# Patient Record
Sex: MALE | Race: WHITE | NOT HISPANIC OR LATINO | Employment: OTHER | URBAN - METROPOLITAN AREA
[De-identification: names, ages, dates, MRNs, and addresses within clinical notes are randomized per-mention and may not be internally consistent; named-entity substitution may affect disease eponyms.]

---

## 2017-01-16 RX ORDER — NORTRIPTYLINE HYDROCHLORIDE 25 MG/1
CAPSULE ORAL
Qty: 90 CAPSULE | Refills: 1 | OUTPATIENT
Start: 2017-01-16

## 2017-06-08 ENCOUNTER — TRANSCRIBE ORDERS (OUTPATIENT)
Dept: ADMINISTRATIVE | Facility: HOSPITAL | Age: 72
End: 2017-06-08

## 2017-06-08 DIAGNOSIS — M79.605 LEG PAIN, BILATERAL: ICD-10-CM

## 2017-06-08 DIAGNOSIS — M79.604 LEG PAIN, BILATERAL: ICD-10-CM

## 2017-06-08 DIAGNOSIS — M54.16 LUMBAR RADICULOPATHY: Primary | ICD-10-CM

## 2017-06-08 DIAGNOSIS — R29.898 RIGHT LEG WEAKNESS: ICD-10-CM

## 2017-06-12 ENCOUNTER — HOSPITAL ENCOUNTER (OUTPATIENT)
Dept: MRI IMAGING | Facility: HOSPITAL | Age: 72
Discharge: HOME | End: 2017-06-12
Admitting: PHYSICIAN ASSISTANT

## 2017-06-12 DIAGNOSIS — M79.604 LEG PAIN, BILATERAL: ICD-10-CM

## 2017-06-12 DIAGNOSIS — R29.898 RIGHT LEG WEAKNESS: ICD-10-CM

## 2017-06-12 DIAGNOSIS — M54.16 LUMBAR RADICULOPATHY: ICD-10-CM

## 2017-06-12 DIAGNOSIS — M79.605 LEG PAIN, BILATERAL: ICD-10-CM

## 2017-06-12 PROCEDURE — 72148 MRI LUMBAR SPINE W/O DYE: CPT

## 2017-06-19 PROBLEM — E78.5 HYPERLIPIDEMIA: Status: ACTIVE | Noted: 2017-06-19

## 2017-06-19 PROBLEM — F41.9 ANXIETY: Status: ACTIVE | Noted: 2017-06-19

## 2017-06-19 PROBLEM — F32.A DEPRESSION: Status: ACTIVE | Noted: 2017-06-19

## 2017-06-19 PROBLEM — M19.90 ARTHRITIS: Status: ACTIVE | Noted: 2017-06-19

## 2017-06-27 RX ORDER — LEVOTHYROXINE SODIUM 0.05 MG/1
50 TABLET ORAL DAILY
COMMUNITY
End: 2018-08-14 | Stop reason: DRUGHIGH

## 2017-06-27 RX ORDER — SERTRALINE HYDROCHLORIDE 100 MG/1
150 TABLET, FILM COATED ORAL DAILY
COMMUNITY
End: 2019-08-14 | Stop reason: SDUPTHER

## 2017-06-27 RX ORDER — ATORVASTATIN CALCIUM 20 MG/1
20 TABLET, FILM COATED ORAL DAILY
COMMUNITY
End: 2018-08-14 | Stop reason: DRUGHIGH

## 2017-06-27 RX ORDER — FLUTICASONE PROPIONATE 50 MCG
2 SPRAY, SUSPENSION (ML) NASAL DAILY
COMMUNITY
End: 2020-03-23 | Stop reason: SDUPTHER

## 2017-06-27 RX ORDER — PRAMIPEXOLE DIHYDROCHLORIDE 0.75 MG/1
0.75 TABLET ORAL 3 TIMES DAILY
COMMUNITY
End: 2017-06-28 | Stop reason: DRUGHIGH

## 2017-06-28 ENCOUNTER — OFFICE VISIT (OUTPATIENT)
Dept: NEUROSURGERY | Facility: CLINIC | Age: 72
End: 2017-06-28

## 2017-06-28 ENCOUNTER — PREP FOR SURGERY (OUTPATIENT)
Dept: OTHER | Facility: HOSPITAL | Age: 72
End: 2017-06-28

## 2017-06-28 VITALS — BODY MASS INDEX: 33.18 KG/M2 | HEIGHT: 71 IN | TEMPERATURE: 97.4 F | WEIGHT: 237 LBS

## 2017-06-28 DIAGNOSIS — M48.062 SPINAL STENOSIS, LUMBAR REGION, WITH NEUROGENIC CLAUDICATION: Primary | ICD-10-CM

## 2017-06-28 DIAGNOSIS — M47.896 OTHER OSTEOARTHRITIS OF SPINE, LUMBAR REGION: ICD-10-CM

## 2017-06-28 DIAGNOSIS — M51.36 DDD (DEGENERATIVE DISC DISEASE), LUMBAR: ICD-10-CM

## 2017-06-28 PROCEDURE — 99204 OFFICE O/P NEW MOD 45 MIN: CPT | Performed by: NEUROLOGICAL SURGERY

## 2017-06-28 RX ORDER — HYDROCODONE BITARTRATE AND ACETAMINOPHEN 5; 325 MG/1; MG/1
1 TABLET ORAL EVERY 6 HOURS PRN
COMMUNITY
Start: 2017-06-07 | End: 2017-07-14 | Stop reason: HOSPADM

## 2017-06-28 RX ORDER — PRAMIPEXOLE DIHYDROCHLORIDE 1 MG/1
1 TABLET ORAL 3 TIMES DAILY
COMMUNITY
Start: 2017-06-15 | End: 2018-11-14 | Stop reason: SDUPTHER

## 2017-06-28 RX ORDER — ACETAMINOPHEN 325 MG/1
1000 TABLET ORAL ONCE
Status: CANCELLED | OUTPATIENT
Start: 2017-06-28 | End: 2017-06-28

## 2017-06-28 RX ORDER — OXYCODONE HCL 10 MG/1
10 TABLET, FILM COATED, EXTENDED RELEASE ORAL ONCE
Status: CANCELLED | OUTPATIENT
Start: 2017-06-28 | End: 2017-06-28

## 2017-06-28 RX ORDER — SODIUM CHLORIDE 0.9 % (FLUSH) 0.9 %
1-10 SYRINGE (ML) INJECTION AS NEEDED
Status: CANCELLED | OUTPATIENT
Start: 2017-06-28

## 2017-06-28 RX ORDER — IBUPROFEN 200 MG
600 TABLET ORAL ONCE
Status: CANCELLED | OUTPATIENT
Start: 2017-06-28 | End: 2017-06-28

## 2017-06-28 RX ORDER — CHLORHEXIDINE GLUCONATE 4 G/100ML
SOLUTION TOPICAL
Qty: 120 ML | Refills: 0 | Status: SHIPPED | OUTPATIENT
Start: 2017-06-28 | End: 2017-07-10

## 2017-06-28 RX ORDER — FAMOTIDINE 10 MG
20 TABLET ORAL
Status: CANCELLED | OUTPATIENT
Start: 2017-06-28

## 2017-06-28 NOTE — PROGRESS NOTES
Patient: Cordell Martin  : 1945    Primary Care Provider: Gregor Bear MD    Requesting Provider: As above        History    Chief Complaint: Back, left hip, and leg pain with walking and standing intolerance.    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  He describes a two-month history of pain in his back that extends in the left hip and then down the side of his left calf.  He also gets pain extending into both inguinal regions.  He gets some relief from medicines and when lying down.  He is much worse when standing or walking.  Physical therapy has been pursued and was not helpful.  He has no bowel difficulties but does have some urinary urgency.  He's got to the point where he cannot stand long enough to fix a sandwhich in his kitchen.  He does have Parkinson's disease.  He does live alone.    Review of Systems   Constitutional: Positive for activity change and fatigue. Negative for appetite change, chills, diaphoresis, fever and unexpected weight change.   HENT: Positive for hearing loss, postnasal drip, tinnitus and voice change. Negative for congestion, dental problem, drooling, ear discharge, ear pain, facial swelling, mouth sores, nosebleeds, rhinorrhea, sinus pressure, sneezing, sore throat and trouble swallowing.    Eyes: Negative for photophobia, pain, discharge, redness, itching and visual disturbance.   Respiratory: Negative for apnea, cough, choking, chest tightness, shortness of breath, wheezing and stridor.    Cardiovascular: Negative for chest pain, palpitations and leg swelling.   Gastrointestinal: Negative for abdominal distention, abdominal pain, anal bleeding, blood in stool, constipation, diarrhea, nausea, rectal pain and vomiting.   Genitourinary: Positive for frequency, penile pain and urgency.   Musculoskeletal: Positive for arthralgias, back pain, myalgias and neck stiffness. Negative for gait problem, joint  "swelling and neck pain.   Skin: Negative for color change, pallor, rash and wound.   Allergic/Immunologic: Negative for environmental allergies, food allergies and immunocompromised state.   Neurological: Positive for dizziness, speech difficulty, weakness, light-headedness and numbness. Negative for tremors, seizures, syncope, facial asymmetry and headaches.   Hematological: Negative for adenopathy. Does not bruise/bleed easily.   Psychiatric/Behavioral: Positive for dysphoric mood and sleep disturbance. Negative for agitation, behavioral problems, confusion, decreased concentration, self-injury and suicidal ideas. The patient is nervous/anxious. The patient is not hyperactive.        The patient's past medical history, past surgical history, family history, and social history have been reviewed at length in the electronic medical record.    Physical Exam:   Temp 97.4 °F (36.3 °C)  Ht 71\" (180.3 cm)  Wt 237 lb (108 kg)  BMI 33.05 kg/m2  CONSTITUTIONAL: Patient is well-nourished, pleasant and appears stated age.  CV: Heart regular rate and rhythm without murmur, rub, or gallop.  PULMONARY: Lungs are clear to ascultation.  MUSCULOSKELETAL:  Straight leg raising is negative.  He does have significant hamstring tightness bilaterally.  Lemuel's Sign is negative.  ROM in back and in all directions.  Tenderness in the back to palpation is not observed.  Bilateral pill-rolling tremors are noted in his hands, right greater than left.  NEUROLOGICAL:  Orientation, memory, attention span, language function, and cognition have been examined and are intact.  Strength is intact in the lower extremities to direct testing.  Muscle tone is normal throughout.  Station and gait are somewhat stooped.  Sensation is intact to light touch testing throughout.  Deep tendon reflexes are difficult to elicit in his lower extremities.  Coordination is intact.  CRANIAL NERVES:  Cranial Nerve II:  Visual fields are full to " confrontation.  Cranial Nerve III, IV, and VI: PERRLADC. Extraocular movements are intact.  Nystagmus is not present.  Cranial Nerve V: Facial sensation is intact to light touch.  Cranial Nerve VII: Muscles of facial expression demonstate no weakness or asymmetry.  Cranial Nerve VIII: Hearing is intact to finger rub bilaterally.  Cranial Nerve IX and X: Palate elevates symmetrically.  Cranial Nerve XI: Shoulder shrug is intact bilaterally.  Cranial Nerve XII: Tongue is midline without evidence of atrophy or fasciculation.    Medical Decision Making    Data Review:   MRI of the lumbar spine demonstrates multilevel degenerative disc disease and facet arthropathy.  There is high-grade stenosis at L4-5.    Diagnosis:   The patient harbors significant L4-5 stenosis with neurogenic claudication.    Treatment Options:   I've discussed the patient's condition with him at length.  I recommended decompressive laminectomy at L4-5.  This is likely to considerably improve his pain and his mobility.  It is not a panacea for all of his back issues.  The nature of the procedure as well as the potential risks, complications, limitations, and alternatives to the procedure were discussed at length with the patient and the patient has agreed to proceed with surgery.  Given his Parkinson's disease and that he lives alone we may need to consider a short rehabilitation stay after surgery.       Diagnosis Plan   1. Spinal stenosis, lumbar region, with neurogenic claudication     2. Other osteoarthritis of spine, lumbar region     3. DDD (degenerative disc disease), lumbar             I, Dr. Trent, personally performed the services described in the documentation, as scribed in my presence, and it is both accurate and complete.  Scribed for Antonio Trent MD by Velasquez Wong CMA on 06/28/2017 at 11:38 AM

## 2017-06-28 NOTE — H&P
Patient: Cordell Martin  : 1945     Primary Care Provider: Gregor Bear MD     Requesting Provider: As above           History     Chief Complaint: Back, left hip, and leg pain with walking and standing intolerance.     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 He describes a two-month history of pain in his back that extends in the left hip and then down the side of his left calf. He also gets pain extending into both inguinal regions. He gets some relief from medicines and when lying down. He is much worse when standing or walking. Physical therapy has been pursued and was not helpful. He has no bowel difficulties but does have some urinary urgency. He's got to the point where he cannot stand long enough to fix a sandwhich in his kitchen. He does have Parkinson's disease. He does live alone.     Review of Systems   Constitutional: Positive for activity change and fatigue. Negative for appetite change, chills, diaphoresis, fever and unexpected weight change.   HENT: Positive for hearing loss, postnasal drip, tinnitus and voice change. Negative for congestion, dental problem, drooling, ear discharge, ear pain, facial swelling, mouth sores, nosebleeds, rhinorrhea, sinus pressure, sneezing, sore throat and trouble swallowing.   Eyes: Negative for photophobia, pain, discharge, redness, itching and visual disturbance.   Respiratory: Negative for apnea, cough, choking, chest tightness, shortness of breath, wheezing and stridor.   Cardiovascular: Negative for chest pain, palpitations and leg swelling.   Gastrointestinal: Negative for abdominal distention, abdominal pain, anal bleeding, blood in stool, constipation, diarrhea, nausea, rectal pain and vomiting.   Genitourinary: Positive for frequency, penile pain and urgency.   Musculoskeletal: Positive for arthralgias, back pain, myalgias and neck stiffness. Negative for gait problem, joint  swelling and neck pain.   Skin: Negative for color change, pallor, rash and wound.   Allergic/Immunologic: Negative for environmental allergies, food allergies and immunocompromised state.   Neurological: Positive for dizziness, speech difficulty, weakness, light-headedness and numbness. Negative for tremors, seizures, syncope, facial asymmetry and headaches.   Hematological: Negative for adenopathy. Does not bruise/bleed easily.   Psychiatric/Behavioral: Positive for dysphoric mood and sleep disturbance. Negative for agitation, behavioral problems, confusion, decreased concentration, self-injury and suicidal ideas. The patient is nervous/anxious. The patient is not hyperactive.         The patient's past medical history, past surgical history, family history, and social history have been reviewed at length in the electronic medical record.     Past Medical History:   Diagnosis Date   • Anxiety    • Arthritis    • Bronchitis    • Chronic kidney disease (CKD), stage III (moderate)    • Cognitive impairment    • Depression    • Glucose intolerance (impaired glucose tolerance)    • Hyperlipidemia    • IBS (irritable bowel syndrome)    • Kidney stone    • Obesity    • Parkinson disease    • Pneumonia    • Prostate disorder      Past Surgical History:   Procedure Laterality Date   • SHOULDER ROTATOR CUFF REPAIR     • TONSILLECTOMY       Family History   Problem Relation Age of Onset   • Alzheimer's disease Other    • Cancer Other    • Diabetes Other    • Heart disease Other    • Stroke Other      Social History     Social History   • Marital status:      Spouse name: N/A   • Number of children: N/A   • Years of education: N/A     Occupational History   • Not on file.     Social History Main Topics   • Smoking status: Former Smoker   • Smokeless tobacco: Not on file   • Alcohol use Yes   • Drug use: No   • Sexual activity: Not on file     Other Topics Concern   • Not on file     Social History Narrative     No  "Known Allergies    Physical Exam:   Temp 97.4 °F (36.3 °C)  Ht 71\" (180.3 cm)  Wt 237 lb (108 kg)  BMI 33.05 kg/m2  CONSTITUTIONAL: Patient is well-nourished, pleasant and appears stated age.  CV: Heart regular rate and rhythm without murmur, rub, or gallop.  PULMONARY: Lungs are clear to ascultation.  MUSCULOSKELETAL:  Straight leg raising is negative. He does have significant hamstring tightness bilaterally.  Lemuel's Sign is negative.  ROM in back and in all directions.  Tenderness in the back to palpation is not observed.  Bilateral pill-rolling tremors are noted in his hands, right greater than left.  NEUROLOGICAL:  Orientation, memory, attention span, language function, and cognition have been examined and are intact.  Strength is intact in the lower extremities to direct testing.  Muscle tone is normal throughout.  Station and gait are somewhat stooped.  Sensation is intact to light touch testing throughout.  Deep tendon reflexes are difficult to elicit in his lower extremities.  Coordination is intact.  CRANIAL NERVES:  Cranial Nerve II: Visual fields are full to confrontation.  Cranial Nerve III, IV, and VI: PERRLADC. Extraocular movements are intact. Nystagmus is not present.  Cranial Nerve V: Facial sensation is intact to light touch.  Cranial Nerve VII: Muscles of facial expression demonstate no weakness or asymmetry.  Cranial Nerve VIII: Hearing is intact to finger rub bilaterally.  Cranial Nerve IX and X: Palate elevates symmetrically.  Cranial Nerve XI: Shoulder shrug is intact bilaterally.  Cranial Nerve XII: Tongue is midline without evidence of atrophy or fasciculation.     Medical Decision Making     Data Review:   MRI of the lumbar spine demonstrates multilevel degenerative disc disease and facet arthropathy. There is high-grade stenosis at L4-5.     Diagnosis:   The patient harbors significant L4-5 stenosis with neurogenic claudication.     Treatment Options:   I've discussed the patient's " condition with him at length. I recommended decompressive laminectomy at L4-5. This is likely to considerably improve his pain and his mobility. It is not a panacea for all of his back issues. The nature of the procedure as well as the potential risks, complications, limitations, and alternatives to the procedure were discussed at length with the patient and the patient has agreed to proceed with surgery. Given his Parkinson's disease and that he lives alone we may need to consider a short rehabilitation stay after surgery.          Diagnosis Plan   1. Spinal stenosis, lumbar region, with neurogenic claudication      2. Other osteoarthritis of spine, lumbar region      3. DDD (degenerative disc disease), lumbar

## 2017-07-10 ENCOUNTER — APPOINTMENT (OUTPATIENT)
Dept: PREADMISSION TESTING | Facility: HOSPITAL | Age: 72
End: 2017-07-10

## 2017-07-10 VITALS — WEIGHT: 233.03 LBS | BODY MASS INDEX: 31.56 KG/M2 | HEIGHT: 72 IN

## 2017-07-10 LAB
DEPRECATED RDW RBC AUTO: 48.9 FL (ref 37–54)
ERYTHROCYTE [DISTWIDTH] IN BLOOD BY AUTOMATED COUNT: 13.9 % (ref 11.3–14.5)
HBA1C MFR BLD: 6.3 % (ref 4.8–5.6)
HCT VFR BLD AUTO: 43.7 % (ref 38.9–50.9)
HGB BLD-MCNC: 14.1 G/DL (ref 13.1–17.5)
MCH RBC QN AUTO: 30.7 PG (ref 27–31)
MCHC RBC AUTO-ENTMCNC: 32.3 G/DL (ref 32–36)
MCV RBC AUTO: 95.2 FL (ref 80–99)
MRSA DNA SPEC QL NAA+PROBE: NEGATIVE
PLATELET # BLD AUTO: 220 10*3/MM3 (ref 150–450)
PMV BLD AUTO: 9.1 FL (ref 6–12)
RBC # BLD AUTO: 4.59 10*6/MM3 (ref 4.2–5.76)
WBC NRBC COR # BLD: 5.56 10*3/MM3 (ref 3.5–10.8)

## 2017-07-10 PROCEDURE — 83036 HEMOGLOBIN GLYCOSYLATED A1C: CPT | Performed by: ANESTHESIOLOGY

## 2017-07-10 PROCEDURE — 87641 MR-STAPH DNA AMP PROBE: CPT | Performed by: NEUROLOGICAL SURGERY

## 2017-07-10 PROCEDURE — 85027 COMPLETE CBC AUTOMATED: CPT | Performed by: ANESTHESIOLOGY

## 2017-07-10 PROCEDURE — 93005 ELECTROCARDIOGRAM TRACING: CPT

## 2017-07-10 PROCEDURE — 36415 COLL VENOUS BLD VENIPUNCTURE: CPT

## 2017-07-10 PROCEDURE — 93010 ELECTROCARDIOGRAM REPORT: CPT | Performed by: INTERNAL MEDICINE

## 2017-07-10 RX ORDER — MELATONIN
2000 DAILY
COMMUNITY
End: 2017-12-12

## 2017-07-10 NOTE — DISCHARGE INSTRUCTIONS
The following information and instructions were given:    NPO after MN except sips of water with routine prescribed medication (except blood thinner, diabetes, or weight reducing medication) unless otherwise instructed by your physician.  Do not eat, drink, smoke or chew gum after MN the night before surgery. This also includes no mints.    DO NOT shave, wear makeup or dark nail polish.    Remove all jewelry (advised to go to jeweler if unable to remove).    Leave anything you consider valuable at home.    Leave your suitcase in the car until after your surgery.    Bring the following with you (if applicable)   -picture ID and insurance cards   -Co-pay/deductible required by insurance   -Medications in the original bottles (not a list) including all over-the-counter  medications if not brought to PAT   -Copy of advance directive, living will or power of  documents if not  brought to PAT   -CPAP or BIPAP mask and tubing (do not bring machine)   -Skin prep instructions sheet   -PAT Pass   Education booklet, brochure, handout or binder given to patient.    Pain Control After Surgery handout given to patient.    Respirex use (handout given to patient) and pneumonia prevention.    Signs and Symptoms of infection.    DVT Prevention stressing the importance of ambulation.    Patient to apply Chlorhexadine wipes to surgical area (as instructed) the night before procedure and the AM of procedure.      Lumbar book given

## 2017-07-10 NOTE — PAT
Bactroban and Chlorhexidine Prescription given during PAT visit, as well as written and verbal instructions given to patient during PAT visit.

## 2017-07-13 ENCOUNTER — ANESTHESIA (OUTPATIENT)
Dept: PERIOP | Facility: HOSPITAL | Age: 72
End: 2017-07-13

## 2017-07-13 ENCOUNTER — ANESTHESIA EVENT (OUTPATIENT)
Dept: PERIOP | Facility: HOSPITAL | Age: 72
End: 2017-07-13

## 2017-07-13 ENCOUNTER — HOSPITAL ENCOUNTER (OUTPATIENT)
Facility: HOSPITAL | Age: 72
Discharge: HOME | End: 2017-07-14
Attending: NEUROLOGICAL SURGERY | Admitting: NEUROLOGICAL SURGERY

## 2017-07-13 ENCOUNTER — APPOINTMENT (OUTPATIENT)
Dept: GENERAL RADIOLOGY | Facility: HOSPITAL | Age: 72
End: 2017-07-13

## 2017-07-13 DIAGNOSIS — M48.062 SPINAL STENOSIS, LUMBAR REGION, WITH NEUROGENIC CLAUDICATION: ICD-10-CM

## 2017-07-13 LAB
ANION GAP SERPL CALCULATED.3IONS-SCNC: 4 MMOL/L (ref 3–11)
BUN BLD-MCNC: 20 MG/DL (ref 9–23)
BUN/CREAT SERPL: 15.4 (ref 7–25)
CALCIUM SPEC-SCNC: 9.7 MG/DL (ref 8.7–10.4)
CHLORIDE SERPL-SCNC: 105 MMOL/L (ref 99–109)
CO2 SERPL-SCNC: 29 MMOL/L (ref 20–31)
CREAT BLD-MCNC: 1.3 MG/DL (ref 0.6–1.3)
GFR SERPL CREATININE-BSD FRML MDRD: 55 ML/MIN/1.73
GLUCOSE BLD-MCNC: 96 MG/DL (ref 70–100)
POTASSIUM BLD-SCNC: 4.3 MMOL/L (ref 3.5–5.5)
SODIUM BLD-SCNC: 138 MMOL/L (ref 132–146)

## 2017-07-13 PROCEDURE — A9270 NON-COVERED ITEM OR SERVICE: HCPCS | Performed by: NEUROLOGICAL SURGERY

## 2017-07-13 PROCEDURE — 25010000002 FENTANYL CITRATE (PF) 100 MCG/2ML SOLUTION: Performed by: NURSE ANESTHETIST, CERTIFIED REGISTERED

## 2017-07-13 PROCEDURE — 63710000001 FLUTICASONE 50 MCG/ACT SUSPENSION 16 G BOTTLE: Performed by: NEUROLOGICAL SURGERY

## 2017-07-13 PROCEDURE — 25010000003 CEFAZOLIN IN DEXTROSE 2-4 GM/100ML-% SOLUTION: Performed by: NEUROLOGICAL SURGERY

## 2017-07-13 PROCEDURE — 63710000001 IBUPROFEN 600 MG TABLET: Performed by: NEUROLOGICAL SURGERY

## 2017-07-13 PROCEDURE — 25010000002 PHENYLEPHRINE PER 1 ML: Performed by: NURSE ANESTHETIST, CERTIFIED REGISTERED

## 2017-07-13 PROCEDURE — 76000 FLUOROSCOPY <1 HR PHYS/QHP: CPT

## 2017-07-13 PROCEDURE — 63710000001 PRAMIPEXOLE 0.25 MG TABLET: Performed by: NEUROLOGICAL SURGERY

## 2017-07-13 PROCEDURE — 25010000002 ONDANSETRON PER 1 MG: Performed by: NURSE ANESTHETIST, CERTIFIED REGISTERED

## 2017-07-13 PROCEDURE — 63047 LAM FACETEC & FORAMOT LUMBAR: CPT | Performed by: NEUROLOGICAL SURGERY

## 2017-07-13 PROCEDURE — 80048 BASIC METABOLIC PNL TOTAL CA: CPT | Performed by: NEUROLOGICAL SURGERY

## 2017-07-13 PROCEDURE — 63710000001 FAMOTIDINE 20 MG TABLET: Performed by: NEUROLOGICAL SURGERY

## 2017-07-13 PROCEDURE — 25010000002 PROPOFOL 10 MG/ML EMULSION: Performed by: NURSE ANESTHETIST, CERTIFIED REGISTERED

## 2017-07-13 PROCEDURE — 63710000001 OXYCODONE 10 MG TABLET EXTENDED-RELEASE 12 HOUR: Performed by: NEUROLOGICAL SURGERY

## 2017-07-13 PROCEDURE — 63710000001 HYDROCODONE-ACETAMINOPHEN 7.5-325 MG TABLET: Performed by: NEUROLOGICAL SURGERY

## 2017-07-13 PROCEDURE — 25010000002 NEOSTIGMINE 10 MG/10ML SOLUTION: Performed by: NURSE ANESTHETIST, CERTIFIED REGISTERED

## 2017-07-13 PROCEDURE — 25010000002 HYDROMORPHONE PER 4 MG: Performed by: NURSE ANESTHETIST, CERTIFIED REGISTERED

## 2017-07-13 RX ORDER — LABETALOL HYDROCHLORIDE 5 MG/ML
5 INJECTION, SOLUTION INTRAVENOUS
Status: DISCONTINUED | OUTPATIENT
Start: 2017-07-13 | End: 2017-07-13 | Stop reason: HOSPADM

## 2017-07-13 RX ORDER — SENNA AND DOCUSATE SODIUM 50; 8.6 MG/1; MG/1
1 TABLET, FILM COATED ORAL NIGHTLY PRN
Status: DISCONTINUED | OUTPATIENT
Start: 2017-07-13 | End: 2017-07-14 | Stop reason: HOSPADM

## 2017-07-13 RX ORDER — PROMETHAZINE HYDROCHLORIDE 25 MG/1
25 SUPPOSITORY RECTAL ONCE AS NEEDED
Status: DISCONTINUED | OUTPATIENT
Start: 2017-07-13 | End: 2017-07-13 | Stop reason: HOSPADM

## 2017-07-13 RX ORDER — FLUTICASONE PROPIONATE 50 MCG
2 SPRAY, SUSPENSION (ML) NASAL DAILY
Status: DISCONTINUED | OUTPATIENT
Start: 2017-07-13 | End: 2017-07-14 | Stop reason: HOSPADM

## 2017-07-13 RX ORDER — IBUPROFEN 600 MG/1
600 TABLET ORAL EVERY 8 HOURS SCHEDULED
Status: DISCONTINUED | OUTPATIENT
Start: 2017-07-13 | End: 2017-07-14 | Stop reason: HOSPADM

## 2017-07-13 RX ORDER — CEFAZOLIN SODIUM 2 G/100ML
2 INJECTION, SOLUTION INTRAVENOUS EVERY 8 HOURS
Status: COMPLETED | OUTPATIENT
Start: 2017-07-13 | End: 2017-07-14

## 2017-07-13 RX ORDER — SODIUM CHLORIDE 0.9 % (FLUSH) 0.9 %
1-10 SYRINGE (ML) INJECTION AS NEEDED
Status: DISCONTINUED | OUTPATIENT
Start: 2017-07-13 | End: 2017-07-13 | Stop reason: HOSPADM

## 2017-07-13 RX ORDER — BISACODYL 10 MG
10 SUPPOSITORY, RECTAL RECTAL DAILY PRN
Status: DISCONTINUED | OUTPATIENT
Start: 2017-07-13 | End: 2017-07-14 | Stop reason: HOSPADM

## 2017-07-13 RX ORDER — FENTANYL CITRATE 50 UG/ML
50 INJECTION, SOLUTION INTRAMUSCULAR; INTRAVENOUS
Status: DISCONTINUED | OUTPATIENT
Start: 2017-07-13 | End: 2017-07-13 | Stop reason: HOSPADM

## 2017-07-13 RX ORDER — HYDRALAZINE HYDROCHLORIDE 20 MG/ML
5 INJECTION INTRAMUSCULAR; INTRAVENOUS
Status: DISCONTINUED | OUTPATIENT
Start: 2017-07-13 | End: 2017-07-13 | Stop reason: HOSPADM

## 2017-07-13 RX ORDER — NEOSTIGMINE METHYLSULFATE 1 MG/ML
INJECTION, SOLUTION INTRAVENOUS AS NEEDED
Status: DISCONTINUED | OUTPATIENT
Start: 2017-07-13 | End: 2017-07-13 | Stop reason: SURG

## 2017-07-13 RX ORDER — SODIUM CHLORIDE 0.9 % (FLUSH) 0.9 %
1-10 SYRINGE (ML) INJECTION AS NEEDED
Status: DISCONTINUED | OUTPATIENT
Start: 2017-07-13 | End: 2017-07-14 | Stop reason: HOSPADM

## 2017-07-13 RX ORDER — MAGNESIUM HYDROXIDE 1200 MG/15ML
LIQUID ORAL AS NEEDED
Status: DISCONTINUED | OUTPATIENT
Start: 2017-07-13 | End: 2017-07-13 | Stop reason: HOSPADM

## 2017-07-13 RX ORDER — MORPHINE SULFATE 4 MG/ML
4 INJECTION, SOLUTION INTRAMUSCULAR; INTRAVENOUS EVERY 4 HOURS PRN
Status: DISCONTINUED | OUTPATIENT
Start: 2017-07-13 | End: 2017-07-14 | Stop reason: HOSPADM

## 2017-07-13 RX ORDER — DIPHENHYDRAMINE HCL 25 MG
25 CAPSULE ORAL NIGHTLY PRN
Status: DISCONTINUED | OUTPATIENT
Start: 2017-07-13 | End: 2017-07-14 | Stop reason: HOSPADM

## 2017-07-13 RX ORDER — FAMOTIDINE 20 MG/1
20 TABLET, FILM COATED ORAL ONCE
Status: DISCONTINUED | OUTPATIENT
Start: 2017-07-13 | End: 2017-07-13

## 2017-07-13 RX ORDER — NALOXONE HCL 0.4 MG/ML
0.4 VIAL (ML) INJECTION
Status: DISCONTINUED | OUTPATIENT
Start: 2017-07-13 | End: 2017-07-14 | Stop reason: HOSPADM

## 2017-07-13 RX ORDER — PROMETHAZINE HYDROCHLORIDE 25 MG/ML
6.25 INJECTION, SOLUTION INTRAMUSCULAR; INTRAVENOUS ONCE AS NEEDED
Status: DISCONTINUED | OUTPATIENT
Start: 2017-07-13 | End: 2017-07-13 | Stop reason: HOSPADM

## 2017-07-13 RX ORDER — PROPOFOL 10 MG/ML
VIAL (ML) INTRAVENOUS AS NEEDED
Status: DISCONTINUED | OUTPATIENT
Start: 2017-07-13 | End: 2017-07-13 | Stop reason: SURG

## 2017-07-13 RX ORDER — ONDANSETRON 2 MG/ML
4 INJECTION INTRAMUSCULAR; INTRAVENOUS EVERY 6 HOURS PRN
Status: DISCONTINUED | OUTPATIENT
Start: 2017-07-13 | End: 2017-07-14 | Stop reason: HOSPADM

## 2017-07-13 RX ORDER — MEPERIDINE HYDROCHLORIDE 25 MG/ML
12.5 INJECTION INTRAMUSCULAR; INTRAVENOUS; SUBCUTANEOUS
Status: DISCONTINUED | OUTPATIENT
Start: 2017-07-13 | End: 2017-07-13 | Stop reason: HOSPADM

## 2017-07-13 RX ORDER — GLYCOPYRROLATE 0.2 MG/ML
INJECTION INTRAMUSCULAR; INTRAVENOUS AS NEEDED
Status: DISCONTINUED | OUTPATIENT
Start: 2017-07-13 | End: 2017-07-13 | Stop reason: SURG

## 2017-07-13 RX ORDER — OXYCODONE HCL 10 MG/1
10 TABLET, FILM COATED, EXTENDED RELEASE ORAL ONCE
Status: COMPLETED | OUTPATIENT
Start: 2017-07-13 | End: 2017-07-13

## 2017-07-13 RX ORDER — OXYCODONE HYDROCHLORIDE AND ACETAMINOPHEN 5; 325 MG/1; MG/1
1 TABLET ORAL ONCE AS NEEDED
Status: DISCONTINUED | OUTPATIENT
Start: 2017-07-13 | End: 2017-07-13 | Stop reason: HOSPADM

## 2017-07-13 RX ORDER — IBUPROFEN 600 MG/1
600 TABLET ORAL ONCE
Status: COMPLETED | OUTPATIENT
Start: 2017-07-13 | End: 2017-07-13

## 2017-07-13 RX ORDER — ACETAMINOPHEN 325 MG/1
650 TABLET ORAL EVERY 4 HOURS PRN
Status: DISCONTINUED | OUTPATIENT
Start: 2017-07-13 | End: 2017-07-14 | Stop reason: HOSPADM

## 2017-07-13 RX ORDER — ONDANSETRON 2 MG/ML
4 INJECTION INTRAMUSCULAR; INTRAVENOUS ONCE AS NEEDED
Status: DISCONTINUED | OUTPATIENT
Start: 2017-07-13 | End: 2017-07-13 | Stop reason: HOSPADM

## 2017-07-13 RX ORDER — FAMOTIDINE 10 MG/ML
20 INJECTION, SOLUTION INTRAVENOUS ONCE
Status: DISCONTINUED | OUTPATIENT
Start: 2017-07-13 | End: 2017-07-13

## 2017-07-13 RX ORDER — ROCURONIUM BROMIDE 10 MG/ML
INJECTION, SOLUTION INTRAVENOUS AS NEEDED
Status: DISCONTINUED | OUTPATIENT
Start: 2017-07-13 | End: 2017-07-13 | Stop reason: SURG

## 2017-07-13 RX ORDER — SODIUM CHLORIDE, SODIUM LACTATE, POTASSIUM CHLORIDE, CALCIUM CHLORIDE 600; 310; 30; 20 MG/100ML; MG/100ML; MG/100ML; MG/100ML
9 INJECTION, SOLUTION INTRAVENOUS CONTINUOUS
Status: DISCONTINUED | OUTPATIENT
Start: 2017-07-13 | End: 2017-07-13 | Stop reason: SDUPTHER

## 2017-07-13 RX ORDER — HYDROCODONE BITARTRATE AND ACETAMINOPHEN 5; 325 MG/1; MG/1
1 TABLET ORAL EVERY 6 HOURS PRN
Status: DISCONTINUED | OUTPATIENT
Start: 2017-07-13 | End: 2017-07-14 | Stop reason: HOSPADM

## 2017-07-13 RX ORDER — LIDOCAINE HYDROCHLORIDE 10 MG/ML
0.5 INJECTION, SOLUTION EPIDURAL; INFILTRATION; INTRACAUDAL; PERINEURAL ONCE AS NEEDED
Status: COMPLETED | OUTPATIENT
Start: 2017-07-13 | End: 2017-07-13

## 2017-07-13 RX ORDER — PROMETHAZINE HYDROCHLORIDE 25 MG/1
25 TABLET ORAL ONCE AS NEEDED
Status: DISCONTINUED | OUTPATIENT
Start: 2017-07-13 | End: 2017-07-13 | Stop reason: HOSPADM

## 2017-07-13 RX ORDER — NALOXONE HCL 0.4 MG/ML
0.4 VIAL (ML) INJECTION AS NEEDED
Status: DISCONTINUED | OUTPATIENT
Start: 2017-07-13 | End: 2017-07-13 | Stop reason: HOSPADM

## 2017-07-13 RX ORDER — HYDROCODONE BITARTRATE AND ACETAMINOPHEN 7.5; 325 MG/1; MG/1
1 TABLET ORAL EVERY 4 HOURS PRN
Status: DISCONTINUED | OUTPATIENT
Start: 2017-07-13 | End: 2017-07-14 | Stop reason: HOSPADM

## 2017-07-13 RX ORDER — HYDROMORPHONE HYDROCHLORIDE 1 MG/ML
0.5 INJECTION, SOLUTION INTRAMUSCULAR; INTRAVENOUS; SUBCUTANEOUS
Status: DISCONTINUED | OUTPATIENT
Start: 2017-07-13 | End: 2017-07-13 | Stop reason: HOSPADM

## 2017-07-13 RX ORDER — FENTANYL CITRATE 50 UG/ML
INJECTION, SOLUTION INTRAMUSCULAR; INTRAVENOUS AS NEEDED
Status: DISCONTINUED | OUTPATIENT
Start: 2017-07-13 | End: 2017-07-13 | Stop reason: SURG

## 2017-07-13 RX ORDER — SODIUM CHLORIDE, SODIUM LACTATE, POTASSIUM CHLORIDE, CALCIUM CHLORIDE 600; 310; 30; 20 MG/100ML; MG/100ML; MG/100ML; MG/100ML
90 INJECTION, SOLUTION INTRAVENOUS CONTINUOUS
Status: DISCONTINUED | OUTPATIENT
Start: 2017-07-13 | End: 2017-07-14

## 2017-07-13 RX ORDER — MORPHINE SULFATE 2 MG/ML
2 INJECTION, SOLUTION INTRAMUSCULAR; INTRAVENOUS EVERY 4 HOURS PRN
Status: DISCONTINUED | OUTPATIENT
Start: 2017-07-13 | End: 2017-07-14 | Stop reason: HOSPADM

## 2017-07-13 RX ORDER — BISACODYL 5 MG/1
5 TABLET, DELAYED RELEASE ORAL DAILY PRN
Status: DISCONTINUED | OUTPATIENT
Start: 2017-07-13 | End: 2017-07-14 | Stop reason: HOSPADM

## 2017-07-13 RX ORDER — ACETAMINOPHEN 500 MG
1000 TABLET ORAL ONCE
Status: DISCONTINUED | OUTPATIENT
Start: 2017-07-13 | End: 2017-07-13 | Stop reason: HOSPADM

## 2017-07-13 RX ORDER — ATORVASTATIN CALCIUM 20 MG/1
20 TABLET, FILM COATED ORAL DAILY
Status: DISCONTINUED | OUTPATIENT
Start: 2017-07-14 | End: 2017-07-14 | Stop reason: HOSPADM

## 2017-07-13 RX ORDER — DOCUSATE SODIUM 100 MG/1
100 CAPSULE, LIQUID FILLED ORAL 2 TIMES DAILY PRN
Status: DISCONTINUED | OUTPATIENT
Start: 2017-07-13 | End: 2017-07-14 | Stop reason: HOSPADM

## 2017-07-13 RX ORDER — CEFAZOLIN SODIUM 2 G/100ML
2 INJECTION, SOLUTION INTRAVENOUS ONCE
Status: COMPLETED | OUTPATIENT
Start: 2017-07-13 | End: 2017-07-13

## 2017-07-13 RX ORDER — PRAMIPEXOLE DIHYDROCHLORIDE 0.25 MG/1
1 TABLET ORAL EVERY 8 HOURS SCHEDULED
Status: DISCONTINUED | OUTPATIENT
Start: 2017-07-13 | End: 2017-07-14 | Stop reason: HOSPADM

## 2017-07-13 RX ORDER — FAMOTIDINE 20 MG/1
20 TABLET, FILM COATED ORAL
Status: COMPLETED | OUTPATIENT
Start: 2017-07-13 | End: 2017-07-13

## 2017-07-13 RX ORDER — IPRATROPIUM BROMIDE AND ALBUTEROL SULFATE 2.5; .5 MG/3ML; MG/3ML
3 SOLUTION RESPIRATORY (INHALATION) ONCE AS NEEDED
Status: DISCONTINUED | OUTPATIENT
Start: 2017-07-13 | End: 2017-07-13 | Stop reason: HOSPADM

## 2017-07-13 RX ORDER — ONDANSETRON 2 MG/ML
INJECTION INTRAMUSCULAR; INTRAVENOUS AS NEEDED
Status: DISCONTINUED | OUTPATIENT
Start: 2017-07-13 | End: 2017-07-13 | Stop reason: SURG

## 2017-07-13 RX ORDER — OXYCODONE AND ACETAMINOPHEN 7.5; 325 MG/1; MG/1
1 TABLET ORAL ONCE AS NEEDED
Status: DISCONTINUED | OUTPATIENT
Start: 2017-07-13 | End: 2017-07-13 | Stop reason: HOSPADM

## 2017-07-13 RX ORDER — BUPIVACAINE HYDROCHLORIDE AND EPINEPHRINE 2.5; 5 MG/ML; UG/ML
INJECTION, SOLUTION EPIDURAL; INFILTRATION; INTRACAUDAL; PERINEURAL AS NEEDED
Status: DISCONTINUED | OUTPATIENT
Start: 2017-07-13 | End: 2017-07-13 | Stop reason: HOSPADM

## 2017-07-13 RX ORDER — LEVOTHYROXINE SODIUM 0.05 MG/1
50 TABLET ORAL
Status: DISCONTINUED | OUTPATIENT
Start: 2017-07-14 | End: 2017-07-14 | Stop reason: HOSPADM

## 2017-07-13 RX ORDER — LIDOCAINE HYDROCHLORIDE 10 MG/ML
INJECTION, SOLUTION EPIDURAL; INFILTRATION; INTRACAUDAL; PERINEURAL AS NEEDED
Status: DISCONTINUED | OUTPATIENT
Start: 2017-07-13 | End: 2017-07-13 | Stop reason: SURG

## 2017-07-13 RX ADMIN — EPHEDRINE SULFATE 10 MG: 50 INJECTION INTRAMUSCULAR; INTRAVENOUS; SUBCUTANEOUS at 13:11

## 2017-07-13 RX ADMIN — IBUPROFEN 600 MG: 600 TABLET ORAL at 21:19

## 2017-07-13 RX ADMIN — ONDANSETRON 4 MG: 2 INJECTION INTRAMUSCULAR; INTRAVENOUS at 13:35

## 2017-07-13 RX ADMIN — LIDOCAINE HYDROCHLORIDE 50 MG: 10 INJECTION, SOLUTION EPIDURAL; INFILTRATION; INTRACAUDAL; PERINEURAL at 12:19

## 2017-07-13 RX ADMIN — FAMOTIDINE 20 MG: 20 TABLET ORAL at 10:45

## 2017-07-13 RX ADMIN — HYDROMORPHONE HYDROCHLORIDE: 1 INJECTION, SOLUTION INTRAMUSCULAR; INTRAVENOUS; SUBCUTANEOUS at 15:25

## 2017-07-13 RX ADMIN — PHENYLEPHRINE HYDROCHLORIDE 100 MCG: 10 INJECTION INTRAVENOUS at 12:46

## 2017-07-13 RX ADMIN — FENTANYL CITRATE 100 MCG: 50 INJECTION, SOLUTION INTRAMUSCULAR; INTRAVENOUS at 12:19

## 2017-07-13 RX ADMIN — CEFAZOLIN SODIUM 2 G: 2 INJECTION, SOLUTION INTRAVENOUS at 12:22

## 2017-07-13 RX ADMIN — HYDROCODONE BITARTRATE AND ACETAMINOPHEN 1 TABLET: 7.5; 325 TABLET ORAL at 16:46

## 2017-07-13 RX ADMIN — SODIUM CHLORIDE, POTASSIUM CHLORIDE, SODIUM LACTATE AND CALCIUM CHLORIDE 9 ML/HR: 600; 310; 30; 20 INJECTION, SOLUTION INTRAVENOUS at 10:50

## 2017-07-13 RX ADMIN — GLYCOPYRROLATE 0.6 MG: 0.2 INJECTION, SOLUTION INTRAMUSCULAR; INTRAVENOUS at 13:48

## 2017-07-13 RX ADMIN — OXYCODONE HYDROCHLORIDE 10 MG: 10 TABLET, FILM COATED, EXTENDED RELEASE ORAL at 11:04

## 2017-07-13 RX ADMIN — LIDOCAINE HYDROCHLORIDE 0.5 ML: 10 INJECTION, SOLUTION EPIDURAL; INFILTRATION; INTRACAUDAL; PERINEURAL at 10:49

## 2017-07-13 RX ADMIN — PHENYLEPHRINE HYDROCHLORIDE 100 MCG: 10 INJECTION INTRAVENOUS at 13:01

## 2017-07-13 RX ADMIN — HYDROCODONE BITARTRATE AND ACETAMINOPHEN 1 TABLET: 7.5; 325 TABLET ORAL at 20:27

## 2017-07-13 RX ADMIN — NEOSTIGMINE METHYLSULFATE 4 MG: 1 INJECTION, SOLUTION INTRAVENOUS at 13:48

## 2017-07-13 RX ADMIN — FENTANYL CITRATE: 50 INJECTION INTRAMUSCULAR; INTRAVENOUS at 14:45

## 2017-07-13 RX ADMIN — PROPOFOL 160 MG: 10 INJECTION, EMULSION INTRAVENOUS at 12:19

## 2017-07-13 RX ADMIN — GLYCOPYRROLATE 0.2 MG: 0.2 INJECTION, SOLUTION INTRAMUSCULAR; INTRAVENOUS at 13:01

## 2017-07-13 RX ADMIN — SODIUM CHLORIDE, POTASSIUM CHLORIDE, SODIUM LACTATE AND CALCIUM CHLORIDE 90 ML/HR: 600; 310; 30; 20 INJECTION, SOLUTION INTRAVENOUS at 16:47

## 2017-07-13 RX ADMIN — ROCURONIUM BROMIDE 30 MG: 10 INJECTION INTRAVENOUS at 12:19

## 2017-07-13 RX ADMIN — IBUPROFEN 600 MG: 600 TABLET ORAL at 11:04

## 2017-07-13 RX ADMIN — FENTANYL CITRATE: 50 INJECTION INTRAMUSCULAR; INTRAVENOUS at 14:25

## 2017-07-13 RX ADMIN — FLUTICASONE PROPIONATE 2 SPRAY: 50 SPRAY, METERED NASAL at 16:46

## 2017-07-13 RX ADMIN — PRAMIPEXOLE DIHYDROCHLORIDE 1 MG: 0.25 TABLET ORAL at 16:46

## 2017-07-13 RX ADMIN — CEFAZOLIN SODIUM 2 G: 2 INJECTION, SOLUTION INTRAVENOUS at 21:18

## 2017-07-13 RX ADMIN — SODIUM CHLORIDE, POTASSIUM CHLORIDE, SODIUM LACTATE AND CALCIUM CHLORIDE: 600; 310; 30; 20 INJECTION, SOLUTION INTRAVENOUS at 13:02

## 2017-07-13 RX ADMIN — PHENYLEPHRINE HYDROCHLORIDE 100 MCG: 10 INJECTION INTRAVENOUS at 12:24

## 2017-07-13 NOTE — ANESTHESIA PREPROCEDURE EVALUATION
Anesthesia Evaluation     Patient summary reviewed and Nursing notes reviewed          Airway   Mallampati: II  TM distance: >3 FB  Neck ROM: full  no difficulty expected  Dental - normal exam     Pulmonary - negative pulmonary ROS and normal exam   Cardiovascular - normal exam    (+) hyperlipidemia      Neuro/Psych    ROS Comment: Parkinsons  GI/Hepatic/Renal/Endo    (+) morbid obesity, hypothyroidism,     Musculoskeletal (-) negative ROS    Abdominal  - normal exam    Bowel sounds: normal.   Substance History - negative use     OB/GYN negative ob/gyn ROS         Other                                        Anesthesia Plan    ASA 3     general     intravenous induction   Anesthetic plan and risks discussed with patient.    Plan discussed with CRNA.

## 2017-07-13 NOTE — H&P
"Pre-Op H&P (See Recent Office Note Attached)    Chief complaint: back and leg pain L>R    HPI:    Patient is a 72 y.o. male who presents with history of 1 year of back and leg pain, worse past 2 months.  No recent fever or chills. No changes in presenting symptoms since last seen by MD.    Review of Systems:  General ROS:  no fever, chills, rashes, No change since last office visit  Cardiovascular ROS: no chest pain or dyspnea on exertion  Respiratory ROS: no cough, shortness of breath, or wheezing    Immunization History:   Influenza:  yes  Pneumococcal:  yes  Tetanus:  yes    Vital Signs:  /79 (BP Location: Right arm, Patient Position: Lying)  Pulse 71  Temp 97.7 °F (36.5 °C) (Tympanic)   Resp 18  Ht 72\" (182.9 cm)  Wt 230 lb (104 kg)  SpO2 91%  BMI 31.19 kg/m2    Physical Exam:    CV:  S1S2 regular rate and rhythm, no murmur               Resp:  Clear to auscultation; respirations regular, even and unlabored    Results Review:    I reviewed the patient's new clinical results. CBC, EKG on chart    Cancer Staging (if applicable)  Cancer Patient: __ yes _x_no __unknown; If yes, clinical stage T:__ N:__M:__, stage group or __N/A    Assessment/Plan: Lumbar spinal stenosis/For lumbar laminectomy today      JASMEET Amaya  7/13/2017 12:05 PM      "

## 2017-07-13 NOTE — ANESTHESIA PROCEDURE NOTES
Airway  Urgency: elective    Date/Time: 7/13/2017 12:28 PM  End Time:7/13/2017 12:28 PM  Airway not difficult    General Information and Staff    Patient location during procedure: OR  CRNA: SAGRARIO PANCHAL    Indications and Patient Condition  Indications for airway management: airway protection    Preoxygenated: yes  MILS maintained throughout  Mask difficulty assessment: 1 - vent by mask    Final Airway Details  Final airway type: endotracheal airway      Successful airway: ETT    Successful intubation technique: direct laryngoscopy  Facilitating devices/methods: intubating stylet  Blade: Bouchra  Blade size: #4  ETT size: 8.0 mm  Cormack-Lehane Classification: grade I - full view of glottis  Placement verified by: chest auscultation and capnometry   Measured from: lips  ETT to lips (cm): 22  Number of attempts at approach: 1

## 2017-07-13 NOTE — OP NOTE
NEUROSURGICAL OPERATIVE NOTE        PREOPERATIVE DIAGNOSIS:    1.  Lumbar spinal stenosis, L4-5  2.  Neurogenic claudication      POSTOPERATIVE DIAGNOSIS:  Same      PROCEDURE:  L4-5 laminectomy with medial facetectomies and foraminotomies      SURGEON:  Antonio Trent M.D.      ASSISTANT:  Orly Bunch PA-C      ANESTHESIA:  General      ESTIMATED BLOOD LOSS:  Minimal      SPECIMEN:  None      DRAINS:  7 flat NELY      COMPLICATIONS:  None      CLINICAL NOTE:  The patient is a 72-year-old gentleman with a history of back and leg pain with walking and standing intolerance.  His left lower extremity afflicts him more than the right.  Studies revealed generous grade stenosis at L4-5.  This is felt to be the source for his symptoms and as such he presents at this time for lumbar decompression. The nature of the procedure as well as the potential risks, complications, limitations, and alternatives to the procedure were discussed at length with the patient and the patient has agreed to proceed with surgery.      TECHNICAL NOTE:  The patient was brought to the operating room and while on his cart general anesthesia was achieved.  He was then turned prone onto the [place] saddle frame.  Special care was ensured to protect pressure points.  His low back was prepared and draped in the usual fashion.  A localizing radiograph was obtained with a spinal needle in the lumbosacral midline utilizing the C-arm.  Based on this a several centimeter vertical incision was fashioned overlying the L4-5 interspace.  Soft tissues were divided with cautery to provide exposure to the posterior spinal elements.  Another radiograph confirmed the operative level.  A Leksell washer was utilized to remove the spinous processes at L4 and the upper aspect of L5.  Midas Terence drill utilized to fashion a central trough which was then widened using drill and Kerrison punches.  The facet complexes were undercut the neural foramina were decompressed.   Bleeding points were controlled with bone wax and FloSeal.  At completion of the procedure a ball probe could easily be passed along the nerve roots into the foramina and good decompression had been achieved.  Bleeding was controlled with bone wax and FloSeal.  After irrigating the wound out a 7 flat NELY drain was brought in through a separate stab incision and left in the epidural space.  The paraspinous muscle and fascia was reapproximated in interrupted fashion with 0 Vicryl suture.  Quarter percent Marcaine was instilled into the paraspinous muscles and subcutaneous tissues.  Cutaneous tissues were closed in layers with 2-0 followed by 3-0 Vicryl suture.  Skin was closed in a running subcuticular fashion with 3-0 Vicryl suture.  Steri-Strips and sterile dressing were applied.  He was subsequently rolled onto his cart, extubated, taken recovery room in satisfactory condition.  There were no overt intraoperative complications.    Antonio Trent M.D.

## 2017-07-13 NOTE — ANESTHESIA POSTPROCEDURE EVALUATION
Patient: Cordell Martin    Procedure Summary     Date Anesthesia Start Anesthesia Stop Room / Location    07/13/17 1213  [facility] OR [facility] / [facility]       Procedure Diagnosis Surgeon Provider    LUMBAR LAMINECTOMY L4-5 (N/A Spine Lumbar) Spinal stenosis, lumbar region, with neurogenic claudication  (Spinal stenosis, lumbar region, with neurogenic claudication [M48.06]) MD Antonio Higgins MD          Anesthesia Type: general  Last vitals 7/13/17 @ 1401  BP   137/75   Temp   97.4   Pulse  91   Resp   24   SpO2   98%     Post Anesthesia Care and Evaluation    Patient location during evaluation: PACU  Patient participation: complete - patient participated  Level of consciousness: responsive to verbal stimuli  Pain score: 0  Pain management: adequate  Airway patency: patent  Anesthetic complications: No anesthetic complications    Cardiovascular status: stable  Respiratory status: acceptable, nasal cannula and spontaneous ventilation  Hydration status: stable    Comments: Pt transferred to PACU with O2. Vital signs stable. Report to PACU RN and care accepted. Moves all extremities.

## 2017-07-14 VITALS
HEART RATE: 69 BPM | OXYGEN SATURATION: 93 % | BODY MASS INDEX: 31.15 KG/M2 | SYSTOLIC BLOOD PRESSURE: 125 MMHG | TEMPERATURE: 96.2 F | WEIGHT: 230 LBS | DIASTOLIC BLOOD PRESSURE: 70 MMHG | RESPIRATION RATE: 16 BRPM | HEIGHT: 72 IN

## 2017-07-14 PROCEDURE — A9270 NON-COVERED ITEM OR SERVICE: HCPCS | Performed by: NEUROLOGICAL SURGERY

## 2017-07-14 PROCEDURE — 63710000001 LEVOTHYROXINE 50 MCG TABLET: Performed by: NEUROLOGICAL SURGERY

## 2017-07-14 PROCEDURE — 63710000001 PRAMIPEXOLE 0.25 MG TABLET: Performed by: NEUROLOGICAL SURGERY

## 2017-07-14 PROCEDURE — 25010000003 CEFAZOLIN IN DEXTROSE 2-4 GM/100ML-% SOLUTION: Performed by: NEUROLOGICAL SURGERY

## 2017-07-14 PROCEDURE — 63710000001 HYDROCODONE-ACETAMINOPHEN 7.5-325 MG TABLET: Performed by: NEUROLOGICAL SURGERY

## 2017-07-14 PROCEDURE — 63710000001 ATORVASTATIN 20 MG TABLET: Performed by: NEUROLOGICAL SURGERY

## 2017-07-14 PROCEDURE — 63710000001 SERTRALINE 100 MG TABLET: Performed by: NEUROLOGICAL SURGERY

## 2017-07-14 PROCEDURE — 63710000001 IBUPROFEN 600 MG TABLET: Performed by: NEUROLOGICAL SURGERY

## 2017-07-14 PROCEDURE — 63710000001 SERTRALINE 50 MG TABLET: Performed by: NEUROLOGICAL SURGERY

## 2017-07-14 RX ORDER — HYDROCODONE BITARTRATE AND ACETAMINOPHEN 7.5; 325 MG/1; MG/1
1 TABLET ORAL 3 TIMES DAILY PRN
Qty: 50 TABLET | Refills: 0 | Status: SHIPPED | OUTPATIENT
Start: 2017-07-14 | End: 2017-12-12

## 2017-07-14 RX ADMIN — FLUTICASONE PROPIONATE 2 SPRAY: 50 SPRAY, METERED NASAL at 08:46

## 2017-07-14 RX ADMIN — PRAMIPEXOLE DIHYDROCHLORIDE 1 MG: 0.25 TABLET ORAL at 14:14

## 2017-07-14 RX ADMIN — SERTRALINE 150 MG: 100 TABLET, FILM COATED ORAL at 08:46

## 2017-07-14 RX ADMIN — PRAMIPEXOLE DIHYDROCHLORIDE 1 MG: 0.25 TABLET ORAL at 05:31

## 2017-07-14 RX ADMIN — HYDROCODONE BITARTRATE AND ACETAMINOPHEN 1 TABLET: 7.5; 325 TABLET ORAL at 08:46

## 2017-07-14 RX ADMIN — CEFAZOLIN SODIUM 2 G: 2 INJECTION, SOLUTION INTRAVENOUS at 05:31

## 2017-07-14 RX ADMIN — IBUPROFEN 600 MG: 600 TABLET ORAL at 05:31

## 2017-07-14 RX ADMIN — IBUPROFEN 600 MG: 600 TABLET ORAL at 14:14

## 2017-07-14 RX ADMIN — LEVOTHYROXINE SODIUM 50 MCG: 50 TABLET ORAL at 05:31

## 2017-07-14 RX ADMIN — ATORVASTATIN CALCIUM 20 MG: 20 TABLET, FILM COATED ORAL at 08:46

## 2017-07-14 NOTE — PLAN OF CARE
Problem: Patient Care Overview (Adult)  Goal: Plan of Care Review  Outcome: Ongoing (interventions implemented as appropriate)    07/14/17 0409   Coping/Psychosocial Response Interventions   Plan Of Care Reviewed With patient   Outcome Evaluation   Outcome Summary/Follow up Plan rested well this shift. up with assist. no falls or injuries. o2 sat on room air 79% o2 at 2liters per nc applied and up to 94%. pain controlled well with pain meds given       Goal: Adult Individualization and Mutuality  Outcome: Ongoing (interventions implemented as appropriate)  Goal: Discharge Needs Assessment  Outcome: Ongoing (interventions implemented as appropriate)    Problem: Perioperative Period (Adult)  Goal: Signs and Symptoms of Listed Potential Problems Will be Absent or Manageable (Perioperative Period)  Outcome: Ongoing (interventions implemented as appropriate)    Problem: Laminectomy/Foraminotomy/Discectomy (Adult)  Goal: Signs and Symptoms of Listed Potential Problems Will be Absent or Manageable (Laminectomy/Foraminotomy/Discectomy)  Outcome: Ongoing (interventions implemented as appropriate)

## 2017-07-14 NOTE — PROGRESS NOTES
Discharge Planning Assessment  [facility]     Patient Name: Cordell Martin  MRN: [id number]  Today's Date: 7/14/2017    Admit Date: 7/13/2017          Discharge Needs Assessment       07/14/17 1034    Living Environment    Lives With alone    Living Arrangements house    Home Accessibility bed and bath on same level;stairs to enter home    Number of Stairs to Enter Home 1    Transportation Available car;family or friend will provide    Living Environment    Quality Of Family Relationships supportive    Able to Return to Prior Living Arrangements yes    Living Arrangement Comments Mr. Martin lives alone in a one story home in [facility].  He has a sister that lives out of town that will be staying with him until this coming Sunday to assist him as needed.  He also has a neighbor that can assist him if he requests help.    Discharge Needs Assessment    Readmission Within The Last 30 Days no previous admission in last 30 days    Outpatient/Agency/Support Group Needs homecare agency (specify level of care)    Community Agency Name(S) --   [facility],     Equipment Currently Used at Home walker, rolling;rollator;wheelchair   Mr. Martin has access to this DME that was his Mother's.  He has not been using prior to admission.    Equipment Needed After Discharge none    Discharge Disposition home healthcare service    Discharge Contact Information if Applicable Sister, Lennie,  [phone]            Discharge Plan       07/14/17 1040    Case Management/Social Work Plan    Plan Home with [facility]    Patient/Family In Agreement With Plan yes    Additional Comments Met with Mr. Martin at the bedside to discuss discharge planning.  Mr. Martin lives alone and will have some assistance from a neighbor, but only if he requests it.  His sister, Lennie, who lives in [place] is visting and will be transporting him home and staying to assist until this Sunday.  Discussed home health and Mr. Martin requested  [facility], [place], operated by [facility].  Referred patient to Alfredo with [facility].  Mr. Martin's sister will be transporting him home today by car.  CM will continue to follow.        Discharge Placement     No information found        Expected Discharge Date and Time     Expected Discharge Date Expected Discharge Time    Jul 14, 2017               Demographic Summary       07/14/17 1030    Referral Information    Admission Type outpatient in a bed    Arrived From home or self-care    Reason For Consult discharge planning    Record Reviewed clinical discipline documentation;history and physical;medical record    Contact Information    Permission Granted to Share Information With     Primary Care Physician Information    Name Gregor Bear MD            Functional Status       07/14/17 1032    Functional Status Prior    Ambulation 0-->independent    Transferring 0-->independent    Toileting 0-->independent    Bathing 0-->independent    Dressing 0-->independent    Communication 0-->understands/communicates without difficulty    Swallowing 0-->swallows foods/liquids without difficulty    IADL    Medications independent    Meal Preparation independent    Housekeeping independent    Laundry independent    Shopping independent    Oral Care independent    Activity Tolerance    Current Activity Limitations --   See PT notes    Usual Activity Tolerance good    Cognitive/Perceptual/Developmental    Current Mental Status/Cognitive Functioning no deficits noted    Employment/Financial    Employment/Finance Comments Mr. Martin has prescription drug coverage with Medicare and [employer].  Verified insurance with patient.  He fills scripts at [facility].            Psychosocial     None            Abuse/Neglect     None            Legal       07/14/17 1034    Legal    Legal Comments No advance directives.            Substance Abuse     None            Patient Forms     None          Ute Diallo

## 2017-07-14 NOTE — PROGRESS NOTES
"NEUROSURGERY PROGRESS NOTE     LOS: 0 days   Patient Care Team:  Gregor Bear MD as PCP - General  Gregor Bear MD as Referring Physician (Internal Medicine)    Chief Complaint: Back and left leg pain with walking and standing intolerance.    POD#: 1 Day Post-Op  Procedures:  4-5 laminectomy.    Interval History:   The patient is voiding independently and has ambulated in the mata.  Patient Complaints: Incisional pain.  Patient Denies: Preoperative back and left hip pain is absent.    Vital Signs: Blood pressure 107/72, pulse 60, temperature 96.9 °F (36.1 °C), temperature source Tympanic, resp. rate 16, height 72\" (182.9 cm), weight 230 lb (104 kg), SpO2 96 %.  Intake/Output:   Intake/Output Summary (Last 24 hours) at 07/14/17 0736  Last data filed at 07/14/17 0430   Gross per 24 hour   Intake             1200 ml   Output              980 ml   Net              220 ml     Drain output: 80/100 mL.    Physical Exam:  Patient is awake, alert, and appropriate.  Motor and sensory function are intact.  Dry dressing is in place on his incision.     Assessment/Plan:  1.  Lumbar stenosis with neurogenic claudication status post L4-5 decompression.  2.  Disposition: Home later today with home health.      Antonio Trent MD  07/14/17  7:36 AM    "

## 2017-07-18 ENCOUNTER — TELEPHONE (OUTPATIENT)
Dept: NEUROSURGERY | Facility: CLINIC | Age: 72
End: 2017-07-18

## 2017-07-18 NOTE — TELEPHONE ENCOUNTER
Provider:  Twan  Caller: Erica   Time of call:   9:41am  Phone #:  [phone]  Surgery: LUMBAR LAMINECTOMY L4-5   Surgery Date:  7/13/17  Last visit:   6/28/17  Next visit: 8/4/17        Reason for call:         Erica from [facility] called to let us know that she visited the pt this morning and he has not been wearing his BAYRON hose and is not on any blood thinner.  Pt has no one living with him and is alone.  Erica wanted to know if she needed to make sure he is definitely wearing his BAYRON hose.

## 2017-07-28 ENCOUNTER — OFFICE VISIT (OUTPATIENT)
Dept: NEUROLOGY | Facility: CLINIC | Age: 72
End: 2017-07-28

## 2017-07-28 VITALS
SYSTOLIC BLOOD PRESSURE: 124 MMHG | DIASTOLIC BLOOD PRESSURE: 76 MMHG | HEIGHT: 72 IN | WEIGHT: 230 LBS | BODY MASS INDEX: 31.15 KG/M2

## 2017-07-28 DIAGNOSIS — G20 PARKINSON'S DISEASE (HCC): Primary | ICD-10-CM

## 2017-07-28 PROBLEM — G20.A1 PARKINSON'S DISEASE: Status: ACTIVE | Noted: 2017-07-28

## 2017-07-28 PROCEDURE — 99214 OFFICE O/P EST MOD 30 MIN: CPT | Performed by: PSYCHIATRY & NEUROLOGY

## 2017-07-28 RX ORDER — AMANTADINE HYDROCHLORIDE 100 MG/1
100 CAPSULE, GELATIN COATED ORAL 2 TIMES DAILY
Qty: 60 CAPSULE | Refills: 6 | Status: SHIPPED | OUTPATIENT
Start: 2017-07-28 | End: 2017-12-12

## 2017-07-28 NOTE — PROGRESS NOTES
"Cordell Martin    Subjective     CC: tremor    History of Present Illness   Cordell Martin returns to clinic today for evaluation of tremor. He has noted an worsening tremor of the R>L hand over the last couple years (since about 2015). He notes occasional associated imbalance, but no other associated symptoms. He is taking Mirapex at 1 mg tid without clear benefit.      The following portions of the patient's history were reviewed and updated as appropriate: allergies, current medications, past family history, past medical history, past social history, past surgical history and problem list.    Review of Systems   Constitutional: Negative.    Respiratory: Negative.    Cardiovascular: Negative.    Gastrointestinal: Negative.    Genitourinary: Positive for urgency.   Musculoskeletal: Negative.        Objective     /76  Ht 72\" (182.9 cm)  Wt 230 lb (104 kg)  BMI 31.19 kg/m2     Neurologic Exam     Mental Status   Oriented to person, place, and time.   Registration: recalls 3 of 3 objects. Recall at 5 minutes: recalls 3 of 3 objects. Follows 3 step commands.   Attention: normal.   Speech: speech is normal   Level of consciousness: alert  Knowledge: good.   Normal comprehension.     Cranial Nerves   Cranial nerves II through XII intact.        Very mild hypomimia     Motor Exam   Muscle bulk: normal  Overall muscle tone: normal    Strength   Strength 5/5 throughout.     Sensory Exam   Light touch normal.     Gait, Coordination, and Reflexes     Gait  Gait: normal    Coordination   Finger to nose coordination: normal    Tremor   Resting tremor: present (primarily on the right)        Assessment/Plan   Cordell was seen today for parkinson's disease.    Diagnoses and all orders for this visit:    Parkinson's disease  -     CT Head Without Contrast    Other orders  -     amantadine (SYMMETREL) 100 MG capsule; Take 1 capsule by mouth 2 (Two) Times a Day.          Cordell Martin returns to clinic today with a history " of tremor and examination suggestive of PD. I discussed this in some detail. I would like to obtain a CT scan to rule out hydrocephalus or other intracranial abnormality. For treatment, we will try adding amantadine. I discussed potential side effects. If he does well, then we may be able to taper his Mirapex. Sinemet is another consideration if necessary in the future.  He will then follow up in 3 month, or sooner if needed.     As part of this visit I reviewed records from prior hospitalizations which is incorporated in the HPI.    Chalo Cavanaugh MD

## 2017-08-02 ENCOUNTER — HOSPITAL ENCOUNTER (OUTPATIENT)
Dept: CT IMAGING | Facility: HOSPITAL | Age: 72
Discharge: HOME | End: 2017-08-02
Attending: PSYCHIATRY & NEUROLOGY | Admitting: PSYCHIATRY & NEUROLOGY

## 2017-08-02 PROCEDURE — 70450 CT HEAD/BRAIN W/O DYE: CPT

## 2017-08-04 ENCOUNTER — OFFICE VISIT (OUTPATIENT)
Dept: NEUROSURGERY | Facility: CLINIC | Age: 72
End: 2017-08-04

## 2017-08-04 VITALS
HEIGHT: 72 IN | WEIGHT: 229 LBS | BODY MASS INDEX: 31.02 KG/M2 | DIASTOLIC BLOOD PRESSURE: 70 MMHG | TEMPERATURE: 97.5 F | SYSTOLIC BLOOD PRESSURE: 116 MMHG

## 2017-08-04 DIAGNOSIS — Z98.890 STATUS POST LUMBAR LAMINECTOMY: ICD-10-CM

## 2017-08-04 DIAGNOSIS — M48.062 SPINAL STENOSIS, LUMBAR REGION, WITH NEUROGENIC CLAUDICATION: Primary | ICD-10-CM

## 2017-08-04 PROCEDURE — 99024 POSTOP FOLLOW-UP VISIT: CPT | Performed by: PHYSICIAN ASSISTANT

## 2017-08-04 NOTE — PATIENT INSTRUCTIONS
Continue to advance activity as tolerated.    Limit bending, lifting, and twisting of the lower back for 3-6 months.    Limit vigorous activites, such as running or other sports.

## 2017-08-04 NOTE — PROGRESS NOTES
[facility]    Patient: Cordell Martin      Date: 17  : 1945   MRN: [id number]    PCP: Gregor Bear MD  Patient Care Team:  Gregor Bear MD as PCP - General  Gregor Bear MD as Referring Physician (Internal Medicine)      SURGERY:  L4-5 laminectomy with medial facetectomies and foraminotomies     2017     CHIEF COMPLIANT:  PostOp wound check    HISTORY OF PRESENT ILLNESS  Mr Martin is a pleasant 72 y.o. gentleman with a hx a Parkinson's Dz who presents today for postop follow up.  Preoperatively, he had back, left hip, inguinal, and leg pain with walking and standing intolerance.  Today, he reports significant improvement of his preoperative symptoms and no pain in his knees.  He reports some incisional pain.  He walks his dog daily and does his exercises at home.  He lives at home alone with his dog. No fevers, chills, headaches, wound drainage, B/B changes, or new pain or weakness.    Review of Systems   Constitutional: Negative for activity change, appetite change, chills, diaphoresis, fatigue, fever and unexpected weight change.   HENT: Positive for trouble swallowing. Negative for congestion, dental problem, drooling, ear discharge, ear pain, facial swelling, hearing loss, mouth sores, nosebleeds, postnasal drip, rhinorrhea, sinus pressure, sneezing, sore throat, tinnitus and voice change.    Eyes: Negative for photophobia, pain, discharge, redness, itching and visual disturbance.   Respiratory: Negative for apnea, cough, choking, chest tightness, shortness of breath, wheezing and stridor.    Cardiovascular: Negative for chest pain, palpitations and leg swelling.   Gastrointestinal: Negative for abdominal distention, abdominal pain, anal bleeding, blood in stool, constipation, diarrhea, nausea, rectal pain and vomiting.   Endocrine: Negative for cold intolerance, heat intolerance, polydipsia, polyphagia and  polyuria.   Genitourinary: Negative for decreased urine volume, difficulty urinating, dysuria, enuresis, flank pain, frequency, genital sores, hematuria and urgency.   Musculoskeletal: Positive for back pain. Negative for arthralgias, gait problem, joint swelling, myalgias, neck pain and neck stiffness.   Skin: Negative for color change, pallor, rash and wound.   Allergic/Immunologic: Negative for environmental allergies, food allergies and immunocompromised state.   Neurological: Positive for tremors and light-headedness. Negative for dizziness, seizures, syncope, facial asymmetry, speech difficulty, weakness, numbness and headaches.   Hematological: Negative for adenopathy. Does not bruise/bleed easily.   Psychiatric/Behavioral: Positive for dysphoric mood. Negative for agitation, behavioral problems, confusion, decreased concentration, hallucinations, self-injury, sleep disturbance and suicidal ideas. The patient is not nervous/anxious and is not hyperactive.         The patient's past medical history, past surgical history, family history, and social history have been reviewed at length in the electronic medical record.      Past Medical History:   Diagnosis Date   • Anxiety    • Arthritis    • Back pain    • Bronchitis    • Cognitive impairment    • Depression    • Disease of thyroid gland    • Glucose intolerance (impaired glucose tolerance)    • Hyperlipidemia    • IBS (irritable bowel syndrome)    • Kidney stone    • Obesity    • Parkinson disease    • Pneumonia    • Prostate disorder    • Wears eyeglasses      Past Surgical History:   Procedure Laterality Date   • COLONOSCOPY      5 years ago   • LUMBAR LAMINECTOMY DISCECTOMY DECOMPRESSION N/A 7/13/2017    Procedure: LUMBAR LAMINECTOMY L4-5;  Surgeon: Antonio Trent MD;  Location: [facility];  Service:    • SHOULDER ROTATOR CUFF REPAIR Right     x2   • TONSILLECTOMY       Family History   Problem Relation Age of Onset   • Alzheimer's disease Other    •  Cancer Other    • Diabetes Other    • Heart disease Other    • Stroke Other      Social History     Social History   • Marital status:      Spouse name: N/A   • Number of children: N/A   • Years of education: N/A     Occupational History   • Not on file.     Social History Main Topics   • Smoking status: Former Smoker     Types: Cigarettes   • Smokeless tobacco: Never Used      Comment: quit 1980   • Alcohol use Yes      Comment: occasionally   • Drug use: No   • Sexual activity: Defer     Other Topics Concern   • Not on file     Social History Narrative     Outpatient Prescriptions Marked as Taking for the 8/4/17 encounter (Office Visit) with Orly Bunch PA-C   Medication Sig Dispense Refill   • amantadine (SYMMETREL) 100 MG capsule Take 1 capsule by mouth 2 (Two) Times a Day. 60 capsule 6   • atorvastatin (LIPITOR) 20 MG tablet Take 20 mg by mouth Daily.     • cholecalciferol (VITAMIN D3) 1000 UNITS tablet Take 2,000 Units by mouth Daily.     • fluticasone (FLONASE) 50 MCG/ACT nasal spray 2 sprays into each nostril Daily.     • HYDROcodone-acetaminophen (NORCO) 7.5-325 MG per tablet Take 1 tablet by mouth 3 (Three) Times a Day As Needed for Moderate Pain  (Pain). 50 tablet 0   • levothyroxine (SYNTHROID, LEVOTHROID) 50 MCG tablet Take 50 mcg by mouth Daily.     • pramipexole (MIRAPEX) 1 MG tablet Take 1 mg by mouth 3 (Three) Times a Day.     • sertraline (ZOLOFT) 100 MG tablet Take 150 mg by mouth Daily.         Current Outpatient Prescriptions:   •  amantadine (SYMMETREL) 100 MG capsule, Take 1 capsule by mouth 2 (Two) Times a Day., Disp: 60 capsule, Rfl: 6  •  atorvastatin (LIPITOR) 20 MG tablet, Take 20 mg by mouth Daily., Disp: , Rfl:   •  cholecalciferol (VITAMIN D3) 1000 UNITS tablet, Take 2,000 Units by mouth Daily., Disp: , Rfl:   •  fluticasone (FLONASE) 50 MCG/ACT nasal spray, 2 sprays into each nostril Daily., Disp: , Rfl:   •  HYDROcodone-acetaminophen (NORCO) 7.5-325 MG per tablet, Take 1  "tablet by mouth 3 (Three) Times a Day As Needed for Moderate Pain  (Pain)., Disp: 50 tablet, Rfl: 0  •  levothyroxine (SYNTHROID, LEVOTHROID) 50 MCG tablet, Take 50 mcg by mouth Daily., Disp: , Rfl:   •  pramipexole (MIRAPEX) 1 MG tablet, Take 1 mg by mouth 3 (Three) Times a Day., Disp: , Rfl:   •  sertraline (ZOLOFT) 100 MG tablet, Take 150 mg by mouth Daily., Disp: , Rfl:   Allergies as of 08/04/2017   • (No Known Allergies)       PHYSICAL EXAM      Vitals: /70  Temp 97.5 °F (36.4 °C)  Ht 72\" (182.9 cm)  Wt 229 lb (104 kg)  BMI 31.06 kg/m2   General Appearance:   WDWN, alert, and cooperative. NAD.   HEENT: NCAT   Chest Breathing unlabored.   Mood: Appropriate mood and affect.   Skin:     Midline lumbar incision is well approximated at appropriate stage of healing without warmth, drainage, or erythema.   Pill rolling tremor.  Bradykinesia c/w Parkinson's     Medical Decision Making  ASSESSMENT  1. Lumbar stenosis with neurogenic claudication status post L4-5 decompression    2. Status post lumbar laminectomy        PLAN  Cordell presents today for postop follow-up.  He is doing very well postoperatively.  His incision is healing well with no s/s of infection.    Patient Instructions   Continue to advance activity as tolerated.    Limit bending, lifting, and twisting of the lower back  Limit vigorous activities.   Can take ibuprofen 400mg BID PRN for incisional pain    Return for f/up with Dr. Trent, in 6 weeks. He should call or RTC sooner if symptoms worsen or new symptoms develop.     Time:   15 minutes with more than 50% of time spent counseling and coordination of care with patient.      MARION Brito MD  [facility]  08/04/17  10:00 AM    "

## 2017-09-13 ENCOUNTER — OFFICE VISIT (OUTPATIENT)
Dept: NEUROSURGERY | Facility: CLINIC | Age: 72
End: 2017-09-13

## 2017-09-13 VITALS — WEIGHT: 232 LBS | BODY MASS INDEX: 31.42 KG/M2 | HEIGHT: 72 IN | TEMPERATURE: 96.5 F

## 2017-09-13 DIAGNOSIS — M48.062 SPINAL STENOSIS OF LUMBAR REGION WITH NEUROGENIC CLAUDICATION: ICD-10-CM

## 2017-09-13 DIAGNOSIS — M47.896 OTHER OSTEOARTHRITIS OF SPINE, LUMBAR REGION: ICD-10-CM

## 2017-09-13 DIAGNOSIS — M51.36 DEGENERATIVE DISC DISEASE, LUMBAR: ICD-10-CM

## 2017-09-13 DIAGNOSIS — Z98.890 STATUS POST LUMBAR LAMINECTOMY: Primary | ICD-10-CM

## 2017-09-13 PROCEDURE — 99024 POSTOP FOLLOW-UP VISIT: CPT | Performed by: NEUROLOGICAL SURGERY

## 2017-09-13 NOTE — PROGRESS NOTES
Patient: Cordell Martin  : 1945    Primary Care Provider: Gregor Bear MD    Requesting Provider: As above        History    Chief Complaint: Back, left hip, and leg pain with walking and standing intolerance.    History of Present Illness: Mr. Martin is seen in follow-up.  He is a 72-year-old gentleman who presented with neurogenic claudication and on 17 underwent L4-5 decompression.  His left leg pain is absent.  Occasionally he gets a twinge of pain in his right thigh.  He's been walking about a mile a day.  He's been steadily increasing his activity.    Review of Systems   Constitutional: Negative for activity change, appetite change, chills, diaphoresis, fatigue, fever and unexpected weight change.   HENT: Negative for congestion, dental problem, drooling, ear discharge, ear pain, facial swelling, hearing loss, mouth sores, nosebleeds, postnasal drip, rhinorrhea, sinus pressure, sneezing, sore throat, tinnitus, trouble swallowing and voice change.    Eyes: Negative for photophobia, pain, discharge, redness, itching and visual disturbance.   Respiratory: Negative for apnea, cough, choking, chest tightness, shortness of breath, wheezing and stridor.    Cardiovascular: Negative for chest pain, palpitations and leg swelling.   Gastrointestinal: Negative for abdominal distention, abdominal pain, anal bleeding, blood in stool, constipation, diarrhea, nausea, rectal pain and vomiting.   Endocrine: Negative for cold intolerance, heat intolerance, polydipsia, polyphagia and polyuria.   Genitourinary: Negative for decreased urine volume, difficulty urinating, dysuria, enuresis, flank pain, frequency, genital sores, hematuria and urgency.   Musculoskeletal: Positive for back pain. Negative for arthralgias, gait problem, joint swelling, myalgias, neck pain and neck stiffness.   Skin: Negative for color change, pallor, rash and wound.   Allergic/Immunologic: Negative for environmental allergies, food  "allergies and immunocompromised state.   Neurological: Positive for tremors and headaches. Negative for dizziness, seizures, syncope, facial asymmetry, speech difficulty, weakness, light-headedness and numbness.   Hematological: Negative for adenopathy. Does not bruise/bleed easily.   Psychiatric/Behavioral: Negative for agitation, behavioral problems, confusion, decreased concentration, dysphoric mood, hallucinations, self-injury, sleep disturbance and suicidal ideas. The patient is not nervous/anxious and is not hyperactive.    All other systems reviewed and are negative.      The patient's past medical history, past surgical history, family history, and social history have been reviewed at length in the electronic medical record.    Physical Exam:   Temp 96.5 °F (35.8 °C) (Temporal Artery )   Ht 72\" (182.9 cm)  Wt 232 lb (105 kg)  BMI 31.46 kg/m2  He moves about in an upright in independent fashion.  His incision is well-healed.    Medical Decision Making    Diagnosis:   Lumbar stenosis with neurogenic claudication status post L4-5 decompression.    Treatment Options:   Mr. Martin is doing quite nicely.  I would like him to continue to increase his walking.  He will follow-up in our clinic in about 3 months for what will likely be a final visit.       Diagnosis Plan   1. Status post lumbar laminectomy     2. Spinal stenosis of lumbar region with neurogenic claudication     3. Other osteoarthritis of spine, lumbar region     4. Degenerative disc disease, lumbar       Scribed for Antonio Trent MD by Brenda Cook CMA on 09/13/2017 at 3:05 PM      I, Dr. Trent, personally performed the services described in the documentation, as scribed in my presence, and it is both accurate and complete.  "

## 2017-12-12 ENCOUNTER — OFFICE VISIT (OUTPATIENT)
Dept: NEUROLOGY | Facility: CLINIC | Age: 72
End: 2017-12-12

## 2017-12-12 ENCOUNTER — APPOINTMENT (OUTPATIENT)
Dept: LAB | Facility: HOSPITAL | Age: 72
End: 2017-12-12

## 2017-12-12 VITALS — BODY MASS INDEX: 31.42 KG/M2 | HEIGHT: 72 IN | WEIGHT: 232 LBS

## 2017-12-12 DIAGNOSIS — G20 PARKINSON'S DISEASE (HCC): Primary | ICD-10-CM

## 2017-12-12 DIAGNOSIS — R41.3 MEMORY LOSS: ICD-10-CM

## 2017-12-12 LAB
ALBUMIN SERPL-MCNC: 4.1 G/DL (ref 3.2–4.8)
ALBUMIN/GLOB SERPL: 1.7 G/DL (ref 1.5–2.5)
ALP SERPL-CCNC: 97 U/L (ref 25–100)
ALT SERPL W P-5'-P-CCNC: 18 U/L (ref 7–40)
ANION GAP SERPL CALCULATED.3IONS-SCNC: 7 MMOL/L (ref 3–11)
AST SERPL-CCNC: 19 U/L (ref 0–33)
BILIRUB SERPL-MCNC: 0.6 MG/DL (ref 0.3–1.2)
BUN BLD-MCNC: 29 MG/DL (ref 9–23)
BUN/CREAT SERPL: 24.2 (ref 7–25)
CALCIUM SPEC-SCNC: 9.3 MG/DL (ref 8.7–10.4)
CHLORIDE SERPL-SCNC: 98 MMOL/L (ref 99–109)
CO2 SERPL-SCNC: 31 MMOL/L (ref 20–31)
CREAT BLD-MCNC: 1.2 MG/DL (ref 0.6–1.3)
FOLATE SERPL-MCNC: 6.74 NG/ML (ref 3.2–20)
GFR SERPL CREATININE-BSD FRML MDRD: 60 ML/MIN/1.73
GLOBULIN UR ELPH-MCNC: 2.4 GM/DL
GLUCOSE BLD-MCNC: 91 MG/DL (ref 70–100)
POTASSIUM BLD-SCNC: 4.9 MMOL/L (ref 3.5–5.5)
PROT SERPL-MCNC: 6.5 G/DL (ref 5.7–8.2)
SODIUM BLD-SCNC: 136 MMOL/L (ref 132–146)
TSH SERPL DL<=0.05 MIU/L-ACNC: 3.6 MIU/ML (ref 0.35–5.35)
VIT B12 BLD-MCNC: 284 PG/ML (ref 211–911)

## 2017-12-12 PROCEDURE — 80053 COMPREHEN METABOLIC PANEL: CPT | Performed by: PHYSICIAN ASSISTANT

## 2017-12-12 PROCEDURE — 36415 COLL VENOUS BLD VENIPUNCTURE: CPT | Performed by: PHYSICIAN ASSISTANT

## 2017-12-12 PROCEDURE — 82607 VITAMIN B-12: CPT | Performed by: PHYSICIAN ASSISTANT

## 2017-12-12 PROCEDURE — 84443 ASSAY THYROID STIM HORMONE: CPT | Performed by: PHYSICIAN ASSISTANT

## 2017-12-12 PROCEDURE — 99214 OFFICE O/P EST MOD 30 MIN: CPT | Performed by: PHYSICIAN ASSISTANT

## 2017-12-12 PROCEDURE — 82746 ASSAY OF FOLIC ACID SERUM: CPT | Performed by: PHYSICIAN ASSISTANT

## 2017-12-12 RX ORDER — AMANTADINE HYDROCHLORIDE 100 MG/1
200 TABLET ORAL 2 TIMES DAILY
Qty: 120 TABLET | Refills: 11 | Status: SHIPPED | OUTPATIENT
Start: 2017-12-12 | End: 2020-04-17

## 2017-12-12 RX ORDER — AMANTADINE HYDROCHLORIDE 100 MG/1
TABLET ORAL
COMMUNITY
Start: 2017-11-08 | End: 2017-12-12 | Stop reason: SDUPTHER

## 2017-12-12 NOTE — PROGRESS NOTES
"Subjective     Chief Complaint: tremor      History of Present Illness   Cordell Martin is a 72 y.o. male who returns to clinic today with a history of Parkinson's Disease. He has symptoms since at least 2015 noted initially by a resting tremor primarily in his right hand. He notes occasional associated imbalance, though denies a shuffling gait. He is taking Mirapex at 1 mg tid without clear benefit. He is also taking Amantadine 100mg BID.    He has also noted short term memory impairment for several years. He notes associated word-finding difficulties and impairments in concentration. He denies any additional cognitive impairments. There have been no associated BPSD symptoms. Additionally, he denies any ADL impairments. He manages his medications  and finances. He continues to drive.      Prior evaluation has included a head CT  which was unremarkable .       I have reviewed and confirmed the past family, social and medical history as accurate on 12/12/17.     Review of Systems   Constitutional: Negative.    HENT: Negative.    Eyes: Negative.    Respiratory: Negative.    Cardiovascular: Negative.    Gastrointestinal: Negative.    Endocrine: Negative.    Genitourinary: Negative.    Musculoskeletal: Negative.    Skin: Negative.    Allergic/Immunologic: Negative.    Hematological: Negative.    Psychiatric/Behavioral: Negative.        Objective     Ht 182.9 cm (72\")  Wt 105 kg (232 lb)  BMI 31.46 kg/m2    General appearance today is normal.       Physical Exam   Neurological: He has normal strength. He has a normal Finger-Nose-Finger Test. Gait normal.   Psychiatric: His speech is normal.        Neurologic Exam     Mental Status   Speech: speech is normal   Level of consciousness: alert  Normal comprehension.     Cranial Nerves   Cranial nerves II through XII intact.        Except for hypomimia     Motor Exam   Muscle bulk: normal  Right arm tone: increased  Left arm tone: normal    Strength   Strength 5/5 " throughout.     Sensory Exam   Light touch normal.     Gait, Coordination, and Reflexes     Gait  Gait: normal    Coordination   Finger to nose coordination: normal    Tremor   Resting tremor: primarily on right.          Assessment/Plan   Cordell was seen today for parkinson's disease.    Diagnoses and all orders for this visit:    Parkinson's disease    Memory loss  -     Folate  -     TSH  -     Vitamin B12  -     Comprehensive Metabolic Panel          Discussion/Summary   Cordell Martin returns to clinic today with a history of Parkinson's Disease and memory impairment. I again reviewed his current status and treatment options. It was elected to obtain screening blood work . After discussing potential treatment options, it was elected to continue on Mirapex unchanged and increase his amantadine. He will then follow up in 2-3 months, or sooner if needed.       I spent 20 minutes out of 25 minutes face to face with the patient and discussing diagnosis, prognosis, diagnostic testing, evaluation, current status, treatment options and management.    As part of this visit I reviewed prior lab results and reviewed radiology results.      Priti Ross PA-C

## 2017-12-27 ENCOUNTER — TELEPHONE (OUTPATIENT)
Dept: NEUROLOGY | Facility: CLINIC | Age: 72
End: 2017-12-27

## 2017-12-27 NOTE — TELEPHONE ENCOUNTER
----- Message from Joyce Ross sent at 12/27/2017  2:35 PM EST -----  Contact: Cordell Landa called with some concerns about the raised dosage of his amantadine 100 MG tablet. He said since its been raised, he's experiencing swelling in legs and it's burning.    Please call Cordell back  [phone]

## 2017-12-28 NOTE — TELEPHONE ENCOUNTER
Unfortunately, that can be a side effect of amantadine. He should discontinue the amantadine or lower the dose back down. Thanks.

## 2018-01-09 ENCOUNTER — OFFICE VISIT (OUTPATIENT)
Dept: NEUROSURGERY | Facility: CLINIC | Age: 73
End: 2018-01-09

## 2018-01-09 VITALS
WEIGHT: 230 LBS | SYSTOLIC BLOOD PRESSURE: 132 MMHG | DIASTOLIC BLOOD PRESSURE: 78 MMHG | HEART RATE: 84 BPM | TEMPERATURE: 97.4 F | BODY MASS INDEX: 31.15 KG/M2 | HEIGHT: 72 IN

## 2018-01-09 DIAGNOSIS — Z98.890 STATUS POST LUMBAR LAMINECTOMY: ICD-10-CM

## 2018-01-09 DIAGNOSIS — M48.062 SPINAL STENOSIS, LUMBAR REGION, WITH NEUROGENIC CLAUDICATION: Primary | ICD-10-CM

## 2018-01-09 PROCEDURE — 99213 OFFICE O/P EST LOW 20 MIN: CPT | Performed by: PHYSICIAN ASSISTANT

## 2018-01-09 RX ORDER — IBUPROFEN 600 MG/1
TABLET ORAL
Qty: 60 TABLET | Refills: 0 | Status: SHIPPED | OUTPATIENT
Start: 2018-01-09 | End: 2018-08-14

## 2018-01-09 NOTE — PROGRESS NOTES
[facility]    Patient: Cordell Martin      Date: 18  : 1945   MRN: [id number]  ______________________________________________________________________      CHIEF COMPLIANT:   Back, left hip, and leg pain with walking and standing intolerance    HISTORY OF PRESENT ILLNESS  Mr. Martin is a 72 y.o. gentleman with history of Parkinson's Disease who presents today for follow up.  He presented with neurogenic claudication and on 17 underwent L4-5 decompression.  His left leg pain and right thigh pain are absent.  His back is stiff at times but the pain has almost completely resolved.  His left hip pain is significantly better.  His claudication symptoms have improved.  He is very pleased with her symptomatic improvements of his preoperative symptoms.    The patient's past medical history, past surgical history, family history, and social history have been reviewed at length in the electronic medical record.      Past Medical History:   Diagnosis Date   • Anxiety    • Arthritis    • Back pain    • Bronchitis    • Cognitive impairment    • Depression    • Disease of thyroid gland    • Glucose intolerance (impaired glucose tolerance)    • Hyperlipidemia    • IBS (irritable bowel syndrome)    • Kidney stone    • Obesity    • Parkinson disease    • Pneumonia    • Prostate disorder    • Wears eyeglasses      Past Surgical History:   Procedure Laterality Date   • COLONOSCOPY      5 years ago   • LUMBAR LAMINECTOMY DISCECTOMY DECOMPRESSION N/A 2017    Procedure: LUMBAR LAMINECTOMY L4-5;  Surgeon: Antonio Trent MD;  Location: [facility];  Service:    • SHOULDER ROTATOR CUFF REPAIR Right     x2   • TONSILLECTOMY       Social History     Social History   • Marital status:      Spouse name: N/A   • Number of children: N/A   • Years of education: N/A     Occupational History   • Not on file.     Social History Main Topics   • Smoking status:  Former Smoker     Types: Cigarettes   • Smokeless tobacco: Never Used      Comment: quit 1980   • Alcohol use Yes      Comment: occasionally   • Drug use: No   • Sexual activity: Defer     Other Topics Concern   • Not on file     Social History Narrative     Mr. Martin has a current medication list which includes the following prescription(s): amantadine, atorvastatin, fluticasone, levothyroxine, pramipexole, sertraline, and ibuprofen. See EMR for details.  He has No Known Allergies.      Review of Systems   Constitutional: Negative for activity change, appetite change, chills, diaphoresis, fatigue, fever and unexpected weight change.   HENT: Negative for congestion, dental problem, drooling, ear discharge, ear pain, facial swelling, hearing loss, mouth sores, nosebleeds, postnasal drip, rhinorrhea, sinus pressure, sneezing, sore throat, tinnitus, trouble swallowing and voice change.    Eyes: Negative for photophobia, pain, discharge, redness, itching and visual disturbance.   Respiratory: Negative for apnea, cough, choking, chest tightness, shortness of breath, wheezing and stridor.    Cardiovascular: Negative for chest pain, palpitations and leg swelling.   Gastrointestinal: Negative for abdominal distention, abdominal pain, anal bleeding, blood in stool, constipation, diarrhea, nausea, rectal pain and vomiting.   Endocrine: Negative for cold intolerance, heat intolerance, polydipsia, polyphagia and polyuria.   Genitourinary: Negative for decreased urine volume, difficulty urinating, dysuria, enuresis, flank pain, frequency, genital sores, hematuria and urgency.   Musculoskeletal: Positive for back pain. Negative for arthralgias, gait problem, joint swelling, myalgias, neck pain and neck stiffness.   Skin: Negative for color change, pallor, rash and wound.   Allergic/Immunologic: Negative for environmental allergies, food allergies and immunocompromised state.   Neurological: Positive for dizziness, tremors and  "headaches. Negative for seizures, syncope, facial asymmetry, speech difficulty, weakness, light-headedness and numbness.   Hematological: Negative for adenopathy. Does not bruise/bleed easily.   Psychiatric/Behavioral: Negative for agitation, behavioral problems, confusion, decreased concentration, dysphoric mood, hallucinations, self-injury, sleep disturbance and suicidal ideas. The patient is not nervous/anxious and is not hyperactive.    All other systems reviewed and are negative.       OBJECTIVE  Physical Exam  Vitals: Blood pressure 132/78, pulse 84, temperature 97.4 °F (36.3 °C), height 182.9 cm (72\"), weight 104 kg (230 lb).  Body mass index is 31.19 kg/(m^2).   Well developed, well nourished male in no acute distress.    Breathing unlabored.   He is able to rise from sitting to standing position without difficulty.    He ambulates independently without assistance  Lumbar ROM normal.   Motor strength is grossly intact in the lower extremities.  He is bradykinetic with a pill rolling tremor and blank faces c/w Parkinsonism.   Well healed mid lumbar scar.        Medical Decision Making    ASSESSMENT  Lumbar stenosis with neurogenic claudication status post L4-5 decompression.    DISCUSSION  Mr. Martin is stable and doing well.  He should continue to advance activity as tolerated, limit repetitive bending, lifting, and twisting of the lower back, and limit vigorous activities.  Continue supportive measures as needed.      Return for f/up in our clinic PRN. He should call or RTC sooner if symptoms worsen or new symptoms develop.     New Medications Ordered This Visit   Medications   • ibuprofen (ADVIL,MOTRIN) 600 MG tablet     Si PO q12 hours PRN back stiffness or pain.  Take with a full meal and glass of water     Dispense:  60 tablet     Refill:  0       Orly Bunch PA-C  [facility]  18    ______________________________________________  Patient Care " Team:  Gregor Bear MD as PCP - General  Gregor Bear MD as Referring Physician (Internal Medicine)

## 2018-02-14 ENCOUNTER — OFFICE VISIT (OUTPATIENT)
Dept: NEUROLOGY | Facility: CLINIC | Age: 73
End: 2018-02-14

## 2018-02-14 VITALS
WEIGHT: 225 LBS | BODY MASS INDEX: 31.5 KG/M2 | SYSTOLIC BLOOD PRESSURE: 127 MMHG | DIASTOLIC BLOOD PRESSURE: 82 MMHG | HEIGHT: 71 IN

## 2018-02-14 DIAGNOSIS — G20 PARKINSON'S DISEASE (HCC): Primary | ICD-10-CM

## 2018-02-14 PROCEDURE — 99214 OFFICE O/P EST MOD 30 MIN: CPT | Performed by: PSYCHIATRY & NEUROLOGY

## 2018-02-14 NOTE — PROGRESS NOTES
"Subjective     Chief Complaint: tremor      History of Present Illness   Cordell Martin is a 73 y.o. male who returns to clinic today with a history of Parkinson's Disease. He has symptoms since at least 2015 noted initially by a resting tremor primarily in his right hand. He notes occasional associated imbalance, though denies a shuffling gait. He is taking Mirapex at 1 mg tid without clear benefit. He is also taking Amantadine 100mg BID.    He has also noted short term memory impairment for several years. He notes associated word-finding difficulties and impairments in concentration. He denies any additional cognitive impairments. There have been no associated BPSD symptoms. Additionally, he denies any ADL impairments. He manages his medications  and finances. He continues to drive.      Prior evaluation has included screening blood work  and a head CT  which was unremarkable .     Since his last visit on 12/12/17 he feels he has generally done well. His tremor is well controlled. He continues to note trouble with memory unchanged. He denies any other new concerns at this time.      I have reviewed and confirmed the past family, social and medical history as accurate on 2/14/18.     Review of Systems   Constitutional: Negative.        Objective     /82  Ht 180.3 cm (71\")  Wt 102 kg (225 lb)  BMI 31.38 kg/m2    General appearance today is normal.       Physical Exam   Constitutional: He is oriented to person, place, and time.   Neurological: He is oriented to person, place, and time. He has normal strength. He has a normal Finger-Nose-Finger Test. Gait normal.   Psychiatric: His speech is normal.        Neurologic Exam     Mental Status   Oriented to person, place, and time.   Registration: recalls 3 of 3 objects. Recall at 5 minutes: recalls 3 of 3 objects. Follows 3 step commands.   Attention: normal.   Speech: speech is normal   Level of consciousness: alert  Knowledge: good.   Able to name object. Able " to read. Able to repeat. Able to write. Normal comprehension.     Cranial Nerves   Cranial nerves II through XII intact.        Except for hypomimia     Motor Exam   Muscle bulk: normal  Overall muscle tone: normal    Strength   Strength 5/5 throughout.     Gait, Coordination, and Reflexes     Gait  Gait: normal    Coordination   Finger to nose coordination: normal    Tremor   Resting tremor: absent          Assessment/Plan   Cordell was seen today for memory loss.    Diagnoses and all orders for this visit:    Parkinson's disease        Discussion/Summary   Cordell Martin returns to clinic today with a history of Parkinson's Disease and memory impairment. His examination is reassuring, which I discussed. I again reviewed his current status and treatment options. After discussing potential treatment options, it was elected to continue on Mirapex and amantadine unchanged. He will then follow up in 6 months, or sooner if needed.       I spent 12 minutes out of 20 minutes face to face with the patient and discussing evaluation, current status, treatment options and management.    As part of this visit I reviewed prior lab results.      Chalo Cavanaugh MD

## 2018-03-06 ENCOUNTER — TELEPHONE (OUTPATIENT)
Dept: GASTROENTEROLOGY | Facility: CLINIC | Age: 73
End: 2018-03-06

## 2018-03-06 NOTE — TELEPHONE ENCOUNTER
Called pharmacist back. Left voice message. Prepopik not covered. Switched bowel prep to Suprep or Gavilyte. Patient is to use the same directions given by the provider.

## 2018-03-07 ENCOUNTER — LAB REQUISITION (OUTPATIENT)
Dept: LAB | Facility: HOSPITAL | Age: 73
End: 2018-03-07

## 2018-03-07 ENCOUNTER — OUTSIDE FACILITY SERVICE (OUTPATIENT)
Dept: GASTROENTEROLOGY | Facility: CLINIC | Age: 73
End: 2018-03-07

## 2018-03-07 DIAGNOSIS — Z86.010 HISTORY OF COLONIC POLYPS: ICD-10-CM

## 2018-03-07 DIAGNOSIS — Z12.11 ENCOUNTER FOR SCREENING FOR MALIGNANT NEOPLASM OF COLON: ICD-10-CM

## 2018-03-07 PROCEDURE — 45385 COLONOSCOPY W/LESION REMOVAL: CPT | Performed by: INTERNAL MEDICINE

## 2018-03-07 PROCEDURE — 88305 TISSUE EXAM BY PATHOLOGIST: CPT | Performed by: INTERNAL MEDICINE

## 2018-03-08 LAB
CYTO UR: NORMAL
LAB AP CASE REPORT: NORMAL
LAB AP CLINICAL INFORMATION: NORMAL
Lab: NORMAL
PATH REPORT.FINAL DX SPEC: NORMAL
PATH REPORT.GROSS SPEC: NORMAL

## 2018-08-14 ENCOUNTER — OFFICE VISIT (OUTPATIENT)
Dept: NEUROLOGY | Facility: CLINIC | Age: 73
End: 2018-08-14

## 2018-08-14 VITALS
HEIGHT: 71 IN | SYSTOLIC BLOOD PRESSURE: 116 MMHG | WEIGHT: 224.87 LBS | DIASTOLIC BLOOD PRESSURE: 78 MMHG | BODY MASS INDEX: 31.48 KG/M2

## 2018-08-14 DIAGNOSIS — G20 PARKINSON'S DISEASE (HCC): ICD-10-CM

## 2018-08-14 PROCEDURE — 99214 OFFICE O/P EST MOD 30 MIN: CPT | Performed by: PHYSICIAN ASSISTANT

## 2018-08-14 RX ORDER — ATORVASTATIN CALCIUM 40 MG/1
40 TABLET, FILM COATED ORAL DAILY
Refills: 3 | COMMUNITY
Start: 2018-05-29 | End: 2019-05-14 | Stop reason: SDUPTHER

## 2018-08-14 RX ORDER — LEVOTHYROXINE SODIUM 0.07 MG/1
TABLET ORAL
Refills: 3 | COMMUNITY
Start: 2018-07-20 | End: 2019-05-28

## 2018-08-14 NOTE — PROGRESS NOTES
"Subjective     Chief Complaint: Parkinson's Disease      History of Present Illness   Cordell Martin is a 73 y.o. male who returns to clinic today with a history of Parkinson's Disease. He has symptoms since at least 2015 noted initially by a resting tremor primarily in his right hand. He notes occasional associated imbalance, though denies a shuffling gait. He is taking Mirapex at 1 mg tid without clear benefit. He is also taking Amantadine 100mg BID.     He has also noted short term memory impairment for several years. He notes associated word-finding difficulties and impairments in concentration. He denies any additional cognitive impairments. There have been no associated BPSD symptoms. Additionally, he denies any ADL impairments. He manages his medications  and finances. He continues to drive.       Prior evaluation has included screening blood work and a head CT  which was unremarkable .     Today: Since his last visit in 2/18, he notes that his tremor and balance are essentially unchanged. However, he has noted several falls over the last few months, fortunately without significant injury.       I have reviewed and confirmed the past family, social and medical history as accurate on 8/14/18.     Review of Systems   Constitutional: Negative.    HENT: Negative.    Eyes: Negative.    Respiratory: Negative.    Cardiovascular: Negative.    Gastrointestinal: Negative.    Endocrine: Negative.    Genitourinary: Negative.    Musculoskeletal: Negative.    Skin: Negative.    Allergic/Immunologic: Negative.    Neurological: Positive for tremors.   Hematological: Negative.    Psychiatric/Behavioral: Negative.        Objective     /78   Ht 180.3 cm (70.98\")   Wt 102 kg (224 lb 13.9 oz)   BMI 31.38 kg/m²     General appearance today is normal.     Physical Exam   Neurological: He has normal strength. He has a normal Finger-Nose-Finger Test.   Psychiatric: His speech is normal.        Neurologic Exam     Mental " Status   Speech: speech is normal   Level of consciousness: alert  Normal comprehension.     Cranial Nerves   Cranial nerves II through XII intact.   Except for hypomimia      Motor Exam   Muscle bulk: normal  Overall muscle tone: normal    Strength   Strength 5/5 throughout.     Sensory Exam   Light touch normal.     Gait, Coordination, and Reflexes     Gait  Gait: (slightly unsteady)    Coordination   Finger to nose coordination: normal    Tremor   Resting tremor: absent        Assessment/Plan   Cordell was seen today for parkinson's disease.    Diagnoses and all orders for this visit:    Parkinson's disease (CMS/[facility])  -     Ambulatory Referral to Physical Therapy Evaluate and treat (Parkinson's Disease )    Other orders  -     carbidopa-levodopa (SINEMET)  MG per tablet; Take 1 tablet by mouth 3 (Three) Times a Day.          Discussion/Summary   Cordell Martin returns to clinic today with a history of Parkinson's Disease. I again reviewed his current status and treatment options. After discussing potential treatment options, it was elected to add Sinemet and continue on amantadine and Mirapex unchanged. I have also made a referral to PT. He will then follow up in 3 months, or sooner if needed.   I spent 25 minutes minutes face to face with the patient with 15 minutes spent on discussing diagnosis, evaluation, current status, treatment options and management as discussed above.       As part of this visit I discussed the history with the patient .      Priti Ross PA-C

## 2018-09-05 ENCOUNTER — APPOINTMENT (OUTPATIENT)
Dept: PHYSICAL THERAPY | Facility: HOSPITAL | Age: 73
End: 2018-09-05

## 2018-11-14 ENCOUNTER — OFFICE VISIT (OUTPATIENT)
Dept: NEUROLOGY | Facility: CLINIC | Age: 73
End: 2018-11-14

## 2018-11-14 VITALS
DIASTOLIC BLOOD PRESSURE: 74 MMHG | BODY MASS INDEX: 31.36 KG/M2 | SYSTOLIC BLOOD PRESSURE: 128 MMHG | HEIGHT: 71 IN | WEIGHT: 224 LBS

## 2018-11-14 DIAGNOSIS — G20 PARKINSON'S DISEASE (HCC): Primary | ICD-10-CM

## 2018-11-14 PROCEDURE — 99214 OFFICE O/P EST MOD 30 MIN: CPT | Performed by: PSYCHIATRY & NEUROLOGY

## 2018-11-14 RX ORDER — PRAMIPEXOLE DIHYDROCHLORIDE 1 MG/1
1 TABLET ORAL 3 TIMES DAILY
Qty: 270 TABLET | Refills: 3 | Status: SHIPPED | OUTPATIENT
Start: 2018-11-14 | End: 2019-12-23

## 2018-11-14 NOTE — PROGRESS NOTES
"Subjective     Chief Complaint: Parkinson's Disease      History of Present Illness   Cordell Martin is a 73 y.o. male who returns to clinic today with a history of Parkinson's Disease. He has symptoms since at least 2015 noted initially by a resting tremor primarily in his right hand. He notes occasional associated imbalance, though denies a shuffling gait.      He has also noted short term memory impairment for several years. He notes associated word-finding difficulties and impairments in concentration.      Prior evaluation has included screening blood work and a head CT  which was unremarkable .     Since his last visit on 8/14/18, he has started Sinemet, though without clear benefit. He has suffered a fall with broken ribs when he missed a step. He has recently run out of Mirapex with worsening of his tremor.    I have reviewed and confirmed the past family, social and medical history as accurate on 11/14/18.     Review of Systems   Constitutional: Negative.        Objective     /74   Ht 180.3 cm (70.98\")   Wt 102 kg (224 lb)   BMI 31.26 kg/m²     General appearance today is normal.     Physical Exam   Neurological: He has normal strength. He has a normal Finger-Nose-Finger Test.   Psychiatric: His speech is normal.        Neurologic Exam     Mental Status   Oriented to person.   Attention: normal.   Speech: speech is normal   Level of consciousness: alert  Normal comprehension.     Cranial Nerves   Cranial nerves II through XII intact.   Except for hypomimia      Motor Exam   Muscle bulk: normal  Right arm tone: increased    Strength   Strength 5/5 throughout.     Gait, Coordination, and Reflexes     Gait  Gait: (slightly unsteady)    Coordination   Finger to nose coordination: normalResting tremor of the right hand noted with walking         Assessment/Plan   Cordell was seen today for parkinson's disease.    Diagnoses and all orders for this visit:    Parkinson's disease (CMS/[facility])    Other orders  - "     pramipexole (MIRAPEX) 1 MG tablet; Take 1 tablet by mouth 3 (Three) Times a Day.          Discussion/Summary   Cordell Martin returns to clinic today with a history of Parkinson's Disease. I again reviewed his current status and treatment options. After discussing potential treatment options, it was elected to simply restart Mirapex for now. We may then increase Sinemet in the future pending his response. He will then follow-up again in 6 months.    I spent 25 minutes face to face with the patient. I spent 15 minutes counseling and discussing evaluation, current status, treatment options and management.      Chalo Cavanaugh MD

## 2019-01-07 RX ORDER — AMANTADINE HYDROCHLORIDE 100 MG/1
CAPSULE, GELATIN COATED ORAL
Qty: 120 CAPSULE | Refills: 7 | Status: SHIPPED | OUTPATIENT
Start: 2019-01-07 | End: 2019-05-14

## 2019-05-14 ENCOUNTER — OFFICE VISIT (OUTPATIENT)
Dept: FAMILY MEDICINE CLINIC | Facility: CLINIC | Age: 74
End: 2019-05-14

## 2019-05-14 ENCOUNTER — LAB (OUTPATIENT)
Dept: LAB | Facility: HOSPITAL | Age: 74
End: 2019-05-14

## 2019-05-14 VITALS
DIASTOLIC BLOOD PRESSURE: 78 MMHG | TEMPERATURE: 97.6 F | HEART RATE: 80 BPM | WEIGHT: 237 LBS | OXYGEN SATURATION: 98 % | BODY MASS INDEX: 33.18 KG/M2 | SYSTOLIC BLOOD PRESSURE: 122 MMHG | HEIGHT: 71 IN

## 2019-05-14 DIAGNOSIS — E03.8 HYPOTHYROIDISM DUE TO HASHIMOTO'S THYROIDITIS: ICD-10-CM

## 2019-05-14 DIAGNOSIS — E55.9 VITAMIN D DEFICIENCY: ICD-10-CM

## 2019-05-14 DIAGNOSIS — E06.3 HYPOTHYROIDISM DUE TO HASHIMOTO'S THYROIDITIS: ICD-10-CM

## 2019-05-14 DIAGNOSIS — E78.5 HYPERLIPIDEMIA, UNSPECIFIED HYPERLIPIDEMIA TYPE: ICD-10-CM

## 2019-05-14 DIAGNOSIS — R79.9 ABNORMAL FINDING OF BLOOD CHEMISTRY: ICD-10-CM

## 2019-05-14 DIAGNOSIS — F33.9 RECURRENT MAJOR DEPRESSIVE DISORDER, REMISSION STATUS UNSPECIFIED (HCC): Primary | ICD-10-CM

## 2019-05-14 DIAGNOSIS — G20 PARKINSON'S DISEASE (HCC): ICD-10-CM

## 2019-05-14 LAB
25(OH)D3 SERPL-MCNC: 41.8 NG/ML (ref 30–100)
ALBUMIN SERPL-MCNC: 4.4 G/DL (ref 3.5–5.2)
ALBUMIN/GLOB SERPL: 1.6 G/DL
ALP SERPL-CCNC: 95 U/L (ref 39–117)
ALT SERPL W P-5'-P-CCNC: 21 U/L (ref 1–41)
ANION GAP SERPL CALCULATED.3IONS-SCNC: 9.2 MMOL/L
AST SERPL-CCNC: 18 U/L (ref 1–40)
BILIRUB SERPL-MCNC: 0.4 MG/DL (ref 0.2–1.2)
BUN BLD-MCNC: 22 MG/DL (ref 8–23)
BUN/CREAT SERPL: 16.2 (ref 7–25)
CALCIUM SPEC-SCNC: 9.6 MG/DL (ref 8.6–10.5)
CHLORIDE SERPL-SCNC: 104 MMOL/L (ref 98–107)
CHOLEST SERPL-MCNC: 190 MG/DL (ref 0–200)
CO2 SERPL-SCNC: 28.8 MMOL/L (ref 22–29)
CREAT BLD-MCNC: 1.36 MG/DL (ref 0.76–1.27)
DEPRECATED RDW RBC AUTO: 56.1 FL (ref 37–54)
ERYTHROCYTE [DISTWIDTH] IN BLOOD BY AUTOMATED COUNT: 15 % (ref 12.3–15.4)
GFR SERPL CREATININE-BSD FRML MDRD: 52 ML/MIN/1.73
GLOBULIN UR ELPH-MCNC: 2.8 GM/DL
GLUCOSE BLD-MCNC: 90 MG/DL (ref 65–99)
HBA1C MFR BLD: 6.06 % (ref 4.8–5.6)
HCT VFR BLD AUTO: 47.1 % (ref 37.5–51)
HDLC SERPL-MCNC: 56 MG/DL (ref 40–60)
HGB BLD-MCNC: 14.7 G/DL (ref 13–17.7)
LDLC SERPL CALC-MCNC: 117 MG/DL (ref 0–100)
LDLC/HDLC SERPL: 2.08 {RATIO}
MCH RBC QN AUTO: 31.1 PG (ref 26.6–33)
MCHC RBC AUTO-ENTMCNC: 31.2 G/DL (ref 31.5–35.7)
MCV RBC AUTO: 99.8 FL (ref 79–97)
PLATELET # BLD AUTO: 218 10*3/MM3 (ref 140–450)
PMV BLD AUTO: 9.6 FL (ref 6–12)
POTASSIUM BLD-SCNC: 4.7 MMOL/L (ref 3.5–5.2)
PROT SERPL-MCNC: 7.2 G/DL (ref 6–8.5)
RBC # BLD AUTO: 4.72 10*6/MM3 (ref 4.14–5.8)
SODIUM BLD-SCNC: 142 MMOL/L (ref 136–145)
T4 FREE SERPL-MCNC: 1.02 NG/DL (ref 0.93–1.7)
TRIGL SERPL-MCNC: 87 MG/DL (ref 0–150)
TSH SERPL DL<=0.05 MIU/L-ACNC: 6.43 MIU/ML (ref 0.27–4.2)
VLDLC SERPL-MCNC: 17.4 MG/DL (ref 5–40)
WBC NRBC COR # BLD: 6.12 10*3/MM3 (ref 3.4–10.8)

## 2019-05-14 PROCEDURE — 80061 LIPID PANEL: CPT | Performed by: INTERNAL MEDICINE

## 2019-05-14 PROCEDURE — 84443 ASSAY THYROID STIM HORMONE: CPT | Performed by: INTERNAL MEDICINE

## 2019-05-14 PROCEDURE — 85027 COMPLETE CBC AUTOMATED: CPT | Performed by: INTERNAL MEDICINE

## 2019-05-14 PROCEDURE — 99204 OFFICE O/P NEW MOD 45 MIN: CPT | Performed by: INTERNAL MEDICINE

## 2019-05-14 PROCEDURE — 80053 COMPREHEN METABOLIC PANEL: CPT | Performed by: INTERNAL MEDICINE

## 2019-05-14 PROCEDURE — 83036 HEMOGLOBIN GLYCOSYLATED A1C: CPT | Performed by: INTERNAL MEDICINE

## 2019-05-14 PROCEDURE — 84439 ASSAY OF FREE THYROXINE: CPT

## 2019-05-14 PROCEDURE — 82306 VITAMIN D 25 HYDROXY: CPT | Performed by: INTERNAL MEDICINE

## 2019-05-14 RX ORDER — ATORVASTATIN CALCIUM 40 MG/1
40 TABLET, FILM COATED ORAL DAILY
Qty: 90 TABLET | Refills: 3 | Status: SHIPPED | OUTPATIENT
Start: 2019-05-14 | End: 2019-08-14 | Stop reason: SDUPTHER

## 2019-05-14 RX ORDER — LANOLIN ALCOHOL/MO/W.PET/CERES
100 CREAM (GRAM) TOPICAL DAILY
COMMUNITY
End: 2020-04-17

## 2019-05-14 RX ORDER — ASCORBIC ACID 500 MG
500 TABLET ORAL DAILY
COMMUNITY

## 2019-05-14 NOTE — PROGRESS NOTES
Chief Complaint:  Establish care, follow-up hyperlipidemia, prediabetes, Parkinson's    HPI:  Cordell Martin is a 74 y.o. male who presents today for self-care follow-up for several chronic medical conditions.  Parkinson's-follow-up with neurology.  On several medications as listed.  Hypothyroidism- asymptomatic currently.  Currently taking 75 mg of levothyroxine.  Denies any cold intolerance, weight gain.  2 deficiency-currently taking 5000 units daily.  Hyperlipidemia-currently taking atorvastatin.  Depression- currently stable on Zoloft.  Patient is not sure if this medication is working or not.  He has been on this for several years.  He has never tried to wean off.  Denies any suicidal ideation.    ROS:  Constitutional: no fevers, night sweats or unexplained weight loss  Eyes: no vision changes  ENT: no runny nose, ear pain, sore throat  Cardio: no chest pain, palpitations  Pulm: no shortness of breath, wheezing, or cough  GI: no abdominal pain or changes in bowel movements  : no difficulty urinating  MSK: no difficulty ambulating, no joint pain  Neuro: no weakness, dizziness or headache  Psych: no trouble sleeping  Endo: no change in appetite      Past Medical History:   Diagnosis Date   • Anxiety    • Arthritis    • Back pain    • Bronchitis    • Cognitive impairment    • Depression    • Disease of thyroid gland    • Glucose intolerance (impaired glucose tolerance)    • Hyperlipidemia    • Kidney stone    • Obesity    • Parkinson disease (CMS/HCC)    • Pneumonia    • Prostate disorder    • Thyroid disease    • Wears eyeglasses       Family History   Problem Relation Age of Onset   • Alzheimer's disease Other    • Cancer Other    • Diabetes Other    • Heart disease Other    • Stroke Other    • Cancer Father       Social History     Socioeconomic History   • Marital status:      Spouse name: Not on file   • Number of children: Not on file   • Years of education: Not on file   • Highest education  level: Not on file   Tobacco Use   • Smoking status: Former Smoker     Types: Cigarettes   • Smokeless tobacco: Never Used   • Tobacco comment: quit 1980   Substance and Sexual Activity   • Alcohol use: Yes     Comment: occasionally   • Drug use: No   • Sexual activity: Yes     Partners: Female     Birth control/protection: None      No Known Allergies     There is no immunization history on file for this patient.     PE:  Vitals:    05/14/19 1457   BP: 122/78   Pulse: 80   Temp: 97.6 °F (36.4 °C)   SpO2: 98%        Gen Appearance: NAD  HEENT: Normocephalic, PERRLA, no thyromegaly, trache midline  Heart: RRR, normal S1 and S2, no murmur  Lungs: CTA b/l, no wheezing, no crackles  Abdomen: Soft, non-tender, non-distended, no guarding and BSx4  MSK: Moves all extremities well, normal gait, no peripheral edema  Pulses: Palpable and equal b/l  Lymph nodes: No palpable lymphadenopathy   Neuro: No focal deficits      Current Outpatient Medications   Medication Sig Dispense Refill   • amantadine (SYMMETREL) 100 MG tablet Take 2 tablets by mouth 2 (Two) Times a Day. 120 tablet 11   • atorvastatin (LIPITOR) 40 MG tablet Take 1 tablet by mouth Daily. 90 tablet 3   • carbidopa-levodopa (SINEMET)  MG per tablet Take 1 tablet by mouth 3 (Three) Times a Day. 90 tablet 11   • Cholecalciferol (VITAMIN D3) 5000 units capsule capsule Take 5,000 Units by mouth Daily.     • fluticasone (FLONASE) 50 MCG/ACT nasal spray 2 sprays into each nostril Daily.     • levothyroxine (SYNTHROID, LEVOTHROID) 75 MCG tablet TAKE 1 TABLET BY MOUTH ONCE DAILY FOR 90 DAYS  3   • pramipexole (MIRAPEX) 1 MG tablet Take 1 tablet by mouth 3 (Three) Times a Day. 270 tablet 3   • sertraline (ZOLOFT) 100 MG tablet Take 150 mg by mouth Daily.     • vitamin B-6 (PYRIDOXINE) 50 MG tablet Take 100 mg by mouth Daily.     • vitamin C (ASCORBIC ACID) 500 MG tablet Take 500 mg by mouth Daily.       No current facility-administered medications for this visit.          Cordell was seen today for establish care.    Diagnoses and all orders for this visit:    Recurrent major depressive disorder, remission status unspecified (CMS/HCC)  Continue Zoloft for now.  Follow-up for physical.  Will probably try to wean down Zoloft if able.  Hyperlipidemia, unspecified hyperlipidemia type  -     CBC (No Diff); Future  -     Comprehensive Metabolic Panel; Future  -     Lipid Panel; Future  Continue atorvastatin.  Checking lipid panel today.  Parkinson's disease (CMS/HCC)  Stable.  Follows with neurology.  Vitamin D deficiency  -     Vitamin D 25 Hydroxy; Future  -     Hemoglobin A1c; Future  .  Continue supplement.  Checking levels today  Hypothyroidism due to Hashimoto's thyroiditis  -     TSH Rfx On Abnormal To Free T4; Future  Asymptomatic.  Checking TSH.  Continue levothyroxine.  Abnormal finding of blood chemistry   -     Hemoglobin A1c; Future    Other orders  -     atorvastatin (LIPITOR) 40 MG tablet; Take 1 tablet by mouth Daily.         No Follow-up on file.

## 2019-05-15 ENCOUNTER — OFFICE VISIT (OUTPATIENT)
Dept: NEUROLOGY | Facility: CLINIC | Age: 74
End: 2019-05-15

## 2019-05-15 VITALS
DIASTOLIC BLOOD PRESSURE: 75 MMHG | HEART RATE: 76 BPM | SYSTOLIC BLOOD PRESSURE: 126 MMHG | WEIGHT: 236 LBS | BODY MASS INDEX: 33.04 KG/M2 | HEIGHT: 71 IN

## 2019-05-15 DIAGNOSIS — G20 PARKINSON'S DISEASE (HCC): Primary | ICD-10-CM

## 2019-05-15 PROCEDURE — 99214 OFFICE O/P EST MOD 30 MIN: CPT | Performed by: PHYSICIAN ASSISTANT

## 2019-05-15 NOTE — PROGRESS NOTES
"Subjective     Chief Complaint: Parkinson's Disease      History of Present Illness   Cordell Martin is a 74 y.o. male who returns to clinic today with a history of Parkinson's Disease. He has symptoms since at least 2015 noted initially by a resting tremor primarily in his right hand. He notes occasional associated imbalance, though denies a shuffling gait.      He has also noted short term memory impairment for several years. He notes associated word-finding difficulties and impairments in concentration.      Prior evaluation has included screening blood work and a head CT which were unremarkable.       Today: Since his last visit in 11/18, he feels that his balance and tremor have worsened. He has had several falls. He recently completed PT, which was beneficial.       I have reviewed and confirmed the past family, social and medical history as accurate on 5/15/19.     Review of Systems   Constitutional: Negative.    HENT: Negative.    Eyes: Negative.    Respiratory: Negative.    Cardiovascular: Negative.    Gastrointestinal: Negative.    Endocrine: Negative.    Genitourinary: Negative.    Musculoskeletal: Negative.    Skin: Negative.    Allergic/Immunologic: Negative.    Neurological: Positive for tremors.        Balance impairment    Hematological: Negative.    Psychiatric/Behavioral: Negative.        Objective     Ht 180.3 cm (70.98\")   Wt 107 kg (236 lb)   BMI 32.93 kg/m²     General appearance today is normal.       Physical Exam   Neurological: He has normal strength. He has a normal Finger-Nose-Finger Test.   Psychiatric: His speech is normal.        Neurologic Exam     Mental Status   Speech: speech is normal   Level of consciousness: alert  Normal comprehension.     Motor Exam   Muscle bulk: normal    Strength   Strength 5/5 throughout.     Sensory Exam   Light touch normal.     Gait, Coordination, and Reflexes     Coordination   Finger to nose coordination: normal    Tremor   Resting tremor: " presentRises from chair easily            Assessment/Plan   Cordell was seen today for tremors.    Diagnoses and all orders for this visit:    Parkinson's disease (CMS/[facility])    Other orders  -     carbidopa-levodopa (SINEMET)  MG per tablet; Take 1.5 tablets by mouth 3 (Three) Times a Day.          Discussion/Summary   Cordell Martin returns to clinic today with a history of Parkinson's Disease. This was discussed in detail. I again reviewed his current status and treatment options. After discussing potential treatment options, it was elected to increase his Sinemet and continue on Mirapex unchanged. He will then follow up in 3 months , or sooner if needed.   I spent 25 minutes face to face with the patient with 20 minutes spent on discussing diagnosis, prognosis, evaluation, current status, treatment options and management as discussed above.       As part of this visit I discussed the history with the patient .      Priti Ross PA-C

## 2019-05-23 ENCOUNTER — TELEPHONE (OUTPATIENT)
Dept: FAMILY MEDICINE CLINIC | Facility: CLINIC | Age: 74
End: 2019-05-23

## 2019-05-23 NOTE — TELEPHONE ENCOUNTER
PER PT CALLED, WOULD LIKE TO KNOW THE RESULTS OF THE 5/14/19 LAB WOR. PLEASE CALL PT BACK TO ADVISE.

## 2019-05-28 ENCOUNTER — TELEPHONE (OUTPATIENT)
Dept: FAMILY MEDICINE CLINIC | Facility: CLINIC | Age: 74
End: 2019-05-28

## 2019-05-28 DIAGNOSIS — E03.8 HYPOTHYROIDISM DUE TO HASHIMOTO'S THYROIDITIS: Primary | ICD-10-CM

## 2019-05-28 DIAGNOSIS — Z11.3 ROUTINE SCREENING FOR STI (SEXUALLY TRANSMITTED INFECTION): ICD-10-CM

## 2019-05-28 DIAGNOSIS — E06.3 HYPOTHYROIDISM DUE TO HASHIMOTO'S THYROIDITIS: Primary | ICD-10-CM

## 2019-05-28 RX ORDER — LEVOTHYROXINE SODIUM 88 UG/1
88 TABLET ORAL DAILY
Qty: 30 TABLET | Refills: 5 | Status: SHIPPED | OUTPATIENT
Start: 2019-05-28 | End: 2019-08-14 | Stop reason: SDUPTHER

## 2019-05-28 NOTE — TELEPHONE ENCOUNTER
Spoke with patient, increasing thyroid dose. Sent in lab work for HIV screening per patient request.

## 2019-05-29 ENCOUNTER — LAB (OUTPATIENT)
Dept: LAB | Facility: HOSPITAL | Age: 74
End: 2019-05-29

## 2019-05-29 DIAGNOSIS — Z11.3 ROUTINE SCREENING FOR STI (SEXUALLY TRANSMITTED INFECTION): ICD-10-CM

## 2019-05-29 LAB — HIV1+2 AB SER QL: NORMAL

## 2019-05-29 PROCEDURE — G0432 EIA HIV-1/HIV-2 SCREEN: HCPCS | Performed by: INTERNAL MEDICINE

## 2019-05-29 PROCEDURE — 36415 COLL VENOUS BLD VENIPUNCTURE: CPT

## 2019-08-14 ENCOUNTER — OFFICE VISIT (OUTPATIENT)
Dept: NEUROLOGY | Facility: CLINIC | Age: 74
End: 2019-08-14

## 2019-08-14 ENCOUNTER — OFFICE VISIT (OUTPATIENT)
Dept: FAMILY MEDICINE CLINIC | Facility: CLINIC | Age: 74
End: 2019-08-14

## 2019-08-14 ENCOUNTER — LAB (OUTPATIENT)
Dept: LAB | Facility: HOSPITAL | Age: 74
End: 2019-08-14

## 2019-08-14 VITALS
SYSTOLIC BLOOD PRESSURE: 122 MMHG | HEIGHT: 71 IN | WEIGHT: 245 LBS | DIASTOLIC BLOOD PRESSURE: 78 MMHG | BODY MASS INDEX: 34.3 KG/M2

## 2019-08-14 VITALS
OXYGEN SATURATION: 98 % | HEART RATE: 83 BPM | SYSTOLIC BLOOD PRESSURE: 122 MMHG | HEIGHT: 71 IN | DIASTOLIC BLOOD PRESSURE: 86 MMHG | TEMPERATURE: 97.3 F | BODY MASS INDEX: 34.33 KG/M2 | WEIGHT: 245.2 LBS

## 2019-08-14 DIAGNOSIS — F32.A DEPRESSION, UNSPECIFIED DEPRESSION TYPE: ICD-10-CM

## 2019-08-14 DIAGNOSIS — M79.89 SWELLING OF LOWER EXTREMITY: ICD-10-CM

## 2019-08-14 DIAGNOSIS — G20 PARKINSON DISEASE (HCC): ICD-10-CM

## 2019-08-14 DIAGNOSIS — E06.3 HYPOTHYROIDISM DUE TO HASHIMOTO'S THYROIDITIS: ICD-10-CM

## 2019-08-14 DIAGNOSIS — Z00.00 MEDICARE ANNUAL WELLNESS VISIT, SUBSEQUENT: Primary | ICD-10-CM

## 2019-08-14 DIAGNOSIS — E03.8 HYPOTHYROIDISM DUE TO HASHIMOTO'S THYROIDITIS: ICD-10-CM

## 2019-08-14 DIAGNOSIS — G20 PARKINSON'S DISEASE (HCC): Primary | ICD-10-CM

## 2019-08-14 DIAGNOSIS — E78.5 HYPERLIPIDEMIA, UNSPECIFIED HYPERLIPIDEMIA TYPE: ICD-10-CM

## 2019-08-14 DIAGNOSIS — Z00.00 MEDICARE ANNUAL WELLNESS VISIT, SUBSEQUENT: ICD-10-CM

## 2019-08-14 LAB
ALBUMIN SERPL-MCNC: 4.2 G/DL (ref 3.5–5.2)
ALBUMIN/GLOB SERPL: 1.8 G/DL
ALP SERPL-CCNC: 92 U/L (ref 39–117)
ALT SERPL W P-5'-P-CCNC: 8 U/L (ref 1–41)
ANION GAP SERPL CALCULATED.3IONS-SCNC: 10.3 MMOL/L (ref 5–15)
AST SERPL-CCNC: 21 U/L (ref 1–40)
BASOPHILS # BLD AUTO: 0.03 10*3/MM3 (ref 0–0.2)
BASOPHILS NFR BLD AUTO: 0.5 % (ref 0–1.5)
BILIRUB SERPL-MCNC: 0.5 MG/DL (ref 0.2–1.2)
BUN BLD-MCNC: 28 MG/DL (ref 8–23)
BUN/CREAT SERPL: 23.5 (ref 7–25)
CALCIUM SPEC-SCNC: 9.4 MG/DL (ref 8.6–10.5)
CHLORIDE SERPL-SCNC: 103 MMOL/L (ref 98–107)
CO2 SERPL-SCNC: 26.7 MMOL/L (ref 22–29)
CREAT BLD-MCNC: 1.19 MG/DL (ref 0.76–1.27)
DEPRECATED RDW RBC AUTO: 52.9 FL (ref 37–54)
EOSINOPHIL # BLD AUTO: 0.1 10*3/MM3 (ref 0–0.4)
EOSINOPHIL NFR BLD AUTO: 1.6 % (ref 0.3–6.2)
ERYTHROCYTE [DISTWIDTH] IN BLOOD BY AUTOMATED COUNT: 14.2 % (ref 12.3–15.4)
GFR SERPL CREATININE-BSD FRML MDRD: 60 ML/MIN/1.73
GLOBULIN UR ELPH-MCNC: 2.3 GM/DL
GLUCOSE BLD-MCNC: 107 MG/DL (ref 65–99)
HBA1C MFR BLD: 6.2 % (ref 4.8–5.6)
HCT VFR BLD AUTO: 43.2 % (ref 37.5–51)
HGB BLD-MCNC: 13.4 G/DL (ref 13–17.7)
IMM GRANULOCYTES # BLD AUTO: 0.03 10*3/MM3 (ref 0–0.05)
IMM GRANULOCYTES NFR BLD AUTO: 0.5 % (ref 0–0.5)
LYMPHOCYTES # BLD AUTO: 1.91 10*3/MM3 (ref 0.7–3.1)
LYMPHOCYTES NFR BLD AUTO: 31.4 % (ref 19.6–45.3)
MCH RBC QN AUTO: 31 PG (ref 26.6–33)
MCHC RBC AUTO-ENTMCNC: 31 G/DL (ref 31.5–35.7)
MCV RBC AUTO: 100 FL (ref 79–97)
MONOCYTES # BLD AUTO: 0.57 10*3/MM3 (ref 0.1–0.9)
MONOCYTES NFR BLD AUTO: 9.4 % (ref 5–12)
NEUTROPHILS # BLD AUTO: 3.45 10*3/MM3 (ref 1.7–7)
NEUTROPHILS NFR BLD AUTO: 56.6 % (ref 42.7–76)
NRBC BLD AUTO-RTO: 0 /100 WBC (ref 0–0.2)
PLATELET # BLD AUTO: 233 10*3/MM3 (ref 140–450)
PMV BLD AUTO: 9.4 FL (ref 6–12)
POTASSIUM BLD-SCNC: 4.9 MMOL/L (ref 3.5–5.2)
PROT SERPL-MCNC: 6.5 G/DL (ref 6–8.5)
RBC # BLD AUTO: 4.32 10*6/MM3 (ref 4.14–5.8)
SODIUM BLD-SCNC: 140 MMOL/L (ref 136–145)
T4 FREE SERPL-MCNC: 1.27 NG/DL (ref 0.93–1.7)
TSH SERPL DL<=0.05 MIU/L-ACNC: 0.84 MIU/ML (ref 0.27–4.2)
WBC NRBC COR # BLD: 6.09 10*3/MM3 (ref 3.4–10.8)

## 2019-08-14 PROCEDURE — G0439 PPPS, SUBSEQ VISIT: HCPCS | Performed by: INTERNAL MEDICINE

## 2019-08-14 PROCEDURE — 85025 COMPLETE CBC W/AUTO DIFF WBC: CPT

## 2019-08-14 PROCEDURE — 80053 COMPREHEN METABOLIC PANEL: CPT

## 2019-08-14 PROCEDURE — 84439 ASSAY OF FREE THYROXINE: CPT

## 2019-08-14 PROCEDURE — 83036 HEMOGLOBIN GLYCOSYLATED A1C: CPT

## 2019-08-14 PROCEDURE — 84443 ASSAY THYROID STIM HORMONE: CPT

## 2019-08-14 PROCEDURE — 99214 OFFICE O/P EST MOD 30 MIN: CPT | Performed by: PSYCHIATRY & NEUROLOGY

## 2019-08-14 PROCEDURE — 36415 COLL VENOUS BLD VENIPUNCTURE: CPT

## 2019-08-14 RX ORDER — POTASSIUM CHLORIDE 20 MEQ/1
20 TABLET, EXTENDED RELEASE ORAL DAILY
Qty: 5 TABLET | Refills: 0 | Status: SHIPPED | OUTPATIENT
Start: 2019-08-14 | End: 2019-08-19

## 2019-08-14 RX ORDER — SERTRALINE HYDROCHLORIDE 100 MG/1
150 TABLET, FILM COATED ORAL DAILY
Qty: 90 TABLET | Refills: 5 | Status: SHIPPED | OUTPATIENT
Start: 2019-08-14 | End: 2020-09-11

## 2019-08-14 RX ORDER — ATORVASTATIN CALCIUM 40 MG/1
40 TABLET, FILM COATED ORAL DAILY
Qty: 90 TABLET | Refills: 3 | Status: SHIPPED | OUTPATIENT
Start: 2019-08-14 | End: 2020-09-01

## 2019-08-14 RX ORDER — FUROSEMIDE 40 MG/1
40 TABLET ORAL DAILY
Qty: 5 TABLET | Refills: 0 | Status: SHIPPED | OUTPATIENT
Start: 2019-08-14 | End: 2019-08-19

## 2019-08-14 RX ORDER — LEVOTHYROXINE SODIUM 88 UG/1
88 TABLET ORAL DAILY
Qty: 30 TABLET | Refills: 5 | Status: SHIPPED | OUTPATIENT
Start: 2019-08-14 | End: 2020-03-02 | Stop reason: SDUPTHER

## 2019-08-14 NOTE — PROGRESS NOTES
QUICK REFERENCE INFORMATION:  The ABCs of the Annual Wellness Visit  Cordell Martin is a 74 y.o. male presenting forMedicare Subsequent Wellness visit and  Medicare Wellness-subsequent  .     Medicare Subsequent Wellness Visit    Chief Complaint   Patient presents with   • Medicare Wellness-subsequent        HPI   Pt here for medicare wellness visit. Complaining of LE swelling for the past year, although worse the last 2 weeks. He has had a few falls over the last year, reports he recently finished PT. Needs refills on some medications. He plans on f/u with neurology later today.     ROS:  Constitutional: no fevers, night sweats or unexplained weight loss  Eyes: no vision changes  ENT: no runny nose, ear pain, sore throat  Cardio: no chest pain, palpitations  Pulm: no shortness of breath, wheezing, or cough  GI: no abdominal pain or changes in bowel movements  : no difficulty urinating  MSK: no difficulty ambulating, no joint pain  Neuro: no weakness, dizziness or headache  Psych: no trouble sleeping  Endo: no change in appetite     Past Medical History:   Diagnosis Date   • Anxiety    • Arthritis    • Back pain    • Bronchitis    • Cognitive impairment    • Depression    • Disease of thyroid gland    • Glucose intolerance (impaired glucose tolerance)    • Hyperlipidemia    • Kidney stone    • Obesity    • Parkinson disease (CMS/HCC)    • Pneumonia    • Prostate disorder    • Thyroid disease    • Wears eyeglasses         Past Surgical History:   Procedure Laterality Date   • COLONOSCOPY      5 years ago   • EYE SURGERY     • LUMBAR LAMINECTOMY DISCECTOMY DECOMPRESSION N/A 7/13/2017    Procedure: LUMBAR LAMINECTOMY L4-5;  Surgeon: Antonio Trent MD;  Location: [facility];  Service:    • SHOULDER ROTATOR CUFF REPAIR Right     x2   • TONSILLECTOMY         Family History   Problem Relation Age of Onset   • Alzheimer's disease Other    • Cancer Other    • Diabetes Other    • Heart disease Other    • Stroke Other    •  Cancer Father         Social History     Socioeconomic History   • Marital status:      Spouse name: Not on file   • Number of children: Not on file   • Years of education: Not on file   • Highest education level: Not on file   Tobacco Use   • Smoking status: Former Smoker     Types: Cigarettes   • Smokeless tobacco: Never Used   • Tobacco comment: quit 1980   Substance and Sexual Activity   • Alcohol use: Yes     Comment: occasionally   • Drug use: No   • Sexual activity: Yes     Partners: Female     Birth control/protection: None        Current Outpatient Medications   Medication Sig Dispense Refill   • amantadine (SYMMETREL) 100 MG tablet Take 2 tablets by mouth 2 (Two) Times a Day. 120 tablet 11   • atorvastatin (LIPITOR) 40 MG tablet Take 1 tablet by mouth Daily. 90 tablet 3   • carbidopa-levodopa (SINEMET)  MG per tablet Take 1.5 tablets by mouth 3 (Three) Times a Day. 135 tablet 11   • Cholecalciferol (VITAMIN D3) 5000 units capsule capsule Take 5,000 Units by mouth Daily.     • fluticasone (FLONASE) 50 MCG/ACT nasal spray 2 sprays into each nostril Daily.     • levothyroxine (SYNTHROID) 88 MCG tablet Take 1 tablet by mouth Daily. 30 tablet 5   • pramipexole (MIRAPEX) 1 MG tablet Take 1 tablet by mouth 3 (Three) Times a Day. 270 tablet 3   • sertraline (ZOLOFT) 100 MG tablet Take 150 mg by mouth Daily.     • vitamin B-6 (PYRIDOXINE) 50 MG tablet Take 100 mg by mouth Daily.     • vitamin C (ASCORBIC ACID) 500 MG tablet Take 500 mg by mouth Daily.       No current facility-administered medications for this visit.         No Known Allergies       There is no immunization history on file for this patient.     The following portions of the patient's history were reviewed and updated as appropriate: allergies, current medications, past family history, past medical history, past social history, past surgical history and problem list.      Objective    Visit Vitals  /86 (BP Location: Right arm,  "Patient Position: Sitting, Cuff Size: Adult)   Pulse 83   Temp 97.3 °F (36.3 °C) (Temporal)   Ht 180.3 cm (70.98\")   Wt 111 kg (245 lb 3.2 oz)   SpO2 98%   BMI 34.22 kg/m²        Physical Exam  Gen Appearance: NAD  HEENT: Normocephalic, PERRLA, no thyromegaly, trache midline  Heart: RRR, normal S1 and S2, no murmur  Lungs: CTA b/l, no wheezing, no crackles  Abdomen: Soft, non-tender, non-distended, no guarding and BSx4  MSK: Moves all extremities well, shuffling gait, +2 LE edema b/l  Pulses: Palpable and equal b/l  Lymph nodes: No palpable lymphadenopathy   Neuro: No focal deficits, pill rolling tremor on the right, slow speech      HEALTH RISK ASSESSMENT    1945    Recent Hospitalizations:  No hospitalization(s) within the last year..      Current Medical Providers:  Patient Care Team:  Ben Holder DO as PCP - General (Internal Medicine)  Mariana Bliss PA-C as PCP - Claims Attributed      Smoking Status:  Social History     Tobacco Use   Smoking Status Former Smoker   • Types: Cigarettes   Smokeless Tobacco Never Used   Tobacco Comment    quit 1980       Alcohol Consumption:  Social History     Substance and Sexual Activity   Alcohol Use Yes    Comment: occasionally       Depression Screen:   PHQ-2/PHQ-9 Depression Screening 8/14/2019   Little interest or pleasure in doing things 1   Feeling down, depressed, or hopeless 3   Trouble falling or staying asleep, or sleeping too much 3   Feeling tired or having little energy 3   Poor appetite or overeating 1   Feeling bad about yourself - or that you are a failure or have let yourself or your family down 1   Trouble concentrating on things, such as reading the newspaper or watching television 2   Moving or speaking so slowly that other people could have noticed. Or the opposite - being so fidgety or restless that you have been moving around a lot more than usual 3   Thoughts that you would be better off dead, or of hurting yourself in some way 0 "   Total Score 17   If you checked off any problems, how difficult have these problems made it for you to do your work, take care of things at home, or get along with other people? Somewhat difficult       Health Habits and Functional and Cognitive Screening:  Functional & Cognitive Status 8/14/2019   Do you have difficulty preparing food and eating? Yes   Do you have difficulty bathing yourself, getting dressed or grooming yourself? Yes   Do you have difficulty using the toilet? No   Do you have difficulty moving around from place to place? Yes   Do you have trouble with steps or getting out of a bed or a chair? Yes   Current Diet Well Balanced Diet   Dental Exam Up to date   Eye Exam Up to date   Exercise (times per week) 0 times per week   Current Exercise Activities Include None   Do you need help using the phone?  No   Are you deaf or do you have serious difficulty hearing?  Yes   Do you need help with transportation? No   Do you need help shopping? Yes   Do you need help preparing meals?  Yes   Do you need help with housework?  No   Do you need help with laundry? No   Do you need help taking your medications? No   Do you need help managing money? Yes   Do you ever drive or ride in a car without wearing a seat belt? No   Have you felt unusual stress, anger or loneliness in the last month? No   Who do you live with? Alone   If you need help, do you have trouble finding someone available to you? Yes   Have you been bothered in the last four weeks by sexual problems? Yes   Do you have difficulty concentrating, remembering or making decisions? Yes         Does the patient have evidence of cognitive impairment? Yes    Aspirin use counseling? Does not need ASA (and currently is not on it)      Recent Lab Results:  CMP:  Lab Results   Component Value Date    BUN 22 05/14/2019    CREATININE 1.36 (H) 05/14/2019    EGFRIFNONA 52 (L) 05/14/2019    BCR 16.2 05/14/2019     05/14/2019    K 4.7 05/14/2019    CO2 28.8  05/14/2019    CALCIUM 9.6 05/14/2019    ALBUMIN 4.40 05/14/2019    BILITOT 0.4 05/14/2019    ALKPHOS 95 05/14/2019    AST 18 05/14/2019    ALT 21 05/14/2019     Lipid Panel:  Lab Results   Component Value Date    CHOL 190 05/14/2019    TRIG 87 05/14/2019    HDL 56 05/14/2019    VLDL 17.4 05/14/2019    LDLHDL 2.08 05/14/2019     HbA1c:  Lab Results   Component Value Date    HGBA1C 6.06 (H) 05/14/2019       Visual Acuity:  No exam data present    Age-appropriate Screening Schedule:  Refer to the list below for future screening recommendations based on patient's age, sex and/or medical conditions. Orders for these recommended tests are listed in the plan section. The patient has been provided with a written plan.    Health Maintenance   Topic Date Due   • TDAP/TD VACCINES (1 - Tdap) 01/29/1966   • ZOSTER VACCINE (1 of 2) 01/29/1997   • PNEUMOCOCCAL VACCINES (65+ LOW/MEDIUM RISK) (1 of 2 - PCV13) 01/29/2012   • INFLUENZA VACCINE  08/01/2019   • LIPID PANEL  05/14/2020   • COLONOSCOPY  03/12/2028          Advance Care Planning:  Patient does not have an advance directive - not interested in additional information    Identification of Risk Factors:  Risk factors include: Cardiovascular risk  Dementia/Memory   Depression/Dysphoria  Fall Risk.    Compared to one year ago, the patient feels his physical health is the same.  Compared to one year ago, the patient feels his mental health is the same.  Reviewed use of high risk medication in the elderly: yes  Reviewed for potential of harmful drug interactions in the elderly: yes    Cordell was seen today for medicare wellness-subsequent.    Diagnoses and all orders for this visit:    Medicare annual wellness visit, subsequent  -     CBC & Differential; Future  -     Comprehensive Metabolic Panel; Future  -     Hemoglobin A1c; Future    Hypothyroidism due to Hashimoto's thyroiditis  -     levothyroxine (SYNTHROID) 88 MCG tablet; Take 1 tablet by mouth Daily.  -     TSH Rfx On  Abnormal To Free T4; Future  -     T4, free; Future    Swelling of lower extremity  Lasix and K for 5 days. Checking US to r/o DVT. Chronic issue so less likely although right side seems more swollen than left.   Depression, unspecified depression type  Decrease zoloft to 150mg daily. Complaining of significant daytime fatigue.   Other orders  -     atorvastatin (LIPITOR) 40 MG tablet; Take 1 tablet by mouth Daily.          Patient Self-Management and Personalized Health Advice  The patient has been provided with information about: diet, exercise, weight management, prevention of cardiac or vascular disease and mental health concerns and preventive services including:   · Annual Wellness Visit (AWV)  · Cardiovascular Disease Screening Tests (may do this order every 5 years in beneficiaries without signs or symptoms of cardiovascular disease).      Follow Up:  No Follow-up on file.     An After Visit Summary and PPPS with all of these plans were given to the patient.

## 2019-08-14 NOTE — PROGRESS NOTES
"Subjective     Chief Complaint: Parkinson's Disease      History of Present Illness   Cordell Martin is a 74 y.o. male who returns to clinic today with a history of Parkinson's Disease. He has symptoms since at least 2015 noted initially by a resting tremor primarily in his right hand. He notes occasional associated imbalance, though denies a shuffling gait.      He has also noted short term memory impairment for several years. He notes associated word-finding difficulties and impairments in concentration.      Prior evaluation has included screening blood work and a head CT which were unremarkable.     Since his last visit on 5/15/19 his tremor is improved. However, he continues to have problems with balance. He also notes a history of back injuries which he feels could be contributing to his imbalance. He denies other changes. He continues on Sinemet 1.5 tabs tid and pramipexole 1 mg tid. He completed a course of PT about 1.5 months ago.    I have reviewed and confirmed the past family, social and medical history as accurate on 8/14/19.     Review of Systems   Constitutional: Negative.        Objective     /78   Ht 180.3 cm (71\")   Wt 111 kg (245 lb)   BMI 34.17 kg/m²     General appearance today is normal.       Physical Exam   Constitutional: He is oriented to person, place, and time.   Neurological: He is oriented to person, place, and time. He has normal strength.   Psychiatric: His speech is normal.        Neurologic Exam     Mental Status   Oriented to person, place, and time.   Attention: normal.   Speech: speech is normal   Level of consciousness: alert  Knowledge: good.   Normal comprehension.     Cranial Nerves   Cranial nerves II through XII intact.     Motor Exam   Muscle bulk: normal  Right arm tone: increased    Strength   Strength 5/5 throughout.     Gait, Coordination, and Reflexes     Tremor   Resting tremor: absent          Assessment/Plan   Cordell was seen today for parkinson's " disease.    Diagnoses and all orders for this visit:    Parkinson's disease (CMS/[facility])          Discussion/Summary   Cordell Martin returns to clinic today with a history of Parkinson's Disease. His neurologic examination is very good this afternoon, and so I have not recommended any changes in his regimen. If ankle swelling continues to be a problem, we may have to consider tapering his amantadine. He will then follow up in 6 months, or sooner if needed.     I spent 25 minutes face to face with the patient. I spent 15 minutes counseling and discussing diagnosis, current status, treatment options and management.    As part of this visit I discussed the history with the patient .      Chalo Cavanaugh MD

## 2019-08-26 ENCOUNTER — OFFICE VISIT (OUTPATIENT)
Dept: FAMILY MEDICINE CLINIC | Facility: CLINIC | Age: 74
End: 2019-08-26

## 2019-08-26 VITALS
HEART RATE: 71 BPM | TEMPERATURE: 97.2 F | BODY MASS INDEX: 33.63 KG/M2 | OXYGEN SATURATION: 100 % | HEIGHT: 71 IN | DIASTOLIC BLOOD PRESSURE: 82 MMHG | SYSTOLIC BLOOD PRESSURE: 126 MMHG | WEIGHT: 240.2 LBS

## 2019-08-26 DIAGNOSIS — H93.13 TINNITUS OF BOTH EARS: ICD-10-CM

## 2019-08-26 DIAGNOSIS — M79.89 LOCALIZED SWELLING OF LOWER EXTREMITY: Primary | ICD-10-CM

## 2019-08-26 PROCEDURE — 99214 OFFICE O/P EST MOD 30 MIN: CPT | Performed by: INTERNAL MEDICINE

## 2019-08-26 NOTE — PROGRESS NOTES
Chief Complaint:  swelling    HPI:  Cordell Martin is a 74 y.o. male who presents today for f/u LE swelling.  Reports improvement with furosemide.  He has ultrasound scheduled for tomorrow.  Reports he still does have residual swelling at the end of the day especially when he has been on his feet for extended periods of time.  Today he is also complaining of bilateral tinnitus.  She has had ringing in his ears for the last 2 years.  His previous hearing test was over 5 years ago.  He reports he did have hearing loss although he does not wear hearing aids.  He reports the tinnitus is constant and daily.  Denies any recent ear infections or upper respiratory infections.  Denies any ear pain.  Not worse with movement and no dizziness.    ROS:  Constitutional: no fevers, night sweats or unexplained weight loss  Eyes: no vision changes  ENT: no runny nose, ear pain, sore throat  Cardio: no chest pain, palpitations  Pulm: no shortness of breath, wheezing, or cough  GI: no abdominal pain or changes in bowel movements  : no difficulty urinating  MSK: no difficulty ambulating, no joint pain  Neuro: no weakness, dizziness or headache  Psych: no trouble sleeping  Endo: no change in appetite      Past Medical History:   Diagnosis Date   • Anxiety    • Arthritis    • Back pain    • Bronchitis    • Cognitive impairment    • Depression    • Disease of thyroid gland    • Glucose intolerance (impaired glucose tolerance)    • Hyperlipidemia    • Kidney stone    • Obesity    • Parkinson disease (CMS/HCC)    • Pneumonia    • Prostate disorder    • Thyroid disease    • Wears eyeglasses       Family History   Problem Relation Age of Onset   • Alzheimer's disease Other    • Cancer Other    • Diabetes Other    • Heart disease Other    • Stroke Other    • Cancer Father       Social History     Socioeconomic History   • Marital status:      Spouse name: Not on file   • Number of children: Not on file   • Years of education: Not on  file   • Highest education level: Not on file   Tobacco Use   • Smoking status: Former Smoker     Types: Cigarettes   • Smokeless tobacco: Never Used   • Tobacco comment: quit 1980   Substance and Sexual Activity   • Alcohol use: Yes     Comment: occasionally   • Drug use: No   • Sexual activity: Yes     Partners: Female     Birth control/protection: None      No Known Allergies     There is no immunization history on file for this patient.     PE:  Vitals:    08/26/19 1316   BP: 126/82   Pulse: 71   Temp: 97.2 °F (36.2 °C)   SpO2: 100%        Gen Appearance: NAD  HEENT: Normocephalic, PERRLA, no thyromegaly, trache midline, clear TM bilaterally  Heart: RRR, normal S1 and S2, no murmur  Lungs: CTA b/l, no wheezing, no crackles  Abdomen: Soft, non-tender, non-distended, no guarding and BSx4  MSK: Moves all extremities well, normal gait, no peripheral edema  Pulses: Palpable and equal b/l  Lymph nodes: No palpable lymphadenopathy   Neuro: No focal deficits      Current Outpatient Medications   Medication Sig Dispense Refill   • amantadine (SYMMETREL) 100 MG tablet Take 2 tablets by mouth 2 (Two) Times a Day. 120 tablet 11   • atorvastatin (LIPITOR) 40 MG tablet Take 1 tablet by mouth Daily. 90 tablet 3   • carbidopa-levodopa (SINEMET)  MG per tablet Take 1.5 tablets by mouth 3 (Three) Times a Day. 135 tablet 11   • Cholecalciferol (VITAMIN D3) 5000 units capsule capsule Take 5,000 Units by mouth Daily.     • fluticasone (FLONASE) 50 MCG/ACT nasal spray 2 sprays into each nostril Daily.     • levothyroxine (SYNTHROID) 88 MCG tablet Take 1 tablet by mouth Daily. 30 tablet 5   • pramipexole (MIRAPEX) 1 MG tablet Take 1 tablet by mouth 3 (Three) Times a Day. 270 tablet 3   • sertraline (ZOLOFT) 100 MG tablet Take 1.5 tablets by mouth Daily. 90 tablet 5   • vitamin B-6 (PYRIDOXINE) 50 MG tablet Take 100 mg by mouth Daily.     • vitamin C (ASCORBIC ACID) 500 MG tablet Take 500 mg by mouth Daily.       No current  facility-administered medications for this visit.         Cordell was seen today for medicare wellness-subsequent.    Diagnoses and all orders for this visit:    Localized swelling of lower extremity  Improved with Lasix.  Possibly due to and amantadine therapy.  Recommend discussing this with his neurologist at follow-up appointment.  Has ultrasound to rule out DVT tomorrow.  Recommend daily compression stockings to prevent future recurrences.  Tinnitus of both ears  Normal exam today.  Tinnitus for 2 years with hearing loss as well.  Will be referring to ENT for further evaluation.       Return in about 6 months (around 2/26/2020).

## 2019-08-27 ENCOUNTER — HOSPITAL ENCOUNTER (OUTPATIENT)
Dept: CARDIOLOGY | Facility: HOSPITAL | Age: 74
Discharge: HOME | End: 2019-08-27
Admitting: INTERNAL MEDICINE

## 2019-08-27 DIAGNOSIS — M79.89 SWELLING OF LOWER EXTREMITY: ICD-10-CM

## 2019-08-27 LAB

## 2019-08-27 PROCEDURE — 93970 EXTREMITY STUDY: CPT

## 2019-08-27 PROCEDURE — 93970 EXTREMITY STUDY: CPT | Performed by: INTERNAL MEDICINE

## 2019-09-11 ENCOUNTER — OFFICE VISIT (OUTPATIENT)
Dept: NEUROLOGY | Facility: CLINIC | Age: 74
End: 2019-09-11

## 2019-09-11 ENCOUNTER — APPOINTMENT (OUTPATIENT)
Dept: LAB | Facility: HOSPITAL | Age: 74
End: 2019-09-11

## 2019-09-11 ENCOUNTER — TELEPHONE (OUTPATIENT)
Dept: FAMILY MEDICINE CLINIC | Facility: CLINIC | Age: 74
End: 2019-09-11

## 2019-09-11 VITALS
BODY MASS INDEX: 33.6 KG/M2 | DIASTOLIC BLOOD PRESSURE: 76 MMHG | WEIGHT: 240 LBS | HEIGHT: 71 IN | SYSTOLIC BLOOD PRESSURE: 118 MMHG

## 2019-09-11 DIAGNOSIS — G20 PARKINSON'S DISEASE (HCC): ICD-10-CM

## 2019-09-11 DIAGNOSIS — R20.0 NUMBNESS AND TINGLING: Primary | ICD-10-CM

## 2019-09-11 DIAGNOSIS — R20.2 NUMBNESS AND TINGLING: Primary | ICD-10-CM

## 2019-09-11 PROCEDURE — 82746 ASSAY OF FOLIC ACID SERUM: CPT | Performed by: PHYSICIAN ASSISTANT

## 2019-09-11 PROCEDURE — 84155 ASSAY OF PROTEIN SERUM: CPT | Performed by: PHYSICIAN ASSISTANT

## 2019-09-11 PROCEDURE — 82607 VITAMIN B-12: CPT | Performed by: PHYSICIAN ASSISTANT

## 2019-09-11 PROCEDURE — 36415 COLL VENOUS BLD VENIPUNCTURE: CPT | Performed by: PHYSICIAN ASSISTANT

## 2019-09-11 PROCEDURE — 84165 PROTEIN E-PHORESIS SERUM: CPT | Performed by: PHYSICIAN ASSISTANT

## 2019-09-11 PROCEDURE — 84207 ASSAY OF VITAMIN B-6: CPT | Performed by: PHYSICIAN ASSISTANT

## 2019-09-11 PROCEDURE — 99214 OFFICE O/P EST MOD 30 MIN: CPT | Performed by: PHYSICIAN ASSISTANT

## 2019-09-11 NOTE — PROGRESS NOTES
"Subjective     Chief Complaint: Parkinson's Disease, numbness and tingling      History of Present Illness   Cordell Martin is a 74 y.o. male who returns to clinic today with a history of Parkinson's Disease. He has symptoms since at least 2015 noted initially by a resting tremor primarily in his right hand. He notes occasional associated imbalance, though denies a shuffling gait.      He has also noted short term memory impairment for several years. He notes associated word-finding difficulties and impairments in concentration.      Prior evaluation has included screening blood work and a head CT which were unremarkable. He is currently taking Sinemet 1.5 tabs tid and pramipexole 1 mg tid as well as amantadine.     Today: Since his last visit in 8/19, he notes that his tremor has worsened. He has also noted worsened numbness and tingling since 9/9/19. This initially began in his legs bilaterally, and now involves his arms as well. His symptoms have remained relatively static and constant over the last couple of days. He may note associated weakness in his legs bilaterally, though this is unclear. There is also an associated burning sensation in his feet. He denies any clear modifying factors as well as any recent injury. He states that he began taking B6  in approximately 6/19.        I have reviewed and confirmed the past family, social and medical history as accurate on 9/11/19.     Review of Systems   Constitutional: Negative.    HENT: Negative.    Eyes: Negative.    Respiratory: Negative.    Cardiovascular: Negative.    Gastrointestinal: Negative.    Endocrine: Negative.    Genitourinary: Negative.    Musculoskeletal: Positive for neck pain.   Skin: Negative.    Allergic/Immunologic: Negative.    Neurological: Positive for tremors and numbness.   Hematological: Negative.    Psychiatric/Behavioral: Negative.        Objective     /76   Ht 180.3 cm (71\")   Wt 109 kg (240 lb)   BMI 33.47 kg/m²     General " appearance today is normal.       Physical Exam   Neurological: He has normal strength. He has a normal Finger-Nose-Finger Test.   Reflex Scores:       Bicep reflexes are 1+ on the right side and 1+ on the left side.       Brachioradialis reflexes are 1+ on the right side and 1+ on the left side.       Patellar reflexes are 1+ on the right side and 1+ on the left side.  Psychiatric: His speech is normal.        Neurologic Exam     Mental Status   Speech: speech is normal   Level of consciousness: alert  Normal comprehension.     Cranial Nerves   Cranial nerves II through XII intact.   Except for hypomimia      Motor Exam   Muscle bulk: normal  Right arm tone: increased    Strength   Strength 5/5 throughout.     Sensory Exam   Decreased sensation to light touch in lower extremities, potentially worse on the right      Gait, Coordination, and Reflexes     Gait  Gait: shuffling    Coordination   Finger to nose coordination: normal    Tremor   Resting tremor: present    Reflexes   Right brachioradialis: 1+  Left brachioradialis: 1+  Right biceps: 1+  Left biceps: 1+  Right patellar: 1+  Left patellar: 1+          Assessment/Plan   Cordell was seen today for parkinson's disease.    Diagnoses and all orders for this visit:    Numbness and tingling  -     Vitamin B12  -     Folate  -     Vitamin B6  -     Protein Elec + Interp, Serum    Parkinson's disease (CMS/[facility])          Discussion/Summary   Cordell Martin returns to clinic today for evaluation of Parkinson's Disease and numbness. The etiology of his numbness and paresthesias is unclear.   I again reviewed his current status and treatment options. It was elected to obtain screening blood work . Next, we may consider obtaining an EMG of his upper and lower extremities bilaterally and/or a CT of the cervical spine given his associated neck pain. After discussing potential treatment options, it was elected to continue on his current medications. If ankle swelling continues  to be a problem, we may have to consider tapering his amantadine. He will then follow up in 5 months, or sooner if needed.   I spent 25 minutes face to face with the patient with 20 minutes spent on discussing diagnosis, prognosis, diagnostic testing, evaluation, current status, treatment options and management as discussed above.       As part of this visit I reviewed prior lab results.      Priti Ross PA-C

## 2019-09-12 LAB
FOLATE SERPL-MCNC: 6.53 NG/ML (ref 4.78–24.2)
VIT B12 BLD-MCNC: 349 PG/ML (ref 211–946)

## 2019-09-13 LAB
ALBUMIN SERPL-MCNC: 3.5 G/DL (ref 2.9–4.4)
ALBUMIN/GLOB SERPL: 1.3 {RATIO} (ref 0.7–1.7)
ALPHA1 GLOB FLD ELPH-MCNC: 0.2 G/DL (ref 0–0.4)
ALPHA2 GLOB SERPL ELPH-MCNC: 0.7 G/DL (ref 0.4–1)
B-GLOBULIN SERPL ELPH-MCNC: 1 G/DL (ref 0.7–1.3)
GAMMA GLOB SERPL ELPH-MCNC: 0.7 G/DL (ref 0.4–1.8)
GLOBULIN SER CALC-MCNC: 2.7 G/DL (ref 2.2–3.9)
Lab: NORMAL
M-SPIKE: NORMAL G/DL
PROT PATTERN SERPL ELPH-IMP: NORMAL
PROT SERPL-MCNC: 6.2 G/DL (ref 6–8.5)

## 2019-09-16 ENCOUNTER — TELEPHONE (OUTPATIENT)
Dept: NEUROLOGY | Facility: CLINIC | Age: 74
End: 2019-09-16

## 2019-09-16 LAB — VIT B6 SERPL-MCNC: 48.9 UG/L (ref 5.3–46.7)

## 2019-09-16 NOTE — TELEPHONE ENCOUNTER
Left detailed VM on 's secure line relaying these results to him. Notified I have routed these results on to his PCP,  for further review. Office # given if patient has any questions.

## 2019-09-16 NOTE — TELEPHONE ENCOUNTER
Priti Ross PA-C Dieruf, Stephanie S, MA             Would you care to let Mr. Martin know that I reviewed his labs. His B6 level was slightly elevated. Therefore, we will forward the results to his PCP as they may want to follow or adjust his B6 dosage (though they may choose continue it unchanged as it is only slightly out of range). Otherwise, I did not see any significant abnormalities and do not have any further recommendations at this time. Thanks!

## 2019-12-18 RX ORDER — AMANTADINE HYDROCHLORIDE 100 MG/1
CAPSULE, GELATIN COATED ORAL
Qty: 360 CAPSULE | Refills: 2 | Status: SHIPPED | OUTPATIENT
Start: 2019-12-18 | End: 2020-04-17

## 2019-12-23 RX ORDER — PRAMIPEXOLE DIHYDROCHLORIDE 1 MG/1
TABLET ORAL
Qty: 270 TABLET | Refills: 3 | Status: SHIPPED | OUTPATIENT
Start: 2019-12-23 | End: 2021-03-23 | Stop reason: SDUPTHER

## 2020-01-23 ENCOUNTER — TELEPHONE (OUTPATIENT)
Dept: FAMILY MEDICINE CLINIC | Facility: CLINIC | Age: 75
End: 2020-01-23

## 2020-01-23 NOTE — TELEPHONE ENCOUNTER
Patient called asking to get a referral to see Dr. Rajesh Galindo at   [facility]    [address]  [place] [address]    [phone] PH    Patient stated that he has early Parkinson's disease.      Patient callback:[phone]     Please advise.

## 2020-01-24 ENCOUNTER — TELEPHONE (OUTPATIENT)
Dept: FAMILY MEDICINE CLINIC | Facility: CLINIC | Age: 75
End: 2020-01-24

## 2020-01-24 DIAGNOSIS — G20 PARKINSON DISEASE (HCC): Primary | ICD-10-CM

## 2020-01-24 NOTE — TELEPHONE ENCOUNTER
[facility] called, they are needing more information about the patients diagnostic code, send last office note.  Fax [phone]  Phone [phone]

## 2020-01-28 ENCOUNTER — TELEPHONE (OUTPATIENT)
Dept: NEUROLOGY | Facility: CLINIC | Age: 75
End: 2020-01-28

## 2020-01-30 ENCOUNTER — TELEPHONE (OUTPATIENT)
Dept: NEUROLOGY | Facility: CLINIC | Age: 75
End: 2020-01-30

## 2020-01-31 DIAGNOSIS — G20 PARKINSON'S DISEASE (HCC): Primary | ICD-10-CM

## 2020-02-03 ENCOUNTER — TELEPHONE (OUTPATIENT)
Dept: NEUROLOGY | Facility: CLINIC | Age: 75
End: 2020-02-03

## 2020-02-03 NOTE — TELEPHONE ENCOUNTER
Called and spoke to pt informing of scheduled referral appt. information. Pt stated verbal understanding. Faxed OV notes, demographics, order, ambulatory consent ([phone]). Thanks.

## 2020-02-18 ENCOUNTER — TELEPHONE (OUTPATIENT)
Dept: NEUROLOGY | Facility: CLINIC | Age: 75
End: 2020-02-18

## 2020-02-18 NOTE — TELEPHONE ENCOUNTER
Called and spoke to pt regarding missed appt with Dr. Gregor Leon. Pt stated that weather was bad and had to reschedule. Scheduled pt fu appt with Dr. Cavanaugh and called  Neuroscience and verified pt scheduled appt for 6/22/20 at 4:00 pm with Dr. Gregor Leon. Thanks.

## 2020-03-02 ENCOUNTER — OFFICE VISIT (OUTPATIENT)
Dept: FAMILY MEDICINE CLINIC | Facility: CLINIC | Age: 75
End: 2020-03-02

## 2020-03-02 VITALS
BODY MASS INDEX: 32.9 KG/M2 | OXYGEN SATURATION: 98 % | SYSTOLIC BLOOD PRESSURE: 135 MMHG | DIASTOLIC BLOOD PRESSURE: 72 MMHG | HEIGHT: 71 IN | WEIGHT: 235 LBS | HEART RATE: 85 BPM

## 2020-03-02 DIAGNOSIS — Z12.11 COLON CANCER SCREENING: ICD-10-CM

## 2020-03-02 DIAGNOSIS — M48.062 SPINAL STENOSIS, LUMBAR REGION, WITH NEUROGENIC CLAUDICATION: ICD-10-CM

## 2020-03-02 DIAGNOSIS — G20 PARKINSON'S DISEASE (HCC): ICD-10-CM

## 2020-03-02 DIAGNOSIS — E55.9 VITAMIN D DEFICIENCY: ICD-10-CM

## 2020-03-02 DIAGNOSIS — E78.5 HYPERLIPIDEMIA, UNSPECIFIED HYPERLIPIDEMIA TYPE: ICD-10-CM

## 2020-03-02 DIAGNOSIS — F33.9 RECURRENT MAJOR DEPRESSIVE DISORDER, REMISSION STATUS UNSPECIFIED (HCC): ICD-10-CM

## 2020-03-02 DIAGNOSIS — E03.8 HYPOTHYROIDISM DUE TO HASHIMOTO'S THYROIDITIS: ICD-10-CM

## 2020-03-02 DIAGNOSIS — S72.92XD CLOSED FRACTURE OF LEFT FEMUR WITH ROUTINE HEALING, UNSPECIFIED FRACTURE MORPHOLOGY, UNSPECIFIED PORTION OF FEMUR, SUBSEQUENT ENCOUNTER: ICD-10-CM

## 2020-03-02 DIAGNOSIS — H93.13 TINNITUS OF BOTH EARS: Primary | ICD-10-CM

## 2020-03-02 DIAGNOSIS — E06.3 HYPOTHYROIDISM DUE TO HASHIMOTO'S THYROIDITIS: ICD-10-CM

## 2020-03-02 PROCEDURE — 99214 OFFICE O/P EST MOD 30 MIN: CPT | Performed by: INTERNAL MEDICINE

## 2020-03-02 RX ORDER — LEVOTHYROXINE SODIUM 88 UG/1
88 TABLET ORAL DAILY
Qty: 90 TABLET | Refills: 3 | Status: SHIPPED | OUTPATIENT
Start: 2020-03-02 | End: 2021-05-25

## 2020-03-03 NOTE — PROGRESS NOTES
Chief Complaint   Patient presents with   • Tinnitus     6 month f/u - problem has not resolved    • Fall     broken L femur - [facility]        HPI:  Cordell Martin is a 75 y.o. male who presents today for follow-up.  Tinnitus- bilateral ringing in both ears.  Has not established with ENT.  Broken femur- Home health and physical therapy.  No concerns today per patient.  Parkinson's-following with neurology, several medications have been discontinued recently.  He has follow-up next month.  Hypothyroidism-taking medication daily, no symptoms at this time.  Vitamin D deficiency-taking 5000 units daily.  Hyperlipidemia-taking statin without myalgias.  ROS:  Constitutional: no fevers, night sweats or unexplained weight loss  Eyes: no vision changes  ENT: no runny nose, ear pain, sore throat  Cardio: no chest pain, palpitations  Pulm: no shortness of breath, wheezing, or cough  GI: no abdominal pain or changes in bowel movements  : no difficulty urinating  MSK: no difficulty ambulating, no joint pain  Neuro: no weakness, dizziness or headache  Psych: no trouble sleeping  Endo: no change in appetite  Advance Care Planning   ACP discussion was declined by the patient.      Past Medical History:   Diagnosis Date   • Anxiety    • Arthritis    • Back pain    • Bronchitis    • Cognitive impairment    • Depression    • Disease of thyroid gland    • Glucose intolerance (impaired glucose tolerance)    • Hyperlipidemia    • Kidney stone    • Obesity    • Parkinson disease (CMS/HCC)    • Pneumonia    • Prostate disorder    • Thyroid disease    • Wears eyeglasses       Family History   Problem Relation Age of Onset   • Alzheimer's disease Other    • Cancer Other    • Diabetes Other    • Heart disease Other    • Stroke Other    • Cancer Father       Social History     Socioeconomic History   • Marital status:      Spouse name: Not on file   • Number of children: Not on file   • Years of education: Not on file    • Highest education level: Not on file   Tobacco Use   • Smoking status: Former Smoker     Types: Cigarettes   • Smokeless tobacco: Never Used   • Tobacco comment: quit 1980   Substance and Sexual Activity   • Alcohol use: Yes     Comment: occasionally   • Drug use: No   • Sexual activity: Yes     Partners: Female     Birth control/protection: None      No Known Allergies   Immunization History   Administered Date(s) Administered   • Fluzone High Dose =>65 Years ([employer] ONLY) 09/02/2019        PE:  Vitals:    03/02/20 1331   BP: 135/72   Pulse: 85   SpO2: 98%      Body mass index is 32.78 kg/m².    Gen Appearance: NAD  HEENT: Normocephalic, PERRLA, no thyromegaly, trache midline  Heart: RRR, normal S1 and S2, no murmur  Lungs: CTA b/l, no wheezing, no crackles  Abdomen: Soft, non-tender, non-distended, no guarding and BSx4  MSK: Ambulates with walker, no peripheral edema  Pulses: Palpable and equal b/l  Lymph nodes: No palpable lymphadenopathy   Neuro: No focal deficits, tremor right side/hand, arm      Current Outpatient Medications   Medication Sig Dispense Refill   • atorvastatin (LIPITOR) 40 MG tablet Take 1 tablet by mouth Daily. 90 tablet 3   • carbidopa-levodopa (SINEMET)  MG per tablet Take 1.5 tablets by mouth 3 (Three) Times a Day. 135 tablet 11   • Cholecalciferol (VITAMIN D3) 5000 units capsule capsule Take 5,000 Units by mouth Daily.     • fluticasone (FLONASE) 50 MCG/ACT nasal spray 2 sprays into each nostril Daily.     • levothyroxine (SYNTHROID) 88 MCG tablet Take 1 tablet by mouth Daily. 90 tablet 3   • pramipexole (MIRAPEX) 1 MG tablet TAKE 1 TABLET BY MOUTH THREE TIMES A  tablet 3   • sertraline (ZOLOFT) 100 MG tablet Take 1.5 tablets by mouth Daily. 90 tablet 5   • vitamin C (ASCORBIC ACID) 500 MG tablet Take 500 mg by mouth Daily.     • amantadine (SYMMETREL) 100 MG capsule TAKE 2 CAPSULES BY MOUTH TWICE A  capsule 2   • amantadine (SYMMETREL) 100 MG tablet Take 2 tablets  by mouth 2 (Two) Times a Day. 120 tablet 11   • vitamin B-6 (PYRIDOXINE) 50 MG tablet Take 100 mg by mouth Daily.       No current facility-administered medications for this visit.         Cordell was seen today for tinnitus and fall.    Diagnoses and all orders for this visit:    Tinnitus of both ears  Recommend follow-up with ENT.  Hyperlipidemia, unspecified hyperlipidemia type  Continue statin, no myalgias.  Parkinson's disease (CMS/HCC)  Follow-up with neurology as scheduled.    Recurrent major depressive disorder, remission status unspecified (CMS/[facility])  Continue sertraline, stable.  Vitamin D deficiency  Continue vitamin D supplementation.  Colon cancer screening  Patient requesting colonoscopy, he prefers this over Cologuard.  He would like to schedule this 1 to 2 months from now.  Closed fracture of left femur with routine healing, unspecified fracture morphology, unspecified portion of femur, subsequent encounter  Follow-up with physical therapy and orthopedics as scheduled.  Hypothyroidism due to Hashimoto's thyroiditis  -     levothyroxine (SYNTHROID) 88 MCG tablet; Take 1 tablet by mouth Daily.  No symptoms.  Refill current dose Synthroid.  Recheck TSH at follow-up visit.       Return in about 6 months (around 9/2/2020) for Medicare Wellness.     Please note that portions of this document were completed with a voice recognition program. Efforts were made to edit the dictations, but occasionally words are mis-transcribed.

## 2020-03-20 ENCOUNTER — TELEPHONE (OUTPATIENT)
Dept: FAMILY MEDICINE CLINIC | Facility: CLINIC | Age: 75
End: 2020-03-20

## 2020-03-23 ENCOUNTER — TELEPHONE (OUTPATIENT)
Dept: FAMILY MEDICINE CLINIC | Facility: CLINIC | Age: 75
End: 2020-03-23

## 2020-03-23 DIAGNOSIS — M79.89 LOCALIZED SWELLING OF LOWER EXTREMITY: Primary | ICD-10-CM

## 2020-03-23 DIAGNOSIS — Z51.81 ENCOUNTER FOR THERAPEUTIC DRUG MONITORING: ICD-10-CM

## 2020-03-23 RX ORDER — POTASSIUM CHLORIDE 20 MEQ/1
20 TABLET, EXTENDED RELEASE ORAL DAILY
Qty: 7 TABLET | Refills: 0 | Status: SHIPPED | OUTPATIENT
Start: 2020-03-23 | End: 2020-12-01 | Stop reason: SDUPTHER

## 2020-03-23 RX ORDER — FLUTICASONE PROPIONATE 50 MCG
2 SPRAY, SUSPENSION (ML) NASAL DAILY
Qty: 1 BOTTLE | Refills: 2 | Status: SHIPPED | OUTPATIENT
Start: 2020-03-23 | End: 2020-06-15

## 2020-03-23 RX ORDER — FUROSEMIDE 40 MG/1
40 TABLET ORAL DAILY
Qty: 7 TABLET | Refills: 0 | Status: SHIPPED | OUTPATIENT
Start: 2020-03-23 | End: 2020-09-02

## 2020-03-23 NOTE — TELEPHONE ENCOUNTER
I reviewed this patient's chart and he got Lasix in August of last year for some swelling at that time.  Dr. Holder seem to think it was related to 1 of his medications for Parkinson's.  I will give him a few Lasix, another for 1 week but also want him to keep his feet elevated and be on a low sodium diet.  He should take it no more than 1500 mg/day.  I also would like him to have labs checked Friday.  These have been ordered.

## 2020-03-23 NOTE — TELEPHONE ENCOUNTER
PT. CALLED REQUESTING AN UPDATED PRESCRIPTION OF fluticasone (FLONASE) 50 MCG/ACT nasal spray    PT. STATED HE GOT SAMPLES OF FLUID PILLS AND IS REQUESTING MORE DUE TO SWELLING IN HIS LEGS OR A PRESCRIPTION    PT. CALL BACK [phone]     PHARMACY CONFIRMED AS [facility]/pharmacy #[id number] - [facility] [address] [phone] [facility] [phone] FX

## 2020-03-23 NOTE — TELEPHONE ENCOUNTER
Contacted patient he is requesting a refill of Lasix because of the swelling in his legs, he states that the swellings starts at his thighs and continues downward.     I advised him that Dr. Holder is not available this week but that I would check with another provider in the office about further refills     He is requesting a call when this is sent into the pharmacy

## 2020-03-26 ENCOUNTER — TELEPHONE (OUTPATIENT)
Dept: FAMILY MEDICINE CLINIC | Facility: CLINIC | Age: 75
End: 2020-03-26

## 2020-04-17 ENCOUNTER — OFFICE VISIT (OUTPATIENT)
Dept: NEUROLOGY | Facility: CLINIC | Age: 75
End: 2020-04-17

## 2020-04-17 DIAGNOSIS — G20 PARKINSON'S DISEASE (HCC): Primary | ICD-10-CM

## 2020-04-17 PROCEDURE — 99442 PR PHYS/QHP TELEPHONE EVALUATION 11-20 MIN: CPT | Performed by: PSYCHIATRY & NEUROLOGY

## 2020-04-17 NOTE — PROGRESS NOTES
Subjective     You have chosen to receive care through a telephone visit. Do you consent to use a telephone visit for your medical care today? Yes    Chief Complaint: Parkinson's Disease, numbness and tingling      History of Present Illness   Cordell Martin is a 75 y.o. male who returns to clinic today with a history of Parkinson's Disease. He has had symptoms since at least 2015 marked initially by a resting tremor primarily in his right hand. He notes occasional associated imbalance, though denies a shuffling gait.      He has also noted short term memory impairment for several years. He notes associated word-finding difficulties and impairments in concentration.      Prior evaluation has included screening blood work and a head CT which were unremarkable. Screening bloodwork has been unremarkable with the exception of a mildly elevated B6 level (and he has subsequently stopped taking a B6 supplement). He is currently taking Sinemet 1.5 tabs tid and pramipexole 1 mg tid.     Since his last visit on 9/11/19 he reports a fall with femur fracture in late 2019 requiring surgery. He finally returned home from a prolonged rehabilitation stay in 1/20. His course was complicated by hallucinations, which have improved possibly with the discontinuation of amantadine. He continues to use a walker and has difficulty with mobility. However, he is living independently and he believes he is doing well cognitively.    I have reviewed and confirmed the past family, social and medical history as accurate on 4/17/20.     Review of Systems   Constitutional: Negative.        Objective     There were no vitals taken for this visit.    Physical Exam     Neurologic Exam        Assessment/Plan   Diagnoses and all orders for this visit:    Parkinson's disease (CMS/[facility])          Discussion/Summary   Cordell Martin returns to clinic today for evaluation of Parkinson's Disease. After discussion, he does not feel that any changes need to be  made in his medication regimen. I did discuss potential options including increasing his dosage of Sinemet. He is also pursuing evaluation at  (appt scheduled in 6/20) and is hopeful he'll be a candidate for DBS. He then arrange follow-up as needed, pending his evaluation at .    This visit was conducted as a telephone visit, with 15 minutes of discussion.      As part of this visit I reviewed prior lab results.      Chalo Cavanaugh MD

## 2020-06-04 RX ORDER — FUROSEMIDE 40 MG/1
TABLET ORAL
Qty: 7 TABLET | Refills: 0 | OUTPATIENT
Start: 2020-06-04

## 2020-06-15 RX ORDER — FLUTICASONE PROPIONATE 50 MCG
SPRAY, SUSPENSION (ML) NASAL
Qty: 48 ML | Refills: 2 | Status: SHIPPED | OUTPATIENT
Start: 2020-06-15 | End: 2021-05-26

## 2020-09-01 RX ORDER — ATORVASTATIN CALCIUM 40 MG/1
TABLET, FILM COATED ORAL
Qty: 90 TABLET | Refills: 1 | Status: SHIPPED | OUTPATIENT
Start: 2020-09-01 | End: 2021-02-25

## 2020-09-02 ENCOUNTER — OFFICE VISIT (OUTPATIENT)
Dept: FAMILY MEDICINE CLINIC | Facility: CLINIC | Age: 75
End: 2020-09-02

## 2020-09-02 VITALS
BODY MASS INDEX: 32.2 KG/M2 | DIASTOLIC BLOOD PRESSURE: 82 MMHG | HEART RATE: 74 BPM | HEIGHT: 71 IN | SYSTOLIC BLOOD PRESSURE: 122 MMHG | WEIGHT: 230 LBS | OXYGEN SATURATION: 98 %

## 2020-09-02 DIAGNOSIS — K42.9 UMBILICAL HERNIA WITHOUT OBSTRUCTION AND WITHOUT GANGRENE: ICD-10-CM

## 2020-09-02 DIAGNOSIS — Z12.5 ENCOUNTER FOR PROSTATE CANCER SCREENING: ICD-10-CM

## 2020-09-02 DIAGNOSIS — R79.9 ABNORMAL FINDING OF BLOOD CHEMISTRY, UNSPECIFIED: ICD-10-CM

## 2020-09-02 DIAGNOSIS — M79.89 SWELLING OF LOWER EXTREMITY: ICD-10-CM

## 2020-09-02 DIAGNOSIS — E55.9 VITAMIN D DEFICIENCY, UNSPECIFIED: ICD-10-CM

## 2020-09-02 DIAGNOSIS — E78.5 HYPERLIPIDEMIA, UNSPECIFIED HYPERLIPIDEMIA TYPE: ICD-10-CM

## 2020-09-02 DIAGNOSIS — Z00.00 MEDICARE ANNUAL WELLNESS VISIT, SUBSEQUENT: Primary | ICD-10-CM

## 2020-09-02 DIAGNOSIS — M48.062 LUMBAR STENOSIS WITH NEUROGENIC CLAUDICATION: ICD-10-CM

## 2020-09-02 DIAGNOSIS — G20 PARKINSON'S DISEASE (HCC): ICD-10-CM

## 2020-09-02 DIAGNOSIS — N52.9 ERECTILE DYSFUNCTION, UNSPECIFIED ERECTILE DYSFUNCTION TYPE: ICD-10-CM

## 2020-09-02 PROCEDURE — G0439 PPPS, SUBSEQ VISIT: HCPCS | Performed by: INTERNAL MEDICINE

## 2020-09-02 RX ORDER — TAMSULOSIN HYDROCHLORIDE 0.4 MG/1
1 CAPSULE ORAL DAILY
COMMUNITY
Start: 2020-06-29 | End: 2020-09-02

## 2020-09-02 RX ORDER — OXYBUTYNIN CHLORIDE 5 MG/1
TABLET, EXTENDED RELEASE ORAL
COMMUNITY
End: 2020-09-02

## 2020-09-02 RX ORDER — SILDENAFIL 50 MG/1
50 TABLET, FILM COATED ORAL DAILY PRN
Qty: 15 TABLET | Refills: 5 | Status: SHIPPED | OUTPATIENT
Start: 2020-09-02 | End: 2021-07-19 | Stop reason: HOSPADM

## 2020-09-02 RX ORDER — FLUOROMETHOLONE 0.1 %
SUSPENSION, DROPS(FINAL DOSAGE FORM)(ML) OPHTHALMIC (EYE)
COMMUNITY
End: 2020-09-02

## 2020-09-02 NOTE — PROGRESS NOTES
QUICK REFERENCE INFORMATION:  The ABCs of the Annual Wellness Visit  Cordell Martin is a 75 y.o. male presenting forMedicare Subsequent Wellness visit and  Annual Exam and Medicare Wellness-subsequent  .     Medicare Subsequent Wellness Visit    Chief Complaint   Patient presents with   • Annual Exam   • Medicare Wellness-subsequent        HPI   Here for wellness visit and physical. He would like to discuss umbilical hernia.  Somewhat painful over the last 2 weeks.  ROS:  Constitutional: no fevers, night sweats or unexplained weight loss  Eyes: no vision changes  ENT: no runny nose, ear pain, sore throat  Cardio: no chest pain, palpitations  Pulm: no shortness of breath, wheezing, or cough  GI: no abdominal pain or changes in bowel movements  : no difficulty urinating  MSK: no difficulty ambulating, no joint pain  Neuro: no weakness, dizziness or headache  Psych: no trouble sleeping  Endo: no change in appetite     Past Medical History:   Diagnosis Date   • Anxiety    • Arthritis    • Back pain    • Bronchitis    • Cognitive impairment    • Depression    • Disease of thyroid gland    • Glucose intolerance (impaired glucose tolerance)    • Hyperlipidemia    • Kidney stone    • Obesity    • Parkinson disease (CMS/HCC)    • Pneumonia    • Prostate disorder    • Thyroid disease    • Wears eyeglasses         Past Surgical History:   Procedure Laterality Date   • COLONOSCOPY      5 years ago   • EYE SURGERY     • LUMBAR LAMINECTOMY DISCECTOMY DECOMPRESSION N/A 7/13/2017    Procedure: LUMBAR LAMINECTOMY L4-5;  Surgeon: Antonio Trent MD;  [facility]: [facility];  Service:    • SHOULDER ROTATOR CUFF REPAIR Right     x2   • TONSILLECTOMY         Family History   Problem Relation Age of Onset   • Alzheimer's disease Other    • Cancer Other    • Diabetes Other    • Heart disease Other    • Stroke Other    • Cancer Father         Social History     Socioeconomic History   • Marital status:      Spouse name: Not on file    • Number of children: Not on file   • Years of education: Not on file   • Highest education level: Not on file   Tobacco Use   • Smoking status: Former Smoker     Packs/day: 1.00     Years: 15.00     Pack years: 15.00     Types: Cigarettes     Start date:      Last attempt to quit:      Years since quittin.6   • Smokeless tobacco: Never Used   • Tobacco comment: quit    Substance and Sexual Activity   • Alcohol use: Yes     Comment: occasionally   • Drug use: No   • Sexual activity: Yes     Partners: Female     Birth control/protection: None        Current Outpatient Medications   Medication Sig Dispense Refill   • atorvastatin (LIPITOR) 40 MG tablet TAKE 1 TABLET BY MOUTH EVERY DAY 90 tablet 1   • carbidopa-levodopa (SINEMET)  MG per tablet TAKE 1 & 1/2 TABLETS BY MOUTH THREE TIMES DAILY 405 tablet 3   • Cholecalciferol (VITAMIN D3) 5000 units capsule capsule Take 5,000 Units by mouth Daily.     • fluticasone (FLONASE) 50 MCG/ACT nasal spray SPRAY 2 SPRAYS IN EACH NOSTRIL ONCE DAILY 48 mL 2   • levothyroxine (SYNTHROID) 88 MCG tablet Take 1 tablet by mouth Daily. 90 tablet 3   • potassium chloride (K-DUR,KLOR-CON) 20 MEQ CR tablet Take 1 tablet by mouth Daily. 7 tablet 0   • pramipexole (MIRAPEX) 1 MG tablet TAKE 1 TABLET BY MOUTH THREE TIMES A  tablet 3   • sertraline (ZOLOFT) 100 MG tablet Take 1.5 tablets by mouth Daily. 90 tablet 5   • vitamin C (ASCORBIC ACID) 500 MG tablet Take 500 mg by mouth Daily.     • sildenafil (Viagra) 50 MG tablet Take 1 tablet by mouth Daily As Needed for Erectile Dysfunction. 15 tablet 5     No current facility-administered medications for this visit.         No Known Allergies     Immunization History   Administered Date(s) Administered   • Fluzone High Dose =>65 Years ([employer] ONLY) 2018, 2018, 2019   • Pneumococcal Conjugate 13-Valent (PCV13) 2015   • Pneumococcal Polysaccharide (PPSV23) 2013   • Tdap 2009   •  "Zostavax 2011        The following portions of the patient's history were reviewed and updated as appropriate: allergies, current medications, past family history, past medical history, past social history, past surgical history and problem list.      Objective    Visit Vitals  /82   Pulse 74   Ht 180.3 cm (71\")   Wt 104 kg (230 lb)   SpO2 98%   BMI 32.08 kg/m²        Physical Exam  Gen Appearance: NAD  HEENT: Normocephalic, PERRLA, no thyromegaly, trache midline  Heart: RRR, normal S1 and S2, no murmur  Lungs: CTA b/l, no wheezing, no crackles  Abdomen: Soft, non-tender, non-distended, no guarding and BSx4  MSK: Moves all extremities well, normal gait, no peripheral edema  Pulses: Palpable and equal b/l  Lymph nodes: No palpable lymphadenopathy   Neuro: No focal deficits       HEALTH RISK ASSESSMENT    1945    Recent Hospitalizations:  No hospitalization(s) within the last year..      Current Medical Providers:  Patient Care Team:  Ben Holder DO as PCP - General (Internal Medicine)  Ben Holder DO as PCP - Claims Attributed      Smoking Status:  Social History     Tobacco Use   Smoking Status Former Smoker   • Packs/day: 1.00   • Years: 15.00   • Pack years: 15.00   • Types: Cigarettes   • Start date:    • Last attempt to quit:    • Years since quittin.6   Smokeless Tobacco Never Used   Tobacco Comment    quit        Alcohol Consumption:  Social History     Substance and Sexual Activity   Alcohol Use Yes    Comment: occasionally       Depression Screen:   PHQ-2/PHQ-9 Depression Screening 2020   Little interest or pleasure in doing things 1   Feeling down, depressed, or hopeless 1   Trouble falling or staying asleep, or sleeping too much 0   Feeling tired or having little energy 2   Poor appetite or overeating 0   Feeling bad about yourself - or that you are a failure or have let yourself or your family down 2   Trouble concentrating on things, such as " reading the newspaper or watching television 2   Moving or speaking so slowly that other people could have noticed. Or the opposite - being so fidgety or restless that you have been moving around a lot more than usual 2   Thoughts that you would be better off dead, or of hurting yourself in some way 0   Total Score 10   If you checked off any problems, how difficult have these problems made it for you to do your work, take care of things at home, or get along with other people? Somewhat difficult       Health Habits and Functional and Cognitive Screening:  Functional & Cognitive Status 9/2/2020   Do you have difficulty preparing food and eating? Yes   Do you have difficulty bathing yourself, getting dressed or grooming yourself? Yes   Do you have difficulty using the toilet? No   Do you have difficulty moving around from place to place? Yes   Do you have trouble with steps or getting out of a bed or a chair? Yes   Current Diet Well Balanced Diet   Dental Exam Not up to date   Eye Exam Up to date   Exercise (times per week) 2 times per week   Current Exercise Activities Include Yard Work   Do you need help using the phone?  No   Are you deaf or do you have serious difficulty hearing?  Yes   Do you need help with transportation? No   Do you need help shopping? Yes   Do you need help preparing meals?  -   Do you need help with housework?  No   Do you need help with laundry? No   Do you need help taking your medications? No   Do you need help managing money? No   Do you ever drive or ride in a car without wearing a seat belt? No   Have you felt unusual stress, anger or loneliness in the last month? Yes   Who do you live with? Alone   If you need help, do you have trouble finding someone available to you? Yes   Have you been bothered in the last four weeks by sexual problems? No   Do you have difficulty concentrating, remembering or making decisions? Yes         Does the patient have evidence of cognitive impairment?  Yes    Aspirin use counseling? Does not need ASA (and currently is not on it)      Recent Lab Results:  CMP:  Lab Results   Component Value Date    BUN 28 (H) 08/14/2019    CREATININE 1.19 08/14/2019    EGFRIFNONA 60 (L) 08/14/2019    BCR 23.5 08/14/2019     08/14/2019    K 4.9 08/14/2019    CO2 26.7 08/14/2019    CALCIUM 9.4 08/14/2019    PROTENTOTREF 6.2 09/11/2019    ALBUMIN 3.5 09/11/2019    LABGLOBREF 2.7 09/11/2019    LABIL2 1.3 09/11/2019    BILITOT 0.5 08/14/2019    ALKPHOS 92 08/14/2019    AST 21 08/14/2019    ALT 8 08/14/2019     Lipid Panel:  Lab Results   Component Value Date    CHOL 190 05/14/2019    TRIG 87 05/14/2019    HDL 56 05/14/2019    VLDL 17.4 05/14/2019    LDLHDL 2.08 05/14/2019     HbA1c:  Lab Results   Component Value Date    HGBA1C 6.20 (H) 08/14/2019       Visual Acuity:  No exam data present    Age-appropriate Screening Schedule:  Refer to the list below for future screening recommendations based on patient's age, sex and/or medical conditions. Orders for these recommended tests are listed in the plan section. The patient has been provided with a written plan.    Health Maintenance   Topic Date Due   • ZOSTER VACCINE (2 of 3) 03/23/2011   • TDAP/TD VACCINES (2 - Td) 12/04/2019   • LIPID PANEL  05/14/2020   • INFLUENZA VACCINE  08/01/2020   • COLONOSCOPY  03/12/2028          Advance Care Planning:  ACP discussion was declined by the patient. Patient does not have an advance directive, declines further assistance.    Identification of Risk Factors:  Risk factors include: Advance Directive Discussion  Cardiovascular risk  Colon Cancer Screening  Prostate Cancer Screening .    Compared to one year ago, the patient feels his physical health is the same.  Compared to one year ago, the patient feels his mental health is the same.  Reviewed use of high risk medication in the elderly: yes  Reviewed for potential of harmful drug interactions in the elderly: yes    Cordell was seen today for  annual exam and medicare wellness-subsequent.    Diagnoses and all orders for this visit:    Medicare annual wellness visit, subsequent  Counseled on healthy weight, nutrition, physical activity, cancer screening, and immunizations.    Erectile dysfunction, unspecified erectile dysfunction type  Trial Viagra.  Recommend using GoodRx.  Hyperlipidemia, unspecified hyperlipidemia type  -     CBC & Differential; Future  -     Comprehensive Metabolic Panel; Future  -     Hemoglobin A1c; Future  -     Lipid Panel; Future  -     Urinalysis With Culture If Indicated - Urine, Clean Catch; Future  -     Vitamin D 25 Hydroxy; Future  -     PSA Screen; Future  -     TSH+Free T4; Future    Lumbar stenosis with neurogenic claudication  -     CBC & Differential; Future  -     Comprehensive Metabolic Panel; Future  -     Hemoglobin A1c; Future  -     Lipid Panel; Future  -     Urinalysis With Culture If Indicated - Urine, Clean Catch; Future  -     Vitamin D 25 Hydroxy; Future  -     PSA Screen; Future  -     TSH+Free T4; Future    Parkinson's disease (CMS/HCC)  -     CBC & Differential; Future  -     Comprehensive Metabolic Panel; Future  -     Hemoglobin A1c; Future  -     Lipid Panel; Future  -     Urinalysis With Culture If Indicated - Urine, Clean Catch; Future  -     Vitamin D 25 Hydroxy; Future  -     PSA Screen; Future  -     TSH+Free T4; Future    Encounter for prostate cancer screening  -     PSA Screen; Future    Abnormal finding of blood chemistry, unspecified   -     Hemoglobin A1c; Future    Vitamin D deficiency, unspecified   -     Vitamin D 25 Hydroxy; Future    Swelling of lower extremity    Other orders  -     sildenafil (Viagra) 50 MG tablet; Take 1 tablet by mouth Daily As Needed for Erectile Dysfunction.          Patient Self-Management and Personalized Health Advice  The patient has been provided with information about: diet, exercise, weight management, prevention of cardiac or vascular disease, the  relationship between weight and GERD and designing advance directives and preventive services including:   · Annual Wellness Visit (AWV)  · Cardiovascular Disease Screening Tests (may do this order every 5 years in beneficiaries without signs or symptoms of cardiovascular disease).      Follow Up:  Return in about 6 months (around 3/2/2021).     An After Visit Summary and PPPS with all of these plans were given to the patient.           Please note that portions of this document were completed with a voice recognition program. Efforts were made to edit the dictations, but occasionally words are mis-transcribed.

## 2020-09-09 ENCOUNTER — LAB (OUTPATIENT)
Dept: LAB | Facility: HOSPITAL | Age: 75
End: 2020-09-09

## 2020-09-09 ENCOUNTER — TELEPHONE (OUTPATIENT)
Dept: NEUROLOGY | Facility: CLINIC | Age: 75
End: 2020-09-09

## 2020-09-09 ENCOUNTER — OFFICE VISIT (OUTPATIENT)
Dept: NEUROLOGY | Facility: CLINIC | Age: 75
End: 2020-09-09

## 2020-09-09 VITALS
TEMPERATURE: 97.8 F | DIASTOLIC BLOOD PRESSURE: 74 MMHG | BODY MASS INDEX: 32.09 KG/M2 | OXYGEN SATURATION: 100 % | SYSTOLIC BLOOD PRESSURE: 118 MMHG | HEIGHT: 71 IN | HEART RATE: 81 BPM

## 2020-09-09 DIAGNOSIS — E55.9 VITAMIN D DEFICIENCY, UNSPECIFIED: ICD-10-CM

## 2020-09-09 DIAGNOSIS — E78.5 HYPERLIPIDEMIA, UNSPECIFIED HYPERLIPIDEMIA TYPE: ICD-10-CM

## 2020-09-09 DIAGNOSIS — G20 PARKINSON'S DISEASE (HCC): ICD-10-CM

## 2020-09-09 DIAGNOSIS — M79.89 LOCALIZED SWELLING OF LOWER EXTREMITY: ICD-10-CM

## 2020-09-09 DIAGNOSIS — G20 PARKINSON'S DISEASE (HCC): Primary | ICD-10-CM

## 2020-09-09 DIAGNOSIS — Z12.5 ENCOUNTER FOR PROSTATE CANCER SCREENING: ICD-10-CM

## 2020-09-09 DIAGNOSIS — M48.062 LUMBAR STENOSIS WITH NEUROGENIC CLAUDICATION: ICD-10-CM

## 2020-09-09 DIAGNOSIS — R79.9 ABNORMAL FINDING OF BLOOD CHEMISTRY, UNSPECIFIED: ICD-10-CM

## 2020-09-09 DIAGNOSIS — Z51.81 ENCOUNTER FOR THERAPEUTIC DRUG MONITORING: ICD-10-CM

## 2020-09-09 PROCEDURE — 82306 VITAMIN D 25 HYDROXY: CPT

## 2020-09-09 PROCEDURE — 80061 LIPID PANEL: CPT

## 2020-09-09 PROCEDURE — 83036 HEMOGLOBIN GLYCOSYLATED A1C: CPT

## 2020-09-09 PROCEDURE — 99214 OFFICE O/P EST MOD 30 MIN: CPT | Performed by: PSYCHIATRY & NEUROLOGY

## 2020-09-09 PROCEDURE — 81003 URINALYSIS AUTO W/O SCOPE: CPT

## 2020-09-09 PROCEDURE — 85025 COMPLETE CBC W/AUTO DIFF WBC: CPT

## 2020-09-09 PROCEDURE — 84439 ASSAY OF FREE THYROXINE: CPT

## 2020-09-09 PROCEDURE — 84443 ASSAY THYROID STIM HORMONE: CPT

## 2020-09-09 PROCEDURE — G0103 PSA SCREENING: HCPCS

## 2020-09-09 PROCEDURE — 80053 COMPREHEN METABOLIC PANEL: CPT

## 2020-09-09 NOTE — PROGRESS NOTES
"Subjective     You have chosen to receive care through a telephone visit. Do you consent to use a telephone visit for your medical care today? Yes    Chief Complaint: Parkinson's Disease, numbness and tingling      History of Present Illness   Cordell Martin is a 75 y.o. male who returns to clinic today with a history of Parkinson's Disease. He has had symptoms since at least 2015 marked initially by a resting tremor primarily in his right hand. He notes occasional associated imbalance, though denies a shuffling gait.      He has also noted short term memory impairment for several years. He notes associated word-finding difficulties and impairments in concentration.      Prior evaluation has included screening blood work and a head CT which were unremarkable. Screening bloodwork has been unremarkable with the exception of a mildly elevated B6 level (and he has subsequently stopped taking a B6 supplement). He is currently taking Sinemet 1.5 tabs tid and pramipexole 1 mg tid.     Since his last visit on 4/17/20 he has done well overall, though has had some increased difficulty with balance, possibly due to hip pain for which he just underwent a series of injections. He denies any other changes. He is scheduled for evaluation at  later this year.    I have reviewed and confirmed the past family, social and medical history as accurate on 9/9/20.     Review of Systems   Constitutional: Negative.        Objective     /74   Pulse 81   Temp 97.8 °F (36.6 °C)   Ht 180.3 cm (70.98\")   SpO2 100%   BMI 32.09 kg/m²     Physical Exam   Constitutional: He is oriented to person, place, and time.   Neurological: He is oriented to person, place, and time. He has normal strength.   Psychiatric: His speech is normal.        Neurologic Exam     Mental Status   Oriented to person, place, and time.   Registration: recalls 3 of 3 objects. Recall at 5 minutes: recalls 3 of 3 objects. Follows 3 step commands.   Attention: " normal.   Speech: speech is normal   Level of consciousness: alert  Knowledge: good.   Able to name object. Able to read. Able to repeat. Able to write. Normal comprehension.     Cranial Nerves   Cranial nerves II through XII intact.   Moderate hypomimia     Motor Exam   Muscle bulk: normal  Overall muscle tone: normal    Strength   Strength 5/5 throughout.     Gait, Coordination, and Reflexes     Tremor   Resting tremor: present    MMSE=29      Assessment/Plan   Cordell was seen today for memory loss.    Diagnoses and all orders for this visit:    Parkinson's disease (CMS/[facility])          Discussion/Summary   Cordell Martin returns to clinic today with a history of Parkinson's Disease. After discussion, he again does not feel that any changes need to be made in his medication regimen. We have previously discussed options including increasing his dosage of Sinemet. He is also pursuing evaluation at  (appt scheduled in 12/20) and is hopeful he'll be a candidate for DBS. He then arrange follow-up in approximately 6 months.    I spent 25 minutes face to face with the patient. I spent 15 minutes counseling and discussing current status, treatment options and management.    As part of this visit I   .      Chalo Cavanaugh MD

## 2020-09-09 NOTE — TELEPHONE ENCOUNTER
----- Message from Araseli El sent at 9/9/2020  3:28 PM EDT -----  Dr. Cavanaugh,    Pt wanted to know if he was suppose to be seen by Priti before he was sent to ?  Please advise.

## 2020-09-10 LAB
25(OH)D3 SERPL-MCNC: 66.5 NG/ML (ref 30–100)
ALBUMIN SERPL-MCNC: 4 G/DL (ref 3.5–5.2)
ALBUMIN/GLOB SERPL: 1.2 G/DL
ALP SERPL-CCNC: 152 U/L (ref 39–117)
ALT SERPL W P-5'-P-CCNC: <5 U/L (ref 1–41)
ANION GAP SERPL CALCULATED.3IONS-SCNC: 9.4 MMOL/L (ref 5–15)
AST SERPL-CCNC: 21 U/L (ref 1–40)
BASOPHILS # BLD AUTO: 0.03 10*3/MM3 (ref 0–0.2)
BASOPHILS NFR BLD AUTO: 0.4 % (ref 0–1.5)
BILIRUB SERPL-MCNC: 0.7 MG/DL (ref 0–1.2)
BILIRUB UR QL STRIP: NEGATIVE
BUN SERPL-MCNC: 39 MG/DL (ref 8–23)
BUN/CREAT SERPL: 26.7 (ref 7–25)
CALCIUM SPEC-SCNC: 10 MG/DL (ref 8.6–10.5)
CHLORIDE SERPL-SCNC: 104 MMOL/L (ref 98–107)
CHOLEST SERPL-MCNC: 145 MG/DL (ref 0–200)
CLARITY UR: CLEAR
CO2 SERPL-SCNC: 25.6 MMOL/L (ref 22–29)
COLOR UR: YELLOW
CREAT SERPL-MCNC: 1.46 MG/DL (ref 0.76–1.27)
DEPRECATED RDW RBC AUTO: 49.5 FL (ref 37–54)
EOSINOPHIL # BLD AUTO: 0.08 10*3/MM3 (ref 0–0.4)
EOSINOPHIL NFR BLD AUTO: 1.1 % (ref 0.3–6.2)
ERYTHROCYTE [DISTWIDTH] IN BLOOD BY AUTOMATED COUNT: 14.1 % (ref 12.3–15.4)
GFR SERPL CREATININE-BSD FRML MDRD: 47 ML/MIN/1.73
GLOBULIN UR ELPH-MCNC: 3.4 GM/DL
GLUCOSE SERPL-MCNC: 92 MG/DL (ref 65–99)
GLUCOSE UR STRIP-MCNC: NEGATIVE MG/DL
HBA1C MFR BLD: 6.32 % (ref 4.8–5.6)
HCT VFR BLD AUTO: 45.6 % (ref 37.5–51)
HDLC SERPL-MCNC: 63 MG/DL (ref 40–60)
HGB BLD-MCNC: 14.5 G/DL (ref 13–17.7)
HGB UR QL STRIP.AUTO: NEGATIVE
IMM GRANULOCYTES # BLD AUTO: 0.02 10*3/MM3 (ref 0–0.05)
IMM GRANULOCYTES NFR BLD AUTO: 0.3 % (ref 0–0.5)
KETONES UR QL STRIP: ABNORMAL
LDLC SERPL CALC-MCNC: 69 MG/DL (ref 0–100)
LDLC/HDLC SERPL: 1.1 {RATIO}
LEUKOCYTE ESTERASE UR QL STRIP.AUTO: NEGATIVE
LYMPHOCYTES # BLD AUTO: 1.95 10*3/MM3 (ref 0.7–3.1)
LYMPHOCYTES NFR BLD AUTO: 25.6 % (ref 19.6–45.3)
MCH RBC QN AUTO: 30 PG (ref 26.6–33)
MCHC RBC AUTO-ENTMCNC: 31.8 G/DL (ref 31.5–35.7)
MCV RBC AUTO: 94.2 FL (ref 79–97)
MONOCYTES # BLD AUTO: 0.44 10*3/MM3 (ref 0.1–0.9)
MONOCYTES NFR BLD AUTO: 5.8 % (ref 5–12)
NEUTROPHILS NFR BLD AUTO: 5.09 10*3/MM3 (ref 1.7–7)
NEUTROPHILS NFR BLD AUTO: 66.8 % (ref 42.7–76)
NITRITE UR QL STRIP: NEGATIVE
NRBC BLD AUTO-RTO: 0.1 /100 WBC (ref 0–0.2)
PH UR STRIP.AUTO: 5.5 [PH] (ref 5–8)
PLATELET # BLD AUTO: 250 10*3/MM3 (ref 140–450)
PMV BLD AUTO: 9.3 FL (ref 6–12)
POTASSIUM SERPL-SCNC: 4.8 MMOL/L (ref 3.5–5.2)
PROT SERPL-MCNC: 7.4 G/DL (ref 6–8.5)
PROT UR QL STRIP: NEGATIVE
PSA SERPL-MCNC: 0.62 NG/ML (ref 0–4)
RBC # BLD AUTO: 4.84 10*6/MM3 (ref 4.14–5.8)
SODIUM SERPL-SCNC: 139 MMOL/L (ref 136–145)
SP GR UR STRIP: 1.02 (ref 1–1.03)
T4 FREE SERPL-MCNC: 1.41 NG/DL (ref 0.93–1.7)
TRIGL SERPL-MCNC: 63 MG/DL (ref 0–150)
TSH SERPL DL<=0.05 MIU/L-ACNC: 1.69 UIU/ML (ref 0.27–4.2)
UROBILINOGEN UR QL STRIP: ABNORMAL
VLDLC SERPL-MCNC: 12.6 MG/DL (ref 5–40)
WBC # BLD AUTO: 7.61 10*3/MM3 (ref 3.4–10.8)

## 2020-09-10 NOTE — TELEPHONE ENCOUNTER
S/W pt to advise Dr Diaz suggestion. Pt verbalized understanding.       No, that is not necessary. Thanks.

## 2020-09-11 RX ORDER — SERTRALINE HYDROCHLORIDE 100 MG/1
TABLET, FILM COATED ORAL
Qty: 135 TABLET | Refills: 1 | Status: SHIPPED | OUTPATIENT
Start: 2020-09-11 | End: 2021-07-16

## 2020-09-15 DIAGNOSIS — R94.4 DECREASED GFR: Primary | ICD-10-CM

## 2020-09-15 RX ORDER — SILDENAFIL CITRATE 20 MG/1
20 TABLET ORAL DAILY
Qty: 15 TABLET | Refills: 5 | Status: ON HOLD | OUTPATIENT
Start: 2020-09-15 | End: 2021-07-19 | Stop reason: SDUPTHER

## 2020-09-15 NOTE — TELEPHONE ENCOUNTER
Patient is requesting Sildenafil 20 mg because the price is more affordable than the current 50 mg dosage     Is this okay?

## 2020-10-12 ENCOUNTER — APPOINTMENT (OUTPATIENT)
Dept: PREADMISSION TESTING | Facility: HOSPITAL | Age: 75
End: 2020-10-12

## 2020-10-12 ENCOUNTER — LAB (OUTPATIENT)
Dept: PREADMISSION TESTING | Facility: HOSPITAL | Age: 75
End: 2020-10-12

## 2020-10-13 ENCOUNTER — APPOINTMENT (OUTPATIENT)
Dept: PREADMISSION TESTING | Facility: HOSPITAL | Age: 75
End: 2020-10-13

## 2020-10-13 LAB
ANION GAP SERPL CALCULATED.3IONS-SCNC: 10 MMOL/L (ref 5–15)
BUN SERPL-MCNC: 27 MG/DL (ref 8–23)
BUN/CREAT SERPL: 21.8 (ref 7–25)
CALCIUM SPEC-SCNC: 9.6 MG/DL (ref 8.6–10.5)
CHLORIDE SERPL-SCNC: 106 MMOL/L (ref 98–107)
CO2 SERPL-SCNC: 26 MMOL/L (ref 22–29)
CREAT SERPL-MCNC: 1.24 MG/DL (ref 0.76–1.27)
DEPRECATED RDW RBC AUTO: 51.8 FL (ref 37–54)
ERYTHROCYTE [DISTWIDTH] IN BLOOD BY AUTOMATED COUNT: 14.7 % (ref 12.3–15.4)
GFR SERPL CREATININE-BSD FRML MDRD: 57 ML/MIN/1.73
GLUCOSE SERPL-MCNC: 128 MG/DL (ref 65–99)
HCT VFR BLD AUTO: 40.8 % (ref 37.5–51)
HGB BLD-MCNC: 12.9 G/DL (ref 13–17.7)
MCH RBC QN AUTO: 30.2 PG (ref 26.6–33)
MCHC RBC AUTO-ENTMCNC: 31.6 G/DL (ref 31.5–35.7)
MCV RBC AUTO: 95.6 FL (ref 79–97)
PLATELET # BLD AUTO: 226 10*3/MM3 (ref 140–450)
PMV BLD AUTO: 8.7 FL (ref 6–12)
POTASSIUM SERPL-SCNC: 4.2 MMOL/L (ref 3.5–5.2)
RBC # BLD AUTO: 4.27 10*6/MM3 (ref 4.14–5.8)
SODIUM SERPL-SCNC: 142 MMOL/L (ref 136–145)
WBC # BLD AUTO: 5.11 10*3/MM3 (ref 3.4–10.8)

## 2020-10-13 PROCEDURE — 36415 COLL VENOUS BLD VENIPUNCTURE: CPT

## 2020-10-13 PROCEDURE — U0004 COV-19 TEST NON-CDC HGH THRU: HCPCS

## 2020-10-13 PROCEDURE — C9803 HOPD COVID-19 SPEC COLLECT: HCPCS

## 2020-10-13 PROCEDURE — 93010 ELECTROCARDIOGRAM REPORT: CPT | Performed by: INTERNAL MEDICINE

## 2020-10-13 PROCEDURE — 93005 ELECTROCARDIOGRAM TRACING: CPT

## 2020-10-13 PROCEDURE — 80048 BASIC METABOLIC PNL TOTAL CA: CPT | Performed by: SURGERY

## 2020-10-13 PROCEDURE — 85027 COMPLETE CBC AUTOMATED: CPT | Performed by: SURGERY

## 2020-10-14 LAB — SARS-COV-2 RNA RESP QL NAA+PROBE: NOT DETECTED

## 2020-11-24 ENCOUNTER — TELEPHONE (OUTPATIENT)
Dept: NEUROLOGY | Facility: CLINIC | Age: 75
End: 2020-11-24

## 2020-11-24 NOTE — TELEPHONE ENCOUNTER
SATHYA WITH [facility] CALLING TO REQUEST LAST OFFICE NOTE AND PRIOR IMAGING OF PT. PT IS SCHEDULE FOR AN APPT WITH ALEXUS VANCE ON 3/9/21, UNSURE IF PT IS LOOKING TO CONTINUE CARE WITH OUR PRACTICE OR WANTING TO BEGIN CARE WITH [facility].    FAX: [phone], THIS IS SATHYA'S DIRECT FAX NUMBER.    PLEASE ADVISE

## 2020-11-30 ENCOUNTER — APPOINTMENT (OUTPATIENT)
Dept: GENERAL RADIOLOGY | Facility: HOSPITAL | Age: 75
End: 2020-11-30

## 2020-11-30 ENCOUNTER — APPOINTMENT (OUTPATIENT)
Dept: CARDIOLOGY | Facility: HOSPITAL | Age: 75
End: 2020-11-30

## 2020-11-30 ENCOUNTER — HOSPITAL ENCOUNTER (EMERGENCY)
Facility: HOSPITAL | Age: 75
Discharge: HOME | End: 2020-11-30
Attending: EMERGENCY MEDICINE | Admitting: EMERGENCY MEDICINE

## 2020-11-30 ENCOUNTER — OFFICE VISIT (OUTPATIENT)
Dept: FAMILY MEDICINE CLINIC | Facility: CLINIC | Age: 75
End: 2020-11-30

## 2020-11-30 VITALS
RESPIRATION RATE: 20 BRPM | BODY MASS INDEX: 34.58 KG/M2 | SYSTOLIC BLOOD PRESSURE: 135 MMHG | WEIGHT: 247 LBS | HEIGHT: 71 IN | OXYGEN SATURATION: 96 % | TEMPERATURE: 97.3 F | HEART RATE: 80 BPM | DIASTOLIC BLOOD PRESSURE: 76 MMHG

## 2020-11-30 VITALS
HEART RATE: 95 BPM | HEIGHT: 71 IN | SYSTOLIC BLOOD PRESSURE: 126 MMHG | DIASTOLIC BLOOD PRESSURE: 50 MMHG | WEIGHT: 247 LBS | OXYGEN SATURATION: 98 % | BODY MASS INDEX: 34.58 KG/M2

## 2020-11-30 DIAGNOSIS — G20 PARKINSON'S DISEASE (HCC): ICD-10-CM

## 2020-11-30 DIAGNOSIS — L03.115 CELLULITIS OF RIGHT LEG: ICD-10-CM

## 2020-11-30 DIAGNOSIS — R60.0 BILATERAL LEG EDEMA: Primary | ICD-10-CM

## 2020-11-30 DIAGNOSIS — L03.115 CELLULITIS OF RIGHT LOWER EXTREMITY: ICD-10-CM

## 2020-11-30 DIAGNOSIS — R22.41 LOCALIZED SWELLING OF RIGHT LOWER EXTREMITY: Primary | ICD-10-CM

## 2020-11-30 DIAGNOSIS — R03.0 ELEVATED BLOOD PRESSURE READING: ICD-10-CM

## 2020-11-30 DIAGNOSIS — R63.5 WEIGHT GAIN: ICD-10-CM

## 2020-11-30 LAB
ALBUMIN SERPL-MCNC: 3.7 G/DL (ref 3.5–5.2)
ALBUMIN/GLOB SERPL: 1.3 G/DL
ALP SERPL-CCNC: 129 U/L (ref 39–117)
ALT SERPL W P-5'-P-CCNC: 16 U/L (ref 1–41)
ANION GAP SERPL CALCULATED.3IONS-SCNC: 7 MMOL/L (ref 5–15)
AST SERPL-CCNC: 15 U/L (ref 1–40)
BASOPHILS # BLD AUTO: 0.03 10*3/MM3 (ref 0–0.2)
BASOPHILS NFR BLD AUTO: 0.4 % (ref 0–1.5)
BH CV ECHO MEAS - BSA(HAYCOCK): 2.4 M^2
BH CV ECHO MEAS - BSA: 2.3 M^2
BH CV ECHO MEAS - BZI_BMI: 34.4 KILOGRAMS/M^2
BH CV ECHO MEAS - BZI_METRIC_HEIGHT: 180.3 CM
BH CV ECHO MEAS - BZI_METRIC_WEIGHT: 112 KG
BH CV LOWER VASCULAR LEFT COMMON FEMORAL AUGMENT: NORMAL
BH CV LOWER VASCULAR LEFT COMMON FEMORAL COMPRESS: NORMAL
BH CV LOWER VASCULAR LEFT COMMON FEMORAL PHASIC: NORMAL
BH CV LOWER VASCULAR LEFT COMMON FEMORAL SPONT: NORMAL
BH CV LOWER VASCULAR RIGHT COMMON FEMORAL AUGMENT: NORMAL
BH CV LOWER VASCULAR RIGHT COMMON FEMORAL COMPRESS: NORMAL
BH CV LOWER VASCULAR RIGHT COMMON FEMORAL PHASIC: NORMAL
BH CV LOWER VASCULAR RIGHT COMMON FEMORAL SPONT: NORMAL
BH CV LOWER VASCULAR RIGHT DISTAL FEMORAL COMPRESS: NORMAL
BH CV LOWER VASCULAR RIGHT GASTRONEMIUS COMPRESS: NORMAL
BH CV LOWER VASCULAR RIGHT GREATER SAPH AK COMPRESS: NORMAL
BH CV LOWER VASCULAR RIGHT GREATER SAPH BK COMPRESS: NORMAL
BH CV LOWER VASCULAR RIGHT LESSER SAPH COMPRESS: NORMAL
BH CV LOWER VASCULAR RIGHT MID FEMORAL AUGMENT: NORMAL
BH CV LOWER VASCULAR RIGHT MID FEMORAL COMPRESS: NORMAL
BH CV LOWER VASCULAR RIGHT MID FEMORAL PHASIC: NORMAL
BH CV LOWER VASCULAR RIGHT MID FEMORAL SPONT: NORMAL
BH CV LOWER VASCULAR RIGHT PERONEAL COMPRESS: NORMAL
BH CV LOWER VASCULAR RIGHT POPLITEAL AUGMENT: NORMAL
BH CV LOWER VASCULAR RIGHT POPLITEAL COMPRESS: NORMAL
BH CV LOWER VASCULAR RIGHT POPLITEAL PHASIC: NORMAL
BH CV LOWER VASCULAR RIGHT POPLITEAL SPONT: NORMAL
BH CV LOWER VASCULAR RIGHT POSTERIOR TIBIAL COMPRESS: NORMAL
BH CV LOWER VASCULAR RIGHT PROFUNDA FEMORAL COMPRESS: NORMAL
BH CV LOWER VASCULAR RIGHT PROXIMAL FEMORAL COMPRESS: NORMAL
BH CV LOWER VASCULAR RIGHT SAPHENOFEMORAL JUNCTION COMPRESS: NORMAL
BILIRUB SERPL-MCNC: 0.4 MG/DL (ref 0–1.2)
BUN SERPL-MCNC: 19 MG/DL (ref 8–23)
BUN/CREAT SERPL: 17.4 (ref 7–25)
CALCIUM SPEC-SCNC: 9.5 MG/DL (ref 8.6–10.5)
CHLORIDE SERPL-SCNC: 104 MMOL/L (ref 98–107)
CO2 SERPL-SCNC: 30 MMOL/L (ref 22–29)
CREAT SERPL-MCNC: 1.09 MG/DL (ref 0.76–1.27)
DEPRECATED RDW RBC AUTO: 52.5 FL (ref 37–54)
EOSINOPHIL # BLD AUTO: 0.14 10*3/MM3 (ref 0–0.4)
EOSINOPHIL NFR BLD AUTO: 2 % (ref 0.3–6.2)
ERYTHROCYTE [DISTWIDTH] IN BLOOD BY AUTOMATED COUNT: 14.7 % (ref 12.3–15.4)
GFR SERPL CREATININE-BSD FRML MDRD: 66 ML/MIN/1.73
GLOBULIN UR ELPH-MCNC: 2.8 GM/DL
GLUCOSE SERPL-MCNC: 101 MG/DL (ref 65–99)
HCT VFR BLD AUTO: 39.2 % (ref 37.5–51)
HGB BLD-MCNC: 12 G/DL (ref 13–17.7)
IMM GRANULOCYTES # BLD AUTO: 0.03 10*3/MM3 (ref 0–0.05)
IMM GRANULOCYTES NFR BLD AUTO: 0.4 % (ref 0–0.5)
LYMPHOCYTES # BLD AUTO: 2.19 10*3/MM3 (ref 0.7–3.1)
LYMPHOCYTES NFR BLD AUTO: 31.2 % (ref 19.6–45.3)
MCH RBC QN AUTO: 29.8 PG (ref 26.6–33)
MCHC RBC AUTO-ENTMCNC: 30.6 G/DL (ref 31.5–35.7)
MCV RBC AUTO: 97.3 FL (ref 79–97)
MONOCYTES # BLD AUTO: 0.58 10*3/MM3 (ref 0.1–0.9)
MONOCYTES NFR BLD AUTO: 8.3 % (ref 5–12)
NEUTROPHILS NFR BLD AUTO: 4.05 10*3/MM3 (ref 1.7–7)
NEUTROPHILS NFR BLD AUTO: 57.7 % (ref 42.7–76)
NRBC BLD AUTO-RTO: 0 /100 WBC (ref 0–0.2)
NT-PROBNP SERPL-MCNC: 163.3 PG/ML (ref 0–900)
PLATELET # BLD AUTO: 225 10*3/MM3 (ref 140–450)
PMV BLD AUTO: 8.4 FL (ref 6–12)
POTASSIUM SERPL-SCNC: 4.5 MMOL/L (ref 3.5–5.2)
PROT SERPL-MCNC: 6.5 G/DL (ref 6–8.5)
QT INTERVAL: 396 MS
QTC INTERVAL: 471 MS
RBC # BLD AUTO: 4.03 10*6/MM3 (ref 4.14–5.8)
SODIUM SERPL-SCNC: 141 MMOL/L (ref 136–145)
TROPONIN T SERPL-MCNC: <0.01 NG/ML (ref 0–0.03)
WBC # BLD AUTO: 7.02 10*3/MM3 (ref 3.4–10.8)

## 2020-11-30 PROCEDURE — 93005 ELECTROCARDIOGRAM TRACING: CPT | Performed by: EMERGENCY MEDICINE

## 2020-11-30 PROCEDURE — 71045 X-RAY EXAM CHEST 1 VIEW: CPT

## 2020-11-30 PROCEDURE — 84484 ASSAY OF TROPONIN QUANT: CPT | Performed by: EMERGENCY MEDICINE

## 2020-11-30 PROCEDURE — 80053 COMPREHEN METABOLIC PANEL: CPT | Performed by: EMERGENCY MEDICINE

## 2020-11-30 PROCEDURE — 99283 EMERGENCY DEPT VISIT LOW MDM: CPT

## 2020-11-30 PROCEDURE — 93971 EXTREMITY STUDY: CPT | Performed by: INTERNAL MEDICINE

## 2020-11-30 PROCEDURE — 99214 OFFICE O/P EST MOD 30 MIN: CPT | Performed by: INTERNAL MEDICINE

## 2020-11-30 PROCEDURE — 85025 COMPLETE CBC W/AUTO DIFF WBC: CPT | Performed by: EMERGENCY MEDICINE

## 2020-11-30 PROCEDURE — 83880 ASSAY OF NATRIURETIC PEPTIDE: CPT | Performed by: EMERGENCY MEDICINE

## 2020-11-30 PROCEDURE — 93971 EXTREMITY STUDY: CPT

## 2020-11-30 RX ORDER — CEPHALEXIN 500 MG/1
500 CAPSULE ORAL 4 TIMES DAILY
Qty: 28 CAPSULE | Refills: 0 | Status: SHIPPED | OUTPATIENT
Start: 2020-11-30 | End: 2020-12-07

## 2020-11-30 RX ORDER — SULFAMETHOXAZOLE AND TRIMETHOPRIM 800; 160 MG/1; MG/1
1 TABLET ORAL 2 TIMES DAILY
Qty: 14 TABLET | Refills: 0 | Status: SHIPPED | OUTPATIENT
Start: 2020-11-30 | End: 2020-12-07

## 2020-11-30 RX ORDER — OXYBUTYNIN CHLORIDE 5 MG/1
TABLET ORAL
Status: ON HOLD | COMMUNITY
End: 2021-07-17

## 2020-11-30 RX ORDER — TAMSULOSIN HYDROCHLORIDE 0.4 MG/1
CAPSULE ORAL
COMMUNITY

## 2020-11-30 NOTE — PROGRESS NOTES
Chief Complaint   Patient presents with   • Leg Swelling     rt leg is swelling and leaking fluid, also hard to bend x2 weeks       HPI:  Cordell Martin is a 75 y.o. male who presents today for right lower extremity swelling for 2 weeks.  Denies any trauma or injury.  No history of DVTs.  He reports a 20 pound weight gain over the last 2 months.  He is wrapping his legs daily due to weeping and drainage.  Denies shortness of breath or chest pain.    ROS:  Constitutional: no fevers, night sweats or unexplained weight loss  Eyes: no vision changes  ENT: no runny nose, ear pain, sore throat  Cardio: no chest pain, palpitations  Pulm: no shortness of breath, wheezing, or cough  GI: no abdominal pain or changes in bowel movements  : no difficulty urinating  MSK: no difficulty ambulating, no joint pain  Neuro: no weakness, dizziness or headache  Psych: no trouble sleeping  Endo: no change in appetite      Past Medical History:   Diagnosis Date   • Anxiety    • Arthritis    • Back pain    • Bronchitis    • Cognitive impairment    • Depression    • Disease of thyroid gland    • Glucose intolerance (impaired glucose tolerance)    • Hyperlipidemia    • Kidney stone    • Obesity    • Parkinson disease (CMS/HCC)    • Pneumonia    • Prostate disorder    • Thyroid disease    • Wears eyeglasses       Family History   Problem Relation Age of Onset   • Alzheimer's disease Other    • Cancer Other    • Diabetes Other    • Heart disease Other    • Stroke Other    • Cancer Father       Social History     Socioeconomic History   • Marital status:      Spouse name: Not on file   • Number of children: Not on file   • Years of education: Not on file   • Highest education level: Not on file   Tobacco Use   • Smoking status: Former Smoker     Packs/day: 1.00     Years: 15.00     Pack years: 15.00     Types: Cigarettes     Start date:      Quit date:      Years since quittin.9   • Smokeless tobacco: Never Used   •  Tobacco comment: quit 1980   Substance and Sexual Activity   • Alcohol use: Yes     Comment: occasionally   • Drug use: No   • Sexual activity: Yes     Partners: Female     Birth control/protection: None      No Known Allergies   Immunization History   Administered Date(s) Administered   • Fluad Quad 65+ 08/31/2020   • Fluzone High Dose =>65 Years ([employer] ONLY) 02/23/2018, 09/12/2018, 09/02/2019, 10/13/2020   • Pneumococcal Conjugate 13-Valent (PCV13) 04/30/2015   • Pneumococcal Polysaccharide (PPSV23) 01/29/2013   • Tdap 12/04/2009   • Zostavax 01/26/2011        PE:  Vitals:    11/30/20 1515   BP: 126/50   Pulse: 95   SpO2: 98%      Body mass index is 34.47 kg/m².    Gen Appearance: NAD  HEENT: Normocephalic, PERRLA, no thyromegaly, trache midline  Heart: RRR, normal S1 and S2, no murmur  Lungs: CTA b/l, no wheezing, no crackles  Abdomen: Soft, non-tender, non-distended, no guarding and BSx4  MSK: Moves all extremities well, normal gait, +3 pitting edema right lower extremity at knee and below, erythema and weeping underneath bandaging  Pulses: Palpable and equal b/l  Lymph nodes: No palpable lymphadenopathy   Neuro: No focal deficits      Current Outpatient Medications   Medication Sig Dispense Refill   • atorvastatin (LIPITOR) 40 MG tablet TAKE 1 TABLET BY MOUTH EVERY DAY 90 tablet 1   • carbidopa-levodopa (SINEMET)  MG per tablet TAKE 1 & 1/2 TABLETS BY MOUTH THREE TIMES DAILY 405 tablet 3   • Cholecalciferol (VITAMIN D3) 5000 units capsule capsule Take 5,000 Units by mouth Daily.     • fluticasone (FLONASE) 50 MCG/ACT nasal spray SPRAY 2 SPRAYS IN EACH NOSTRIL ONCE DAILY 48 mL 2   • levothyroxine (SYNTHROID) 88 MCG tablet Take 1 tablet by mouth Daily. 90 tablet 3   • oxyCODONE-acetaminophen (PERCOCET) 5-325 MG per tablet Take 1 tablet by mouth Every 6 (Six) Hours As Needed for pain. 16 tablet 0   • potassium chloride (K-DUR,KLOR-CON) 20 MEQ CR tablet Take 1 tablet by mouth Daily. 7 tablet 0   •  pramipexole (MIRAPEX) 1 MG tablet TAKE 1 TABLET BY MOUTH THREE TIMES A  tablet 3   • sertraline (ZOLOFT) 100 MG tablet TAKE 1&1/2 TABLETS BY MOUTH EVERY  tablet 1   • sildenafil (REVATIO) 20 MG tablet Take 1 tablet by mouth Daily. 15 tablet 5   • sildenafil (Viagra) 50 MG tablet Take 1 tablet by mouth Daily As Needed for Erectile Dysfunction. 15 tablet 5   • vitamin C (ASCORBIC ACID) 500 MG tablet Take 500 mg by mouth Daily.     • oxybutynin (DITROPAN) 5 MG tablet oxybutynin chloride 5 mg tablet   1-2 tabs po bid as needed     • tamsulosin (FLOMAX) 0.4 MG capsule 24 hr capsule tamsulosin 0.4 mg capsule   TAKE 1 CAPSULE BY MOUTH EVERY DAY       No current facility-administered medications for this visit.         Diagnoses and all orders for this visit:    1. Localized swelling of right lower extremity (Primary)  Unable to schedule stat ultrasound today, recommend ER for further evaluation.  Will need a lower extremity ultrasound and blood work.  No history of heart disease but will likely need EKG and BNP with his 20 pound weight gain over the last 2 months.  2. Cellulitis of right lower extremity  See above.  3. Weight gain  See above.  4. Elevated blood pressure reading  See above.  5. Parkinson's disease (CMS/HCC)  Established with neurology.  Symptoms stable at this time.       No follow-ups on file.     Please note that portions of this document were completed with a voice recognition program. Efforts were made to edit the dictations, but occasionally words are mis-transcribed.

## 2020-12-01 ENCOUNTER — EPISODE CHANGES (OUTPATIENT)
Dept: CASE MANAGEMENT | Facility: OTHER | Age: 75
End: 2020-12-01

## 2020-12-01 ENCOUNTER — OFFICE VISIT (OUTPATIENT)
Dept: FAMILY MEDICINE CLINIC | Facility: CLINIC | Age: 75
End: 2020-12-01

## 2020-12-01 VITALS
TEMPERATURE: 97.1 F | OXYGEN SATURATION: 98 % | WEIGHT: 240 LBS | BODY MASS INDEX: 33.6 KG/M2 | DIASTOLIC BLOOD PRESSURE: 80 MMHG | SYSTOLIC BLOOD PRESSURE: 120 MMHG | HEART RATE: 79 BPM | HEIGHT: 71 IN

## 2020-12-01 DIAGNOSIS — R60.0 LOWER EXTREMITY EDEMA: Primary | ICD-10-CM

## 2020-12-01 DIAGNOSIS — L03.115 CELLULITIS OF RIGHT LOWER EXTREMITY: ICD-10-CM

## 2020-12-01 PROCEDURE — 99214 OFFICE O/P EST MOD 30 MIN: CPT | Performed by: INTERNAL MEDICINE

## 2020-12-01 RX ORDER — POTASSIUM CHLORIDE 20 MEQ/1
20 TABLET, EXTENDED RELEASE ORAL DAILY
Qty: 7 TABLET | Refills: 0 | Status: SHIPPED | OUTPATIENT
Start: 2020-12-01 | End: 2020-12-08 | Stop reason: SDUPTHER

## 2020-12-01 RX ORDER — FUROSEMIDE 40 MG/1
40 TABLET ORAL DAILY
Qty: 7 TABLET | Refills: 0 | Status: SHIPPED | OUTPATIENT
Start: 2020-12-01 | End: 2020-12-08 | Stop reason: SDUPTHER

## 2020-12-01 NOTE — ED PROVIDER NOTES
EMERGENCY DEPARTMENT ENCOUNTER      Pt Name: Cordell Martin  MRN: [id number]  YOB: 1945  Date of evaluation: 11/30/2020  Provider: Benigno Dominguez MD    CHIEF COMPLAINT       Chief Complaint   Patient presents with   • Leg Swelling         HISTORY OF PRESENT ILLNESS  (Location/Symptom, Timing/Onset, Context/Setting, Quality, Duration, Modifying Factors, Severity.)   Cordell Martin is a 75 y.o. male who presents to the emergency department with complaints of swelling in his right leg.  Patient has noted ulceration on the medial aspect of his right lower leg for the past month with some surrounding redness but no associated fever or chills.  He notes that he is actually had swelling in both legs over the course of the past month and had not previously had any issues associated with this.  He does endorse that occasionally he gets short of breath with exertion but denies any orthopnea or paroxysmal nocturnal dyspnea.  He denies any previous history of any cardiac disease including any history of CAD or CHF as well as any history of pulmonary disease or smoking history.  He states that he believes he has been diagnosed with kidney issues in the past but denies any history of liver trouble.  He did have a hernia repair surgery done about a month ago.      Nursing notes were reviewed.    REVIEW OF SYSTEMS    (2-9 systems for level 4, 10 or more for level 5)   ROS:  General:  No fevers, no chills, no weakness  Cardiovascular:  No chest pain, no palpitations  Respiratory: Shortness of breath  Gastrointestinal:  No pain, no nausea, no vomiting, no diarrhea  Musculoskeletal: Leg swelling  Skin: Redness  Neurologic:  No speech problems, no headache, no extremity numbness, no extremity tingling, no extremity weakness  Psychiatric:  No anxiety  Genitourinary:  No dysuria, no hematuria    Except as noted above the remainder of the review of systems was reviewed and negative.       PAST MEDICAL HISTORY      Past Medical History:   Diagnosis Date   • Anxiety    • Arthritis    • Back pain    • Bronchitis    • Cognitive impairment    • Depression    • Disease of thyroid gland    • Glucose intolerance (impaired glucose tolerance)    • Hyperlipidemia    • Kidney stone    • Obesity    • Parkinson disease (CMS/HCC)    • Pneumonia    • Prostate disorder    • Thyroid disease    • Wears eyeglasses          SURGICAL HISTORY       Past Surgical History:   Procedure Laterality Date   • COLONOSCOPY      5 years ago   • EYE SURGERY     • LUMBAR LAMINECTOMY DISCECTOMY DECOMPRESSION N/A 7/13/2017    Procedure: LUMBAR LAMINECTOMY L4-5;  Surgeon: Antonio Trent MD;  Location: [facility];  Service:    • SHOULDER ROTATOR CUFF REPAIR Right     x2   • TONSILLECTOMY           CURRENT MEDICATIONS     No current facility-administered medications for this encounter.     Current Outpatient Medications:   •  atorvastatin (LIPITOR) 40 MG tablet, TAKE 1 TABLET BY MOUTH EVERY DAY, Disp: 90 tablet, Rfl: 1  •  carbidopa-levodopa (SINEMET)  MG per tablet, TAKE 1 & 1/2 TABLETS BY MOUTH THREE TIMES DAILY, Disp: 405 tablet, Rfl: 3  •  cephalexin (KEFLEX) 500 MG capsule, Take 1 capsule by mouth 4 (Four) Times a Day for 7 days., Disp: 28 capsule, Rfl: 0  •  Cholecalciferol (VITAMIN D3) 5000 units capsule capsule, Take 5,000 Units by mouth Daily., Disp: , Rfl:   •  fluticasone (FLONASE) 50 MCG/ACT nasal spray, SPRAY 2 SPRAYS IN EACH NOSTRIL ONCE DAILY, Disp: 48 mL, Rfl: 2  •  levothyroxine (SYNTHROID) 88 MCG tablet, Take 1 tablet by mouth Daily., Disp: 90 tablet, Rfl: 3  •  oxybutynin (DITROPAN) 5 MG tablet, oxybutynin chloride 5 mg tablet  1-2 tabs po bid as needed, Disp: , Rfl:   •  oxyCODONE-acetaminophen (PERCOCET) 5-325 MG per tablet, Take 1 tablet by mouth Every 6 (Six) Hours As Needed for pain., Disp: 16 tablet, Rfl: 0  •  potassium chloride (K-DUR,KLOR-CON) 20 MEQ CR tablet, Take 1 tablet by mouth Daily., Disp: 7 tablet, Rfl: 0  •   pramipexole (MIRAPEX) 1 MG tablet, TAKE 1 TABLET BY MOUTH THREE TIMES A DAY, Disp: 270 tablet, Rfl: 3  •  sertraline (ZOLOFT) 100 MG tablet, TAKE 1&1/2 TABLETS BY MOUTH EVERY DAY, Disp: 135 tablet, Rfl: 1  •  sildenafil (REVATIO) 20 MG tablet, Take 1 tablet by mouth Daily., Disp: 15 tablet, Rfl: 5  •  sildenafil (Viagra) 50 MG tablet, Take 1 tablet by mouth Daily As Needed for Erectile Dysfunction., Disp: 15 tablet, Rfl: 5  •  sulfamethoxazole-trimethoprim (BACTRIM DS,SEPTRA DS) 800-160 MG per tablet, Take 1 tablet by mouth 2 (Two) Times a Day for 7 days., Disp: 14 tablet, Rfl: 0  •  tamsulosin (FLOMAX) 0.4 MG capsule 24 hr capsule, tamsulosin 0.4 mg capsule  TAKE 1 CAPSULE BY MOUTH EVERY DAY, Disp: , Rfl:   •  vitamin C (ASCORBIC ACID) 500 MG tablet, Take 500 mg by mouth Daily., Disp: , Rfl:     ALLERGIES     Patient has no known allergies.    FAMILY HISTORY       Family History   Problem Relation Age of Onset   • Alzheimer's disease Other    • Cancer Other    • Diabetes Other    • Heart disease Other    • Stroke Other    • Cancer Father           SOCIAL HISTORY       Social History     Socioeconomic History   • Marital status:      Spouse name: Not on file   • Number of children: Not on file   • Years of education: Not on file   • Highest education level: Not on file   Tobacco Use   • Smoking status: Former Smoker     Packs/day: 1.00     Years: 15.00     Pack years: 15.00     Types: Cigarettes     Start date:      Quit date:      Years since quittin.9   • Smokeless tobacco: Never Used   • Tobacco comment: quit    Substance and Sexual Activity   • Alcohol use: Yes     Comment: occasionally   • Drug use: No   • Sexual activity: Yes     Partners: Female     Birth control/protection: None         PHYSICAL EXAM    (up to 7 for level 4, 8 or more for level 5)     Vitals:    20 1620 20 2132 20 2254   BP: 152/74 136/85  135/76   BP Location: Left arm      Patient  "Position: Sitting      Pulse: 95  80    Resp: 20      Temp: 97.3 °F (36.3 °C)      TempSrc: Temporal      SpO2: 98%  96% 96%   Weight: 112 kg (247 lb)      Height: 180.3 cm (70.98\")          Physical Exam  General: Awake, alert, no acute distress.  HEENT: Conjunctiva normal.  Neck: Trachea midline.  Cardiac: Heart regular rate, rhythm, no murmurs, rubs, or gallops  Lungs: Lungs are clear to auscultation, there is no wheezing, rhonchi, or rales. There is no use of accessory muscles.  Chest wall: There is no tenderness to palpation over the chest wall or over ribs  Abdomen: Abdomen is soft, nontender, nondistended. There are no firm or pulsatile masses, no rebound rigidity or guarding.   Musculoskeletal: 1+ pitting edema bilateral lower extremities  Neuro: Alert and oriented x 4.  Dermatology: Small superficial ulceration to the medial aspect of the middle portion of the right lower leg with minimal surrounding erythema but no significant warmth.  There is no drainage, blistering, crepitus, or necrosis.  Psych: Mentation is grossly normal, cognition is grossly normal. Affect is appropriate.        DIAGNOSTIC RESULTS     EKG: All EKG's are interpreted by the Emergency Department Physician who either signs or Co-signs this chart in the absence of a cardiologist.    Sinus rhythm rate of 85, no acute ST segment or T wave changes, normal intervals, no ectopy    RADIOLOGY:   Non-plain film images such as CT, Ultrasound and MRI are read by the radiologist. Plain radiographic images are visualized and preliminarily interpreted by the emergency physician with the below findings:      [x] Radiologist's Report Reviewed:  XR Chest 1 View   Final Result   No acute cardiopulmonary findings.      Signer Name: Eagle Hinojosa MD    Signed: 11/30/2020 10:07 PM    Workstation Name: [id number]     [facility]            LABS:    I have reviewed and interpreted all of the currently available lab results from this " visit (if applicable):  Results for orders placed or performed during the hospital encounter of 11/30/20   Comprehensive Metabolic Panel    Specimen: Blood   Result Value Ref Range    Glucose 101 (H) 65 - 99 mg/dL    BUN 19 8 - 23 mg/dL    Creatinine 1.09 0.76 - 1.27 mg/dL    Sodium 141 136 - 145 mmol/L    Potassium 4.5 3.5 - 5.2 mmol/L    Chloride 104 98 - 107 mmol/L    CO2 30.0 (H) 22.0 - 29.0 mmol/L    Calcium 9.5 8.6 - 10.5 mg/dL    Total Protein 6.5 6.0 - 8.5 g/dL    Albumin 3.70 3.50 - 5.20 g/dL    ALT (SGPT) 16 1 - 41 U/L    AST (SGOT) 15 1 - 40 U/L    Alkaline Phosphatase 129 (H) 39 - 117 U/L    Total Bilirubin 0.4 0.0 - 1.2 mg/dL    eGFR Non African Amer 66 >60 mL/min/1.73    Globulin 2.8 gm/dL    A/G Ratio 1.3 g/dL    BUN/Creatinine Ratio 17.4 7.0 - 25.0    Anion Gap 7.0 5.0 - 15.0 mmol/L   BNP    Specimen: Blood   Result Value Ref Range    proBNP 163.3 0.0 - 900.0 pg/mL   Troponin    Specimen: Blood   Result Value Ref Range    Troponin T <0.010 0.000 - 0.030 ng/mL   CBC Auto Differential    Specimen: Blood   Result Value Ref Range    WBC 7.02 3.40 - 10.80 10*3/mm3    RBC 4.03 (L) 4.14 - 5.80 10*6/mm3    Hemoglobin 12.0 (L) 13.0 - 17.7 g/dL    Hematocrit 39.2 37.5 - 51.0 %    MCV 97.3 (H) 79.0 - 97.0 fL    MCH 29.8 26.6 - 33.0 pg    MCHC 30.6 (L) 31.5 - 35.7 g/dL    RDW 14.7 12.3 - 15.4 %    RDW-SD 52.5 37.0 - 54.0 fl    MPV 8.4 6.0 - 12.0 fL    Platelets 225 140 - 450 10*3/mm3    Neutrophil % 57.7 42.7 - 76.0 %    Lymphocyte % 31.2 19.6 - 45.3 %    Monocyte % 8.3 5.0 - 12.0 %    Eosinophil % 2.0 0.3 - 6.2 %    Basophil % 0.4 0.0 - 1.5 %    Immature Grans % 0.4 0.0 - 0.5 %    Neutrophils, Absolute 4.05 1.70 - 7.00 10*3/mm3    Lymphocytes, Absolute 2.19 0.70 - 3.10 10*3/mm3    Monocytes, Absolute 0.58 0.10 - 0.90 10*3/mm3    Eosinophils, Absolute 0.14 0.00 - 0.40 10*3/mm3    Basophils, Absolute 0.03 0.00 - 0.20 10*3/mm3    Immature Grans, Absolute 0.03 0.00 - 0.05 10*3/mm3    nRBC 0.0 0.0 - 0.2 /100 WBC  "  ECG 12 Lead   Result Value Ref Range    QT Interval 396 ms    QTC Interval 471 ms   Duplex Venous Lower Extremity - Right   Result Value Ref Range    BSA 2.3 m^2     CV ECHO JOSE - BZI_BMI 34.4 kilograms/m^2     CV ECHO JOSE - BSA(HAYCOCK) 2.4 m^2     CV ECHO JOSE - BZI_METRIC_WEIGHT 112.0 kg     CV ECHO JOSE - BZI_METRIC_HEIGHT 180.3 cm    Right Common Femoral Spont Y     Right Common Femoral Phasic Y     Right Common Femoral Augment Y     Right Common Femoral Compress C     Right Saphenofemoral Junction Compress C     Right Profunda Femoral Compress C     Right Proximal Femoral Compress C     Right Mid Femoral Spont Y     Right Mid Femoral Phasic Y     Right Mid Femoral Augment Y     Right Mid Femoral Compress C     Right Distal Femoral Compress C     Right Popliteal Spont Y     Right Popliteal Phasic Y     Right Popliteal Augment Y     Right Popliteal Compress C     Right Posterior Tibial Compress C     Right Peroneal Compress C     Right GastronemiusSoleal Compress C     Right Greater Saph AK Compress C     Right Greater Saph BK Compress C     Right Lesser Saph Compress C     Left Common Femoral Spont Y     Left Common Femoral Phasic Y     Left Common Femoral Augment Y     Left Common Femoral Compress C         All other labs were within normal range or not returned as of this dictation.      EMERGENCY DEPARTMENT COURSE and DIFFERENTIAL DIAGNOSIS/MDM:   Vitals:    Vitals:    11/30/20 1620 11/30/20 2132 11/30/20 2135 11/30/20 2254   BP: 152/74 136/85  135/76   BP Location: Left arm      Patient Position: Sitting      Pulse: 95  80    Resp: 20      Temp: 97.3 °F (36.3 °C)      TempSrc: Temporal      SpO2: 98%  96% 96%   Weight: 112 kg (247 lb)      Height: 180.3 cm (70.98\")          ED Course as of Dec 01 0734   Mon Nov 30, 2020   2122 Ultrasound result reviewed and there is no evidence of DVT.    [NS]   2227 CXR personally reviewed and negative for acute process.        [NS]   2228 All diagnostic " results reviewed.  There is no evidence of significant abnormality that would be contributing to the patient's lower extremity swelling.  He does have evidence of mild cellulitis to his right lower extremity and so will prescribe a course of antibiotics.  I have counseled him on following up with his primary care provider in the next 1 to 2 days for reevaluation.    [NS]      ED Course User Index  [NS] Benigno Dominguez MD       Patient well-appearing throughout the duration of his stay in the emergency department.  He has bilateral lower extremity edema that does not appear to be asymmetrical and is very mild in nature.  DVT ultrasound is noted to be negative.  Laboratory evaluation is completely unremarkable without any evidence of CHF, liver disease, hypoalbuminemia, or kidney disease.  He is appropriate for discharge home with continued outpatient follow-up.    I had a discussion with the patient/family regarding diagnosis, diagnostic results, treatment plan, and medications.  The patient/family indicated understanding of these instructions.  I spent adequate time at the bedside proceeding discharge necessary to personally discuss the aftercare instructions, giving patient education, providing explanations of the results of our evaluations/findings, and my decision making to assure that the patient/family understand the plan of care.  Time was allotted to answer questions at that time and throughout the ED course.  Emphasis was placed on timely follow-up after discharge.  I also discussed the potential for the development of an acute emergent condition requiring further evaluation, admission, or even surgical intervention. I discussed that we found nothing during the visit today indicating the need for further workup, admission, or the presence of an unstable medical condition.  I encouraged the patient to return to the emergency department immediately for ANY concerns, worsening, new complaints, or if symptoms  persist and unable to seek follow-up in a timely fashion.  The patient/family expressed understanding and agreement with this plan.  The patient will follow-up with their PCP in 1-2 days for reevaluation.           FINAL IMPRESSION      1. Bilateral leg edema    2. Cellulitis of right leg          DISPOSITION/PLAN     ED Disposition     ED Disposition Condition Comment    Discharge Stable           PATIENT REFERRED TO:  Ben Holder DO  [address] [address]  [phone]    In 1 week      [facility] Emergency Department  [address] [facility] [address] [phone]    If symptoms worsen      DISCHARGE MEDICATIONS:     Medication List      START taking these medications    cephalexin 500 MG capsule  Commonly known as: KEFLEX  Take 1 capsule by mouth 4 (Four) Times a Day for 7 days.     sulfamethoxazole-trimethoprim 800-160 MG per tablet  Commonly known as: BACTRIM DS,SEPTRA DS  Take 1 tablet by mouth 2 (Two) Times a Day for 7 days.        CONTINUE taking these medications    atorvastatin 40 MG tablet  Commonly known as: LIPITOR  TAKE 1 TABLET BY MOUTH EVERY DAY     carbidopa-levodopa  MG per tablet  Commonly known as: SINEMET  TAKE 1 & 1/2 TABLETS BY MOUTH THREE TIMES DAILY     fluticasone 50 MCG/ACT nasal spray  Commonly known as: FLONASE  SPRAY 2 SPRAYS IN EACH NOSTRIL ONCE DAILY     levothyroxine 88 MCG tablet  Commonly known as: Synthroid  Take 1 tablet by mouth Daily.     oxybutynin 5 MG tablet  Commonly known as: DITROPAN     oxyCODONE-acetaminophen 5-325 MG per tablet  Commonly known as: PERCOCET  Take 1 tablet by mouth Every 6 (Six) Hours As Needed for pain.     potassium chloride 20 MEQ CR tablet  Commonly known as: K-DUR,KLOR-CON  Take 1 tablet by mouth Daily.     pramipexole 1 MG tablet  Commonly known as: MIRAPEX  TAKE 1 TABLET BY MOUTH THREE TIMES A DAY     sertraline 100 MG tablet  Commonly known as: ZOLOFT  TAKE 1&1/2 TABLETS BY  MOUTH EVERY DAY     sildenafil 20 MG tablet  Commonly known as: REVATIO  Take 1 tablet by mouth Daily.     sildenafil 50 MG tablet  Commonly known as: Viagra  Take 1 tablet by mouth Daily As Needed for Erectile Dysfunction.     tamsulosin 0.4 MG capsule 24 hr capsule  Commonly known as: FLOMAX     vitamin C 500 MG tablet  Commonly known as: ASCORBIC ACID     vitamin D3 125 MCG (5000 UT) capsule capsule           Where to Get Your Medications      You can get these medications from any pharmacy    Bring a paper prescription for each of these medications  · cephalexin 500 MG capsule  · sulfamethoxazole-trimethoprim 800-160 MG per tablet             Comment: Please note this report has been produced using speech recognition software.      Benigno Dominguez MD  Attending Emergency Physician               Benigno Dominguez MD  12/01/20 [id number]

## 2020-12-01 NOTE — DISCHARGE INSTRUCTIONS
Please return to the emergency department immediately with any worsening pain or swelling to your right leg.  Please also return if the area of redness significantly gets worse or if you develop any black spots or blistering.  Please also return if you start to develop any fever or chills.  Please follow-up with your primary care provider in the next 1 to 2 days for reevaluation.

## 2020-12-01 NOTE — PROGRESS NOTES
Chief Complaint   Patient presents with   • Edema     He was recently evaluated at  ED 11/30, he was released after no blood clots were found. Patient reports he is still having pain and swelling in his right leg       HPI:  Cordell Martin is a 75 y.o. male who presents today for right-sided lower extremity edema and weight gain.  Patient was evaluated to the ER with normal work-up including lower extremity ultrasound.  No evidence of DVT.  He was started on antibiotics for cellulitis.  He is here today for follow-up.  He still has a significant amount of swelling in his right leg as well as weeping.    ROS:  Constitutional: no fevers, night sweats or unexplained weight loss  Eyes: no vision changes  ENT: no runny nose, ear pain, sore throat  Cardio: no chest pain, palpitations  Pulm: no shortness of breath, wheezing, or cough  GI: no abdominal pain or changes in bowel movements  : no difficulty urinating  MSK: no difficulty ambulating, no joint pain  Neuro: no weakness, dizziness or headache  Psych: no trouble sleeping  Endo: no change in appetite      Past Medical History:   Diagnosis Date   • Anxiety    • Arthritis    • Back pain    • Bronchitis    • Cognitive impairment    • Depression    • Disease of thyroid gland    • Glucose intolerance (impaired glucose tolerance)    • Hyperlipidemia    • Kidney stone    • Obesity    • Parkinson disease (CMS/HCC)    • Pneumonia    • Prostate disorder    • Thyroid disease    • Wears eyeglasses       Family History   Problem Relation Age of Onset   • Alzheimer's disease Other    • Cancer Other    • Diabetes Other    • Heart disease Other    • Stroke Other    • Cancer Father       Social History     Socioeconomic History   • Marital status:      Spouse name: Not on file   • Number of children: Not on file   • Years of education: Not on file   • Highest education level: Not on file   Tobacco Use   • Smoking status: Former Smoker     Packs/day: 1.00     Years:  15.00     Pack years: 15.00     Types: Cigarettes     Start date:      Quit date:      Years since quittin.9   • Smokeless tobacco: Never Used   • Tobacco comment: quit    Substance and Sexual Activity   • Alcohol use: Yes     Comment: occasionally   • Drug use: No   • Sexual activity: Yes     Partners: Female     Birth control/protection: None      No Known Allergies   Immunization History   Administered Date(s) Administered   • Fluad Quad 65+ 2020   • Fluzone High Dose =>65 Years ([employer] ONLY) 2018, 2018, 2019, 10/13/2020   • Pneumococcal Conjugate 13-Valent (PCV13) 2015   • Pneumococcal Polysaccharide (PPSV23) 2013   • Tdap 2009   • Zostavax 2011        PE:  Vitals:    20 1157   BP: 120/80   Pulse: 79   Temp: 97.1 °F (36.2 °C)   SpO2: 98%      Body mass index is 33.49 kg/m².    Gen Appearance: NAD  HEENT: Normocephalic, PERRLA, no thyromegaly, trache midline  Heart: RRR, normal S1 and S2, no murmur  Lungs: CTA b/l, no wheezing, no crackles  Abdomen: Soft, non-tender, non-distended, no guarding and BSx4  MSK: Moves all extremities well, normal gait, +2 pitting edema right lower extremity with weeping  Pulses: Palpable and equal b/l  Lymph nodes: No palpable lymphadenopathy   Neuro: No focal deficits      Current Outpatient Medications   Medication Sig Dispense Refill   • atorvastatin (LIPITOR) 40 MG tablet TAKE 1 TABLET BY MOUTH EVERY DAY 90 tablet 1   • carbidopa-levodopa (SINEMET)  MG per tablet TAKE 1 & 1/2 TABLETS BY MOUTH THREE TIMES DAILY 405 tablet 3   • Cholecalciferol (VITAMIN D3) 5000 units capsule capsule Take 5,000 Units by mouth Daily.     • fluticasone (FLONASE) 50 MCG/ACT nasal spray SPRAY 2 SPRAYS IN EACH NOSTRIL ONCE DAILY 48 mL 2   • levothyroxine (SYNTHROID) 88 MCG tablet Take 1 tablet by mouth Daily. 90 tablet 3   • pramipexole (MIRAPEX) 1 MG tablet TAKE 1 TABLET BY MOUTH THREE TIMES A  tablet 3   • sildenafil  (REVATIO) 20 MG tablet Take 1 tablet by mouth Daily. 15 tablet 5   • sildenafil (Viagra) 50 MG tablet Take 1 tablet by mouth Daily As Needed for Erectile Dysfunction. 15 tablet 5   • tamsulosin (FLOMAX) 0.4 MG capsule 24 hr capsule tamsulosin 0.4 mg capsule   TAKE 1 CAPSULE BY MOUTH EVERY DAY     • cephalexin (KEFLEX) 500 MG capsule Take 1 capsule by mouth 4 (Four) Times a Day for 7 days. 28 capsule 0   • furosemide (Lasix) 40 MG tablet Take 1 tablet by mouth Daily for 7 days. 7 tablet 0   • oxybutynin (DITROPAN) 5 MG tablet oxybutynin chloride 5 mg tablet   1-2 tabs po bid as needed     • oxyCODONE-acetaminophen (PERCOCET) 5-325 MG per tablet Take 1 tablet by mouth Every 6 (Six) Hours As Needed for pain. 16 tablet 0   • potassium chloride (K-DUR,KLOR-CON) 20 MEQ CR tablet Take 1 tablet by mouth Daily. 7 tablet 0   • sertraline (ZOLOFT) 100 MG tablet TAKE 1&1/2 TABLETS BY MOUTH EVERY  tablet 1   • sulfamethoxazole-trimethoprim (BACTRIM DS,SEPTRA DS) 800-160 MG per tablet Take 1 tablet by mouth 2 (Two) Times a Day for 7 days. 14 tablet 0   • vitamin C (ASCORBIC ACID) 500 MG tablet Take 500 mg by mouth Daily.       No current facility-administered medications for this visit.         Diagnoses and all orders for this visit:    1. Lower extremity edema (Primary)  -     furosemide (Lasix) 40 MG tablet; Take 1 tablet by mouth Daily for 7 days.  Dispense: 7 tablet; Refill: 0  -     potassium chloride (K-DUR,KLOR-CON) 20 MEQ CR tablet; Take 1 tablet by mouth Daily.  Dispense: 7 tablet; Refill: 0  -     Ambulatory Referral to Physical Therapy Lymphedema  Swelling seems to be one-sided right leg significantly worse than left.  Start on daily Lasix therapy for the next week.  See back in office at that time.  BNP normal and lower extremity ultrasound negative for DVT.  2. Cellulitis of right lower extremity  Continue antibiotics as prescribed by ER.       Return in about 1 week (around 12/8/2020).     Please note that  portions of this document were completed with a voice recognition program. Efforts were made to edit the dictations, but occasionally words are mis-transcribed.

## 2020-12-08 ENCOUNTER — OFFICE VISIT (OUTPATIENT)
Dept: FAMILY MEDICINE CLINIC | Facility: CLINIC | Age: 75
End: 2020-12-08

## 2020-12-08 ENCOUNTER — LAB (OUTPATIENT)
Dept: LAB | Facility: HOSPITAL | Age: 75
End: 2020-12-08

## 2020-12-08 VITALS
BODY MASS INDEX: 33.4 KG/M2 | HEIGHT: 71 IN | DIASTOLIC BLOOD PRESSURE: 76 MMHG | WEIGHT: 238.6 LBS | HEART RATE: 77 BPM | SYSTOLIC BLOOD PRESSURE: 120 MMHG | OXYGEN SATURATION: 98 %

## 2020-12-08 DIAGNOSIS — R94.4 DECREASED GFR: ICD-10-CM

## 2020-12-08 DIAGNOSIS — G20 PARKINSON'S DISEASE (HCC): ICD-10-CM

## 2020-12-08 DIAGNOSIS — M79.89 SWELLING OF LOWER EXTREMITY: ICD-10-CM

## 2020-12-08 DIAGNOSIS — M79.89 SWELLING OF LOWER EXTREMITY: Primary | ICD-10-CM

## 2020-12-08 DIAGNOSIS — L03.115 CELLULITIS OF RIGHT LOWER EXTREMITY: ICD-10-CM

## 2020-12-08 LAB
BASOPHILS # BLD AUTO: 0.03 10*3/MM3 (ref 0–0.2)
BASOPHILS NFR BLD AUTO: 0.5 % (ref 0–1.5)
DEPRECATED RDW RBC AUTO: 46.3 FL (ref 37–54)
EOSINOPHIL # BLD AUTO: 0.12 10*3/MM3 (ref 0–0.4)
EOSINOPHIL NFR BLD AUTO: 1.9 % (ref 0.3–6.2)
ERYTHROCYTE [DISTWIDTH] IN BLOOD BY AUTOMATED COUNT: 13.6 % (ref 12.3–15.4)
HCT VFR BLD AUTO: 38.9 % (ref 37.5–51)
HGB BLD-MCNC: 12.6 G/DL (ref 13–17.7)
IMM GRANULOCYTES # BLD AUTO: 0.02 10*3/MM3 (ref 0–0.05)
IMM GRANULOCYTES NFR BLD AUTO: 0.3 % (ref 0–0.5)
LYMPHOCYTES # BLD AUTO: 2.18 10*3/MM3 (ref 0.7–3.1)
LYMPHOCYTES NFR BLD AUTO: 35.2 % (ref 19.6–45.3)
MCH RBC QN AUTO: 29.6 PG (ref 26.6–33)
MCHC RBC AUTO-ENTMCNC: 32.4 G/DL (ref 31.5–35.7)
MCV RBC AUTO: 91.5 FL (ref 79–97)
MONOCYTES # BLD AUTO: 0.64 10*3/MM3 (ref 0.1–0.9)
MONOCYTES NFR BLD AUTO: 10.3 % (ref 5–12)
NEUTROPHILS NFR BLD AUTO: 3.2 10*3/MM3 (ref 1.7–7)
NEUTROPHILS NFR BLD AUTO: 51.8 % (ref 42.7–76)
NRBC BLD AUTO-RTO: 0 /100 WBC (ref 0–0.2)
PLATELET # BLD AUTO: 254 10*3/MM3 (ref 140–450)
PMV BLD AUTO: 9 FL (ref 6–12)
RBC # BLD AUTO: 4.25 10*6/MM3 (ref 4.14–5.8)
WBC # BLD AUTO: 6.19 10*3/MM3 (ref 3.4–10.8)

## 2020-12-08 PROCEDURE — 80053 COMPREHEN METABOLIC PANEL: CPT

## 2020-12-08 PROCEDURE — 85025 COMPLETE CBC W/AUTO DIFF WBC: CPT

## 2020-12-08 PROCEDURE — 99214 OFFICE O/P EST MOD 30 MIN: CPT | Performed by: INTERNAL MEDICINE

## 2020-12-08 RX ORDER — POTASSIUM CHLORIDE 20 MEQ/1
20 TABLET, EXTENDED RELEASE ORAL DAILY
Qty: 14 TABLET | Refills: 0 | Status: SHIPPED | OUTPATIENT
Start: 2020-12-08 | End: 2021-07-16

## 2020-12-08 RX ORDER — FUROSEMIDE 40 MG/1
40 TABLET ORAL DAILY
Qty: 14 TABLET | Refills: 0 | Status: SHIPPED | OUTPATIENT
Start: 2020-12-08 | End: 2021-07-12 | Stop reason: SDUPTHER

## 2020-12-08 NOTE — PROGRESS NOTES
Chief Complaint   Patient presents with   • Cellulitis     1 wk f/u        HPI:  Cordell Martin is a 75 y.o. male who presents today for follow-up of lower extremity swelling.  Patient recently finished course of antibiotics and diuretics.  Swelling is somewhat improved.  Redness has improved as well.  Overall feeling better.    ROS:  Constitutional: no fevers, night sweats or unexplained weight loss  Eyes: no vision changes  ENT: no runny nose, ear pain, sore throat  Cardio: no chest pain, palpitations  Pulm: no shortness of breath, wheezing, or cough  GI: no abdominal pain or changes in bowel movements  : no difficulty urinating  MSK: no difficulty ambulating, no joint pain  Neuro: no weakness, dizziness or headache  Psych: no trouble sleeping  Endo: no change in appetite      Past Medical History:   Diagnosis Date   • Anxiety    • Arthritis    • Back pain    • Bronchitis    • Cognitive impairment    • Depression    • Disease of thyroid gland    • Glucose intolerance (impaired glucose tolerance)    • Hyperlipidemia    • Kidney stone    • Obesity    • Parkinson disease (CMS/HCC)    • Pneumonia    • Prostate disorder    • Thyroid disease    • Wears eyeglasses       Family History   Problem Relation Age of Onset   • Alzheimer's disease Other    • Cancer Other    • Diabetes Other    • Heart disease Other    • Stroke Other    • Cancer Father       Social History     Socioeconomic History   • Marital status:      Spouse name: Not on file   • Number of children: Not on file   • Years of education: Not on file   • Highest education level: Not on file   Tobacco Use   • Smoking status: Former Smoker     Packs/day: 1.00     Years: 15.00     Pack years: 15.00     Types: Cigarettes     Start date:      Quit date:      Years since quittin.9   • Smokeless tobacco: Never Used   • Tobacco comment: quit    Substance and Sexual Activity   • Alcohol use: Yes     Comment: occasionally   • Drug use: No   •  Sexual activity: Yes     Partners: Female     Birth control/protection: None      No Known Allergies   Immunization History   Administered Date(s) Administered   • Fluad Quad 65+ 08/31/2020   • Fluzone High Dose =>65 Years ([employer] ONLY) 02/23/2018, 09/12/2018, 09/02/2019, 10/13/2020   • Pneumococcal Conjugate 13-Valent (PCV13) 04/30/2015   • Pneumococcal Polysaccharide (PPSV23) 01/29/2013   • Tdap 12/04/2009   • Zostavax 01/26/2011        PE:  Vitals:    12/08/20 1341   BP: 120/76   Pulse: 77   SpO2: 98%      Body mass index is 33.3 kg/m².    Gen Appearance: NAD  HEENT: Normocephalic, PERRLA, no thyromegaly, trache midline  Heart: RRR, normal S1 and S2, no murmur  Lungs: CTA b/l, no wheezing, no crackles  Abdomen: Soft, non-tender, non-distended, no guarding and BSx4  MSK: Moves all extremities well, normal gait, +2 pitting edema bilaterally, erythema and weeping right lower extremity  Pulses: Palpable and equal b/l  Lymph nodes: No palpable lymphadenopathy   Neuro: No focal deficits      Current Outpatient Medications   Medication Sig Dispense Refill   • atorvastatin (LIPITOR) 40 MG tablet TAKE 1 TABLET BY MOUTH EVERY DAY 90 tablet 1   • carbidopa-levodopa (SINEMET)  MG per tablet TAKE 1 & 1/2 TABLETS BY MOUTH THREE TIMES DAILY 405 tablet 3   • Cholecalciferol (VITAMIN D3) 5000 units capsule capsule Take 5,000 Units by mouth Daily.     • fluticasone (FLONASE) 50 MCG/ACT nasal spray SPRAY 2 SPRAYS IN EACH NOSTRIL ONCE DAILY 48 mL 2   • furosemide (Lasix) 40 MG tablet Take 1 tablet by mouth Daily for 14 days. 14 tablet 0   • levothyroxine (SYNTHROID) 88 MCG tablet Take 1 tablet by mouth Daily. 90 tablet 3   • oxybutynin (DITROPAN) 5 MG tablet oxybutynin chloride 5 mg tablet   1-2 tabs po bid as needed     • oxyCODONE-acetaminophen (PERCOCET) 5-325 MG per tablet Take 1 tablet by mouth Every 6 (Six) Hours As Needed for pain. 16 tablet 0   • potassium chloride (K-DUR,KLOR-CON) 20 MEQ CR tablet Take 1 tablet by  mouth Daily. 14 tablet 0   • pramipexole (MIRAPEX) 1 MG tablet TAKE 1 TABLET BY MOUTH THREE TIMES A  tablet 3   • sertraline (ZOLOFT) 100 MG tablet TAKE 1&1/2 TABLETS BY MOUTH EVERY  tablet 1   • sildenafil (REVATIO) 20 MG tablet Take 1 tablet by mouth Daily. 15 tablet 5   • sildenafil (Viagra) 50 MG tablet Take 1 tablet by mouth Daily As Needed for Erectile Dysfunction. 15 tablet 5   • tamsulosin (FLOMAX) 0.4 MG capsule 24 hr capsule tamsulosin 0.4 mg capsule   TAKE 1 CAPSULE BY MOUTH EVERY DAY     • vitamin C (ASCORBIC ACID) 500 MG tablet Take 500 mg by mouth Daily.       No current facility-administered medications for this visit.         Diagnoses and all orders for this visit:    1. Swelling of lower extremity (Primary)  -     CBC & Differential; Future  -     Comprehensive Metabolic Panel; Future  -     potassium chloride (K-DUR,KLOR-CON) 20 MEQ CR tablet; Take 1 tablet by mouth Daily.  Dispense: 14 tablet; Refill: 0  -     furosemide (Lasix) 40 MG tablet; Take 1 tablet by mouth Daily for 14 days.  Dispense: 14 tablet; Refill: 0  Follow-up with lymphedema clinic tomorrow, continue Lasix and potassium for 2 weeks.  Checking blood work today.  2. Cellulitis of right lower extremity  Improvement of erythema, he still has persistent weeping and some erythema.  He has follow-up with lymphedema clinic tomorrow.  3. Parkinson's disease (CMS/[facility])  Follow-up with neurology scheduled.       Return in about 4 weeks (around 1/5/2021).     Please note that portions of this document were completed with a voice recognition program. Efforts were made to edit the dictations, but occasionally words are mis-transcribed.

## 2020-12-09 ENCOUNTER — HOSPITAL ENCOUNTER (OUTPATIENT)
Dept: PHYSICAL THERAPY | Facility: HOSPITAL | Age: 75
Setting detail: THERAPIES SERIES
Discharge: HOME | End: 2020-12-09

## 2020-12-09 DIAGNOSIS — L97.919 VENOUS STASIS ULCER OF RIGHT LOWER LEG WITH EDEMA OF RIGHT LOWER LEG (HCC): Primary | ICD-10-CM

## 2020-12-09 DIAGNOSIS — I83.891 VENOUS STASIS ULCER OF RIGHT LOWER LEG WITH EDEMA OF RIGHT LOWER LEG (HCC): Primary | ICD-10-CM

## 2020-12-09 DIAGNOSIS — R60.9 VENOUS STASIS ULCER OF RIGHT LOWER LEG WITH EDEMA OF RIGHT LOWER LEG (HCC): Primary | ICD-10-CM

## 2020-12-09 DIAGNOSIS — I83.019 VENOUS STASIS ULCER OF RIGHT LOWER LEG WITH EDEMA OF RIGHT LOWER LEG (HCC): Primary | ICD-10-CM

## 2020-12-09 LAB
ALBUMIN SERPL-MCNC: 4.1 G/DL (ref 3.5–5.2)
ALBUMIN/GLOB SERPL: 1.4 G/DL
ALP SERPL-CCNC: 120 U/L (ref 39–117)
ALT SERPL W P-5'-P-CCNC: 7 U/L (ref 1–41)
ANION GAP SERPL CALCULATED.3IONS-SCNC: 9 MMOL/L (ref 5–15)
AST SERPL-CCNC: 23 U/L (ref 1–40)
BILIRUB SERPL-MCNC: 0.4 MG/DL (ref 0–1.2)
BUN SERPL-MCNC: 35 MG/DL (ref 8–23)
BUN/CREAT SERPL: 21 (ref 7–25)
CALCIUM SPEC-SCNC: 9.5 MG/DL (ref 8.6–10.5)
CHLORIDE SERPL-SCNC: 100 MMOL/L (ref 98–107)
CO2 SERPL-SCNC: 29 MMOL/L (ref 22–29)
CREAT SERPL-MCNC: 1.67 MG/DL (ref 0.76–1.27)
GFR SERPL CREATININE-BSD FRML MDRD: 41 ML/MIN/1.73
GLOBULIN UR ELPH-MCNC: 3 GM/DL
GLUCOSE SERPL-MCNC: 81 MG/DL (ref 65–99)
POTASSIUM SERPL-SCNC: 5.1 MMOL/L (ref 3.5–5.2)
PROT SERPL-MCNC: 7.1 G/DL (ref 6–8.5)
SODIUM SERPL-SCNC: 138 MMOL/L (ref 136–145)

## 2020-12-09 PROCEDURE — 29580 STRAPPING UNNA BOOT: CPT

## 2020-12-09 PROCEDURE — 97597 DBRDMT OPN WND 1ST 20 CM/<: CPT

## 2020-12-09 PROCEDURE — 97161 PT EVAL LOW COMPLEX 20 MIN: CPT

## 2020-12-09 NOTE — THERAPY EVALUATION
Outpatient Rehabilitation - Wound/Debridement Initial Eval  [facility]     Patient Name: Cordell Martin  : 1945  MRN: [id number]  Today's Date: 2020                   Admit Date: 2020    Visit Dx:    ICD-10-CM ICD-9-CM   1. Venous stasis ulcer of right lower leg with edema of right lower leg (CMS/HCC)  I83.019 454.8    I83.891 454.0    L97.919     R60.9        Patient Active Problem List   Diagnosis   • Anxiety   • Arthritis   • Depression   • Hyperlipidemia   • Lumbar stenosis with neurogenic claudication   • Parkinson's disease (CMS/HCC)   • Lumbar stenosis with neurogenic claudication status post L4-5 decompression   • Status post lumbar laminectomy   • Memory loss        Past Medical History:   Diagnosis Date   • Anxiety    • Arthritis    • Back pain    • Bronchitis    • Cognitive impairment    • Depression    • Disease of thyroid gland    • Glucose intolerance (impaired glucose tolerance)    • Hyperlipidemia    • Kidney stone    • Obesity    • Parkinson disease (CMS/HCC)    • Pneumonia    • Prostate disorder    • Thyroid disease    • Wears eyeglasses         Past Surgical History:   Procedure Laterality Date   • COLONOSCOPY      5 years ago   • EYE SURGERY     • HERNIA REPAIR  10/20/2020   • LUMBAR LAMINECTOMY DISCECTOMY DECOMPRESSION N/A 2017    Procedure: LUMBAR LAMINECTOMY L4-5;  Surgeon: Antonio Trent MD;  Location: [facility];  Service:    • SHOULDER ROTATOR CUFF REPAIR Right     x2   • TONSILLECTOMY         Patient History     Row Name 20 1130             History    Chief Complaint  Ulcer, wound or other skin conditions  -MP      Brief Description of Current Complaint  Patient presents with R LE edema with open ulcerations that have been present for ~1 month in duration. Patient states that he visited ER last week due to increased redness and swelling with blood clot being ruled out. Patient was a difficult historian during session. Per patient, he used to wear  compression on his legs but stopped. Patient is currently taking diuretics and finished course of oral abx yesterday.  -MP      Previous treatment for THIS PROBLEM  Medication abx and diuretics  -MP      Patient/Caregiver Goals  Improve mobility;Know what to do to help the symptoms;Decrease swelling;Heal wound  -MP      Patient's Rating of General Health  Fair  -MP      Patient seeing anyone else for problem(s)?  PCP Dr. Holder  -MP      How has patient tried to help current problem?  Patient performs daily dressing changes with bandage and tape, sees Urologist, followed up in ER when condition worsened.  -MP      What clinical tests have you had for this problem?  Other 1 (comment) Duplex to rule out DVT  -MP      Results of Clinical Tests  No DVT noted  -MP         Pain     Pain at Present  0  -MP         Services    Are you currently receiving Home Health services  No  -MP         Daily Activities    Primary Language  English  -MP      Pt Participated in POC and Goals  Yes  -MP        User Key  (r) = Recorded By, (t) = Taken By, (c) = Cosigned By    Initials Name Provider Type    Bryan Cline, PT Physical Therapist          EVALUATION  PT Ortho     Row Name 12/09/20 1130       Subjective Comments    Subjective Comments  Patient states wound is looking much better since he visited ER and started taking abx and diuretics.  -MP       Subjective Pain    Able to rate subjective pain?  yes  -MP    Pre-Treatment Pain Level  0  -MP    Post-Treatment Pain Level  0  -MP       Transfers    Comment (Transfers)  seated in transport chair  -MP      User Key  (r) = Recorded By, (t) = Taken By, (c) = Cosigned By    Initials Name Provider Type    Bryan Cline, PT Physical Therapist        LDA Wound     Row Name 12/09/20 1130             Wound 12/09/20 1130 Right lower leg Venous Ulcer    Wound - Properties Group Placement Date: 12/09/20  -MP Placement Time: 1130  -MP Present on Hospital Admission: Y  -MP Side: Right  -MP  Orientation: lower  -MP Location: leg  -MP Primary Wound Type: Venous ulcer  -MP    Wound Image  Images linked: 1  -MP      Dressing Appearance  dressing loose;dried drainage;other (see comments) bandage loose and sticking to ulcerations  -MP      Base  moist;pink;red;yellow;necrotic  -MP      Periwound  swelling;warm;redness;pink  -MP      Periwound Temperature  warm  -MP      Periwound Skin Turgor  firm  -MP      Edges  open;other (see comments)  -MP      Wound Length (cm)  -- small scattered ulcerations  -MP      Drainage Characteristics/Odor  serosanguineous  -MP      Drainage Amount  small  -MP      Care, Wound  cleansed with;wound cleanser;debrided;kimberly boot  -MP      Dressing Care  dressing applied;petroleum-based;gauze;multi-layer wrap Xeroform to ulcerations, unna boot, 3'' coban, spandage  -MP      Periwound Care  cleansed with pH balanced cleanser;dry periwound area maintained  -MP      Retired Wound - Properties Group Date first assessed: 12/09/20  - Time first assessed: 1130  -MP Present on Hospital Admission: Y  -MP Side: Right  -MP Location: leg  -MP Primary Wound Type: Venous ulcer  -MP      User Key  (r) = Recorded By, (t) = Taken By, (c) = Cosigned By    Initials Name Provider Type    Bryan Cline, PT Physical Therapist        Lymphedema     Row Name 12/09/20 1130             Lymphedema Edema Assessment    Ptting Edema Category  By severity  -MP      Pitting Edema  Moderate  -MP         Skin Changes/Observations    Location/Assessment  Lower Extremity  -MP      Lower Extremity Conditions  right:;shiny;hairless;inflamed  -MP      Lower Extremity Color/Pigment  right:;erythema;red  -MP         Lymphedema Pulses/Capillary Refill    Lymphedema Pulses/Capillary Refill  lower extremity pulses;capillary refill  -MP      Dorsalis Pedis Pulse  right:;+1 diminished  -MP      Posterior Tibialis Pulse  right:;+1 diminished  -MP      Capillary Refill  lower extremity capillary refill  -MP      Lower  Extremity Capillary Refill  right:;less than 3 seconds  -MP         Lymphedema Measurements    Measurement Type(s)  Quick Girth  -MP      Quick Girth Areas  Lower extremities  -MP         RLE Quick Girth (cm)    Met-heads  24.1 cm  -MP      Mid foot  24 cm  -MP      Smallest ankle  24.6 cm  -MP      Largest calf  44.4 cm  -MP      RLE Quick Girth Total  117.1  -MP         Compression/Skin Care    Compression/Skin Care  skin care;wrapping location;bandaging  -MP      Skin Care  washed/dried  -MP      Wrapping Location  lower extremity  -MP      Wrapping Location LE  right:;foot to knee  -MP      Wrapping Comments  Xeroform to ulcerations, unna boot, 3'' coban, size 6 spandage  -MP      Bandaging Comments  Unna boot applied in clam shell fashiob  -MP        User Key  (r) = Recorded By, (t) = Taken By, (c) = Cosigned By    Initials Name Provider Type    Bryan Cline, PT Physical Therapist          WOUND DEBRIDEMENT  Total area of Debridement: 5 cm2  Debridement Site 1  Location- Site 1: R LE  Selective Debridement- Site 1: Wound Surface <20cmsq  Instruments- Site 1: tweezers  Excised Tissue Description- Site 1: minimum, slough, necrotic, other (comment)(nonviable skin flakes)  Bleeding- Site 1: none             Therapy Education     Row Name 12/09/20 2724             Therapy Education    Education Details  Patient provided information about use of unna boot for skin irritation and to improve venous return, education of importance of keeping unna boot dry and intact and to elevate B LE's as much as possible.   -MP      Given  Edema management  -MP      Program  New  -MP      How Provided  Verbal;Demonstration  -MP      Provided to  Patient  -MP        User Key  (r) = Recorded By, (t) = Taken By, (c) = Cosigned By    Initials Name Provider Type    Bryan Cline, PT Physical Therapist          Recommendation and Plan  PT Assessment/Plan     Row Name 12/09/20 9344          PT Assessment    Functional Limitations   Impaired gait;Limitation in home management;Other (comment) wound and edema management  -MP     Impairments  Impaired venous circulation;Integumentary integrity  -MP     Assessment Comments  Patient presents with R LE edema with small open ulcerations that have been present for ~1 month. Patient visited ER due to worsening of condition and was started on oral abx and diuretics which patient reports has improved symptoms. R LE was inflammed with moderate pitting edema present. When PT removed bandage from wounds, bandage was adherent to wound bed. PT applied xeroform to wounds under unna boot to prevent unna boot from also adhering to wounds. Patient would continue to benefit from PT wound care to promote increased venous return and wound healing.  -MP     Rehab Potential  Good  -MP     Patient/caregiver participated in establishment of treatment plan and goals  Yes  -MP     Patient would benefit from skilled therapy intervention  Yes  -MP        PT Plan    PT Frequency  1x/week;2x/week  -MP     Predicted Duration of Therapy Intervention (PT)  15 visits  -MP     Planned CPT's?  PT EVAL LOW COMPLEXITY: 68455;PT SELF CARE/MGMT/TRAIN 15 MIN: 69702;PT NONSELECT DEBRIDE 15 MIN: 43572;PT MIAH DEBRIDE OPEN WOUND UP TO 20 CM: 52081;PT UNNA BOOT: 82824;PT MULTI LAYER COMP SYS LE  -MP       User Key  (r) = Recorded By, (t) = Taken By, (c) = Cosigned By    Initials Name Provider Type    Bryan Cline, PT Physical Therapist            Goals  PT OP Goals     Row Name 20 1130          PT Short Term Goals    STG 1  Patient will demonstrate 2cm reduction in girth measurements of R LE as evidence of improved venous return.   -MP     STG 1 Progress  New  -MP     STG 2  Patient will verbalize signs and symptoms of infection.  -MP     STG 2 Progress  New  -MP        Long Term Goals    LTG 1  Patient will demonstrate 4cm reduction in girth measurements of R LE as evidence of improved venous return.   -MP     LTG 1  Progress  New  -MP     LTG 2  Patient will demonstrate independence of management of R LE edema at home with use of long term compression system.  -MP     LTG 2 Progress  New  -MP        Time Calculation    PT Goal Re-Cert Due Date  03/09/21  -MP       User Key  (r) = Recorded By, (t) = Taken By, (c) = Cosigned By    Initials Name Provider Type    Bryan Cline, PT Physical Therapist          Time Calculation: Start Time: 1130  Therapy Charges for Today     Code Description Service Date Service Provider Modifiers Qty    47978204828 HC MIAH DEBRIDE OPEN WOUND UP TO 20CM 12/9/2020 Bryan Aguilar, PT GP 1    31190218771 HC PT EVAL LOW COMPLEXITY 4 12/9/2020 Bryan Aguilar, PT GP 1    91785742533 HC PT STAPPING UNNA BOOT 12/9/2020 Bryan Aguilar, PT GP 1                Bryan Aguilar, PT  12/9/2020

## 2020-12-11 ENCOUNTER — HOSPITAL ENCOUNTER (OUTPATIENT)
Dept: PHYSICAL THERAPY | Facility: HOSPITAL | Age: 75
Setting detail: THERAPIES SERIES
Discharge: HOME | End: 2020-12-11

## 2020-12-11 DIAGNOSIS — I83.019 VENOUS STASIS ULCER OF RIGHT LOWER LEG WITH EDEMA OF RIGHT LOWER LEG (HCC): Primary | ICD-10-CM

## 2020-12-11 DIAGNOSIS — I83.891 VENOUS STASIS ULCER OF RIGHT LOWER LEG WITH EDEMA OF RIGHT LOWER LEG (HCC): Primary | ICD-10-CM

## 2020-12-11 DIAGNOSIS — R60.9 VENOUS STASIS ULCER OF RIGHT LOWER LEG WITH EDEMA OF RIGHT LOWER LEG (HCC): Primary | ICD-10-CM

## 2020-12-11 DIAGNOSIS — L97.919 VENOUS STASIS ULCER OF RIGHT LOWER LEG WITH EDEMA OF RIGHT LOWER LEG (HCC): Primary | ICD-10-CM

## 2020-12-11 PROCEDURE — 29580 STRAPPING UNNA BOOT: CPT

## 2020-12-11 PROCEDURE — 97597 DBRDMT OPN WND 1ST 20 CM/<: CPT

## 2020-12-11 NOTE — THERAPY WOUND CARE TREATMENT
Outpatient Rehabilitation - Wound/Debridement Treatment Note   [facility]     Patient Name: Cordell Martin  : 1945  MRN: [id number]  Today's Date: 2020                 Admit Date: 2020    Visit Dx:    ICD-10-CM ICD-9-CM   1. Venous stasis ulcer of right lower leg with edema of right lower leg (CMS/HCC)  I83.019 454.8    I83.891 454.0    L97.919     R60.9        Patient Active Problem List   Diagnosis   • Anxiety   • Arthritis   • Depression   • Hyperlipidemia   • Lumbar stenosis with neurogenic claudication   • Parkinson's disease (CMS/HCC)   • Lumbar stenosis with neurogenic claudication status post L4-5 decompression   • Status post lumbar laminectomy   • Memory loss        Past Medical History:   Diagnosis Date   • Anxiety    • Arthritis    • Back pain    • Bronchitis    • Cognitive impairment    • Depression    • Disease of thyroid gland    • Glucose intolerance (impaired glucose tolerance)    • Hyperlipidemia    • Kidney stone    • Obesity    • Parkinson disease (CMS/HCC)    • Pneumonia    • Prostate disorder    • Thyroid disease    • Wears eyeglasses         Past Surgical History:   Procedure Laterality Date   • COLONOSCOPY      5 years ago   • EYE SURGERY     • HERNIA REPAIR  10/20/2020   • LUMBAR LAMINECTOMY DISCECTOMY DECOMPRESSION N/A 2017    Procedure: LUMBAR LAMINECTOMY L4-5;  Surgeon: Antonio Trent MD;  Location: [facility], [facility];  Service:    • SHOULDER ROTATOR CUFF REPAIR Right     x2   • TONSILLECTOMY           EVALUATION  PT Ortho     Row Name 20 1115       Subjective Comments    Subjective Comments  Pt c/o some burning pain in calf / wound area  -       Subjective Pain    Able to rate subjective pain?  yes  -MF    Pre-Treatment Pain Level  3  -MF    Post-Treatment Pain Level  2  -MF       Transfers    Comment (Transfers)  seated in transport chair  -      User Key  (r) = Recorded By, (t) = Taken By, (c) = Cosigned By    Initials Name Provider Type      Trevor Wang, PT Physical Therapist          LDA Wound     Row Name 12/11/20 1115             Wound 12/09/20 1130 Right lower leg Venous Ulcer    Wound - Properties Group Placement Date: 12/09/20  -MP Placement Time: 1130  -MP Present on Hospital Admission: Y  -MP Side: Right  -MP Orientation: lower  -MP Location: leg  -MP Primary Wound Type: Venous ulcer  -MP    Dressing Appearance  intact  -MF      Base  moist;pink;red;yellow;necrotic  -MF      Periwound  intact;dry;redness  -MF      Periwound Temperature  warm  -MF      Periwound Skin Turgor  firm  -MF      Edges  irregular  -MF      Drainage Characteristics/Odor  serosanguineous  -MF      Drainage Amount  small  -MF      Care, Wound  irrigated with;sterile normal saline;debrided;cleansed with;soap and water  -MF      Dressing Care  petroleum-based xeroform with unna boot  -MF      Periwound Care  cleansed with pH balanced cleanser  -MF      Retired Wound - Properties Group Date first assessed: 12/09/20  -MP Time first assessed: 1130  -MP Present on Hospital Admission: Y  -MP Side: Right  -MP Location: leg  -MP Primary Wound Type: Venous ulcer  -MP      User Key  (r) = Recorded By, (t) = Taken By, (c) = Cosigned By    Initials Name Provider Type    MF Trevor Wang, PT Physical Therapist    MP Bryan Aguilar, JARAD Physical Therapist        Lymphedema     Row Name 12/11/20 1115             Lymphedema Edema Assessment    Ptting Edema Category  By severity  -      Pitting Edema  Moderate  -         Skin Changes/Observations    Location/Assessment  Lower Extremity  -      Lower Extremity Conditions  right:;inflamed;scab(s);crust  -      Lower Extremity Color/Pigment  right:;erythema improved from baseline  -         Compression/Skin Care    Compression/Skin Care  skin care;wrapping location;bandaging  -      Skin Care  washed/dried  -MF      Wrapping Location  lower extremity  -      Wrapping Location LE  right:;foot to knee  -      Wrapping  "Comments  xeroform to wound base with unna boot and 3\" coban with spandage to secure  -MF      Bandaging Comments  Unna boot applied in clam shell fashion  -MF        User Key  (r) = Recorded By, (t) = Taken By, (c) = Cosigned By    Initials Name Provider Type    Trevor Haddad, PT Physical Therapist          WOUND DEBRIDEMENT  Total area of Debridement: ~3cm2  Debridement Site 1  Location- Site 1: R LE  Selective Debridement- Site 1: Wound Surface <20cmsq  Instruments- Site 1: tweezers  Excised Tissue Description- Site 1: minimum, slough, other (comment)(dry, flaking, nonviable skin. )  Bleeding- Site 1: none             Therapy Education     Row Name 12/11/20 1110             Therapy Education    Given  Edema management;Bandaging/dressing change  -MF      Program  Reinforced  -MF      How Provided  Verbal  -MF      Provided to  Patient  -MF      Level of Understanding  Verbalized  -MF        User Key  (r) = Recorded By, (t) = Taken By, (c) = Cosigned By    Initials Name Provider Type    Trevor Haddad, PT Physical Therapist          Recommendation and Plan  PT Assessment/Plan     Row Name 12/11/20 1118          PT Assessment    Functional Limitations  Impaired gait;Limitation in home management;Other (comment) wound care   -MF     Impairments  Impaired venous circulation;Integumentary integrity  -MF     Assessment Comments  PT able to debride slough and nonviable, hypertrophic skin from LE wound and periwound area to help improve healing potential.  PT replaced unna boot in clamshell fashion to help improve venous return and limit potential skin issues with fluctuating LE edema.  PT will cont with increased freq of dressing changes at this time to allow for debridement of wound to decrease bioburden and encourage better reepithelialization of wound bed.    -MF        PT Plan    PT Frequency  1x/week;2x/week  -MF     Physical Therapy Interventions (Optional Details)  wound care;patient/family " education  -     PT Plan Comments  debridement with unna boot 1-2 x/week   -       User Key  (r) = Recorded By, (t) = Taken By, (c) = Cosigned By    Initials Name Provider Type    Trevor Haddad, PT Physical Therapist          Goals  PT OP Goals     Row Name 12/11/20 1115          Time Calculation    PT Goal Re-Cert Due Date  03/09/21  -       User Key  (r) = Recorded By, (t) = Taken By, (c) = Cosigned By    Initials Name Provider Type    Trevor Haddad, PT Physical Therapist          PT Goal Re-Cert Due Date: 03/09/21            Time Calculation: Start Time: 1115  Therapy Charges for Today     Code Description Service Date Service Provider Modifiers Qty    65569533536 HC MIAH DEBRIDE OPEN WOUND UP TO 20CM 12/11/2020 Trevor Wang, PT GP 1    93454461088 HC PT STAPPING UNNA BOOT 12/11/2020 Trevor Wang, PT GP 1                  Trevor Wang, PT  12/11/2020

## 2020-12-14 DIAGNOSIS — R94.4 DECREASED CREATININE CLEARANCE: Primary | ICD-10-CM

## 2020-12-15 ENCOUNTER — HOSPITAL ENCOUNTER (OUTPATIENT)
Dept: PHYSICAL THERAPY | Facility: HOSPITAL | Age: 75
Setting detail: THERAPIES SERIES
Discharge: HOME | End: 2020-12-15

## 2020-12-15 DIAGNOSIS — I83.019 VENOUS STASIS ULCER OF RIGHT LOWER LEG WITH EDEMA OF RIGHT LOWER LEG (HCC): Primary | ICD-10-CM

## 2020-12-15 DIAGNOSIS — I83.891 VENOUS STASIS ULCER OF RIGHT LOWER LEG WITH EDEMA OF RIGHT LOWER LEG (HCC): Primary | ICD-10-CM

## 2020-12-15 DIAGNOSIS — L97.919 VENOUS STASIS ULCER OF RIGHT LOWER LEG WITH EDEMA OF RIGHT LOWER LEG (HCC): Primary | ICD-10-CM

## 2020-12-15 DIAGNOSIS — R60.9 VENOUS STASIS ULCER OF RIGHT LOWER LEG WITH EDEMA OF RIGHT LOWER LEG (HCC): Primary | ICD-10-CM

## 2020-12-15 PROCEDURE — 29580 STRAPPING UNNA BOOT: CPT

## 2020-12-15 PROCEDURE — 97597 DBRDMT OPN WND 1ST 20 CM/<: CPT

## 2020-12-15 NOTE — THERAPY WOUND CARE TREATMENT
Outpatient Rehabilitation - Wound/Debridement Treatment Note   [place]     Patient Name: Cordell Martin  : 1945  MRN: [id number]  Today's Date: 12/15/2020                 Admit Date: 12/15/2020    Visit Dx:    ICD-10-CM ICD-9-CM   1. Venous stasis ulcer of right lower leg with edema of right lower leg (CMS/HCC)  I83.019 454.8    I83.891 454.0    L97.919     R60.9        Patient Active Problem List   Diagnosis   • Anxiety   • Arthritis   • Depression   • Hyperlipidemia   • Lumbar stenosis with neurogenic claudication   • Parkinson's disease (CMS/HCC)   • Lumbar stenosis with neurogenic claudication status post L4-5 decompression   • Status post lumbar laminectomy   • Memory loss        Past Medical History:   Diagnosis Date   • Anxiety    • Arthritis    • Back pain    • Bronchitis    • Cognitive impairment    • Depression    • Disease of thyroid gland    • Glucose intolerance (impaired glucose tolerance)    • Hyperlipidemia    • Kidney stone    • Obesity    • Parkinson disease (CMS/HCC)    • Pneumonia    • Prostate disorder    • Thyroid disease    • Wears eyeglasses         Past Surgical History:   Procedure Laterality Date   • COLONOSCOPY      5 years ago   • EYE SURGERY     • HERNIA REPAIR  10/20/2020   • LUMBAR LAMINECTOMY DISCECTOMY DECOMPRESSION N/A 2017    Procedure: LUMBAR LAMINECTOMY L4-5;  Surgeon: Antonio Trent MD;  Location: [facility];  Service:    • SHOULDER ROTATOR CUFF REPAIR Right     x2   • TONSILLECTOMY           EVALUATION  PT Ortho     Row Name 12/15/20 1300       Subjective Comments    Subjective Comments  Patient states that he is unable to continue taking Lasixs due to concern of damage to kidney's.  -MP       Subjective Pain    Able to rate subjective pain?  yes  -MP    Pre-Treatment Pain Level  0  -MP    Post-Treatment Pain Level  0  -MP       Transfers    Comment (Transfers)  seated in transport chair  -MP      User Key  (r) = Recorded By, (t) = Taken By, (c) =  Cosigned By    Initials Name Provider Type    Bryan Cline, PT Physical Therapist          LDA Wound     Row Name 12/15/20 1300             Wound 12/09/20 1130 Right lower leg Venous Ulcer    Wound - Properties Group Placement Date: 12/09/20  -MP Placement Time: 1130  -MP Present on Hospital Admission: Y  -MP Side: Right  -MP Orientation: lower  -MP Location: leg  -MP Primary Wound Type: Venous ulcer  -MP    Dressing Appearance  intact;dried drainage  -MP      Base  moist;pink;red;yellow;necrotic 1 small open wound noted  -MP      Periwound  intact;dry;redness  -MP      Periwound Temperature  warm  -MP      Periwound Skin Turgor  firm  -MP      Edges  irregular  -MP      Drainage Characteristics/Odor  serosanguineous  -MP      Drainage Amount  small  -MP      Care, Wound  cleansed with;wound cleanser;debrided;kimberly boot  -MP      Dressing Care  dressing applied unna boot  -MP      Periwound Care  cleansed with pH balanced cleanser;dry periwound area maintained  -MP      Retired Wound - Properties Group Date first assessed: 12/09/20  - Time first assessed: 1130  -MP Present on Hospital Admission: Y  -MP Side: Right  -MP Location: leg  -MP Primary Wound Type: Venous ulcer  -MP      User Key  (r) = Recorded By, (t) = Taken By, (c) = Cosigned By    Initials Name Provider Type    Bryan Cline PT Physical Therapist        Lymphedema     Row Name 12/15/20 1300             Lymphedema Edema Assessment    Ptting Edema Category  By severity  -MP      Pitting Edema  Moderate  -MP         Skin Changes/Observations    Location/Assessment  Lower Extremity  -MP      Lower Extremity Conditions  right:;inflamed;scab(s);crust  -MP      Lower Extremity Color/Pigment  right:;erythema improved from baseline  -MP         Compression/Skin Care    Compression/Skin Care  skin care;wrapping location;bandaging  -MP      Skin Care  washed/dried  -MP      Wrapping Location  lower extremity  -MP      Wrapping Location LE  right:;foot  to knee  -MP      Wrapping Comments  unna boot, 4'' coban, size 6 spandage  -MP      Bandaging Comments  unna boot applied in clam shell fashion  -MP        User Key  (r) = Recorded By, (t) = Taken By, (c) = Cosigned By    Initials Name Provider Type    Bryan Cline, PT Physical Therapist          WOUND DEBRIDEMENT  Total area of Debridement: ~1cm2  Debridement Site 1  Location- Site 1: R LE  Selective Debridement- Site 1: Wound Surface <20cmsq  Instruments- Site 1: tweezers  Excised Tissue Description- Site 1: minimum, slough  Bleeding- Site 1: none             Therapy Education     Row Name 12/15/20 1300             Therapy Education    Education Details  PT reinforced importance of LE elevation  -MP      Given  Edema management;Bandaging/dressing change  -MP      Program  Reinforced  -MP      How Provided  Verbal  -MP      Provided to  Patient  -MP      Level of Understanding  Verbalized  -MP        User Key  (r) = Recorded By, (t) = Taken By, (c) = Cosigned By    Initials Name Provider Type    Bryan Cline, PT Physical Therapist          Recommendation and Plan  PT Assessment/Plan     Row Name 12/15/20 1300          PT Assessment    Functional Limitations  Impaired gait;Limitation in home management;Other (comment) wound care   -MP     Impairments  Impaired venous circulation;Integumentary integrity  -MP     Assessment Comments  Patient presents with improvement in R LE ulcerations with only 1 small wound remaining. PT was able to lightly debride slough from wound bed revealing moist, pink/red wound base. Small amount of drainage dried to unna boot upon removal. Due to improvement in skin integrity and open ulcerations, may consider decreasing PT frequency to 1x per week pending need for debridement next tx. Patient would continue to benefit from skilled PT wound care to promote increased wound healing potential.  -MP        PT Plan    PT Frequency  1x/week  -MP     Physical Therapy Interventions  (Optional Details)  patient/family education;wound care  -MP     PT Plan Comments  debridement, ?decreased frequency, compression  -MP       User Key  (r) = Recorded By, (t) = Taken By, (c) = Cosigned By    Initials Name Provider Type    Bryan Cline, PT Physical Therapist          Goals  PT OP Goals     Row Name 12/15/20 1300          Time Calculation    PT Goal Re-Cert Due Date  03/09/21  -MP       User Key  (r) = Recorded By, (t) = Taken By, (c) = Cosigned By    Initials Name Provider Type    Bryan Cline PT Physical Therapist          PT Goal Re-Cert Due Date: 03/09/21            Time Calculation: Start Time: 1300  Therapy Charges for Today     Code Description Service Date Service Provider Modifiers Qty    97980845889 HC MIAH DEBRIDE OPEN WOUND UP TO 20CM 12/15/2020 Bryan Aguilar, PT GP 1    25986822996 HC PT STAPPING UNNA BOOT 12/15/2020 Bryan Aguilar, PT GP 1                  Byran Aguilar PT  12/15/2020

## 2020-12-18 ENCOUNTER — HOSPITAL ENCOUNTER (OUTPATIENT)
Dept: PHYSICAL THERAPY | Facility: HOSPITAL | Age: 75
Setting detail: THERAPIES SERIES
Discharge: HOME | End: 2020-12-18

## 2020-12-18 DIAGNOSIS — L97.919 VENOUS STASIS ULCER OF RIGHT LOWER LEG WITH EDEMA OF RIGHT LOWER LEG (HCC): Primary | ICD-10-CM

## 2020-12-18 DIAGNOSIS — I83.019 VENOUS STASIS ULCER OF RIGHT LOWER LEG WITH EDEMA OF RIGHT LOWER LEG (HCC): Primary | ICD-10-CM

## 2020-12-18 DIAGNOSIS — I83.891 VENOUS STASIS ULCER OF RIGHT LOWER LEG WITH EDEMA OF RIGHT LOWER LEG (HCC): Primary | ICD-10-CM

## 2020-12-18 DIAGNOSIS — R60.9 VENOUS STASIS ULCER OF RIGHT LOWER LEG WITH EDEMA OF RIGHT LOWER LEG (HCC): Primary | ICD-10-CM

## 2020-12-18 PROCEDURE — 29580 STRAPPING UNNA BOOT: CPT

## 2020-12-18 PROCEDURE — 97597 DBRDMT OPN WND 1ST 20 CM/<: CPT

## 2020-12-18 NOTE — THERAPY WOUND CARE TREATMENT
Outpatient Rehabilitation - Wound/Debridement Treatment Note   [place]     Patient Name: Cordell Martin  : 1945  MRN: [id number]  Today's Date: 2020                 Admit Date: 2020    Visit Dx:    ICD-10-CM ICD-9-CM   1. Venous stasis ulcer of right lower leg with edema of right lower leg (CMS/HCC)  I83.019 454.8    I83.891 454.0    L97.919     R60.9        Patient Active Problem List   Diagnosis   • Anxiety   • Arthritis   • Depression   • Hyperlipidemia   • Lumbar stenosis with neurogenic claudication   • Parkinson's disease (CMS/HCC)   • Lumbar stenosis with neurogenic claudication status post L4-5 decompression   • Status post lumbar laminectomy   • Memory loss        Past Medical History:   Diagnosis Date   • Anxiety    • Arthritis    • Back pain    • Bronchitis    • Cognitive impairment    • Depression    • Disease of thyroid gland    • Glucose intolerance (impaired glucose tolerance)    • Hyperlipidemia    • Kidney stone    • Obesity    • Parkinson disease (CMS/HCC)    • Pneumonia    • Prostate disorder    • Thyroid disease    • Wears eyeglasses         Past Surgical History:   Procedure Laterality Date   • COLONOSCOPY      5 years ago   • EYE SURGERY     • HERNIA REPAIR  10/20/2020   • LUMBAR LAMINECTOMY DISCECTOMY DECOMPRESSION N/A 2017    Procedure: LUMBAR LAMINECTOMY L4-5;  Surgeon: Antonio Trent MD;  Location: [facility];  Service:    • SHOULDER ROTATOR CUFF REPAIR Right     x2   • TONSILLECTOMY           EVALUATION  PT Ortho     Row Name 20 1430       Subjective Comments    Subjective Comments  Pt without complaints.  -       Subjective Pain    Able to rate subjective pain?  yes  -    Pre-Treatment Pain Level  0  -    Post-Treatment Pain Level  0  -       Transfers    Comment (Transfers)  seated in transport chair  -      User Key  (r) = Recorded By, (t) = Taken By, (c) = Cosigned By    Initials Name Provider Type    Leticia Kang, PT  Physical Therapist          LDA Wound     Row Name 12/18/20 1430             Wound 12/09/20 1130 Right lower leg Venous Ulcer    Wound - Properties Group Placement Date: 12/09/20  -MP Placement Time: 1130  -MP Present on Hospital Admission: Y  -MP Side: Right  -MP Orientation: lower  -MP Location: leg  -MP Primary Wound Type: Venous ulcer  -MP    Wound Image  Images linked: 2  -JM      Dressing Appearance  intact;dried drainage  -      Base  moist;pink;red;yellow  -      Periwound  intact;dry;redness  -      Periwound Temperature  warm  -      Periwound Skin Turgor  firm  -      Edges  irregular  -      Wound Length (cm)  0.2 cm  -JM      Wound Width (cm)  0.2 cm  -      Wound Depth (cm)  0.1 cm  -JM      Drainage Characteristics/Odor  serosanguineous  -JM      Drainage Amount  scant  -JM      Care, Wound  cleansed with;wound cleanser;debrided;kimberly boot  -JM      Dressing Care  dressing changed unna boot  -JM      Periwound Care  cleansed with pH balanced cleanser;dry periwound area maintained  -      Retired Wound - Properties Group Date first assessed: 12/09/20 - Time first assessed: 1130  -MP Present on Hospital Admission: Y  -MP Side: Right  -MP Location: leg  -MP Primary Wound Type: Venous ulcer  -MP      User Key  (r) = Recorded By, (t) = Taken By, (c) = Cosigned By    Initials Name Provider Type    Leticia Kang, PT Physical Therapist    MP Bryan Aguilar, JARAD Physical Therapist        Lymphedema     Row Name 12/18/20 1430             Lymphedema Edema Assessment    Ptting Edema Category  By severity  -      Pitting Edema  Moderate;Mild mild distally, mod prox calf  -         Skin Changes/Observations    Location/Assessment  Lower Extremity  -      Lower Extremity Conditions  right:;inflamed;crust;fragile  -      Lower Extremity Color/Pigment  right:;erythema improved from baseline  -         Lymphedema Pulses/Capillary Refill    Lower Extremity Capillary Refill  right:;less  "than 3 seconds  -JM         RLE Quick Girth (cm)    Met-heads  24.5 cm  -JM      Mid foot  24 cm  -JM      Smallest ankle  23 cm  -JM      Largest calf  37.7 cm  -JM      Tib tuberosity  43.4 cm  -JM      RLE Quick Girth Total  152.6  -JM         Compression/Skin Care    Compression/Skin Care  skin care;wrapping location;bandaging  -JM      Skin Care  washed/dried  -JM      Wrapping Location  lower extremity  -JM      Wrapping Location LE  right:;foot to knee  -JM      Wrapping Comments  unna boot, clam-shell application on calf, 4\" coban, size 6 spandage  -        User Key  (r) = Recorded By, (t) = Taken By, (c) = Cosigned By    Initials Name Provider Type    Leticia Kang, PT Physical Therapist          WOUND DEBRIDEMENT  Total area of Debridement: 1cmsq  Debridement Site 1  Location- Site 1: R LE  Selective Debridement- Site 1: Wound Surface <20cmsq  Instruments- Site 1: tweezers  Excised Tissue Description- Site 1: scant, slough, minimum, other (comment)(loose hypertrophic crusts)  Bleeding- Site 1: none             Therapy Education     Row Name 12/18/20 1430             Therapy Education    Education Details  Continue leg elevation.  Discussed options for long-term edema management, will need to troubleshoot with pt due to his living alone and decreased mobility.  -JM      Given  Edema management;Bandaging/dressing change  -      Program  Reinforced  -EULALIO      How Provided  Verbal  -JM      Provided to  Patient  -EULALIO      Level of Understanding  Verbalized  -        User Key  (r) = Recorded By, (t) = Taken By, (c) = Cosigned By    Initials Name Provider Type    Leticia Kang, PT Physical Therapist          Recommendation and Plan  PT Assessment/Plan     Row Name 12/18/20 1430          PT Assessment    Functional Limitations  Impaired gait;Limitation in home management;Other (comment) wound care   -     Impairments  Impaired venous circulation;Integumentary integrity  -EULALIO     Assessment " Comments  RLE skin integrity improving, erythema improving.  Only small 0.2cm x0.2cm area open medial RLE with scant drainage.  PT debrided scant slough and periwound crusts to assist with wound healing.  Continued unna boot for increased venous return.  Will need to troubleshoot d/c options with pt as he lives alone and is unable to reach his feet due to decreased hip mobility after hip surgery.  May consider velcro-closure system?  -        PT Plan    PT Frequency  1x/week  -     Physical Therapy Interventions (Optional Details)  patient/family education;wound care  -     PT Plan Comments  unna boot, PRN debridement, ? order velcro wrap from [employer] while pt has open wound  -       User Key  (r) = Recorded By, (t) = Taken By, (c) = Cosigned By    Initials Name Provider Type    Leticia Kang, PT Physical Therapist          Goals  PT OP Goals     Row Name 12/18/20 1430          Time Calculation    PT Goal Re-Cert Due Date  03/09/21  -       User Key  (r) = Recorded By, (t) = Taken By, (c) = Cosigned By    Initials Name Provider Type    Leticia Kang, PT Physical Therapist          PT Goal Re-Cert Due Date: 03/09/21            Time Calculation: Start Time: 1430  Therapy Charges for Today     Code Description Service Date Service Provider Modifiers Qty    60246135048 HC MIAH DEBRIDE OPEN WOUND UP TO 20CM 12/18/2020 Leticia Adams, PT GP 1    20178258749 HC PT STAPPING UNNA BOOT 12/18/2020 Leticia Adams, PT GP 1                  Leticia Adams PT  12/18/2020

## 2020-12-28 ENCOUNTER — HOSPITAL ENCOUNTER (OUTPATIENT)
Dept: PHYSICAL THERAPY | Facility: HOSPITAL | Age: 75
Setting detail: THERAPIES SERIES
Discharge: HOME | End: 2020-12-28

## 2020-12-28 DIAGNOSIS — I83.891 VENOUS STASIS ULCER OF RIGHT LOWER LEG WITH EDEMA OF RIGHT LOWER LEG (HCC): Primary | ICD-10-CM

## 2020-12-28 DIAGNOSIS — L97.919 VENOUS STASIS ULCER OF RIGHT LOWER LEG WITH EDEMA OF RIGHT LOWER LEG (HCC): Primary | ICD-10-CM

## 2020-12-28 DIAGNOSIS — R60.9 VENOUS STASIS ULCER OF RIGHT LOWER LEG WITH EDEMA OF RIGHT LOWER LEG (HCC): Primary | ICD-10-CM

## 2020-12-28 DIAGNOSIS — I83.019 VENOUS STASIS ULCER OF RIGHT LOWER LEG WITH EDEMA OF RIGHT LOWER LEG (HCC): Primary | ICD-10-CM

## 2020-12-28 DIAGNOSIS — R60.0 BILATERAL LEG EDEMA: ICD-10-CM

## 2020-12-28 PROCEDURE — 97597 DBRDMT OPN WND 1ST 20 CM/<: CPT

## 2020-12-28 PROCEDURE — 29580 STRAPPING UNNA BOOT: CPT

## 2020-12-28 NOTE — THERAPY WOUND CARE TREATMENT
Outpatient Rehabilitation - Wound/Debridement Treatment Note  [facility]     Patient Name: Cordell Martin  : 1945  MRN: [id number]  Today's Date: 2020           R LE:         Admit Date: 2020    Visit Dx:    ICD-10-CM ICD-9-CM   1. Venous stasis ulcer of right lower leg with edema of right lower leg (CMS/HCC)  I83.019 454.8    I83.891 454.0    L97.919     R60.9    2. Bilateral leg edema  R60.0 782.3       Patient Active Problem List   Diagnosis   • Anxiety   • Arthritis   • Depression   • Hyperlipidemia   • Lumbar stenosis with neurogenic claudication   • Parkinson's disease (CMS/HCC)   • Lumbar stenosis with neurogenic claudication status post L4-5 decompression   • Status post lumbar laminectomy   • Memory loss        Past Medical History:   Diagnosis Date   • Anxiety    • Arthritis    • Back pain    • Bronchitis    • Cognitive impairment    • Depression    • Disease of thyroid gland    • Glucose intolerance (impaired glucose tolerance)    • Hyperlipidemia    • Kidney stone    • Obesity    • Parkinson disease (CMS/HCC)    • Pneumonia    • Prostate disorder    • Thyroid disease    • Wears eyeglasses         Past Surgical History:   Procedure Laterality Date   • COLONOSCOPY      5 years ago   • EYE SURGERY     • HERNIA REPAIR  10/20/2020   • LUMBAR LAMINECTOMY DISCECTOMY DECOMPRESSION N/A 2017    Procedure: LUMBAR LAMINECTOMY L4-5;  Surgeon: Antonio Trent MD;  Location: [facility];  Service:    • SHOULDER ROTATOR CUFF REPAIR Right     x2   • TONSILLECTOMY           EVALUATION  PT Ortho     Row Name 20 0950       Subjective Comments    Subjective Comments  Patient states he has noticed swelling in L LE as well. Reports he follows up with MD on .  -MP       Subjective Pain    Able to rate subjective pain?  yes  -MP    Pre-Treatment Pain Level  0  -MP    Post-Treatment Pain Level  0  -MP       Transfers    Comment (Transfers)  seated in transport chair  -MP      User  Key  (r) = Recorded By, (t) = Taken By, (c) = Cosigned By    Initials Name Provider Type    Bryan Cline PT Physical Therapist          LDA Wound     Row Name 12/28/20 0950             Wound 12/09/20 1130 Right lower leg Venous Ulcer    Wound - Properties Group Placement Date: 12/09/20  -MP Placement Time: 1130  -MP Present on Hospital Admission: Y  -MP Side: Right  -MP Orientation: lower  -MP Location: leg  -MP Primary Wound Type: Venous ulcer  -MP    Wound Image  Images linked: 1  -MP      Dressing Appearance  intact;no drainage  -MP      Base  yellow;closed/resurfaced;epithelialization;pink  -MP      Periwound  intact;dry;redness  -MP      Periwound Temperature  warm  -MP      Periwound Skin Turgor  firm  -MP      Edges  other (see comments) epithelialized this date  -MP      Drainage Characteristics/Odor  --  -MP      Drainage Amount  none  -MP      Care, Wound  cleansed with;wound cleanser;debrided  -MP      Dressing Care  dressing changed unna boot  -MP      Periwound Care  cleansed with pH balanced cleanser  -MP      Retired Wound - Properties Group Date first assessed: 12/09/20  - Time first assessed: 1130  -MP Present on Hospital Admission: Y  -MP Side: Right  -MP Location: leg  -MP Primary Wound Type: Venous ulcer  -MP      User Key  (r) = Recorded By, (t) = Taken By, (c) = Cosigned By    Initials Name Provider Type    Bryan Cline PT Physical Therapist        Lymphedema     Row Name 12/28/20 0950             Lymphedema Edema Assessment    Ptting Edema Category  By severity  -MP      Pitting Edema  Moderate;Mild mild distally, mod prox calf  -MP         Skin Changes/Observations    Location/Assessment  Lower Extremity  -MP      Lower Extremity Conditions  bilateral:;dry;shiny;hairless;fragile  -MP      Lower Extremity Color/Pigment  bilateral:;erythema  -MP         Lymphedema Pulses/Capillary Refill    Lymphedema Pulses/Capillary Refill  lower extremity pulses;capillary refill  -MP       Dorsalis Pedis Pulse  right:;left:;+1 diminished  -MP      Posterior Tibialis Pulse  right:;left:;+1 diminished  -MP      Capillary Refill  lower extremity capillary refill  -MP      Lower Extremity Capillary Refill  right:;left:;less than 3 seconds  -MP         Lymphedema Measurements    Measurement Type(s)  Quick Girth  -MP      Quick Girth Areas  Lower extremities  -MP         LLE Quick Girth (cm)    Met-heads  24.5 cm  -MP      Smallest ankle  26.3 cm  -MP      Largest calf  44.5 cm  -MP         Compression/Skin Care    Compression/Skin Care  skin care;wrapping location;bandaging  -MP      Skin Care  washed/dried  -MP      Wrapping Location  lower extremity  -MP      Wrapping Location LE  bilateral:;foot to knee  -MP      Wrapping Comments  B LE: unna boot in clam shell application, 4'' coban, size 6 spandage to calf  -MP        User Key  (r) = Recorded By, (t) = Taken By, (c) = Cosigned By    Initials Name Provider Type    Bryan Cline, PT Physical Therapist          WOUND DEBRIDEMENT  Total area of Debridement: 1 cm2  Debridement Site 1  Location- Site 1: R LE  Selective Debridement- Site 1: Wound Surface <20cmsq  Instruments- Site 1: tweezers  Excised Tissue Description- Site 1: scant, other (comment)(dry, hypertrophic skin crusts)  Bleeding- Site 1: none             Therapy Education     Row Name 12/28/20 1083             Therapy Education    Education Details  Patient educated to inform MD of increased thigh and L LE edema at appt on January 5th.   -MP      Given  Edema management;Bandaging/dressing change  -MP      Program  Reinforced  -MP      How Provided  Verbal  -MP      Provided to  Patient  -MP      Level of Understanding  Verbalized  -MP        User Key  (r) = Recorded By, (t) = Taken By, (c) = Cosigned By    Initials Name Provider Type    Bryan Cline, PT Physical Therapist          Recommendation and Plan  PT Assessment/Plan     Row Name 12/28/20 7758          PT Assessment    Functional  Limitations  Impaired gait;Limitation in home management;Other (comment) wound care   -MP     Impairments  Impaired venous circulation;Integumentary integrity  -MP     Assessment Comments  No open wounds noted on R LE. PT able to lightly debride hypertrophic skin crusts from area revealing pink, fragile skin. No drainage noted. PT applied unna boot to B LE's as L LE presented with moderate edema; no open wounds noted. Patient would continue to benefit from skilled PT wound care to promote increased venous return to address B LE edema.  -MP        PT Plan    PT Frequency  1x/week  -MP     Physical Therapy Interventions (Optional Details)  patient/family education;wound care  -MP     PT Plan Comments  unna boot, discuss long term edema management options with patient  -MP       User Key  (r) = Recorded By, (t) = Taken By, (c) = Cosigned By    Initials Name Provider Type    Bryan Cline PT Physical Therapist          Goals  PT OP Goals     Row Name 12/28/20 0950          Time Calculation    PT Goal Re-Cert Due Date  03/09/21  -MP       User Key  (r) = Recorded By, (t) = Taken By, (c) = Cosigned By    Initials Name Provider Type    Bryan Cline, PT Physical Therapist          PT Goal Re-Cert Due Date: 03/09/21            Time Calculation: Start Time: 0950  Therapy Charges for Today     Code Description Service Date Service Provider Modifiers Qty    47750880748 HC PT STAPPING UNNA BOOT 12/28/2020 Bryan Aguilar, PT GP 1    [id number] HC MIAH DEBRIDE OPEN WOUND UP TO 20CM 12/28/2020 Bryan Aguilar PT GP 1                  Bryan Aguilar PT  12/28/2020

## 2021-01-04 ENCOUNTER — APPOINTMENT (OUTPATIENT)
Dept: PHYSICAL THERAPY | Facility: HOSPITAL | Age: 76
End: 2021-01-04

## 2021-01-05 ENCOUNTER — OFFICE VISIT (OUTPATIENT)
Dept: FAMILY MEDICINE CLINIC | Facility: CLINIC | Age: 76
End: 2021-01-05

## 2021-01-05 ENCOUNTER — LAB (OUTPATIENT)
Dept: LAB | Facility: HOSPITAL | Age: 76
End: 2021-01-05

## 2021-01-05 VITALS
DIASTOLIC BLOOD PRESSURE: 80 MMHG | HEART RATE: 86 BPM | WEIGHT: 246 LBS | SYSTOLIC BLOOD PRESSURE: 122 MMHG | BODY MASS INDEX: 34.44 KG/M2 | HEIGHT: 71 IN

## 2021-01-05 DIAGNOSIS — I87.8 VENOUS STASIS: Primary | ICD-10-CM

## 2021-01-05 DIAGNOSIS — R06.02 SHORTNESS OF BREATH: ICD-10-CM

## 2021-01-05 DIAGNOSIS — R06.00 DYSPNEA, UNSPECIFIED TYPE: ICD-10-CM

## 2021-01-05 DIAGNOSIS — R60.0 BILATERAL LOWER EXTREMITY EDEMA: ICD-10-CM

## 2021-01-05 DIAGNOSIS — R94.4 DECREASED CREATININE CLEARANCE: ICD-10-CM

## 2021-01-05 DIAGNOSIS — I87.8 VENOUS STASIS: ICD-10-CM

## 2021-01-05 LAB
ALBUMIN SERPL-MCNC: 4.1 G/DL (ref 3.5–5.2)
ALBUMIN/GLOB SERPL: 1.5 G/DL
ALP SERPL-CCNC: 121 U/L (ref 39–117)
ALT SERPL W P-5'-P-CCNC: 9 U/L (ref 1–41)
ANION GAP SERPL CALCULATED.3IONS-SCNC: 7.8 MMOL/L (ref 5–15)
AST SERPL-CCNC: 18 U/L (ref 1–40)
BILIRUB SERPL-MCNC: 0.5 MG/DL (ref 0–1.2)
BILIRUB UR QL STRIP: NEGATIVE
BUN SERPL-MCNC: 36 MG/DL (ref 8–23)
BUN/CREAT SERPL: 28.6 (ref 7–25)
CALCIUM SPEC-SCNC: 9.5 MG/DL (ref 8.6–10.5)
CHLORIDE SERPL-SCNC: 104 MMOL/L (ref 98–107)
CLARITY UR: CLEAR
CO2 SERPL-SCNC: 28.2 MMOL/L (ref 22–29)
COLOR UR: YELLOW
CREAT SERPL-MCNC: 1.26 MG/DL (ref 0.76–1.27)
GFR SERPL CREATININE-BSD FRML MDRD: 56 ML/MIN/1.73
GLOBULIN UR ELPH-MCNC: 2.8 GM/DL
GLUCOSE SERPL-MCNC: 98 MG/DL (ref 65–99)
GLUCOSE UR STRIP-MCNC: NEGATIVE MG/DL
HGB UR QL STRIP.AUTO: NEGATIVE
KETONES UR QL STRIP: NEGATIVE
LEUKOCYTE ESTERASE UR QL STRIP.AUTO: NEGATIVE
NITRITE UR QL STRIP: NEGATIVE
NT-PROBNP SERPL-MCNC: 47.3 PG/ML (ref 0–900)
PH UR STRIP.AUTO: 5.5 [PH] (ref 5–8)
POTASSIUM SERPL-SCNC: 4.4 MMOL/L (ref 3.5–5.2)
PROT SERPL-MCNC: 6.9 G/DL (ref 6–8.5)
PROT UR QL STRIP: NEGATIVE
SODIUM SERPL-SCNC: 140 MMOL/L (ref 136–145)
SP GR UR STRIP: 1.02 (ref 1–1.03)
UROBILINOGEN UR QL STRIP: NORMAL

## 2021-01-05 PROCEDURE — 81003 URINALYSIS AUTO W/O SCOPE: CPT

## 2021-01-05 PROCEDURE — 85025 COMPLETE CBC W/AUTO DIFF WBC: CPT

## 2021-01-05 PROCEDURE — 80053 COMPREHEN METABOLIC PANEL: CPT

## 2021-01-05 PROCEDURE — 99214 OFFICE O/P EST MOD 30 MIN: CPT | Performed by: INTERNAL MEDICINE

## 2021-01-05 PROCEDURE — 83880 ASSAY OF NATRIURETIC PEPTIDE: CPT

## 2021-01-05 NOTE — PROGRESS NOTES
Chief Complaint   Patient presents with   • Cellulitis     4 wk f/u    • Edema     swelling now in both legs    • Wound Check     wound is getting better        HPI:  Cordell Martin is a 75 y.o. male who presents today for follow-up lower extremity swelling.  Currently following with wound care and lymphedema clinic.  Denies any fevers or chills.  Reports wound has improved overall including erythema.  However, swelling is not improved since onset.  No other acute concerns today other than some minimal shortness of breath over the last month.  Denies any COVID-19 exposure.  He suspects the shortness of breath may be due to deconditioning since Covid started.    ROS:  Constitutional: no fevers, night sweats or unexplained weight loss  Eyes: no vision changes  ENT: no runny nose, ear pain, sore throat  Cardio: no chest pain, palpitations  Pulm: + shortness of breath,+ wheezing, or cough  GI: no abdominal pain or changes in bowel movements  : no difficulty urinating  MSK: no difficulty ambulating, no joint pain  Neuro: no weakness, dizziness or headache  Psych: no trouble sleeping  Endo: no change in appetite      Past Medical History:   Diagnosis Date   • Anxiety    • Arthritis    • Back pain    • Bronchitis    • Cognitive impairment    • Depression    • Disease of thyroid gland    • Glucose intolerance (impaired glucose tolerance)    • Hyperlipidemia    • Kidney stone    • Obesity    • Parkinson disease (CMS/HCC)    • Pneumonia    • Prostate disorder    • Thyroid disease    • Wears eyeglasses       Family History   Problem Relation Age of Onset   • Alzheimer's disease Other    • Cancer Other    • Diabetes Other    • Heart disease Other    • Stroke Other    • Cancer Father       Social History     Socioeconomic History   • Marital status:      Spouse name: Not on file   • Number of children: Not on file   • Years of education: Not on file   • Highest education level: Not on file   Tobacco Use   • Smoking  status: Former Smoker     Packs/day: 1.00     Years: 15.00     Pack years: 15.00     Types: Cigarettes     Start date:      Quit date:      Years since quittin.0   • Smokeless tobacco: Never Used   • Tobacco comment: quit    Substance and Sexual Activity   • Alcohol use: Yes     Comment: occasionally   • Drug use: No   • Sexual activity: Yes     Partners: Female     Birth control/protection: None      No Known Allergies   Immunization History   Administered Date(s) Administered   • Fluad Quad 65+ 2020   • Fluzone High Dose =>65 Years ([employer] ONLY) 2018, 2018, 2019, 10/13/2020   • Pneumococcal Conjugate 13-Valent (PCV13) 2015   • Pneumococcal Polysaccharide (PPSV23) 2013   • Tdap 2009   • Zostavax 2011        PE:  Vitals:    21 1347   BP: 122/80   Pulse: 86      Body mass index is 34.33 kg/m².    Gen Appearance: NAD  HEENT: Normocephalic, PERRLA, no thyromegaly, trache midline  Heart: RRR, normal S1 and S2, no murmur  Lungs: CTA b/l, no wheezing, no crackles  Abdomen: Soft, non-tender, non-distended, no guarding and BSx4  MSK: Moves all extremities well, normal gait, +2 pitting edema bilaterally  Pulses: Palpable and equal b/l  Lymph nodes: No palpable lymphadenopathy   Neuro: No focal deficits      Current Outpatient Medications   Medication Sig Dispense Refill   • atorvastatin (LIPITOR) 40 MG tablet TAKE 1 TABLET BY MOUTH EVERY DAY 90 tablet 1   • carbidopa-levodopa (SINEMET)  MG per tablet TAKE 1 & 1/2 TABLETS BY MOUTH THREE TIMES DAILY 405 tablet 3   • Cholecalciferol (VITAMIN D3) 5000 units capsule capsule Take 5,000 Units by mouth Daily.     • fluticasone (FLONASE) 50 MCG/ACT nasal spray SPRAY 2 SPRAYS IN EACH NOSTRIL ONCE DAILY 48 mL 2   • levothyroxine (SYNTHROID) 88 MCG tablet Take 1 tablet by mouth Daily. 90 tablet 3   • oxybutynin (DITROPAN) 5 MG tablet oxybutynin chloride 5 mg tablet   1-2 tabs po bid as needed     •  oxyCODONE-acetaminophen (PERCOCET) 5-325 MG per tablet Take 1 tablet by mouth Every 6 (Six) Hours As Needed for pain. 16 tablet 0   • potassium chloride (K-DUR,KLOR-CON) 20 MEQ CR tablet Take 1 tablet by mouth Daily. 14 tablet 0   • pramipexole (MIRAPEX) 1 MG tablet TAKE 1 TABLET BY MOUTH THREE TIMES A  tablet 3   • sertraline (ZOLOFT) 100 MG tablet TAKE 1&1/2 TABLETS BY MOUTH EVERY  tablet 1   • sildenafil (REVATIO) 20 MG tablet Take 1 tablet by mouth Daily. 15 tablet 5   • sildenafil (Viagra) 50 MG tablet Take 1 tablet by mouth Daily As Needed for Erectile Dysfunction. 15 tablet 5   • tamsulosin (FLOMAX) 0.4 MG capsule 24 hr capsule tamsulosin 0.4 mg capsule   TAKE 1 CAPSULE BY MOUTH EVERY DAY     • vitamin C (ASCORBIC ACID) 500 MG tablet Take 500 mg by mouth Daily.     • furosemide (Lasix) 40 MG tablet Take 1 tablet by mouth Daily for 14 days. 14 tablet 0     No current facility-administered medications for this visit.         Diagnoses and all orders for this visit:    1. Venous stasis (Primary)  -     CBC & Differential; Future  -     Comprehensive Metabolic Panel; Future  -     Urinalysis With Culture If Indicated - Urine, Clean Catch; Future  -     proBNP; Future  -     Ambulatory Referral to Vascular Surgery  Will need to check blood work to monitor kidney function.  Could try another course of diuretics.  Would recommend establishing care with vascular surgery.  Continue to follow-up with edema clinic.  2. Bilateral lower extremity edema  -     CBC & Differential; Future  -     Comprehensive Metabolic Panel; Future  -     Urinalysis With Culture If Indicated - Urine, Clean Catch; Future  -     proBNP; Future    3. Shortness of breath  -     XR Chest PA & Lateral; Future  Checking x-ray.  Will check BNP as well.  Initially had negative DVT and cardiac work-up.  4. Dyspnea, unspecified type   -     proBNP; Future         Return in about 4 weeks (around 2/2/2021).     Please note that portions of  this document were completed with a voice recognition program. Efforts were made to edit the dictations, but occasionally words are mis-transcribed.

## 2021-01-06 LAB
BASOPHILS # BLD AUTO: 0.03 10*3/MM3 (ref 0–0.2)
BASOPHILS NFR BLD AUTO: 0.4 % (ref 0–1.5)
DEPRECATED RDW RBC AUTO: 45.1 FL (ref 37–54)
EOSINOPHIL # BLD AUTO: 0.14 10*3/MM3 (ref 0–0.4)
EOSINOPHIL NFR BLD AUTO: 2 % (ref 0.3–6.2)
ERYTHROCYTE [DISTWIDTH] IN BLOOD BY AUTOMATED COUNT: 13.6 % (ref 12.3–15.4)
HCT VFR BLD AUTO: 38.6 % (ref 37.5–51)
HGB BLD-MCNC: 12.9 G/DL (ref 13–17.7)
IMM GRANULOCYTES # BLD AUTO: 0.04 10*3/MM3 (ref 0–0.05)
IMM GRANULOCYTES NFR BLD AUTO: 0.6 % (ref 0–0.5)
LYMPHOCYTES # BLD AUTO: 2.19 10*3/MM3 (ref 0.7–3.1)
LYMPHOCYTES NFR BLD AUTO: 31.2 % (ref 19.6–45.3)
MCH RBC QN AUTO: 30.4 PG (ref 26.6–33)
MCHC RBC AUTO-ENTMCNC: 33.4 G/DL (ref 31.5–35.7)
MCV RBC AUTO: 91 FL (ref 79–97)
MONOCYTES # BLD AUTO: 0.55 10*3/MM3 (ref 0.1–0.9)
MONOCYTES NFR BLD AUTO: 7.8 % (ref 5–12)
NEUTROPHILS NFR BLD AUTO: 4.08 10*3/MM3 (ref 1.7–7)
NEUTROPHILS NFR BLD AUTO: 58 % (ref 42.7–76)
NRBC BLD AUTO-RTO: 0 /100 WBC (ref 0–0.2)
PLATELET # BLD AUTO: 255 10*3/MM3 (ref 140–450)
PMV BLD AUTO: 9 FL (ref 6–12)
RBC # BLD AUTO: 4.24 10*6/MM3 (ref 4.14–5.8)
WBC # BLD AUTO: 7.03 10*3/MM3 (ref 3.4–10.8)

## 2021-01-11 ENCOUNTER — TELEPHONE (OUTPATIENT)
Dept: FAMILY MEDICINE CLINIC | Facility: CLINIC | Age: 76
End: 2021-01-11

## 2021-01-11 ENCOUNTER — TELEPHONE (OUTPATIENT)
Dept: PHYSICAL THERAPY | Facility: HOSPITAL | Age: 76
End: 2021-01-11

## 2021-01-11 ENCOUNTER — HOSPITAL ENCOUNTER (OUTPATIENT)
Dept: PHYSICAL THERAPY | Facility: HOSPITAL | Age: 76
Setting detail: THERAPIES SERIES
Discharge: HOME | End: 2021-01-11

## 2021-01-11 DIAGNOSIS — I83.019 VENOUS STASIS ULCER OF RIGHT LOWER LEG WITH EDEMA OF RIGHT LOWER LEG (HCC): Primary | ICD-10-CM

## 2021-01-11 DIAGNOSIS — R60.0 BILATERAL LEG EDEMA: ICD-10-CM

## 2021-01-11 DIAGNOSIS — L97.919 VENOUS STASIS ULCER OF RIGHT LOWER LEG WITH EDEMA OF RIGHT LOWER LEG (HCC): Primary | ICD-10-CM

## 2021-01-11 DIAGNOSIS — I83.891 VENOUS STASIS ULCER OF RIGHT LOWER LEG WITH EDEMA OF RIGHT LOWER LEG (HCC): Primary | ICD-10-CM

## 2021-01-11 DIAGNOSIS — L03.115 CELLULITIS OF RIGHT LOWER EXTREMITY: ICD-10-CM

## 2021-01-11 DIAGNOSIS — R60.9 VENOUS STASIS ULCER OF RIGHT LOWER LEG WITH EDEMA OF RIGHT LOWER LEG (HCC): Primary | ICD-10-CM

## 2021-01-11 PROCEDURE — 97597 DBRDMT OPN WND 1ST 20 CM/<: CPT

## 2021-01-11 PROCEDURE — 29580 STRAPPING UNNA BOOT: CPT

## 2021-01-11 NOTE — TELEPHONE ENCOUNTER
PATIENT IS REQUESTING A CALL BACK FROM PCP TO DISCUSS TEST RESULTS FROM LAST WEEK.    CALL BACK NUMBER [phone]

## 2021-01-11 NOTE — THERAPY PROGRESS REPORT/RE-CERT
Outpatient Rehabilitation - Wound/Debridement Progress Note  [facility]     Patient Name: Cordell Martin  : 1945  MRN: [id number]  Today's Date: 2021                 Admit Date: 2021    Visit Dx:    ICD-10-CM ICD-9-CM   1. Venous stasis ulcer of right lower leg with edema of right lower leg (CMS/HCC)  I83.019 454.8    I83.891 454.0    L97.919     R60.9    2. Bilateral leg edema  R60.0 782.3   3. Cellulitis of right lower extremity  L03.115 682.6   Medial RLE:    RLE:    LLE:      Patient Active Problem List   Diagnosis   • Anxiety   • Arthritis   • Depression   • Hyperlipidemia   • Lumbar stenosis with neurogenic claudication   • Parkinson's disease (CMS/HCC)   • Lumbar stenosis with neurogenic claudication status post L4-5 decompression   • Status post lumbar laminectomy   • Memory loss        Past Medical History:   Diagnosis Date   • Anxiety    • Arthritis    • Back pain    • Bronchitis    • Cognitive impairment    • Depression    • Disease of thyroid gland    • Glucose intolerance (impaired glucose tolerance)    • Hyperlipidemia    • Kidney stone    • Obesity    • Parkinson disease (CMS/HCC)    • Pneumonia    • Prostate disorder    • Thyroid disease    • Wears eyeglasses         Past Surgical History:   Procedure Laterality Date   • COLONOSCOPY      5 years ago   • EYE SURGERY     • HERNIA REPAIR  10/20/2020   • LUMBAR LAMINECTOMY DISCECTOMY DECOMPRESSION N/A 2017    Procedure: LUMBAR LAMINECTOMY L4-5;  Surgeon: Antonio Trent MD;  Location: Replaced by [facility];  Service:    • SHOULDER ROTATOR CUFF REPAIR Right     x2   • TONSILLECTOMY           EVALUATION  PT Ortho     Row Name 21 9870       Subjective Comments    Subjective Comments  Pt reports he had to take the unna boot off after a few days due to it getting too tight.  States he has continued to have increased swelling with 30# weight gain in last 2-3 weeks, swelling now extending up into thighs, also having mild cough.   States he is waiting to hear back from his PCP re: blookwork to see if he can take a diuretic.  -JM       Subjective Pain    Able to rate subjective pain?  yes  -JM    Pre-Treatment Pain Level  3  -JM    Post-Treatment Pain Level  3  -JM       Transfers    Comment (Transfers)  seated in transport chair for tx  -JM      User Key  (r) = Recorded By, (t) = Taken By, (c) = Cosigned By    Initials Name Provider Type    Leticia Kang, PT Physical Therapist          JOHANNY Wound     Row Name 01/11/21 1345             Wound 12/09/20 1130 Right lower leg Venous Ulcer    Wound - Properties Group Placement Date: 12/09/20  -MP Placement Time: 1130 -MP Present on Hospital Admission: Y  -MP Side: Right  -MP Orientation: lower  -MP Location: leg  -MP Primary Wound Type: Venous ulcer  -MP    Wound Image  Images linked: 3  -JM      Dressing Appearance  intact;moist drainage;other (see comments) small border dressing and gauze wrap RLE  -JM      Base  moist;red;yellow;slough  -JM      Periwound  redness;blistered;moist;swelling;warm  -JM      Periwound Temperature  warm  -JM      Periwound Skin Turgor  firm  -JM      Edges  other (see comments) indistinct  -JM      Drainage Characteristics/Odor  serous;yellow  -JM      Drainage Amount  large;other (see comments) active weeping from RLE  -JM      Care, Wound  cleansed with;wound cleanser;debrided  -JM      Dressing Care  dressing changed unna boot, optilocks, cast padding, coban  -JM      Periwound Care  cleansed with pH balanced cleanser;dry periwound area maintained  -      Retired Wound - Properties Group Date first assessed: 12/09/20 -MP Time first assessed: 1130 -MP Present on Hospital Admission: Y  -MP Side: Right  -MP Location: leg  -MP Primary Wound Type: Venous ulcer  -MP      User Key  (r) = Recorded By, (t) = Taken By, (c) = Cosigned By    Initials Name Provider Type    Leticia Kang, PT Physical Therapist    Bryan Cline PT Physical Therapist     "    Lymphedema     Row Name 01/11/21 1842             Lymphedema Edema Assessment    Pitting Edema  Moderate;Severe  -      Edema Assessment Comment  pitting edema extending up into thighs  -         Skin Changes/Observations    Lower Extremity Conditions  bilateral:;shiny;hairless;inflamed;weeping  -      Lower Extremity Color/Pigment  right:;erythema;left:;hyperpigmented  -         Lymphedema Pulses/Capillary Refill    Lower Extremity Capillary Refill  right:;left:;less than 3 seconds  -JM         Compression/Skin Care    Compression/Skin Care  skin care;wrapping location;bandaging  -      Skin Care  washed/dried  -JM      Wrapping Location  lower extremity  -JM      Wrapping Location LE  bilateral:;foot to knee  -JM      Wrapping Comments  BLE unna boots, clam-shell application, optilock x2 and cast padding to R calf, 4\" coban, size 6 spandage  -      Bandaging Technique  light compression;figure-eight;circumferential/spiral  -        User Key  (r) = Recorded By, (t) = Taken By, (c) = Cosigned By    Initials Name Provider Type    Leticia Kang, PT Physical Therapist          WOUND DEBRIDEMENT  Total area of Debridement: ~4cmsq  Debridement Site 1  Location- Site 1: R LE  Selective Debridement- Site 1: Wound Surface <20cmsq  Instruments- Site 1: tweezers  Excised Tissue Description- Site 1: scant, slough  Bleeding- Site 1: none             Therapy Education     Row Name 01/11/21 4249             Therapy Education    Education Details  Recommended ED assessment, pt declining, will try to contact his PCP, but is to go to ED if symptoms persist or worsen.  -JM      Given  Edema management;Bandaging/dressing change  -      Program  Reinforced  -JM      How Provided  Verbal  -JM      Provided to  Patient  -JM      Level of Understanding  Verbalized  -        User Key  (r) = Recorded By, (t) = Taken By, (c) = Cosigned By    Initials Name Provider Type    Leticia Kang, PT Physical " Therapist          Recommendation and Plan  PT Assessment/Plan     Row Name 21 2126          PT Assessment    Functional Limitations  Impaired gait;Limitation in home management;Other (comment) wound care   -     Impairments  Impaired venous circulation;Integumentary integrity  -     Assessment Comments  Pt presenting with significant worsening of BLE edema, with RLE now actively weeping with S&S of cellulitis, open slough-covered areas post/med calf, and small blistering of BLE.  PT recommended pt go to ED for assessment.  Pt declines at this time until he hears back from his PCP, who he has contacted to discuss lab results.  PT notified PCP, and spoke with Dr. Holder by phone after pt's treatment about worsening S&S of infection and edema.  MD office will contact patient with recommendations.  PT scheduled pt for additional tx this week due to increased drainage and decline in status.  Pt will continue to require skilled PT for debridement, unna boot/compression, and education, current goals and POC remain appropriate.  -     Rehab Potential  Good  -     Patient/caregiver participated in establishment of treatment plan and goals  Yes  -     Patient would benefit from skilled therapy intervention  Yes  -        PT Plan    PT Frequency  2x/week  -     Predicted Duration of Therapy Intervention (PT)  12 visits  -     Planned CPT's?  PT UNNA BOOT: 47864;PT MULTI LAYER COMP SYS LE;PT MIAH DEBRIDE OPEN WOUND UP TO 20 CM: 32365;PT NONSELECT DEBRIDE 15 MIN: 18369;PT SELF CARE/MGMT/TRAIN 15 MIN: 79232  -     Physical Therapy Interventions (Optional Details)  patient/family education;wound care  -     PT Plan Comments  debridement, unna boots/MLW, ? pending ED assessmet  -       User Key  (r) = Recorded By, (t) = Taken By, (c) = Cosigned By    Initials Name Provider Type    Leticia Kang, PT Physical Therapist          Goals  PT OP Goals     Row Name 21 2687          PT Short  Term Goals    STG 1  Patient will demonstrate 2cm reduction in girth measurements of R LE as evidence of improved venous return.   -     STG 1 Progress  Ongoing  -     STG 2  Patient will verbalize signs and symptoms of infection.  -     STG 2 Progress  Met  -        Long Term Goals    LTG 1  Patient will demonstrate 4cm reduction in girth measurements of R LE as evidence of improved venous return.   -     LTG 1 Progress  Ongoing  -     LTG 2  Patient will demonstrate independence of management of R LE edema at home with use of long term compression system.  -     LTG 2 Progress  Ongoing  -        Time Calculation    PT Goal Re-Cert Due Date  03/09/21  -       User Key  (r) = Recorded By, (t) = Taken By, (c) = Cosigned By    Initials Name Provider Type    Leticia Kang, PT Physical Therapist          PT Goal Re-Cert Due Date: 03/09/21  PT Short Term Goals  STG 1: Patient will demonstrate 2cm reduction in girth measurements of R LE as evidence of improved venous return.   STG 1 Progress: Ongoing  STG 2: Patient will verbalize signs and symptoms of infection.  STG 2 Progress: Met  Long Term Goals  LTG 1: Patient will demonstrate 4cm reduction in girth measurements of R LE as evidence of improved venous return.   LTG 1 Progress: Ongoing  LTG 2: Patient will demonstrate independence of management of R LE edema at home with use of long term compression system.  LTG 2 Progress: Ongoing      Time Calculation: Start Time: 1345  Therapy Charges for Today     Code Description Service Date Service Provider Modifiers Qty    24030066127 HC MIAH DEBRIDE OPEN WOUND UP TO 20CM 1/11/2021 Leticia Adams, PT GP 1    74017744889 HC PT STAPPING UNNA BOOT 1/11/2021 Leticia Adams, PT GP 1                  Leticia Adams, PT  1/11/2021

## 2021-01-12 ENCOUNTER — APPOINTMENT (OUTPATIENT)
Dept: GENERAL RADIOLOGY | Facility: HOSPITAL | Age: 76
End: 2021-01-12

## 2021-01-12 ENCOUNTER — HOSPITAL ENCOUNTER (INPATIENT)
Facility: HOSPITAL | Age: 76
LOS: 2 days | Discharge: HOME HEALTH | End: 2021-01-16
Attending: EMERGENCY MEDICINE | Admitting: INTERNAL MEDICINE

## 2021-01-12 DIAGNOSIS — R60.9 CHRONIC EDEMA: Primary | ICD-10-CM

## 2021-01-12 DIAGNOSIS — L03.119 CELLULITIS OF LOWER EXTREMITY, UNSPECIFIED LATERALITY: ICD-10-CM

## 2021-01-12 DIAGNOSIS — L03.115 CELLULITIS OF RIGHT LOWER EXTREMITY: ICD-10-CM

## 2021-01-12 DIAGNOSIS — J40 BRONCHITIS: ICD-10-CM

## 2021-01-12 DIAGNOSIS — G20 PARKINSON'S DISEASE (HCC): ICD-10-CM

## 2021-01-12 PROBLEM — R06.09 DOE (DYSPNEA ON EXERTION): Status: ACTIVE | Noted: 2021-01-12

## 2021-01-12 LAB
ALBUMIN SERPL-MCNC: 3.6 G/DL (ref 3.5–5.2)
ALBUMIN/GLOB SERPL: 1.3 G/DL
ALP SERPL-CCNC: 119 U/L (ref 39–117)
ALT SERPL W P-5'-P-CCNC: <5 U/L (ref 1–41)
ANION GAP SERPL CALCULATED.3IONS-SCNC: 6 MMOL/L (ref 5–15)
AST SERPL-CCNC: 14 U/L (ref 1–40)
BASOPHILS # BLD AUTO: 0.02 10*3/MM3 (ref 0–0.2)
BASOPHILS NFR BLD AUTO: 0.4 % (ref 0–1.5)
BILIRUB SERPL-MCNC: 0.4 MG/DL (ref 0–1.2)
BUN SERPL-MCNC: 23 MG/DL (ref 8–23)
BUN/CREAT SERPL: 19.3 (ref 7–25)
CALCIUM SPEC-SCNC: 9.2 MG/DL (ref 8.6–10.5)
CHLORIDE SERPL-SCNC: 105 MMOL/L (ref 98–107)
CO2 SERPL-SCNC: 30 MMOL/L (ref 22–29)
CREAT SERPL-MCNC: 1.19 MG/DL (ref 0.76–1.27)
CRP SERPL-MCNC: 1.06 MG/DL (ref 0–0.5)
D-LACTATE SERPL-SCNC: 0.9 MMOL/L (ref 0.5–2)
DEPRECATED RDW RBC AUTO: 54.4 FL (ref 37–54)
EOSINOPHIL # BLD AUTO: 0.17 10*3/MM3 (ref 0–0.4)
EOSINOPHIL NFR BLD AUTO: 3 % (ref 0.3–6.2)
ERYTHROCYTE [DISTWIDTH] IN BLOOD BY AUTOMATED COUNT: 14.9 % (ref 12.3–15.4)
ERYTHROCYTE [SEDIMENTATION RATE] IN BLOOD: 26 MM/HR (ref 0–20)
FLUAV RNA RESP QL NAA+PROBE: NOT DETECTED
FLUBV RNA RESP QL NAA+PROBE: NOT DETECTED
GFR SERPL CREATININE-BSD FRML MDRD: 60 ML/MIN/1.73
GLOBULIN UR ELPH-MCNC: 2.8 GM/DL
GLUCOSE SERPL-MCNC: 113 MG/DL (ref 65–99)
HCT VFR BLD AUTO: 37.9 % (ref 37.5–51)
HGB BLD-MCNC: 11.7 G/DL (ref 13–17.7)
IMM GRANULOCYTES # BLD AUTO: 0.02 10*3/MM3 (ref 0–0.05)
IMM GRANULOCYTES NFR BLD AUTO: 0.4 % (ref 0–0.5)
LYMPHOCYTES # BLD AUTO: 1.78 10*3/MM3 (ref 0.7–3.1)
LYMPHOCYTES NFR BLD AUTO: 31.2 % (ref 19.6–45.3)
MCH RBC QN AUTO: 30.4 PG (ref 26.6–33)
MCHC RBC AUTO-ENTMCNC: 30.9 G/DL (ref 31.5–35.7)
MCV RBC AUTO: 98.4 FL (ref 79–97)
MONOCYTES # BLD AUTO: 0.72 10*3/MM3 (ref 0.1–0.9)
MONOCYTES NFR BLD AUTO: 12.6 % (ref 5–12)
NEUTROPHILS NFR BLD AUTO: 3 10*3/MM3 (ref 1.7–7)
NEUTROPHILS NFR BLD AUTO: 52.4 % (ref 42.7–76)
NRBC BLD AUTO-RTO: 0 /100 WBC (ref 0–0.2)
NT-PROBNP SERPL-MCNC: 47.4 PG/ML (ref 0–900)
NT-PROBNP SERPL-MCNC: 49 PG/ML (ref 0–900)
PLATELET # BLD AUTO: 266 10*3/MM3 (ref 140–450)
PMV BLD AUTO: 8.6 FL (ref 6–12)
POTASSIUM SERPL-SCNC: 4.8 MMOL/L (ref 3.5–5.2)
PROT SERPL-MCNC: 6.4 G/DL (ref 6–8.5)
QT INTERVAL: 398 MS
QTC INTERVAL: 464 MS
RBC # BLD AUTO: 3.85 10*6/MM3 (ref 4.14–5.8)
SARS-COV-2 RNA RESP QL NAA+PROBE: NOT DETECTED
SODIUM SERPL-SCNC: 141 MMOL/L (ref 136–145)
TROPONIN T SERPL-MCNC: <0.01 NG/ML (ref 0–0.03)
WBC # BLD AUTO: 5.71 10*3/MM3 (ref 3.4–10.8)

## 2021-01-12 PROCEDURE — G0378 HOSPITAL OBSERVATION PER HR: HCPCS

## 2021-01-12 PROCEDURE — 71045 X-RAY EXAM CHEST 1 VIEW: CPT

## 2021-01-12 PROCEDURE — 87040 BLOOD CULTURE FOR BACTERIA: CPT | Performed by: EMERGENCY MEDICINE

## 2021-01-12 PROCEDURE — 85652 RBC SED RATE AUTOMATED: CPT | Performed by: EMERGENCY MEDICINE

## 2021-01-12 PROCEDURE — 80053 COMPREHEN METABOLIC PANEL: CPT | Performed by: EMERGENCY MEDICINE

## 2021-01-12 PROCEDURE — 85025 COMPLETE CBC W/AUTO DIFF WBC: CPT | Performed by: EMERGENCY MEDICINE

## 2021-01-12 PROCEDURE — 25010000002 HEPARIN (PORCINE) PER 1000 UNITS: Performed by: NURSE PRACTITIONER

## 2021-01-12 PROCEDURE — 87636 SARSCOV2 & INF A&B AMP PRB: CPT | Performed by: EMERGENCY MEDICINE

## 2021-01-12 PROCEDURE — 99284 EMERGENCY DEPT VISIT MOD MDM: CPT

## 2021-01-12 PROCEDURE — 25010000002 VANCOMYCIN 10 G RECONSTITUTED SOLUTION: Performed by: EMERGENCY MEDICINE

## 2021-01-12 PROCEDURE — 83880 ASSAY OF NATRIURETIC PEPTIDE: CPT | Performed by: EMERGENCY MEDICINE

## 2021-01-12 PROCEDURE — 93005 ELECTROCARDIOGRAM TRACING: CPT | Performed by: EMERGENCY MEDICINE

## 2021-01-12 PROCEDURE — 83605 ASSAY OF LACTIC ACID: CPT | Performed by: EMERGENCY MEDICINE

## 2021-01-12 PROCEDURE — 99223 1ST HOSP IP/OBS HIGH 75: CPT | Performed by: HOSPITALIST

## 2021-01-12 PROCEDURE — 84484 ASSAY OF TROPONIN QUANT: CPT | Performed by: EMERGENCY MEDICINE

## 2021-01-12 PROCEDURE — 86140 C-REACTIVE PROTEIN: CPT | Performed by: EMERGENCY MEDICINE

## 2021-01-12 PROCEDURE — 25010000002 CEFTRIAXONE PER 250 MG: Performed by: EMERGENCY MEDICINE

## 2021-01-12 RX ORDER — ACETAMINOPHEN 325 MG/1
650 TABLET ORAL EVERY 4 HOURS PRN
Status: DISCONTINUED | OUTPATIENT
Start: 2021-01-12 | End: 2021-01-16 | Stop reason: HOSPADM

## 2021-01-12 RX ORDER — BISACODYL 5 MG/1
5 TABLET, DELAYED RELEASE ORAL DAILY PRN
Status: DISCONTINUED | OUTPATIENT
Start: 2021-01-12 | End: 2021-01-16 | Stop reason: HOSPADM

## 2021-01-12 RX ORDER — ASCORBIC ACID 500 MG
500 TABLET ORAL DAILY
Status: DISCONTINUED | OUTPATIENT
Start: 2021-01-12 | End: 2021-01-16 | Stop reason: HOSPADM

## 2021-01-12 RX ORDER — CHOLECALCIFEROL (VITAMIN D3) 125 MCG
5 CAPSULE ORAL NIGHTLY PRN
Status: DISCONTINUED | OUTPATIENT
Start: 2021-01-12 | End: 2021-01-16 | Stop reason: HOSPADM

## 2021-01-12 RX ORDER — SILDENAFIL CITRATE 20 MG/1
20 TABLET ORAL DAILY
Status: DISCONTINUED | OUTPATIENT
Start: 2021-01-12 | End: 2021-01-14

## 2021-01-12 RX ORDER — CALCIUM CARBONATE 750 MG/1
1500 TABLET, CHEWABLE ORAL 2 TIMES DAILY PRN
Status: DISCONTINUED | OUTPATIENT
Start: 2021-01-12 | End: 2021-01-16 | Stop reason: HOSPADM

## 2021-01-12 RX ORDER — FLUTICASONE PROPIONATE 50 MCG
2 SPRAY, SUSPENSION (ML) NASAL DAILY
Status: DISCONTINUED | OUTPATIENT
Start: 2021-01-12 | End: 2021-01-16 | Stop reason: HOSPADM

## 2021-01-12 RX ORDER — SODIUM CHLORIDE 0.9 % (FLUSH) 0.9 %
10 SYRINGE (ML) INJECTION EVERY 12 HOURS SCHEDULED
Status: DISCONTINUED | OUTPATIENT
Start: 2021-01-12 | End: 2021-01-16 | Stop reason: HOSPADM

## 2021-01-12 RX ORDER — LEVOTHYROXINE SODIUM 88 UG/1
88 TABLET ORAL
Status: DISCONTINUED | OUTPATIENT
Start: 2021-01-13 | End: 2021-01-16 | Stop reason: HOSPADM

## 2021-01-12 RX ORDER — DOCUSATE SODIUM 100 MG/1
100 CAPSULE, LIQUID FILLED ORAL 2 TIMES DAILY
Status: DISCONTINUED | OUTPATIENT
Start: 2021-01-12 | End: 2021-01-16 | Stop reason: HOSPADM

## 2021-01-12 RX ORDER — ONDANSETRON 2 MG/ML
4 INJECTION INTRAMUSCULAR; INTRAVENOUS EVERY 6 HOURS PRN
Status: DISCONTINUED | OUTPATIENT
Start: 2021-01-12 | End: 2021-01-16 | Stop reason: HOSPADM

## 2021-01-12 RX ORDER — OXYCODONE HYDROCHLORIDE AND ACETAMINOPHEN 5; 325 MG/1; MG/1
1 TABLET ORAL EVERY 6 HOURS PRN
Status: DISCONTINUED | OUTPATIENT
Start: 2021-01-12 | End: 2021-01-16 | Stop reason: HOSPADM

## 2021-01-12 RX ORDER — PRAMIPEXOLE DIHYDROCHLORIDE 0.25 MG/1
1 TABLET ORAL 3 TIMES DAILY
Status: DISCONTINUED | OUTPATIENT
Start: 2021-01-12 | End: 2021-01-16 | Stop reason: HOSPADM

## 2021-01-12 RX ORDER — GUAIFENESIN 600 MG/1
600 TABLET, EXTENDED RELEASE ORAL EVERY 12 HOURS SCHEDULED
Status: DISCONTINUED | OUTPATIENT
Start: 2021-01-12 | End: 2021-01-16 | Stop reason: HOSPADM

## 2021-01-12 RX ORDER — ATORVASTATIN CALCIUM 40 MG/1
40 TABLET, FILM COATED ORAL DAILY
Status: DISCONTINUED | OUTPATIENT
Start: 2021-01-12 | End: 2021-01-16 | Stop reason: HOSPADM

## 2021-01-12 RX ORDER — HEPARIN SODIUM 5000 [USP'U]/ML
5000 INJECTION, SOLUTION INTRAVENOUS; SUBCUTANEOUS EVERY 8 HOURS SCHEDULED
Status: DISCONTINUED | OUTPATIENT
Start: 2021-01-12 | End: 2021-01-16 | Stop reason: HOSPADM

## 2021-01-12 RX ORDER — SODIUM CHLORIDE 0.9 % (FLUSH) 0.9 %
10 SYRINGE (ML) INJECTION AS NEEDED
Status: DISCONTINUED | OUTPATIENT
Start: 2021-01-12 | End: 2021-01-16 | Stop reason: HOSPADM

## 2021-01-12 RX ORDER — TAMSULOSIN HYDROCHLORIDE 0.4 MG/1
0.4 CAPSULE ORAL DAILY
Status: DISCONTINUED | OUTPATIENT
Start: 2021-01-12 | End: 2021-01-16 | Stop reason: HOSPADM

## 2021-01-12 RX ADMIN — CARBIDOPA AND LEVODOPA 1.5 TABLET: 25; 100 TABLET ORAL at 21:01

## 2021-01-12 RX ADMIN — SODIUM CHLORIDE, PRESERVATIVE FREE 10 ML: 5 INJECTION INTRAVENOUS at 21:09

## 2021-01-12 RX ADMIN — TAMSULOSIN HYDROCHLORIDE 0.4 MG: 0.4 CAPSULE ORAL at 18:32

## 2021-01-12 RX ADMIN — ATORVASTATIN CALCIUM 40 MG: 40 TABLET, FILM COATED ORAL at 20:57

## 2021-01-12 RX ADMIN — SERTRALINE HYDROCHLORIDE 150 MG: 100 TABLET ORAL at 20:56

## 2021-01-12 RX ADMIN — CEFTRIAXONE SODIUM 1 G: 1 INJECTION, POWDER, FOR SOLUTION INTRAMUSCULAR; INTRAVENOUS at 15:58

## 2021-01-12 RX ADMIN — SILDENAFIL 20 MG: 20 TABLET, FILM COATED ORAL at 18:28

## 2021-01-12 RX ADMIN — HEPARIN SODIUM 5000 UNITS: 5000 INJECTION INTRAVENOUS; SUBCUTANEOUS at 21:01

## 2021-01-12 RX ADMIN — OXYCODONE HYDROCHLORIDE AND ACETAMINOPHEN 500 MG: 500 TABLET ORAL at 20:57

## 2021-01-12 RX ADMIN — FLUTICASONE PROPIONATE 2 SPRAY: 50 SPRAY, METERED NASAL at 21:17

## 2021-01-12 RX ADMIN — PRAMIPEXOLE DIHYDROCHLORIDE 1 MG: 0.25 TABLET ORAL at 20:57

## 2021-01-12 RX ADMIN — DOCUSATE SODIUM 100 MG: 100 CAPSULE, LIQUID FILLED ORAL at 20:57

## 2021-01-12 RX ADMIN — VANCOMYCIN HYDROCHLORIDE 2250 MG: 100 INJECTION, POWDER, LYOPHILIZED, FOR SOLUTION INTRAVENOUS at 17:21

## 2021-01-12 RX ADMIN — Medication 5000 UNITS: at 20:56

## 2021-01-12 RX ADMIN — GUAIFENESIN 600 MG: 600 TABLET, EXTENDED RELEASE ORAL at 20:57

## 2021-01-13 ENCOUNTER — APPOINTMENT (OUTPATIENT)
Dept: CARDIOLOGY | Facility: HOSPITAL | Age: 76
End: 2021-01-13

## 2021-01-13 ENCOUNTER — APPOINTMENT (OUTPATIENT)
Dept: PHYSICAL THERAPY | Facility: HOSPITAL | Age: 76
End: 2021-01-13

## 2021-01-13 LAB
ANION GAP SERPL CALCULATED.3IONS-SCNC: 8 MMOL/L (ref 5–15)
BASOPHILS # BLD AUTO: 0.03 10*3/MM3 (ref 0–0.2)
BASOPHILS NFR BLD AUTO: 0.4 % (ref 0–1.5)
BH CV ECHO MEAS - AO MAX PG (FULL): 3.7 MMHG
BH CV ECHO MEAS - AO MAX PG: 5.6 MMHG
BH CV ECHO MEAS - AO MEAN PG (FULL): 2.5 MMHG
BH CV ECHO MEAS - AO MEAN PG: 3.5 MMHG
BH CV ECHO MEAS - AO ROOT AREA (BSA CORRECTED): 1.7
BH CV ECHO MEAS - AO ROOT AREA: 12.9 CM^2
BH CV ECHO MEAS - AO ROOT DIAM: 4.1 CM
BH CV ECHO MEAS - AO V2 MAX: 118.6 CM/SEC
BH CV ECHO MEAS - AO V2 MEAN: 89.6 CM/SEC
BH CV ECHO MEAS - AO V2 VTI: 28.7 CM
BH CV ECHO MEAS - ASC AORTA: 3.6 CM
BH CV ECHO MEAS - AVA(I,A): 2.8 CM^2
BH CV ECHO MEAS - AVA(I,D): 2.8 CM^2
BH CV ECHO MEAS - AVA(V,A): 2.7 CM^2
BH CV ECHO MEAS - AVA(V,D): 2.7 CM^2
BH CV ECHO MEAS - BSA(HAYCOCK): 2.4 M^2
BH CV ECHO MEAS - BSA(HAYCOCK): 2.4 M^2
BH CV ECHO MEAS - BSA: 2.3 M^2
BH CV ECHO MEAS - BSA: 2.3 M^2
BH CV ECHO MEAS - BZI_BMI: 33.9 KILOGRAMS/M^2
BH CV ECHO MEAS - BZI_BMI: 33.9 KILOGRAMS/M^2
BH CV ECHO MEAS - BZI_METRIC_HEIGHT: 182.9 CM
BH CV ECHO MEAS - BZI_METRIC_HEIGHT: 182.9 CM
BH CV ECHO MEAS - BZI_METRIC_WEIGHT: 113.4 KG
BH CV ECHO MEAS - BZI_METRIC_WEIGHT: 113.4 KG
BH CV ECHO MEAS - EDV(CUBED): 122.3 ML
BH CV ECHO MEAS - EDV(MOD-SP2): 115 ML
BH CV ECHO MEAS - EDV(MOD-SP4): 131 ML
BH CV ECHO MEAS - EDV(TEICH): 116.3 ML
BH CV ECHO MEAS - EF(CUBED): 81.9 %
BH CV ECHO MEAS - EF(MOD-BP): 54 %
BH CV ECHO MEAS - EF(MOD-SP2): 54.8 %
BH CV ECHO MEAS - EF(MOD-SP4): 55.7 %
BH CV ECHO MEAS - EF(TEICH): 74.4 %
BH CV ECHO MEAS - ESV(CUBED): 22.1 ML
BH CV ECHO MEAS - ESV(MOD-SP2): 52 ML
BH CV ECHO MEAS - ESV(MOD-SP4): 58 ML
BH CV ECHO MEAS - ESV(TEICH): 29.7 ML
BH CV ECHO MEAS - FS: 43.5 %
BH CV ECHO MEAS - IVS/LVPW: 0.98
BH CV ECHO MEAS - IVSD: 0.93 CM
BH CV ECHO MEAS - LA DIMENSION: 4 CM
BH CV ECHO MEAS - LA/AO: 0.98
BH CV ECHO MEAS - LAD MAJOR: 5.7 CM
BH CV ECHO MEAS - LAT PEAK E' VEL: 8 CM/SEC
BH CV ECHO MEAS - LATERAL E/E' RATIO: 12.3
BH CV ECHO MEAS - LV DIASTOLIC VOL/BSA (35-75): 55.9 ML/M^2
BH CV ECHO MEAS - LV IVRT: 0.06 SEC
BH CV ECHO MEAS - LV MASS(C)D: 165.4 GRAMS
BH CV ECHO MEAS - LV MASS(C)DI: 70.6 GRAMS/M^2
BH CV ECHO MEAS - LV MAX PG: 1.9 MMHG
BH CV ECHO MEAS - LV MEAN PG: 0.97 MMHG
BH CV ECHO MEAS - LV SYSTOLIC VOL/BSA (12-30): 24.8 ML/M^2
BH CV ECHO MEAS - LV V1 MAX: 68.5 CM/SEC
BH CV ECHO MEAS - LV V1 MEAN: 45.2 CM/SEC
BH CV ECHO MEAS - LV V1 VTI: 17.2 CM
BH CV ECHO MEAS - LVIDD: 5 CM
BH CV ECHO MEAS - LVIDS: 2.8 CM
BH CV ECHO MEAS - LVLD AP2: 9.5 CM
BH CV ECHO MEAS - LVLD AP4: 9.5 CM
BH CV ECHO MEAS - LVLS AP2: 7.2 CM
BH CV ECHO MEAS - LVLS AP4: 7.8 CM
BH CV ECHO MEAS - LVOT AREA (M): 4.5 CM^2
BH CV ECHO MEAS - LVOT AREA: 4.7 CM^2
BH CV ECHO MEAS - LVOT DIAM: 2.4 CM
BH CV ECHO MEAS - LVPWD: 0.95 CM
BH CV ECHO MEAS - MED PEAK E' VEL: 7.3 CM/SEC
BH CV ECHO MEAS - MEDIAL E/E' RATIO: 13.4
BH CV ECHO MEAS - MPA AREA: 8 CM^2
BH CV ECHO MEAS - MPA DIAM: 3.2 CM
BH CV ECHO MEAS - MV A MAX VEL: 89.9 CM/SEC
BH CV ECHO MEAS - MV DEC TIME: 0.24 SEC
BH CV ECHO MEAS - MV E MAX VEL: 100.9 CM/SEC
BH CV ECHO MEAS - MV E/A: 1.1
BH CV ECHO MEAS - PA ACC SLOPE: 1082 CM/SEC^2
BH CV ECHO MEAS - PA ACC TIME: 0.09 SEC
BH CV ECHO MEAS - PA PR(ACCEL): 39.4 MMHG
BH CV ECHO MEAS - PULM A REVS VEL: 27.4 CM/SEC
BH CV ECHO MEAS - PULM DIAS VEL: 55.1 CM/SEC
BH CV ECHO MEAS - PULM S/D: 0.93
BH CV ECHO MEAS - PULM SYS VEL: 51.3 CM/SEC
BH CV ECHO MEAS - RAP SYSTOLE: 8 MMHG
BH CV ECHO MEAS - RVSP: 37 MMHG
BH CV ECHO MEAS - SI(AO): 158.5 ML/M^2
BH CV ECHO MEAS - SI(CUBED): 42.8 ML/M^2
BH CV ECHO MEAS - SI(LVOT): 34.5 ML/M^2
BH CV ECHO MEAS - SI(MOD-SP2): 26.9 ML/M^2
BH CV ECHO MEAS - SI(MOD-SP4): 31.2 ML/M^2
BH CV ECHO MEAS - SI(TEICH): 37 ML/M^2
BH CV ECHO MEAS - SV(AO): 371.3 ML
BH CV ECHO MEAS - SV(CUBED): 100.2 ML
BH CV ECHO MEAS - SV(LVOT): 80.8 ML
BH CV ECHO MEAS - SV(MOD-SP2): 63 ML
BH CV ECHO MEAS - SV(MOD-SP4): 73 ML
BH CV ECHO MEAS - SV(TEICH): 86.6 ML
BH CV ECHO MEAS - TAPSE (>1.6): 2.7 CM
BH CV ECHO MEAS - TR MAX PG: 29 MMHG
BH CV ECHO MEAS - TR MAX VEL: 267 CM/SEC
BH CV ECHO MEASUREMENTS AVERAGE E/E' RATIO: 13.19
BH CV LOWER VASCULAR LEFT COMMON FEMORAL AUGMENT: NORMAL
BH CV LOWER VASCULAR LEFT COMMON FEMORAL COMPRESS: NORMAL
BH CV LOWER VASCULAR LEFT COMMON FEMORAL PHASIC: NORMAL
BH CV LOWER VASCULAR LEFT COMMON FEMORAL SPONT: NORMAL
BH CV LOWER VASCULAR LEFT DISTAL FEMORAL AUGMENT: NORMAL
BH CV LOWER VASCULAR LEFT DISTAL FEMORAL COMPRESS: NORMAL
BH CV LOWER VASCULAR LEFT DISTAL FEMORAL PHASIC: NORMAL
BH CV LOWER VASCULAR LEFT DISTAL FEMORAL SPONT: NORMAL
BH CV LOWER VASCULAR LEFT GASTRONEMIUS COMPRESS: NORMAL
BH CV LOWER VASCULAR LEFT GREATER SAPH AK COMPRESS: NORMAL
BH CV LOWER VASCULAR LEFT GREATER SAPH BK COMPRESS: NORMAL
BH CV LOWER VASCULAR LEFT LESSER SAPH COMPRESS: NORMAL
BH CV LOWER VASCULAR LEFT MID FEMORAL AUGMENT: NORMAL
BH CV LOWER VASCULAR LEFT MID FEMORAL COMPRESS: NORMAL
BH CV LOWER VASCULAR LEFT MID FEMORAL PHASIC: NORMAL
BH CV LOWER VASCULAR LEFT MID FEMORAL SPONT: NORMAL
BH CV LOWER VASCULAR LEFT PERONEAL AUGMENT: NORMAL
BH CV LOWER VASCULAR LEFT PERONEAL COMPRESS: NORMAL
BH CV LOWER VASCULAR LEFT POPLITEAL AUGMENT: NORMAL
BH CV LOWER VASCULAR LEFT POPLITEAL COMPRESS: NORMAL
BH CV LOWER VASCULAR LEFT POPLITEAL PHASIC: NORMAL
BH CV LOWER VASCULAR LEFT POPLITEAL SPONT: NORMAL
BH CV LOWER VASCULAR LEFT POSTERIOR TIBIAL AUGMENT: NORMAL
BH CV LOWER VASCULAR LEFT POSTERIOR TIBIAL COMPRESS: NORMAL
BH CV LOWER VASCULAR LEFT PROFUNDA FEMORAL AUGMENT: NORMAL
BH CV LOWER VASCULAR LEFT PROFUNDA FEMORAL PHASIC: NORMAL
BH CV LOWER VASCULAR LEFT PROFUNDA FEMORAL SPONT: NORMAL
BH CV LOWER VASCULAR LEFT PROXIMAL FEMORAL AUGMENT: NORMAL
BH CV LOWER VASCULAR LEFT PROXIMAL FEMORAL COMPRESS: NORMAL
BH CV LOWER VASCULAR LEFT PROXIMAL FEMORAL PHASIC: NORMAL
BH CV LOWER VASCULAR LEFT PROXIMAL FEMORAL SPONT: NORMAL
BH CV LOWER VASCULAR LEFT SAPHENOFEMORAL JUNCTION AUGMENT: NORMAL
BH CV LOWER VASCULAR LEFT SAPHENOFEMORAL JUNCTION COMPRESS: NORMAL
BH CV LOWER VASCULAR LEFT SAPHENOFEMORAL JUNCTION PHASIC: NORMAL
BH CV LOWER VASCULAR LEFT SAPHENOFEMORAL JUNCTION SPONT: NORMAL
BH CV LOWER VASCULAR RIGHT COMMON FEMORAL AUGMENT: NORMAL
BH CV LOWER VASCULAR RIGHT COMMON FEMORAL COMPRESS: NORMAL
BH CV LOWER VASCULAR RIGHT COMMON FEMORAL PHASIC: NORMAL
BH CV LOWER VASCULAR RIGHT COMMON FEMORAL SPONT: NORMAL
BH CV LOWER VASCULAR RIGHT DISTAL FEMORAL AUGMENT: NORMAL
BH CV LOWER VASCULAR RIGHT DISTAL FEMORAL COMPRESS: NORMAL
BH CV LOWER VASCULAR RIGHT DISTAL FEMORAL PHASIC: NORMAL
BH CV LOWER VASCULAR RIGHT DISTAL FEMORAL SPONT: NORMAL
BH CV LOWER VASCULAR RIGHT GASTRONEMIUS COMPRESS: NORMAL
BH CV LOWER VASCULAR RIGHT GREATER SAPH AK COMPRESS: NORMAL
BH CV LOWER VASCULAR RIGHT GREATER SAPH BK COMPRESS: NORMAL
BH CV LOWER VASCULAR RIGHT LESSER SAPH COMPRESS: NORMAL
BH CV LOWER VASCULAR RIGHT MID FEMORAL AUGMENT: NORMAL
BH CV LOWER VASCULAR RIGHT MID FEMORAL COMPRESS: NORMAL
BH CV LOWER VASCULAR RIGHT MID FEMORAL PHASIC: NORMAL
BH CV LOWER VASCULAR RIGHT MID FEMORAL SPONT: NORMAL
BH CV LOWER VASCULAR RIGHT PERONEAL AUGMENT: NORMAL
BH CV LOWER VASCULAR RIGHT PERONEAL COMPRESS: NORMAL
BH CV LOWER VASCULAR RIGHT POPLITEAL AUGMENT: NORMAL
BH CV LOWER VASCULAR RIGHT POPLITEAL COMPRESS: NORMAL
BH CV LOWER VASCULAR RIGHT POPLITEAL PHASIC: NORMAL
BH CV LOWER VASCULAR RIGHT POPLITEAL SPONT: NORMAL
BH CV LOWER VASCULAR RIGHT POSTERIOR TIBIAL AUGMENT: NORMAL
BH CV LOWER VASCULAR RIGHT POSTERIOR TIBIAL COMPRESS: NORMAL
BH CV LOWER VASCULAR RIGHT PROFUNDA FEMORAL AUGMENT: NORMAL
BH CV LOWER VASCULAR RIGHT PROFUNDA FEMORAL PHASIC: NORMAL
BH CV LOWER VASCULAR RIGHT PROFUNDA FEMORAL SPONT: NORMAL
BH CV LOWER VASCULAR RIGHT PROXIMAL FEMORAL AUGMENT: NORMAL
BH CV LOWER VASCULAR RIGHT PROXIMAL FEMORAL COMPRESS: NORMAL
BH CV LOWER VASCULAR RIGHT PROXIMAL FEMORAL PHASIC: NORMAL
BH CV LOWER VASCULAR RIGHT PROXIMAL FEMORAL SPONT: NORMAL
BH CV LOWER VASCULAR RIGHT SAPHENOFEMORAL JUNCTION AUGMENT: NORMAL
BH CV LOWER VASCULAR RIGHT SAPHENOFEMORAL JUNCTION COMPRESS: NORMAL
BH CV LOWER VASCULAR RIGHT SAPHENOFEMORAL JUNCTION PHASIC: NORMAL
BH CV LOWER VASCULAR RIGHT SAPHENOFEMORAL JUNCTION SPONT: NORMAL
BH CV VAS BP LEFT ARM: NORMAL MMHG
BH CV XLRA - RV BASE: 4.6 CM
BH CV XLRA - RV LENGTH: 8.5 CM
BH CV XLRA - RV MID: 4.9 CM
BH CV XLRA - TDI S': 13 CM/SEC
BUN SERPL-MCNC: 22 MG/DL (ref 8–23)
BUN/CREAT SERPL: 20.8 (ref 7–25)
CALCIUM SPEC-SCNC: 8.9 MG/DL (ref 8.6–10.5)
CHLORIDE SERPL-SCNC: 106 MMOL/L (ref 98–107)
CO2 SERPL-SCNC: 25 MMOL/L (ref 22–29)
CREAT SERPL-MCNC: 1.06 MG/DL (ref 0.76–1.27)
DEPRECATED RDW RBC AUTO: 53 FL (ref 37–54)
EOSINOPHIL # BLD AUTO: 0.19 10*3/MM3 (ref 0–0.4)
EOSINOPHIL NFR BLD AUTO: 2.3 % (ref 0.3–6.2)
ERYTHROCYTE [DISTWIDTH] IN BLOOD BY AUTOMATED COUNT: 15.1 % (ref 12.3–15.4)
GFR SERPL CREATININE-BSD FRML MDRD: 68 ML/MIN/1.73
GLUCOSE SERPL-MCNC: 133 MG/DL (ref 65–99)
HBA1C MFR BLD: 6.2 % (ref 4.8–5.6)
HCT VFR BLD AUTO: 36.9 % (ref 37.5–51)
HGB BLD-MCNC: 11.3 G/DL (ref 13–17.7)
IMM GRANULOCYTES # BLD AUTO: 0.02 10*3/MM3 (ref 0–0.05)
IMM GRANULOCYTES NFR BLD AUTO: 0.2 % (ref 0–0.5)
LEFT ATRIUM VOLUME INDEX: 31.6 ML/M^2
LEFT ATRIUM VOLUME: 74 ML
LV EF 2D ECHO EST: 54 %
LYMPHOCYTES # BLD AUTO: 0.96 10*3/MM3 (ref 0.7–3.1)
LYMPHOCYTES NFR BLD AUTO: 11.6 % (ref 19.6–45.3)
MCH RBC QN AUTO: 29.6 PG (ref 26.6–33)
MCHC RBC AUTO-ENTMCNC: 30.6 G/DL (ref 31.5–35.7)
MCV RBC AUTO: 96.6 FL (ref 79–97)
MONOCYTES # BLD AUTO: 0.54 10*3/MM3 (ref 0.1–0.9)
MONOCYTES NFR BLD AUTO: 6.5 % (ref 5–12)
NEUTROPHILS NFR BLD AUTO: 6.52 10*3/MM3 (ref 1.7–7)
NEUTROPHILS NFR BLD AUTO: 79 % (ref 42.7–76)
NRBC BLD AUTO-RTO: 0 /100 WBC (ref 0–0.2)
PLATELET # BLD AUTO: 248 10*3/MM3 (ref 140–450)
PMV BLD AUTO: 8.8 FL (ref 6–12)
POTASSIUM SERPL-SCNC: 4.5 MMOL/L (ref 3.5–5.2)
RBC # BLD AUTO: 3.82 10*6/MM3 (ref 4.14–5.8)
SODIUM SERPL-SCNC: 139 MMOL/L (ref 136–145)
TSH SERPL DL<=0.05 MIU/L-ACNC: 2.35 UIU/ML (ref 0.27–4.2)
WBC # BLD AUTO: 8.26 10*3/MM3 (ref 3.4–10.8)

## 2021-01-13 PROCEDURE — 25010000002 HEPARIN (PORCINE) PER 1000 UNITS: Performed by: NURSE PRACTITIONER

## 2021-01-13 PROCEDURE — 25010000002 CEFTRIAXONE PER 250 MG: Performed by: NURSE PRACTITIONER

## 2021-01-13 PROCEDURE — G0378 HOSPITAL OBSERVATION PER HR: HCPCS

## 2021-01-13 PROCEDURE — 25010000002 VANCOMYCIN PER 500 MG: Performed by: HOSPITALIST

## 2021-01-13 PROCEDURE — 97161 PT EVAL LOW COMPLEX 20 MIN: CPT

## 2021-01-13 PROCEDURE — 83036 HEMOGLOBIN GLYCOSYLATED A1C: CPT | Performed by: NURSE PRACTITIONER

## 2021-01-13 PROCEDURE — 93306 TTE W/DOPPLER COMPLETE: CPT | Performed by: INTERNAL MEDICINE

## 2021-01-13 PROCEDURE — 93306 TTE W/DOPPLER COMPLETE: CPT

## 2021-01-13 PROCEDURE — 93970 EXTREMITY STUDY: CPT

## 2021-01-13 PROCEDURE — 93970 EXTREMITY STUDY: CPT | Performed by: INTERNAL MEDICINE

## 2021-01-13 PROCEDURE — 97165 OT EVAL LOW COMPLEX 30 MIN: CPT

## 2021-01-13 PROCEDURE — 80048 BASIC METABOLIC PNL TOTAL CA: CPT | Performed by: NURSE PRACTITIONER

## 2021-01-13 PROCEDURE — 85025 COMPLETE CBC W/AUTO DIFF WBC: CPT | Performed by: NURSE PRACTITIONER

## 2021-01-13 PROCEDURE — 99233 SBSQ HOSP IP/OBS HIGH 50: CPT | Performed by: HOSPITALIST

## 2021-01-13 PROCEDURE — 84443 ASSAY THYROID STIM HORMONE: CPT | Performed by: NURSE PRACTITIONER

## 2021-01-13 PROCEDURE — 25010000002 VANCOMYCIN PER 500 MG: Performed by: NURSE PRACTITIONER

## 2021-01-13 RX ORDER — VANCOMYCIN HYDROCHLORIDE 1 G/200ML
1000 INJECTION, SOLUTION INTRAVENOUS EVERY 12 HOURS
Status: DISCONTINUED | OUTPATIENT
Start: 2021-01-13 | End: 2021-01-14

## 2021-01-13 RX ORDER — BUMETANIDE 0.25 MG/ML
1 INJECTION INTRAMUSCULAR; INTRAVENOUS ONCE
Status: COMPLETED | OUTPATIENT
Start: 2021-01-13 | End: 2021-01-13

## 2021-01-13 RX ADMIN — DOCUSATE SODIUM 100 MG: 100 CAPSULE, LIQUID FILLED ORAL at 21:42

## 2021-01-13 RX ADMIN — ATORVASTATIN CALCIUM 40 MG: 40 TABLET, FILM COATED ORAL at 09:08

## 2021-01-13 RX ADMIN — Medication 5000 UNITS: at 09:08

## 2021-01-13 RX ADMIN — SODIUM CHLORIDE, PRESERVATIVE FREE 10 ML: 5 INJECTION INTRAVENOUS at 21:47

## 2021-01-13 RX ADMIN — GUAIFENESIN 600 MG: 600 TABLET, EXTENDED RELEASE ORAL at 09:08

## 2021-01-13 RX ADMIN — SILDENAFIL 20 MG: 20 TABLET, FILM COATED ORAL at 10:01

## 2021-01-13 RX ADMIN — OXYCODONE HYDROCHLORIDE AND ACETAMINOPHEN 1 TABLET: 5; 325 TABLET ORAL at 06:06

## 2021-01-13 RX ADMIN — PRAMIPEXOLE DIHYDROCHLORIDE 1 MG: 0.25 TABLET ORAL at 10:01

## 2021-01-13 RX ADMIN — CARBIDOPA AND LEVODOPA 1.5 TABLET: 25; 100 TABLET ORAL at 06:06

## 2021-01-13 RX ADMIN — DOCUSATE SODIUM 100 MG: 100 CAPSULE, LIQUID FILLED ORAL at 09:08

## 2021-01-13 RX ADMIN — LEVOTHYROXINE SODIUM 88 MCG: 88 TABLET ORAL at 06:07

## 2021-01-13 RX ADMIN — FLUTICASONE PROPIONATE 2 SPRAY: 50 SPRAY, METERED NASAL at 11:21

## 2021-01-13 RX ADMIN — GUAIFENESIN 600 MG: 600 TABLET, EXTENDED RELEASE ORAL at 21:42

## 2021-01-13 RX ADMIN — CEFTRIAXONE SODIUM 1 G: 1 INJECTION, POWDER, FOR SOLUTION INTRAMUSCULAR; INTRAVENOUS at 10:01

## 2021-01-13 RX ADMIN — TAMSULOSIN HYDROCHLORIDE 0.4 MG: 0.4 CAPSULE ORAL at 09:08

## 2021-01-13 RX ADMIN — HEPARIN SODIUM 5000 UNITS: 5000 INJECTION INTRAVENOUS; SUBCUTANEOUS at 06:06

## 2021-01-13 RX ADMIN — VANCOMYCIN HYDROCHLORIDE 750 MG: 750 INJECTION, SOLUTION INTRAVENOUS at 06:03

## 2021-01-13 RX ADMIN — OXYCODONE HYDROCHLORIDE AND ACETAMINOPHEN 500 MG: 500 TABLET ORAL at 09:08

## 2021-01-13 RX ADMIN — PRAMIPEXOLE DIHYDROCHLORIDE 1 MG: 0.25 TABLET ORAL at 17:09

## 2021-01-13 RX ADMIN — SERTRALINE HYDROCHLORIDE 150 MG: 100 TABLET ORAL at 09:08

## 2021-01-13 RX ADMIN — HEPARIN SODIUM 5000 UNITS: 5000 INJECTION INTRAVENOUS; SUBCUTANEOUS at 13:08

## 2021-01-13 RX ADMIN — SODIUM CHLORIDE, PRESERVATIVE FREE 10 ML: 5 INJECTION INTRAVENOUS at 10:05

## 2021-01-13 RX ADMIN — CARBIDOPA AND LEVODOPA 1.5 TABLET: 25; 100 TABLET ORAL at 21:41

## 2021-01-13 RX ADMIN — CARBIDOPA AND LEVODOPA 1.5 TABLET: 25; 100 TABLET ORAL at 13:08

## 2021-01-13 RX ADMIN — VANCOMYCIN HYDROCHLORIDE 1000 MG: 1 INJECTION, SOLUTION INTRAVENOUS at 16:18

## 2021-01-13 RX ADMIN — BUMETANIDE 1 MG: 0.25 INJECTION, SOLUTION INTRAMUSCULAR; INTRAVENOUS at 12:21

## 2021-01-13 RX ADMIN — HEPARIN SODIUM 5000 UNITS: 5000 INJECTION INTRAVENOUS; SUBCUTANEOUS at 21:44

## 2021-01-13 RX ADMIN — PRAMIPEXOLE DIHYDROCHLORIDE 1 MG: 0.25 TABLET ORAL at 21:41

## 2021-01-14 PROBLEM — L03.90 CELLULITIS: Status: ACTIVE | Noted: 2021-01-14

## 2021-01-14 LAB
ANION GAP SERPL CALCULATED.3IONS-SCNC: 8 MMOL/L (ref 5–15)
BUN SERPL-MCNC: 24 MG/DL (ref 8–23)
BUN/CREAT SERPL: 20.3 (ref 7–25)
CALCIUM SPEC-SCNC: 9 MG/DL (ref 8.6–10.5)
CHLORIDE SERPL-SCNC: 102 MMOL/L (ref 98–107)
CO2 SERPL-SCNC: 28 MMOL/L (ref 22–29)
CREAT SERPL-MCNC: 1.18 MG/DL (ref 0.76–1.27)
DEPRECATED RDW RBC AUTO: 52.7 FL (ref 37–54)
ERYTHROCYTE [DISTWIDTH] IN BLOOD BY AUTOMATED COUNT: 14.7 % (ref 12.3–15.4)
GFR SERPL CREATININE-BSD FRML MDRD: 61 ML/MIN/1.73
GLUCOSE SERPL-MCNC: 128 MG/DL (ref 65–99)
HCT VFR BLD AUTO: 36.3 % (ref 37.5–51)
HGB BLD-MCNC: 11.4 G/DL (ref 13–17.7)
MCH RBC QN AUTO: 30.4 PG (ref 26.6–33)
MCHC RBC AUTO-ENTMCNC: 31.4 G/DL (ref 31.5–35.7)
MCV RBC AUTO: 96.8 FL (ref 79–97)
MRSA DNA SPEC QL NAA+PROBE: POSITIVE
PLATELET # BLD AUTO: 246 10*3/MM3 (ref 140–450)
PMV BLD AUTO: 8.7 FL (ref 6–12)
POTASSIUM SERPL-SCNC: 4.1 MMOL/L (ref 3.5–5.2)
RBC # BLD AUTO: 3.75 10*6/MM3 (ref 4.14–5.8)
SODIUM SERPL-SCNC: 138 MMOL/L (ref 136–145)
VANCOMYCIN TROUGH SERPL-MCNC: 18.2 MCG/ML (ref 5–20)
WBC # BLD AUTO: 6.64 10*3/MM3 (ref 3.4–10.8)

## 2021-01-14 PROCEDURE — 87641 MR-STAPH DNA AMP PROBE: CPT | Performed by: HOSPITALIST

## 2021-01-14 PROCEDURE — 85027 COMPLETE CBC AUTOMATED: CPT | Performed by: HOSPITALIST

## 2021-01-14 PROCEDURE — 80048 BASIC METABOLIC PNL TOTAL CA: CPT | Performed by: HOSPITALIST

## 2021-01-14 PROCEDURE — 80202 ASSAY OF VANCOMYCIN: CPT | Performed by: HOSPITALIST

## 2021-01-14 PROCEDURE — 25010000002 VANCOMYCIN PER 500 MG

## 2021-01-14 PROCEDURE — 25010000002 CEFTRIAXONE PER 250 MG: Performed by: NURSE PRACTITIONER

## 2021-01-14 PROCEDURE — 25010000002 VANCOMYCIN PER 500 MG: Performed by: HOSPITALIST

## 2021-01-14 PROCEDURE — 25010000002 HEPARIN (PORCINE) PER 1000 UNITS: Performed by: NURSE PRACTITIONER

## 2021-01-14 PROCEDURE — 99233 SBSQ HOSP IP/OBS HIGH 50: CPT | Performed by: HOSPITALIST

## 2021-01-14 RX ORDER — CETIRIZINE HYDROCHLORIDE 10 MG/1
5 TABLET ORAL DAILY
Status: DISCONTINUED | OUTPATIENT
Start: 2021-01-14 | End: 2021-01-16 | Stop reason: HOSPADM

## 2021-01-14 RX ORDER — BENZONATATE 100 MG/1
100 CAPSULE ORAL 3 TIMES DAILY PRN
Status: DISCONTINUED | OUTPATIENT
Start: 2021-01-14 | End: 2021-01-16 | Stop reason: HOSPADM

## 2021-01-14 RX ORDER — BUMETANIDE 0.25 MG/ML
1 INJECTION INTRAMUSCULAR; INTRAVENOUS ONCE
Status: COMPLETED | OUTPATIENT
Start: 2021-01-14 | End: 2021-01-14

## 2021-01-14 RX ADMIN — PRAMIPEXOLE DIHYDROCHLORIDE 1 MG: 0.25 TABLET ORAL at 17:51

## 2021-01-14 RX ADMIN — ATORVASTATIN CALCIUM 40 MG: 40 TABLET, FILM COATED ORAL at 09:42

## 2021-01-14 RX ADMIN — TAMSULOSIN HYDROCHLORIDE 0.4 MG: 0.4 CAPSULE ORAL at 09:43

## 2021-01-14 RX ADMIN — HEPARIN SODIUM 5000 UNITS: 5000 INJECTION INTRAVENOUS; SUBCUTANEOUS at 05:48

## 2021-01-14 RX ADMIN — HEPARIN SODIUM 5000 UNITS: 5000 INJECTION INTRAVENOUS; SUBCUTANEOUS at 21:24

## 2021-01-14 RX ADMIN — PRAMIPEXOLE DIHYDROCHLORIDE 1 MG: 0.25 TABLET ORAL at 21:25

## 2021-01-14 RX ADMIN — SERTRALINE HYDROCHLORIDE 150 MG: 100 TABLET ORAL at 09:43

## 2021-01-14 RX ADMIN — CARBIDOPA AND LEVODOPA 1.5 TABLET: 25; 100 TABLET ORAL at 05:48

## 2021-01-14 RX ADMIN — PRAMIPEXOLE DIHYDROCHLORIDE 1 MG: 0.25 TABLET ORAL at 09:43

## 2021-01-14 RX ADMIN — FLUTICASONE PROPIONATE 2 SPRAY: 50 SPRAY, METERED NASAL at 11:05

## 2021-01-14 RX ADMIN — BUMETANIDE 1 MG: 0.25 INJECTION, SOLUTION INTRAMUSCULAR; INTRAVENOUS at 14:02

## 2021-01-14 RX ADMIN — HEPARIN SODIUM 5000 UNITS: 5000 INJECTION INTRAVENOUS; SUBCUTANEOUS at 14:02

## 2021-01-14 RX ADMIN — OXYCODONE HYDROCHLORIDE AND ACETAMINOPHEN 500 MG: 500 TABLET ORAL at 09:42

## 2021-01-14 RX ADMIN — DOCUSATE SODIUM 100 MG: 100 CAPSULE, LIQUID FILLED ORAL at 09:42

## 2021-01-14 RX ADMIN — Medication 5000 UNITS: at 09:43

## 2021-01-14 RX ADMIN — LEVOTHYROXINE SODIUM 88 MCG: 88 TABLET ORAL at 05:48

## 2021-01-14 RX ADMIN — CARBIDOPA AND LEVODOPA 1.5 TABLET: 25; 100 TABLET ORAL at 14:02

## 2021-01-14 RX ADMIN — VANCOMYCIN HYDROCHLORIDE 750 MG: 750 INJECTION, SOLUTION INTRAVENOUS at 17:51

## 2021-01-14 RX ADMIN — SODIUM CHLORIDE, PRESERVATIVE FREE 10 ML: 5 INJECTION INTRAVENOUS at 21:24

## 2021-01-14 RX ADMIN — VANCOMYCIN HYDROCHLORIDE 1000 MG: 1 INJECTION, SOLUTION INTRAVENOUS at 03:11

## 2021-01-14 RX ADMIN — CEFTRIAXONE SODIUM 1 G: 1 INJECTION, POWDER, FOR SOLUTION INTRAMUSCULAR; INTRAVENOUS at 09:43

## 2021-01-14 RX ADMIN — GUAIFENESIN 600 MG: 600 TABLET, EXTENDED RELEASE ORAL at 21:21

## 2021-01-14 RX ADMIN — CARBIDOPA AND LEVODOPA 1.5 TABLET: 25; 100 TABLET ORAL at 21:21

## 2021-01-14 RX ADMIN — GUAIFENESIN 600 MG: 600 TABLET, EXTENDED RELEASE ORAL at 09:42

## 2021-01-14 RX ADMIN — CETIRIZINE HYDROCHLORIDE 5 MG: 10 TABLET, FILM COATED ORAL at 14:03

## 2021-01-14 RX ADMIN — DOCUSATE SODIUM 100 MG: 100 CAPSULE, LIQUID FILLED ORAL at 21:21

## 2021-01-15 LAB
ANION GAP SERPL CALCULATED.3IONS-SCNC: 8 MMOL/L (ref 5–15)
BUN SERPL-MCNC: 22 MG/DL (ref 8–23)
BUN/CREAT SERPL: 19.3 (ref 7–25)
CALCIUM SPEC-SCNC: 8.8 MG/DL (ref 8.6–10.5)
CHLORIDE SERPL-SCNC: 102 MMOL/L (ref 98–107)
CO2 SERPL-SCNC: 29 MMOL/L (ref 22–29)
CREAT SERPL-MCNC: 1.14 MG/DL (ref 0.76–1.27)
GFR SERPL CREATININE-BSD FRML MDRD: 63 ML/MIN/1.73
GLUCOSE SERPL-MCNC: 110 MG/DL (ref 65–99)
POTASSIUM SERPL-SCNC: 3.7 MMOL/L (ref 3.5–5.2)
SODIUM SERPL-SCNC: 139 MMOL/L (ref 136–145)

## 2021-01-15 PROCEDURE — 97164 PT RE-EVAL EST PLAN CARE: CPT

## 2021-01-15 PROCEDURE — 25010000002 HEPARIN (PORCINE) PER 1000 UNITS: Performed by: NURSE PRACTITIONER

## 2021-01-15 PROCEDURE — 94799 UNLISTED PULMONARY SVC/PX: CPT

## 2021-01-15 PROCEDURE — 29580 STRAPPING UNNA BOOT: CPT

## 2021-01-15 PROCEDURE — 80048 BASIC METABOLIC PNL TOTAL CA: CPT | Performed by: HOSPITALIST

## 2021-01-15 PROCEDURE — 97597 DBRDMT OPN WND 1ST 20 CM/<: CPT

## 2021-01-15 PROCEDURE — 99232 SBSQ HOSP IP/OBS MODERATE 35: CPT | Performed by: HOSPITALIST

## 2021-01-15 PROCEDURE — 25010000002 VANCOMYCIN PER 500 MG

## 2021-01-15 PROCEDURE — 25010000002 CEFTRIAXONE PER 250 MG: Performed by: NURSE PRACTITIONER

## 2021-01-15 RX ORDER — BUMETANIDE 0.25 MG/ML
0.5 INJECTION INTRAMUSCULAR; INTRAVENOUS ONCE
Status: COMPLETED | OUTPATIENT
Start: 2021-01-15 | End: 2021-01-15

## 2021-01-15 RX ADMIN — CETIRIZINE HYDROCHLORIDE 5 MG: 10 TABLET, FILM COATED ORAL at 08:32

## 2021-01-15 RX ADMIN — ATORVASTATIN CALCIUM 40 MG: 40 TABLET, FILM COATED ORAL at 08:32

## 2021-01-15 RX ADMIN — Medication 5000 UNITS: at 08:32

## 2021-01-15 RX ADMIN — SODIUM CHLORIDE, PRESERVATIVE FREE 10 ML: 5 INJECTION INTRAVENOUS at 08:35

## 2021-01-15 RX ADMIN — HEPARIN SODIUM 5000 UNITS: 5000 INJECTION INTRAVENOUS; SUBCUTANEOUS at 05:38

## 2021-01-15 RX ADMIN — LEVOTHYROXINE SODIUM 88 MCG: 88 TABLET ORAL at 05:38

## 2021-01-15 RX ADMIN — OXYCODONE HYDROCHLORIDE AND ACETAMINOPHEN 1 TABLET: 5; 325 TABLET ORAL at 13:29

## 2021-01-15 RX ADMIN — OXYCODONE HYDROCHLORIDE AND ACETAMINOPHEN 1 TABLET: 5; 325 TABLET ORAL at 21:19

## 2021-01-15 RX ADMIN — DOCUSATE SODIUM 100 MG: 100 CAPSULE, LIQUID FILLED ORAL at 08:32

## 2021-01-15 RX ADMIN — PRAMIPEXOLE DIHYDROCHLORIDE 1 MG: 0.25 TABLET ORAL at 17:09

## 2021-01-15 RX ADMIN — HEPARIN SODIUM 5000 UNITS: 5000 INJECTION INTRAVENOUS; SUBCUTANEOUS at 21:14

## 2021-01-15 RX ADMIN — FLUTICASONE PROPIONATE 2 SPRAY: 50 SPRAY, METERED NASAL at 08:35

## 2021-01-15 RX ADMIN — GUAIFENESIN 600 MG: 600 TABLET, EXTENDED RELEASE ORAL at 08:31

## 2021-01-15 RX ADMIN — HEPARIN SODIUM 5000 UNITS: 5000 INJECTION INTRAVENOUS; SUBCUTANEOUS at 13:21

## 2021-01-15 RX ADMIN — BUMETANIDE 0.5 MG: 0.25 INJECTION, SOLUTION INTRAMUSCULAR; INTRAVENOUS at 13:21

## 2021-01-15 RX ADMIN — PRAMIPEXOLE DIHYDROCHLORIDE 1 MG: 0.25 TABLET ORAL at 21:13

## 2021-01-15 RX ADMIN — CARBIDOPA AND LEVODOPA 1.5 TABLET: 25; 100 TABLET ORAL at 21:12

## 2021-01-15 RX ADMIN — DOCUSATE SODIUM 100 MG: 100 CAPSULE, LIQUID FILLED ORAL at 21:12

## 2021-01-15 RX ADMIN — CARBIDOPA AND LEVODOPA 1.5 TABLET: 25; 100 TABLET ORAL at 13:21

## 2021-01-15 RX ADMIN — TAMSULOSIN HYDROCHLORIDE 0.4 MG: 0.4 CAPSULE ORAL at 08:32

## 2021-01-15 RX ADMIN — OXYCODONE HYDROCHLORIDE AND ACETAMINOPHEN 500 MG: 500 TABLET ORAL at 08:32

## 2021-01-15 RX ADMIN — VANCOMYCIN HYDROCHLORIDE 750 MG: 750 INJECTION, SOLUTION INTRAVENOUS at 17:09

## 2021-01-15 RX ADMIN — CARBIDOPA AND LEVODOPA 1.5 TABLET: 25; 100 TABLET ORAL at 05:38

## 2021-01-15 RX ADMIN — PRAMIPEXOLE DIHYDROCHLORIDE 1 MG: 0.25 TABLET ORAL at 08:48

## 2021-01-15 RX ADMIN — VANCOMYCIN HYDROCHLORIDE 750 MG: 750 INJECTION, SOLUTION INTRAVENOUS at 05:36

## 2021-01-15 RX ADMIN — SERTRALINE HYDROCHLORIDE 150 MG: 100 TABLET ORAL at 08:31

## 2021-01-15 RX ADMIN — GUAIFENESIN 600 MG: 600 TABLET, EXTENDED RELEASE ORAL at 21:13

## 2021-01-15 RX ADMIN — CEFTRIAXONE SODIUM 1 G: 1 INJECTION, POWDER, FOR SOLUTION INTRAMUSCULAR; INTRAVENOUS at 08:32

## 2021-01-16 ENCOUNTER — READMISSION MANAGEMENT (OUTPATIENT)
Dept: CALL CENTER | Facility: HOSPITAL | Age: 76
End: 2021-01-16

## 2021-01-16 VITALS
HEIGHT: 72 IN | BODY MASS INDEX: 33.86 KG/M2 | WEIGHT: 250 LBS | OXYGEN SATURATION: 95 % | TEMPERATURE: 97.5 F | SYSTOLIC BLOOD PRESSURE: 137 MMHG | HEART RATE: 76 BPM | DIASTOLIC BLOOD PRESSURE: 73 MMHG | RESPIRATION RATE: 17 BRPM

## 2021-01-16 PROBLEM — R06.09 DOE (DYSPNEA ON EXERTION): Status: RESOLVED | Noted: 2021-01-12 | Resolved: 2021-01-16

## 2021-01-16 PROBLEM — L03.90 CELLULITIS: Status: RESOLVED | Noted: 2021-01-14 | Resolved: 2021-01-16

## 2021-01-16 PROBLEM — L03.119 LOWER EXTREMITY CELLULITIS: Status: RESOLVED | Noted: 2021-01-12 | Resolved: 2021-01-16

## 2021-01-16 LAB
ANION GAP SERPL CALCULATED.3IONS-SCNC: 10 MMOL/L (ref 5–15)
BUN SERPL-MCNC: 27 MG/DL (ref 8–23)
BUN/CREAT SERPL: 20.5 (ref 7–25)
CALCIUM SPEC-SCNC: 9.1 MG/DL (ref 8.6–10.5)
CHLORIDE SERPL-SCNC: 99 MMOL/L (ref 98–107)
CO2 SERPL-SCNC: 29 MMOL/L (ref 22–29)
CREAT SERPL-MCNC: 1.32 MG/DL (ref 0.76–1.27)
GFR SERPL CREATININE-BSD FRML MDRD: 53 ML/MIN/1.73
GLUCOSE SERPL-MCNC: 119 MG/DL (ref 65–99)
POTASSIUM SERPL-SCNC: 4.1 MMOL/L (ref 3.5–5.2)
SODIUM SERPL-SCNC: 138 MMOL/L (ref 136–145)

## 2021-01-16 PROCEDURE — 25010000002 HEPARIN (PORCINE) PER 1000 UNITS: Performed by: NURSE PRACTITIONER

## 2021-01-16 PROCEDURE — 25010000002 CEFTRIAXONE PER 250 MG: Performed by: NURSE PRACTITIONER

## 2021-01-16 PROCEDURE — 80048 BASIC METABOLIC PNL TOTAL CA: CPT | Performed by: HOSPITALIST

## 2021-01-16 PROCEDURE — 25010000002 VANCOMYCIN PER 500 MG

## 2021-01-16 PROCEDURE — 99239 HOSP IP/OBS DSCHRG MGMT >30: CPT | Performed by: INTERNAL MEDICINE

## 2021-01-16 RX ORDER — CEFUROXIME AXETIL 500 MG/1
500 TABLET ORAL 2 TIMES DAILY
Qty: 10 TABLET | Refills: 0 | Status: SHIPPED | OUTPATIENT
Start: 2021-01-16 | End: 2021-01-21

## 2021-01-16 RX ORDER — DOXYCYCLINE HYCLATE 100 MG/1
100 CAPSULE ORAL 2 TIMES DAILY
Qty: 10 CAPSULE | Refills: 0 | Status: SHIPPED | OUTPATIENT
Start: 2021-01-16 | End: 2021-01-21

## 2021-01-16 RX ADMIN — GUAIFENESIN 600 MG: 600 TABLET, EXTENDED RELEASE ORAL at 09:55

## 2021-01-16 RX ADMIN — LEVOTHYROXINE SODIUM 88 MCG: 88 TABLET ORAL at 06:17

## 2021-01-16 RX ADMIN — SERTRALINE HYDROCHLORIDE 150 MG: 100 TABLET ORAL at 09:55

## 2021-01-16 RX ADMIN — BENZONATATE 100 MG: 100 CAPSULE ORAL at 06:26

## 2021-01-16 RX ADMIN — Medication 5000 UNITS: at 09:55

## 2021-01-16 RX ADMIN — CEFTRIAXONE SODIUM 1 G: 1 INJECTION, POWDER, FOR SOLUTION INTRAMUSCULAR; INTRAVENOUS at 09:56

## 2021-01-16 RX ADMIN — PRAMIPEXOLE DIHYDROCHLORIDE 1 MG: 0.25 TABLET ORAL at 09:56

## 2021-01-16 RX ADMIN — CETIRIZINE HYDROCHLORIDE 5 MG: 10 TABLET, FILM COATED ORAL at 09:55

## 2021-01-16 RX ADMIN — CARBIDOPA AND LEVODOPA 1.5 TABLET: 25; 100 TABLET ORAL at 13:23

## 2021-01-16 RX ADMIN — OXYCODONE HYDROCHLORIDE AND ACETAMINOPHEN 500 MG: 500 TABLET ORAL at 09:55

## 2021-01-16 RX ADMIN — TAMSULOSIN HYDROCHLORIDE 0.4 MG: 0.4 CAPSULE ORAL at 09:55

## 2021-01-16 RX ADMIN — HEPARIN SODIUM 5000 UNITS: 5000 INJECTION INTRAVENOUS; SUBCUTANEOUS at 06:17

## 2021-01-16 RX ADMIN — VANCOMYCIN HYDROCHLORIDE 750 MG: 750 INJECTION, SOLUTION INTRAVENOUS at 06:27

## 2021-01-16 RX ADMIN — ATORVASTATIN CALCIUM 40 MG: 40 TABLET, FILM COATED ORAL at 09:56

## 2021-01-16 RX ADMIN — CARBIDOPA AND LEVODOPA 1.5 TABLET: 25; 100 TABLET ORAL at 06:17

## 2021-01-16 RX ADMIN — FLUTICASONE PROPIONATE 2 SPRAY: 50 SPRAY, METERED NASAL at 09:56

## 2021-01-16 RX ADMIN — SODIUM CHLORIDE, PRESERVATIVE FREE 10 ML: 5 INJECTION INTRAVENOUS at 09:57

## 2021-01-16 NOTE — OUTREACH NOTE
Prep Survey      Responses   [facility] facility patient discharged from?  [facility]   Is LACE score < 7 ?  No   Emergency Room discharge w/ pulse ox?  No   Eligibility  [facility]   Date of Admission  01/12/21   Date of Discharge  01/16/21   Discharge Disposition  Home or Self Care   Discharge diagnosis  Cellulitis   Does the patient have one of the following disease processes/diagnoses(primary or secondary)?  Other   Does the patient have Home health ordered?  Yes   What is the Home health agency?   [facility]    Is there a DME ordered?  No   Prep survey completed?  Yes          Mell Carey RN

## 2021-01-17 LAB
BACTERIA SPEC AEROBE CULT: NORMAL
BACTERIA SPEC AEROBE CULT: NORMAL

## 2021-01-18 ENCOUNTER — TRANSITIONAL CARE MANAGEMENT TELEPHONE ENCOUNTER (OUTPATIENT)
Dept: CALL CENTER | Facility: HOSPITAL | Age: 76
End: 2021-01-18

## 2021-01-18 ENCOUNTER — OUTSIDE FACILITY SERVICE (OUTPATIENT)
Dept: FAMILY MEDICINE CLINIC | Facility: CLINIC | Age: 76
End: 2021-01-18

## 2021-01-18 NOTE — OUTREACH NOTE
Call Center TCM Note      Responses   [facility] [place] patient discharged from?  [place]   Does the patient have one of the following disease processes/diagnoses(primary or secondary)?  Other   TCM attempt successful?  Yes   Call start time  1203   Call end time  1212   Discharge diagnosis  Cellulitis   Meds reviewed with patient/caregiver?  Yes   Is the patient having any side effects they believe may be caused by any medication additions or changes?  No   Does the patient have all medications ordered at discharge?  Yes   Is the patient taking all medications as directed (includes completed medication regime)?  Yes   Does the patient have a primary care provider?   Yes   Does the patient have an appointment with their PCP within 7 days of discharge?  Greater than 7 days   Comments regarding PCP  f/u with Dr. Holder on 2/3/21- patient declined to have appt moved up sooner   Nursing Interventions  Verified appointment date/time/provider   Has the patient kept scheduled appointments due by today?  N/A   What is the Home health agency?   [facility]   Has home health visited the patient within 72 hours of discharge?  Yes   Psychosocial issues?  No   Did the patient receive a copy of their discharge instructions?  Yes   Nursing interventions  Reviewed instructions with patient   What is the patient's perception of their health status since discharge?  Improving   Is the patient/caregiver able to teach back signs and symptoms related to disease process for when to call PCP?  Yes   Is the patient/caregiver able to teach back signs and symptoms related to disease process for when to call 911?  Yes   Is the patient/caregiver able to teach back the hierarchy of who to call/visit for symptoms/problems? PCP, Specialist, Home health nurse, Urgent Care, ED, 911  Yes   TCM call completed?  Yes   Wrap up additional comments  States he is doing well, taking his medications as ordered, home health has seen hime today, encouraged him to  keep legs elevated to minimize swelling, discussed moving up PCP appt to sooner date but patient refused and would like to keep appt on 2/3, discussed nurse call line if patient has any questions or concerns, he verbalized understanding.          Yazmin El RN    1/18/2021, 12:12 EST

## 2021-01-19 ENCOUNTER — TELEPHONE (OUTPATIENT)
Dept: FAMILY MEDICINE CLINIC | Facility: CLINIC | Age: 76
End: 2021-01-19

## 2021-01-19 ENCOUNTER — DOCUMENTATION (OUTPATIENT)
Dept: PHYSICAL THERAPY | Facility: HOSPITAL | Age: 76
End: 2021-01-19

## 2021-01-19 NOTE — TELEPHONE ENCOUNTER
PHONE CALL FROM [facility] FOR VERBAL ORDERS FOR TUBIGRIP FOR HIS LOWER EXTREMITIES FOR EDEMA MANAGEMENT.  UNIBOOT NOT COVERED BY INSURANCE DUE TO NO OPEN WOUND.    PLEASE CALL MARY @ [phone]

## 2021-01-19 NOTE — THERAPY DISCHARGE NOTE
Outpatient Rehabilitation - Wound/Debridement D/C Summary        Patient Name: Cordell Martin  : 1945  MRN: [id number]  Today's Date: 2021                  Admit Date: (Not on file)    Visit Dx:  No diagnosis found.    Patient Active Problem List   Diagnosis   • Anxiety   • Arthritis   • Depression   • Hyperlipidemia   • Lumbar stenosis with neurogenic claudication   • Parkinson's disease (CMS/HCC)   • Lumbar stenosis with neurogenic claudication status post L4-5 decompression   • Status post lumbar laminectomy   • Memory loss   • Chronic edema        Past Medical History:   Diagnosis Date   • Anxiety    • Arthritis    • Back pain    • Bronchitis    • Cognitive impairment    • Depression    • Disease of thyroid gland    • Glucose intolerance (impaired glucose tolerance)    • Hyperlipidemia    • Kidney stone    • Obesity    • Parkinson disease (CMS/HCC)    • Pneumonia    • Prostate disorder    • Thyroid disease    • Wears eyeglasses         Past Surgical History:   Procedure Laterality Date   • COLONOSCOPY      5 years ago   • EYE SURGERY     • HERNIA REPAIR  10/20/2020   • LUMBAR LAMINECTOMY DISCECTOMY DECOMPRESSION N/A 2017    Procedure: LUMBAR LAMINECTOMY L4-5;  Surgeon: Antonio Trent MD;  Location: [facility];  Service:    • SHOULDER ROTATOR CUFF REPAIR Right     x2   • TONSILLECTOMY           EVALUATION                WOUND DEBRIDEMENT                            Recommendation and Plan      Goals  PT OP Goals     Row Name 21 0903          PT Short Term Goals    STG 1  Patient will demonstrate 2cm reduction in girth measurements of R LE as evidence of improved venous return.   -     STG 1 Progress  Not Met  -     STG 2  Patient will verbalize signs and symptoms of infection.  -     STG 2 Progress  Met  -        Long Term Goals    LTG 1  Patient will demonstrate 4cm reduction in girth measurements of R LE as evidence of improved venous return.   -     LTG 1 Progress  Not  Met  -     LTG 2  Patient will demonstrate independence of management of R LE edema at home with use of long term compression system.  -     LTG 2 Progress  Not Met  -       User Key  (r) = Recorded By, (t) = Taken By, (c) = Cosigned By    Initials Name Provider Type    Radha Castillo, PT Physical Therapist          Time Calculation:              OP Discharge Summary     Row Name 01/19/21 0903             OP PT Discharge Summary    Date of Discharge  01/19/21  -      Reason for Discharge  Transfer to other facility/level of care pt was hospitalized and d/c'd home with   -      Outcomes Achieved  Patient able to partially acheive established goals  -      Discharge Destination  Home health services  -        User Key  (r) = Recorded By, (t) = Taken By, (c) = Cosigned By    Initials Name Provider Type    Radha Castillo, PT Physical Therapist          Radha Parks, PT  1/19/2021

## 2021-01-19 NOTE — TELEPHONE ENCOUNTER
READ MARY FROM  THE MESSAGE THAT DR NUHA MOON LEFT APPROVING THE VERBAL. SHE UNDERSTOOD AND HAD NO FURTHER QUESTIONS.

## 2021-01-19 NOTE — TELEPHONE ENCOUNTER
Called Verónica, no answer. M for return call.    Okay for verbal ORDERS FOR TUBIGRIP FOR HIS LOWER EXTREMITIES FOR EDEMA MANAGEMENT      OKAY FOR HUB TO RELAY MESSAGE

## 2021-01-25 ENCOUNTER — READMISSION MANAGEMENT (OUTPATIENT)
Dept: CALL CENTER | Facility: HOSPITAL | Age: 76
End: 2021-01-25

## 2021-01-25 NOTE — OUTREACH NOTE
Medical Week 2 Survey      Responses   [facility] patient discharged from?  [place]   Does the patient have one of the following disease processes/diagnoses(primary or secondary)?  Other   Week 2 attempt successful?  Yes   Call start time  1438   Discharge diagnosis  Cellulitis   Call end time  1449   Meds reviewed with patient/caregiver?  Yes   Is the patient having any side effects they believe may be caused by any medication additions or changes?  No   Does the patient have all medications ordered at discharge?  Yes   Is the patient taking all medications as directed (includes completed medication regime)?  Yes   Does the patient have a primary care provider?   Yes   Does the patient have an appointment with their PCP within 7 days of discharge?  Greater than 7 days   Has the patient kept scheduled appointments due by today?  N/A   What is the Home health agency?   [facility]    Has home health visited the patient within 72 hours of discharge?  Yes   Psychosocial issues?  No   Did the patient receive a copy of their discharge instructions?  Yes   Nursing interventions  Reviewed instructions with patient   What is the patient's perception of their health status since discharge?  Improving   Is the patient/caregiver able to teach back signs and symptoms related to disease process for when to call PCP?  Yes   Is the patient/caregiver able to teach back signs and symptoms related to disease process for when to call 911?  Yes   Is the patient/caregiver able to teach back the hierarchy of who to call/visit for symptoms/problems? PCP, Specialist, Home health nurse, Urgent Care, ED, 911  Yes   If the patient is a current smoker, are they able to teach back resources for cessation?  Not a smoker   Additional teach back comments  Will schedule with vascular surgery   Week 2 Call Completed?  Yes   Wrap up additional comments  Still to see PCP next week. Says leg is better but no color back in it yet          Vy CRAWFORD  Blade RN

## 2021-01-28 PROCEDURE — G0180 MD CERTIFICATION HHA PATIENT: HCPCS | Performed by: INTERNAL MEDICINE

## 2021-02-01 ENCOUNTER — READMISSION MANAGEMENT (OUTPATIENT)
Dept: CALL CENTER | Facility: HOSPITAL | Age: 76
End: 2021-02-01

## 2021-02-01 NOTE — OUTREACH NOTE
Medical Week 3 Survey      Responses   [facility] patient discharged from?  [place]   Does the patient have one of the following disease processes/diagnoses(primary or secondary)?  Other   Week 3 attempt successful?  Yes   Call start time  1433   Call end time  1436   Discharge diagnosis  Cellulitis   Meds reviewed with patient/caregiver?  Yes   Is the patient having any side effects they believe may be caused by any medication additions or changes?  No   Does the patient have all medications ordered at discharge?  Yes   Is the patient taking all medications as directed (includes completed medication regime)?  Yes   Comments regarding appointments  Appt on Wed.   Does the patient have a primary care provider?   Yes   Comments regarding PCP  f/u with Dr. Holder on 2/3/21- patient declined to have appt moved up sooner   Does the patient have an appointment with their PCP within 7 days of discharge?  Greater than 7 days   What is preventing the patient from scheduling follow up appointments within 7 days of discharge?  Haven't had time   Has the patient kept scheduled appointments due by today?  N/A   What is the Home health agency?   [facility] OT and PT cont. to see pt.    Has home health visited the patient within 72 hours of discharge?  Yes   Psychosocial issues?  No   Did the patient receive a copy of their discharge instructions?  Yes   Nursing interventions  Reviewed instructions with patient   What is the patient's perception of their health status since discharge?  Improving   Is the patient/caregiver able to teach back signs and symptoms related to disease process for when to call PCP?  Yes   Is the patient/caregiver able to teach back signs and symptoms related to disease process for when to call 911?  Yes   Is the patient/caregiver able to teach back the hierarchy of who to call/visit for symptoms/problems? PCP, Specialist, Home health nurse, Urgent Care, ED, 911  Yes   If the patient is a current smoker,  are they able to teach back resources for cessation?  Not a smoker   Additional teach back comments  Has appt on Wed.   Week 3 Call Completed?  Yes   Wrap up additional comments  States everything is healed up and not weeping as bad as it was.           Nubia Hester RN

## 2021-02-03 ENCOUNTER — OFFICE VISIT (OUTPATIENT)
Dept: FAMILY MEDICINE CLINIC | Facility: CLINIC | Age: 76
End: 2021-02-03

## 2021-02-03 ENCOUNTER — LAB (OUTPATIENT)
Dept: LAB | Facility: HOSPITAL | Age: 76
End: 2021-02-03

## 2021-02-03 VITALS
BODY MASS INDEX: 34.13 KG/M2 | SYSTOLIC BLOOD PRESSURE: 128 MMHG | OXYGEN SATURATION: 98 % | DIASTOLIC BLOOD PRESSURE: 64 MMHG | HEIGHT: 72 IN | WEIGHT: 252 LBS | HEART RATE: 65 BPM

## 2021-02-03 DIAGNOSIS — R94.4 DECREASED CREATININE CLEARANCE: ICD-10-CM

## 2021-02-03 DIAGNOSIS — L03.90 RECURRENT CELLULITIS: Primary | ICD-10-CM

## 2021-02-03 DIAGNOSIS — G20 PARKINSON'S DISEASE (HCC): ICD-10-CM

## 2021-02-03 DIAGNOSIS — I87.8 VENOUS STASIS: ICD-10-CM

## 2021-02-03 LAB
ALBUMIN SERPL-MCNC: 3.9 G/DL (ref 3.5–5.2)
ALBUMIN/GLOB SERPL: 1.3 G/DL
ALP SERPL-CCNC: 113 U/L (ref 39–117)
ALT SERPL W P-5'-P-CCNC: <5 U/L (ref 1–41)
ANION GAP SERPL CALCULATED.3IONS-SCNC: 6.2 MMOL/L (ref 5–15)
AST SERPL-CCNC: 16 U/L (ref 1–40)
BASOPHILS # BLD AUTO: 0.02 10*3/MM3 (ref 0–0.2)
BASOPHILS NFR BLD AUTO: 0.3 % (ref 0–1.5)
BILIRUB SERPL-MCNC: 0.6 MG/DL (ref 0–1.2)
BUN SERPL-MCNC: 23 MG/DL (ref 8–23)
BUN/CREAT SERPL: 19.3 (ref 7–25)
CALCIUM SPEC-SCNC: 9.9 MG/DL (ref 8.6–10.5)
CHLORIDE SERPL-SCNC: 103 MMOL/L (ref 98–107)
CO2 SERPL-SCNC: 30.8 MMOL/L (ref 22–29)
CREAT SERPL-MCNC: 1.19 MG/DL (ref 0.76–1.27)
DEPRECATED RDW RBC AUTO: 44.7 FL (ref 37–54)
EOSINOPHIL # BLD AUTO: 0.13 10*3/MM3 (ref 0–0.4)
EOSINOPHIL NFR BLD AUTO: 2.2 % (ref 0.3–6.2)
ERYTHROCYTE [DISTWIDTH] IN BLOOD BY AUTOMATED COUNT: 13.3 % (ref 12.3–15.4)
GFR SERPL CREATININE-BSD FRML MDRD: 60 ML/MIN/1.73
GLOBULIN UR ELPH-MCNC: 3.1 GM/DL
GLUCOSE SERPL-MCNC: 94 MG/DL (ref 65–99)
HCT VFR BLD AUTO: 39.6 % (ref 37.5–51)
HGB BLD-MCNC: 13.1 G/DL (ref 13–17.7)
IMM GRANULOCYTES # BLD AUTO: 0.02 10*3/MM3 (ref 0–0.05)
IMM GRANULOCYTES NFR BLD AUTO: 0.3 % (ref 0–0.5)
LYMPHOCYTES # BLD AUTO: 1.97 10*3/MM3 (ref 0.7–3.1)
LYMPHOCYTES NFR BLD AUTO: 32.9 % (ref 19.6–45.3)
MCH RBC QN AUTO: 30.6 PG (ref 26.6–33)
MCHC RBC AUTO-ENTMCNC: 33.1 G/DL (ref 31.5–35.7)
MCV RBC AUTO: 92.5 FL (ref 79–97)
MONOCYTES # BLD AUTO: 0.51 10*3/MM3 (ref 0.1–0.9)
MONOCYTES NFR BLD AUTO: 8.5 % (ref 5–12)
NEUTROPHILS NFR BLD AUTO: 3.34 10*3/MM3 (ref 1.7–7)
NEUTROPHILS NFR BLD AUTO: 55.8 % (ref 42.7–76)
NRBC BLD AUTO-RTO: 0 /100 WBC (ref 0–0.2)
PLATELET # BLD AUTO: 244 10*3/MM3 (ref 140–450)
PMV BLD AUTO: 9.1 FL (ref 6–12)
POTASSIUM SERPL-SCNC: 4.9 MMOL/L (ref 3.5–5.2)
PROT SERPL-MCNC: 7 G/DL (ref 6–8.5)
RBC # BLD AUTO: 4.28 10*6/MM3 (ref 4.14–5.8)
SODIUM SERPL-SCNC: 140 MMOL/L (ref 136–145)
WBC # BLD AUTO: 5.99 10*3/MM3 (ref 3.4–10.8)

## 2021-02-03 PROCEDURE — 85025 COMPLETE CBC W/AUTO DIFF WBC: CPT

## 2021-02-03 PROCEDURE — 99214 OFFICE O/P EST MOD 30 MIN: CPT | Performed by: INTERNAL MEDICINE

## 2021-02-03 PROCEDURE — 80053 COMPREHEN METABOLIC PANEL: CPT

## 2021-02-10 ENCOUNTER — READMISSION MANAGEMENT (OUTPATIENT)
Dept: CALL CENTER | Facility: HOSPITAL | Age: 76
End: 2021-02-10

## 2021-02-10 ENCOUNTER — TELEPHONE (OUTPATIENT)
Dept: FAMILY MEDICINE CLINIC | Facility: CLINIC | Age: 76
End: 2021-02-10

## 2021-02-10 NOTE — OUTREACH NOTE
Medical Week 4 Survey      Responses   [facility] patient discharged from?  [place]   Does the patient have one of the following disease processes/diagnoses(primary or secondary)?  Other   Week 4 attempt successful?  No          Ayala Fraga RN

## 2021-02-10 NOTE — PROGRESS NOTES
Chief Complaint   Patient presents with   • Edema     4 wk f/u - also [facility] ED visit.    • Cough     Dry cough        HPI:  Cordell Martin is a 76 y.o. male who presents today for follow-up edema and recurrent cellulitis.  Patient reports overall doing better.  Redness is improved with removal of fluid, although he still has residual edema and erythema.  Denies any further weeping or discharge.    ROS:  Constitutional: no fevers, night sweats or unexplained weight loss  Eyes: no vision changes  ENT: no runny nose, ear pain, sore throat  Cardio: no chest pain, palpitations  Pulm: no shortness of breath, wheezing, or cough  GI: no abdominal pain or changes in bowel movements  : no difficulty urinating  MSK: no difficulty ambulating, no joint pain  Neuro: no weakness, dizziness or headache  Psych: no trouble sleeping  Endo: no change in appetite      Past Medical History:   Diagnosis Date   • Anxiety    • Arthritis    • Back pain    • Bronchitis    • Cognitive impairment    • Depression    • Disease of thyroid gland    • Glucose intolerance (impaired glucose tolerance)    • Hyperlipidemia    • Kidney stone    • Obesity    • Parkinson disease (CMS/HCC)    • Pneumonia    • Prostate disorder    • Thyroid disease    • Wears eyeglasses       Family History   Problem Relation Age of Onset   • Alzheimer's disease Other    • Cancer Other    • Diabetes Other    • Heart disease Other    • Stroke Other    • Cancer Father       Social History     Socioeconomic History   • Marital status:      Spouse name: Not on file   • Number of children: Not on file   • Years of education: Not on file   • Highest education level: Not on file   Tobacco Use   • Smoking status: Former Smoker     Packs/day: 1.00     Years: 15.00     Pack years: 15.00     Types: Cigarettes     Start date:      Quit date:      Years since quittin.1   • Smokeless tobacco: Never Used   • Tobacco comment: quit    Substance and Sexual Activity    • Alcohol use: Yes     Comment: occasionally   • Drug use: No   • Sexual activity: Yes     Partners: Female     Birth control/protection: None   Social History Narrative    Lives alone. Uses walker      No Known Allergies   Immunization History   Administered Date(s) Administered   • Fluad Quad 65+ 08/31/2020   • Fluzone High Dose =>65 Years ([employer] ONLY) 02/23/2018, 09/12/2018, 09/02/2019, 10/13/2020   • Pneumococcal Conjugate 13-Valent (PCV13) 04/30/2015   • Pneumococcal Polysaccharide (PPSV23) 01/29/2013   • Tdap 12/04/2009   • Zostavax 01/26/2011        PE:  Vitals:    02/03/21 1123   BP: 128/64   Pulse: 65   SpO2: 98%      Body mass index is 34.18 kg/m².    Gen Appearance: NAD  HEENT: Normocephalic, PERRLA, no thyromegaly, trache midline  Heart: RRR, normal S1 and S2, no murmur  Lungs: CTA b/l, no wheezing, no crackles  Abdomen: Soft, non-tender, non-distended, no guarding and BSx4  MSK: Moves all extremities well, normal gait, no peripheral edema  Pulses: Palpable and equal b/l  Lymph nodes: No palpable lymphadenopathy   Neuro: No focal deficits      Current Outpatient Medications   Medication Sig Dispense Refill   • atorvastatin (LIPITOR) 40 MG tablet TAKE 1 TABLET BY MOUTH EVERY DAY 90 tablet 1   • carbidopa-levodopa (SINEMET)  MG per tablet TAKE 1 & 1/2 TABLETS BY MOUTH THREE TIMES DAILY 405 tablet 3   • Cholecalciferol (VITAMIN D3) 5000 units capsule capsule Take 5,000 Units by mouth Daily.     • fluticasone (FLONASE) 50 MCG/ACT nasal spray SPRAY 2 SPRAYS IN EACH NOSTRIL ONCE DAILY 48 mL 2   • levothyroxine (SYNTHROID) 88 MCG tablet Take 1 tablet by mouth Daily. 90 tablet 3   • oxybutynin (DITROPAN) 5 MG tablet oxybutynin chloride 5 mg tablet   1-2 tabs po bid as needed     • oxyCODONE-acetaminophen (PERCOCET) 5-325 MG per tablet Take 1 tablet by mouth Every 6 (Six) Hours As Needed for pain. 16 tablet 0   • potassium chloride (K-DUR,KLOR-CON) 20 MEQ CR tablet Take 1 tablet by mouth Daily. 14  tablet 0   • pramipexole (MIRAPEX) 1 MG tablet TAKE 1 TABLET BY MOUTH THREE TIMES A  tablet 3   • sertraline (ZOLOFT) 100 MG tablet TAKE 1&1/2 TABLETS BY MOUTH EVERY  tablet 1   • sildenafil (REVATIO) 20 MG tablet Take 1 tablet by mouth Daily. 15 tablet 5   • sildenafil (Viagra) 50 MG tablet Take 1 tablet by mouth Daily As Needed for Erectile Dysfunction. 15 tablet 5   • tamsulosin (FLOMAX) 0.4 MG capsule 24 hr capsule tamsulosin 0.4 mg capsule   TAKE 1 CAPSULE BY MOUTH EVERY DAY     • vitamin C (ASCORBIC ACID) 500 MG tablet Take 500 mg by mouth Daily.     • furosemide (Lasix) 40 MG tablet Take 1 tablet by mouth Daily for 14 days. 14 tablet 0     No current facility-administered medications for this visit.         Diagnoses and all orders for this visit:    1. Recurrent cellulitis (Primary)  -     Ambulatory Referral to Infectious Disease  Skin is still quite a bit red on exam today although better than usual.  I would recommend establishing care with infectious disease due to recurrent infections.  I do think keeping the fluid off is going to be key to prevent infections.  He has an upcoming appointment with vascular surgery for chronic venous stasis.  2. Venous stasis  See above.  3. Parkinson's disease (CMS/HCC)  Stable.  4. Decreased creatinine clearance  -     CBC & Differential; Future  -     Comprehensive Metabolic Panel; Future  Recheck kidney function today.       No follow-ups on file.     Please note that portions of this document were completed with a voice recognition program. Efforts were made to edit the dictations, but occasionally words are mis-transcribed.

## 2021-02-22 ENCOUNTER — TELEPHONE (OUTPATIENT)
Dept: FAMILY MEDICINE CLINIC | Facility: CLINIC | Age: 76
End: 2021-02-22

## 2021-02-22 NOTE — TELEPHONE ENCOUNTER
ASTRID FROM [facility] STATES THAT THEY DO NOT SPECIALIZE IN LYMPHEDEMA AND NOT SURE WHAT THE DR IS ORDERING FOR PT.    PLEASE ADVISE  ASTRID  [phone]

## 2021-02-23 DIAGNOSIS — M25.519 CHRONIC SHOULDER PAIN, UNSPECIFIED LATERALITY: Primary | ICD-10-CM

## 2021-02-23 DIAGNOSIS — G89.29 CHRONIC SHOULDER PAIN, UNSPECIFIED LATERALITY: Primary | ICD-10-CM

## 2021-02-25 RX ORDER — ATORVASTATIN CALCIUM 40 MG/1
TABLET, FILM COATED ORAL
Qty: 90 TABLET | Refills: 3 | Status: SHIPPED | OUTPATIENT
Start: 2021-02-25 | End: 2022-02-24

## 2021-03-01 ENCOUNTER — OFFICE VISIT (OUTPATIENT)
Dept: FAMILY MEDICINE CLINIC | Facility: CLINIC | Age: 76
End: 2021-03-01

## 2021-03-01 ENCOUNTER — HOSPITAL ENCOUNTER (OUTPATIENT)
Dept: GENERAL RADIOLOGY | Facility: HOSPITAL | Age: 76
Discharge: HOME | End: 2021-03-01
Admitting: INTERNAL MEDICINE

## 2021-03-01 VITALS
DIASTOLIC BLOOD PRESSURE: 70 MMHG | WEIGHT: 250 LBS | SYSTOLIC BLOOD PRESSURE: 124 MMHG | BODY MASS INDEX: 33.86 KG/M2 | HEART RATE: 87 BPM | OXYGEN SATURATION: 98 % | HEIGHT: 72 IN

## 2021-03-01 DIAGNOSIS — M79.89 SWELLING OF LOWER EXTREMITY: ICD-10-CM

## 2021-03-01 DIAGNOSIS — M25.512 CHRONIC LEFT SHOULDER PAIN: Primary | ICD-10-CM

## 2021-03-01 DIAGNOSIS — R05.9 COUGH: ICD-10-CM

## 2021-03-01 DIAGNOSIS — G20 PARKINSON'S DISEASE (HCC): ICD-10-CM

## 2021-03-01 DIAGNOSIS — G89.29 CHRONIC LEFT SHOULDER PAIN: Primary | ICD-10-CM

## 2021-03-01 PROCEDURE — 99214 OFFICE O/P EST MOD 30 MIN: CPT | Performed by: INTERNAL MEDICINE

## 2021-03-01 PROCEDURE — 71046 X-RAY EXAM CHEST 2 VIEWS: CPT

## 2021-03-01 NOTE — PROGRESS NOTES
Chief Complaint   Patient presents with   • Tremors   • Hyperlipidemia     6 month f/u    • Shoulder Pain     Left shoulder x a few months - pain worsens at night        HPI:  Cordell Martin is a 76 y.o. male who presents today for follow-up.  He has a history of chronic shoulder pain in his left shoulder is now worse than usual.  He reports PT is improving symptoms.  He has had 2 sessions so far.  He has not followed up with vascular surgery just yet.  He still has persistent swelling in right lower extremity.  He has had a nonproductive cough since his hospitalization.  Denies any shortness of breath fever or chills.    ROS:  Constitutional: no fevers, night sweats or unexplained weight loss  Eyes: no vision changes  ENT: no runny nose, ear pain, sore throat  Cardio: no chest pain, palpitations  Pulm: no shortness of breath, wheezing, +cough  GI: no abdominal pain or changes in bowel movements  : no difficulty urinating  MSK: no difficulty ambulating, no joint pain  Neuro: no weakness, dizziness or headache  Psych: no trouble sleeping  Endo: no change in appetite      Past Medical History:   Diagnosis Date   • Anxiety    • Arthritis    • Back pain    • Bronchitis    • Cognitive impairment    • Depression    • Disease of thyroid gland    • Glucose intolerance (impaired glucose tolerance)    • Hyperlipidemia    • Kidney stone    • Obesity    • Parkinson disease (CMS/HCC)    • Pneumonia    • Prostate disorder    • Thyroid disease    • Wears eyeglasses       Family History   Problem Relation Age of Onset   • Alzheimer's disease Other    • Cancer Other    • Diabetes Other    • Heart disease Other    • Stroke Other    • Cancer Father       Social History     Socioeconomic History   • Marital status:      Spouse name: Not on file   • Number of children: Not on file   • Years of education: Not on file   • Highest education level: Not on file   Tobacco Use   • Smoking status: Former Smoker     Packs/day: 1.00      Years: 15.00     Pack years: 15.00     Types: Cigarettes     Start date:      Quit date:      Years since quittin.1   • Smokeless tobacco: Never Used   • Tobacco comment: quit    Substance and Sexual Activity   • Alcohol use: Yes     Comment: occasionally   • Drug use: No   • Sexual activity: Yes     Partners: Female     Birth control/protection: None   Social History Narrative    Lives alone. Uses walker      No Known Allergies   Immunization History   Administered Date(s) Administered   • Fluad Quad 65+ 2020   • Fluzone High Dose =>65 Years ([employer] ONLY) 2018, 2018, 2019, 10/13/2020   • Pneumococcal Conjugate 13-Valent (PCV13) 2015   • Pneumococcal Polysaccharide (PPSV23) 2013   • Tdap 2009   • Zostavax 2011        PE:  Vitals:    21 1443   BP: 124/70   Pulse: 87   SpO2: 98%      Body mass index is 33.91 kg/m².    Gen Appearance: NAD  HEENT: Normocephalic, PERRLA, no thyromegaly, trache midline  Heart: RRR, normal S1 and S2, no murmur  Lungs: CTA b/l, no wheezing, no crackles  Abdomen: Soft, non-tender, non-distended, no guarding and BSx4  MSK: Moves all extremities well, normal gait, no peripheral edema  Pulses: Palpable and equal b/l  Lymph nodes: No palpable lymphadenopathy   Neuro: No focal deficits      Current Outpatient Medications   Medication Sig Dispense Refill   • atorvastatin (LIPITOR) 40 MG tablet TAKE 1 TABLET BY MOUTH EVERY DAY 90 tablet 3   • carbidopa-levodopa (SINEMET)  MG per tablet TAKE 1 & 1/2 TABLETS BY MOUTH THREE TIMES DAILY 405 tablet 3   • Cholecalciferol (VITAMIN D3) 5000 units capsule capsule Take 5,000 Units by mouth Daily.     • fluticasone (FLONASE) 50 MCG/ACT nasal spray SPRAY 2 SPRAYS IN EACH NOSTRIL ONCE DAILY 48 mL 2   • levothyroxine (SYNTHROID) 88 MCG tablet Take 1 tablet by mouth Daily. 90 tablet 3   • oxybutynin (DITROPAN) 5 MG tablet oxybutynin chloride 5 mg tablet   1-2 tabs po bid as needed      • oxyCODONE-acetaminophen (PERCOCET) 5-325 MG per tablet Take 1 tablet by mouth Every 6 (Six) Hours As Needed for pain. 16 tablet 0   • potassium chloride (K-DUR,KLOR-CON) 20 MEQ CR tablet Take 1 tablet by mouth Daily. 14 tablet 0   • pramipexole (MIRAPEX) 1 MG tablet TAKE 1 TABLET BY MOUTH THREE TIMES A  tablet 3   • sertraline (ZOLOFT) 100 MG tablet TAKE 1&1/2 TABLETS BY MOUTH EVERY  tablet 1   • sildenafil (REVATIO) 20 MG tablet Take 1 tablet by mouth Daily. 15 tablet 5   • sildenafil (Viagra) 50 MG tablet Take 1 tablet by mouth Daily As Needed for Erectile Dysfunction. 15 tablet 5   • tamsulosin (FLOMAX) 0.4 MG capsule 24 hr capsule tamsulosin 0.4 mg capsule   TAKE 1 CAPSULE BY MOUTH EVERY DAY     • vitamin C (ASCORBIC ACID) 500 MG tablet Take 500 mg by mouth Daily.     • furosemide (Lasix) 40 MG tablet Take 1 tablet by mouth Daily for 14 days. 14 tablet 0     No current facility-administered medications for this visit.         Diagnoses and all orders for this visit:    1. Chronic left shoulder pain (Primary)  Continue PT, if no improvement in the next 4 to 6 weeks would recommend checking x-ray.  2. Cough  -     XR Chest PA & Lateral; Future  Check x-ray to rule out atelectasis.  Lungs clear on exam.  No symptoms of infection.  3. Parkinson's disease (CMS/HCC)  Follow-up with neurology as scheduled.  4. Swelling of lower extremity  Recommend following up with vascular surgery, would recommend lymphedema clinic as well but patient would like to see specialists initially.       Return in about 6 months (around 9/1/2021) for Medicare Wellness.     Please note that portions of this document were completed with a voice recognition program. Efforts were made to edit the dictations, but occasionally words are mis-transcribed.

## 2021-03-04 ENCOUNTER — TELEPHONE (OUTPATIENT)
Dept: FAMILY MEDICINE CLINIC | Facility: CLINIC | Age: 76
End: 2021-03-04

## 2021-03-04 NOTE — TELEPHONE ENCOUNTER
Pt called asking for results of recent chest X-ray. I relayed information from note. Pt did not fully understand results so I recommended that he make an appt. To see Dr. Holder .

## 2021-03-05 ENCOUNTER — OFFICE VISIT (OUTPATIENT)
Dept: FAMILY MEDICINE CLINIC | Facility: CLINIC | Age: 76
End: 2021-03-05

## 2021-03-05 VITALS
WEIGHT: 250 LBS | SYSTOLIC BLOOD PRESSURE: 120 MMHG | HEART RATE: 90 BPM | HEIGHT: 72 IN | DIASTOLIC BLOOD PRESSURE: 68 MMHG | OXYGEN SATURATION: 99 % | BODY MASS INDEX: 33.86 KG/M2

## 2021-03-05 DIAGNOSIS — J40 BRONCHITIS: ICD-10-CM

## 2021-03-05 DIAGNOSIS — R05.9 COUGH: Primary | ICD-10-CM

## 2021-03-05 DIAGNOSIS — J30.2 SEASONAL ALLERGIES: ICD-10-CM

## 2021-03-05 PROCEDURE — 99214 OFFICE O/P EST MOD 30 MIN: CPT | Performed by: INTERNAL MEDICINE

## 2021-03-05 RX ORDER — LORATADINE 10 MG/1
10 TABLET ORAL DAILY
Qty: 90 TABLET | Refills: 1 | Status: ON HOLD | OUTPATIENT
Start: 2021-03-05 | End: 2021-07-17

## 2021-03-05 RX ORDER — GUAIFENESIN/DEXTROMETHORPHAN 100-10MG/5
5 SYRUP ORAL 3 TIMES DAILY PRN
Qty: 118 ML | Refills: 1 | Status: SHIPPED | OUTPATIENT
Start: 2021-03-05 | End: 2021-07-16

## 2021-03-05 NOTE — PROGRESS NOTES
Chief Complaint   Patient presents with   • Cough     Review chest xray        HPI:  Cordell Martin is a 76 y.o. male who presents today for review of chest x-ray and to discuss chronic cough.  Ongoing cough for the last several months.  Denies any shortness of breath.    ROS:  Constitutional: no fevers, night sweats or unexplained weight loss  Eyes: no vision changes  ENT: no runny nose, ear pain, sore throat  Cardio: no chest pain, palpitations  Pulm: no shortness of breath, wheezing, + cough  GI: no abdominal pain or changes in bowel movements  : no difficulty urinating  MSK: no difficulty ambulating, no joint pain  Neuro: no weakness, dizziness or headache  Psych: no trouble sleeping  Endo: no change in appetite      Past Medical History:   Diagnosis Date   • Anxiety    • Arthritis    • Back pain    • Bronchitis    • Cognitive impairment    • Depression    • Disease of thyroid gland    • Glucose intolerance (impaired glucose tolerance)    • Hyperlipidemia    • Kidney stone    • Obesity    • Parkinson disease (CMS/HCC)    • Pneumonia    • Prostate disorder    • Thyroid disease    • Wears eyeglasses       Family History   Problem Relation Age of Onset   • Alzheimer's disease Other    • Cancer Other    • Diabetes Other    • Heart disease Other    • Stroke Other    • Cancer Father       Social History     Socioeconomic History   • Marital status:      Spouse name: Not on file   • Number of children: Not on file   • Years of education: Not on file   • Highest education level: Not on file   Tobacco Use   • Smoking status: Former Smoker     Packs/day: 1.00     Years: 15.00     Pack years: 15.00     Types: Cigarettes     Start date:      Quit date:      Years since quittin.2   • Smokeless tobacco: Never Used   • Tobacco comment: quit    Substance and Sexual Activity   • Alcohol use: Yes     Comment: occasionally   • Drug use: No   • Sexual activity: Yes     Partners: Female     Birth  control/protection: None   Social History Narrative    Lives alone. Uses walker      No Known Allergies   Immunization History   Administered Date(s) Administered   • Fluad Quad 65+ 08/31/2020   • Fluzone High Dose =>65 Years ([employer] ONLY) 02/23/2018, 09/12/2018, 09/02/2019, 10/13/2020   • Pneumococcal Conjugate 13-Valent (PCV13) 04/30/2015   • Pneumococcal Polysaccharide (PPSV23) 01/29/2013   • Tdap 12/04/2009   • Zostavax 01/26/2011        PE:  Vitals:    03/05/21 1326   BP: 120/68   Pulse: 90   SpO2: 99%      Body mass index is 33.91 kg/m².    Gen Appearance: NAD  HEENT: Normocephalic, PERRLA, no thyromegaly, trache midline  Heart: RRR, normal S1 and S2, no murmur  Lungs: CTA b/l, no wheezing, no crackles  Abdomen: Soft, non-tender, non-distended, no guarding and BSx4  MSK: Moves all extremities well, normal gait, no peripheral edema  Pulses: Palpable and equal b/l  Lymph nodes: No palpable lymphadenopathy   Neuro: No focal deficits      Current Outpatient Medications   Medication Sig Dispense Refill   • atorvastatin (LIPITOR) 40 MG tablet TAKE 1 TABLET BY MOUTH EVERY DAY 90 tablet 3   • carbidopa-levodopa (SINEMET)  MG per tablet TAKE 1 & 1/2 TABLETS BY MOUTH THREE TIMES DAILY 405 tablet 3   • Cholecalciferol (VITAMIN D3) 5000 units capsule capsule Take 5,000 Units by mouth Daily.     • fluticasone (FLONASE) 50 MCG/ACT nasal spray SPRAY 2 SPRAYS IN EACH NOSTRIL ONCE DAILY 48 mL 2   • levothyroxine (SYNTHROID) 88 MCG tablet Take 1 tablet by mouth Daily. 90 tablet 3   • oxybutynin (DITROPAN) 5 MG tablet oxybutynin chloride 5 mg tablet   1-2 tabs po bid as needed     • oxyCODONE-acetaminophen (PERCOCET) 5-325 MG per tablet Take 1 tablet by mouth Every 6 (Six) Hours As Needed for pain. 16 tablet 0   • potassium chloride (K-DUR,KLOR-CON) 20 MEQ CR tablet Take 1 tablet by mouth Daily. 14 tablet 0   • pramipexole (MIRAPEX) 1 MG tablet TAKE 1 TABLET BY MOUTH THREE TIMES A  tablet 3   • sertraline (ZOLOFT)  100 MG tablet TAKE 1&1/2 TABLETS BY MOUTH EVERY  tablet 1   • sildenafil (REVATIO) 20 MG tablet Take 1 tablet by mouth Daily. 15 tablet 5   • sildenafil (Viagra) 50 MG tablet Take 1 tablet by mouth Daily As Needed for Erectile Dysfunction. 15 tablet 5   • tamsulosin (FLOMAX) 0.4 MG capsule 24 hr capsule tamsulosin 0.4 mg capsule   TAKE 1 CAPSULE BY MOUTH EVERY DAY     • vitamin C (ASCORBIC ACID) 500 MG tablet Take 500 mg by mouth Daily.     • furosemide (Lasix) 40 MG tablet Take 1 tablet by mouth Daily for 14 days. 14 tablet 0   • guaiFENesin-dextromethorphan (ROBITUSSIN DM) 100-10 MG/5ML syrup Take 5 mL by mouth 3 (Three) Times a Day As Needed for Cough. 118 mL 1   • loratadine (Claritin) 10 MG tablet Take 1 tablet by mouth Daily. 90 tablet 1     No current facility-administered medications for this visit.         Diagnoses and all orders for this visit:    1. Cough (Primary)  -     guaiFENesin-dextromethorphan (ROBITUSSIN DM) 100-10 MG/5ML syrup; Take 5 mL by mouth 3 (Three) Times a Day As Needed for Cough.  Dispense: 118 mL; Refill: 1  Suspect his cough is allergy related.  Chest x-ray is clear.  Starting on Claritin and Flonase.  See back in 3 months for reassessment.  2. Seasonal allergies  See above.  3. Bronchitis  No signs of infection on chest x-ray.  Recommend calling or return to clinic if symptoms worsen or do not improve.  Other orders  -     loratadine (Claritin) 10 MG tablet; Take 1 tablet by mouth Daily.  Dispense: 90 tablet; Refill: 1         Return in about 3 months (around 6/5/2021).     Please note that portions of this document were completed with a voice recognition program. Efforts were made to edit the dictations, but occasionally words are mis-transcribed.

## 2021-03-09 ENCOUNTER — OFFICE VISIT (OUTPATIENT)
Dept: NEUROLOGY | Facility: CLINIC | Age: 76
End: 2021-03-09

## 2021-03-09 VITALS
HEART RATE: 89 BPM | TEMPERATURE: 97.1 F | WEIGHT: 250 LBS | DIASTOLIC BLOOD PRESSURE: 68 MMHG | BODY MASS INDEX: 33.86 KG/M2 | SYSTOLIC BLOOD PRESSURE: 132 MMHG | OXYGEN SATURATION: 98 % | HEIGHT: 72 IN

## 2021-03-09 DIAGNOSIS — R20.0 NUMBNESS AND TINGLING: Primary | ICD-10-CM

## 2021-03-09 DIAGNOSIS — R20.2 NUMBNESS AND TINGLING: Primary | ICD-10-CM

## 2021-03-09 DIAGNOSIS — G20 PARKINSON'S DISEASE (HCC): ICD-10-CM

## 2021-03-09 PROCEDURE — 99214 OFFICE O/P EST MOD 30 MIN: CPT | Performed by: PHYSICIAN ASSISTANT

## 2021-03-09 NOTE — PROGRESS NOTES
"Subjective       Chief Complaint:  Parkinson's Disease, numbness and tingling      History of Present Illness   Cordell Martin is a 76 y.o. male who returns to clinic today with a history of Parkinson's Disease. He has had symptoms since at least 2015 marked initially by a resting tremor primarily in his right hand. He notes occasional associated imbalance, though denies a shuffling gait.      He has also noted short term memory impairment for several years. He notes associated word-finding difficulties and impairments in concentration.      Prior evaluation has included screening blood work and a head CT which were unremarkable. Screening bloodwork has been unremarkable with the exception of a mildly elevated B6 level (and he has subsequently stopped taking a B6 supplement). He is currently taking Sinemet 1.5 tabs tid and pramipexole 1 mg tid. He was intolerant of amantadine due to hallucinations.     Today: Since his last visit in 9/20, he feels that his balance has somewhat worsened. He denies any recent falls. His tremor is unchanged. He was recently seen at  and was told he was not a candidate for DBS. He also reports numbness and tingling in his right great toe. This is worse while driving.       I have reviewed and confirmed the past family, social and medical history as accurate on 3/9/21.     Review of Systems   Constitutional: Negative.    HENT: Negative.    Eyes: Negative.    Respiratory: Negative.    Cardiovascular: Negative.    Gastrointestinal: Negative.    Endocrine: Negative.    Genitourinary: Negative.    Musculoskeletal: Negative.    Skin: Negative.    Allergic/Immunologic: Negative.    Hematological: Negative.    Psychiatric/Behavioral: Negative.        Objective     /68   Pulse 89   Temp 97.1 °F (36.2 °C)   Ht 182.9 cm (72\")   Wt 113 kg (250 lb)   SpO2 98%   BMI 33.91 kg/m²     General appearance today is normal.       Physical Exam  Neurological:      Mental Status: He is " oriented to person, place, and time.      Coordination: Heel to Shin Test abnormal. Finger-Nose-Finger Test normal.      Deep Tendon Reflexes: Strength normal.   Psychiatric:         Speech: Speech normal.          Neurologic Exam     Mental Status   Oriented to person, place, and time.   Registration: recalls 3 of 3 objects. Recall at 5 minutes: recalls 3 of 3 objects. Follows 3 step commands.   Attention: normal.   Speech: speech is normal   Level of consciousness: alert  Able to name object. Able to read. Able to repeat. Able to write. Normal comprehension.     Cranial Nerves   Cranial nerves II through XII intact.   Except for hypomimia      Motor Exam   Muscle bulk: normal  Overall muscle tone: normal    Strength   Strength 5/5 throughout.     Gait, Coordination, and Reflexes     Gait  Gait: shuffling    Coordination   Finger to nose coordination: normal  Heel to shin coordination: abnormal    Tremor   Resting tremor: present        Results  MMSE=29      Assessment/Plan   Diagnoses and all orders for this visit:    1. Numbness and tingling (Primary)  -     Vitamin B12    2. Parkinson's disease (CMS/HCC)    Other orders  -     carbidopa-levodopa (SINEMET)  MG per tablet; Take 2 tablets by mouth 3 (Three) Times a Day.  Dispense: 180 tablet; Refill: 11          Discussion/Summary   Cordell Martin returns to clinic today with a history of Parkinson's Disease along with numbness and tingling. It was elected to repeat at B12 level. After discussing potential treatment options, it was elected to increase his Sinemet to 25/100mg 2 tabs TID. He will continue to work closely with PT. He will then follow up in 6 months , or sooner if needed.   I spent 25 minutes face to face with the patient with 15 minutes spent on discussing diagnosis, evaluation, current status, treatment options and management as discussed above.       As part of this visit I reviewed prior lab results and reviewed records from prior  hospitalizations which is incorporated in the HPI.      Priti Ross PA-C

## 2021-03-23 DIAGNOSIS — G20 PARKINSON'S DISEASE (HCC): Primary | ICD-10-CM

## 2021-03-23 RX ORDER — PRAMIPEXOLE DIHYDROCHLORIDE 1 MG/1
1 TABLET ORAL 3 TIMES DAILY
Qty: 270 TABLET | Refills: 3 | Status: SHIPPED | OUTPATIENT
Start: 2021-03-23 | End: 2022-03-25

## 2021-04-16 ENCOUNTER — TELEPHONE (OUTPATIENT)
Dept: FAMILY MEDICINE CLINIC | Facility: CLINIC | Age: 76
End: 2021-04-16

## 2021-04-16 NOTE — TELEPHONE ENCOUNTER
Caller: [facility] -  [facility]STRID    Best call back number: [phone]    What orders are you requesting:  ASTRID SENT PHYSICAL THERAPY PLAN OF CARE ON 4/14 AND WAS WANTING TO SEE IF THESE WERE RECEIVED.    In what timeframe do these need to be signed: AS SOON AS POSSIBLE

## 2021-05-25 DIAGNOSIS — E03.8 HYPOTHYROIDISM DUE TO HASHIMOTO'S THYROIDITIS: ICD-10-CM

## 2021-05-25 DIAGNOSIS — E06.3 HYPOTHYROIDISM DUE TO HASHIMOTO'S THYROIDITIS: ICD-10-CM

## 2021-05-25 RX ORDER — LEVOTHYROXINE SODIUM 88 UG/1
TABLET ORAL
Qty: 90 TABLET | Refills: 3 | Status: SHIPPED | OUTPATIENT
Start: 2021-05-25 | End: 2022-06-20

## 2021-05-26 RX ORDER — FLUTICASONE PROPIONATE 50 MCG
SPRAY, SUSPENSION (ML) NASAL
Qty: 48 ML | Refills: 2 | Status: SHIPPED | OUTPATIENT
Start: 2021-05-26

## 2021-07-12 DIAGNOSIS — M79.89 SWELLING OF LOWER EXTREMITY: ICD-10-CM

## 2021-07-12 RX ORDER — FUROSEMIDE 40 MG/1
40 TABLET ORAL DAILY
Qty: 14 TABLET | Refills: 0 | Status: ON HOLD | OUTPATIENT
Start: 2021-07-12 | End: 2021-07-19 | Stop reason: SDUPTHER

## 2021-07-12 NOTE — TELEPHONE ENCOUNTER
Caller: Cordell Martin    Relationship: Self    Best call back number: [phone]     Medication needed:   Requested Prescriptions     Pending Prescriptions Disp Refills   • furosemide (Lasix) 40 MG tablet 14 tablet 0     Sig: Take 1 tablet by mouth Daily for 14 days.       When do you need the refill by: ASAP    What additional details did the patient provide when requesting the medication: PATIENT IS REQUESTING A NEW PRESCRIPTION WITH REFILLS ON ABOVE MEDICATION.    PATIENT IS REQUESTING A CALL BACK ONCE PRESCRIPTION HAS BEEN SENT    Does the patient have less than a 3 day supply:  [x] Yes  [] No    What is the patient's preferred pharmacy: [id number] - [place], [place] - [address] [phone] [facility] [phone] FX

## 2021-07-16 ENCOUNTER — HOSPITAL ENCOUNTER (INPATIENT)
Facility: HOSPITAL | Age: 76
LOS: 1 days | Discharge: HOME | End: 2021-07-19
Attending: EMERGENCY MEDICINE | Admitting: INTERNAL MEDICINE

## 2021-07-16 ENCOUNTER — OFFICE VISIT (OUTPATIENT)
Dept: FAMILY MEDICINE CLINIC | Facility: CLINIC | Age: 76
End: 2021-07-16

## 2021-07-16 VITALS
SYSTOLIC BLOOD PRESSURE: 130 MMHG | DIASTOLIC BLOOD PRESSURE: 70 MMHG | BODY MASS INDEX: 33.86 KG/M2 | WEIGHT: 250 LBS | HEIGHT: 72 IN | OXYGEN SATURATION: 95 % | HEART RATE: 76 BPM

## 2021-07-16 DIAGNOSIS — M25.561 ACUTE PAIN OF RIGHT KNEE: ICD-10-CM

## 2021-07-16 DIAGNOSIS — G20 PARKINSON'S DISEASE (HCC): ICD-10-CM

## 2021-07-16 DIAGNOSIS — L03.115 CELLULITIS OF RIGHT LEG: Primary | ICD-10-CM

## 2021-07-16 DIAGNOSIS — M19.90 ARTHRITIS: ICD-10-CM

## 2021-07-16 DIAGNOSIS — R23.8 BULLAE: ICD-10-CM

## 2021-07-16 DIAGNOSIS — R60.9 CHRONIC EDEMA: ICD-10-CM

## 2021-07-16 DIAGNOSIS — M79.89 SWELLING OF LOWER EXTREMITY: ICD-10-CM

## 2021-07-16 DIAGNOSIS — M48.062 LUMBAR STENOSIS WITH NEUROGENIC CLAUDICATION: ICD-10-CM

## 2021-07-16 DIAGNOSIS — L03.115 CELLULITIS OF RIGHT LOWER EXTREMITY: ICD-10-CM

## 2021-07-16 DIAGNOSIS — L03.115 CELLULITIS OF RIGHT LOWER EXTREMITY: Primary | ICD-10-CM

## 2021-07-16 PROBLEM — L03.119 CELLULITIS OF LOWER EXTREMITY: Status: ACTIVE | Noted: 2021-07-16

## 2021-07-16 LAB
ALBUMIN SERPL-MCNC: 3.8 G/DL (ref 3.5–5.2)
ALBUMIN/GLOB SERPL: 1.3 G/DL
ALP SERPL-CCNC: 112 U/L (ref 39–117)
ALT SERPL W P-5'-P-CCNC: <5 U/L (ref 1–41)
ANION GAP SERPL CALCULATED.3IONS-SCNC: 10 MMOL/L (ref 5–15)
AST SERPL-CCNC: 17 U/L (ref 1–40)
BASOPHILS # BLD AUTO: 0.02 10*3/MM3 (ref 0–0.2)
BASOPHILS NFR BLD AUTO: 0.3 % (ref 0–1.5)
BILIRUB SERPL-MCNC: 0.5 MG/DL (ref 0–1.2)
BUN SERPL-MCNC: 24 MG/DL (ref 8–23)
BUN/CREAT SERPL: 18 (ref 7–25)
CALCIUM SPEC-SCNC: 9.6 MG/DL (ref 8.6–10.5)
CHLORIDE SERPL-SCNC: 104 MMOL/L (ref 98–107)
CO2 SERPL-SCNC: 29 MMOL/L (ref 22–29)
CREAT SERPL-MCNC: 1.33 MG/DL (ref 0.76–1.27)
CRP SERPL-MCNC: <0.3 MG/DL (ref 0–0.5)
DEPRECATED RDW RBC AUTO: 55.2 FL (ref 37–54)
EOSINOPHIL # BLD AUTO: 0.25 10*3/MM3 (ref 0–0.4)
EOSINOPHIL NFR BLD AUTO: 3.7 % (ref 0.3–6.2)
ERYTHROCYTE [DISTWIDTH] IN BLOOD BY AUTOMATED COUNT: 15.6 % (ref 12.3–15.4)
ERYTHROCYTE [SEDIMENTATION RATE] IN BLOOD: 19 MM/HR (ref 0–20)
FLUAV RNA RESP QL NAA+PROBE: NOT DETECTED
FLUBV RNA RESP QL NAA+PROBE: NOT DETECTED
GFR SERPL CREATININE-BSD FRML MDRD: 53 ML/MIN/1.73
GLOBULIN UR ELPH-MCNC: 3 GM/DL
GLUCOSE SERPL-MCNC: 112 MG/DL (ref 65–99)
HCT VFR BLD AUTO: 38 % (ref 37.5–51)
HGB BLD-MCNC: 12 G/DL (ref 13–17.7)
HOLD SPECIMEN: NORMAL
IMM GRANULOCYTES # BLD AUTO: 0.02 10*3/MM3 (ref 0–0.05)
IMM GRANULOCYTES NFR BLD AUTO: 0.3 % (ref 0–0.5)
LYMPHOCYTES # BLD AUTO: 1.58 10*3/MM3 (ref 0.7–3.1)
LYMPHOCYTES NFR BLD AUTO: 23.5 % (ref 19.6–45.3)
MCH RBC QN AUTO: 30.4 PG (ref 26.6–33)
MCHC RBC AUTO-ENTMCNC: 31.6 G/DL (ref 31.5–35.7)
MCV RBC AUTO: 96.2 FL (ref 79–97)
MONOCYTES # BLD AUTO: 0.46 10*3/MM3 (ref 0.1–0.9)
MONOCYTES NFR BLD AUTO: 6.9 % (ref 5–12)
NEUTROPHILS NFR BLD AUTO: 4.38 10*3/MM3 (ref 1.7–7)
NEUTROPHILS NFR BLD AUTO: 65.3 % (ref 42.7–76)
NRBC BLD AUTO-RTO: 0 /100 WBC (ref 0–0.2)
PLATELET # BLD AUTO: 233 10*3/MM3 (ref 140–450)
PMV BLD AUTO: 8.5 FL (ref 6–12)
POTASSIUM SERPL-SCNC: 4.7 MMOL/L (ref 3.5–5.2)
PROCALCITONIN SERPL-MCNC: 0.09 NG/ML (ref 0–0.25)
PROT SERPL-MCNC: 6.8 G/DL (ref 6–8.5)
RBC # BLD AUTO: 3.95 10*6/MM3 (ref 4.14–5.8)
SARS-COV-2 RNA RESP QL NAA+PROBE: NOT DETECTED
SODIUM SERPL-SCNC: 143 MMOL/L (ref 136–145)
WBC # BLD AUTO: 6.71 10*3/MM3 (ref 3.4–10.8)
WHOLE BLOOD HOLD SPECIMEN: NORMAL

## 2021-07-16 PROCEDURE — G0378 HOSPITAL OBSERVATION PER HR: HCPCS

## 2021-07-16 PROCEDURE — 99284 EMERGENCY DEPT VISIT MOD MDM: CPT

## 2021-07-16 PROCEDURE — 80053 COMPREHEN METABOLIC PANEL: CPT

## 2021-07-16 PROCEDURE — 87636 SARSCOV2 & INF A&B AMP PRB: CPT | Performed by: EMERGENCY MEDICINE

## 2021-07-16 PROCEDURE — 25010000002 CEFTRIAXONE PER 250 MG: Performed by: EMERGENCY MEDICINE

## 2021-07-16 PROCEDURE — 84145 PROCALCITONIN (PCT): CPT | Performed by: INTERNAL MEDICINE

## 2021-07-16 PROCEDURE — 85652 RBC SED RATE AUTOMATED: CPT | Performed by: EMERGENCY MEDICINE

## 2021-07-16 PROCEDURE — 99214 OFFICE O/P EST MOD 30 MIN: CPT | Performed by: INTERNAL MEDICINE

## 2021-07-16 PROCEDURE — 83880 ASSAY OF NATRIURETIC PEPTIDE: CPT | Performed by: NURSE PRACTITIONER

## 2021-07-16 PROCEDURE — 85025 COMPLETE CBC W/AUTO DIFF WBC: CPT

## 2021-07-16 PROCEDURE — 86140 C-REACTIVE PROTEIN: CPT | Performed by: EMERGENCY MEDICINE

## 2021-07-16 RX ORDER — CEFTRIAXONE SODIUM 1 G/50ML
1 INJECTION, SOLUTION INTRAVENOUS ONCE
Status: COMPLETED | OUTPATIENT
Start: 2021-07-16 | End: 2021-07-16

## 2021-07-16 RX ORDER — CLINDAMYCIN HYDROCHLORIDE 300 MG/1
CAPSULE ORAL
COMMUNITY
Start: 2021-07-12 | End: 2021-07-19 | Stop reason: HOSPADM

## 2021-07-16 RX ORDER — SODIUM CHLORIDE 0.9 % (FLUSH) 0.9 %
10 SYRINGE (ML) INJECTION AS NEEDED
Status: DISCONTINUED | OUTPATIENT
Start: 2021-07-16 | End: 2021-07-19 | Stop reason: HOSPADM

## 2021-07-16 RX ORDER — FLUOROMETHOLONE 0.1 %
SUSPENSION, DROPS(FINAL DOSAGE FORM)(ML) OPHTHALMIC (EYE)
COMMUNITY

## 2021-07-16 RX ADMIN — CEFTRIAXONE SODIUM 1 G: 1 INJECTION, SOLUTION INTRAVENOUS at 22:38

## 2021-07-16 RX ADMIN — DOXYCYCLINE 100 MG: 100 INJECTION, POWDER, LYOPHILIZED, FOR SOLUTION INTRAVENOUS at 23:10

## 2021-07-16 NOTE — PROGRESS NOTES
Cordell Martin  1945  [id number]  Patient Care Team:  Ben Holder DO as PCP - General (Internal Medicine)  Anthony Masters MD as Consulting Physician (General Surgery)  Nirav Connor MD as Surgeon (Vascular Surgery)  Miguel Banda MD as Consulting Physician (Otolaryngology)    Cordell Martin is a 76 y.o. male here today for follow up.     This patient is accompanied by their self who contributes to the history of their care.    Chief Complaint:    Chief Complaint   Patient presents with   • Leg Pain     Rt leg has blisters and Went to July 11 2021. PT had issues with walking . Legis red and swelling. Went to ER and is on antibotics   • Temporomandibular Joint Pain     Pt states his knees are hurting. Has had injections in June. Ankle also has pain.         History of Present Illness:  I have reviewed and/or updated the patient's past medical, past surgical, family, social history, problem list and allergies as appropriate.      Mid June rooster comb injection. Right knee. No relief. Reports worsening pain. The worsening pain began last week. Reorts right knee feels hot. Some swelling. No redness. Reports increased pain with picking leg up which is new to him. Increased pain with knee extension. . Right ankle hurts as well which is worse. Had recent xray by [facility] in [facility].     Was in er 7/11 with redness and swelling in r> left LE. Swelling began one month ago and has worsend. Blisters appeared 10 days ago, skin hurts- stretched and burning. Was placed on clindamycin for cellulitis. No improvement.  Was prescribed a diuretic 4-5 days ago without imporvement,     Review of Systems:    Review of Systems   Constitutional: Positive for fatigue. Negative for fever.   Eyes: Negative.    Respiratory: Positive for shortness of breath.    Cardiovascular: Positive for leg swelling. Negative for chest pain and palpitations.   Endocrine: Negative for cold intolerance and heat  "intolerance.   Genitourinary: Negative.    Musculoskeletal: Positive for arthralgias.   Skin: Positive for color change, rash and skin lesions.   Neurological: Positive for tremors and weakness.   Psychiatric/Behavioral: Negative.        Vitals:    07/16/21 1459   BP: 130/70   Pulse: 76   SpO2: 95%   Weight: 113 kg (250 lb)   Height: 182.9 cm (72.01\")   PainSc: 10-Worst pain ever   PainLoc: Knee     Body mass index is 33.9 kg/m².      Current Outpatient Medications:   •  atorvastatin (LIPITOR) 40 MG tablet, TAKE 1 TABLET BY MOUTH EVERY DAY, Disp: 90 tablet, Rfl: 3  •  carbidopa-levodopa (SINEMET)  MG per tablet, Take 2 tablets by mouth 3 (Three) Times a Day., Disp: 180 tablet, Rfl: 11  •  Cholecalciferol (VITAMIN D3) 5000 units capsule capsule, Take 5,000 Units by mouth Daily., Disp: , Rfl:   •  clindamycin (CLEOCIN) 300 MG capsule, , Disp: , Rfl:   •  fluorometholone (FML) 0.1 % ophthalmic suspension, fluorometholone 0.1 % eye drops,suspension, Disp: , Rfl:   •  fluticasone (FLONASE) 50 MCG/ACT nasal spray, USE 2 SPRAYS IN EACH NOSTRIL ONCE DAILY, Disp: 48 mL, Rfl: 2  •  furosemide (Lasix) 40 MG tablet, Take 1 tablet by mouth Daily for 14 days., Disp: 14 tablet, Rfl: 0  •  levothyroxine (SYNTHROID, LEVOTHROID) 88 MCG tablet, TAKE 1 TABLET BY MOUTH EVERY DAY, Disp: 90 tablet, Rfl: 3  •  loratadine (Claritin) 10 MG tablet, Take 1 tablet by mouth Daily., Disp: 90 tablet, Rfl: 1  •  oxybutynin (DITROPAN) 5 MG tablet, oxybutynin chloride 5 mg tablet  1-2 tabs po bid as needed, Disp: , Rfl:   •  pramipexole (MIRAPEX) 1 MG tablet, Take 1 tablet by mouth 3 (Three) Times a Day., Disp: 270 tablet, Rfl: 3  •  sildenafil (REVATIO) 20 MG tablet, Take 1 tablet by mouth Daily., Disp: 15 tablet, Rfl: 5  •  sildenafil (Viagra) 50 MG tablet, Take 1 tablet by mouth Daily As Needed for Erectile Dysfunction., Disp: 15 tablet, Rfl: 5  •  tamsulosin (FLOMAX) 0.4 MG capsule 24 hr capsule, tamsulosin 0.4 mg capsule  TAKE 1 CAPSULE BY " MOUTH EVERY DAY, Disp: , Rfl:   •  vitamin C (ASCORBIC ACID) 500 MG tablet, Take 500 mg by mouth Daily., Disp: , Rfl:     Physical Exam:    Physical Exam  Vitals and nursing note reviewed.   Constitutional:       General: He is not in acute distress.     Appearance: He is well-developed. He is obese. He is ill-appearing. He is not diaphoretic.   HENT:      Head: Normocephalic and atraumatic.      Right Ear: External ear normal.      Left Ear: External ear normal.      Mouth/Throat:      Pharynx: No oropharyngeal exudate.   Eyes:      General: No scleral icterus.        Right eye: No discharge.         Left eye: No discharge.      Extraocular Movements: Extraocular movements intact.      Conjunctiva/sclera: Conjunctivae normal.   Neck:      Thyroid: No thyromegaly.      Vascular: No JVD.      Trachea: No tracheal deviation.   Cardiovascular:      Rate and Rhythm: Normal rate and regular rhythm.      Pulses: Normal pulses.      Heart sounds: Normal heart sounds.      Comments: PMI nondisplaced  Pulmonary:      Effort: Pulmonary effort is normal.      Breath sounds: Normal breath sounds. No wheezing or rales.   Abdominal:      General: Bowel sounds are normal.      Palpations: Abdomen is soft.      Tenderness: There is no abdominal tenderness. There is no guarding or rebound.   Musculoskeletal:      Cervical back: Normal range of motion and neck supple.      Comments: Shuffling gait, uses a walker.  Right knee is visibly swollen with significant medial joint line tenderness.  Cannot assess for effusion secondary to generalized edema.  He has marked decreased range of motion with knee extension.  There is minimal warmth but no erythema.   Lymphadenopathy:      Cervical: No cervical adenopathy.   Skin:     General: Skin is warm and dry.      Capillary Refill: Capillary refill takes less than 2 seconds.      Coloration: Skin is not pale.      Findings: No rash.      Comments: He is weeping on his right lower extremity.   Numerous ulcerated lesions in the posterior calf.  Bullous changes noted anteriorly.  Erythema extends to near the level of the tibial plateau.  Peau de orange changes are noted.  3 plus edema noted to the level above the knee.  There is marked warmth to the distal right lower extremity.   Neurological:      Mental Status: He is alert and oriented to person, place, and time.      Motor: No abnormal muscle tone.      Coordination: Coordination normal.   Psychiatric:         Judgment: Judgment normal.         Procedures    Results Review:    None    Assessment/Plan:  Case discussed with Dr. Hooker as well as  of the emergency room.  He has failed outpatient therapy for cellulitis with clindamycin.  He has significant weeping wounds.  He likely has chronic venous stasis exacerbating the situation.  He has no one to take care of him at home.  I feel he needs evaluation for inpatient management for wound care, IV antibiotics, physical therapy for edema care.  I do believe he warrants radiologic evaluation for possible early septic right knee.  Problem List Items Addressed This Visit        Musculoskeletal and Injuries    Right knee pain       Neuro    Parkinson's disease (CMS/[facility])    Relevant Medications    carbidopa-levodopa (SINEMET)  MG per tablet    pramipexole (MIRAPEX) 1 MG tablet       Symptoms and Signs    Chronic edema       Other    Cellulitis of right lower extremity - Primary          Plan of care reviewed with patient at the conclusion of today's visit. Education was provided regarding diagnosis and management.  Patient verbalizes understanding of and agreement with management plan.    No follow-ups on file.    Miguel Da Silva MD    Please note that portions of this note may have been completed with a voice recognition program. Efforts were made to edit the dictations, but occasionally words are mistranscribed.

## 2021-07-17 ENCOUNTER — APPOINTMENT (OUTPATIENT)
Dept: GENERAL RADIOLOGY | Facility: HOSPITAL | Age: 76
End: 2021-07-17

## 2021-07-17 PROBLEM — E03.9 HYPOTHYROIDISM (ACQUIRED): Status: ACTIVE | Noted: 2021-07-17

## 2021-07-17 PROBLEM — R79.89 ELEVATED SERUM CREATININE: Status: ACTIVE | Noted: 2021-07-17

## 2021-07-17 LAB
ANION GAP SERPL CALCULATED.3IONS-SCNC: 9 MMOL/L (ref 5–15)
BASOPHILS # BLD AUTO: 0.03 10*3/MM3 (ref 0–0.2)
BASOPHILS NFR BLD AUTO: 0.5 % (ref 0–1.5)
BUN SERPL-MCNC: 22 MG/DL (ref 8–23)
BUN/CREAT SERPL: 16.8 (ref 7–25)
CALCIUM SPEC-SCNC: 9.3 MG/DL (ref 8.6–10.5)
CHLORIDE SERPL-SCNC: 103 MMOL/L (ref 98–107)
CO2 SERPL-SCNC: 29 MMOL/L (ref 22–29)
CREAT SERPL-MCNC: 1.31 MG/DL (ref 0.76–1.27)
DEPRECATED RDW RBC AUTO: 54.4 FL (ref 37–54)
EOSINOPHIL # BLD AUTO: 0.23 10*3/MM3 (ref 0–0.4)
EOSINOPHIL NFR BLD AUTO: 3.9 % (ref 0.3–6.2)
ERYTHROCYTE [DISTWIDTH] IN BLOOD BY AUTOMATED COUNT: 15.5 % (ref 12.3–15.4)
GFR SERPL CREATININE-BSD FRML MDRD: 53 ML/MIN/1.73
GLUCOSE SERPL-MCNC: 135 MG/DL (ref 65–99)
HCT VFR BLD AUTO: 36.6 % (ref 37.5–51)
HGB BLD-MCNC: 11.4 G/DL (ref 13–17.7)
IMM GRANULOCYTES # BLD AUTO: 0.03 10*3/MM3 (ref 0–0.05)
IMM GRANULOCYTES NFR BLD AUTO: 0.5 % (ref 0–0.5)
LYMPHOCYTES # BLD AUTO: 1.75 10*3/MM3 (ref 0.7–3.1)
LYMPHOCYTES NFR BLD AUTO: 29.5 % (ref 19.6–45.3)
MAGNESIUM SERPL-MCNC: 2.2 MG/DL (ref 1.6–2.4)
MCH RBC QN AUTO: 29.8 PG (ref 26.6–33)
MCHC RBC AUTO-ENTMCNC: 31.1 G/DL (ref 31.5–35.7)
MCV RBC AUTO: 95.8 FL (ref 79–97)
MONOCYTES # BLD AUTO: 0.45 10*3/MM3 (ref 0.1–0.9)
MONOCYTES NFR BLD AUTO: 7.6 % (ref 5–12)
NEUTROPHILS NFR BLD AUTO: 3.44 10*3/MM3 (ref 1.7–7)
NEUTROPHILS NFR BLD AUTO: 58 % (ref 42.7–76)
NRBC BLD AUTO-RTO: 0 /100 WBC (ref 0–0.2)
NT-PROBNP SERPL-MCNC: 69.1 PG/ML (ref 0–900)
PLATELET # BLD AUTO: 233 10*3/MM3 (ref 140–450)
PMV BLD AUTO: 8.8 FL (ref 6–12)
POTASSIUM SERPL-SCNC: 3.9 MMOL/L (ref 3.5–5.2)
RBC # BLD AUTO: 3.82 10*6/MM3 (ref 4.14–5.8)
SODIUM SERPL-SCNC: 141 MMOL/L (ref 136–145)
WBC # BLD AUTO: 5.93 10*3/MM3 (ref 3.4–10.8)

## 2021-07-17 PROCEDURE — 87147 CULTURE TYPE IMMUNOLOGIC: CPT | Performed by: NURSE PRACTITIONER

## 2021-07-17 PROCEDURE — G0378 HOSPITAL OBSERVATION PER HR: HCPCS

## 2021-07-17 PROCEDURE — 83735 ASSAY OF MAGNESIUM: CPT | Performed by: NURSE PRACTITIONER

## 2021-07-17 PROCEDURE — 25010000002 HEPARIN (PORCINE) PER 1000 UNITS: Performed by: NURSE PRACTITIONER

## 2021-07-17 PROCEDURE — 99223 1ST HOSP IP/OBS HIGH 75: CPT | Performed by: NURSE PRACTITIONER

## 2021-07-17 PROCEDURE — 87186 SC STD MICRODIL/AGAR DIL: CPT | Performed by: NURSE PRACTITIONER

## 2021-07-17 PROCEDURE — 85025 COMPLETE CBC W/AUTO DIFF WBC: CPT | Performed by: NURSE PRACTITIONER

## 2021-07-17 PROCEDURE — 71045 X-RAY EXAM CHEST 1 VIEW: CPT

## 2021-07-17 PROCEDURE — 87205 SMEAR GRAM STAIN: CPT | Performed by: NURSE PRACTITIONER

## 2021-07-17 PROCEDURE — 25010000002 CEFTRIAXONE PER 250 MG: Performed by: NURSE PRACTITIONER

## 2021-07-17 PROCEDURE — 87070 CULTURE OTHR SPECIMN AEROBIC: CPT | Performed by: NURSE PRACTITIONER

## 2021-07-17 PROCEDURE — 25010000002 VANCOMYCIN 10 G RECONSTITUTED SOLUTION

## 2021-07-17 PROCEDURE — 80048 BASIC METABOLIC PNL TOTAL CA: CPT | Performed by: NURSE PRACTITIONER

## 2021-07-17 RX ORDER — SODIUM CHLORIDE 0.9 % (FLUSH) 0.9 %
10 SYRINGE (ML) INJECTION EVERY 12 HOURS SCHEDULED
Status: DISCONTINUED | OUTPATIENT
Start: 2021-07-17 | End: 2021-07-19 | Stop reason: HOSPADM

## 2021-07-17 RX ORDER — ONDANSETRON 2 MG/ML
4 INJECTION INTRAMUSCULAR; INTRAVENOUS EVERY 6 HOURS PRN
Status: DISCONTINUED | OUTPATIENT
Start: 2021-07-17 | End: 2021-07-19 | Stop reason: HOSPADM

## 2021-07-17 RX ORDER — SODIUM CHLORIDE 9 MG/ML
75 INJECTION, SOLUTION INTRAVENOUS CONTINUOUS
Status: ACTIVE | OUTPATIENT
Start: 2021-07-17 | End: 2021-07-17

## 2021-07-17 RX ORDER — ONDANSETRON 4 MG/1
4 TABLET, FILM COATED ORAL EVERY 6 HOURS PRN
Status: DISCONTINUED | OUTPATIENT
Start: 2021-07-17 | End: 2021-07-19 | Stop reason: HOSPADM

## 2021-07-17 RX ORDER — FLUTICASONE PROPIONATE 50 MCG
2 SPRAY, SUSPENSION (ML) NASAL DAILY
Status: DISCONTINUED | OUTPATIENT
Start: 2021-07-17 | End: 2021-07-19 | Stop reason: HOSPADM

## 2021-07-17 RX ORDER — SODIUM CHLORIDE 0.9 % (FLUSH) 0.9 %
10 SYRINGE (ML) INJECTION AS NEEDED
Status: DISCONTINUED | OUTPATIENT
Start: 2021-07-17 | End: 2021-07-19 | Stop reason: HOSPADM

## 2021-07-17 RX ORDER — AMOXICILLIN 250 MG
2 CAPSULE ORAL 2 TIMES DAILY
Status: DISCONTINUED | OUTPATIENT
Start: 2021-07-17 | End: 2021-07-19 | Stop reason: HOSPADM

## 2021-07-17 RX ORDER — BISACODYL 5 MG/1
5 TABLET, DELAYED RELEASE ORAL DAILY PRN
Status: DISCONTINUED | OUTPATIENT
Start: 2021-07-17 | End: 2021-07-19 | Stop reason: HOSPADM

## 2021-07-17 RX ORDER — CHOLECALCIFEROL (VITAMIN D3) 125 MCG
5 CAPSULE ORAL NIGHTLY PRN
Status: DISCONTINUED | OUTPATIENT
Start: 2021-07-17 | End: 2021-07-19 | Stop reason: HOSPADM

## 2021-07-17 RX ORDER — CETIRIZINE HYDROCHLORIDE 10 MG/1
10 TABLET ORAL DAILY
Status: DISCONTINUED | OUTPATIENT
Start: 2021-07-17 | End: 2021-07-19 | Stop reason: HOSPADM

## 2021-07-17 RX ORDER — ACETAMINOPHEN 160 MG/5ML
650 SOLUTION ORAL EVERY 4 HOURS PRN
Status: DISCONTINUED | OUTPATIENT
Start: 2021-07-17 | End: 2021-07-19 | Stop reason: HOSPADM

## 2021-07-17 RX ORDER — TAMSULOSIN HYDROCHLORIDE 0.4 MG/1
0.4 CAPSULE ORAL DAILY
Status: DISCONTINUED | OUTPATIENT
Start: 2021-07-17 | End: 2021-07-19 | Stop reason: HOSPADM

## 2021-07-17 RX ORDER — BISACODYL 10 MG
10 SUPPOSITORY, RECTAL RECTAL DAILY PRN
Status: DISCONTINUED | OUTPATIENT
Start: 2021-07-17 | End: 2021-07-19 | Stop reason: HOSPADM

## 2021-07-17 RX ORDER — CEFTRIAXONE SODIUM 1 G/50ML
1 INJECTION, SOLUTION INTRAVENOUS EVERY 24 HOURS
Status: DISCONTINUED | OUTPATIENT
Start: 2021-07-17 | End: 2021-07-19 | Stop reason: HOSPADM

## 2021-07-17 RX ORDER — PRAMIPEXOLE DIHYDROCHLORIDE 0.25 MG/1
1 TABLET ORAL 3 TIMES DAILY
Status: DISCONTINUED | OUTPATIENT
Start: 2021-07-17 | End: 2021-07-19 | Stop reason: HOSPADM

## 2021-07-17 RX ORDER — HEPARIN SODIUM 5000 [USP'U]/ML
5000 INJECTION, SOLUTION INTRAVENOUS; SUBCUTANEOUS EVERY 8 HOURS SCHEDULED
Status: DISCONTINUED | OUTPATIENT
Start: 2021-07-17 | End: 2021-07-19 | Stop reason: HOSPADM

## 2021-07-17 RX ORDER — ATORVASTATIN CALCIUM 40 MG/1
40 TABLET, FILM COATED ORAL DAILY
Status: DISCONTINUED | OUTPATIENT
Start: 2021-07-17 | End: 2021-07-19 | Stop reason: HOSPADM

## 2021-07-17 RX ORDER — ACETAMINOPHEN 325 MG/1
650 TABLET ORAL EVERY 4 HOURS PRN
Status: DISCONTINUED | OUTPATIENT
Start: 2021-07-17 | End: 2021-07-19 | Stop reason: HOSPADM

## 2021-07-17 RX ORDER — LEVOTHYROXINE SODIUM 88 UG/1
88 TABLET ORAL DAILY
Status: DISCONTINUED | OUTPATIENT
Start: 2021-07-17 | End: 2021-07-19 | Stop reason: HOSPADM

## 2021-07-17 RX ORDER — ACETAMINOPHEN 650 MG/1
650 SUPPOSITORY RECTAL EVERY 4 HOURS PRN
Status: DISCONTINUED | OUTPATIENT
Start: 2021-07-17 | End: 2021-07-19 | Stop reason: HOSPADM

## 2021-07-17 RX ORDER — ASCORBIC ACID 500 MG
500 TABLET ORAL DAILY
Status: DISCONTINUED | OUTPATIENT
Start: 2021-07-17 | End: 2021-07-19 | Stop reason: HOSPADM

## 2021-07-17 RX ORDER — POLYETHYLENE GLYCOL 3350 17 G/17G
17 POWDER, FOR SOLUTION ORAL DAILY PRN
Status: DISCONTINUED | OUTPATIENT
Start: 2021-07-17 | End: 2021-07-19 | Stop reason: HOSPADM

## 2021-07-17 RX ADMIN — CARBIDOPA AND LEVODOPA 2 TABLET: 25; 100 TABLET ORAL at 16:26

## 2021-07-17 RX ADMIN — CARBIDOPA AND LEVODOPA 2 TABLET: 25; 100 TABLET ORAL at 21:19

## 2021-07-17 RX ADMIN — FLUTICASONE PROPIONATE 2 SPRAY: 50 SPRAY, METERED NASAL at 08:07

## 2021-07-17 RX ADMIN — LEVOTHYROXINE SODIUM 88 MCG: 88 TABLET ORAL at 06:02

## 2021-07-17 RX ADMIN — Medication 5000 UNITS: at 08:07

## 2021-07-17 RX ADMIN — PRAMIPEXOLE DIHYDROCHLORIDE 1 MG: 0.25 TABLET ORAL at 08:07

## 2021-07-17 RX ADMIN — SODIUM CHLORIDE 75 ML/HR: 9 INJECTION, SOLUTION INTRAVENOUS at 06:01

## 2021-07-17 RX ADMIN — SODIUM CHLORIDE, PRESERVATIVE FREE 10 ML: 5 INJECTION INTRAVENOUS at 08:08

## 2021-07-17 RX ADMIN — HEPARIN SODIUM 5000 UNITS: 5000 INJECTION INTRAVENOUS; SUBCUTANEOUS at 16:26

## 2021-07-17 RX ADMIN — CEFTRIAXONE SODIUM 1 G: 1 INJECTION, SOLUTION INTRAVENOUS at 21:20

## 2021-07-17 RX ADMIN — TAMSULOSIN HYDROCHLORIDE 0.4 MG: 0.4 CAPSULE ORAL at 08:07

## 2021-07-17 RX ADMIN — ACETAMINOPHEN 650 MG: 325 TABLET ORAL at 16:26

## 2021-07-17 RX ADMIN — VANCOMYCIN HYDROCHLORIDE 2250 MG: 100 INJECTION, POWDER, LYOPHILIZED, FOR SOLUTION INTRAVENOUS at 01:48

## 2021-07-17 RX ADMIN — SODIUM CHLORIDE, PRESERVATIVE FREE 10 ML: 5 INJECTION INTRAVENOUS at 01:48

## 2021-07-17 RX ADMIN — PRAMIPEXOLE DIHYDROCHLORIDE 1 MG: 0.25 TABLET ORAL at 16:26

## 2021-07-17 RX ADMIN — ATORVASTATIN CALCIUM 40 MG: 40 TABLET, FILM COATED ORAL at 08:07

## 2021-07-17 RX ADMIN — OXYCODONE HYDROCHLORIDE AND ACETAMINOPHEN 500 MG: 500 TABLET ORAL at 08:07

## 2021-07-17 RX ADMIN — HEPARIN SODIUM 5000 UNITS: 5000 INJECTION INTRAVENOUS; SUBCUTANEOUS at 06:02

## 2021-07-17 RX ADMIN — CETIRIZINE HYDROCHLORIDE 10 MG: 10 TABLET, FILM COATED ORAL at 08:07

## 2021-07-17 RX ADMIN — CARBIDOPA AND LEVODOPA 2 TABLET: 25; 100 TABLET ORAL at 08:07

## 2021-07-17 RX ADMIN — HEPARIN SODIUM 5000 UNITS: 5000 INJECTION INTRAVENOUS; SUBCUTANEOUS at 21:19

## 2021-07-17 RX ADMIN — PRAMIPEXOLE DIHYDROCHLORIDE 1 MG: 0.25 TABLET ORAL at 21:19

## 2021-07-17 NOTE — H&P
"    [facility] Medicine Services  Clinical Decision Unit (CDU)  History and Physical    Patient Name: Cordell Martin  : 1945  MRN: [id number]  Primary Care Physician: Ben Holder DO  Date of admission: 2021  7:15 PM      Subjective   Subjective     Chief Complaint:  Lower extremity edema and ulcerations    HPI:  Cordell Martin is a 76 y.o. male with medical history significant for arthritis, anxiety, depression, parkinson's disease, hypothyroidism, HLD, BPH who presents to [facility] ED for evaluation of lower extremity edema and redness with bullous lesions and ulcerations that have failed outpatient antibiotic therapy (similar admission 21).    Reports swelling over the past month, blisters for ~ 1 week, feels like the skin is tight and stretching, there is erythema and mild tenderness - most of his pain however is in his right knee and right ankle. He was seen in ED at [facility] on 21 and placed on clindamycin for cellulitis as well as started on lasix for the edema w/o improvement. No fever or chills. He has had ~ 30 lb weight gain over the past year, increased fatigue, exertional dyspnea and noted weeping from the RLE. Also c/o pain in his right knee that occurred as a result of exercises he was doing at PT per his report and now having right ankle pain as well.     Had imaging of the right knee in [facility] the patient tells me showed arthritic changes; last month had a \"rooster comb\" injection by ortho but patient reports it did not alleviate his knee pain and he is now also having right ankle pain and cannot bear weight on the RLE d/t pain, tells me he is \"dragging\" his leg. He has been taking ibuprofen 800 mg and this helps the pain but seems to make him drowsy per his report. Denies recent falls. Does have history of left femur fracture and he thinks his left leg may be shorter than the right causing him to put more stress on his right leg when " ambulating.    COVID Details:    Symptoms:    [] NONE [] Fever []  Cough [x] Shortness of breath [] Change in taste/smell      The patient qualifies to receive the vaccine, but they have not yet received it.    Review of Systems   Constitutional: Positive for fatigue and unexpected weight change.   HENT: Negative.    Eyes: Negative.    Respiratory: Positive for shortness of breath. Negative for cough and wheezing.    Cardiovascular: Positive for leg swelling. Negative for chest pain and palpitations.   Gastrointestinal: Negative.    Endocrine: Negative.    Genitourinary: Negative.    Musculoskeletal: Positive for arthralgias and gait problem.   Skin: Positive for color change and wound.   Allergic/Immunologic: Negative.    Hematological: Negative.    Psychiatric/Behavioral: Negative.         All other systems reviewed and negative    Personal History     Past Medical History:   Diagnosis Date   • Anxiety    • Arthritis    • Back pain    • Bronchitis    • Cognitive impairment    • Depression    • Disease of thyroid gland    • Glucose intolerance (impaired glucose tolerance)    • Hyperlipidemia    • Kidney stone    • Obesity    • Parkinson disease (CMS/HCC)    • Pneumonia    • Prostate disorder    • Thyroid disease    • Wears eyeglasses        Past Surgical History:   Procedure Laterality Date   • COLONOSCOPY      5 years ago   • EYE SURGERY     • HERNIA REPAIR  10/20/2020   • LUMBAR LAMINECTOMY DISCECTOMY DECOMPRESSION N/A 7/13/2017    Procedure: LUMBAR LAMINECTOMY L4-5;  Surgeon: Antonio Trent MD;  Location: [facility];  Service:    • SHOULDER ROTATOR CUFF REPAIR Right     x2   • TONSILLECTOMY         Family History:  family history includes Alzheimer's disease in an other family member; Cancer in his father and another family member; Diabetes in an other family member; Heart disease in an other family member; Stroke in an other family member. Otherwise pertinent FHx was reviewed and unremarkable.     Social  History:  reports that he quit smoking about 41 years ago. His smoking use included cigarettes. He started smoking about 71 years ago. He has a 15.00 pack-year smoking history. He has never used smokeless tobacco. He reports current alcohol use. He reports that he does not use drugs.  Social History     Social History Narrative    Lives alone. Uses walker       Medications:  atorvastatin, carbidopa-levodopa, clindamycin, fluorometholone, fluticasone, furosemide, levothyroxine, pramipexole, sildenafil, tamsulosin, vitamin C, and vitamin D3    No Known Allergies    Objective   Objective     Vital Signs:   Temp:  [97.5 °F (36.4 °C)-97.7 °F (36.5 °C)] 97.5 °F (36.4 °C)  Heart Rate:  [71-77] 76  Resp:  [18-22] 18  BP: ()/(70-99) 140/71    Physical Exam  Vitals reviewed.   Constitutional:       General: He is not in acute distress.     Appearance: He is obese. He is not toxic-appearing.   HENT:      Head: Normocephalic and atraumatic.      Nose: Nose normal.      Mouth/Throat:      Mouth: Mucous membranes are moist.      Pharynx: Oropharynx is clear. No oropharyngeal exudate.   Eyes:      General: No scleral icterus.     Extraocular Movements: Extraocular movements intact.      Pupils: Pupils are equal, round, and reactive to light.   Cardiovascular:      Rate and Rhythm: Normal rate and regular rhythm.      Pulses: Normal pulses.      Heart sounds: Normal heart sounds. No murmur heard.     Pulmonary:      Effort: Pulmonary effort is normal. No respiratory distress.      Breath sounds: Normal breath sounds. No wheezing or rales.   Abdominal:      General: Bowel sounds are normal. There is no distension.      Palpations: Abdomen is soft.      Tenderness: There is no abdominal tenderness.   Musculoskeletal:      Cervical back: Normal range of motion and neck supple.      Right lower leg: 3+ Pitting Edema (tight) present.      Left lower le+ Pitting Edema (tight) present.   Skin:     General: Skin is warm.       Capillary Refill: Capillary refill takes less than 2 seconds.      Findings: Erythema (BLE, R > L) and lesion (see photo) present.   Neurological:      General: No focal deficit present.      Mental Status: He is alert.   Psychiatric:         Mood and Affect: Mood normal.         Behavior: Behavior normal.         Thought Content: Thought content normal.         Judgment: Judgment normal.        Results Reviewed:  Glucose 112, creatinine 1.33 (up from 1.19 on 2/3/21), BUN 24, eGFR 53; crp <0.30; procal 0.09; wbc 6.71, Hgb 12.0, Hct 38.0; esr 19    Assessment/Plan   Assessment / Plan     Active Hospital Problems    Diagnosis  POA   • **Cellulitis of lower extremity [L03.119]  Yes   • Hypothyroidism (acquired) [E03.9]  Unknown   • Elevated serum creatinine [R79.89]  Unknown   • Exertional dyspnea [R06.00]  Yes   • Parkinson's disease (CMS/HCC) [G20]  Yes   • Anxiety [F41.9]  Yes   • Depression [F32.9]  Yes   • Arthritis [M19.90]  Yes   • Hyperlipidemia [E78.5]  Yes       Plan:  Cellulitis, right lower extremity  - RLE wound culture  - hold clindamycin  - pharmacy to dose vancomycin  - continue rocephin 1 gram daily  - WOC to see, open ulcerations / bullous lesions  - PT wound care to evaluate for leg wraps  - consider outpatient dermatology consult vs discussion w/ derm in am regarding treatment, infectious vs underlying dermatologic process like bullous pemphigoid d/t recurrence  - of note, patient has had vascular w/u outpatient for venous/arterial insufficiency which was negative    Exertional dyspnea  - chest XR  - ECHO from 1/12/21 w/ EF 51-55%  - check proBNP = 69.1    Elevated creatinine  - hold lasix  - avoid NSAIDS  - gentle hydration    Arthritis  - PT/OT to evaluate for ambulation  - tylenol PRN pain  - may need to have orthopedic evaluation if patient not able to ambulate    Parkinson's disease  - continue sinemet, mirapex    BPH  - continue flomax    Hypothyroidism  - continue  levothyroxine    Hyperlipidemia  - continue statin    CODE STATUS:    Code Status and Medical Interventions:   Ordered at: 07/17/21 0046     Code Status:    CPR     Medical Interventions (Level of Support Prior to Arrest):    Full     Admission Status: OBSERVATION status, however if further evaluation or treatment plans warrant, status may change.  Based upon current information, I predict patient's care encounter to be less than or equal to 2 midnights.    Discharge Blueprint (criteria for discharge):   1. Improved skin integrity  2. Labs and vitals stable  3. Able to ambulate independently  4. Derm evaluation    Signature: Electronically signed by ALEXUS Diaz, 07/17/21, 1:29 AM EDT    Patient seen and billed independently by [facility].

## 2021-07-17 NOTE — PROGRESS NOTES
[facility] Medicine Services  Clinical Decision Unit  UPDATE NOTE    Patient evaluated by my partner earlier on today's date reviewed.  Interim findings, labs, and charting have been reviewed.  [facility] Hospital Problem List has been managed and updated.    Active Hospital Problems    Diagnosis  POA   • **Cellulitis of lower extremity [L03.119]  Yes   • Hypothyroidism (acquired) [E03.9]  Unknown   • Elevated serum creatinine [R79.89]  Unknown   • Exertional dyspnea [R06.00]  Yes   • Parkinson's disease (CMS/HCC) [G20]  Yes   • Anxiety [F41.9]  Yes   • Depression [F32.9]  Yes   • Arthritis [M19.90]  Yes   • Hyperlipidemia [E78.5]  Yes      Resolved Hospital Problems   No resolved problems to display.       Updates to Plan:     RLE cellulitis with bullae  -Failed outpatient clindamycin  -Continue vancomycin and ceftriaxone  -PT wound care evaluation for compression wrap/unna boot assessment   -Outpatient referral to dermatology as there is some concern for bullous pemphigoid given appearance and recurrence.  This can be associated with neurological disorders such as Parkinson's disease.  -Previously had vascular workup outpatient for venous/arterial insufficiency which was negative  -Similar admission in January, improved with IV antibiotics and was transitioned to PO at discharge.   -Wound culture pending.  Previously had positive MRSA PCR of the nares.     Exertional dyspnea  -CXR no acute findings  -Echocardiogram from 21 shows LVEF 51-55%  -proBNP 69.1     Elevated creatinine/CKD III  -Hold lasix and NSAIDS  -Gentle hydration to  today     Arthritis  -PT/OT to evaluate for ambulation  -Acetaminophen PRN pain  -Reportedly has been following with orthopedic surgery outpatient and recently had X-rays by [facility].  ESR and CRP are normal.  Exam does not appear consistent with septic arthritis     Parkinson's disease  -Continue sinemet, mirapex     BPH  -Continue  flomax     Hypothyroidism  -Continue levothyroxine     Hyperlipidemia  -Continue atorvastatin    Discharge Blueprint Update:    1. Improvement in cellulitis  2. Able to ambulate safely  3. Final antibiotic plan    Sadie Martin MD  07/17/21

## 2021-07-17 NOTE — PLAN OF CARE
Goal Outcome Evaluation:        Problem: Adult Inpatient Plan of Care  Goal: Plan of Care Review  Outcome: Ongoing, Progressing  Goal: Patient-Specific Goal (Individualized)  Outcome: Ongoing, Progressing  Goal: Absence of Hospital-Acquired Illness or Injury  Outcome: Ongoing, Progressing  Intervention: Identify and Manage Fall Risk  Recent Flowsheet Documentation  Taken 7/17/2021 0846 by Roxie Saini RN  Safety Promotion/Fall Prevention:   activity supervised   assistive device/personal items within reach  Intervention: Prevent Skin Injury  Recent Flowsheet Documentation  Taken 7/17/2021 0846 by Roxie Saini RN  Body Position: position changed independently  Goal: Optimal Comfort and Wellbeing  Outcome: Ongoing, Progressing  Goal: Readiness for Transition of Care  Outcome: Ongoing, Progressing     Problem: Pain Acute  Goal: Optimal Pain Control  Outcome: Ongoing, Progressing     Problem: Fall Injury Risk  Goal: Absence of Fall and Fall-Related Injury  Outcome: Ongoing, Progressing  Intervention: Promote Injury-Free Environment  Recent Flowsheet Documentation  Taken 7/17/2021 0846 by Roxie Saini RN  Safety Promotion/Fall Prevention:   activity supervised   assistive device/personal items within reach     Problem: Skin or Soft Tissue Infection  Goal: Infection Symptom Resolution  Outcome: Ongoing, Progressing     Problem: Skin Injury Risk Increased  Goal: Skin Health and Integrity  Outcome: Ongoing, Progressing  Intervention: Optimize Skin Protection  Recent Flowsheet Documentation  Taken 7/17/2021 0846 by Roxie Saini RN  Head of Bed (HOB): HOB elevated

## 2021-07-17 NOTE — ED PROVIDER NOTES
Subjective   Pt presents with leg swelling, redness, weeping for about one week. No fever.  He has some pain in the knee and ankle as well that are worse with weight bearing.  He was seen in the ED at [facility] a few days ago, prescribed clindamycin but he has seen no improvement.  Saw PCP today and was sent to the ED due to several weeping lesions.  He has been admitted several times for similar problems.  No fever, vomiting, myalgias.      History provided by:  Patient      Review of Systems   Constitutional: Negative for fever.   Gastrointestinal: Negative for vomiting.   Musculoskeletal: Negative for myalgias.   Skin: Positive for rash and wound.   All other systems reviewed and are negative.      Past Medical History:   Diagnosis Date   • Anxiety    • Arthritis    • Back pain    • Bronchitis    • Cognitive impairment    • Depression    • Disease of thyroid gland    • Glucose intolerance (impaired glucose tolerance)    • Hyperlipidemia    • Kidney stone    • Obesity    • Parkinson disease (CMS/HCC)    • Pneumonia    • Prostate disorder    • Thyroid disease    • Wears eyeglasses        No Known Allergies    Past Surgical History:   Procedure Laterality Date   • COLONOSCOPY      5 years ago   • EYE SURGERY     • HERNIA REPAIR  10/20/2020   • LUMBAR LAMINECTOMY DISCECTOMY DECOMPRESSION N/A 7/13/2017    Procedure: LUMBAR LAMINECTOMY L4-5;  Surgeon: Antonio Trent MD;  Location: [facility];  Service:    • SHOULDER ROTATOR CUFF REPAIR Right     x2   • TONSILLECTOMY         Family History   Problem Relation Age of Onset   • Alzheimer's disease Other    • Cancer Other    • Diabetes Other    • Heart disease Other    • Stroke Other    • Cancer Father        Social History     Socioeconomic History   • Marital status:      Spouse name: Not on file   • Number of children: Not on file   • Years of education: Not on file   • Highest education level: Not on file   Tobacco Use   • Smoking status: Former Smoker      Packs/day: 1.00     Years: 15.00     Pack years: 15.00     Types: Cigarettes     Start date:      Quit date:      Years since quittin.5   • Smokeless tobacco: Never Used   • Tobacco comment: quit    Vaping Use   • Vaping Use: Never used   Substance and Sexual Activity   • Alcohol use: Yes     Comment: occasionally   • Drug use: No   • Sexual activity: Yes     Partners: Female     Birth control/protection: None           Objective   Physical Exam  Vitals and nursing note reviewed.   Constitutional:       General: He is not in acute distress.     Appearance: Normal appearance. He is not ill-appearing.   HENT:      Head: Normocephalic and atraumatic.      Mouth/Throat:      Mouth: Mucous membranes are moist.   Eyes:      General: No scleral icterus.        Right eye: No discharge.         Left eye: No discharge.      Conjunctiva/sclera: Conjunctivae normal.   Cardiovascular:      Rate and Rhythm: Normal rate and regular rhythm.      Heart sounds: No murmur heard.     Pulmonary:      Effort: Pulmonary effort is normal. No respiratory distress.      Breath sounds: Normal breath sounds. No wheezing.   Musculoskeletal:         General: Normal range of motion.      Cervical back: Normal range of motion and neck supple.      Comments: Both legs have chronic venous stasis changes.  The right lower leg has erythema without warmth.   There is bullous disease as well, primarily posteriorly but on anterior of lower leg as well.  Nikolsky sign is positive.  There is minimal warmth to the leg.    The knee and ankle are not red, hot, swollen.  They both have normal passive ROM without pain.   Skin:     General: Skin is warm and dry.   Neurological:      General: No focal deficit present.      Mental Status: He is alert. Mental status is at baseline.   Psychiatric:         Mood and Affect: Mood normal.         Behavior: Behavior normal.         Thought Content: Thought content normal.         Procedures           ED  Course         Possibly a recurrent cellulitis. I actually wonder whether he has bullous pemphigoid given the nature of the bullae.  PCP sent to be admitted.  Discussed this with pt and with hospitalist.                                  MDM  Number of Diagnoses or Management Options     Amount and/or Complexity of Data Reviewed  Clinical lab tests: ordered and reviewed  Discuss the patient with other providers: yes        Final diagnoses:   Cellulitis of right leg       ED Disposition  ED Disposition     ED Disposition Condition Comment    Decision to Admit  Level of Care: Telemetry [5]   Diagnosis: Cellulitis of lower extremity [[id number]]   Admitting Physician: CRISTHIAN COATS [[id number]]   Attending Physician: CRISTHIAN COATS [[id number]]   Bed Request Comments: reg            No follow-up provider specified.       Medication List      No changes were made to your prescriptions during this visit.          Deion Lipscomb MD  07/17/21 0012

## 2021-07-17 NOTE — PLAN OF CARE
Pt arrived from ER, VSS on RA, c/o pain to right knee with movement, no c/o pain to RLE, RLE red, hot, swollen, blisters and open areas noted to RLE, wound culture completed per order, rested well during shift, will continue to monitor

## 2021-07-17 NOTE — PROGRESS NOTES
"Pharmacy Consult-Vancomycin Dosing  Cordell Martin is a  76 y.o. male receiving vancomycin therapy.     Indication: SSTI  Consulting Provider: hospitalist  ID Consult: No    Goal AUC: 400 - 600 mg/L*hr    Current Antimicrobial Therapy  Anti-Infectives (From admission, onward)      Ordered     Dose/Rate Route Frequency Start Stop    07/17/21 0058  vancomycin 1500 mg/500 mL 0.9% NS IVPB ([facility])     Ordering Provider: Radu Mckeon PharmD    12.9 mg/kg × 116 kg  over 90 Minutes Intravenous Every 24 Hours 07/18/21 0600 07/22/21 0559    07/17/21 0047  cefTRIAXone (ROCEPHIN) IVPB 1 g     Ordering Provider: Ayla Nelson APRN    1 g  100 mL/hr over 30 Minutes Intravenous Every 24 Hours 07/17/21 2100 07/22/21 2059    07/17/21 0054  vancomycin 2250 mg/500 mL 0.9% NS IVPB ([facility])     Ordering Provider: Radu Mckeon PharmD    20 mg/kg × 116 kg  over 135 Minutes Intravenous Once 07/17/21 0145      07/17/21 0047  Pharmacy to dose vancomycin     Ordering Provider: Ayla Nelson APRN     Does not apply Continuous PRN 07/17/21 0047 07/22/21 0046    07/16/21 2120  cefTRIAXone (ROCEPHIN) IVPB 1 g     Ordering Provider: Deion Lipscomb MD    1 g  100 mL/hr over 30 Minutes Intravenous Once 07/16/21 2122 07/16/21 2310    07/16/21 2120  doxycycline (VIBRAMYCIN) 100 mg/100 mL 0.9% NS MBP     Ordering Provider: Deion Lipscomb MD    100 mg  over 60 Minutes Intravenous Once 07/16/21 2122 07/17/21 0010            Allergies  Allergies as of 07/16/2021    (No Known Allergies)       Labs  Results from last 7 days   Lab Units 07/16/21  1743   BUN mg/dL 24*   CREATININE mg/dL 1.33*     Results from last 7 days   Lab Units 07/16/21  1743   WBC 10*3/mm3 6.71       Evaluation of Dosing  Last Dose Received in the ED/Outside Facility:               N/A  Is Patient on Dialysis or Renal Replacement:               No    Ht - 182.9 cm (72\")  Wt - 116 kg (256 lb 3.2 oz)    Estimated Creatinine Clearance: 64.1 mL/min (A) (by " C-G formula based on SCr of 1.33 mg/dL (H)).  Intake & Output (last 3 days)       None            Microbiology and Radiology  Microbiology Results (last 10 days)       Procedure Component Value - Date/Time    COVID-19 and FLU A/B PCR - Swab, Nasopharynx [[id number]]  (Normal) Collected: 07/16/21 2218    Lab Status: Final result Specimen: Swab from Nasopharynx Updated: 07/16/21 2303     COVID19 Not Detected     Influenza A PCR Not Detected     Influenza B PCR Not Detected    Narrative:      Fact sheet for providers: https://www.fda.gov/media/883502/download    Fact sheet for patients: https://www.fda.gov/media/303595/download    Test performed by PCR.            Reported Vancomycin Levels                   InsightRX AUC Calculation:  Current AUC:   -- mg/L*hr    Predicted Steady State AUC on Current Dose: -- mg/L*hr  _________________________________  Predicted Steady State AUC on New Dose:   462 mg/L*hr    Assessment/Plan:  1. Will give vancomycin 2250 mg IV once                               followed by      Vancomycin 1500 mg IV q 24 hours    2. Vancomycin trough is scheduled 7/19 at 0530 prior to the 3rd dose. Predicted Steady State AUC at current dose: 462 mg/L*hr.      Radu Mckeon, PharmD, BCPS  7/17/2021  01:00 EDT

## 2021-07-17 NOTE — NURSING NOTE
WOC consult for: RLE open wounds     Assessment and Care provided:     1.  Appears to be venous insufficiency due to bilateral swelling and brown/jagdish discoloration. There are a few open blisters on RLE, and one moderate sized blister that is not draining yet.   2. LLE has same appearance but has no open areas or blisters.     Recommendation(s): Continue with xeroform over open areas and blisters on RLE, cover with rolled gauze. PT wound care to possibly do compression wraps    *Maintain good skin care, keep dry, turn q 2 hr with wedge if possible, keep heels elevated and offloaded with heel boots.    *Apply z-guard to bottom and bony prominences daily and as needed with incontinence episodes.  *Follow C.A.R.E protocol if medical devices (Bipap, kc, Ng tube, etc) are being used.      All skin interventions in place. Head to toe assessment completed. WOC orders placed.  Discussed plan of care with RN. Thank you for consulting WOCN.  Will continue to follow.  Please call with questions or if needs arise.

## 2021-07-18 LAB
ANION GAP SERPL CALCULATED.3IONS-SCNC: 10 MMOL/L (ref 5–15)
BUN SERPL-MCNC: 23 MG/DL (ref 8–23)
BUN/CREAT SERPL: 21.7 (ref 7–25)
CALCIUM SPEC-SCNC: 8.8 MG/DL (ref 8.6–10.5)
CHLORIDE SERPL-SCNC: 106 MMOL/L (ref 98–107)
CO2 SERPL-SCNC: 25 MMOL/L (ref 22–29)
CREAT SERPL-MCNC: 1.06 MG/DL (ref 0.76–1.27)
DEPRECATED RDW RBC AUTO: 55.7 FL (ref 37–54)
ERYTHROCYTE [DISTWIDTH] IN BLOOD BY AUTOMATED COUNT: 15.6 % (ref 12.3–15.4)
GFR SERPL CREATININE-BSD FRML MDRD: 68 ML/MIN/1.73
GLUCOSE SERPL-MCNC: 115 MG/DL (ref 65–99)
HCT VFR BLD AUTO: 36.2 % (ref 37.5–51)
HGB BLD-MCNC: 11.4 G/DL (ref 13–17.7)
MCH RBC QN AUTO: 30.6 PG (ref 26.6–33)
MCHC RBC AUTO-ENTMCNC: 31.5 G/DL (ref 31.5–35.7)
MCV RBC AUTO: 97.1 FL (ref 79–97)
PLATELET # BLD AUTO: 226 10*3/MM3 (ref 140–450)
PMV BLD AUTO: 9 FL (ref 6–12)
POTASSIUM SERPL-SCNC: 3.9 MMOL/L (ref 3.5–5.2)
RBC # BLD AUTO: 3.73 10*6/MM3 (ref 4.14–5.8)
SODIUM SERPL-SCNC: 141 MMOL/L (ref 136–145)
WBC # BLD AUTO: 5.13 10*3/MM3 (ref 3.4–10.8)

## 2021-07-18 PROCEDURE — 97116 GAIT TRAINING THERAPY: CPT | Performed by: PHYSICAL THERAPIST

## 2021-07-18 PROCEDURE — 85027 COMPLETE CBC AUTOMATED: CPT | Performed by: INTERNAL MEDICINE

## 2021-07-18 PROCEDURE — 99232 SBSQ HOSP IP/OBS MODERATE 35: CPT | Performed by: INTERNAL MEDICINE

## 2021-07-18 PROCEDURE — 97161 PT EVAL LOW COMPLEX 20 MIN: CPT

## 2021-07-18 PROCEDURE — 97165 OT EVAL LOW COMPLEX 30 MIN: CPT

## 2021-07-18 PROCEDURE — 80048 BASIC METABOLIC PNL TOTAL CA: CPT

## 2021-07-18 PROCEDURE — 25010000002 HEPARIN (PORCINE) PER 1000 UNITS: Performed by: NURSE PRACTITIONER

## 2021-07-18 PROCEDURE — G0378 HOSPITAL OBSERVATION PER HR: HCPCS

## 2021-07-18 PROCEDURE — 25010000002 VANCOMYCIN 10 G RECONSTITUTED SOLUTION

## 2021-07-18 PROCEDURE — 29580 STRAPPING UNNA BOOT: CPT

## 2021-07-18 PROCEDURE — 25010000002 CEFTRIAXONE PER 250 MG: Performed by: NURSE PRACTITIONER

## 2021-07-18 PROCEDURE — 97597 DBRDMT OPN WND 1ST 20 CM/<: CPT

## 2021-07-18 PROCEDURE — 97530 THERAPEUTIC ACTIVITIES: CPT | Performed by: PHYSICAL THERAPIST

## 2021-07-18 RX ORDER — LIDOCAINE 50 MG/G
1 PATCH TOPICAL
Status: DISCONTINUED | OUTPATIENT
Start: 2021-07-18 | End: 2021-07-19 | Stop reason: HOSPADM

## 2021-07-18 RX ORDER — TRAMADOL HYDROCHLORIDE 50 MG/1
50 TABLET ORAL EVERY 6 HOURS PRN
Status: DISCONTINUED | OUTPATIENT
Start: 2021-07-18 | End: 2021-07-19 | Stop reason: HOSPADM

## 2021-07-18 RX ADMIN — ATORVASTATIN CALCIUM 40 MG: 40 TABLET, FILM COATED ORAL at 08:38

## 2021-07-18 RX ADMIN — HEPARIN SODIUM 5000 UNITS: 5000 INJECTION INTRAVENOUS; SUBCUTANEOUS at 05:33

## 2021-07-18 RX ADMIN — CARBIDOPA AND LEVODOPA 2 TABLET: 25; 100 TABLET ORAL at 08:38

## 2021-07-18 RX ADMIN — PRAMIPEXOLE DIHYDROCHLORIDE 1 MG: 0.25 TABLET ORAL at 21:17

## 2021-07-18 RX ADMIN — CARBIDOPA AND LEVODOPA 2 TABLET: 25; 100 TABLET ORAL at 15:05

## 2021-07-18 RX ADMIN — LIDOCAINE 1 PATCH: 50 PATCH CUTANEOUS at 13:43

## 2021-07-18 RX ADMIN — CARBIDOPA AND LEVODOPA 2 TABLET: 25; 100 TABLET ORAL at 21:16

## 2021-07-18 RX ADMIN — ACETAMINOPHEN 650 MG: 325 TABLET ORAL at 15:04

## 2021-07-18 RX ADMIN — VANCOMYCIN HYDROCHLORIDE 1500 MG: 100 INJECTION, POWDER, LYOPHILIZED, FOR SOLUTION INTRAVENOUS at 05:33

## 2021-07-18 RX ADMIN — SODIUM CHLORIDE, PRESERVATIVE FREE 10 ML: 5 INJECTION INTRAVENOUS at 21:17

## 2021-07-18 RX ADMIN — OXYCODONE HYDROCHLORIDE AND ACETAMINOPHEN 500 MG: 500 TABLET ORAL at 08:38

## 2021-07-18 RX ADMIN — CEFTRIAXONE SODIUM 1 G: 1 INJECTION, SOLUTION INTRAVENOUS at 21:16

## 2021-07-18 RX ADMIN — Medication 5000 UNITS: at 08:38

## 2021-07-18 RX ADMIN — HEPARIN SODIUM 5000 UNITS: 5000 INJECTION INTRAVENOUS; SUBCUTANEOUS at 13:43

## 2021-07-18 RX ADMIN — PRAMIPEXOLE DIHYDROCHLORIDE 1 MG: 0.25 TABLET ORAL at 08:38

## 2021-07-18 RX ADMIN — CETIRIZINE HYDROCHLORIDE 10 MG: 10 TABLET, FILM COATED ORAL at 08:38

## 2021-07-18 RX ADMIN — ACETAMINOPHEN 650 MG: 325 TABLET ORAL at 00:51

## 2021-07-18 RX ADMIN — ACETAMINOPHEN 650 MG: 325 TABLET ORAL at 08:41

## 2021-07-18 RX ADMIN — TAMSULOSIN HYDROCHLORIDE 0.4 MG: 0.4 CAPSULE ORAL at 08:38

## 2021-07-18 RX ADMIN — SODIUM CHLORIDE, PRESERVATIVE FREE 10 ML: 5 INJECTION INTRAVENOUS at 08:38

## 2021-07-18 RX ADMIN — PRAMIPEXOLE DIHYDROCHLORIDE 1 MG: 0.25 TABLET ORAL at 15:05

## 2021-07-18 RX ADMIN — HEPARIN SODIUM 5000 UNITS: 5000 INJECTION INTRAVENOUS; SUBCUTANEOUS at 21:16

## 2021-07-18 RX ADMIN — FLUTICASONE PROPIONATE 2 SPRAY: 50 SPRAY, METERED NASAL at 08:41

## 2021-07-18 RX ADMIN — LEVOTHYROXINE SODIUM 88 MCG: 88 TABLET ORAL at 05:33

## 2021-07-18 NOTE — THERAPY EVALUATION
Patient Name: Cordell Martin  : 1945    MRN: [id number]                              Today's Date: 2021       Admit Date: 2021    Visit Dx:     ICD-10-CM ICD-9-CM   1. Cellulitis of right leg  L03.115 682.6     Patient Active Problem List   Diagnosis   • Anxiety   • Arthritis   • Depression   • Hyperlipidemia   • Lumbar stenosis with neurogenic claudication   • Parkinson's disease (CMS/HCC)   • Lumbar stenosis with neurogenic claudication status post L4-5 decompression   • Status post lumbar laminectomy   • Memory loss   • Chronic edema   • Cellulitis of right lower extremity   • Exertional dyspnea   • Right knee pain   • Cellulitis of lower extremity   • Hypothyroidism (acquired)   • Elevated serum creatinine     Past Medical History:   Diagnosis Date   • Anxiety    • Arthritis    • Back pain    • Bronchitis    • Cognitive impairment    • Depression    • Disease of thyroid gland    • Glucose intolerance (impaired glucose tolerance)    • Hyperlipidemia    • Kidney stone    • Obesity    • Parkinson disease (CMS/HCC)    • Pneumonia    • Prostate disorder    • Thyroid disease    • Wears eyeglasses      Past Surgical History:   Procedure Laterality Date   • COLONOSCOPY      5 years ago   • EYE SURGERY     • HERNIA REPAIR  10/20/2020   • LUMBAR LAMINECTOMY DISCECTOMY DECOMPRESSION N/A 2017    Procedure: LUMBAR LAMINECTOMY L4-5;  Surgeon: Antonio Trent MD;  Location: [facility];  Service:    • SHOULDER ROTATOR CUFF REPAIR Right     x2   • TONSILLECTOMY       General Information     Row Name 21 0844          Physical Therapy Time and Intention    Document Type  evaluation  -LM     Mode of Treatment  physical therapy  -LM     Row Name 21 0844          General Information    Patient Profile Reviewed  yes  -LM     Prior Level of Function  independent:;all household mobility;gait;ADL's  -LM     Existing Precautions/Restrictions  fall;other (see comments) Parkinson's Disease; R Knee/Ankle  Pain  -LM     Barriers to Rehab  none identified  -LM     Row Name 07/18/21 0844          Living Environment    Lives With  alone  -LM     Row Name 07/18/21 0844          Home Main Entrance    Number of Stairs, Main Entrance  two  -LM     Stair Railings, Main Entrance  none  -LM     Row Name 07/18/21 0844          Stairs Within Home, Primary    Stairs, Within Home, Primary  Recently had a stair lift installed so pt no longer has to ambulate up/down stairs  -LM     Number of Stairs, Within Home, Primary  other (see comments) 15  -LM     Row Name 07/18/21 0844          Cognition    Orientation Status (Cognition)  oriented x 3  -LM     Row Name 07/18/21 0844          Safety Issues, Functional Mobility    Safety Issues Affecting Function (Mobility)  insight into deficits/self-awareness;safety precaution awareness;safety precautions follow-through/compliance;sequencing abilities  -LM     Impairments Affecting Function (Mobility)  balance;endurance/activity tolerance;range of motion (ROM);strength;pain  -LM       User Key  (r) = Recorded By, (t) = Taken By, (c) = Cosigned By    Initials Name Provider Type    LM Teri Guillory, PT Physical Therapist        Mobility     Row Name 07/18/21 0844          Bed Mobility    Bed Mobility  supine-sit  -LM     Supine-Sit [place] (Bed Mobility)  moderate assist (50% patient effort);1 person assist;verbal cues  -LM     Assistive Device (Bed Mobility)  bed rails;head of bed elevated  -LM     Comment (Bed Mobility)  Vc's for sequencing.  -LM     Row Name 07/18/21 0844          Transfers    Comment (Transfers)  Vc's for hand placement.  -LM     Row Name 07/18/21 0844          Sit-Stand Transfer    Sit-Stand [place] (Transfers)  contact guard;1 person assist;verbal cues  -LM     Assistive Device (Sit-Stand Transfers)  walker, front-wheeled  -LM     Row Name 07/18/21 0844          Gait/Stairs (Locomotion)    [place] Level (Gait)  contact guard;1 person assist;verbal cues  -LM      Assistive Device (Gait)  walker, front-wheeled  -LM     Distance in Feet (Gait)  30 feet  -LM     Deviations/Abnormal Patterns (Gait)  antalgic;amparo decreased;gait speed decreased;stride length decreased  -LM     Bilateral Gait Deviations  forward flexed posture;heel strike decreased  -LM     Right Sided Gait Deviations  weight shift ability decreased  -LM     Comment (Gait/Stairs)  Pt complains of pain in the R knee/ankle with weightbearing.  Pt ambulates at a slower pace.  Vc's for upright posture.  -LM       User Key  (r) = Recorded By, (t) = Taken By, (c) = Cosigned By    Initials Name Provider Type    Teri Almazan PT Physical Therapist        Obj/Interventions     Row Name 07/18/21 0844          Range of Motion Comprehensive    General Range of Motion  lower extremity range of motion deficits identified  -LM     Comment, General Range of Motion  RLE - Decreased AROM through all planes; LLE AROM - Decreased active hip flex; Knee flex/ext AROM WFL; Ankle AROM WFL  -LM     Row Name 07/18/21 0844          Strength Comprehensive (MMT)    General Manual Muscle Testing (MMT) Assessment  lower extremity strength deficits identified  -LM     Comment, General Manual Muscle Testing (MMT) Assessment  RLE - Hip flex - 2+/5; Knee flex/ext - 2/5; Ankle DF - 2/5; LLE - Hip flex - 2-/5; Knee flex/ext - 4/5; Ankle DF - 4/5  -LM     Row Name 07/18/21 0844          Balance    Balance Assessment  sitting static balance;standing static balance;standing dynamic balance  -LM     Static Sitting Balance  WFL;sitting, edge of bed  -LM     Static Standing Balance  WFL;supported  -LM     Dynamic Standing Balance  mild impairment;supported  -LM     Row Name 07/18/21 0844          Sensory Assessment (Somatosensory)    Sensory Assessment (Somatosensory)  LE sensation intact  -LM       User Key  (r) = Recorded By, (t) = Taken By, (c) = Cosigned By    Initials Name Provider Type    Teri Almazan PT Physical Therapist         Goals/Plan     Row Name 07/18/21 0844          Bed Mobility Goal 1 (PT)    Activity/Assistive Device (Bed Mobility Goal 1, PT)  sit to supine/supine to sit  -LM     [place] Level/Cues Needed (Bed Mobility Goal 1, PT)  independent  -LM     Time Frame (Bed Mobility Goal 1, PT)  long term goal (LTG);2 weeks  -LM     Row Name 07/18/21 0844          Transfer Goal 1 (PT)    Activity/Assistive Device (Transfer Goal 1, PT)  bed-to-chair/chair-to-bed  -LM     [place] Level/Cues Needed (Transfer Goal 1, PT)  modified independence  -LM     Time Frame (Transfer Goal 1, PT)  long term goal (LTG);2 weeks  -LM     Row Name 07/18/21 0844          Gait Training Goal 1 (PT)    Activity/Assistive Device (Gait Training Goal 1, PT)  gait (walking locomotion);assistive device use  -LM     [place] Level (Gait Training Goal 1, PT)  modified independence  -LM     Distance (Gait Training Goal 1, PT)  150 feet  -LM     Time Frame (Gait Training Goal 1, PT)  long term goal (LTG);2 weeks  -LM       User Key  (r) = Recorded By, (t) = Taken By, (c) = Cosigned By    Initials Name Provider Type    LM Teri Guillory, PT Physical Therapist        Clinical Impression     Row Name 07/18/21 0844          Pain    Additional Documentation  Pain Scale: Numbers Pre/Post-Treatment (Group)  -LM     Row Name 07/18/21 0844          Pain Scale: Numbers Pre/Post-Treatment    Pretreatment Pain Rating  6/10  -LM     Posttreatment Pain Rating  6/10  -LM     Pain Location - Side  Right  -LM     Pain Location  knee;ankle  -LM     Pain Intervention(s)  Repositioned;Ambulation/increased activity  -LM     Row Name 07/18/21 0844          Plan of Care Review    Plan of Care Reviewed With  patient  -LM     Outcome Summary  PT evaluation completed.  Pt transferred supine-->sit with ModAx1, stood with CGA, and ambulated 30 feet using rw with CGAx1.  Skilled PT services warranted to improve mobility and safety.  Pt lives alone and would benefit from inpt  rehab at d/c.  -LM     Row Name 07/18/21 0844          Therapy Assessment/Plan (PT)    Rehab Potential (PT)  good, to achieve stated therapy goals  -LM     Criteria for Skilled Interventions Met (PT)  yes;meets criteria;skilled treatment is necessary  -LM     Row Name 07/18/21 0844          Vital Signs    Pre Systolic BP Rehab  109  -LM     Pre Treatment Diastolic BP  69  -LM     Post Systolic BP Rehab  127  -LM     Post Treatment Diastolic BP  67  -LM     Pretreatment Heart Rate (beats/min)  77  -LM     Posttreatment Heart Rate (beats/min)  79  -LM     Pre SpO2 (%)  97  -LM     O2 Delivery Pre Treatment  room air  -LM     Post SpO2 (%)  97  -LM     O2 Delivery Post Treatment  room air  -LM     Pre Patient Position  Supine  -LM     Post Patient Position  Sitting  -LM     Row Name 07/18/21 0844          Positioning and Restraints    Pre-Treatment Position  in bed  -LM     Post Treatment Position  chair  -LM     In Chair  reclined;call light within reach;encouraged to call for assist;exit alarm on;waffle cushion;notified nsg  -LM       User Key  (r) = Recorded By, (t) = Taken By, (c) = Cosigned By    Initials Name Provider Type    LM Teri Guillory, PT Physical Therapist        Outcome Measures     Row Name 07/18/21 0844 07/18/21 0841       How much help from another person do you currently need...    Turning from your back to your side while in flat bed without using bedrails?  3  -LM  3  -MR    Moving from lying on back to sitting on the side of a flat bed without bedrails?  2  -LM  3  -MR    Moving to and from a bed to a chair (including a wheelchair)?  3  -LM  3  -MR    Standing up from a chair using your arms (e.g., wheelchair, bedside chair)?  3  -LM  3  -MR    Climbing 3-5 steps with a railing?  2  -LM  2  -MR    To walk in hospital room?  3  -LM  3  -MR    AM-PAC 6 Clicks Score (PT)  16  -LM  17  -MR    Row Name 07/18/21 0848 07/18/21 0844       Functional Assessment    Outcome Measure Options  AM-PAC 6 Clicks  Daily Activity (OT)  -AC  AM-PAC 6 Clicks Basic Mobility (PT)  -      User Key  (r) = Recorded By, (t) = Taken By, (c) = Cosigned By    Initials Name Provider Type    AC Ayla Alexis, OT Occupational Therapist    Teri Almazan, PT Physical Therapist    Bryan Gama, RN Registered Nurse        Physical Therapy Education                 Title: PT OT SLP Therapies (In Progress)     Topic: Physical Therapy (In Progress)     Point: Mobility training (Done)     Learning Progress Summary           Patient Acceptance, E, VU by  at 7/18/2021 0936                   Point: Home exercise program (Not Started)     Learner Progress:  Not documented in this visit.          Point: Precautions (Done)     Learning Progress Summary           Patient Acceptance, E, VU by  at 7/18/2021 0936                               User Key     Initials Effective Dates Name Provider Type Discipline     06/16/21 -  Teri Guillory, PT Physical Therapist PT              PT Recommendation and Plan  Planned Therapy Interventions (PT): balance training, bed mobility training, gait training, home exercise program, motor coordination training, neuromuscular re-education, patient/family education, postural re-education, stair training, ROM (range of motion), strengthening, stretching, transfer training  Plan of Care Reviewed With: patient  Outcome Summary: PT evaluation completed.  Pt transferred supine-->sit with ModAx1, stood with CGA, and ambulated 30 feet using rw with CGAx1.  Skilled PT services warranted to improve mobility and safety.  Pt lives alone and would benefit from inpt rehab at d/c.     Time Calculation:   PT Charges     Row Name 07/18/21 0844 07/18/21 0740          Time Calculation    Start Time  0844  -  0740  -     PT Received On  07/18/21  -  --     PT Goal Re-Cert Due Date  07/28/21  -  07/28/21  -        Timed Charges    31698 - Gait Training Minutes   15  -  --     50749 - PT Therapeutic Activity Minutes  23   -LM  --        Untimed Charges    PT Eval/Re-eval Minutes  --  30  -MC     Wound Care  --  26080 Unna boot;39216 Selective debridement  -     02732-Vytn Boot  --  10  -MC     81614-Zgyxzzvpg debridement  --  10  -MC        Total Minutes    Timed Charges Total Minutes  38  -LM  --     Untimed Charges Total Minutes  --  50  -MC      Total Minutes  38  -LM  50  -MC       User Key  (r) = Recorded By, (t) = Taken By, (c) = Cosigned By    Initials Name Provider Type    Teri Almazan, PT Physical Therapist    Radha Castillo PT Physical Therapist        Therapy Charges for Today     Code Description Service Date Service Provider Modifiers Qty    [id number] HC PT THERAPEUTIC ACT EA 15 MIN 7/18/2021 Teri Guillory, PT GP 2    59072328575 HC GAIT TRAINING EA 15 MIN 7/18/2021 Teri Guillory, PT GP 1          PT G-Codes  Outcome Measure Options: AM-PAC 6 Clicks Daily Activity (OT)  AM-PAC 6 Clicks Score (PT): 16  AM-PAC 6 Clicks Score (OT): 15    Teri Guillory PT  7/18/2021

## 2021-07-18 NOTE — PLAN OF CARE
VSS on RA, c/o pain to R knee, see MAR, dsg in place to RLE, rested on and off during shift, will continue to monitor

## 2021-07-18 NOTE — PLAN OF CARE
Goal Outcome Evaluation:  Plan of Care Reviewed With: patient           Outcome Summary: PT evaluation completed.  Pt transferred supine-->sit with ModAx1, stood with CGA, and ambulated 30 feet using rw with CGAx1.  Skilled PT services warranted to improve mobility and safety.  Pt lives alone and would benefit from inpt rehab at d/c.

## 2021-07-18 NOTE — PLAN OF CARE
Goal Outcome Evaluation:  Plan of Care Reviewed With: patient         PT VSS on RA. NSR on monitor. PT WOC performed dressing change on right lower extremity, kimberly boot now in place. C/o pain in right knee and right ankle. PO tylenol given and lidocaine patch placed on right knee. Pt resting in bed, call light within reach.

## 2021-07-18 NOTE — PLAN OF CARE
Goal Outcome Evaluation:  Plan of Care Reviewed With: patient           Outcome Summary: PT Wound care eval complete. Pt with several open areas to the R lower leg with blistering, periwound erythema, warmth, and mild taut edema. Pt will benefit from skilled PT wound care to promote healing. Anticipate unna boot change in 2-3 days.

## 2021-07-18 NOTE — THERAPY EVALUATION
Patient Name: Cordell Martin  : 1945    MRN: [id number]                              Today's Date: 2021       Admit Date: 2021    Visit Dx:     ICD-10-CM ICD-9-CM   1. Cellulitis of right leg  L03.115 682.6     Patient Active Problem List   Diagnosis   • Anxiety   • Arthritis   • Depression   • Hyperlipidemia   • Lumbar stenosis with neurogenic claudication   • Parkinson's disease (CMS/HCC)   • Lumbar stenosis with neurogenic claudication status post L4-5 decompression   • Status post lumbar laminectomy   • Memory loss   • Chronic edema   • Cellulitis of right lower extremity   • Exertional dyspnea   • Right knee pain   • Cellulitis of lower extremity   • Hypothyroidism (acquired)   • Elevated serum creatinine     Past Medical History:   Diagnosis Date   • Anxiety    • Arthritis    • Back pain    • Bronchitis    • Cognitive impairment    • Depression    • Disease of thyroid gland    • Glucose intolerance (impaired glucose tolerance)    • Hyperlipidemia    • Kidney stone    • Obesity    • Parkinson disease (CMS/HCC)    • Pneumonia    • Prostate disorder    • Thyroid disease    • Wears eyeglasses      Past Surgical History:   Procedure Laterality Date   • COLONOSCOPY      5 years ago   • EYE SURGERY     • HERNIA REPAIR  10/20/2020   • LUMBAR LAMINECTOMY DISCECTOMY DECOMPRESSION N/A 2017    Procedure: LUMBAR LAMINECTOMY L4-5;  Surgeon: Antonio Trent MD;  Location: [facility];  Service:    • SHOULDER ROTATOR CUFF REPAIR Right     x2   • TONSILLECTOMY       General Information     Row Name 21 0848          OT Time and Intention    Document Type  evaluation  -AC     Mode of Treatment  occupational therapy  -AC     Row Name 21 0848          General Information    Patient Profile Reviewed  yes  -AC     Prior Level of Function  independent:;all household mobility;ADL's  -AC     Existing Precautions/Restrictions  fall;other (see comments) Parkinson's Disease; R Knee/Ankle Pain  -AC      Barriers to Rehab  medically complex Parkinson's  -     Row Name 07/18/21 0848          Living Environment    Lives With  alone  -     Row Name 07/18/21 0848          Home Main Entrance    Number of Stairs, Main Entrance  two  -AC     Stair Railings, Main Entrance  none  -AC     Row Name 07/18/21 0848          Stairs Within Home, Primary    Stairs, Within Home, Primary  has stair lift to second level,  tub shower with grab bar, shower seat and handheld shower  -     Number of Stairs, Within Home, Primary  other (see comments) 15  -AC     Row Name 07/18/21 0848          Cognition    Orientation Status (Cognition)  oriented x 3  -AC     Row Name 07/18/21 0848          Safety Issues, Functional Mobility    Safety Issues Affecting Function (Mobility)  insight into deficits/self-awareness;safety precaution awareness;safety precautions follow-through/compliance;sequencing abilities  -     Impairments Affecting Function (Mobility)  balance;endurance/activity tolerance;range of motion (ROM);strength;pain;motor control;coordination  -       User Key  (r) = Recorded By, (t) = Taken By, (c) = Cosigned By    Initials Name Provider Type     Ayla Alexis, OT Occupational Therapist        Lymphedema     Row Name 07/18/21 0740             Lymphedema Edema Assessment    Ptting Edema Category  By severity  -      Pitting Edema  Mild  -      Recorded by [MC] Radha Parks, PT              Skin Changes/Observations    Location/Assessment  Lower Extremity  -      Lower Extremity Conditions  right:;clean;dry;shiny;hairless;crust;scab(s);inflamed;fragile  -      Lower Extremity Color/Pigment  right:;erythema;red;brawny  -      Recorded by [MC] Radha Parks, PT              Lymphedema Pulses/Capillary Refill    Lymphedema Pulses/Capillary Refill  lower extremity pulses;capillary refill  -      Dorsalis Pedis Pulse  right:;+1 diminished  -MC      Posterior Tibialis Pulse  right:;+1 diminished  -MC       "Capillary Refill  lower extremity capillary refill  -      Lower Extremity Capillary Refill  right:;less than 3 seconds  -      Recorded by [] Radha Parks, PT              Compression/Skin Care    Compression/Skin Care  skin care;wrapping location;bandaging  -      Skin Care  washed/dried  -      Wrapping Location  lower extremity  -      Wrapping Location LE  right:;foot to knee  -      Wrapping Comments  wound dressings, unna boot, 4\" coban, size 5 spandage  -      Bandaging Technique  figure-eight;light compression  -      Recorded by [MC] Radha Parks, PT        User Key  (r) = Recorded By, (t) = Taken By, (c) = Cosigned By    Initials Name Effective Dates     Radha Parks, PT 06/16/21 -         Mobility/ADL's     Row Name 07/18/21 0848          Bed Mobility    Bed Mobility  supine-sit  -     Supine-Sit [place] (Bed Mobility)  verbal cues;moderate assist (50% patient effort)  -     Assistive Device (Bed Mobility)  bed rails;head of bed elevated  -     Row Name 07/18/21 0848          Transfers    Transfers  sit-stand transfer  -     Sit-Stand [place] (Transfers)  verbal cues;contact guard elevated bed surface  -     Row Name 07/18/21 0848          Sit-Stand Transfer    Assistive Device (Sit-Stand Transfers)  walker, front-wheeled  -     Row Name 07/18/21 0848          Functional Mobility    Functional Mobility- Ind. Level  contact guard assist  -     Functional Mobility- Device  rolling walker  -     Functional Mobility-Distance (Feet)  30  -     Functional Mobility- Safety Issues  step length decreased  -     Row Name 07/18/21 0848          Activities of Daily Living    BADL Assessment/Intervention  lower body dressing;upper body dressing  -     Row Name 07/18/21 0848          Lower Body Dressing Assessment/Training    [place] Level (Lower Body Dressing)  doff;don;socks;dependent (less than 25% patient effort)  -     Position (Lower " Body Dressing)  edge of bed sitting  -AC     Comment (Lower Body Dressing)  has Ae at home to assist  -     Row Name 07/18/21 0848          Upper Body Dressing Assessment/Training    [place] Level (Upper Body Dressing)  don;minimum assist (75% patient effort) back gown  -     Position (Upper Body Dressing)  edge of bed sitting  -AC       User Key  (r) = Recorded By, (t) = Taken By, (c) = Cosigned By    Initials Name Provider Type    Ayla Woodruff, OT Occupational Therapist        Obj/Interventions     Row Name 07/18/21 0822          Sensory Assessment (Somatosensory)    Sensory Assessment (Somatosensory)  right UE  -     Sensory Subjective Reports  numbness fingertips  -     Row Name 07/18/21 0822          Vision Assessment/Intervention    Visual Impairment/Limitations  corrective lenses for reading  -     Row Name 07/18/21 0822          Range of Motion Comprehensive    General Range of Motion  bilateral upper extremity ROM WNL  -     Row Name 07/18/21 0822          Strength Comprehensive (MMT)    Comment, General Manual Muscle Testing (MMT) Assessment  R shld 3+/5,  R distally 4-/5,  LUE grossly 4/5  -     Row Name 07/18/21 0822          Motor Skills    Motor Skills  coordination pill rolling/ tremor right  -AC       User Key  (r) = Recorded By, (t) = Taken By, (c) = Cosigned By    Initials Name Provider Type    Ayla Woodruff, OT Occupational Therapist        Goals/Plan     Row Name 07/18/21 0848          Bed Mobility Goal 1 (OT)    Activity/Assistive Device (Bed Mobility Goal 1, OT)  sit to supine;supine to sit  -     [place] Level/Cues Needed (Bed Mobility Goal 1, OT)  minimum assist (75% or more patient effort)  -     Time Frame (Bed Mobility Goal 1, OT)  by discharge  -     Progress/Outcomes (Bed Mobility Goal 1, OT)  goal ongoing  -     Row Name 07/18/21 0848          Transfer Goal 1 (OT)    Activity/Assistive Device (Transfer Goal 1, OT)   bed-to-chair/chair-to-bed;toilet;commode;walker, rolling  -AC     [place] Level/Cues Needed (Transfer Goal 1, OT)  supervision required  -AC     Time Frame (Transfer Goal 1, OT)  by discharge  -AC     Progress/Outcome (Transfer Goal 1, OT)  goal ongoing  -AC     Row Name 07/18/21 0848          Toileting Goal 1 (OT)    Activity/Device (Toileting Goal 1, OT)  adjust/manage clothing;perform perineal hygiene;grab bar/safety frame  -AC     [place] Level/Cues Needed (Toileting Goal 1, OT)  supervision required  -AC     Time Frame (Toileting Goal 1, OT)  by discharge  -AC     Progress/Outcome (Toileting Goal 1, OT)  goal ongoing  -AC     Row Name 07/18/21 0848          Therapy Assessment/Plan (OT)    Planned Therapy Interventions (OT)  activity tolerance training;BADL retraining;functional balance retraining;occupation/activity based interventions;patient/caregiver education/training;transfer/mobility retraining  -AC       User Key  (r) = Recorded By, (t) = Taken By, (c) = Cosigned By    Initials Name Provider Type    AC Ayla Alexis, OT Occupational Therapist        Clinical Impression     Row Name 07/18/21 0848          Pain Assessment    Additional Documentation  Pain Scale: Numbers Pre/Post-Treatment (Group)  -AC     Row Name 07/18/21 0848          Pain Scale: Numbers Pre/Post-Treatment    Pretreatment Pain Rating  6/10  -AC     Posttreatment Pain Rating  6/10  -AC     Pain Location - Side  Right  -AC     Pain Location  foot;knee  -AC     Pain Intervention(s)  Repositioned  -AC     Row Name 07/18/21 0848          Plan of Care Review    Plan of Care Reviewed With  patient  -AC     Outcome Summary  OT eval complete.  Pt with h/o Parkinson's, R UE tremor. Pt presents with RLE pain, weakness and decreased activity tolerance limiting independence with self care.  Pt mod A supine to sit,  dependent to don socks ( uses Ae at home),  mod A to don back gown, CGA STS,  CGA to ambulate 30 ft with RW.  OT will follow  to advance pt toward increased independence with self care.  Recommend IP rehab upon d/c.  -     Row Name 07/18/21 0848          Therapy Assessment/Plan (OT)    Criteria for Skilled Therapeutic Interventions Met (OT)  yes;skilled treatment is necessary  -     Therapy Frequency (OT)  daily  -     Row Name 07/18/21 0848          Therapy Plan Review/Discharge Plan (OT)    Anticipated Discharge Disposition (OT)  inpatient rehabilitation facility  -     Row Name 07/18/21 0848          Vital Signs    Pre Systolic BP Rehab  109  -AC     Pre Treatment Diastolic BP  69  -AC     Post Systolic BP Rehab  127  -AC     Post Treatment Diastolic BP  67  -AC     Pretreatment Heart Rate (beats/min)  77  -AC     Posttreatment Heart Rate (beats/min)  84  -AC     Pre SpO2 (%)  95  -AC     O2 Delivery Pre Treatment  room air  -AC     O2 Delivery Intra Treatment  room air  -AC     Post SpO2 (%)  95  -AC     O2 Delivery Post Treatment  room air  -AC     Pre Patient Position  Supine  -AC     Intra Patient Position  Standing  -AC     Post Patient Position  Sitting  -AC     Row Name 07/18/21 0848          Positioning and Restraints    Pre-Treatment Position  in bed  -AC     Post Treatment Position  chair  -AC     In Chair  reclined;call light within reach;encouraged to call for assist;exit alarm on;waffle cushion  -       User Key  (r) = Recorded By, (t) = Taken By, (c) = Cosigned By    Initials Name Provider Type    AC Ayla Alexis, OT Occupational Therapist        Outcome Measures     Row Name 07/18/21 0848          How much help from another is currently needed...    Putting on and taking off regular lower body clothing?  2  -AC     Bathing (including washing, rinsing, and drying)  2  -AC     Toileting (which includes using toilet bed pan or urinal)  2  -AC     Putting on and taking off regular upper body clothing  3  -AC     Taking care of personal grooming (such as brushing teeth)  3  -AC     Eating meals  3  -AC     AM-PAC 6  Clicks Score (OT)  15  -AC     Row Name 07/18/21 0844 07/18/21 0841       How much help from another person do you currently need...    Turning from your back to your side while in flat bed without using bedrails?  3  -LM  3  -MR    Moving from lying on back to sitting on the side of a flat bed without bedrails?  2  -LM  3  -MR    Moving to and from a bed to a chair (including a wheelchair)?  3  -LM  3  -MR    Standing up from a chair using your arms (e.g., wheelchair, bedside chair)?  3  -LM  3  -MR    Climbing 3-5 steps with a railing?  2  -LM  2  -MR    To walk in hospital room?  3  -LM  3  -MR    AM-PAC 6 Clicks Score (PT)  16  -LM  17  -MR    Row Name 07/18/21 0848 07/18/21 0844       Functional Assessment    Outcome Measure Options  AM-PAC 6 Clicks Daily Activity (OT)  -  AM-PAC 6 Clicks Basic Mobility (PT)  -      User Key  (r) = Recorded By, (t) = Taken By, (c) = Cosigned By    Initials Name Provider Type    Ayla Woodruff, OT Occupational Therapist    Teri Almazan, PT Physical Therapist    Bryan Gama, RN Registered Nurse        Occupational Therapy Education                 Title: PT OT SLP Therapies (In Progress)     Topic: Occupational Therapy (In Progress)     Point: ADL training (Done)     Description:   Instruct learner(s) on proper safety adaptation and remediation techniques during self care or transfers.   Instruct in proper use of assistive devices.              Learning Progress Summary           Patient Acceptance, E, VU by  at 7/18/2021 0848    Comment: role of OT, benefits of activity                   Point: Home exercise program (Not Started)     Description:   Instruct learner(s) on appropriate technique for monitoring, assisting and/or progressing therapeutic exercises/activities.              Learner Progress:  Not documented in this visit.          Point: Precautions (Not Started)     Description:   Instruct learner(s) on prescribed precautions during self-care and  functional transfers.              Learner Progress:  Not documented in this visit.          Point: Body mechanics (Not Started)     Description:   Instruct learner(s) on proper positioning and spine alignment during self-care, functional mobility activities and/or exercises.              Learner Progress:  Not documented in this visit.                      User Key     Initials Effective Dates Name Provider Type Discipline     06/16/21 -  Ayla Alexis, OT Occupational Therapist OT              OT Recommendation and Plan  Planned Therapy Interventions (OT): activity tolerance training, BADL retraining, functional balance retraining, occupation/activity based interventions, patient/caregiver education/training, transfer/mobility retraining  Therapy Frequency (OT): daily  Plan of Care Review  Plan of Care Reviewed With: patient  Outcome Summary: OT eval complete.  Pt with h/o Parkinson's, R UE tremor. Pt presents with RLE pain, weakness and decreased activity tolerance limiting independence with self care.  Pt mod A supine to sit,  dependent to don socks ( uses Ae at home),  mod A to don back gown, CGA STS,  CGA to ambulate 30 ft with RW.  OT will follow to advance pt toward increased independence with self care.  Recommend IP rehab upon d/c.     Time Calculation:   Time Calculation- OT     Row Name 07/18/21 0848 07/18/21 0844          Time Calculation- OT    OT Start Time  0848  -AC  --     OT Received On  07/18/21  -  --     OT Goal Re-Cert Due Date  07/28/21  -AC  --        Timed Charges    43587 - Gait Training Minutes   --  15  -LM        Untimed Charges    OT Eval/Re-eval Minutes  60  -AC  --        Total Minutes    Timed Charges Total Minutes  --  15  -LM     Untimed Charges Total Minutes  60  -AC  --      Total Minutes  60  -AC  15  -LM       User Key  (r) = Recorded By, (t) = Taken By, (c) = Cosigned By    Initials Name Provider Type    AC Ayla Alexis, OT Occupational Therapist    Teri Almazan,  PT Physical Therapist        Therapy Charges for Today     Code Description Service Date Service Provider Modifiers Qty    [id number] HC OT EVAL LOW COMPLEXITY 4 7/18/2021 Ayla Alexis, OT GO 1               Ayla Alexis, OT  7/18/2021

## 2021-07-18 NOTE — THERAPY WOUND CARE TREATMENT
Acute Care - Wound/Debridement Initial Evaluation  [facility]     Patient Name: Cordell Martin  : 1945  MRN: [id number]  Today's Date: 2021                Admit Date: 2021    Visit Dx:    ICD-10-CM ICD-9-CM   1. Cellulitis of right leg  L03.115 682.6       Patient Active Problem List   Diagnosis   • Anxiety   • Arthritis   • Depression   • Hyperlipidemia   • Lumbar stenosis with neurogenic claudication   • Parkinson's disease (CMS/HCC)   • Lumbar stenosis with neurogenic claudication status post L4-5 decompression   • Status post lumbar laminectomy   • Memory loss   • Chronic edema   • Cellulitis of right lower extremity   • Exertional dyspnea   • Right knee pain   • Cellulitis of lower extremity   • Hypothyroidism (acquired)   • Elevated serum creatinine        Past Medical History:   Diagnosis Date   • Anxiety    • Arthritis    • Back pain    • Bronchitis    • Cognitive impairment    • Depression    • Disease of thyroid gland    • Glucose intolerance (impaired glucose tolerance)    • Hyperlipidemia    • Kidney stone    • Obesity    • Parkinson disease (CMS/HCC)    • Pneumonia    • Prostate disorder    • Thyroid disease    • Wears eyeglasses         Past Surgical History:   Procedure Laterality Date   • COLONOSCOPY      5 years ago   • EYE SURGERY     • HERNIA REPAIR  10/20/2020   • LUMBAR LAMINECTOMY DISCECTOMY DECOMPRESSION N/A 2017    Procedure: LUMBAR LAMINECTOMY L4-5;  Surgeon: Antonio Trent MD;  Location: formerly [facility];  Service:    • SHOULDER ROTATOR CUFF REPAIR Right     x2   • TONSILLECTOMY             Wound 21 0106 Right lower leg (Active)   Wound Image   21 0930   Dressing Appearance intact;dry 21 0740   Closure MARLO 21   Base moist;pink;red;yellow;slough;scab 21 0740   Periwound redness;swelling 21 0740   Periwound Temperature warm 21 0740   Periwound Skin Turgor firm 21 0740   Edges irregular;open 21 0740   Drainage  "Characteristics/Odor serous 07/18/21 0740   Drainage Amount small 07/18/21 0740   Care, Wound cleansed with;wound cleanser;debrided;kimberly boot 07/18/21 0740   Dressing Care dressing applied;silver impregnated;low-adherent;foam 07/18/21 0740   Periwound Care cleansed with pH balanced cleanser;dry periwound area maintained 07/18/21 0740       Wound 07/17/21 0106 Right medial calf (Active)   Dressing Appearance dry;intact 07/17/21 2000   Closure MARLO 07/17/21 2000   Periwound other (see comments);edematous 07/17/21 0930   Drainage Amount none 07/17/21 0930   Dressing Care petroleum-based;gauze 07/17/21 2000     Lymphedema     Row Name 07/18/21 0740             Lymphedema Edema Assessment    Ptting Edema Category  By severity  -      Pitting Edema  Mild  -         Skin Changes/Observations    Location/Assessment  Lower Extremity  -      Lower Extremity Conditions  right:;clean;dry;shiny;hairless;crust;scab(s);inflamed;fragile  -      Lower Extremity Color/Pigment  right:;erythema;red;brawny  -         Lymphedema Pulses/Capillary Refill    Lymphedema Pulses/Capillary Refill  lower extremity pulses;capillary refill  -      Dorsalis Pedis Pulse  right:;+1 diminished  -      Posterior Tibialis Pulse  right:;+1 diminished  -      Capillary Refill  lower extremity capillary refill  -      Lower Extremity Capillary Refill  right:;less than 3 seconds  -         Compression/Skin Care    Compression/Skin Care  skin care;wrapping location;bandaging  -      Skin Care  washed/dried  -      Wrapping Location  lower extremity  -      Wrapping Location LE  right:;foot to knee  -      Wrapping Comments  wound dressings, unna boot, 4\" coban, size 5 spandage  -      Bandaging Technique  figure-eight;light compression  -        User Key  (r) = Recorded By, (t) = Taken By, (c) = Cosigned By    Initials Name Provider Type    Radha Castillo, PT Physical Therapist          WOUND DEBRIDEMENT  Total area of " Debridement: 8 cm2  Debridement Site 1  Location- Site 1: RLE  Selective Debridement- Site 1: Wound Surface <20cmsq  Instruments- Site 1: tweezers  Excised Tissue Description- Site 1: moderate, slough, other (comment) (scabbing, crusting, blistered skin)  Bleeding- Site 1: none                  PT Assessment (last 12 hours)      PT Evaluation and Treatment     Row Name 07/18/21 0740          Physical Therapy Time and Intention    Subjective Information  complains of;pain  -     Document Type  wound care;evaluation  -     Mode of Treatment  physical therapy;individual therapy  -     Row Name 07/18/21 0740          General Information    Patient Profile Reviewed  yes  -     Patient Observations  alert;cooperative;agree to therapy  -     Risks Reviewed  patient:;increased discomfort  -     Benefits Reviewed  patient:;improve skin integrity  -     Barriers to Rehab  none identified  -[facility] Name 07/18/21 0740          Cognition    Orientation Status (Cognition)  oriented x 3  -MC     Row Name 07/18/21 0740          Pain    Additional Documentation  Pain Scale: Numbers Pre/Post-Treatment (Group)  -MC     Row Name 07/18/21 0740          Pain Scale: Numbers Pre/Post-Treatment    Pretreatment Pain Rating  3/10  -     Posttreatment Pain Rating  3/10  -     Pain Location - Side  Right  -     Pain Location - Orientation  lower  -     Pain Location  extremity  -     Pre/Posttreatment Pain Comment  tolerated  -[facility] Name 07/18/21 0740          Pain Scale: Word Pre/Post-Treatment    Pain Intervention(s)  Rest  -[facility] Name 07/18/21 0740          Wound 07/17/21 0106 Right lower leg    Wound - Properties Group Placement Date: 07/17/21  -AS Placement Time: 0106  -AS Present on Hospital Admission: Y  -AS Side: Right  -AS Orientation: lower  -AS Location: leg  -AS    Dressing Appearance  intact;dry  -     Base  moist;pink;red;yellow;slough;scab  -     Periwound  redness;swelling  -      Periwound Temperature  warm  -     Periwound Skin Turgor  firm  -     Edges  irregular;open  -     Wound Length (cm)  -- multiple open areas  -     Drainage Characteristics/Odor  serous  -     Drainage Amount  small  -     Care, Wound  cleansed with;wound cleanser;debrided;kimberly boot  -MC     Dressing Care  dressing applied;silver impregnated;low-adherent;foam mepilex Ag over open areas, unna boot  -MC     Periwound Care  cleansed with pH balanced cleanser;dry periwound area maintained  -     Retired Wound - Properties Group Date first assessed: 07/17/21  -AS Time first assessed: 0106  -AS Present on Hospital Admission: Y  -AS Side: Right  -AS Location: leg  -AS    Row Name             Wound 07/17/21 0106 Right medial calf    Wound - Properties Group Placement Date: 07/17/21  -AS Placement Time: 0106  -AS Present on Hospital Admission: Y  -AS Side: Right  -AS Orientation: medial  -AS Location: calf  -AS    Retired Wound - Properties Group Date first assessed: 07/17/21  -AS Time first assessed: 0106  -AS Present on Hospital Admission: Y  -AS Side: Right  -AS Location: calf  -AS    Row Name 07/18/21 0740          Coping    Observed Emotional State  calm;cooperative;pleasant  -     Verbalized Emotional State  acceptance  -     Row Name 07/18/21 0740          Plan of Care Review    Plan of Care Reviewed With  patient  -MC     Outcome Summary  PT Wound care eval complete. Pt with several open areas to the R lower leg with blistering, periwound erythema, warmth, and mild taut edema. Pt will benefit from skilled PT wound care to promote healing. Anticipate unna boot change in 2-3 days.  -     Row Name 07/18/21 0740          Physical Therapy Goals    Wound Care Goal Selection (PT)  wound care, PT goal 1  -     Row Name 07/18/21 0740          Wound Care Goal 1 (PT)    Wound Care Goal 1 (PT)  Pt will demonstrate trace/no edema to the RLE to indicate healing progress.  -     Time Frame (Wound Care Goal 1,  PT)  10 days  -     Row Name 07/18/21 0740          Positioning and Restraints    Pre-Treatment Position  in bed  -     Post Treatment Position  bed  -     In Bed  supine;call light within reach;encouraged to call for assist  -     Row Name 07/18/21 0740          Therapy Assessment/Plan (PT)    Patient/Family Therapy Goals Statement (PT)  heal RLE  -     Rehab Potential (PT)  good, to achieve stated therapy goals  -     Criteria for Skilled Interventions Met (PT)  yes;meets criteria;skilled treatment is necessary  -     Row Name 07/18/21 0740          PT Evaluation Complexity    History, PT Evaluation Complexity  1-2 personal factors and/or comorbidities  -     Examination of Body Systems (PT Eval Complexity)  1-2 elements  -     Clinical Presentation (PT Evaluation Complexity)  stable  -     Clinical Decision Making (PT Evaluation Complexity)  low complexity  -     Overall Complexity (PT Evaluation Complexity)  low complexity  -       User Key  (r) = Recorded By, (t) = Taken By, (c) = Cosigned By    Initials Name Provider Type     Radha Parks, PT Physical Therapist    AS Anai Thomas RN Registered Nurse            Recommendation and Plan  Anticipated Discharge Disposition (PT): other (see comments) (defer to PT mobility, will need wound care upon d/c)  Planned Therapy Interventions (PT): wound care  Plan of Care Reviewed With: patient           Outcome Summary: PT Wound care eval complete. Pt with several open areas to the R lower leg with blistering, periwound erythema, warmth, and mild taut edema. Pt will benefit from skilled PT wound care to promote healing. Anticipate unna boot change in 2-3 days.  Plan of Care Reviewed With: patient            Time Calculation  PT Charges     Row Name 07/18/21 0740             Time Calculation    Start Time  0740  -      PT Goal Re-Cert Due Date  07/28/21  -         Untimed Charges    PT Eval/Re-eval Minutes  30  -      Wound Care   26490 Unna boot;90049 Selective debridement  -MC      28918-Ovlx Boot  10  -MC      49033-Gqjujsqtg debridement  10  -MC         Total Minutes    Untimed Charges Total Minutes  50  -MC       Total Minutes  50  -MC        User Key  (r) = Recorded By, (t) = Taken By, (c) = Cosigned By    Initials Name Provider Type     Radha Parks, PT Physical Therapist            Therapy Charges for Today     Code Description Service Date Service Provider Modifiers Qty    58299266014 HC PT EVAL LOW COMPLEXITY 2 7/18/2021 Radha Parks, PT GP 1    66087054201 HC MIAH DEBRIDE OPEN WOUND UP TO 20CM 7/18/2021 Radha Parks, PT GP 1    39809174205 HC PT STAPPING JEANETTEA BOOT 7/18/2021 Radha Parks, PT GP 1            PT G-Codes  AM-PAC 6 Clicks Score (PT): 15       Radha Parks, PT  7/18/2021

## 2021-07-18 NOTE — PLAN OF CARE
Problem: Adult Inpatient Plan of Care  Goal: Plan of Care Review  Recent Flowsheet Documentation  Taken 7/18/2021 0848 by Ayla Alexis OT  Plan of Care Reviewed With: patient  Outcome Summary: OT annabel complete.  Pt with h/o Parkinson's, R UE tremor. Pt presents with RLE pain, weakness and decreased activity tolerance limiting independence with self care.  Pt mod A supine to sit,  dependent to don socks ( uses Ae at home),  mod A to don back gown, CGA STS,  CGA to ambulate 30 ft with RW.  OT will follow to advance pt toward increased independence with self care.  Recommend IP rehab upon d/c.      Activity  • For the rest of the day, do NOT:  • Work  • Stay alone  • Drive a car   • Operate electrical/power tools or appliances  • Drink alcohol  • Sign any legal papers  • Apply heat to the injection site    · You may:  · Apply ice to the injection site for up to 15 minutes at a time for comfort as long as ice is sealed in a water-tight bag so that injection site or dressings do not become wet.  · Resume activity as tolerated today  · Resume your pre-procedure activity or restrictions tomorrow.    Dressing Care  • Keep injection site clean and dry.   • You may use an ice pack on and off over the injection site for the next 24 hours while awake.    Bathing  • You may shower tomorrow and bathe in two days.    Diet  · Resume your normal pre-procedure diet today.  · If you are Diabetic and received steroids today, please monitor your blood sugars throughout the day for at least the next 4 days and follow a strict diabetic diet and avoid sugars, and simple carbohydrates such as sweets, candy, regular soda. Focus on Vegetables, proteins and complex carbohydrates.    Medication  • Continue home medications, unless otherwise instructed.  • If you had an Epidural Steroid injection please do not take NSAIDS or blood thinning medicine for 24 hours (Aspirin, Mobic, Aleve, Ibuprofen, Diclofenac, Plavix, Xarelto, Coumadin, fish oil, ect.)     Follow Up  · We will call you in four weeks evaluate the effectiveness of the procedure, and plan for next steps.      Call Dr. Mensah office at [phone] if you have the following:  · Drainage, increase in redness and/or swelling from the injection site. If there is a dressing/Band-Aid over the injection site, some spotting of the dressings over the injection site is normal, but drainage that pools, soaks, or drips from the dressing is not normal.  · Temperature above 101 degrees, chills, sweats, or body aches.  · Numbness or weakness of your legs or arms, different than before the  injection.  · Severe headache.

## 2021-07-18 NOTE — PROGRESS NOTES
[facility] Medicine Services  Clinical Decision Unit  PROGRESS NOTE    Patient Name: Cordell Martin  : 1945  MRN: [id number]    Admission Date and Time: 2021  7:15 PM  Primary Care Physician: Ben Holder DO    Subjective   Subjective     CC:  F/U RLE cellulitis    HPI:  Unna boot placed by PT.  He has been able to ambulate some but still with pain in right knee and ankle with weight bearing.  No pain with range of motion.    Review of Systems  Gen- No fevers, chills  CV- No chest pain, palpitations  Resp- No cough, dyspnea  GI- No N/V/D, abd pain    Objective   Objective     Vital Signs:   Temp:  [97.5 °F (36.4 °C)-97.7 °F (36.5 °C)] 97.7 °F (36.5 °C)  Heart Rate:  [65-81] 73  Resp:  [16-18] 16  BP: (102-129)/(56-70) 109/69     Physical Exam:  Constitutional: No acute distress, awake, alert, sitting up in bed  HENT: NCAT, mucous membranes moist  Respiratory: Clear to auscultation bilaterally, respiratory effort normal   Cardiovascular: RRR, no murmurs, rubs, or gallops  Gastrointestinal: Positive bowel sounds, soft, nontender, nondistended  Musculoskeletal: Unna boots in place.  Right knee without erythema or warmth.  Normal ROM of right knee and ankle without pain  Psychiatric: Appropriate affect, cooperative  Neurologic: Cranial Nerves grossly intact to confrontation, speech clear  Skin: No rashes on exposed surfaces    Result Review:  I have personally reviewed the results from the time of admission and agree with these findings:  [x]  Laboratory  []  Radiology  []  EKG/Telemetry   []  Pathology  []  Old records  []  Other:  Most notable findings include:    LAB RESULTS:      Lab 21  0348 21  0353 21  1743   WBC 5.13 5.93 6.71   HEMOGLOBIN 11.4* 11.4* 12.0*   HEMATOCRIT 36.2* 36.6* 38.0   PLATELETS 226 233 233   NEUTROS ABS  --  3.44 4.38   IMMATURE GRANS (ABS)  --  0.03 0.02   LYMPHS ABS  --  1.75 1.58   MONOS ABS  --  0.45 0.46   EOS ABS   --  0.23 0.25   MCV 97.1* 95.8 96.2   SED RATE  --   --  19   CRP  --   --  <0.30   PROCALCITONIN  --   --  0.09         Lab 07/18/21  0348 07/17/21  0353 07/16/21  1743   SODIUM 141 141 143   POTASSIUM 3.9 3.9 4.7   CHLORIDE 106 103 104   CO2 25.0 29.0 29.0   ANION GAP 10.0 9.0 10.0   BUN 23 22 24*   CREATININE 1.06 1.31* 1.33*   GLUCOSE 115* 135* 112*   CALCIUM 8.8 9.3 9.6   MAGNESIUM  --  2.2  --          Lab 07/16/21  1743   TOTAL PROTEIN 6.8   ALBUMIN 3.80   GLOBULIN 3.0   ALT (SGPT) <5   AST (SGOT) 17   BILIRUBIN 0.5   ALK PHOS 112         Lab 07/16/21  1743   PROBNP 69.1                 Brief Urine Lab Results  (Last result in the past 365 days)      Color   Clarity   Blood   Leuk Est   Nitrite   Protein   CREAT   Urine HCG        01/05/21 1443 Yellow Clear Negative Negative Negative Negative             Microbiology Results (last 10 days)     Procedure Component Value - Date/Time    Wound Culture - Wound, Leg, Right [[id number]] Collected: 07/17/21 0154    Lab Status: Preliminary result Specimen: Wound from Leg, Right Updated: 07/18/21 0729     Wound Culture No growth     Gram Stain No WBCs or organisms seen    COVID-19 and FLU A/B PCR - Swab, Nasopharynx [[id number]]  (Normal) Collected: 07/16/21 2218    Lab Status: Final result Specimen: Swab from Nasopharynx Updated: 07/16/21 2303     COVID19 Not Detected     Influenza A PCR Not Detected     Influenza B PCR Not Detected    Narrative:      Fact sheet for providers: https://www.fda.gov/media/017609/download    Fact sheet for patients: https://www.fda.gov/media/835898/download    Test performed by PCR.           Results for orders placed during the hospital encounter of 01/12/21    Adult Transthoracic Echo Complete W/ Cont if Necessary Per Protocol    Interpretation Summary  · The right ventricle and right atrium are moderately dilated. Other chamber sizes and wall thicknesses are normal.  · Global and segmental LV wall motion is normal. The estimated left  "ventricular ejection fraction is 51% - 55%.  · The aortic and mitral valves are normal in structure and function.  · The estimated RV systolic pressure is mildly elevated 35 mmHg - 40 mmHg. There is as well noted to be less than 50% inspiratory IVC collapse. The main pulmonary artery is \"borderline dilated\".  · There are no other important findings on this study.        Assessment/Plan   Assessment / Plan     Active Hospital Problems    Diagnosis  POA   • **Cellulitis of lower extremity [L03.119]  Yes   • Hypothyroidism (acquired) [E03.9]  Unknown   • Elevated serum creatinine [R79.89]  Unknown   • Exertional dyspnea [R06.00]  Yes   • Parkinson's disease (CMS/HCC) [G20]  Yes   • Anxiety [F41.9]  Yes   • Depression [F32.9]  Yes   • Arthritis [M19.90]  Yes   • Hyperlipidemia [E78.5]  Yes      Resolved Hospital Problems   No resolved problems to display.        Brief course to date:  Cordell Martin is a 76 y.o. male who presented with right lower extremity cellulitis, bullous lesions that did not improve with outpatient clindamycin.    Plan:    RLE cellulitis with bullae  -Failed outpatient clindamycin  -Continue vancomycin and ceftriaxone-may be able to transition to PO doxycycline and cefdinir in the next few days if continued improvement  -PT wound care evaluation for compression wrap/unna boot assessment-anticipate changing unna boot in 2-3 days   -Outpatient referral to dermatology as there is some concern for bullous pemphigoid given appearance and recurrence.  This can be associated with neurological disorders such as Parkinson's disease.  -Reportedly previously had vascular workup outpatient for arterial insufficiency which was negative, has had numerous venous duplex negative for DVT  -Similar admission in January, improved with IV antibiotics and was transitioned to PO at discharge.   -Wound culture NGTD.  Previously had positive MRSA PCR of the nares.     Exertional dyspnea  -CXR no acute " findings  -Echocardiogram from 1/12/21 shows LVEF 51-55%  -proBNP 69.1     Elevated creatinine/CKD III  -S/P gentle hydration     Arthritis  -PT/OT to evaluate for ambulation  -Acetaminophen PRN pain  -Reportedly has been following with orthopedic surgery outpatient and recently had X-rays by [facility] in [facility].  ESR and CRP are normal.  Exam does not appear consistent with septic arthritis     Parkinson's disease  -Continue sinemet, mirapex     BPH  -Continue flomax     Hypothyroidism  -Continue levothyroxine     Hyperlipidemia  -Continue atorvastatin    DVT prophylaxis:  Medical DVT prophylaxis orders are present.     Discharge readiness criteria:   1. Improvement in cellulitis  2. Able to ambulate safely  3. Final antibiotic plan    Sadie Martin MD  07/18/21

## 2021-07-19 ENCOUNTER — READMISSION MANAGEMENT (OUTPATIENT)
Dept: CALL CENTER | Facility: HOSPITAL | Age: 76
End: 2021-07-19

## 2021-07-19 VITALS
TEMPERATURE: 97.3 F | HEART RATE: 79 BPM | SYSTOLIC BLOOD PRESSURE: 107 MMHG | DIASTOLIC BLOOD PRESSURE: 59 MMHG | HEIGHT: 72 IN | RESPIRATION RATE: 18 BRPM | OXYGEN SATURATION: 94 % | WEIGHT: 257.8 LBS | BODY MASS INDEX: 34.92 KG/M2

## 2021-07-19 PROBLEM — L03.115 CELLULITIS OF RIGHT LEG: Status: ACTIVE | Noted: 2021-07-19

## 2021-07-19 LAB
ANION GAP SERPL CALCULATED.3IONS-SCNC: 8 MMOL/L (ref 5–15)
BUN SERPL-MCNC: 21 MG/DL (ref 8–23)
BUN/CREAT SERPL: 16.7 (ref 7–25)
CALCIUM SPEC-SCNC: 9.3 MG/DL (ref 8.6–10.5)
CHLORIDE SERPL-SCNC: 105 MMOL/L (ref 98–107)
CO2 SERPL-SCNC: 26 MMOL/L (ref 22–29)
CREAT SERPL-MCNC: 1.26 MG/DL (ref 0.76–1.27)
DEPRECATED RDW RBC AUTO: 54.9 FL (ref 37–54)
ERYTHROCYTE [DISTWIDTH] IN BLOOD BY AUTOMATED COUNT: 15.4 % (ref 12.3–15.4)
GFR SERPL CREATININE-BSD FRML MDRD: 56 ML/MIN/1.73
GLUCOSE SERPL-MCNC: 104 MG/DL (ref 65–99)
HCT VFR BLD AUTO: 39.8 % (ref 37.5–51)
HGB BLD-MCNC: 12.4 G/DL (ref 13–17.7)
MCH RBC QN AUTO: 30.2 PG (ref 26.6–33)
MCHC RBC AUTO-ENTMCNC: 31.2 G/DL (ref 31.5–35.7)
MCV RBC AUTO: 97.1 FL (ref 79–97)
PLATELET # BLD AUTO: 227 10*3/MM3 (ref 140–450)
PMV BLD AUTO: 8.7 FL (ref 6–12)
POTASSIUM SERPL-SCNC: 4.2 MMOL/L (ref 3.5–5.2)
RBC # BLD AUTO: 4.1 10*6/MM3 (ref 4.14–5.8)
SODIUM SERPL-SCNC: 139 MMOL/L (ref 136–145)
VANCOMYCIN TROUGH SERPL-MCNC: 10 MCG/ML (ref 5–20)
WBC # BLD AUTO: 6.07 10*3/MM3 (ref 3.4–10.8)

## 2021-07-19 PROCEDURE — 25010000002 VANCOMYCIN 10 G RECONSTITUTED SOLUTION

## 2021-07-19 PROCEDURE — 80048 BASIC METABOLIC PNL TOTAL CA: CPT

## 2021-07-19 PROCEDURE — 25010000002 HEPARIN (PORCINE) PER 1000 UNITS: Performed by: NURSE PRACTITIONER

## 2021-07-19 PROCEDURE — 85027 COMPLETE CBC AUTOMATED: CPT | Performed by: INTERNAL MEDICINE

## 2021-07-19 PROCEDURE — 80202 ASSAY OF VANCOMYCIN: CPT

## 2021-07-19 PROCEDURE — 99239 HOSP IP/OBS DSCHRG MGMT >30: CPT | Performed by: INTERNAL MEDICINE

## 2021-07-19 RX ORDER — ACETAMINOPHEN 325 MG/1
650 TABLET ORAL EVERY 6 HOURS PRN
Qty: 28 TABLET | Refills: 0 | Status: SHIPPED | OUTPATIENT
Start: 2021-07-19

## 2021-07-19 RX ORDER — DOXYCYCLINE HYCLATE 100 MG/1
100 CAPSULE ORAL 2 TIMES DAILY
Qty: 14 CAPSULE | Refills: 0 | Status: SHIPPED | OUTPATIENT
Start: 2021-07-19 | End: 2021-07-26

## 2021-07-19 RX ORDER — FUROSEMIDE 40 MG/1
40 TABLET ORAL DAILY PRN
Qty: 14 TABLET | Refills: 0 | Status: SHIPPED | OUTPATIENT
Start: 2021-07-19 | End: 2022-07-06 | Stop reason: SDUPTHER

## 2021-07-19 RX ORDER — SILDENAFIL CITRATE 20 MG/1
20 TABLET ORAL DAILY PRN
Qty: 15 TABLET | Refills: 5
Start: 2021-07-19

## 2021-07-19 RX ORDER — CEFDINIR 300 MG/1
300 CAPSULE ORAL 2 TIMES DAILY
Qty: 14 CAPSULE | Refills: 0 | Status: SHIPPED | OUTPATIENT
Start: 2021-07-19 | End: 2021-07-26

## 2021-07-19 RX ORDER — SACCHAROMYCES BOULARDII 250 MG
250 CAPSULE ORAL 2 TIMES DAILY
Qty: 28 CAPSULE | Refills: 0 | Status: SHIPPED | OUTPATIENT
Start: 2021-07-19 | End: 2021-08-02

## 2021-07-19 RX ORDER — LIDOCAINE 50 MG/G
1 PATCH TOPICAL
Qty: 30 EACH | Refills: 1 | Status: SHIPPED | OUTPATIENT
Start: 2021-07-20

## 2021-07-19 RX ORDER — LORATADINE 10 MG/1
10 TABLET ORAL DAILY
Qty: 90 TABLET | Refills: 1
Start: 2021-07-19

## 2021-07-19 RX ADMIN — TAMSULOSIN HYDROCHLORIDE 0.4 MG: 0.4 CAPSULE ORAL at 08:27

## 2021-07-19 RX ADMIN — PRAMIPEXOLE DIHYDROCHLORIDE 1 MG: 0.25 TABLET ORAL at 08:27

## 2021-07-19 RX ADMIN — LIDOCAINE 1 PATCH: 50 PATCH CUTANEOUS at 08:27

## 2021-07-19 RX ADMIN — LEVOTHYROXINE SODIUM 88 MCG: 88 TABLET ORAL at 05:19

## 2021-07-19 RX ADMIN — Medication 5000 UNITS: at 08:27

## 2021-07-19 RX ADMIN — FLUTICASONE PROPIONATE 2 SPRAY: 50 SPRAY, METERED NASAL at 08:27

## 2021-07-19 RX ADMIN — ATORVASTATIN CALCIUM 40 MG: 40 TABLET, FILM COATED ORAL at 08:27

## 2021-07-19 RX ADMIN — CETIRIZINE HYDROCHLORIDE 10 MG: 10 TABLET, FILM COATED ORAL at 08:27

## 2021-07-19 RX ADMIN — OXYCODONE HYDROCHLORIDE AND ACETAMINOPHEN 500 MG: 500 TABLET ORAL at 08:27

## 2021-07-19 RX ADMIN — TRAMADOL HYDROCHLORIDE 50 MG: 50 TABLET, FILM COATED ORAL at 05:47

## 2021-07-19 RX ADMIN — HEPARIN SODIUM 5000 UNITS: 5000 INJECTION INTRAVENOUS; SUBCUTANEOUS at 05:19

## 2021-07-19 RX ADMIN — CARBIDOPA AND LEVODOPA 2 TABLET: 25; 100 TABLET ORAL at 08:27

## 2021-07-19 RX ADMIN — VANCOMYCIN HYDROCHLORIDE 1500 MG: 100 INJECTION, POWDER, LYOPHILIZED, FOR SOLUTION INTRAVENOUS at 06:35

## 2021-07-19 RX ADMIN — Medication: at 06:41

## 2021-07-19 NOTE — CASE MANAGEMENT/SOCIAL WORK
Case Management Discharge Note      Final Note: I confirmed with Francy at  the wound clinic at [facility] received the referral and they will call the pt at home this pm or am with an appointment.         Selected Continued Care - Discharged on 7/19/2021 Admission date: 7/16/2021 - Discharge disposition: Home or Self Care    Destination    No services have been selected for the patient.              Durable Medical Equipment    No services have been selected for the patient.              Dialysis/Infusion    No services have been selected for the patient.              Home Medical Care    No services have been selected for the patient.              Therapy    No services have been selected for the patient.              Community Resources    No services have been selected for the patient.              Community & DME    No services have been selected for the patient.                       Final Discharge Disposition Code: 01 - home or self-care

## 2021-07-19 NOTE — PLAN OF CARE
VSS on room air.  NSR on monitor.  XUAN boot to RLE applied by WO.  Patient complains of mild discomfort to right ankle but denies need for pain medication.  Will continue to monitor.   Problem: Adult Inpatient Plan of Care  Goal: Plan of Care Review  Outcome: Ongoing, Progressing  Goal: Patient-Specific Goal (Individualized)  Outcome: Ongoing, Progressing  Goal: Absence of Hospital-Acquired Illness or Injury  Outcome: Ongoing, Progressing  Intervention: Identify and Manage Fall Risk  Recent Flowsheet Documentation  Taken 7/18/2021 2116 by Kyra Vazquez RN  Safety Promotion/Fall Prevention:   activity supervised   assistive device/personal items within reach   clutter free environment maintained   nonskid shoes/slippers when out of bed   room organization consistent   safety round/check completed  Intervention: Prevent Skin Injury  Recent Flowsheet Documentation  Taken 7/19/2021 0200 by Kyra Vazquez RN  Body Position: position changed independently  Taken 7/19/2021 0000 by Kyra Vazquez RN  Body Position: position changed independently  Taken 7/18/2021 2200 by Kyra Vazquez RN  Body Position:   position changed independently   supine  Taken 7/18/2021 2116 by Kyra Vazquez RN  Body Position:   position changed independently   supine  Skin Protection:   adhesive use limited   tubing/devices free from skin contact   transparent dressing maintained  Taken 7/18/2021 2000 by Kyra Vazquez RN  Body Position:   position changed independently   sitting up in bed  Intervention: Prevent and Manage VTE (venous thromboembolism) Risk  Recent Flowsheet Documentation  Taken 7/18/2021 2116 by Kyra Vazquez RN  VTE Prevention/Management: (heparin )   bilateral   dorsiflexion/plantar flexion performed   other (see comments)  Intervention: Prevent Infection  Recent Flowsheet Documentation  Taken 7/18/2021 2116 by Kyra Vazquez RN  Infection Prevention:   environmental surveillance performed   personal  protective equipment utilized   single patient room provided   visitors restricted/screened  Goal: Optimal Comfort and Wellbeing  Outcome: Ongoing, Progressing  Intervention: Provide Person-Centered Care  Recent Flowsheet Documentation  Taken 7/19/2021 0200 by Kyra Vazquez RN  Trust Relationship/Rapport:   care explained   choices provided  Taken 7/19/2021 0000 by Kyra Vazquez RN  Trust Relationship/Rapport:   care explained   choices provided  Taken 7/18/2021 2200 by Kyra Vazquez RN  Trust Relationship/Rapport:   care explained   choices provided   questions answered   questions encouraged  Taken 7/18/2021 2116 by Kyra Vazquez RN  Trust Relationship/Rapport:   care explained   choices provided   questions answered  Taken 7/18/2021 2000 by Kyra Vazquez RN  Trust Relationship/Rapport:   care explained   choices provided   questions answered   questions encouraged  Goal: Readiness for Transition of Care  Outcome: Ongoing, Progressing     Problem: Pain Acute  Goal: Optimal Pain Control  Outcome: Ongoing, Progressing  Intervention: Develop Pain Management Plan  Recent Flowsheet Documentation  Taken 7/19/2021 0000 by Kyra Vazquez RN  Pain Management Interventions:   care clustered   no interventions per patient request  Taken 7/18/2021 2200 by Kyra Vazquez RN  Pain Management Interventions: care clustered  Taken 7/18/2021 2116 by Kyra Vazquez RN  Pain Management Interventions:   care clustered   no interventions per patient request  Intervention: Prevent or Manage Pain  Recent Flowsheet Documentation  Taken 7/19/2021 0200 by Kyra Vazquez RN  Sleep/Rest Enhancement: awakenings minimized  Taken 7/19/2021 0000 by Kyra Vazquez RN  Sleep/Rest Enhancement: awakenings minimized  Taken 7/18/2021 2200 by Kyra Vazquez RN  Sleep/Rest Enhancement: awakenings minimized  Taken 7/18/2021 2116 by Kyra Vazquez RN  Sensory Stimulation Regulation: lighting decreased  Sleep/Rest Enhancement:  awakenings minimized  Taken 7/18/2021 2000 by Kyra Vazquez RN  Sleep/Rest Enhancement: awakenings minimized  Intervention: Optimize Psychosocial Wellbeing  Recent Flowsheet Documentation  Taken 7/19/2021 0200 by Kyra Vazquez RN  Supportive Measures: active listening utilized  Diversional Activities: television  Taken 7/19/2021 0000 by Kyra Vazquez RN  Supportive Measures: active listening utilized  Diversional Activities: television  Taken 7/18/2021 2200 by Kyra Vazquez RN  Supportive Measures: active listening utilized  Diversional Activities: television  Taken 7/18/2021 2116 by Kyra Vazquez RN  Supportive Measures: active listening utilized  Diversional Activities: television  Taken 7/18/2021 2000 by Kyra Vazquez RN  Supportive Measures: active listening utilized  Diversional Activities: television     Problem: Fall Injury Risk  Goal: Absence of Fall and Fall-Related Injury  Outcome: Ongoing, Progressing  Intervention: Identify and Manage Contributors to Fall Injury Risk  Recent Flowsheet Documentation  Taken 7/18/2021 2116 by Kyra Vazquez RN  Medication Review/Management: medications reviewed  Intervention: Promote Injury-Free Environment  Recent Flowsheet Documentation  Taken 7/18/2021 2116 by Kyra Vazquez RN  Safety Promotion/Fall Prevention:   activity supervised   assistive device/personal items within reach   clutter free environment maintained   nonskid shoes/slippers when out of bed   room organization consistent   safety round/check completed     Problem: Skin or Soft Tissue Infection  Goal: Infection Symptom Resolution  Outcome: Ongoing, Progressing  Intervention: Provide Meticulous Infection Site Care  Recent Flowsheet Documentation  Taken 7/18/2021 2116 by Kyra Vazquez RN  Infection Prevention:   environmental surveillance performed   personal protective equipment utilized   single patient room provided   visitors restricted/screened  Intervention: Minimize and  Manage Infection Progression  Recent Flowsheet Documentation  Taken 7/18/2021 2116 by Kyra Vazquez RN  Infection Management: aseptic technique maintained     Problem: Skin Injury Risk Increased  Goal: Skin Health and Integrity  Outcome: Ongoing, Progressing  Intervention: Optimize Skin Protection  Recent Flowsheet Documentation  Taken 7/19/2021 0200 by Kyra Vazquez RN  Head of Bed (HOB): HOB at 30-45 degrees  Taken 7/19/2021 0000 by Kyra Vazquez RN  Head of Bed (HOB): HOB at 20-30 degrees  Taken 7/18/2021 2200 by Kyra Vazquez RN  Head of Bed (HOB): HOB at 20-30 degrees  Taken 7/18/2021 2116 by Kyra Vazquez RN  Pressure Reduction Techniques:   frequent weight shift encouraged   weight shift assistance provided  Head of Bed ([facility]): [facility] elevated  Pressure Reduction Devices: pressure-redistributing mattress utilized  Skin Protection:   adhesive use limited   tubing/devices free from skin contact   transparent dressing maintained  Taken 7/18/2021 2000 by Kyra Vazquez RN  Head of Bed (HOB): [facility] elevated

## 2021-07-19 NOTE — PROGRESS NOTES
"Pharmacy Consult-Vancomycin Dosing  Cordell Martin is a  76 y.o. male receiving vancomycin therapy.     Indication: SSTI  Consulting Provider: Ayla Nelson APRN  ID Consult: No    Goal AUC: 400 - 600 mg/L*hr    Current Antimicrobial Therapy  Vancomycin day 3  Ceftriaxone 1 gm q24h      Allergies  Allergies as of 07/16/2021    (No Known Allergies)       Labs  Results from last 7 days   Lab Units 07/19/21  0522 07/18/21  0348 07/17/21  0353   BUN mg/dL 21 23 22   CREATININE mg/dL 1.26 1.06 1.31*     Results from last 7 days   Lab Units 07/19/21  0522 07/18/21  0348 07/17/21  0353   WBC 10*3/mm3 6.07 5.13 5.93       Evaluation of Dosing    Is Patient on Dialysis or Renal Replacement: no                    Ht - 182.9 cm (72\")  Wt - 117 kg (257 lb 12.8 oz)    Estimated Creatinine Clearance: 67.9 mL/min (by C-G formula based on SCr of 1.26 mg/dL).  Intake & Output (last 3 days)         07/16 0701 - 07/17 0700 07/17 0701 - 07/18 0700 07/18 0701 - 07/19 0700    P.O.  956     Total Intake(mL/kg)  956 (8.1)     Urine (mL/kg/hr) 1000 2300 (0.8) 3550 (1.3)    Stool   0    Total Output 1000 2300 3550    Net -1000 -1344 -3550           Urine Unmeasured Occurrence   2 x    Stool Unmeasured Occurrence   1 x            Microbiology and Radiology  Microbiology Results (last 10 days)       Procedure Component Value - Date/Time    Wound Culture - Wound, Leg, Right [[id number]] Collected: 07/17/21 0154    Lab Status: Preliminary result Specimen: Wound from Leg, Right Updated: 07/18/21 0729     Wound Culture No growth     Gram Stain No WBCs or organisms seen    COVID-19 and FLU A/B PCR - Swab, Nasopharynx [[id number]]  (Normal) Collected: 07/16/21 2218    Lab Status: Final result Specimen: Swab from Nasopharynx Updated: 07/16/21 2303     COVID19 Not Detected     Influenza A PCR Not Detected     Influenza B PCR Not Detected    Narrative:      Fact sheet for providers: https://www.fda.gov/media/516806/download    Fact sheet for " patients: https://www.fda.gov/media/737313/download    Test performed by PCR.            Reported Vancomycin Levels          Results from last 7 days   Lab Units 07/19/21  0522   VANCOMYCIN TR mcg/mL 10.00          InsightRX AUC Calculation:  Current AUC:  323 mg/L*hr    Predicted Steady State AUC on Current Dose: 433 mg/L*hr (1500 mg Q24H)  Model Fit: Good  _________________________________  Predicted Steady State AUC on New Dose:   -- mg/L*hr    Assessment/Plan:  Vancomycin dosing for SSTI  Goal AUC: 400-600 mg/L*hr  7/19 SCr 1.26 mg/dL increased from 1.06 mg/dL (7/18)  7/19 WBC 6.07    Continue Vancomycin 1500 mg IV Q24H due at 06:00  No further levels ordered at this time  Last dose currently scheduled for 7/21/21    Trevor Alfaro [facility]  7/19/21 06:35

## 2021-07-19 NOTE — DISCHARGE PLACEMENT REQUEST
Mario Cordell Ortiz (76 y.o. Male)   [facility]  From Vielka Nguyen [phone]    [facility] cell # [phone]  [address] [address]  Phone:  [phone]  Fax:  [phone] Date: 2021      Ambulatory Referral to Wound Clinic     Patient:  Cordell Martin MRN:  [id number]   [address] [id number] :  1945  SSN:    Phone: [phone] Sex:  M      INSURANCE PAYOR PLAN GROUP # SUBSCRIBER ID   Primary:  Secondary:    MEDICARE ANTHEM BLUE CROSS [id number]  [id number]    [id number]  [id number]      Referring Provider Information:  ELIZA MARTIN Phone: [phone] Fax: [phone]      Referral Information:   # Visits:  1 Referral Type: Consultation [3]   Urgency:  Routine Referral Reason: Specialty Services Required   Start Date: 2021 End Date:  To be determined by Insurer   Diagnosis: Cellulitis of right leg (L03.115 [ICD-10-CM] 682.6 [ICD-9-CM])  Swelling of lower extremity (M79.89 [ICD-10-CM] 729.81 [ICD-9-CM])  Arthritis (M19.90 [ICD-10-CM] 716.90 [ICD-9-CM])  Lumbar stenosis with neurogenic claudication (M48.062 [ICD-10-CM] 724.03 [ICD-9-CM])  Parkinson's disease (CMS/[facility]) (G20 [ICD-10-CM] 332.0 [ICD-9-CM])  Chronic edema (R60.9 [ICD-10-CM] 782.3 [ICD-9-CM])  Acute pain of right knee (M25.561 [ICD-10-CM] 719.46 [ICD-9-CM])  Cellulitis of right lower extremity (L03.115 [ICD-10-CM] 682.6 [ICD-9-CM])      Refer to Dept:   Refer to Provider:   Refer to Facility:    WOC consult for: RLE open wounds     Assessment and Care provided:      1.             Appears to be venous insufficiency due to bilateral swelling and brown/jagdish discoloration. There are a few open blisters on RLE, and one moderate sized blister that is not draining yet.   2.            LLE has same appearance but has no open areas or blisters.     Recommendation(s): Continue with xeroform over open areas and blisters on RLE,  "cover with rolled gauze. PT wound care to possibly do compression wraps     *Maintain good skin care, keep dry, turn q 2 hr with wedge if possible, keep heels elevated and offloaded with heel boots.    *Apply z-guard to bottom and bony prominences daily and as needed with incontinence episodes.  *Follow C.A.R.E protocol if medical devices (Bipap, kc, Ng tube, etc) are being used.      All skin interventions in place. Head to toe assessment completed. WOC orders placed.  Discussed plan of care with RN. Thank you for consulting WOCN.  Will continue to follow.  Please call with questions or if needs arise.           This document serves as a request of services and does not constitute Insurance authorization or approval of services.  To determine eligibility, please contact the members Insurance carrier to verify and review coverage.     If you have medical questions regarding this request for services. Please contact [address] at [phone] during normal business hours.       Verbal Order Mode: Telephone with readback   Authorizing Provider: Sadie Martin MD  Authorizing Provider's NPI: [id number]     Order Entered By: Vielka Nguyen RN 7/19/2021  2:07 PM                   Date of Birth Social Security Number Address Home Phone MRN    1945  [address] [address] [phone] [id number]    Druze Marital Status          Presybeterian        Admission Date Admission Type Admitting Provider Attending Provider Department, Room/Bed    7/16/21 Emergency River Rodríguez DO Brown, Hannah, MD [facility] 5F, S506/1    Discharge Date Discharge Disposition Discharge Destination         Home or Self Care              Attending Provider: Sadie Martin MD    Allergies: No Known Allergies    Isolation: None   Infection: MRSA (01/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)   Code Status: CPR    Ht: 182.9 cm (72\")   Wt: 117 kg (257 lb 12.8 oz)    Admission Cmt: None   Principal Problem: " Cellulitis of lower extremity [L03.119]                 Active Insurance as of 2021     Primary Coverage     Payor Plan Insurance Group Employer/Plan Group    MEDICARE MEDICARE A & B      Payor Plan Address Payor Plan Phone Number Payor Plan Fax Number Effective Dates    [address] [phone]  2012 - None Entered    [facility] [id number]       Subscriber Name Subscriber Birth Date Member ID       OTILIA MARTIN 1945 [id number]           Secondary Coverage     Payor Plan Insurance Group Employer/Plan Group    ANTHEM BLUE CROSS [facility] SUPP [id number]     Payor Plan Address Payor Plan Phone Number Payor Plan Fax Number Effective Dates    [address]   2016 - None Entered    [facility] [id number]       Subscriber Name Subscriber Birth Date Member ID       OTILIA MARTIN 1945 [id number]                 Emergency Contacts      (Rel.) Home Phone Work Phone Mobile Phone    GEROGIANA PICKERING (Sister) [phone] -- [phone]            Insurance Information                MEDICARE/MEDICARE A & B Phone: [phone]    Subscriber: Otilia Martin Subscriber#: [id number]    Group#:  Precert#:         JENN CAMPA/JENN [facility] SUPP Phone:     Subscriber: Otilia Martin Subscriber#: [id number]    Group#: [id number] Precert#:              History & Physical      River Rodríguez DO at 21 0048              [facility]  Clinical Decision Unit (CDU)  History and Physical    Patient Name: Otilia Martin  : 1945  MRN: [id number]  Primary Care Physician: Ben Holder DO  Date of admission: 2021  7:15 PM      Subjective   Subjective     Chief Complaint:  Lower extremity edema and ulcerations    HPI:  Otilia Martin is a 76 y.o. male with medical history significant for arthritis, anxiety, depression, parkinson's disease, hypothyroidism, HLD, BPH who presents to [facility] ED for evaluation of lower  "extremity edema and redness with bullous lesions and ulcerations that have failed outpatient antibiotic therapy (similar admission 1/12/21).    Reports swelling over the past month, blisters for ~ 1 week, feels like the skin is tight and stretching, there is erythema and mild tenderness - most of his pain however is in his right knee and right ankle. He was seen in ED at [facility] on 7/11/21 and placed on clindamycin for cellulitis as well as started on lasix for the edema w/o improvement. No fever or chills. He has had ~ 30 lb weight gain over the past year, increased fatigue, exertional dyspnea and noted weeping from the RLE. Also c/o pain in his right knee that occurred as a result of exercises he was doing at PT per his report and now having right ankle pain as well.     Had imaging of the right knee in [facility] the patient tells me showed arthritic changes; last month had a \"rooster comb\" injection by ortho but patient reports it did not alleviate his knee pain and he is now also having right ankle pain and cannot bear weight on the RLE d/t pain, tells me he is \"dragging\" his leg. He has been taking ibuprofen 800 mg and this helps the pain but seems to make him drowsy per his report. Denies recent falls. Does have history of left femur fracture and he thinks his left leg may be shorter than the right causing him to put more stress on his right leg when ambulating.    COVID Details:    Symptoms:    [] NONE [] Fever []  Cough [x] Shortness of breath [] Change in taste/smell      The patient qualifies to receive the vaccine, but they have not yet received it.    Review of Systems   Constitutional: Positive for fatigue and unexpected weight change.   HENT: Negative.    Eyes: Negative.    Respiratory: Positive for shortness of breath. Negative for cough and wheezing.    Cardiovascular: Positive for leg swelling. Negative for chest pain and palpitations.   Gastrointestinal: Negative.    Endocrine: Negative.  "   Genitourinary: Negative.    Musculoskeletal: Positive for arthralgias and gait problem.   Skin: Positive for color change and wound.   Allergic/Immunologic: Negative.    Hematological: Negative.    Psychiatric/Behavioral: Negative.         All other systems reviewed and negative    Personal History     Past Medical History:   Diagnosis Date   • Anxiety    • Arthritis    • Back pain    • Bronchitis    • Cognitive impairment    • Depression    • Disease of thyroid gland    • Glucose intolerance (impaired glucose tolerance)    • Hyperlipidemia    • Kidney stone    • Obesity    • Parkinson disease (CMS/HCC)    • Pneumonia    • Prostate disorder    • Thyroid disease    • Wears eyeglasses        Past Surgical History:   Procedure Laterality Date   • COLONOSCOPY      5 years ago   • EYE SURGERY     • HERNIA REPAIR  10/20/2020   • LUMBAR LAMINECTOMY DISCECTOMY DECOMPRESSION N/A 7/13/2017    Procedure: LUMBAR LAMINECTOMY L4-5;  Surgeon: Antonio Trent MD;  Location: [facility];  Service:    • SHOULDER ROTATOR CUFF REPAIR Right     x2   • TONSILLECTOMY         Family History:  family history includes Alzheimer's disease in an other family member; Cancer in his father and another family member; Diabetes in an other family member; Heart disease in an other family member; Stroke in an other family member. Otherwise pertinent FHx was reviewed and unremarkable.     Social History:  reports that he quit smoking about 41 years ago. His smoking use included cigarettes. He started smoking about 71 years ago. He has a 15.00 pack-year smoking history. He has never used smokeless tobacco. He reports current alcohol use. He reports that he does not use drugs.  Social History     Social History Narrative    Lives alone. Uses walker       Medications:  atorvastatin, carbidopa-levodopa, clindamycin, fluorometholone, fluticasone, furosemide, levothyroxine, pramipexole, sildenafil, tamsulosin, vitamin C, and vitamin D3    No Known  Allergies    Objective   Objective     Vital Signs:   Temp:  [97.5 °F (36.4 °C)-97.7 °F (36.5 °C)] 97.5 °F (36.4 °C)  Heart Rate:  [71-77] 76  Resp:  [18-22] 18  BP: ()/(70-99) 140/71    Physical Exam  Vitals reviewed.   Constitutional:       General: He is not in acute distress.     Appearance: He is obese. He is not toxic-appearing.   HENT:      Head: Normocephalic and atraumatic.      Nose: Nose normal.      Mouth/Throat:      Mouth: Mucous membranes are moist.      Pharynx: Oropharynx is clear. No oropharyngeal exudate.   Eyes:      General: No scleral icterus.     Extraocular Movements: Extraocular movements intact.      Pupils: Pupils are equal, round, and reactive to light.   Cardiovascular:      Rate and Rhythm: Normal rate and regular rhythm.      Pulses: Normal pulses.      Heart sounds: Normal heart sounds. No murmur heard.     Pulmonary:      Effort: Pulmonary effort is normal. No respiratory distress.      Breath sounds: Normal breath sounds. No wheezing or rales.   Abdominal:      General: Bowel sounds are normal. There is no distension.      Palpations: Abdomen is soft.      Tenderness: There is no abdominal tenderness.   Musculoskeletal:      Cervical back: Normal range of motion and neck supple.      Right lower leg: 3+ Pitting Edema (tight) present.      Left lower le+ Pitting Edema (tight) present.   Skin:     General: Skin is warm.      Capillary Refill: Capillary refill takes less than 2 seconds.      Findings: Erythema (BLE, R > L) and lesion (see photo) present.   Neurological:      General: No focal deficit present.      Mental Status: He is alert.   Psychiatric:         Mood and Affect: Mood normal.         Behavior: Behavior normal.         Thought Content: Thought content normal.         Judgment: Judgment normal.        Results Reviewed:  Glucose 112, creatinine 1.33 (up from 1.19 on 2/3/21), BUN 24, eGFR 53; crp <0.30; procal 0.09; wbc 6.71, Hgb 12.0, Hct 38.0; esr  19    Assessment/Plan   Assessment / Plan     Active Hospital Problems    Diagnosis  POA   • **Cellulitis of lower extremity [L03.119]  Yes   • Hypothyroidism (acquired) [E03.9]  Unknown   • Elevated serum creatinine [R79.89]  Unknown   • Exertional dyspnea [R06.00]  Yes   • Parkinson's disease (CMS/HCC) [G20]  Yes   • Anxiety [F41.9]  Yes   • Depression [F32.9]  Yes   • Arthritis [M19.90]  Yes   • Hyperlipidemia [E78.5]  Yes       Plan:  Cellulitis, right lower extremity  - RLE wound culture  - hold clindamycin  - pharmacy to dose vancomycin  - continue rocephin 1 gram daily  - WOC to see, open ulcerations / bullous lesions  - PT wound care to evaluate for leg wraps  - consider outpatient dermatology consult vs discussion w/ derm in am regarding treatment, infectious vs underlying dermatologic process like bullous pemphigoid d/t recurrence  - of note, patient has had vascular w/u outpatient for venous/arterial insufficiency which was negative    Exertional dyspnea  - chest XR  - ECHO from 1/12/21 w/ EF 51-55%  - check proBNP = 69.1    Elevated creatinine  - hold lasix  - avoid NSAIDS  - gentle hydration    Arthritis  - PT/OT to evaluate for ambulation  - tylenol PRN pain  - may need to have orthopedic evaluation if patient not able to ambulate    Parkinson's disease  - continue sinemet, mirapex    BPH  - continue flomax    Hypothyroidism  - continue levothyroxine    Hyperlipidemia  - continue statin    CODE STATUS:    Code Status and Medical Interventions:   Ordered at: 07/17/21 0046     Code Status:    CPR     Medical Interventions (Level of Support Prior to Arrest):    Full     Admission Status: OBSERVATION status, however if further evaluation or treatment plans warrant, status may change.  Based upon current information, I predict patient's care encounter to be less than or equal to 2 midnights.    Discharge Blueprint (criteria for discharge):   1. Improved skin integrity  2. Labs and vitals stable  3. Able to  ambulate independently  4. Derm evaluation    Signature: Electronically signed by ALEXUS Diaz, 07/17/21, 1:29 AM EDT    Patient seen and billed independently by [facility].      Electronically signed by River Rodríguez DO at 07/17/21 0259       Consult Notes (most recent note)    No notes of this type exist for this encounter.         Physical Therapy Notes (last 24 hours) (Notes from 07/18/21 1414 through 07/19/21 1414)    No notes exist for this encounter.

## 2021-07-19 NOTE — CASE MANAGEMENT/SOCIAL WORK
Discharge Planning Assessment  [facility]     Patient Name: Cordell Martin  MRN: [id number]  Today's Date: 7/19/2021    Admit Date: 7/16/2021    Discharge Needs Assessment     Row Name 07/19/21 1057       Living Environment    Lives With  alone    Current Living Arrangements  home/apartment/condo    Living Arrangement Comments  Rents a duplex that has two levels. Keeps a walker on each level of home and in the car. Has a stair lift. Planning to hire house keeping help. Has most family out of state. One sister in [place]. He drives.       Discharge Needs Assessment    Equipment Currently Used at Home  bath bench;lift device;walker, rolling;rollator    Equipment Needed After Discharge  none    Discharge Coordination/Progress  Has a scooyer. Has been to inpt rehab 1.5 years ago at [place]. Has done outpt wound care at [facility] in the past. He plans to obtain a lift chair and craft-matic type bed at some point.        Discharge Plan     Row Name 07/19/21 1102       Plan    Plan  Home at IN vs HH vs outpt    Patient/Family in Agreement with Plan  yes    Plan Comments  I spoke with the pt. He is unsure of the IN needs at this time. He may be interested in  or local outpt care if needed. I will follow.    Final Discharge Disposition Code  01 - home or self-care    Row Name 07/19/21 0807       Plan    Final Discharge Disposition Code  01 - home or self-care        Continued Care and Services - Admitted Since 7/16/2021    Coordination has not been started for this encounter.       Expected Discharge Date and Time     Expected Discharge Date Expected Discharge Time    Jul 20, 2021         Demographic Summary    No documentation.       Functional Status     Row Name 07/19/21 1057       Functional Status    Usual Activity Tolerance  fair       Functional Status, IADL    Medications  independent    Meal Preparation  independent    Housekeeping  assistive person    Laundry  assistive person    Shopping  independent         Psychosocial    No documentation.       Abuse/Neglect    No documentation.       Legal    No documentation.       Substance Abuse    No documentation.       Patient Forms    No documentation.           Vielka Nguyen RN

## 2021-07-19 NOTE — DISCHARGE SUMMARY
[facility] Medicine Services  Clinical Decision Unit  DISCHARGE SUMMARY    Patient Name: Cordell Martin  : 1945  MRN: [id number]    Admission Date and Time: 2021  7:15 PM  Discharge Date and Time:  21    Primary Care Physician: Ben Holder DO    Hospital Course     Active Hospital Problems    Diagnosis  POA   • **Cellulitis of lower extremity [L03.119]  Yes   • Cellulitis of right leg [L03.115]  Yes   • Hypothyroidism (acquired) [E03.9]  Yes   • Elevated serum creatinine [R79.89]  Yes   • Exertional dyspnea [R06.00]  Yes   • Parkinson's disease (CMS/HCC) [G20]  Yes   • Anxiety [F41.9]  Yes   • Depression [F32.9]  Yes   • Arthritis [M19.90]  Yes   • Hyperlipidemia [E78.5]  Yes      Resolved Hospital Problems   No resolved problems to display.          Brief CDU Course:  Cordell Martin is a 76 y.o. male who presented with right lower extremity cellulitis, bullous lesions that did not improve with outpatient clindamycin.     RLE cellulitis with bullae  -Failed outpatient clindamycin  -Improving with IV vancomycin and ceftriaxone.  Transition to PO doxycycline and cefdinir x 7 more days at discharge  -PT wound care placed compression wrap/unna boot assessment-outpatient wound care arranged   -Outpatient referral to dermatology as there is some concern for bullous pemphigoid given appearance and recurrence.  This can be associated with neurological disorders such as Parkinson's disease.  Although this may just be recurrent cellulitis in the setting of venous insufficiency.  -Reportedly previously had vascular workup outpatient for arterial insufficiency which was negative, has had numerous venous duplex negative for DVT  -Prescribed PRN lasix at discharge for swelling.  Emphasized elevation of lower extremities, compression if able but he has a difficult time getting compression socks on himself.     Right knee arthritis  -Reportedly has been following with  orthopedic surgery outpatient and recently had X-rays by [facility] in [facility].  ESR and CRP are normal.  Exam does not appear consistent with septic arthritis  -Pain improved with lidoderm patch which has been prescribed at discharge       Key Discharge Recommendations:  Follow up with PCP within 1 week  Outpatient referral to dermatology  Outpatient wound care    Discharge Evaluation     Vital Signs:   Temp:  [97.3 °F (36.3 °C)-98 °F (36.7 °C)] 97.3 °F (36.3 °C)  Heart Rate:  [69-81] 69  Resp:  [17-18] 18  BP: (107-144)/(59-78) 107/59    Physical Exam:  Constitutional: No acute distress, awake, alert, sitting up in bed  HENT: NCAT, mucous membranes moist  Respiratory: Clear to auscultation bilaterally, respiratory effort normal   Cardiovascular: RRR, no murmurs, rubs, or gallops  Gastrointestinal: Positive bowel sounds, soft, nontender, nondistended  Musculoskeletal: Improved RLE edema in unna boot  Psychiatric: Appropriate affect, cooperative  Neurologic: Cranial Nerves grossly intact to confrontation, speech clear, resting upper extremity tremor    Pertinent  and/or Most Recent Results     Result Review:  I have personally reviewed the results from the time of this admission to 07/19/21 1:00 PM EDT and agree with these findings:  [x]  Laboratory  []  Microbiology  []  Radiology  []  EKG/Telemetry   []  Cardiology/Vascular   []  Pathology  []  Old records  []  Other:  Most notable findings include:   LAB RESULTS:      Lab 07/19/21  0522 07/18/21  0348 07/17/21  0353 07/16/21  1743   WBC 6.07 5.13 5.93 6.71   HEMOGLOBIN 12.4* 11.4* 11.4* 12.0*   HEMATOCRIT 39.8 36.2* 36.6* 38.0   PLATELETS 227 226 233 233   NEUTROS ABS  --   --  3.44 4.38   IMMATURE GRANS (ABS)  --   --  0.03 0.02   LYMPHS ABS  --   --  1.75 1.58   MONOS ABS  --   --  0.45 0.46   EOS ABS  --   --  0.23 0.25   MCV 97.1* 97.1* 95.8 96.2   SED RATE  --   --   --  19   CRP  --   --   --  <0.30   PROCALCITONIN  --   --   --  0.09         Lab  07/19/21  0522 07/18/21  0348 07/17/21  0353 07/16/21  1743   SODIUM 139 141 141 143   POTASSIUM 4.2 3.9 3.9 4.7   CHLORIDE 105 106 103 104   CO2 26.0 25.0 29.0 29.0   ANION GAP 8.0 10.0 9.0 10.0   BUN 21 23 22 24*   CREATININE 1.26 1.06 1.31* 1.33*   GLUCOSE 104* 115* 135* 112*   CALCIUM 9.3 8.8 9.3 9.6   MAGNESIUM  --   --  2.2  --          Lab 07/16/21  1743   TOTAL PROTEIN 6.8   ALBUMIN 3.80   GLOBULIN 3.0   ALT (SGPT) <5   AST (SGOT) 17   BILIRUBIN 0.5   ALK PHOS 112         Lab 07/16/21  1743   PROBNP 69.1                 UA    Urinalysis 9/9/20 1/5/21   Specific [place], UA 1.022 1.017   Ketones, UA Trace (A) Negative   Blood, UA Negative Negative   Leukocytes, UA Negative Negative   Nitrite, UA Negative Negative   (A) Abnormal value            Microbiology Results (last 10 days)     Procedure Component Value - Date/Time    Wound Culture - Wound, Leg, Right [[id number]]  (Abnormal) Collected: 07/17/21 0154    Lab Status: Preliminary result Specimen: Wound from Leg, Right Updated: 07/19/21 0708     Wound Culture Scant growth (1+) Gram Positive Rods     Gram Stain No WBCs or organisms seen    COVID-19 and FLU A/B PCR - Swab, Nasopharynx [[id number]]  (Normal) Collected: 07/16/21 2218    Lab Status: Final result Specimen: Swab from Nasopharynx Updated: 07/16/21 2303     COVID19 Not Detected     Influenza A PCR Not Detected     Influenza B PCR Not Detected    Narrative:      Fact sheet for providers: https://www.fda.gov/media/785282/download    Fact sheet for patients: https://www.fda.gov/media/424522/download    Test performed by PCR.          XR Chest 1 View    Result Date: 7/17/2021  CR Chest 1 Vw INDICATION: Exertional dyspnea today. COMPARISON:  Chest 3/1/2021 FINDINGS: Single portable AP view(s) of the chest. The heart and mediastinal contours are normal. The lungs are clear. No pneumothorax or pleural effusion.     No acute cardiopulmonary findings. Signer Name: Faustino Aldridge MD  Signed: 7/17/2021 1:57 AM   "Workstation Name: [id number]  [facility]      Results for orders placed during the hospital encounter of 01/12/21    Duplex Venous Lower Extremity - Bilateral CAR    Interpretation Summary  · Normal bilateral lower extremity venous duplex scan.      Results for orders placed during the hospital encounter of 01/12/21    Duplex Venous Lower Extremity - Bilateral CAR    Interpretation Summary  · Normal bilateral lower extremity venous duplex scan.      Results for orders placed during the hospital encounter of 01/12/21    Adult Transthoracic Echo Complete W/ Cont if Necessary Per Protocol    Interpretation Summary  · The right ventricle and right atrium are moderately dilated. Other chamber sizes and wall thicknesses are normal.  · Global and segmental LV wall motion is normal. The estimated left ventricular ejection fraction is 51% - 55%.  · The aortic and mitral valves are normal in structure and function.  · The estimated RV systolic pressure is mildly elevated 35 mmHg - 40 mmHg. There is as well noted to be less than 50% inspiratory IVC collapse. The main pulmonary artery is \"borderline dilated\".  · There are no other important findings on this study.        Plan for Follow-up of Pending Labs/Results: Further results will not .    Pending Labs     Order Current Status    Wound Culture - Wound, Leg, Right Preliminary result          Discharge Medications and Follow-Up        Discharge Medications      New Medications      Instructions Start Date   acetaminophen 325 MG tablet  Commonly known as: TYLENOL   650 mg, Oral, Every 6 Hours PRN      cefdinir 300 MG capsule  Commonly known as: OMNICEF   300 mg, Oral, 2 Times Daily      doxycycline 100 MG capsule  Commonly known as: VIBRAMYCIN   100 mg, Oral, 2 Times Daily      lidocaine 5 %  Commonly known as: LIDODERM   1 patch, Transdermal, Every 24 Hours Scheduled, Remove & Discard patch within 12 hours or as directed by MD   " Start Date: July 20, 2021     saccharomyces boulardii 250 MG capsule  Commonly known as: Florastor   250 mg, Oral, 2 Times Daily         Changes to Medications      Instructions Start Date   furosemide 40 MG tablet  Commonly known as: Lasix  What changed:   · when to take this  · reasons to take this   40 mg, Oral, Daily PRN         Continue These Medications      Instructions Start Date   atorvastatin 40 MG tablet  Commonly known as: LIPITOR   TAKE 1 TABLET BY MOUTH EVERY DAY      carbidopa-levodopa  MG per tablet  Commonly known as: SINEMET   2 tablets, Oral, 3 Times Daily      fluorometholone 0.1 % ophthalmic suspension  Commonly known as: FML   fluorometholone 0.1 % eye drops,suspension      fluticasone 50 MCG/ACT nasal spray  Commonly known as: FLONASE   USE 2 SPRAYS IN EACH NOSTRIL ONCE DAILY      levothyroxine 88 MCG tablet  Commonly known as: SYNTHROID, LEVOTHROID   TAKE 1 TABLET BY MOUTH EVERY DAY      loratadine 10 MG tablet  Commonly known as: Claritin   10 mg, Oral, Daily      pramipexole 1 MG tablet  Commonly known as: MIRAPEX   1 mg, Oral, 3 Times Daily      tamsulosin 0.4 MG capsule 24 hr capsule  Commonly known as: FLOMAX   tamsulosin 0.4 mg capsule   TAKE 1 CAPSULE BY MOUTH EVERY DAY      vitamin C 500 MG tablet  Commonly known as: ASCORBIC ACID   500 mg, Oral, Daily      vitamin D3 125 MCG (5000 UT) capsule capsule   5,000 Units, Oral, Daily         Stop These Medications    clindamycin 300 MG capsule  Commonly known as: CLEOCIN        ASK your doctor about these medications      Instructions Start Date   sildenafil 20 MG tablet  Commonly known as: REVATIO   20 mg, Oral, Daily      sildenafil 50 MG tablet  Commonly known as: Viagra   50 mg, Oral, Daily PRN               Future Appointments   Date Time Provider Department Center   9/8/2021  1:00 PM Priti Ross PA-C [facility]   9/15/2021  2:45 PM Ben Holder DO [facility]       Additional Instructions for the  Follow-ups that You Need to Schedule     Discharge Follow-up with PCP   As directed       Currently Documented PCP:    Ben Holder DO    PCP Phone Number:    [phone]     Follow Up Details: 1 week                     Time Spent on Discharge: I spent  33  minutes on this discharge activity which included: face-to-face encounter with the patient, reviewing the data in the system, coordination of the care with the nursing staff as well as consultants, documentation, and entering orders.

## 2021-07-19 NOTE — OUTREACH NOTE
Prep Survey      Responses   [facility] patient discharged from?  [place]   Is LACE score < 7 ?  Yes   Emergency Room discharge w/ pulse ox?  No   Eligibility  [facility]   Date of Admission  07/16/21   Date of Discharge  07/19/21   Discharge Disposition  Home or Self Care   Discharge diagnosis  Cellullitis of lower extremity   Does the patient have one of the following disease processes/diagnoses(primary or secondary)?  Other   Does the patient have Home health ordered?  No   Is there a DME ordered?  No   Comments regarding appointments  outpt wound clinic at [facility]   Prep survey completed?  Yes          Mell Carey RN

## 2021-07-19 NOTE — CASE MANAGEMENT/SOCIAL WORK
Case Management Discharge Note      Final Note: I spoke with the pt. He states he will not be home bound. He is interested in outpt wound care at the [facility]. I called [phone] and spoke with Charles at the wound center. I faxed the referral and info to them at [phone]. They will call me back with an appointment or call the pt directly after DC. The pt denies any other DC needs.         Selected Continued Care - Admitted Since 7/16/2021     Destination    No services have been selected for the patient.              Durable Medical Equipment    No services have been selected for the patient.              Dialysis/Infusion    No services have been selected for the patient.              Home Medical Care    No services have been selected for the patient.              Therapy    No services have been selected for the patient.              Community Resources    No services have been selected for the patient.              Community & DME    No services have been selected for the patient.                       Final Discharge Disposition Code: 01 - home or self-care

## 2021-07-20 ENCOUNTER — TELEPHONE (OUTPATIENT)
Dept: FAMILY MEDICINE CLINIC | Facility: CLINIC | Age: 76
End: 2021-07-20

## 2021-07-20 ENCOUNTER — TRANSITIONAL CARE MANAGEMENT TELEPHONE ENCOUNTER (OUTPATIENT)
Dept: CALL CENTER | Facility: HOSPITAL | Age: 76
End: 2021-07-20

## 2021-07-20 NOTE — OUTREACH NOTE
Call Center TCM Note      Responses   [facility] patient discharged from?  [place]   Does the patient have one of the following disease processes/diagnoses(primary or secondary)?  Other   TCM attempt successful?  No   Unsuccessful attempts  Attempt 2          Yazmin El RN    7/20/2021, 15:58 EDT

## 2021-07-20 NOTE — TELEPHONE ENCOUNTER
If pt is doing well and prefers to see his PCP, he can move TCM visit to next week. It looks like Dr. Holder has appointment slots available on Wed and Fri. Thanks.

## 2021-07-20 NOTE — OUTREACH NOTE
Call Center TCM Note      Responses   [facility] patient discharged from?  [place]   Does the patient have one of the following disease processes/diagnoses(primary or secondary)?  Other   TCM attempt successful?  No   Unsuccessful attempts  Attempt 1          Yazmin El RN    7/20/2021, 15:30 EDT

## 2021-07-20 NOTE — TELEPHONE ENCOUNTER
LAB CALLED TO LET US KNOW PT HAS MRSA RIGHT LEG WOUND CULTURE    HE WAS IN THE HOSPITAL ON 5F DISCHARGED ON July 16TH.

## 2021-07-21 ENCOUNTER — TRANSITIONAL CARE MANAGEMENT TELEPHONE ENCOUNTER (OUTPATIENT)
Dept: CALL CENTER | Facility: HOSPITAL | Age: 76
End: 2021-07-21

## 2021-07-21 NOTE — OUTREACH NOTE
Call Center TCM Note      Responses   [facility] patient discharged from?  [place]   Does the patient have one of the following disease processes/diagnoses(primary or secondary)?  Other   TCM attempt successful?  Yes   Call start time  0934   Call end time  0939   Discharge diagnosis  Cellullitis of lower extremity   Person spoke with today (if not patient) and relationship  Patient   Meds reviewed with patient/caregiver?  Yes   Is the patient having any side effects they believe may be caused by any medication additions or changes?  No   Does the patient have all medications ordered at discharge?  Yes   Is the patient taking all medications as directed (includes completed medication regime)?  Yes   Does the patient have a primary care provider?   Yes   Does the patient have an appointment with their PCP within 7 days of discharge?  Yes   Comments regarding [facility] fu appointment on 7/21/21 at 3:00 PM with MARION Marquez   Has the patient kept scheduled appointments due by today?  N/A   What is the Home health agency?   No HH   Psychosocial issues?  No   Did the patient receive a copy of their discharge instructions?  Yes   Nursing interventions  Reviewed instructions with patient   What is the patient's perception of their health status since discharge?  Same   Is the patient/caregiver able to teach back signs and symptoms related to disease process for when to call PCP?  Yes   Is the patient/caregiver able to teach back signs and symptoms related to disease process for when to call 911?  Yes   Is the patient/caregiver able to teach back the hierarchy of who to call/visit for symptoms/problems? PCP, Specialist, Home health nurse, Urgent Care, ED, 911  Yes   If the patient is a current smoker, are they able to teach back resources for cessation?  Not a smoker   Wrap up additional comments  Pt states he feel about the same,  still has pain in leg. Pt verified [facility] fu appt today, 7/21/21.  Questions/concerns addressed.          Karishma Abrams RN    7/21/2021, 09:39 EDT

## 2021-07-22 LAB
BACTERIA SPEC AEROBE CULT: ABNORMAL
BACTERIA SPEC AEROBE CULT: ABNORMAL
GRAM STN SPEC: ABNORMAL

## 2021-08-23 RX ORDER — LORATADINE 10 MG/1
TABLET ORAL
Qty: 90 TABLET | Refills: 1 | OUTPATIENT
Start: 2021-08-23

## 2021-09-08 ENCOUNTER — OFFICE VISIT (OUTPATIENT)
Dept: NEUROLOGY | Facility: CLINIC | Age: 76
End: 2021-09-08

## 2021-09-08 VITALS
DIASTOLIC BLOOD PRESSURE: 78 MMHG | HEART RATE: 62 BPM | OXYGEN SATURATION: 94 % | WEIGHT: 257 LBS | HEIGHT: 72 IN | BODY MASS INDEX: 34.81 KG/M2 | SYSTOLIC BLOOD PRESSURE: 120 MMHG

## 2021-09-08 DIAGNOSIS — G20 PARKINSON'S DISEASE (HCC): Primary | ICD-10-CM

## 2021-09-08 PROCEDURE — 99213 OFFICE O/P EST LOW 20 MIN: CPT | Performed by: PHYSICIAN ASSISTANT

## 2021-09-08 NOTE — PROGRESS NOTES
Subjective       Chief Complaint:  Parkinson's Disease, numbness and tingling       History of Present Illness   Cordell Martin is a 76 y.o. male who returns to clinic today with a history of Parkinson's Disease. He has had symptoms since at least 2015 marked initially by a resting tremor primarily in his right hand. He notes occasional associated imbalance, though denies a shuffling gait.      He has also noted short term memory impairment for several years. He notes associated word-finding difficulties and impairments in concentration.      Prior evaluation has included screening blood work and a head CT which were unremarkable. Screening bloodwork has been unremarkable with the exception of a mildly elevated B6 level (and he has subsequently stopped taking a B6 supplement). He is currently taking Sinemet 1.5 tabs tid and pramipexole 1 mg tid. He was intolerant of amantadine due to hallucinations.     Today: Since his last visit in 3/21, he continues to note unsteadiness. His tremor is essentially unchanged. He completed a round of PT, but unfortunately did not find it to be significantly beneficial. He was recently hospitalized at [facility] for cellulitis.        I have reviewed and confirmed the past family, social and medical history as accurate on 9/8/21.     Review of Systems   Constitutional: Negative.    HENT: Negative.    Eyes: Negative.    Respiratory: Negative.    Cardiovascular: Negative.    Gastrointestinal: Negative.    Endocrine: Negative.    Genitourinary: Negative.    Musculoskeletal: Negative.    Skin: Negative.    Allergic/Immunologic: Negative.    Hematological: Negative.        Objective     There were no vitals taken for this visit.    General appearance today is normal.         Physical Exam  Neurological:      Coordination: Heel to Shin Test abnormal. Finger-Nose-Finger Test normal.      Deep Tendon Reflexes: Strength normal.          Neurologic Exam     Mental Status   Speech:  (Hypophonic  )  Level of consciousness: alert  Normal comprehension.     Cranial Nerves   Cranial nerves II through XII intact.   Except for hypomimia      Motor Exam   Muscle bulk: normal  Right arm tone: increased  Left arm tone: normal    Strength   Strength 5/5 throughout.     Gait, Coordination, and Reflexes     Coordination   Finger to nose coordination: normal  Heel to shin coordination: abnormal    Tremor   Resting tremor: present        Results  MMSE=29 in 3/21      Assessment/Plan   Diagnoses and all orders for this visit:    1. Parkinson's disease (CMS/formerly [facility]) (Primary)          Discussion/Summary   Cordell Martin returns to clinic today with a history of Parkinson's Disease. I again reviewed his current status and treatment options. After discussing potential treatment options, it was elected to continue on his current medications unchanged for now, though we may consider increasing his Sinemet to QID or adding Sinemet CR at night in the future. He will then follow up in 6 months , or sooner if needed.   Total time of visit today: 20 minutes. As part of this visit I reviewed records from prior hospitalizations which is incorporated in the HPI. I also discussed diagnosis, prognosis, evaluation, current status, treatment options and management as discussed above.           Priti Ross PA-C

## 2022-02-24 RX ORDER — ATORVASTATIN CALCIUM 40 MG/1
TABLET, FILM COATED ORAL
Qty: 90 TABLET | Refills: 3 | Status: SHIPPED | OUTPATIENT
Start: 2022-02-24 | End: 2023-03-23

## 2022-02-24 NOTE — TELEPHONE ENCOUNTER
Rx Refill Note  Requested Prescriptions     Pending Prescriptions Disp Refills   • atorvastatin (LIPITOR) 40 MG tablet [Pharmacy Med Name: ATORVASTATIN 40 MG TABLET] 90 tablet 3     Sig: TAKE 1 TABLET BY MOUTH EVERY DAY      Last office visit with prescribing clinician: 3/5/2021      Next office visit with prescribing clinician: 9/7/2022            Marly Singh MA  02/24/22, 09:53 ESTVm not set up   Return in about 6 months (around 9/1/2021) for Medicare Wellness.

## 2022-02-24 NOTE — TELEPHONE ENCOUNTER
Rx Refill Note  Requested Prescriptions     Pending Prescriptions Disp Refills   • atorvastatin (LIPITOR) 40 MG tablet [Pharmacy Med Name: ATORVASTATIN 40 MG TABLET] 90 tablet 3     Sig: TAKE 1 TABLET BY MOUTH EVERY DAY      Last office visit with prescribing clinician: 7-6-2021      Next office visit with prescribing clinician: 9/7/2022            Marly Singh MA  02/24/22, 09:53 EST

## 2022-03-09 ENCOUNTER — LAB (OUTPATIENT)
Dept: LAB | Facility: HOSPITAL | Age: 77
End: 2022-03-09

## 2022-03-09 ENCOUNTER — OFFICE VISIT (OUTPATIENT)
Dept: NEUROLOGY | Facility: CLINIC | Age: 77
End: 2022-03-09

## 2022-03-09 DIAGNOSIS — R20.0 NUMBNESS AND TINGLING: Primary | ICD-10-CM

## 2022-03-09 DIAGNOSIS — G20 PARKINSON'S DISEASE: ICD-10-CM

## 2022-03-09 DIAGNOSIS — R20.2 NUMBNESS AND TINGLING: Primary | ICD-10-CM

## 2022-03-09 LAB
BASOPHILS # BLD AUTO: 0.02 10*3/MM3 (ref 0–0.2)
BASOPHILS NFR BLD AUTO: 0.3 % (ref 0–1.5)
DEPRECATED RDW RBC AUTO: 43.6 FL (ref 37–54)
EOSINOPHIL # BLD AUTO: 0.08 10*3/MM3 (ref 0–0.4)
EOSINOPHIL NFR BLD AUTO: 1.1 % (ref 0.3–6.2)
ERYTHROCYTE [DISTWIDTH] IN BLOOD BY AUTOMATED COUNT: 13.4 % (ref 12.3–15.4)
HCT VFR BLD AUTO: 39.8 % (ref 37.5–51)
HGB BLD-MCNC: 13.4 G/DL (ref 13–17.7)
IMM GRANULOCYTES # BLD AUTO: 0.02 10*3/MM3 (ref 0–0.05)
IMM GRANULOCYTES NFR BLD AUTO: 0.3 % (ref 0–0.5)
LYMPHOCYTES # BLD AUTO: 1.48 10*3/MM3 (ref 0.7–3.1)
LYMPHOCYTES NFR BLD AUTO: 20.7 % (ref 19.6–45.3)
MCH RBC QN AUTO: 30.2 PG (ref 26.6–33)
MCHC RBC AUTO-ENTMCNC: 33.7 G/DL (ref 31.5–35.7)
MCV RBC AUTO: 89.8 FL (ref 79–97)
MONOCYTES # BLD AUTO: 0.49 10*3/MM3 (ref 0.1–0.9)
MONOCYTES NFR BLD AUTO: 6.8 % (ref 5–12)
NEUTROPHILS NFR BLD AUTO: 5.07 10*3/MM3 (ref 1.7–7)
NEUTROPHILS NFR BLD AUTO: 70.8 % (ref 42.7–76)
NRBC BLD AUTO-RTO: 0 /100 WBC (ref 0–0.2)
PLATELET # BLD AUTO: 292 10*3/MM3 (ref 140–450)
PMV BLD AUTO: 9.1 FL (ref 6–12)
RBC # BLD AUTO: 4.43 10*6/MM3 (ref 4.14–5.8)
TSH SERPL DL<=0.05 MIU/L-ACNC: 4.76 UIU/ML (ref 0.27–4.2)
WBC NRBC COR # BLD: 7.16 10*3/MM3 (ref 3.4–10.8)

## 2022-03-09 PROCEDURE — 84443 ASSAY THYROID STIM HORMONE: CPT | Performed by: PHYSICIAN ASSISTANT

## 2022-03-09 PROCEDURE — 80053 COMPREHEN METABOLIC PANEL: CPT | Performed by: PHYSICIAN ASSISTANT

## 2022-03-09 PROCEDURE — 82746 ASSAY OF FOLIC ACID SERUM: CPT | Performed by: PHYSICIAN ASSISTANT

## 2022-03-09 PROCEDURE — 84165 PROTEIN E-PHORESIS SERUM: CPT | Performed by: PHYSICIAN ASSISTANT

## 2022-03-09 PROCEDURE — 84207 ASSAY OF VITAMIN B-6: CPT | Performed by: PHYSICIAN ASSISTANT

## 2022-03-09 PROCEDURE — 82607 VITAMIN B-12: CPT | Performed by: PHYSICIAN ASSISTANT

## 2022-03-09 PROCEDURE — 85025 COMPLETE CBC W/AUTO DIFF WBC: CPT | Performed by: PHYSICIAN ASSISTANT

## 2022-03-09 PROCEDURE — 36415 COLL VENOUS BLD VENIPUNCTURE: CPT | Performed by: PHYSICIAN ASSISTANT

## 2022-03-09 PROCEDURE — 99215 OFFICE O/P EST HI 40 MIN: CPT | Performed by: PHYSICIAN ASSISTANT

## 2022-03-09 NOTE — PROGRESS NOTES
Subjective       Chief Complaint: Parkinson's Disease, numbness and tingling        History of Present Illness   Cordell Martin is a 77 y.o. male who returns to clinic today with a history of Parkinson's Disease. He has had symptoms since at least 2015 marked initially by a resting tremor primarily in his right hand. He notes occasional associated imbalance, though denies a shuffling gait.      He has also noted short term memory impairment for several years. He notes associated word-finding difficulties and impairments in concentration.      Prior evaluation has included screening blood work and a head CT which were unremarkable. Screening bloodwork has been unremarkable with the exception of a mildly elevated B6 level (and he has subsequently stopped taking a B6 supplement). He is currently taking Sinemet 1.5 tabs tid and pramipexole 1 mg tid. He was intolerant of amantadine due to hallucinations. He has completed a round of PT, but unfortunately did not find it to be significantly beneficial     Today: Since his last visit in 9/21, he notes that his tremor has worsened. He reports increased freezing. He continues to note numbness and tingling in his hands and feet.       I have reviewed and confirmed the past family, social and medical history as accurate on 3/9/22.     Review of Systems   Constitutional: Negative.    HENT: Negative.    Eyes: Negative.    Respiratory: Negative.    Cardiovascular: Negative.    Gastrointestinal: Negative.    Endocrine: Negative.    Genitourinary: Negative.    Musculoskeletal: Negative.    Skin: Negative.    Allergic/Immunologic: Negative.    Hematological: Negative.        Objective       General appearance today is normal.         Physical Exam  Neurological:      Coordination: Heel to Shin Test abnormal. Finger-Nose-Finger Test normal.      Deep Tendon Reflexes: Strength normal.   Psychiatric:         Speech: Speech normal.          Neurologic Exam     Mental Status   Speech:  speech is normal   Level of consciousness: alert  Normal comprehension.     Cranial Nerves   Cranial nerves II through XII intact.   Except for hypomimia          Motor Exam   Muscle bulk: normal  Right arm tone: increased  Left arm tone: normal    Strength   Strength 5/5 throughout.     Gait, Coordination, and Reflexes     Gait  Gait: shuffling    Coordination   Finger to nose coordination: normal  Heel to shin coordination: abnormal    Tremor   Resting tremor: present        Assessment/Plan   Diagnoses and all orders for this visit:    1. Numbness and tingling (Primary)  -     Cancel: Vitamin B12  -     Folate  -     Comprehensive Metabolic Panel  -     CBC & Differential  -     Protein Elec + Interp, Serum  -     Vitamin B6  -     TSH    2. Parkinson's disease (HCC)    Other orders  -     carbidopa-levodopa (SINEMET)  MG per tablet; Take 2 tablets by mouth 4 (Four) Times a Day.  Dispense: 240 tablet; Refill: 11          Discussion/Summary   Cordell Martin returns to clinic today for evaluation of Parkinson's Disease and numbness and tingling, possibly related to a peripheral neuropathy. I again reviewed his current status and treatment options. It was elected to obtain screening blood work . After discussing potential treatment options, it was elected to increase his sinemet to 2 tabs QID and continue on his other medications unchanged. He will then follow up in 6 months , or sooner if needed.   Total time of visit today: 40 minutes. As part of this visit I discussed the history with the patient . I also discussed diagnosis, diagnostic testing, evaluation, current status, treatment options and management as discussed above.           Priti Ross PA-C

## 2022-03-10 LAB
ALBUMIN SERPL-MCNC: 3.8 G/DL (ref 3.5–5.2)
ALBUMIN/GLOB SERPL: 1.3 G/DL
ALP SERPL-CCNC: 103 U/L (ref 39–117)
ALT SERPL W P-5'-P-CCNC: 6 U/L (ref 1–41)
ANION GAP SERPL CALCULATED.3IONS-SCNC: 11.4 MMOL/L (ref 5–15)
AST SERPL-CCNC: 12 U/L (ref 1–40)
BILIRUB SERPL-MCNC: 0.5 MG/DL (ref 0–1.2)
BUN SERPL-MCNC: 28 MG/DL (ref 8–23)
BUN/CREAT SERPL: 27.7 (ref 7–25)
CALCIUM SPEC-SCNC: 9.4 MG/DL (ref 8.6–10.5)
CHLORIDE SERPL-SCNC: 109 MMOL/L (ref 98–107)
CO2 SERPL-SCNC: 23.6 MMOL/L (ref 22–29)
CREAT SERPL-MCNC: 1.01 MG/DL (ref 0.76–1.27)
EGFRCR SERPLBLD CKD-EPI 2021: 77.6 ML/MIN/1.73
FOLATE SERPL-MCNC: 9.34 NG/ML (ref 4.78–24.2)
GLOBULIN UR ELPH-MCNC: 2.9 GM/DL
GLUCOSE SERPL-MCNC: 100 MG/DL (ref 65–99)
POTASSIUM SERPL-SCNC: 4.1 MMOL/L (ref 3.5–5.2)
PROT SERPL-MCNC: 6.7 G/DL (ref 6–8.5)
SODIUM SERPL-SCNC: 144 MMOL/L (ref 136–145)
VIT B12 BLD-MCNC: 327 PG/ML (ref 211–946)

## 2022-03-11 LAB
ALBUMIN SERPL ELPH-MCNC: 3.5 G/DL (ref 2.9–4.4)
ALBUMIN/GLOB SERPL: 1.1 {RATIO} (ref 0.7–1.7)
ALPHA1 GLOB SERPL ELPH-MCNC: 0.3 G/DL (ref 0–0.4)
ALPHA2 GLOB SERPL ELPH-MCNC: 0.9 G/DL (ref 0.4–1)
B-GLOBULIN SERPL ELPH-MCNC: 1.1 G/DL (ref 0.7–1.3)
GAMMA GLOB SERPL ELPH-MCNC: 0.8 G/DL (ref 0.4–1.8)
GLOBULIN SER CALC-MCNC: 3.1 G/DL (ref 2.2–3.9)
LABORATORY COMMENT REPORT: NORMAL
M PROTEIN SERPL ELPH-MCNC: NORMAL G/DL
PROT PATTERN SERPL ELPH-IMP: NORMAL
PROT SERPL-MCNC: 6.6 G/DL (ref 6–8.5)

## 2022-03-15 LAB — VIT B6 SERPL-MCNC: 3.4 UG/L (ref 5.3–46.7)

## 2022-03-16 RX ORDER — DIPHENHYDRAMINE HYDROCHLORIDE 25 MG/1
25 CAPSULE ORAL DAILY
Qty: 30 TABLET | Refills: 11 | Status: SHIPPED | OUTPATIENT
Start: 2022-03-16

## 2022-03-24 ENCOUNTER — TELEPHONE (OUTPATIENT)
Dept: NEUROLOGY | Facility: OTHER | Age: 77
End: 2022-03-24

## 2022-03-24 ENCOUNTER — TELEPHONE (OUTPATIENT)
Dept: NEUROLOGY | Facility: CLINIC | Age: 77
End: 2022-03-24

## 2022-03-24 DIAGNOSIS — G20 PARKINSON'S DISEASE: ICD-10-CM

## 2022-03-24 NOTE — TELEPHONE ENCOUNTER
PATIENT RETURNED OUR CALL IN REGARDS TO HIS LAB RESULTS. READ THE NOTE LEFT BY ILANA MURRAY PATIENT HAD NO FURTHER QUESTIONS.

## 2022-03-25 RX ORDER — PRAMIPEXOLE DIHYDROCHLORIDE 1 MG/1
TABLET ORAL
Qty: 270 TABLET | Refills: 3 | Status: SHIPPED | OUTPATIENT
Start: 2022-03-25

## 2022-06-06 ENCOUNTER — TELEPHONE (OUTPATIENT)
Dept: FAMILY MEDICINE CLINIC | Facility: CLINIC | Age: 77
End: 2022-06-06

## 2022-06-06 NOTE — TELEPHONE ENCOUNTER
Elza with [facility] called requesting either written or verbal orders for this patient. Please advise.    Elza's Cell: [phone]  [place] Phone: [phone]  [place] Fax: [phone]

## 2022-06-07 ENCOUNTER — TELEPHONE (OUTPATIENT)
Dept: FAMILY MEDICINE CLINIC | Facility: CLINIC | Age: 77
End: 2022-06-07

## 2022-06-07 NOTE — TELEPHONE ENCOUNTER
Caller: AYO     Relationship: MELANIE AT [facility]     Best call back number: [phone]    What orders are you requesting (i.e. lab or imaging): VERBAL ORDER FOR OCCUPATIONAL THERAPY :1 TIME A WEEK FOR 2 WEEKS.    Additional notes:

## 2022-06-18 DIAGNOSIS — E03.8 HYPOTHYROIDISM DUE TO HASHIMOTO'S THYROIDITIS: ICD-10-CM

## 2022-06-18 DIAGNOSIS — E06.3 HYPOTHYROIDISM DUE TO HASHIMOTO'S THYROIDITIS: ICD-10-CM

## 2022-06-20 RX ORDER — LEVOTHYROXINE SODIUM 88 UG/1
TABLET ORAL
Qty: 90 TABLET | Refills: 3 | Status: SHIPPED | OUTPATIENT
Start: 2022-06-20

## 2022-06-20 NOTE — TELEPHONE ENCOUNTER
Rx Refill Note  Requested Prescriptions     Pending Prescriptions Disp Refills   • levothyroxine (SYNTHROID, LEVOTHROID) 88 MCG tablet [Pharmacy Med Name: LEVOTHYROXINE 88 MCG TABLET] 90 tablet 3     Sig: TAKE 1 TABLET BY MOUTH EVERY DAY      Last office visit with prescribing clinician: 7-16-21    Next office visit with prescribing clinician: 9/7/2022            Marly Singh MA  06/20/22, 08:46 EDT

## 2022-06-29 ENCOUNTER — TELEPHONE (OUTPATIENT)
Dept: FAMILY MEDICINE CLINIC | Facility: CLINIC | Age: 77
End: 2022-06-29

## 2022-07-01 NOTE — TELEPHONE ENCOUNTER
HAWA WITH [facility] CALLED BACK ASKING IF DR BREEN WILL BE FOLLOWING PATIENT'S CARE FOLLOWING DISCHARGING 7/2/22.    HAWA STATED SHE NEEDS AN ANSWER ASAP AS PATIENT IS DISCHARGING. PLEASE ADVISE AND CALL HAWA [phone]

## 2022-07-01 NOTE — TELEPHONE ENCOUNTER
Can you answer this please? Dr. Holder is out of the office and pt is getting discharged tomorrow.

## 2022-07-03 ENCOUNTER — READMISSION MANAGEMENT (OUTPATIENT)
Dept: CALL CENTER | Facility: HOSPITAL | Age: 77
End: 2022-07-03

## 2022-07-03 NOTE — OUTREACH NOTE
Prep Survey    Flowsheet Row Responses   [facility] facility patient discharged from? Non-BH   Is LACE score < 7 ? Non-BH Discharge   Emergency Room discharge w/ pulse ox? No   [facility]ligibility [facility]   Date of Discharge 07/03/22   Discharge Disposition Home or Self Care   Discharge diagnosis Unavailable    Does the patient have one of the following disease processes/diagnoses(primary or secondary)? Other   Does the patient have Home health ordered? Yes   What is the Home health agency?  Home with home health    Prep survey completed? Yes          SAMI SOTO - Registered Nurse

## 2022-07-04 ENCOUNTER — TRANSITIONAL CARE MANAGEMENT TELEPHONE ENCOUNTER (OUTPATIENT)
Dept: CALL CENTER | Facility: HOSPITAL | Age: 77
End: 2022-07-04

## 2022-07-04 NOTE — OUTREACH NOTE
Call Center TCM Note    Flowsheet Row Responses   [facility] patient discharged from? Non-   Does the patient have one of the following disease processes/diagnoses(primary or secondary)? Other   TCM attempt successful? Yes  [ verbal consent]   Call start time 1021   Call end time 102   Discharge diagnosis [facility]   Person spoke with today (if not patient) and relationship Patient   Meds reviewed with patient/caregiver? Yes   Is the patient having any side effects they believe may be caused by any medication additions or changes? No   Does the patient have all medications ordered at discharge? Yes   Is the patient taking all medications as directed (includes completed medication regime)? Yes   Does the patient have a primary care provider?  Yes   Does the patient have an appointment with their PCP within 7 days of discharge? Yes   Comments regarding PCP hospital fu appt on 22 at 10:45 AM   Has the patient kept scheduled appointments due by today? N/A   What is the Home health agency?  Home with home health    Has home health visited the patient within 72 hours of discharge? Yes   Home health comments 22   Psychosocial issues? No   Did the patient receive a copy of their discharge instructions? Yes   Nursing interventions Reviewed instructions with patient   What is the patient's perception of their health status since discharge? Improving   Is the patient/caregiver able to teach back signs and symptoms related to disease process for when to call PCP? Yes   Is the patient/caregiver able to teach back signs and symptoms related to disease process for when to call 911? Yes   Is the patient/caregiver able to teach back the hierarchy of who to call/visit for symptoms/problems? PCP, Specialist, Home health nurse, Urgent Care, ED, 911 Yes   If the patient is a current smoker, are they able to teach back resources for cessation? Not a smoker   TCM call completed? Yes   Wrap up additional  comments Pt states his right knee is still sore, and taking Norco for pain management. Pt will be receiving HH/PT at home. Pt verified PCP hospital fu appt on 7/6/22.          Karishma Abrams RN    7/4/2022, 10:30 EDT

## 2022-07-06 ENCOUNTER — OFFICE VISIT (OUTPATIENT)
Dept: FAMILY MEDICINE CLINIC | Facility: CLINIC | Age: 77
End: 2022-07-06

## 2022-07-06 VITALS
RESPIRATION RATE: 16 BRPM | OXYGEN SATURATION: 96 % | HEIGHT: 72 IN | DIASTOLIC BLOOD PRESSURE: 64 MMHG | WEIGHT: 237 LBS | SYSTOLIC BLOOD PRESSURE: 138 MMHG | HEART RATE: 81 BPM | BODY MASS INDEX: 32.1 KG/M2 | TEMPERATURE: 97.5 F

## 2022-07-06 DIAGNOSIS — E78.5 HYPERLIPIDEMIA, UNSPECIFIED HYPERLIPIDEMIA TYPE: ICD-10-CM

## 2022-07-06 DIAGNOSIS — G89.29 CHRONIC PAIN OF RIGHT KNEE: ICD-10-CM

## 2022-07-06 DIAGNOSIS — M25.561 CHRONIC PAIN OF RIGHT KNEE: ICD-10-CM

## 2022-07-06 DIAGNOSIS — E06.3 HYPOTHYROIDISM DUE TO HASHIMOTO'S THYROIDITIS: ICD-10-CM

## 2022-07-06 DIAGNOSIS — M79.89 SWELLING OF LOWER EXTREMITY: Primary | ICD-10-CM

## 2022-07-06 DIAGNOSIS — E03.8 HYPOTHYROIDISM DUE TO HASHIMOTO'S THYROIDITIS: ICD-10-CM

## 2022-07-06 DIAGNOSIS — G20 PARKINSON'S DISEASE: ICD-10-CM

## 2022-07-06 PROCEDURE — 99495 TRANSJ CARE MGMT MOD F2F 14D: CPT | Performed by: INTERNAL MEDICINE

## 2022-07-06 RX ORDER — OXYCODONE HYDROCHLORIDE 5 MG/1
TABLET ORAL
COMMUNITY
Start: 2022-07-01

## 2022-07-06 RX ORDER — FUROSEMIDE 40 MG/1
40 TABLET ORAL DAILY PRN
Qty: 5 TABLET | Refills: 0 | Status: SHIPPED | OUTPATIENT
Start: 2022-07-06 | End: 2023-03-15

## 2022-07-06 RX ORDER — GABAPENTIN 300 MG/1
CAPSULE ORAL
COMMUNITY
Start: 2022-07-01

## 2022-07-06 RX ORDER — POTASSIUM CHLORIDE 20 MEQ/1
20 TABLET, EXTENDED RELEASE ORAL 2 TIMES DAILY
Qty: 10 TABLET | Refills: 0 | Status: SHIPPED | OUTPATIENT
Start: 2022-07-06 | End: 2022-07-11

## 2022-07-07 ENCOUNTER — TELEPHONE (OUTPATIENT)
Dept: FAMILY MEDICINE CLINIC | Facility: CLINIC | Age: 77
End: 2022-07-07

## 2022-07-07 NOTE — PROGRESS NOTES
Chief Complaint   Patient presents with   • Hospital Follow Up Visit     SJE 22 RIGHT KNEE REPLACEMENT. DISCHARGED FROM [facility] 22.   Current outpatient and discharge medications have been reconciled for the patient.  Reviewed by: Ben Holder DO      HPI:  Cordell Martin is a 77 y.o. male who presents today for hospital follow-up knee replacement.  Recently discharged from [facility].  He reports some swelling in lower extremities but otherwise doing well.    ROS:  Constitutional: no fevers, night sweats or unexplained weight loss  Eyes: no vision changes  ENT: no runny nose, ear pain, sore throat  Cardio: no chest pain, palpitations  Pulm: no shortness of breath, wheezing, or cough  GI: no abdominal pain or changes in bowel movements  : no difficulty urinating  MSK: no difficulty ambulating, no joint pain  Neuro: no weakness, dizziness or headache  Psych: no trouble sleeping  Endo: no change in appetite      Past Medical History:   Diagnosis Date   • Anxiety    • Arthritis    • Back pain    • Bronchitis    • Cognitive impairment    • Depression    • Disease of thyroid gland    • Glucose intolerance (impaired glucose tolerance)    • Hyperlipidemia    • Kidney stone    • Obesity    • Parkinson disease (HCC)    • Pneumonia    • Prostate disorder    • Thyroid disease    • Wears eyeglasses       Family History   Problem Relation Age of Onset   • Alzheimer's disease Other    • Cancer Other    • Diabetes Other    • Heart disease Other    • Stroke Other    • Cancer Father       Social History     Socioeconomic History   • Marital status:    Tobacco Use   • Smoking status: Former Smoker     Packs/day: 1.00     Years: 15.00     Pack years: 15.00     Types: Cigarettes     Start date:      Quit date:      Years since quittin.5   • Smokeless tobacco: Never Used   • Tobacco comment: quit    Vaping Use   • Vaping Use: Never used   Substance and Sexual Activity   • Alcohol  use: Yes     Comment: occasionally   • Drug use: No   • Sexual activity: Yes     Partners: Female     Birth control/protection: None      No Known Allergies   Immunization History   Administered Date(s) Administered   • COVID-19 (MODERNA) 1st, 2nd, 3rd Dose Only 03/10/2021, 04/07/2021, 10/28/2021   • Flu Vaccine Quad PF >36MO 06/16/2022   • Fluad Quad 65+ 08/31/2020   • Fluzone High Dose =>65 Years ([employer]ax[employer] ONLY) 02/23/2018, 09/12/2018, 09/02/2019, 10/13/2020   • Pneumococcal Conjugate 13-Valent (PCV13) 04/30/2015   • Pneumococcal Polysaccharide (PPSV23) 01/29/2013   • Tdap 12/04/2009   • Zostavax 01/26/2011        PE:  Vitals:    07/06/22 1048   BP: 138/64   Pulse: 81   Resp: 16   Temp: 97.5 °F (36.4 °C)   SpO2: 96%      Body mass index is 32.14 kg/m².    Gen Appearance: NAD  HEENT: Normocephalic, PERRLA, no thyromegaly, trache midline  Heart: RRR, normal S1 and S2, no murmur  Lungs: CTA b/l, no wheezing, no crackles  Abdomen: Soft, non-tender, non-distended, no guarding and BSx4  MSK: Moves all extremities well, normal gait, + peripheral edema , venous stasis changes bilaterally  Pulses: Palpable and equal b/l  Lymph nodes: No palpable lymphadenopathy   Neuro: No focal deficits      Current Outpatient Medications   Medication Sig Dispense Refill   • atorvastatin (LIPITOR) 40 MG tablet TAKE 1 TABLET BY MOUTH EVERY DAY 90 tablet 3   • carbidopa-levodopa (SINEMET)  MG per tablet Take 2 tablets by mouth 4 (Four) Times a Day. 240 tablet 11   • Cholecalciferol (VITAMIN D3) 5000 units capsule capsule Take 5,000 Units by mouth Daily.     • fluorometholone (FML) 0.1 % ophthalmic suspension fluorometholone 0.1 % eye drops,suspension     • fluticasone (FLONASE) 50 MCG/ACT nasal spray USE 2 SPRAYS IN EACH NOSTRIL ONCE DAILY 48 mL 2   • furosemide (Lasix) 40 MG tablet Take 1 tablet by mouth Daily As Needed (Swelling) for up to 5 days. 5 tablet 0   • gabapentin (NEURONTIN) 300 MG capsule      • levothyroxine (SYNTHROID,  LEVOTHROID) 88 MCG tablet TAKE 1 TABLET BY MOUTH EVERY DAY 90 tablet 3   • lidocaine (LIDODERM) 5 % Place 1 patch on the skin as directed by provider Daily. Remove & Discard patch within 12 hours or as directed by MD 30 each 1   • loratadine (Claritin) 10 MG tablet Take 1 tablet by mouth Daily. 90 tablet 1   • oxyCODONE (ROXICODONE) 5 MG immediate release tablet      • pramipexole (MIRAPEX) 1 MG tablet TAKE 1 TABLET BY MOUTH THREE TIMES A  tablet 3   • sildenafil (REVATIO) 20 MG tablet Take 1 tablet by mouth Daily As Needed (Erectile dysfunction). 15 tablet 5   • tamsulosin (FLOMAX) 0.4 MG capsule 24 hr capsule tamsulosin 0.4 mg capsule   TAKE 1 CAPSULE BY MOUTH EVERY DAY     • vitamin B-6 (PYRIDOXINE) 25 MG tablet Take 1 tablet by mouth Daily. 30 tablet 11   • vitamin C (ASCORBIC ACID) 500 MG tablet Take 500 mg by mouth Daily.     • acetaminophen (TYLENOL) 325 MG tablet Take 2 tablets by mouth Every 6 (Six) Hours As Needed for Mild Pain . 28 tablet 0   • potassium chloride (K-DUR,KLOR-CON) 20 MEQ CR tablet Take 1 tablet by mouth 2 (Two) Times a Day for 5 days. 10 tablet 0     No current facility-administered medications for this visit.      Follow-up with orthopedics as scheduled.  Recommend short course of Lasix for 5 days.  If no improvement after diuretics consider eval for DVT.  No change in prescription medication otherwise.    Diagnoses and all orders for this visit:    1. Swelling of lower extremity (Primary)  -     furosemide (Lasix) 40 MG tablet; Take 1 tablet by mouth Daily As Needed (Swelling) for up to 5 days.  Dispense: 5 tablet; Refill: 0    2. Chronic pain of right knee    3. Hypothyroidism due to Hashimoto's thyroiditis    4. Hyperlipidemia, unspecified hyperlipidemia type    5. Parkinson's disease (HCC)    Other orders  -     potassium chloride (K-DUR,KLOR-CON) 20 MEQ CR tablet; Take 1 tablet by mouth 2 (Two) Times a Day for 5 days.  Dispense: 10 tablet; Refill: 0         No follow-ups on  file.     Dictated Utilizing Dragon Dictation    Please note that portions of this note were completed with a voice recognition program.    Part of this note may be an electronic transcription/translation of spoken language to printed text using the Dragon Dictation System.

## 2022-07-07 NOTE — TELEPHONE ENCOUNTER
Caller: [facility]     Relationship: SPEECH THERAPIST     Best call back number: [phone]    What orders are you requesting (i.e. lab or imaging): SOCIAL WORK ORDERS     In what timeframe would the patient need to come in: AS SOON AS POSSIBLE         Additional notes: THE CALLER STATES THAT SHE SAW THE PATIENT YESTERDAY SHE STATES THAT THE PATIENT HAS NO HELP AT HOME SHE STATES THAT THAT PATIENT IS NOT ABLE TO BUT HIS CLOTHES ON OR PUT FOOD AWAY. THE CALLER STATES THAT THE PATIENT ONLY HAS A LITTLE BIT OF STRENGTH AND ONCE ITS GONE HE CANT DO ANYTHING ELSE THE CALLER STATES THAT THE PATIENT NEED HELP

## 2022-07-18 DIAGNOSIS — R62.7 FAILURE TO THRIVE IN ADULT: Primary | ICD-10-CM

## 2022-07-18 DIAGNOSIS — R26.89 BALANCE DISORDER: ICD-10-CM

## 2022-07-19 ENCOUNTER — REFERRAL TRIAGE (OUTPATIENT)
Dept: CASE MANAGEMENT | Facility: OTHER | Age: 77
End: 2022-07-19

## 2022-07-22 ENCOUNTER — TELEPHONE (OUTPATIENT)
Dept: NEUROLOGY | Facility: CLINIC | Age: 77
End: 2022-07-22

## 2022-07-22 NOTE — TELEPHONE ENCOUNTER
Patient is on Carbopa levodopa  2 tablets 4x daily and she  would like to know if he could take the Carbodopa levodopa  ER when leaves hospital. Flower at  would like a call at [phone].

## 2022-07-28 ENCOUNTER — PATIENT OUTREACH (OUTPATIENT)
Dept: CASE MANAGEMENT | Facility: OTHER | Age: 77
End: 2022-07-28

## 2022-07-28 NOTE — OUTREACH NOTE
Patient Outreach    SW will close referral at this time as SW was unable to contact pt after four attempts.    PIPPA ESPINOSA -   Ambulatory Case Management    7/28/2022, 14:38 EDT

## 2022-07-29 ENCOUNTER — TELEPHONE (OUTPATIENT)
Dept: FAMILY MEDICINE CLINIC | Facility: CLINIC | Age: 77
End: 2022-07-29

## 2022-08-03 ENCOUNTER — READMISSION MANAGEMENT (OUTPATIENT)
Dept: CALL CENTER | Facility: HOSPITAL | Age: 77
End: 2022-08-03

## 2022-08-03 NOTE — OUTREACH NOTE
Prep Survey    Flowsheet Row Responses   [facility] facility patient discharged from? Non-BH   Is LACE score < 7 ? Non-BH Discharge   Emergency Room discharge w/ pulse ox? No   Eligibility Not Eligible   What are the reasons patient is not eligible? [facility]   Does the patient have one of the following disease processes/diagnoses(primary or secondary)? Other   Prep survey completed? Yes          SAMI SOTO - Registered Nurse

## 2022-09-05 ENCOUNTER — TRANSITIONAL CARE MANAGEMENT TELEPHONE ENCOUNTER (OUTPATIENT)
Dept: CALL CENTER | Facility: HOSPITAL | Age: 77
End: 2022-09-05

## 2022-09-05 ENCOUNTER — READMISSION MANAGEMENT (OUTPATIENT)
Dept: CALL CENTER | Facility: HOSPITAL | Age: 77
End: 2022-09-05

## 2022-09-05 NOTE — OUTREACH NOTE
Prep Survey    Flowsheet Row Responses   [facility] facility patient discharged from? Non-BH   Is LACE score < 7 ? Non-BH Discharge   Emergency Room discharge w/ pulse ox? No   [facility] [place]    Date of Discharge 09/02/22   Discharge diagnosis Unavailable   Does the patient have one of the following disease processes/diagnoses(primary or secondary)? Other   Prep survey completed? Yes          ASIYA EAGLE - Registered Nurse

## 2022-09-05 NOTE — OUTREACH NOTE
"Call Center TCM Note    Flowsheet Row Responses   [facility] patient discharged from? Non-   Does the patient have one of the following disease processes/diagnoses(primary or secondary)? Other   TCM attempt successful? Yes   Call start time 1131   Call end time 1137   Meds reviewed with patient/caregiver? Yes   Is the patient taking all medications as directed (includes completed medication regime)? Yes   Does the patient have an appointment with their PCP within 7 days of discharge? Yes   Has home health visited the patient within 72 hours of discharge? N/A   DME comments PATIENT STATES, \"MY SUCTION PUMP IS NOT WORKING. IT'S IN THE RED.\" PATIENT STATES HE IS GOING TO CALL THE \"REP.\"    Psychosocial issues? No   TCM call completed? Yes          Jaycee Goetz LPN    9/5/2022, 11:41 EDT      "

## 2022-09-06 ENCOUNTER — TELEPHONE (OUTPATIENT)
Dept: FAMILY MEDICINE CLINIC | Facility: CLINIC | Age: 77
End: 2022-09-06

## 2022-09-06 NOTE — TELEPHONE ENCOUNTER
[facility] called needing verbal orders to start patients home health.     Verbal orders given.     No further information needed.

## 2022-11-02 ENCOUNTER — TELEPHONE (OUTPATIENT)
Dept: FAMILY MEDICINE CLINIC | Facility: CLINIC | Age: 77
End: 2022-11-02

## 2022-11-02 NOTE — TELEPHONE ENCOUNTER
Caller: STELLA FROM [facility]    Relationship: Other    Best call back number: [phone]    What form or medical record are you requesting: APPOINTMENT NOTES AND MED LISTS     Who is requesting this form or medical record from you: [facility]  How would you like to receive the form or medical records (pick-up, mail, fax):FAX  If fax, what is the fax number: [phone]  If mail, what is the address:   If pick-up, provide patient with address and location details    Timeframe paperwork needed: AS SOON AS POSSIBLE     Additional notes:

## 2023-02-19 ENCOUNTER — READMISSION MANAGEMENT (OUTPATIENT)
Dept: CALL CENTER | Facility: HOSPITAL | Age: 78
End: 2023-02-19
Payer: MEDICARE

## 2023-02-19 NOTE — OUTREACH NOTE
Prep Survey    Flowsheet Row Responses   [facility] facility patient discharged from? Non-BH   Is LACE score < 7 ? Non- Discharge   [facility]   Date of Discharge 02/19/23   Discharge Disposition Home or Self Care   Discharge diagnosis Localized edema   Does the patient have one of the following disease processes/diagnoses(primary or secondary)? Other   Prep survey completed? Yes          Layne BLANCAS - Registered Nurse

## 2023-02-21 ENCOUNTER — TRANSITIONAL CARE MANAGEMENT TELEPHONE ENCOUNTER (OUTPATIENT)
Dept: CALL CENTER | Facility: HOSPITAL | Age: 78
End: 2023-02-21
Payer: MEDICARE

## 2023-02-21 DIAGNOSIS — E03.8 HYPOTHYROIDISM DUE TO HASHIMOTO'S THYROIDITIS: ICD-10-CM

## 2023-02-21 DIAGNOSIS — E78.5 HYPERLIPIDEMIA, UNSPECIFIED HYPERLIPIDEMIA TYPE: ICD-10-CM

## 2023-02-21 DIAGNOSIS — M79.89 SWELLING OF LOWER EXTREMITY: ICD-10-CM

## 2023-02-21 DIAGNOSIS — R26.89 BALANCE DISORDER: ICD-10-CM

## 2023-02-21 DIAGNOSIS — E06.3 HYPOTHYROIDISM DUE TO HASHIMOTO'S THYROIDITIS: ICD-10-CM

## 2023-02-21 DIAGNOSIS — G20 PARKINSON'S DISEASE: ICD-10-CM

## 2023-02-21 DIAGNOSIS — R62.7 FAILURE TO THRIVE IN ADULT: Primary | ICD-10-CM

## 2023-02-21 DIAGNOSIS — R60.9 CHRONIC EDEMA: ICD-10-CM

## 2023-02-21 NOTE — OUTREACH NOTE
Call Center TCM Note    Flowsheet Row Responses   [facility] patient discharged from? Non-  [[facility]]   Does the patient have one of the following disease processes/diagnoses(primary or secondary)? Other   TCM attempt successful? Yes   Call start time 0910   Call end time 0918   Discharge diagnosis Localized edema   Person spoke with today (if not patient) and relationship Patient   Meds reviewed with patient/caregiver? Yes   Prescription comments Patient states they started him on an infection related to swelling in Legs.   Is the patient taking all medications as directed (includes completed medication regime)? Yes   Comments Scheduled first available hospital fu on 02/27 @ 115   Does the patient have an appointment with their PCP within 7 days of discharge? No   Nursing Interventions Assisted patient with making appointment per protocol   Has home health visited the patient within 72 hours of discharge? N/A   Psychosocial issues? No   Comments Patient states he is suppose to have home health coming to help wrap legs- however no one has reached out- He is unsure if hospital ordered home health- [facility] Will ask pcp office to advise referral.   Did the patient receive a copy of their discharge instructions? Yes   Nursing interventions Reviewed instructions with patient   What is the patient's perception of their health status since discharge? Improving   Is the patient/caregiver able to teach back signs and symptoms related to disease process for when to call PCP? Yes   Is the patient/caregiver able to teach back signs and symptoms related to disease process for when to call 911? Yes   Is the patient/caregiver able to teach back the hierarchy of who to call/visit for symptoms/problems? PCP, Specialist, Home health nurse, Urgent Care, ED, 911 Yes   TCM call completed? Yes   Wrap up additional comments Patient states he is doing well- Patient admitted to [facility] Related to localized edema- Lower Extremity- Patient DC on ABX and to wrap legs- patient needing orders for home health- sent request to pcp. Discussed monitoring s/s for worsening infection no other questions or concerns at this time.   Call end time 0918   Would this patient benefit from a Referral to [facility] Social Work? No   Is the patient interested in additional calls from an ambulatory ?  NOTE:  applies to high risk patients requiring additional follow-up. No          Joyce Stubbs RN    2/21/2023, 09:19 EST

## 2023-03-15 ENCOUNTER — OFFICE VISIT (OUTPATIENT)
Dept: FAMILY MEDICINE CLINIC | Facility: CLINIC | Age: 78
End: 2023-03-15
Payer: MEDICARE

## 2023-03-15 VITALS
HEART RATE: 78 BPM | BODY MASS INDEX: 32.14 KG/M2 | DIASTOLIC BLOOD PRESSURE: 80 MMHG | HEIGHT: 72 IN | SYSTOLIC BLOOD PRESSURE: 126 MMHG | OXYGEN SATURATION: 98 %

## 2023-03-15 DIAGNOSIS — I89.0 LYMPHEDEMA: ICD-10-CM

## 2023-03-15 DIAGNOSIS — R73.9 HYPERGLYCEMIA: ICD-10-CM

## 2023-03-15 DIAGNOSIS — R60.0 LOWER EXTREMITY EDEMA: Primary | ICD-10-CM

## 2023-03-15 DIAGNOSIS — L03.119 CELLULITIS OF LOWER EXTREMITY, UNSPECIFIED LATERALITY: ICD-10-CM

## 2023-03-15 PROCEDURE — 99214 OFFICE O/P EST MOD 30 MIN: CPT | Performed by: INTERNAL MEDICINE

## 2023-03-15 PROCEDURE — 1159F MED LIST DOCD IN RCRD: CPT | Performed by: INTERNAL MEDICINE

## 2023-03-15 RX ORDER — POTASSIUM CHLORIDE 20 MEQ/1
20 TABLET, EXTENDED RELEASE ORAL DAILY
Qty: 90 TABLET | Refills: 3 | Status: SHIPPED | OUTPATIENT
Start: 2023-03-15

## 2023-03-15 RX ORDER — DOXYCYCLINE 100 MG/1
100 CAPSULE ORAL 2 TIMES DAILY
Qty: 14 CAPSULE | Refills: 0 | Status: SHIPPED | OUTPATIENT
Start: 2023-03-15 | End: 2023-03-22

## 2023-03-15 RX ORDER — FUROSEMIDE 20 MG/1
20 TABLET ORAL DAILY
Qty: 90 TABLET | Refills: 3 | Status: SHIPPED | OUTPATIENT
Start: 2023-03-15

## 2023-03-15 NOTE — PROGRESS NOTES
Chief Complaint   Patient presents with   • Edema     Seen at [facility]        HPI:  Cordell Martin is a 78 y.o. male who presents today for hospital follow-up cellulitis and edema.  Was seen at [facility] 1 month ago.  Hospitalized for 2 days.    ROS:  Constitutional: no fevers, night sweats or unexplained weight loss  Eyes: no vision changes  ENT: no runny nose, ear pain, sore throat  Cardio: no chest pain, palpitations  Pulm: no shortness of breath, wheezing, or cough  GI: no abdominal pain or changes in bowel movements  : no difficulty urinating  MSK: no difficulty ambulating, no joint pain  Neuro: no weakness, dizziness or headache  Psych: no trouble sleeping  Endo: no change in appetite      Past Medical History:   Diagnosis Date   • Anxiety    • Arthritis    • Back pain    • Bronchitis    • Cognitive impairment    • Depression    • Disease of thyroid gland    • Glucose intolerance (impaired glucose tolerance)    • Hyperlipidemia    • Kidney stone    • Obesity    • Parkinson disease (HCC)    • Pneumonia    • Prostate disorder    • Thyroid disease    • Wears eyeglasses       Family History   Problem Relation Age of Onset   • Alzheimer's disease Other    • Cancer Other    • Diabetes Other    • Heart disease Other    • Stroke Other    • Cancer Father       Social History     Socioeconomic History   • Marital status:    Tobacco Use   • Smoking status: Former     Packs/day: 1.00     Years: 15.00     Pack years: 15.00     Types: Cigarettes     Start date:      Quit date:      Years since quittin.2   • Smokeless tobacco: Never   • Tobacco comments:     quit    Vaping Use   • Vaping Use: Never used   Substance and Sexual Activity   • Alcohol use: Yes     Comment: occasionally   • Drug use: No   • Sexual activity: Yes     Partners: Female     Birth control/protection: None      No Known Allergies   Immunization History   Administered Date(s) Administered   • COVID-19 (MODERNA) 1st, 2nd, 3rd Dose  Only 03/10/2021, 04/07/2021, 10/28/2021   • Flu Vaccine Quad PF >36MO 06/16/2022   • Fluad Quad 65+ 08/31/2020   • Fluzone High Dose =>65 Years (Vaxcare ONLY) 02/23/2018, 09/12/2018, 09/02/2019, 10/13/2020   • Pneumococcal Conjugate 13-Valent (PCV13) 04/30/2015   • Pneumococcal Polysaccharide (PPSV23) 01/29/2013   • Tdap 12/04/2009   • Zostavax 01/26/2011        PE:  Vitals:    03/15/23 1411   BP: 126/80   Pulse: 78   SpO2: 98%      Body mass index is 32.14 kg/m².    Gen Appearance: NAD  HEENT: Normocephalic, PERRLA, no thyromegaly, trache midline  Heart: RRR, normal S1 and S2, no murmur  Lungs: CTA b/l, no wheezing, no crackles  Abdomen: Soft, non-tender, non-distended, no guarding and BSx4  MSK: Moves all extremities well, normal gait, no peripheral edema  Pulses: Palpable and equal b/l  Lymph nodes: No palpable lymphadenopathy   Neuro: No focal deficits      Current Outpatient Medications   Medication Sig Dispense Refill   • acetaminophen (TYLENOL) 325 MG tablet Take 2 tablets by mouth Every 6 (Six) Hours As Needed for Mild Pain . 28 tablet 0   • atorvastatin (LIPITOR) 40 MG tablet TAKE 1 TABLET BY MOUTH EVERY DAY 90 tablet 3   • carbidopa-levodopa (SINEMET)  MG per tablet Take 2 tablets by mouth 4 (Four) Times a Day. 240 tablet 11   • Cholecalciferol (VITAMIN D3) 5000 units capsule capsule Take 1 capsule by mouth Daily.     • fluorometholone (FML) 0.1 % ophthalmic suspension fluorometholone 0.1 % eye drops,suspension     • fluticasone (FLONASE) 50 MCG/ACT nasal spray USE 2 SPRAYS IN EACH NOSTRIL ONCE DAILY 48 mL 2   • gabapentin (NEURONTIN) 300 MG capsule      • levothyroxine (SYNTHROID, LEVOTHROID) 88 MCG tablet TAKE 1 TABLET BY MOUTH EVERY DAY 90 tablet 3   • lidocaine (LIDODERM) 5 % Place 1 patch on the skin as directed by provider Daily. Remove & Discard patch within 12 hours or as directed by MD 30 each 1   • loratadine (Claritin) 10 MG tablet Take 1 tablet by mouth Daily. 90 tablet 1   • oxyCODONE  (ROXICODONE) 5 MG immediate release tablet      • pramipexole (MIRAPEX) 1 MG tablet TAKE 1 TABLET BY MOUTH THREE TIMES A  tablet 3   • sildenafil (REVATIO) 20 MG tablet Take 1 tablet by mouth Daily As Needed (Erectile dysfunction). 15 tablet 5   • tamsulosin (FLOMAX) 0.4 MG capsule 24 hr capsule tamsulosin 0.4 mg capsule   TAKE 1 CAPSULE BY MOUTH EVERY DAY     • vitamin B-6 (PYRIDOXINE) 25 MG tablet Take 1 tablet by mouth Daily. 30 tablet 11   • vitamin C (ASCORBIC ACID) 500 MG tablet Take 1 tablet by mouth Daily.     • doxycycline (MONODOX) 100 MG capsule Take 1 capsule by mouth 2 (Two) Times a Day for 7 days. 14 capsule 0   • furosemide (Lasix) 20 MG tablet Take 1 tablet by mouth Daily. 90 tablet 3   • potassium chloride (K-DUR,KLOR-CON) 20 MEQ CR tablet Take 1 tablet by mouth Daily. 90 tablet 3     No current facility-administered medications for this visit.      Recommend following up with vascular surgeon as scheduled.  Starting on daily Lasix 20 mg along with potassium supplement.  See back in 2 weeks for blood work and reassessment.  Starting on doxycycline for repeat cellulitis episode.  If he has persistent infection or concern for infection at follow-up would recommend establishing care with infectious disease.  We will try to get him into lymphedema clinic as well which may help with swelling.    Diagnoses and all orders for this visit:    1. Lower extremity edema (Primary)  -     furosemide (Lasix) 20 MG tablet; Take 1 tablet by mouth Daily.  Dispense: 90 tablet; Refill: 3  -     potassium chloride (K-DUR,KLOR-CON) 20 MEQ CR tablet; Take 1 tablet by mouth Daily.  Dispense: 90 tablet; Refill: 3    2. Hyperglycemia  -     Comprehensive Metabolic Panel; Future  -     CBC & Differential; Future  -     Hemoglobin A1c; Future    3. Cellulitis of lower extremity, unspecified laterality  -     doxycycline (MONODOX) 100 MG capsule; Take 1 capsule by mouth 2 (Two) Times a Day for 7 days.  Dispense: 14  capsule; Refill: 0    4. Lymphedema  -     Ambulatory Referral to Physical Therapy Lymphedema (lymphedema)         Return in about 2 weeks (around 3/29/2023).     Dictated Utilizing Dragon Dictation    Please note that portions of this note were completed with a voice recognition program.    Part of this note may be an electronic transcription/translation of spoken language to printed text using the Dragon Dictation System.

## 2023-03-23 RX ORDER — ATORVASTATIN CALCIUM 40 MG/1
TABLET, FILM COATED ORAL
Qty: 90 TABLET | Refills: 1 | Status: SHIPPED | OUTPATIENT
Start: 2023-03-23 | End: 2023-03-29 | Stop reason: SDUPTHER

## 2023-03-23 NOTE — TELEPHONE ENCOUNTER
Rx Refill Note  Requested Prescriptions     Pending Prescriptions Disp Refills   • atorvastatin (LIPITOR) 40 MG tablet [Pharmacy Med Name: ATORVASTATIN 40 MG TABLET] 90 tablet 3     Sig: TAKE 1 TABLET BY MOUTH EVERY DAY      Last office visit with prescribing clinician: 3/15/2023     Next office visit with prescribing clinician: 3/29/2023     Trinidad Chase MA  03/23/23, 16:18 EDT

## 2023-03-24 ENCOUNTER — TELEPHONE (OUTPATIENT)
Dept: FAMILY MEDICINE CLINIC | Facility: CLINIC | Age: 78
End: 2023-03-24

## 2023-03-24 NOTE — TELEPHONE ENCOUNTER
Caller: [facility]    Relationship: NURSE WITH [facility]    Best call back number:    What is the medical concern/diagnosis: LYMPHEDMA    What specialty or service is being requested: REFERRAL FOR  PHYSICAL THERAPY LYMPHEDEMA CLINIC    What is the provider, practice or medical service name: [facility]    What is the office location: [address] [address]    What is the office phone number:  [phone]     Any additional details: THIS WAS ORGINALLY  FOR [facility] BUT THEY ARE NOT CURRENTLY TAKING ANY NEW PATIENTS; SHE HAS SET UP THE PATIENT AN APPT WITH [facility] April 4 @ 200p    PATIENT IS ALSO BEING DISCHARGED FROM HOME [facility] TODAY [id number]

## 2023-03-24 NOTE — TELEPHONE ENCOUNTER
Caller: [facility]ESSE [facility]    Relationship: NURSE    Best call back number: [phone]    What is the best time to reach you: ANY, ASAP    Who are you requesting to speak with (clinical staff, provider,  specific staff member): DR. BREEN'S NURSE    What was the call regarding: PATIENT IS BEING RELEASED FROM HOME HEALTH TODAY AND TOLD THE NURSE THAT DR. BREEN WAS GOING TO PUT IN A REFERRAL FOR PHYSICAL THERAPY LYMPHEDEMA. HE DOES NOT KNOW WHERE THIS WILL BE AND HAS NOT GOTTEN A CALL. PLEASE CALL THE NURSE TO LET HER KNOW.     Do you require a callback: YES

## 2023-03-29 ENCOUNTER — OFFICE VISIT (OUTPATIENT)
Dept: FAMILY MEDICINE CLINIC | Facility: CLINIC | Age: 78
End: 2023-03-29
Payer: MEDICARE

## 2023-03-29 VITALS
HEIGHT: 72 IN | SYSTOLIC BLOOD PRESSURE: 126 MMHG | DIASTOLIC BLOOD PRESSURE: 70 MMHG | HEART RATE: 88 BPM | BODY MASS INDEX: 32.14 KG/M2

## 2023-03-29 DIAGNOSIS — G20 PARKINSON'S DISEASE: ICD-10-CM

## 2023-03-29 DIAGNOSIS — L03.119 CELLULITIS OF LOWER EXTREMITY, UNSPECIFIED LATERALITY: Primary | ICD-10-CM

## 2023-03-29 DIAGNOSIS — R26.89 BALANCE DISORDER: ICD-10-CM

## 2023-03-29 DIAGNOSIS — R60.9 SWELLING: ICD-10-CM

## 2023-03-29 PROCEDURE — 1159F MED LIST DOCD IN RCRD: CPT | Performed by: INTERNAL MEDICINE

## 2023-03-29 PROCEDURE — 99214 OFFICE O/P EST MOD 30 MIN: CPT | Performed by: INTERNAL MEDICINE

## 2023-03-29 PROCEDURE — 1160F RVW MEDS BY RX/DR IN RCRD: CPT | Performed by: INTERNAL MEDICINE

## 2023-03-29 RX ORDER — POTASSIUM CHLORIDE 20 MEQ/1
20 TABLET, EXTENDED RELEASE ORAL 2 TIMES DAILY
Qty: 10 TABLET | Refills: 0 | Status: SHIPPED | OUTPATIENT
Start: 2023-03-29 | End: 2023-04-03

## 2023-03-29 RX ORDER — ATORVASTATIN CALCIUM 40 MG/1
40 TABLET, FILM COATED ORAL DAILY
Qty: 90 TABLET | Refills: 3 | Status: SHIPPED | OUTPATIENT
Start: 2023-03-29

## 2023-03-29 RX ORDER — DOXYCYCLINE 100 MG/1
100 CAPSULE ORAL 2 TIMES DAILY
Qty: 14 CAPSULE | Refills: 0 | Status: SHIPPED | OUTPATIENT
Start: 2023-03-29 | End: 2023-04-05

## 2023-03-29 RX ORDER — FUROSEMIDE 40 MG/1
40 TABLET ORAL DAILY
Qty: 5 TABLET | Refills: 0 | Status: SHIPPED | OUTPATIENT
Start: 2023-03-29 | End: 2023-04-03

## 2023-03-29 NOTE — PROGRESS NOTES
Chief Complaint   Patient presents with   • Cellulitis, LE swelling              HPI:  Cordell Martin is a 78 y.o. male who presents today for follow-up cellulitis and lower extremity swelling.  Swelling is somewhat improved over the last week.  Continues to have redness and tenderness.    ROS:  Constitutional: no fevers, night sweats or unexplained weight loss  Eyes: no vision changes  ENT: no runny nose, ear pain, sore throat  Cardio: no chest pain, palpitations  Pulm: no shortness of breath, wheezing, or cough  GI: no abdominal pain or changes in bowel movements  : no difficulty urinating  MSK: + difficulty ambulating, + joint pain  Neuro: no weakness, dizziness or headache  Psych: no trouble sleeping  Endo: no change in appetite      Past Medical History:   Diagnosis Date   • Anxiety    • Arthritis    • Back pain    • Bronchitis    • Cognitive impairment    • Depression    • Disease of thyroid gland    • Glucose intolerance (impaired glucose tolerance)    • Hyperlipidemia    • Kidney stone    • Obesity    • Parkinson disease (HCC)    • Pneumonia    • Prostate disorder    • Thyroid disease    • Wears eyeglasses       Family History   Problem Relation Age of Onset   • Alzheimer's disease Other    • Cancer Other    • Diabetes Other    • Heart disease Other    • Stroke Other    • Cancer Father       Social History     Socioeconomic History   • Marital status:    Tobacco Use   • Smoking status: Former     Packs/day: 1.00     Years: 15.00     Pack years: 15.00     Types: Cigarettes     Start date:      Quit date:      Years since quittin.2   • Smokeless tobacco: Never   • Tobacco comments:     quit    Vaping Use   • Vaping Use: Never used   Substance and Sexual Activity   • Alcohol use: Yes     Comment: occasionally   • Drug use: No   • Sexual activity: Yes     Partners: Female     Birth control/protection: None      No Known Allergies   Immunization History   Administered Date(s)  Administered   • COVID-19 (MODERNA) 1st, 2nd, 3rd Dose Only 03/10/2021, 04/07/2021, 10/28/2021   • Flu Vaccine Quad PF >36MO 06/16/2022   • Fluad Quad 65+ 08/31/2020   • Fluzone High Dose =>65 Years (Vaxcare ONLY) 02/23/2018, 09/12/2018, 09/02/2019, 10/13/2020   • Pneumococcal Conjugate 13-Valent (PCV13) 04/30/2015   • Pneumococcal Polysaccharide (PPSV23) 01/29/2013   • Tdap 12/04/2009   • Zostavax 01/26/2011        PE:  Vitals:    03/29/23 1418   BP: 126/70   Pulse: 88      Body mass index is 32.14 kg/m².    Gen Appearance: NAD  HEENT: Normocephalic, PERRLA, no thyromegaly, trache midline  Heart: RRR, normal S1 and S2, no murmur  Lungs: CTA b/l, no wheezing, no crackles  Abdomen: Soft, non-tender, non-distended, no guarding and BSx4  MSK: Moves all extremities well, normal gait, + peripheral edema  Pulses: Palpable and equal b/l  Lymph nodes: No palpable lymphadenopathy   Neuro: No focal deficits      Current Outpatient Medications   Medication Sig Dispense Refill   • acetaminophen (TYLENOL) 325 MG tablet Take 2 tablets by mouth Every 6 (Six) Hours As Needed for Mild Pain . 28 tablet 0   • atorvastatin (LIPITOR) 40 MG tablet Take 1 tablet by mouth Daily. 90 tablet 3   • carbidopa-levodopa (SINEMET)  MG per tablet Take 2 tablets by mouth 4 (Four) Times a Day. 240 tablet 11   • Cholecalciferol (VITAMIN D3) 5000 units capsule capsule Take 1 capsule by mouth Daily.     • fluorometholone (FML) 0.1 % ophthalmic suspension fluorometholone 0.1 % eye drops,suspension     • fluticasone (FLONASE) 50 MCG/ACT nasal spray USE 2 SPRAYS IN EACH NOSTRIL ONCE DAILY 48 mL 2   • furosemide (Lasix) 20 MG tablet Take 1 tablet by mouth Daily. 90 tablet 3   • gabapentin (NEURONTIN) 300 MG capsule      • levothyroxine (SYNTHROID, LEVOTHROID) 88 MCG tablet TAKE 1 TABLET BY MOUTH EVERY DAY 90 tablet 3   • lidocaine (LIDODERM) 5 % Place 1 patch on the skin as directed by provider Daily. Remove & Discard patch within 12 hours or as  directed by MD 30 each 1   • loratadine (Claritin) 10 MG tablet Take 1 tablet by mouth Daily. 90 tablet 1   • oxyCODONE (ROXICODONE) 5 MG immediate release tablet      • potassium chloride (K-DUR,KLOR-CON) 20 MEQ CR tablet Take 1 tablet by mouth Daily. 90 tablet 3   • pramipexole (MIRAPEX) 1 MG tablet TAKE 1 TABLET BY MOUTH THREE TIMES A  tablet 3   • sildenafil (REVATIO) 20 MG tablet Take 1 tablet by mouth Daily As Needed (Erectile dysfunction). 15 tablet 5   • tamsulosin (FLOMAX) 0.4 MG capsule 24 hr capsule tamsulosin 0.4 mg capsule   TAKE 1 CAPSULE BY MOUTH EVERY DAY     • vitamin B-6 (PYRIDOXINE) 25 MG tablet Take 1 tablet by mouth Daily. 30 tablet 11   • vitamin C (ASCORBIC ACID) 500 MG tablet Take 1 tablet by mouth Daily.     • doxycycline (MONODOX) 100 MG capsule Take 1 capsule by mouth 2 (Two) Times a Day for 7 days. 14 capsule 0   • furosemide (Lasix) 40 MG tablet Take 1 tablet by mouth Daily for 5 days. 5 tablet 0   • potassium chloride (K-DUR,KLOR-CON) 20 MEQ CR tablet Take 1 tablet by mouth 2 (Two) Times a Day for 5 days. 10 tablet 0     No current facility-administered medications for this visit.        Diagnoses and all orders for this visit:    1. Cellulitis of lower extremity, unspecified laterality (Primary)  -     Ambulatory Referral to Infectious Disease  -     doxycycline (MONODOX) 100 MG capsule; Take 1 capsule by mouth 2 (Two) Times a Day for 7 days.  Dispense: 14 capsule; Refill: 0  Continues to have redness and tenderness clearly demarcated on exam.  Recommend repeat course of doxycycline, refer to ID for recurrent cellulitis.  Increasing Lasix dose for the next week to improve swelling.  He plans on establishing care with lymphedema clinic soon as well.  Follow-up 1 week to monitor.  2. Swelling  -     potassium chloride (K-DUR,KLOR-CON) 20 MEQ CR tablet; Take 1 tablet by mouth 2 (Two) Times a Day for 5 days.  Dispense: 10 tablet; Refill: 0  -     furosemide (Lasix) 40 MG tablet;  Take 1 tablet by mouth Daily for 5 days.  Dispense: 5 tablet; Refill: 0    3. Parkinson's disease (HCC)    4. Balance disorder    Other orders  -     atorvastatin (LIPITOR) 40 MG tablet; Take 1 tablet by mouth Daily.  Dispense: 90 tablet; Refill: 3         No follow-ups on file.     Dictated Utilizing Dragon Dictation    Please note that portions of this note were completed with a voice recognition program.    Part of this note may be an electronic transcription/translation of spoken language to printed text using the Dragon Dictation System.

## 2023-04-10 DIAGNOSIS — G20 PARKINSON'S DISEASE: ICD-10-CM

## 2023-04-10 NOTE — TELEPHONE ENCOUNTER
Caller: Cordell Martin    Relationship: Self    Best call back number: [phone]    Requested Prescriptions:   Requested Prescriptions     Pending Prescriptions Disp Refills   • pramipexole (MIRAPEX) 1 MG tablet 270 tablet 3     Sig: Take 1 tablet by mouth 3 (Three) Times a Day.        Pharmacy where request should be sent: [id number] - [place], [place] - [address] [phone] [facility] [phone]      Last office visit with prescribing clinician: 3/29/2023   Last telemedicine visit with prescribing clinician: 4/12/2023   Next office visit with prescribing clinician: 4/12/2023     Additional details provided by patient: OUT OF MEDICATION FOR 2 WEEKS.     Does the patient have less than a 3 day supply:  [x] Yes  [] No    Would you like a call back once the refill request has been completed: [] Yes [x] No    If the office needs to give you a call back, can they leave a voicemail: [] Yes [x] No    Zuleyma Ureña Rep   04/10/23 11:25 EDT

## 2023-04-11 RX ORDER — PRAMIPEXOLE DIHYDROCHLORIDE 1 MG/1
1 TABLET ORAL 3 TIMES DAILY
Qty: 270 TABLET | Refills: 3 | OUTPATIENT
Start: 2023-04-11

## 2023-04-11 NOTE — TELEPHONE ENCOUNTER
Left detailed voicemail explaining patient will need to get refills from neurology. Office # given incase patient had further questions.     Hub may relay message and document.

## 2023-05-30 ENCOUNTER — TELEPHONE (OUTPATIENT)
Dept: FAMILY MEDICINE CLINIC | Facility: CLINIC | Age: 78
End: 2023-05-30

## 2023-05-30 NOTE — TELEPHONE ENCOUNTER
Contacted [facility] & notified them that I was able to pull the records through [employer]. She states they need a signature on a plan of care document. She will attempt to fax again and let us know if she has trouble.

## 2023-05-30 NOTE — TELEPHONE ENCOUNTER
Caller: [facility]    Best call back number: [phone]    What is the best time to reach you: ANY    Who are you requesting to speak with (clinical staff, provider,  specific staff member):  OR HIS NURSE    What was the call regarding: [facility] HAS BEEN TRYING TO SEND OVER A REPORT FOR THE LAST WEEK. THEY HAVE TRIED SENDING IT AT DIFFERENT TIMES AROUND 3-4 TIMES A DAY AND THEY KEEP GETTING AN ERROR. THEY HAVE TRIED TO SEND THIS OVER AT LEAST 12 TIMES. OTHER FAXES THEY SEND WORK. IS THERE ANOTHER WAY TO GET THIS TO DR. BREEN. THE NUMBER THEY USED IS [phone]. PLEASE CALL THEM TO PROVIDE THEM WITH INFORMATION.     Is it okay if the provider responds through [employer]: NO

## 2023-06-06 ENCOUNTER — TELEPHONE (OUTPATIENT)
Dept: FAMILY MEDICINE CLINIC | Facility: CLINIC | Age: 78
End: 2023-06-06
Payer: MEDICARE

## 2023-06-06 DIAGNOSIS — G20 PARKINSON'S DISEASE: Primary | ICD-10-CM

## 2023-06-06 NOTE — TELEPHONE ENCOUNTER
[facility] physical therapy called requesting speech therapy orders for patient. Informed provider who put in the order, I then faxed them to [phone].

## 2023-06-06 NOTE — TELEPHONE ENCOUNTER
Caller: [facility]    Relationship: [employer]    Best call back number: [phone]    What form or medical record are you requesting: notes with linfedema diagnosis    Who is requesting this form or medical record from you: [employer]    How would you like to receive the form or medical records (pick-up, mail, fax):   If fax, what is the fax number: [phone]      Timeframe paperwork needed: ASAP    Additional notes:

## 2023-06-15 ENCOUNTER — OFFICE VISIT (OUTPATIENT)
Dept: NEUROLOGY | Facility: CLINIC | Age: 78
End: 2023-06-15
Payer: MEDICARE

## 2023-06-15 VITALS
HEART RATE: 88 BPM | DIASTOLIC BLOOD PRESSURE: 60 MMHG | SYSTOLIC BLOOD PRESSURE: 110 MMHG | HEIGHT: 72 IN | BODY MASS INDEX: 32.1 KG/M2 | OXYGEN SATURATION: 95 % | WEIGHT: 237 LBS

## 2023-06-15 DIAGNOSIS — G25.81 RESTLESS LEGS SYNDROME (RLS): ICD-10-CM

## 2023-06-15 DIAGNOSIS — G20 PARKINSON'S DISEASE: ICD-10-CM

## 2023-06-15 DIAGNOSIS — G20 PARKINSON'S DISEASE: Primary | ICD-10-CM

## 2023-06-15 RX ORDER — QUETIAPINE FUMARATE 25 MG/1
12.5 TABLET, FILM COATED ORAL NIGHTLY
Qty: 15 TABLET | Refills: 5 | Status: SHIPPED | OUTPATIENT
Start: 2023-06-15 | End: 2023-06-15 | Stop reason: SDUPTHER

## 2023-06-15 RX ORDER — CARBIDOPA AND LEVODOPA 25; 100 MG/1; MG/1
1 TABLET, EXTENDED RELEASE ORAL 2 TIMES DAILY
Qty: 60 TABLET | Refills: 11 | Status: SHIPPED | OUTPATIENT
Start: 2023-06-15 | End: 2024-06-14

## 2023-06-15 RX ORDER — PRAMIPEXOLE DIHYDROCHLORIDE 1 MG/1
1 TABLET ORAL 3 TIMES DAILY
Qty: 270 TABLET | Refills: 3 | Status: SHIPPED | OUTPATIENT
Start: 2023-06-15 | End: 2023-06-15 | Stop reason: SDUPTHER

## 2023-06-15 RX ORDER — PRAMIPEXOLE DIHYDROCHLORIDE 1 MG/1
1 TABLET ORAL 3 TIMES DAILY
Qty: 270 TABLET | Refills: 3 | Status: SHIPPED | OUTPATIENT
Start: 2023-06-15

## 2023-06-15 RX ORDER — QUETIAPINE FUMARATE 25 MG/1
12.5 TABLET, FILM COATED ORAL NIGHTLY
Qty: 15 TABLET | Refills: 5 | Status: SHIPPED | OUTPATIENT
Start: 2023-06-15

## 2023-06-15 RX ORDER — CARBIDOPA AND LEVODOPA 25; 100 MG/1; MG/1
1 TABLET, EXTENDED RELEASE ORAL 2 TIMES DAILY
Qty: 60 TABLET | Refills: 11 | Status: SHIPPED | OUTPATIENT
Start: 2023-06-15 | End: 2023-06-15 | Stop reason: SDUPTHER

## 2023-06-15 NOTE — PROGRESS NOTES
Subjective:    CC: Cordell Martin is seen today in consultation at the request of JASMEET Alcazar for Parkinson's Disease       HPI:  78-year-old male accompanied by his cousin with a history of hypothyroidism, anxiety, depression, RLS, hyperlipidemia, lumbar stenosis status post L4-5 decompression presents with Parkinson's disease.  As per patient he started having a tremor in his right hand in 2015 along with balance issues.  Over time his symptoms have progressed remarkably.  In addition he has symptoms of anxiety, depression, difficulty sleeping at night where he only gets 4 to 6 hours of sleep and anosmia for the past 2 years.  Also has mild constipation.  Was previously having hallucinations but they have stopped now.  He denies having any significant memory problems.  He lives at home by himself and is currently carrying out his ADLs but with much difficulty.  Cooks his meals by supporting himself against the countertop with 1 hand.  His mother lives out of state gets his groceries shipped to the house.  Was started on Sinemet 25/100 mg, currently at 2 tablets 4 times a day that he takes at 10 AM, 3 PM, 8 PM and 11 PM.  Also takes Requip 1 mg 3 times daily for restless leg syndrome that helps.  Was previously tried on amantadine that caused hallucinations.  Has been getting outpatient PT that he will be finishing this week.  Sustained a fall 2 months ago for which she had a CT scan of the head that was unremarkable.  Also had a CT cervical spine that showed marked degenerative changes with disc bulges most prominent at C4-5 and C5-6 but no acute abnormalities such as fractures.  Of note-I personally reviewed his CT head and Priti's notes as follows-    Cordell Martin is a 77 y.o. male who returns to clinic today with a history of Parkinson's Disease. He has had symptoms since at least 2015 marked initially by a resting tremor primarily in his right hand. He notes occasional associated imbalance,  though denies a shuffling gait.    He has also noted short term memory impairment for several years. He notes associated word-finding difficulties and impairments in concentration.    Prior evaluation has included screening blood work and a head CT which were unremarkable. Screening bloodwork has been unremarkable with the exception of a mildly elevated B6 level (and he has subsequently stopped taking a B6 supplement). He is currently taking Sinemet 1.5 tabs tid and pramipexole 1 mg tid. He was intolerant of amantadine due to hallucinations. He has completed a round of PT, but unfortunately did not find it to be significantly beneficial  Last visit-: Since his last visit in 9/21, he notes that his tremor has worsened. He reports increased freezing. He continues to note numbness and tingling in his hands and feet.     The following portions of the patient's history were reviewed today and updated as of 06/15/2023  : allergies, current medications, past family history, past medical history, past social history, past surgical history, and problem list  These document will be scanned to patient's chart.      Current Outpatient Medications:     acetaminophen (TYLENOL) 325 MG tablet, Take 2 tablets by mouth Every 6 (Six) Hours As Needed for Mild Pain ., Disp: 28 tablet, Rfl: 0    atorvastatin (LIPITOR) 40 MG tablet, Take 1 tablet by mouth Daily., Disp: 90 tablet, Rfl: 3    Cholecalciferol (VITAMIN D3) 5000 units capsule capsule, Take 1 capsule by mouth Daily., Disp: , Rfl:     fluorometholone (FML) 0.1 % ophthalmic suspension, fluorometholone 0.1 % eye drops,suspension, Disp: , Rfl:     fluticasone (FLONASE) 50 MCG/ACT nasal spray, USE 2 SPRAYS IN EACH NOSTRIL ONCE DAILY, Disp: 48 mL, Rfl: 2    furosemide (Lasix) 20 MG tablet, Take 1 tablet by mouth Daily., Disp: 90 tablet, Rfl: 3    levothyroxine (SYNTHROID, LEVOTHROID) 88 MCG tablet, TAKE 1 TABLET BY MOUTH EVERY DAY, Disp: 90 tablet, Rfl: 3    lidocaine (LIDODERM) 5 %,  Place 1 patch on the skin as directed by provider Daily. Remove & Discard patch within 12 hours or as directed by MD, Disp: 30 each, Rfl: 1    loratadine (Claritin) 10 MG tablet, Take 1 tablet by mouth Daily., Disp: 90 tablet, Rfl: 1    oxyCODONE (ROXICODONE) 5 MG immediate release tablet, , Disp: , Rfl:     potassium chloride (K-DUR,KLOR-CON) 20 MEQ CR tablet, Take 1 tablet by mouth Daily., Disp: 90 tablet, Rfl: 3    sildenafil (REVATIO) 20 MG tablet, Take 1 tablet by mouth Daily As Needed (Erectile dysfunction)., Disp: 15 tablet, Rfl: 5    tamsulosin (FLOMAX) 0.4 MG capsule 24 hr capsule, tamsulosin 0.4 mg capsule  TAKE 1 CAPSULE BY MOUTH EVERY DAY, Disp: , Rfl:     vitamin B-6 (PYRIDOXINE) 25 MG tablet, Take 1 tablet by mouth Daily., Disp: 30 tablet, Rfl: 11    vitamin C (ASCORBIC ACID) 500 MG tablet, Take 1 tablet by mouth Daily., Disp: , Rfl:     carbidopa-levodopa (SINEMET)  MG per tablet, Take 2 tablets by mouth 4 (Four) Times a Day., Disp: 240 tablet, Rfl: 11    carbidopa-levodopa ER (SINEMET CR)  MG per tablet, Take 1 tablet by mouth 2 (Two) Times a Day. To be taken at 8 AM and 4 PM, Disp: 60 tablet, Rfl: 11    pramipexole (MIRAPEX) 1 MG tablet, Take 1 tablet by mouth 3 (Three) Times a Day., Disp: 270 tablet, Rfl: 3    QUEtiapine (SEROquel) 25 MG tablet, Take 0.5 tablets by mouth Every Night., Disp: 15 tablet, Rfl: 5   Past Medical History:   Diagnosis Date    Anxiety     Arthritis     Back pain     Bronchitis     Cognitive impairment     Depression     Disease of thyroid gland     Glucose intolerance (impaired glucose tolerance)     Hyperlipidemia     Kidney stone     Obesity     Parkinson disease     Pneumonia     Prostate disorder     Thyroid disease     Wears eyeglasses       Past Surgical History:   Procedure Laterality Date    COLONOSCOPY      5 years ago    EYE SURGERY      HERNIA REPAIR  10/20/2020    KNEE SURGERY      LUMBAR LAMINECTOMY DISCECTOMY DECOMPRESSION N/A 07/13/2017     "Procedure: LUMBAR LAMINECTOMY L4-5;  Surgeon: Antonio Trent MD;  Location: [facility];  Service:     SHOULDER ROTATOR CUFF REPAIR Right     x2    TONSILLECTOMY      VEIN SURGERY        Family History   Problem Relation Age of Onset    Alzheimer's disease Other     Cancer Other     Diabetes Other     Heart disease Other     Stroke Other     Cancer Father       Social History     Socioeconomic History    Marital status:    Tobacco Use    Smoking status: Former     Packs/day: 1.00     Years: 15.00     Pack years: 15.00     Types: Cigarettes     Start date:      Quit date:      Years since quittin.4     Passive exposure: Past    Smokeless tobacco: Never    Tobacco comments:     quit    Vaping Use    Vaping Use: Never used   Substance and Sexual Activity    Alcohol use: Yes     Comment: occasionally    Drug use: No    Sexual activity: Yes     Partners: Female     Birth control/protection: None     Review of Systems   Musculoskeletal:  Positive for gait problem.   Neurological:  Positive for tremors.   All other systems reviewed and are negative.    Objective:    /60   Pulse 88   Ht 182.9 cm (72\")   Wt 108 kg (237 lb)   SpO2 95%   BMI 32.14 kg/m²     Neurology Exam:    General apperance: Masklike facies    Mental status: Alert, awake and oriented to time place and person.    Recent and Remote memory: Intact.    Attention span and Concentration: Normal.     Language and Speech: Intact- No dysarthria.    Fluency, Naming , Repitition and Comprehension:  Intact    Cranial Nerves:   CN II: Visual fields are full. Intact. Fundi - Normal, No papillederma, Pupils - BELLA  CN III, IV and VI: Extraocular movements are intact. Normal saccades.   CN V: Facial sensation is intact.   CN VII: Muscles of facial expression reveal no asymmetry. Intact.   CN VIII: Hearing is intact. Whispered voice intact.   CN IX and X: Palate elevates symmetrically. Intact  CN XI: Shoulder shrug is intact.   CN XII: " Tongue is midline without evidence of atrophy or fasciculation.     Ophthalmoscopic exam of optic disc-normal    Motor:-Constant resting tremor noted in his right hand (last dose of Sinemet was about 3-1/2 hours ago).  Right UE muscle strength 5/5. Normal tone.     Left UE muscle strength 5/5. Normal tone.      Right LE muscle strength5/5. Normal tone.     Left LE muscle strength 5/5. Normal tone.  Mild bilateral lymphedema with vasostatic changes noted    Sensory: Normal light touch, vibration and pinprick sensation bilaterally.    DTRs: 2+ bilaterally in upper and lower extremities.    Babinski: Negative bilaterally.    Co-ordination: Normal finger-to-nose, heel to shin B/L.    Rhomberg: Negative.    Gait: Normal.    Cardiovascular: Regular rate and rhythm without murmur, gallop or rub.    Assessment and Plan:  1. Parkinson's disease  I have told him to continue Sinemet 25/100 mg, 2 tablets 4 times daily at 10 AM, 3 PM, 8 PM and 11 PM  In addition I will start him on Sinemet ER 1 tablet twice a day that he will take at 8 AM and 4 PM  I will also start him on low doses of Seroquel 12.5 mg nightly to help with his anxiety, depression and sleep disturbances  Will give him a home health PT/OT referral as he also needs help with his activities of daily living  We have given him a letter to be able to move to an assisted living facility  - Ambulatory Referral to Home Health    2. Restless legs syndrome (RLS)  He should continue Mirapex 1 mg 3 times daily  Should keep himself well-hydrated    Return in about 3 months (around 9/15/2023).     I spent over 45 minutes with the patient face to face out of which over 50% (30 minutes) was spent in management, instructions and education.     Bernice Ortiz MD

## 2023-06-17 ENCOUNTER — APPOINTMENT (OUTPATIENT)
Dept: GENERAL RADIOLOGY | Facility: HOSPITAL | Age: 78
End: 2023-06-17
Payer: MEDICARE

## 2023-06-17 ENCOUNTER — HOSPITAL ENCOUNTER (EMERGENCY)
Facility: HOSPITAL | Age: 78
Discharge: HOME | End: 2023-06-17
Attending: EMERGENCY MEDICINE
Payer: MEDICARE

## 2023-06-17 VITALS
TEMPERATURE: 97.4 F | HEIGHT: 72 IN | DIASTOLIC BLOOD PRESSURE: 73 MMHG | SYSTOLIC BLOOD PRESSURE: 141 MMHG | RESPIRATION RATE: 20 BRPM | HEART RATE: 74 BPM | OXYGEN SATURATION: 96 % | WEIGHT: 240 LBS | BODY MASS INDEX: 32.51 KG/M2

## 2023-06-17 DIAGNOSIS — J18.9 PNEUMONIA OF LEFT LOWER LOBE DUE TO INFECTIOUS ORGANISM: Primary | ICD-10-CM

## 2023-06-17 DIAGNOSIS — T50.905A ADVERSE EFFECT OF DRUG, INITIAL ENCOUNTER: ICD-10-CM

## 2023-06-17 LAB
ALBUMIN SERPL-MCNC: 3.7 G/DL (ref 3.5–5.2)
ALBUMIN/GLOB SERPL: 1.4 G/DL
ALP SERPL-CCNC: 82 U/L (ref 39–117)
ALT SERPL W P-5'-P-CCNC: <5 U/L (ref 1–41)
ANION GAP SERPL CALCULATED.3IONS-SCNC: 7 MMOL/L (ref 5–15)
AST SERPL-CCNC: 15 U/L (ref 1–40)
B PARAPERT DNA SPEC QL NAA+PROBE: NOT DETECTED
B PERT DNA SPEC QL NAA+PROBE: NOT DETECTED
BASOPHILS # BLD AUTO: 0.02 10*3/MM3 (ref 0–0.2)
BASOPHILS NFR BLD AUTO: 0.4 % (ref 0–1.5)
BILIRUB SERPL-MCNC: 0.5 MG/DL (ref 0–1.2)
BILIRUB UR QL STRIP: NEGATIVE
BUN SERPL-MCNC: 17 MG/DL (ref 8–23)
BUN/CREAT SERPL: 14.7 (ref 7–25)
C PNEUM DNA NPH QL NAA+NON-PROBE: NOT DETECTED
CALCIUM SPEC-SCNC: 9.3 MG/DL (ref 8.6–10.5)
CHLORIDE SERPL-SCNC: 104 MMOL/L (ref 98–107)
CLARITY UR: CLEAR
CO2 SERPL-SCNC: 32 MMOL/L (ref 22–29)
COLOR UR: YELLOW
CREAT SERPL-MCNC: 1.16 MG/DL (ref 0.76–1.27)
DEPRECATED RDW RBC AUTO: 55.1 FL (ref 37–54)
EGFRCR SERPLBLD CKD-EPI 2021: 65.3 ML/MIN/1.73
EOSINOPHIL # BLD AUTO: 0.15 10*3/MM3 (ref 0–0.4)
EOSINOPHIL NFR BLD AUTO: 2.9 % (ref 0.3–6.2)
ERYTHROCYTE [DISTWIDTH] IN BLOOD BY AUTOMATED COUNT: 15.6 % (ref 12.3–15.4)
FLUAV SUBTYP SPEC NAA+PROBE: NOT DETECTED
FLUBV RNA ISLT QL NAA+PROBE: NOT DETECTED
GLOBULIN UR ELPH-MCNC: 2.7 GM/DL
GLUCOSE SERPL-MCNC: 109 MG/DL (ref 65–99)
GLUCOSE UR STRIP-MCNC: NEGATIVE MG/DL
HADV DNA SPEC NAA+PROBE: NOT DETECTED
HCOV 229E RNA SPEC QL NAA+PROBE: NOT DETECTED
HCOV HKU1 RNA SPEC QL NAA+PROBE: NOT DETECTED
HCOV NL63 RNA SPEC QL NAA+PROBE: NOT DETECTED
HCOV OC43 RNA SPEC QL NAA+PROBE: NOT DETECTED
HCT VFR BLD AUTO: 39.6 % (ref 37.5–51)
HGB BLD-MCNC: 12.6 G/DL (ref 13–17.7)
HGB UR QL STRIP.AUTO: NEGATIVE
HMPV RNA NPH QL NAA+NON-PROBE: NOT DETECTED
HOLD SPECIMEN: NORMAL
HPIV1 RNA ISLT QL NAA+PROBE: NOT DETECTED
HPIV2 RNA SPEC QL NAA+PROBE: NOT DETECTED
HPIV3 RNA NPH QL NAA+PROBE: NOT DETECTED
HPIV4 P GENE NPH QL NAA+PROBE: NOT DETECTED
IMM GRANULOCYTES # BLD AUTO: 0.01 10*3/MM3 (ref 0–0.05)
IMM GRANULOCYTES NFR BLD AUTO: 0.2 % (ref 0–0.5)
KETONES UR QL STRIP: ABNORMAL
LEUKOCYTE ESTERASE UR QL STRIP.AUTO: NEGATIVE
LYMPHOCYTES # BLD AUTO: 1.81 10*3/MM3 (ref 0.7–3.1)
LYMPHOCYTES NFR BLD AUTO: 34.7 % (ref 19.6–45.3)
M PNEUMO IGG SER IA-ACNC: NOT DETECTED
MAGNESIUM SERPL-MCNC: 2.1 MG/DL (ref 1.6–2.4)
MCH RBC QN AUTO: 30.4 PG (ref 26.6–33)
MCHC RBC AUTO-ENTMCNC: 31.8 G/DL (ref 31.5–35.7)
MCV RBC AUTO: 95.4 FL (ref 79–97)
MONOCYTES # BLD AUTO: 0.43 10*3/MM3 (ref 0.1–0.9)
MONOCYTES NFR BLD AUTO: 8.2 % (ref 5–12)
NEUTROPHILS NFR BLD AUTO: 2.8 10*3/MM3 (ref 1.7–7)
NEUTROPHILS NFR BLD AUTO: 53.6 % (ref 42.7–76)
NITRITE UR QL STRIP: NEGATIVE
NRBC BLD AUTO-RTO: 0 /100 WBC (ref 0–0.2)
PH UR STRIP.AUTO: 6 [PH] (ref 5–8)
PLATELET # BLD AUTO: 196 10*3/MM3 (ref 140–450)
PMV BLD AUTO: 8.4 FL (ref 6–12)
POTASSIUM SERPL-SCNC: 3.7 MMOL/L (ref 3.5–5.2)
PROT SERPL-MCNC: 6.4 G/DL (ref 6–8.5)
PROT UR QL STRIP: NEGATIVE
QT INTERVAL: 418 MS
QTC INTERVAL: 441 MS
RBC # BLD AUTO: 4.15 10*6/MM3 (ref 4.14–5.8)
RHINOVIRUS RNA SPEC NAA+PROBE: NOT DETECTED
RSV RNA NPH QL NAA+NON-PROBE: NOT DETECTED
SARS-COV-2 RNA NPH QL NAA+NON-PROBE: NOT DETECTED
SODIUM SERPL-SCNC: 143 MMOL/L (ref 136–145)
SP GR UR STRIP: 1.02 (ref 1–1.03)
TROPONIN T SERPL HS-MCNC: 26 NG/L
UROBILINOGEN UR QL STRIP: ABNORMAL
WBC NRBC COR # BLD: 5.22 10*3/MM3 (ref 3.4–10.8)
WHOLE BLOOD HOLD COAG: NORMAL
WHOLE BLOOD HOLD SPECIMEN: NORMAL

## 2023-06-17 PROCEDURE — 85025 COMPLETE CBC W/AUTO DIFF WBC: CPT | Performed by: EMERGENCY MEDICINE

## 2023-06-17 PROCEDURE — 80053 COMPREHEN METABOLIC PANEL: CPT | Performed by: EMERGENCY MEDICINE

## 2023-06-17 PROCEDURE — 83735 ASSAY OF MAGNESIUM: CPT | Performed by: EMERGENCY MEDICINE

## 2023-06-17 PROCEDURE — 0202U NFCT DS 22 TRGT SARS-COV-2: CPT | Performed by: EMERGENCY MEDICINE

## 2023-06-17 PROCEDURE — 84484 ASSAY OF TROPONIN QUANT: CPT | Performed by: EMERGENCY MEDICINE

## 2023-06-17 PROCEDURE — 71045 X-RAY EXAM CHEST 1 VIEW: CPT

## 2023-06-17 PROCEDURE — 81003 URINALYSIS AUTO W/O SCOPE: CPT | Performed by: EMERGENCY MEDICINE

## 2023-06-17 PROCEDURE — 93005 ELECTROCARDIOGRAM TRACING: CPT | Performed by: EMERGENCY MEDICINE

## 2023-06-17 RX ORDER — SODIUM CHLORIDE 0.9 % (FLUSH) 0.9 %
10 SYRINGE (ML) INJECTION AS NEEDED
Status: DISCONTINUED | OUTPATIENT
Start: 2023-06-17 | End: 2023-06-17 | Stop reason: HOSPADM

## 2023-06-17 RX ORDER — AZITHROMYCIN 250 MG/1
250 TABLET, FILM COATED ORAL DAILY
Qty: 6 TABLET | Refills: 0 | Status: SHIPPED | OUTPATIENT
Start: 2023-06-17

## 2023-06-17 NOTE — DISCHARGE INSTRUCTIONS
Take antibiotics as prescribed.    Discuss parkinson's medications further with your neurologist, Dr. Ortiz.

## 2023-06-17 NOTE — ED PROVIDER NOTES
Subjective   History of Present Illness  78-year-old male with a known history of Parkinson's who presents for evaluation of increased fatigue.  The patient reports that he has Parkinson's was actually just seen by his neurologist roughly 2 days ago and was initiated on some new medication.  He reports that this morning he has felt very fatigued and even mildly dizzy.  He still continues to have baseline mentation and is able to talk and interact consistent with his baseline at this given time.  He denies focal symptoms of infection include no upper respiratory congestion, sore throat, rhinorrhea, cough, shortness of breath.  No reported chest pain.  No focal abdominal pain.  No change in bowel or urinary function.  His medication changes include adding a extended release Sinemet to he is already prescribed Sinemet.  He also had Seroquel added to help with anxiety and sleep at night.  No definitive sick contacts or other medication changes.  No other acute complaints.    Review of Systems   Constitutional:  Positive for activity change and fatigue. Negative for chills and fever.   HENT:  Negative for congestion, ear pain, postnasal drip, sinus pressure and sore throat.    Eyes:  Negative for pain, redness and visual disturbance.   Respiratory:  Negative for cough, chest tightness and shortness of breath.    Cardiovascular:  Negative for chest pain, palpitations and leg swelling.   Gastrointestinal:  Negative for abdominal pain, anal bleeding, blood in stool, diarrhea, nausea and vomiting.   Endocrine: Negative for polydipsia and polyuria.   Genitourinary:  Negative for difficulty urinating, dysuria, frequency and urgency.   Musculoskeletal:  Negative for arthralgias, back pain and neck pain.   Skin:  Negative for pallor and rash.   Allergic/Immunologic: Negative for environmental allergies and immunocompromised state.   Neurological:  Positive for dizziness. Negative for weakness and headaches.   Hematological:   Negative for adenopathy.   Psychiatric/Behavioral:  Negative for confusion, self-injury and suicidal ideas. The patient is not nervous/anxious.    All other systems reviewed and are negative.    Past Medical History:   Diagnosis Date    Anxiety     Arthritis     Back pain     Bronchitis     Cognitive impairment     Depression     Disease of thyroid gland     Glucose intolerance (impaired glucose tolerance)     Hyperlipidemia     Kidney stone     Obesity     Parkinson disease     Pneumonia     Prostate disorder     Thyroid disease     Wears eyeglasses        No Known Allergies    Past Surgical History:   Procedure Laterality Date    COLONOSCOPY      5 years ago    EYE SURGERY      HERNIA REPAIR  10/20/2020    KNEE SURGERY      LUMBAR LAMINECTOMY DISCECTOMY DECOMPRESSION N/A 2017    Procedure: LUMBAR LAMINECTOMY L4-5;  Surgeon: Antonio Trent MD;  Location: [facility];  Service:     SHOULDER ROTATOR CUFF REPAIR Right     x2    TONSILLECTOMY      VEIN SURGERY         Family History   Problem Relation Age of Onset    Alzheimer's disease Other     Cancer Other     Diabetes Other     Heart disease Other     Stroke Other     Cancer Father        Social History     Socioeconomic History    Marital status:    Tobacco Use    Smoking status: Former     Packs/day: 1.00     Years: 15.00     Pack years: 15.00     Types: Cigarettes     Start date:      Quit date:      Years since quittin.4     Passive exposure: Past    Smokeless tobacco: Never    Tobacco comments:     quit    Vaping Use    Vaping Use: Never used   Substance and Sexual Activity    Alcohol use: Yes     Comment: occasionally    Drug use: No    Sexual activity: Yes     Partners: Female     Birth control/protection: None           Objective   Physical Exam  Vitals and nursing note reviewed.   Constitutional:       General: He is not in acute distress.     Appearance: Normal appearance. He is well-developed. He is not toxic-appearing or  diaphoretic.   HENT:      Head: Normocephalic and atraumatic.      Right Ear: External ear normal.      Left Ear: External ear normal.      Nose: Nose normal.   Eyes:      General: Lids are normal.      Pupils: Pupils are equal, round, and reactive to light.   Neck:      Trachea: No tracheal deviation.   Cardiovascular:      Rate and Rhythm: Normal rate and regular rhythm.      Pulses: No decreased pulses.      Heart sounds: Normal heart sounds. No murmur heard.    No friction rub. No gallop.      Comments: Associated erythema to the bilateral lower extremities consistent with known venous stasis dermatitis.  Pulmonary:      Effort: Pulmonary effort is normal. No respiratory distress.      Breath sounds: Normal breath sounds. No decreased breath sounds, wheezing, rhonchi or rales.   Abdominal:      General: Bowel sounds are normal.      Palpations: Abdomen is soft.      Tenderness: There is no abdominal tenderness. There is no guarding or rebound.   Musculoskeletal:         General: No deformity. Normal range of motion.      Cervical back: Normal range of motion and neck supple.      Right lower le+ Pitting      Left lower le+ Pitting   Lymphadenopathy:      Cervical: No cervical adenopathy.   Skin:     General: Skin is warm and dry.      Findings: No rash.          Neurological:      Mental Status: He is alert and oriented to person, place, and time.      Cranial Nerves: No cranial nerve deficit.      Sensory: No sensory deficit.   Psychiatric:         Speech: Speech normal.         Behavior: Behavior normal.         Thought Content: Thought content normal.         Judgment: Judgment normal.       Procedures           ED Course                                           Medical Decision Making  Differential diagnosis includes pneumonia, urinary tract infection, renal insufficiency, electrolyte abnormality, adverse medication effect.    The patient reports feeling increased fatigue today.  He just initiated  Seroquel last night and a higher dose of Sinemet yesterday per his neurologist, Dr. Ortiz.    Lab evaluation shows normal white blood cell count, normal kidney function, no significant electrolyte abnormalities.    Urine does not show infection.    Viral respiratory panel is negative.    Chest x-ray reports mildly increased left basilar atelectasis versus mild/early changes of pneumonia.    The patient appears well, does not appear short of breath, and has normal oxygen saturations.  However he reports feeling more fatigued therefore I feel it is appropriate to give a course of antibiotics for possible pneumonia.    My clinical impression is that current medication changes in combination with the patient's underlying Parkinson's have contributed to the increased fatigue this morning.    A friend who is in the room states the patient appears better as time has progressed here in the ER.  I think observation has allow the medications to wear off.  The patient has maintained normal vital signs.    Discharged home with the advised to follow-up with primary care physician for repeat evaluation within the next week.    Problems Addressed:  Adverse effect of drug, initial encounter: complicated acute illness or injury  Pneumonia of left lower lobe due to infectious organism: complicated acute illness or injury    Amount and/or Complexity of Data Reviewed  Independent Historian: friend  External Data Reviewed: labs, radiology, ECG and notes.  Labs: ordered. Decision-making details documented in ED Course.  Radiology: ordered and independent interpretation performed. Decision-making details documented in ED Course.  ECG/medicine tests: ordered and independent interpretation performed. Decision-making details documented in ED Course.    Risk  Prescription drug management.        Final diagnoses:   Pneumonia of left lower lobe due to infectious organism   Adverse effect of drug, initial encounter       ED Disposition  ED  Disposition       ED Disposition   Discharge    Condition   Stable    Comment   --               Ben Holder DO  [address]  [place] [address]  [phone]    In 1 week           Medication List        New Prescriptions      azithromycin 250 MG tablet  Commonly known as: ZITHROMAX  Take 1 tablet by mouth Daily. Take 2 tablets the first day, then 1 tablet daily for 4 days.               Where to Get Your Medications        These medications were sent to [facility] [address] [place] [address] - [address] - [phone]  - [phone] [place] [address], [facility] [address]      Phone: [phone]   azithromycin 250 MG tablet            Ruddy Nettles MD  06/17/23 3370

## 2023-06-17 NOTE — Clinical Note
[facility] EMERGENCY DEPARTMENT  [address] [address]  Phone: [phone]    Cordell Martin was seen and treated in our emergency department on 6/17/2023.  He may return to work on 06/19/2023.         Thank you for choosing [facility].    Ruddy Nettles MD

## 2023-06-20 LAB
QT INTERVAL: 418 MS
QTC INTERVAL: 441 MS

## 2023-06-22 ENCOUNTER — TELEPHONE (OUTPATIENT)
Dept: NEUROLOGY | Facility: CLINIC | Age: 78
End: 2023-06-22

## 2023-06-22 NOTE — TELEPHONE ENCOUNTER
Caller: [facility]     Relationship: HOME HEALTH     Best call back number: [phone]      What was the call regarding: THEY NEED TO KNOW IF THEY CAN'T GET TO PT HOME TOMORROW IS MONDAY OK ? PLEASE CALL TO ADVISE         PLEASE ADVISE

## 2023-09-11 ENCOUNTER — APPOINTMENT (OUTPATIENT)
Dept: CARDIOLOGY | Facility: HOSPITAL | Age: 78
DRG: 299 | End: 2023-09-11
Payer: MEDICARE

## 2023-09-11 ENCOUNTER — APPOINTMENT (OUTPATIENT)
Dept: CT IMAGING | Facility: HOSPITAL | Age: 78
DRG: 299 | End: 2023-09-11
Payer: MEDICARE

## 2023-09-11 ENCOUNTER — HOSPITAL ENCOUNTER (INPATIENT)
Facility: HOSPITAL | Age: 78
LOS: 3 days | Discharge: NURSING, REHAB OR LONG-TERM CARE | DRG: 299 | End: 2023-09-16
Attending: EMERGENCY MEDICINE | Admitting: STUDENT IN AN ORGANIZED HEALTH CARE EDUCATION/TRAINING PROGRAM
Payer: MEDICARE

## 2023-09-11 ENCOUNTER — APPOINTMENT (OUTPATIENT)
Dept: GENERAL RADIOLOGY | Facility: HOSPITAL | Age: 78
DRG: 299 | End: 2023-09-11
Payer: MEDICARE

## 2023-09-11 DIAGNOSIS — R91.1 PULMONARY NODULE: ICD-10-CM

## 2023-09-11 DIAGNOSIS — R93.89 ABNORMAL X-RAY: ICD-10-CM

## 2023-09-11 DIAGNOSIS — R05.2 SUBACUTE COUGH: ICD-10-CM

## 2023-09-11 DIAGNOSIS — R60.0 BILATERAL LOWER EXTREMITY EDEMA: Primary | ICD-10-CM

## 2023-09-11 DIAGNOSIS — R13.10 DYSPHAGIA, UNSPECIFIED TYPE: ICD-10-CM

## 2023-09-11 DIAGNOSIS — L03.115 CELLULITIS OF RIGHT LOWER EXTREMITY: ICD-10-CM

## 2023-09-11 DIAGNOSIS — I82.4Y1 ACUTE DEEP VEIN THROMBOSIS (DVT) OF PROXIMAL VEIN OF RIGHT LOWER EXTREMITY: ICD-10-CM

## 2023-09-11 PROBLEM — I82.401 ACUTE DEEP VEIN THROMBOSIS (DVT) OF RIGHT LOWER EXTREMITY: Status: ACTIVE | Noted: 2023-09-11

## 2023-09-11 PROBLEM — J18.9 PNEUMONIA: Status: ACTIVE | Noted: 2023-09-11

## 2023-09-11 LAB
ALBUMIN SERPL-MCNC: 3.7 G/DL (ref 3.5–5.2)
ALBUMIN/GLOB SERPL: 1.2 G/DL
ALP SERPL-CCNC: 123 U/L (ref 39–117)
ALT SERPL W P-5'-P-CCNC: 5 U/L (ref 1–41)
ANION GAP SERPL CALCULATED.3IONS-SCNC: 10 MMOL/L (ref 5–15)
AST SERPL-CCNC: 13 U/L (ref 1–40)
B PARAPERT DNA SPEC QL NAA+PROBE: NOT DETECTED
B PERT DNA SPEC QL NAA+PROBE: NOT DETECTED
BASOPHILS # BLD AUTO: 0.02 10*3/MM3 (ref 0–0.2)
BASOPHILS NFR BLD AUTO: 0.3 % (ref 0–1.5)
BILIRUB SERPL-MCNC: 0.4 MG/DL (ref 0–1.2)
BUN SERPL-MCNC: 33 MG/DL (ref 8–23)
BUN/CREAT SERPL: 25 (ref 7–25)
C PNEUM DNA NPH QL NAA+NON-PROBE: NOT DETECTED
CALCIUM SPEC-SCNC: 9.5 MG/DL (ref 8.6–10.5)
CHLORIDE SERPL-SCNC: 106 MMOL/L (ref 98–107)
CO2 SERPL-SCNC: 28 MMOL/L (ref 22–29)
CREAT SERPL-MCNC: 1.32 MG/DL (ref 0.76–1.27)
D-LACTATE SERPL-SCNC: 1.3 MMOL/L (ref 0.5–2)
DEPRECATED RDW RBC AUTO: 53.6 FL (ref 37–54)
EGFRCR SERPLBLD CKD-EPI 2021: 55.9 ML/MIN/1.73
EOSINOPHIL # BLD AUTO: 0.16 10*3/MM3 (ref 0–0.4)
EOSINOPHIL NFR BLD AUTO: 2.4 % (ref 0.3–6.2)
ERYTHROCYTE [DISTWIDTH] IN BLOOD BY AUTOMATED COUNT: 14.8 % (ref 12.3–15.4)
FLUAV SUBTYP SPEC NAA+PROBE: NOT DETECTED
FLUBV RNA ISLT QL NAA+PROBE: NOT DETECTED
GLOBULIN UR ELPH-MCNC: 3.2 GM/DL
GLUCOSE SERPL-MCNC: 108 MG/DL (ref 65–99)
HADV DNA SPEC NAA+PROBE: NOT DETECTED
HCOV 229E RNA SPEC QL NAA+PROBE: NOT DETECTED
HCOV HKU1 RNA SPEC QL NAA+PROBE: NOT DETECTED
HCOV NL63 RNA SPEC QL NAA+PROBE: NOT DETECTED
HCOV OC43 RNA SPEC QL NAA+PROBE: NOT DETECTED
HCT VFR BLD AUTO: 38.9 % (ref 37.5–51)
HGB BLD-MCNC: 12.2 G/DL (ref 13–17.7)
HMPV RNA NPH QL NAA+NON-PROBE: NOT DETECTED
HOLD SPECIMEN: NORMAL
HPIV1 RNA ISLT QL NAA+PROBE: NOT DETECTED
HPIV2 RNA SPEC QL NAA+PROBE: NOT DETECTED
HPIV3 RNA NPH QL NAA+PROBE: NOT DETECTED
HPIV4 P GENE NPH QL NAA+PROBE: NOT DETECTED
IMM GRANULOCYTES # BLD AUTO: 0.01 10*3/MM3 (ref 0–0.05)
IMM GRANULOCYTES NFR BLD AUTO: 0.1 % (ref 0–0.5)
LYMPHOCYTES # BLD AUTO: 1.92 10*3/MM3 (ref 0.7–3.1)
LYMPHOCYTES NFR BLD AUTO: 28.8 % (ref 19.6–45.3)
M PNEUMO IGG SER IA-ACNC: NOT DETECTED
MCH RBC QN AUTO: 31 PG (ref 26.6–33)
MCHC RBC AUTO-ENTMCNC: 31.4 G/DL (ref 31.5–35.7)
MCV RBC AUTO: 99 FL (ref 79–97)
MONOCYTES # BLD AUTO: 0.63 10*3/MM3 (ref 0.1–0.9)
MONOCYTES NFR BLD AUTO: 9.4 % (ref 5–12)
NEUTROPHILS NFR BLD AUTO: 3.93 10*3/MM3 (ref 1.7–7)
NEUTROPHILS NFR BLD AUTO: 59 % (ref 42.7–76)
NRBC BLD AUTO-RTO: 0 /100 WBC (ref 0–0.2)
NT-PROBNP SERPL-MCNC: 53.9 PG/ML (ref 0–1800)
PLATELET # BLD AUTO: 206 10*3/MM3 (ref 140–450)
PMV BLD AUTO: 9.1 FL (ref 6–12)
POTASSIUM SERPL-SCNC: 4.3 MMOL/L (ref 3.5–5.2)
PROCALCITONIN SERPL-MCNC: 0.08 NG/ML (ref 0–0.25)
PROT SERPL-MCNC: 6.9 G/DL (ref 6–8.5)
RBC # BLD AUTO: 3.93 10*6/MM3 (ref 4.14–5.8)
RHINOVIRUS RNA SPEC NAA+PROBE: NOT DETECTED
RSV RNA NPH QL NAA+NON-PROBE: NOT DETECTED
SARS-COV-2 RNA NPH QL NAA+NON-PROBE: NOT DETECTED
SODIUM SERPL-SCNC: 144 MMOL/L (ref 136–145)
WBC NRBC COR # BLD: 6.67 10*3/MM3 (ref 3.4–10.8)
WHOLE BLOOD HOLD COAG: NORMAL
WHOLE BLOOD HOLD SPECIMEN: NORMAL

## 2023-09-11 PROCEDURE — 93970 EXTREMITY STUDY: CPT | Performed by: INTERNAL MEDICINE

## 2023-09-11 PROCEDURE — 84145 PROCALCITONIN (PCT): CPT | Performed by: EMERGENCY MEDICINE

## 2023-09-11 PROCEDURE — 0202U NFCT DS 22 TRGT SARS-COV-2: CPT | Performed by: INTERNAL MEDICINE

## 2023-09-11 PROCEDURE — 36415 COLL VENOUS BLD VENIPUNCTURE: CPT

## 2023-09-11 PROCEDURE — 87040 BLOOD CULTURE FOR BACTERIA: CPT | Performed by: EMERGENCY MEDICINE

## 2023-09-11 PROCEDURE — 99285 EMERGENCY DEPT VISIT HI MDM: CPT

## 2023-09-11 PROCEDURE — G0378 HOSPITAL OBSERVATION PER HR: HCPCS

## 2023-09-11 PROCEDURE — 93970 EXTREMITY STUDY: CPT

## 2023-09-11 PROCEDURE — 71045 X-RAY EXAM CHEST 1 VIEW: CPT

## 2023-09-11 PROCEDURE — 25510000001 IOPAMIDOL PER 1 ML: Performed by: EMERGENCY MEDICINE

## 2023-09-11 PROCEDURE — 99223 1ST HOSP IP/OBS HIGH 75: CPT | Performed by: INTERNAL MEDICINE

## 2023-09-11 PROCEDURE — 25010000002 CEFTRIAXONE PER 250 MG

## 2023-09-11 PROCEDURE — 83605 ASSAY OF LACTIC ACID: CPT | Performed by: EMERGENCY MEDICINE

## 2023-09-11 PROCEDURE — 80053 COMPREHEN METABOLIC PANEL: CPT | Performed by: EMERGENCY MEDICINE

## 2023-09-11 PROCEDURE — 83880 ASSAY OF NATRIURETIC PEPTIDE: CPT | Performed by: EMERGENCY MEDICINE

## 2023-09-11 PROCEDURE — 71275 CT ANGIOGRAPHY CHEST: CPT

## 2023-09-11 PROCEDURE — 85025 COMPLETE CBC W/AUTO DIFF WBC: CPT | Performed by: EMERGENCY MEDICINE

## 2023-09-11 RX ORDER — FLUTICASONE PROPIONATE 50 MCG
2 SPRAY, SUSPENSION (ML) NASAL DAILY
Status: DISCONTINUED | OUTPATIENT
Start: 2023-09-12 | End: 2023-09-16 | Stop reason: HOSPADM

## 2023-09-11 RX ORDER — POTASSIUM CHLORIDE 20 MEQ/1
20 TABLET, EXTENDED RELEASE ORAL DAILY
Status: DISCONTINUED | OUTPATIENT
Start: 2023-09-12 | End: 2023-09-16 | Stop reason: HOSPADM

## 2023-09-11 RX ORDER — CARBIDOPA AND LEVODOPA 25; 100 MG/1; MG/1
1 TABLET, EXTENDED RELEASE ORAL 2 TIMES DAILY
Status: DISCONTINUED | OUTPATIENT
Start: 2023-09-12 | End: 2023-09-16 | Stop reason: HOSPADM

## 2023-09-11 RX ORDER — ATORVASTATIN CALCIUM 40 MG/1
40 TABLET, FILM COATED ORAL DAILY
Status: DISCONTINUED | OUTPATIENT
Start: 2023-09-12 | End: 2023-09-16 | Stop reason: HOSPADM

## 2023-09-11 RX ORDER — CETIRIZINE HYDROCHLORIDE 10 MG/1
10 TABLET ORAL DAILY
Status: DISCONTINUED | OUTPATIENT
Start: 2023-09-12 | End: 2023-09-16 | Stop reason: HOSPADM

## 2023-09-11 RX ORDER — SODIUM CHLORIDE 0.9 % (FLUSH) 0.9 %
10 SYRINGE (ML) INJECTION AS NEEDED
Status: DISCONTINUED | OUTPATIENT
Start: 2023-09-11 | End: 2023-09-16 | Stop reason: HOSPADM

## 2023-09-11 RX ORDER — ACETAMINOPHEN 325 MG/1
650 TABLET ORAL EVERY 6 HOURS PRN
Status: DISCONTINUED | OUTPATIENT
Start: 2023-09-11 | End: 2023-09-16 | Stop reason: HOSPADM

## 2023-09-11 RX ORDER — SODIUM CHLORIDE 9 MG/ML
40 INJECTION, SOLUTION INTRAVENOUS AS NEEDED
Status: DISCONTINUED | OUTPATIENT
Start: 2023-09-11 | End: 2023-09-16 | Stop reason: HOSPADM

## 2023-09-11 RX ORDER — POLYETHYLENE GLYCOL 3350 17 G/17G
17 POWDER, FOR SOLUTION ORAL DAILY PRN
Status: DISCONTINUED | OUTPATIENT
Start: 2023-09-11 | End: 2023-09-16 | Stop reason: HOSPADM

## 2023-09-11 RX ORDER — LANOLIN ALCOHOL/MO/W.PET/CERES
25 CREAM (GRAM) TOPICAL DAILY
Status: DISCONTINUED | OUTPATIENT
Start: 2023-09-12 | End: 2023-09-16 | Stop reason: HOSPADM

## 2023-09-11 RX ORDER — PRAMIPEXOLE DIHYDROCHLORIDE 1 MG/1
1 TABLET ORAL 3 TIMES DAILY
Status: DISCONTINUED | OUTPATIENT
Start: 2023-09-11 | End: 2023-09-16 | Stop reason: HOSPADM

## 2023-09-11 RX ORDER — IPRATROPIUM BROMIDE AND ALBUTEROL SULFATE 2.5; .5 MG/3ML; MG/3ML
3 SOLUTION RESPIRATORY (INHALATION) EVERY 4 HOURS PRN
Status: DISCONTINUED | OUTPATIENT
Start: 2023-09-11 | End: 2023-09-16 | Stop reason: HOSPADM

## 2023-09-11 RX ORDER — QUETIAPINE FUMARATE 25 MG/1
12.5 TABLET, FILM COATED ORAL NIGHTLY
Status: DISCONTINUED | OUTPATIENT
Start: 2023-09-11 | End: 2023-09-16 | Stop reason: HOSPADM

## 2023-09-11 RX ORDER — BISACODYL 10 MG
10 SUPPOSITORY, RECTAL RECTAL DAILY PRN
Status: DISCONTINUED | OUTPATIENT
Start: 2023-09-11 | End: 2023-09-16 | Stop reason: HOSPADM

## 2023-09-11 RX ORDER — FUROSEMIDE 20 MG/1
20 TABLET ORAL DAILY
Status: DISCONTINUED | OUTPATIENT
Start: 2023-09-12 | End: 2023-09-16 | Stop reason: HOSPADM

## 2023-09-11 RX ORDER — TAMSULOSIN HYDROCHLORIDE 0.4 MG/1
0.4 CAPSULE ORAL DAILY
Status: DISCONTINUED | OUTPATIENT
Start: 2023-09-12 | End: 2023-09-16 | Stop reason: HOSPADM

## 2023-09-11 RX ORDER — BISACODYL 5 MG/1
5 TABLET, DELAYED RELEASE ORAL DAILY PRN
Status: DISCONTINUED | OUTPATIENT
Start: 2023-09-11 | End: 2023-09-16 | Stop reason: HOSPADM

## 2023-09-11 RX ORDER — SODIUM CHLORIDE 0.9 % (FLUSH) 0.9 %
10 SYRINGE (ML) INJECTION EVERY 12 HOURS SCHEDULED
Status: DISCONTINUED | OUTPATIENT
Start: 2023-09-11 | End: 2023-09-16 | Stop reason: HOSPADM

## 2023-09-11 RX ORDER — LEVOTHYROXINE SODIUM 88 UG/1
88 TABLET ORAL DAILY
Status: DISCONTINUED | OUTPATIENT
Start: 2023-09-12 | End: 2023-09-16 | Stop reason: HOSPADM

## 2023-09-11 RX ORDER — AMOXICILLIN 250 MG
2 CAPSULE ORAL 2 TIMES DAILY
Status: DISCONTINUED | OUTPATIENT
Start: 2023-09-11 | End: 2023-09-16 | Stop reason: HOSPADM

## 2023-09-11 RX ORDER — NITROGLYCERIN 0.4 MG/1
0.4 TABLET SUBLINGUAL
Status: DISCONTINUED | OUTPATIENT
Start: 2023-09-11 | End: 2023-09-16 | Stop reason: HOSPADM

## 2023-09-11 RX ORDER — ASCORBIC ACID 500 MG
500 TABLET ORAL DAILY
Status: DISCONTINUED | OUTPATIENT
Start: 2023-09-12 | End: 2023-09-16 | Stop reason: HOSPADM

## 2023-09-11 RX ADMIN — IOPAMIDOL 80 ML: 755 INJECTION, SOLUTION INTRAVENOUS at 22:22

## 2023-09-11 RX ADMIN — APIXABAN 10 MG: 5 TABLET, FILM COATED ORAL at 22:11

## 2023-09-11 RX ADMIN — SODIUM CHLORIDE 2000 MG: 900 INJECTION INTRAVENOUS at 22:11

## 2023-09-11 NOTE — Clinical Note
Level of Care: Telemetry [5]   Diagnosis: Acute deep vein thrombosis (DVT) of right lower extremity [[id number]]   Admitting Physician: CRISTHIAN COATS [[id number]]   Attending Physician: CRISTHIAN COATS [[id number]]   Bed Request Comments: covid rule out

## 2023-09-11 NOTE — ED PROVIDER NOTES
Subjective   History of Present Illness  Patient is a 78-year-old with history of Parkinson's and lymphedema who presents to the emerged part with lower extremity swelling and erythema.  He states that he recently had moved into [facility] [place] [facility] at [facility] and it may be sitting down more than he normally has in the past.  This could mean that his legs are lower than they have been in the past causing increasing edema.  Over the past several days it is been noted that there more swollen than usual and is erythema, usually localized to the area below the knee is now on the right lower extremity extending up into the right thigh.  He is also having blistering and weeping associated with this.  Admits to cough, but has had this for the past 1 year..  Denies fever, chills, chest pain, shortness of breath, abdominal pain, vomiting, diarrhea, or other acute complaints.    Review of Systems   All other systems reviewed and are negative.    Past Medical History:   Diagnosis Date   • Anxiety    • Arthritis    • Back pain    • Bronchitis    • Cognitive impairment    • Depression    • Disease of thyroid gland    • Glucose intolerance (impaired glucose tolerance)    • Hyperlipidemia    • Kidney stone    • Obesity    • Parkinson disease    • Pneumonia    • Prostate disorder    • Thyroid disease    • Wears eyeglasses        No Known Allergies    Past Surgical History:   Procedure Laterality Date   • COLONOSCOPY      5 years ago   • EYE SURGERY     • HERNIA REPAIR  10/20/2020   • KNEE SURGERY     • LUMBAR LAMINECTOMY DISCECTOMY DECOMPRESSION N/A 07/13/2017    Procedure: LUMBAR LAMINECTOMY L4-5;  Surgeon: Antonio Trent MD;  Location: [facility];  Service:    • SHOULDER ROTATOR CUFF REPAIR Right     x2   • TONSILLECTOMY     • VEIN SURGERY         Family History   Problem Relation Age of Onset   • Alzheimer's disease Other    • Cancer Other    • Diabetes Other    • Heart disease Other    • Stroke Other    • Cancer  Father        Social History     Socioeconomic History   • Marital status:    Tobacco Use   • Smoking status: Former     Packs/day: 1.00     Years: 15.00     Pack years: 15.00     Types: Cigarettes     Start date:      Quit date:      Years since quittin.7     Passive exposure: Past   • Smokeless tobacco: Never   • Tobacco comments:     quit    Vaping Use   • Vaping Use: Never used   Substance and Sexual Activity   • Alcohol use: Yes     Comment: occasionally   • Drug use: No   • Sexual activity: Yes     Partners: Female     Birth control/protection: None           Objective   Physical Exam  Vitals and nursing note reviewed.   Constitutional:       Appearance: Normal appearance.   HENT:      Head: Normocephalic and atraumatic.   Neurological:      Mental Status: He is alert.       Procedures           ED Course          FL Limited Ugi For Mbs Reflux Single-Contrast   Final Result   Impression:   Fluoroscopy provided for modified barium swallow, and limited upper GI series. Please see speech therapy report for full details and recommendations. Limited upper GI series appeared within normal limits.         Electronically Signed: Arlene Bernardo MD     2023 3:35 PM EDT     Workstation ID: [id number]      FL Video Swallow With Speech Single Contrast   Final Result   Impression:   Fluoroscopy provided for modified barium swallow, and limited upper GI series. Please see speech therapy report for full details and recommendations. Limited upper GI series appeared within normal limits.         Electronically Signed: Arlene Bernardo MD     2023 3:35 PM EDT     Workstation ID: [id number]      CT Angiogram Chest   Final Result   Impression:   1.No evidence of pulmonary embolism nor other acute process.   2.There is a 4 mm subpleural right middle lobe nodule. See below recommendations.      *The Fleischner society pulmonary nodule recommendations are for the follow-up and management of pulmonary  nodules smaller than 8 mm detected incidentally in patients >35 years on non-screening CT. The initial guidelines for the management of solid    nodules were released in 2005 1, and guidelines for the management of subsolid nodules were released in 2013 2. New revised 2017 recommendations for both solid and subsolid have since been released 4.      2017 guidelines   Solid nodules   Solitary nodule size: <6 mm   *low risk patients: no follow-up needed   *high risk patients: optional CT at 12 months   Solitary nodule size: 6-8 mm   *low risk patients: follow-up at 6-12 months, then consider further follow-up at 18-24 months   *high risk patients: initial follow-up CT at 6-12 months and then at 18-24 months if no change   Solitary nodule size: >8 mm   *either low or high risk patients   *consider follow-up CT at 3 months, and/or CT-PET, and/or biopsy   Multiple nodules size: <6 mm   *low risk patients: no routine follow-up   *high risk patients: optional CT at 12 months   Multiple nodules size: 6-8 mm   *low risk patients: follow-up at 3-6 months, then consider further follow-up at 18-24 months   *high risk patients: follow-up at 3-6 months, then at 18-24 months if no change   Multiple nodules size: >8 mm   *low risk patients: follow-up at 3-6 months, then consider further follow-up at 18-24 months   *high risk patients: follow-up at 3-6 months, then at 18-24 months if no change   *    Note: newly detected indeterminate nodule in persons 35 years of age or older.   *low risk patients: minimal or absent history of smoking and or other known risk factors   *high risk patients: history of smoking or of other known risk factors (e.g. first degree relative with lung cancer, or exposure to asbestos, radon, uranium)   *if a nodule up to 8 mm is partly solid or is ground glass further follow-up is required after 24 months to exclude possible slow growing adenocarcinoma (IKE)      Subsolid nodules   Solitary pure ground-glass  "nodule   *nodule size <6mm   *no CT follow-up required   *nodule size \"e6mm   *follow up CT at 6-12 months, then every 2 years until 5 years   Solitary part-solid nodule   *nodule size <6mm   *no CT follow-up required   *nodule size \"e6mm   *follow-up CT at 3-6 months   *if unchanged, and solid component remains <6mm, then annual follow-up for 5 years   Multiple subsolid nodules   *nodule size <6mm   *follow-up CT at 3-6 months   *consider further follow-up at 2 and 4 years if stable   *nodule size \"e6mm   *follow-up CT at 3-6 months   *subsequent management based on the most suspicious nodule(s)            Electronically Signed: Jnoas Evans MD     9/11/2023 9:27 PM CDT     Workstation ID: [id number]      XR Chest 1 View   Final Result   Impression:   Mild increase groundglass opacity in the periphery of the left upper lung zone and left lung base. Findings are accentuated by low lung volumes and are concerning for pneumonia.      Electronically Signed: Tomi Jiménez MD     9/11/2023 4:47 PM EDT     Workstation ID: [id number]        Vitals:    09/14/23 2027 09/14/23 2345 09/15/23 0311 09/15/23 0726   BP: 113/66 122/58 132/66 123/65   BP Location: Right arm Right arm Right arm Left arm   Patient Position: Lying Lying Lying Lying   Pulse: 75 83 74 75   Resp: 16 18 16 16   Temp: 97.5 °F (36.4 °C) 97.8 °F (36.6 °C) 97.8 °F (36.6 °C) 97.4 °F (36.3 °C)   TempSrc: Oral Oral Oral Oral   SpO2: 90% 93% 91% 91%   Weight:       Height:         Medications   sodium chloride 0.9 % flush 10 mL (has no administration in time range)   cefTRIAXone (ROCEPHIN) 2000 mg/100 mL 0.9% NS IVPB (MBP) (2,000 mg Intravenous New Bag 9/14/23 2120)   doxycycline (VIBRAMYCIN) 100 mg in sodium chloride 0.9 % 100 mL IVPB-VTB (100 mg Intravenous New Bag 9/14/23 2120)   acetaminophen (TYLENOL) tablet 650 mg (has no administration in time range)   atorvastatin (LIPITOR) tablet 40 mg (40 mg Oral Given 9/14/23 3337)   carbidopa-levodopa (SINEMET)  " MG per tablet 2 tablet (2 tablets Oral Given 9/14/23 2120)   carbidopa-levodopa ER (SINEMET CR)  MG per tablet 1 tablet (1 tablet Oral Given 9/14/23 1553)   vitamin D3 capsule 5,000 Units (5,000 Units Oral Given 9/14/23 0836)   fluticasone (FLONASE) 50 MCG/ACT nasal spray 2 spray (2 sprays Each Nare Given 9/14/23 0835)   levothyroxine (SYNTHROID, LEVOTHROID) tablet 88 mcg (88 mcg Oral Given 9/15/23 0623)   cetirizine (zyrTEC) tablet 10 mg (10 mg Oral Given 9/14/23 0836)   pramipexole (MIRAPEX) tablet 1 mg (1 mg Oral Given 9/14/23 2120)   QUEtiapine (SEROquel) tablet 12.5 mg (12.5 mg Oral Given 9/14/23 2121)   tamsulosin (FLOMAX) 24 hr capsule 0.4 mg (0.4 mg Oral Given 9/14/23 0836)   vitamin B-6 (PYRIDOXINE) tablet 25 mg (25 mg Oral Given 9/14/23 0836)   ascorbic acid (VITAMIN C) tablet 500 mg (500 mg Oral Given 9/14/23 0836)   sodium chloride 0.9 % flush 10 mL ( Intravenous Canceled Entry 9/14/23 2054)   sodium chloride 0.9 % flush 10 mL (has no administration in time range)   sodium chloride 0.9 % infusion 40 mL (has no administration in time range)   sennosides-docusate (PERICOLACE) 8.6-50 MG per tablet 2 tablet (2 tablets Oral Given 9/14/23 2120)     And   polyethylene glycol (MIRALAX) packet 17 g (17 g Oral Given 9/14/23 1136)     And   bisacodyl (DULCOLAX) EC tablet 5 mg (has no administration in time range)     And   bisacodyl (DULCOLAX) suppository 10 mg (has no administration in time range)   nitroglycerin (NITROSTAT) SL tablet 0.4 mg (has no administration in time range)   furosemide (LASIX) tablet 20 mg (20 mg Oral Given 9/14/23 0836)   potassium chloride (K-DUR,KLOR-CON) CR tablet 20 mEq (20 mEq Oral Given 9/14/23 0836)   ipratropium-albuterol (DUO-NEB) nebulizer solution 3 mL (has no administration in time range)   apixaban (ELIQUIS) tablet 10 mg (10 mg Oral Given 9/14/23 2120)     Followed by   apixaban (ELIQUIS) tablet 5 mg (has no administration in time range)   cefTRIAXone (ROCEPHIN) 2000  mg/100 mL 0.9% NS IVPB (MBP) (2,000 mg Intravenous New Bag 9/11/23 2211)   doxycycline (VIBRAMYCIN) 100 mg in sodium chloride 0.9 % 100 mL IVPB-VTB (100 mg Intravenous New Bag 9/12/23 0036)   iopamidol (ISOVUE-370) 76 % injection 100 mL (80 mL Intravenous Given 9/11/23 2222)   barium sulfate (VARIBAR THIN LIQUID) oral suspension 100 mL (100 mL Oral Given 9/13/23 1502)   barium sulfate (VARIBAR PUDDING) oral paste 20 mL (20 mL Oral Given 9/13/23 1502)   barium sulfate (VARIBAR THIN LIQUID) oral suspension 100 mL (100 mL Oral Given 9/13/23 1501)     ECG/EMG Results (last 24 hours)       ** No results found for the last 24 hours. **          No orders to display                                            Medical Decision Making  Problems Addressed:  Abnormal x-ray: complicated acute illness or injury  Acute deep vein thrombosis (DVT) of proximal vein of right lower extremity: complicated acute illness or injury  Bilateral lower extremity edema: complicated acute illness or injury  Cellulitis of right lower extremity: complicated acute illness or injury  Subacute cough: complicated acute illness or injury    Amount and/or Complexity of Data Reviewed  Labs: ordered.  Radiology: ordered.    Risk  Prescription drug management.  Decision regarding hospitalization.       Latest Reference Range & Units 09/11/23 16:00 09/11/23 16:16   proBNP 0.0 - 1,800.0 pg/mL 53.9    Sodium 136 - 145 mmol/L 144    Potassium 3.5 - 5.2 mmol/L 4.3    Chloride 98 - 107 mmol/L 106    CO2 22.0 - 29.0 mmol/L 28.0    Anion Gap 5.0 - 15.0 mmol/L 10.0    BUN 8 - 23 mg/dL 33 (H)    Creatinine 0.76 - 1.27 mg/dL 1.32 (H)    BUN/Creatinine Ratio 7.0 - 25.0  25.0    eGFR >60.0 mL/min/1.73 55.9 (L)    Glucose 65 - 99 mg/dL 108 (H)    Calcium 8.6 - 10.5 mg/dL 9.5    Alkaline Phosphatase 39 - 117 U/L 123 (H)    Total Protein 6.0 - 8.5 g/dL 6.9    Albumin 3.5 - 5.2 g/dL 3.7    Globulin gm/dL 3.2    A/G Ratio g/dL 1.2    AST (SGOT) 1 - 40 U/L 13    ALT (SGPT) 1  - 41 U/L 5    Total Bilirubin 0.0 - 1.2 mg/dL 0.4    Procalcitonin 0.00 - 0.25 ng/mL 0.08    WBC 3.40 - 10.80 10*3/mm3  6.67   RBC 4.14 - 5.80 10*6/mm3  3.93 (L)   Hemoglobin 13.0 - 17.7 g/dL  12.2 (L)   Hematocrit 37.5 - 51.0 %  38.9   Platelets 140 - 450 10*3/mm3  206   RDW 12.3 - 15.4 %  14.8   MCV 79.0 - 97.0 fL  99.0 (H)   MCH 26.6 - 33.0 pg  31.0   MCHC 31.5 - 35.7 g/dL  31.4 (L)   MPV 6.0 - 12.0 fL  9.1   RDW-SD 37.0 - 54.0 fl  53.6   Neutrophil Rel % 42.7 - 76.0 %  59.0   Lymphocyte Rel % 19.6 - 45.3 %  28.8   Monocyte Rel % 5.0 - 12.0 %  9.4   Eosinophil Rel % 0.3 - 6.2 %  2.4   Basophil Rel % 0.0 - 1.5 %  0.3   Immature Granulocyte Rel % 0.0 - 0.5 %  0.1   Neutrophils Absolute 1.70 - 7.00 10*3/mm3  3.93   Lymphocytes Absolute 0.70 - 3.10 10*3/mm3  1.92   Monocytes Absolute 0.10 - 0.90 10*3/mm3  0.63   Eosinophils Absolute 0.00 - 0.40 10*3/mm3  0.16   Basophils Absolute 0.00 - 0.20 10*3/mm3  0.02   Immature Grans, Absolute 0.00 - 0.05 10*3/mm3  0.01   nRBC 0.0 - 0.2 /100 WBC  0.0   (H): Data is abnormally high  (L): Data is abnormally low        Final diagnoses:   Bilateral lower extremity edema   Cellulitis of right lower extremity   Subacute cough   Abnormal x-ray   Acute deep vein thrombosis (DVT) of proximal vein of right lower extremity       ED Disposition  ED Disposition       ED Disposition   Decision to Admit    Condition   --    Comment   Level of Care: Telemetry [5]   Diagnosis: Acute deep vein thrombosis (DVT) of right lower extremity [[id number]]   Admitting Physician: CRISTHIAN COATS [[id number]]   Attending Physician: CRISTHIAN COATS [[id number]]   Bed Request Comments: covid rule out                0.90 10*3/mm3  0.63   Eosinophils Absolute 0.00 - 0.40 10*3/mm3  0.16   Basophils Absolute 0.00 - 0.20 10*3/mm3  0.02   Immature Grans, Absolute 0.00 - 0.05 10*3/mm3  0.01   nRBC 0.0 - 0.2 /100 WBC  0.0   (H): Data is abnormally high  (L): Data is abnormally low        Final diagnoses:   Bilateral lower extremity edema   Cellulitis of right lower extremity   Subacute cough   Abnormal x-ray   Acute deep vein thrombosis (DVT) of proximal vein of right lower extremity       ED Disposition  ED Disposition       ED Disposition   Decision to Admit    Condition   --    Comment   Level of Care: Telemetry [5]   Diagnosis: Acute deep vein thrombosis (DVT) of right lower extremity [[id number]]   Admitting Physician: CRISTHIAN COATS [[id number]]   Attending Physician: CRISTHIAN COATS [[id number]]   Bed Request Comments: covid rule out                  Tu Pruitt DO  09/25/23 0801

## 2023-09-12 PROBLEM — R91.1 PULMONARY NODULE: Status: ACTIVE | Noted: 2023-09-12

## 2023-09-12 LAB
ANION GAP SERPL CALCULATED.3IONS-SCNC: 9 MMOL/L (ref 5–15)
BASOPHILS # BLD AUTO: 0.02 10*3/MM3 (ref 0–0.2)
BASOPHILS NFR BLD AUTO: 0.4 % (ref 0–1.5)
BH CV LOW VAS LEFT GREATER SAPH AK VESSEL: 1
BH CV LOW VAS RIGHT PERONEAL VESSEL: 1
BH CV LOWER VASCULAR LEFT COMMON FEMORAL AUGMENT: NORMAL
BH CV LOWER VASCULAR LEFT COMMON FEMORAL COMPRESS: NORMAL
BH CV LOWER VASCULAR LEFT COMMON FEMORAL PHASIC: NORMAL
BH CV LOWER VASCULAR LEFT COMMON FEMORAL SPONT: NORMAL
BH CV LOWER VASCULAR LEFT DISTAL FEMORAL AUGMENT: NORMAL
BH CV LOWER VASCULAR LEFT DISTAL FEMORAL COMPRESS: NORMAL
BH CV LOWER VASCULAR LEFT GASTRONEMIUS COMPRESS: NORMAL
BH CV LOWER VASCULAR LEFT GREATER SAPH AK COMPRESS: NORMAL
BH CV LOWER VASCULAR LEFT GREATER SAPH BK COMPRESS: NORMAL
BH CV LOWER VASCULAR LEFT LESSER SAPH COMPRESS: NORMAL
BH CV LOWER VASCULAR LEFT MID FEMORAL AUGMENT: NORMAL
BH CV LOWER VASCULAR LEFT MID FEMORAL COMPRESS: NORMAL
BH CV LOWER VASCULAR LEFT MID FEMORAL PHASIC: NORMAL
BH CV LOWER VASCULAR LEFT PERONEAL COMPRESS: NORMAL
BH CV LOWER VASCULAR LEFT POPLITEAL AUGMENT: NORMAL
BH CV LOWER VASCULAR LEFT POPLITEAL COMPRESS: NORMAL
BH CV LOWER VASCULAR LEFT POSTERIOR TIBIAL COMPRESS: NORMAL
BH CV LOWER VASCULAR LEFT PROFUNDA FEMORAL AUGMENT: NORMAL
BH CV LOWER VASCULAR LEFT PROFUNDA FEMORAL COMPRESS: NORMAL
BH CV LOWER VASCULAR LEFT PROXIMAL FEMORAL AUGMENT: NORMAL
BH CV LOWER VASCULAR LEFT PROXIMAL FEMORAL COMPRESS: NORMAL
BH CV LOWER VASCULAR LEFT SAPHENOFEMORAL JUNCTION AUGMENT: NORMAL
BH CV LOWER VASCULAR LEFT SAPHENOFEMORAL JUNCTION COMPRESS: NORMAL
BH CV LOWER VASCULAR RIGHT COMMON FEMORAL AUGMENT: NORMAL
BH CV LOWER VASCULAR RIGHT COMMON FEMORAL COMPRESS: NORMAL
BH CV LOWER VASCULAR RIGHT COMMON FEMORAL PHASIC: NORMAL
BH CV LOWER VASCULAR RIGHT COMMON FEMORAL SPONT: NORMAL
BH CV LOWER VASCULAR RIGHT DISTAL FEMORAL AUGMENT: NORMAL
BH CV LOWER VASCULAR RIGHT DISTAL FEMORAL COMPRESS: NORMAL
BH CV LOWER VASCULAR RIGHT GASTRONEMIUS COMPRESS: NORMAL
BH CV LOWER VASCULAR RIGHT GREATER SAPH AK COMPRESS: NORMAL
BH CV LOWER VASCULAR RIGHT GREATER SAPH BK COMPRESS: NORMAL
BH CV LOWER VASCULAR RIGHT LESSER SAPH COMPRESS: NORMAL
BH CV LOWER VASCULAR RIGHT MID FEMORAL AUGMENT: NORMAL
BH CV LOWER VASCULAR RIGHT MID FEMORAL COMPRESS: NORMAL
BH CV LOWER VASCULAR RIGHT MID FEMORAL PHASIC: NORMAL
BH CV LOWER VASCULAR RIGHT MID FEMORAL SPONT: NORMAL
BH CV LOWER VASCULAR RIGHT PERONEAL COMPRESS: NORMAL
BH CV LOWER VASCULAR RIGHT POPLITEAL AUGMENT: NORMAL
BH CV LOWER VASCULAR RIGHT POPLITEAL COMPRESS: NORMAL
BH CV LOWER VASCULAR RIGHT POPLITEAL PHASIC: NORMAL
BH CV LOWER VASCULAR RIGHT POPLITEAL SPONT: NORMAL
BH CV LOWER VASCULAR RIGHT POSTERIOR TIBIAL COMPRESS: NORMAL
BH CV LOWER VASCULAR RIGHT PROFUNDA FEMORAL AUGMENT: NORMAL
BH CV LOWER VASCULAR RIGHT PROFUNDA FEMORAL COMPRESS: NORMAL
BH CV LOWER VASCULAR RIGHT PROXIMAL FEMORAL AUGMENT: NORMAL
BH CV LOWER VASCULAR RIGHT PROXIMAL FEMORAL COMPRESS: NORMAL
BH CV LOWER VASCULAR RIGHT SAPHENOFEMORAL JUNCTION AUGMENT: NORMAL
BH CV LOWER VASCULAR RIGHT SAPHENOFEMORAL JUNCTION COMPRESS: NORMAL
BH CV LOWER VASCULAR RIGHT SAPHENOFEMORAL JUNCTION SPONT: NORMAL
BH CV VAS PRELIMINARY FINDINGS SCRIPTING: 1
BUN SERPL-MCNC: 28 MG/DL (ref 8–23)
BUN/CREAT SERPL: 21.7 (ref 7–25)
CALCIUM SPEC-SCNC: 8.9 MG/DL (ref 8.6–10.5)
CHLORIDE SERPL-SCNC: 106 MMOL/L (ref 98–107)
CHOLEST SERPL-MCNC: 102 MG/DL (ref 0–200)
CO2 SERPL-SCNC: 29 MMOL/L (ref 22–29)
CREAT SERPL-MCNC: 1.29 MG/DL (ref 0.76–1.27)
CREAT UR-MCNC: 54.2 MG/DL
DEPRECATED RDW RBC AUTO: 53.6 FL (ref 37–54)
EGFRCR SERPLBLD CKD-EPI 2021: 57.5 ML/MIN/1.73
EOSINOPHIL # BLD AUTO: 0.15 10*3/MM3 (ref 0–0.4)
EOSINOPHIL NFR BLD AUTO: 2.8 % (ref 0.3–6.2)
ERYTHROCYTE [DISTWIDTH] IN BLOOD BY AUTOMATED COUNT: 15.2 % (ref 12.3–15.4)
GLUCOSE SERPL-MCNC: 143 MG/DL (ref 65–99)
HCT VFR BLD AUTO: 36.9 % (ref 37.5–51)
HDLC SERPL-MCNC: 39 MG/DL (ref 40–60)
HGB BLD-MCNC: 11.7 G/DL (ref 13–17.7)
IMM GRANULOCYTES # BLD AUTO: 0.01 10*3/MM3 (ref 0–0.05)
IMM GRANULOCYTES NFR BLD AUTO: 0.2 % (ref 0–0.5)
L PNEUMO1 AG UR QL IA: NEGATIVE
LDLC SERPL CALC-MCNC: 48 MG/DL (ref 0–100)
LDLC/HDLC SERPL: 1.24 {RATIO}
LYMPHOCYTES # BLD AUTO: 1.98 10*3/MM3 (ref 0.7–3.1)
LYMPHOCYTES NFR BLD AUTO: 36.7 % (ref 19.6–45.3)
MCH RBC QN AUTO: 30.6 PG (ref 26.6–33)
MCHC RBC AUTO-ENTMCNC: 31.7 G/DL (ref 31.5–35.7)
MCV RBC AUTO: 96.6 FL (ref 79–97)
MONOCYTES # BLD AUTO: 0.54 10*3/MM3 (ref 0.1–0.9)
MONOCYTES NFR BLD AUTO: 10 % (ref 5–12)
NEUTROPHILS NFR BLD AUTO: 2.7 10*3/MM3 (ref 1.7–7)
NEUTROPHILS NFR BLD AUTO: 49.9 % (ref 42.7–76)
NRBC BLD AUTO-RTO: 0 /100 WBC (ref 0–0.2)
PLATELET # BLD AUTO: 209 10*3/MM3 (ref 140–450)
PMV BLD AUTO: 9.3 FL (ref 6–12)
POTASSIUM SERPL-SCNC: 3.9 MMOL/L (ref 3.5–5.2)
PROT ?TM UR-MCNC: 5.2 MG/DL
RBC # BLD AUTO: 3.82 10*6/MM3 (ref 4.14–5.8)
S PNEUM AG SPEC QL LA: NEGATIVE
SODIUM SERPL-SCNC: 144 MMOL/L (ref 136–145)
SODIUM UR-SCNC: 164 MMOL/L
TRIGL SERPL-MCNC: 74 MG/DL (ref 0–150)
UUN 24H UR-MCNC: 622 MG/DL
VLDLC SERPL-MCNC: 15 MG/DL (ref 5–40)
WBC NRBC COR # BLD: 5.4 10*3/MM3 (ref 3.4–10.8)

## 2023-09-12 PROCEDURE — 92610 EVALUATE SWALLOWING FUNCTION: CPT

## 2023-09-12 PROCEDURE — 84300 ASSAY OF URINE SODIUM: CPT | Performed by: NURSE PRACTITIONER

## 2023-09-12 PROCEDURE — 97116 GAIT TRAINING THERAPY: CPT

## 2023-09-12 PROCEDURE — 87899 AGENT NOS ASSAY W/OPTIC: CPT | Performed by: NURSE PRACTITIONER

## 2023-09-12 PROCEDURE — 80061 LIPID PANEL: CPT | Performed by: NURSE PRACTITIONER

## 2023-09-12 PROCEDURE — 97162 PT EVAL MOD COMPLEX 30 MIN: CPT

## 2023-09-12 PROCEDURE — 87449 NOS EACH ORGANISM AG IA: CPT | Performed by: NURSE PRACTITIONER

## 2023-09-12 PROCEDURE — G0378 HOSPITAL OBSERVATION PER HR: HCPCS

## 2023-09-12 PROCEDURE — 97535 SELF CARE MNGMENT TRAINING: CPT

## 2023-09-12 PROCEDURE — 97161 PT EVAL LOW COMPLEX 20 MIN: CPT

## 2023-09-12 PROCEDURE — 80048 BASIC METABOLIC PNL TOTAL CA: CPT | Performed by: NURSE PRACTITIONER

## 2023-09-12 PROCEDURE — 25010000002 CEFTRIAXONE PER 250 MG: Performed by: NURSE PRACTITIONER

## 2023-09-12 PROCEDURE — 84156 ASSAY OF PROTEIN URINE: CPT | Performed by: NURSE PRACTITIONER

## 2023-09-12 PROCEDURE — 82570 ASSAY OF URINE CREATININE: CPT | Performed by: NURSE PRACTITIONER

## 2023-09-12 PROCEDURE — 84540 ASSAY OF URINE/UREA-N: CPT | Performed by: NURSE PRACTITIONER

## 2023-09-12 PROCEDURE — 85025 COMPLETE CBC W/AUTO DIFF WBC: CPT | Performed by: NURSE PRACTITIONER

## 2023-09-12 PROCEDURE — 97166 OT EVAL MOD COMPLEX 45 MIN: CPT

## 2023-09-12 PROCEDURE — 99232 SBSQ HOSP IP/OBS MODERATE 35: CPT | Performed by: INTERNAL MEDICINE

## 2023-09-12 RX ADMIN — APIXABAN 10 MG: 5 TABLET, FILM COATED ORAL at 20:28

## 2023-09-12 RX ADMIN — Medication 5000 UNITS: at 08:21

## 2023-09-12 RX ADMIN — CETIRIZINE HYDROCHLORIDE TABLETS 10 MG: 10 TABLET, FILM COATED ORAL at 08:21

## 2023-09-12 RX ADMIN — OXYCODONE HYDROCHLORIDE AND ACETAMINOPHEN 500 MG: 500 TABLET ORAL at 08:20

## 2023-09-12 RX ADMIN — APIXABAN 10 MG: 5 TABLET, FILM COATED ORAL at 08:20

## 2023-09-12 RX ADMIN — LEVOTHYROXINE SODIUM 88 MCG: 0.09 TABLET ORAL at 06:00

## 2023-09-12 RX ADMIN — PRAMIPEXOLE DIHYDROCHLORIDE 1 MG: 1 TABLET ORAL at 20:28

## 2023-09-12 RX ADMIN — DOXYCYCLINE 100 MG: 100 INJECTION, POWDER, LYOPHILIZED, FOR SOLUTION INTRAVENOUS at 00:36

## 2023-09-12 RX ADMIN — PRAMIPEXOLE DIHYDROCHLORIDE 1 MG: 1 TABLET ORAL at 08:21

## 2023-09-12 RX ADMIN — CARBIDOPA AND LEVODOPA 2 TABLET: 25; 100 TABLET ORAL at 18:17

## 2023-09-12 RX ADMIN — CARBIDOPA AND LEVODOPA 1 TABLET: 25; 100 TABLET, EXTENDED RELEASE ORAL at 16:29

## 2023-09-12 RX ADMIN — SODIUM CHLORIDE 2000 MG: 900 INJECTION INTRAVENOUS at 20:17

## 2023-09-12 RX ADMIN — SENNOSIDES AND DOCUSATE SODIUM 2 TABLET: 8.6; 5 TABLET ORAL at 08:20

## 2023-09-12 RX ADMIN — CARBIDOPA AND LEVODOPA 2 TABLET: 25; 100 TABLET ORAL at 08:20

## 2023-09-12 RX ADMIN — DOXYCYCLINE 100 MG: 100 INJECTION, POWDER, LYOPHILIZED, FOR SOLUTION INTRAVENOUS at 20:50

## 2023-09-12 RX ADMIN — CARBIDOPA AND LEVODOPA 2 TABLET: 25; 100 TABLET ORAL at 00:38

## 2023-09-12 RX ADMIN — ATORVASTATIN CALCIUM 40 MG: 40 TABLET, FILM COATED ORAL at 08:20

## 2023-09-12 RX ADMIN — SENNOSIDES AND DOCUSATE SODIUM 2 TABLET: 8.6; 5 TABLET ORAL at 00:38

## 2023-09-12 RX ADMIN — POTASSIUM CHLORIDE 20 MEQ: 1500 TABLET, EXTENDED RELEASE ORAL at 08:21

## 2023-09-12 RX ADMIN — QUETIAPINE FUMARATE 12.5 MG: 25 TABLET ORAL at 20:29

## 2023-09-12 RX ADMIN — FUROSEMIDE 20 MG: 20 TABLET ORAL at 08:21

## 2023-09-12 RX ADMIN — PYRIDOXINE HCL TAB 50 MG 25 MG: 50 TAB at 08:21

## 2023-09-12 RX ADMIN — QUETIAPINE FUMARATE 12.5 MG: 25 TABLET ORAL at 00:38

## 2023-09-12 RX ADMIN — PRAMIPEXOLE DIHYDROCHLORIDE 1 MG: 1 TABLET ORAL at 00:38

## 2023-09-12 RX ADMIN — SENNOSIDES AND DOCUSATE SODIUM 2 TABLET: 8.6; 5 TABLET ORAL at 20:28

## 2023-09-12 RX ADMIN — DOXYCYCLINE 100 MG: 100 INJECTION, POWDER, LYOPHILIZED, FOR SOLUTION INTRAVENOUS at 08:21

## 2023-09-12 RX ADMIN — Medication 10 ML: at 00:39

## 2023-09-12 RX ADMIN — CARBIDOPA AND LEVODOPA 2 TABLET: 25; 100 TABLET ORAL at 20:28

## 2023-09-12 RX ADMIN — CARBIDOPA AND LEVODOPA 1 TABLET: 25; 100 TABLET, EXTENDED RELEASE ORAL at 08:20

## 2023-09-12 RX ADMIN — TAMSULOSIN HYDROCHLORIDE 0.4 MG: 0.4 CAPSULE ORAL at 08:21

## 2023-09-12 RX ADMIN — Medication 10 ML: at 20:28

## 2023-09-12 RX ADMIN — CARBIDOPA AND LEVODOPA 2 TABLET: 25; 100 TABLET ORAL at 12:34

## 2023-09-12 RX ADMIN — Medication 10 ML: at 08:24

## 2023-09-12 RX ADMIN — PRAMIPEXOLE DIHYDROCHLORIDE 1 MG: 1 TABLET ORAL at 16:29

## 2023-09-12 RX ADMIN — FLUTICASONE PROPIONATE 2 SPRAY: 50 SPRAY, METERED NASAL at 08:22

## 2023-09-12 NOTE — CASE MANAGEMENT/SOCIAL WORK
Discharge Planning Assessment  [facility]     Patient Name: Cordell Martin  MRN: [id number]  Today's Date: 9/12/2023    Admit Date: 9/11/2023    Plan: TBD                   Discharge Plan       Row Name 09/12/23 1411       Plan    Plan TBD    Patient/Family in Agreement with Plan yes    Plan Comments Met with Mr. Martin, at the bedside, for discharge planning.    Mr. Martin lives alone in an apartment at [facility].    He states that prior to admission, he ambulated using a rollator.  He also has a car walker, bath bench and BSC at home.   His facility brings his meals to the room and cleans and does his laundry.  He does have a sister, Lennie, that can assist him if needed.  The patient still drives himself to his MD appointments.    Mr. Martin is still pending PT and OT evaluation.  SLP evaluation completed and upper GI scheduled for tomorrow.  Mr. Martin states he has been to IPR at [facility] in the past.  If recommended by therapy, the patient is agreeable to referrals to [facility], [facility] and to [place].  Referrals given to facilities to follow the patient for IPR recs.    PCP is Ben Holder.  Insurance is Medicare and [place] with no interruption in coverage.  No ACP documents.  DC plan is TBD.     CM will continue to follow.    Final Discharge Disposition Code 03 - skilled nursing facility (SNF)                  Continued Care and Services - Admitted Since 9/11/2023       Destination       Service Provider Request Status Selected Services Address Phone Fax Patient Preferred    [facility] Pending - No Request Sent N/A [address], [facility] [address] [phone] --    [facility] Pending - No Request Sent N/A [address] [facility] [address] [phone] --    [facility] Pending - No Request Sent N/A [address]  , [facility] [address] [phone] --                  Expected Discharge Date and Time       Expected Discharge Date Expected Discharge Time    Sep 13, 2023               Ute Diallo RN

## 2023-09-12 NOTE — H&P
[facility] Medicine Services  HISTORY AND PHYSICAL    Patient Name: Cordell Martin  : 1945  MRN: [id number]  Primary Care Physician: Ben Holder DO  Date of admission: 2023    Subjective   Subjective     Chief Complaint:  Shortness of breath, edema, and redness    HPI:  Cordell Martin is a 78 y.o. male with a history of Parkinson disease followed by Dr. Ortiz, hypothyroidism, hyperlipidemia, anxiety, depression, was transferred from [facility] at [facility] with complaints of lower extremity swelling and redness.  Patient reports that he has had worsening lower extremity edema for the past week.  2 days ago he noticed redness that extended up the back of his right thigh.  He states that his right lower extremity is very painful and that he has blisters that are draining clear fluid.  He has been short of breath for approximately 1 week.  He has a productive cough with white sputum and feels like it does when he has bronchitis.  He also endorses fatigue, headache, abdominal distention, and questionable weight gain.  He states that his cough and shortness of breath are worse when eating or talking.  He denies fever, chills, wheezing, chest pain, abdominal pain, nausea, vomiting, diarrhea, dysuria, dizziness, or any other complaints at this time.  Chest x-ray shows mild increased groundglass opacity in the periphery of the left upper lung zone and left lung base, findings are accentuated by low lung volumes and are concerning for pneumonia.  Venous duplex consistent with right lower extremity DVT.  Patient was started on doxycycline, Rocephin, and Eliquis in the ED.  Patient is being admitted to the hospital medicine service for further evaluation management.        Review of Systems   Constitutional:  Positive for fatigue and unexpected weight change. Negative for appetite change, chills and fever.   Eyes: Negative.    Respiratory:  Positive  for cough and shortness of breath. Negative for wheezing.    Cardiovascular:  Positive for leg swelling. Negative for chest pain and palpitations.   Gastrointestinal:  Positive for abdominal distention. Negative for abdominal pain, diarrhea, nausea and vomiting.   Endocrine: Negative.    Genitourinary: Negative.    Musculoskeletal: Negative.    Skin:  Positive for color change and rash.   Allergic/Immunologic: Negative.    Neurological:  Positive for headaches. Negative for dizziness, syncope, weakness and light-headedness.   Hematological: Negative.    Psychiatric/Behavioral: Negative.            Personal History     Past Medical History:   Diagnosis Date    Anxiety     Arthritis     Back pain     Bronchitis     Cognitive impairment     Depression     Disease of thyroid gland     Glucose intolerance (impaired glucose tolerance)     Hyperlipidemia     Kidney stone     Obesity     Parkinson disease     Pneumonia     Prostate disorder     Thyroid disease     Wears eyeglasses              Past Surgical History:   Procedure Laterality Date    COLONOSCOPY      5 years ago    EYE SURGERY      HERNIA REPAIR  10/20/2020    KNEE SURGERY      LUMBAR LAMINECTOMY DISCECTOMY DECOMPRESSION N/A 07/13/2017    Procedure: LUMBAR LAMINECTOMY L4-5;  Surgeon: Antonio Trent MD;  Location: [facility];  Service:     SHOULDER ROTATOR CUFF REPAIR Right     x2    TONSILLECTOMY      VEIN SURGERY         Family History:  family history includes Alzheimer's disease in an other family member; Cancer in his father and another family member; Diabetes in an other family member; Heart disease in an other family member; Stroke in an other family member.     Social History:  reports that he quit smoking about 43 years ago. His smoking use included cigarettes. He started smoking about 73 years ago. He has a 15.00 pack-year smoking history. He has been exposed to tobacco smoke. He has never used smokeless tobacco. He reports current alcohol use. He  reports that he does not use drugs.  Social History     Social History Narrative    Lives alone. Uses walker       Medications:  QUEtiapine, acetaminophen, atorvastatin, azithromycin, carbidopa-levodopa, carbidopa-levodopa ER, fluorometholone, fluticasone, furosemide, levothyroxine, lidocaine, loratadine, oxyCODONE, potassium chloride, pramipexole, sildenafil, tamsulosin, vitamin B-6, vitamin C, and vitamin D3    No Known Allergies    Objective   Objective     Vital Signs:   Temp:  [97.6 °F (36.4 °C)-98 °F (36.7 °C)] 98 °F (36.7 °C)  Heart Rate:  [70-93] 73  Resp:  [16-18] 18  BP: ()/(56-83) 123/83    Physical Exam   Constitutional: Awake, alert, resting in bed  Eyes: PERRLA, sclerae anicteric, no conjunctival injection  HENT: NCAT, mucous membranes moist  Neck: Supple, no thyromegaly, no lymphadenopathy, trachea midline  Respiratory: diminished to auscultation bilaterally, nonlabored respirations   Cardiovascular: RRR, no murmurs, rubs, or gallops, palpable pedal pulses bilaterally  Gastrointestinal: Positive bowel sounds, soft, nontender, distended  Musculoskeletal: 2+ BLE edema, no clubbing or cyanosis to extremities  Psychiatric: Appropriate affect, cooperative  Neurologic: Oriented x 3, strength symmetric in all extremities, Cranial Nerves grossly intact to confrontation, speech clear  Skin: erythema to BLE, RLE erythema extends up the back of his thigh.  RLE with intact blisters       Result Review:  I have personally reviewed the results from the time of this admission to 9/11/2023 23:47 EDT and agree with these findings:  [x]  Laboratory list / accordion  []  Microbiology  [x]  Radiology  []  EKG/Telemetry   []  Cardiology/Vascular   []  Pathology  [x]  Old records  []  Other:  Most notable findings include:     LAB RESULTS:      Lab 09/11/23  2105 09/11/23  1616 09/11/23  1600   WBC  --  6.67  --    HEMOGLOBIN  --  12.2*  --    HEMATOCRIT  --  38.9  --    PLATELETS  --  206  --    NEUTROS ABS  --   3.93  --    IMMATURE GRANS (ABS)  --  0.01  --    LYMPHS ABS  --  1.92  --    MONOS ABS  --  0.63  --    EOS ABS  --  0.16  --    MCV  --  99.0*  --    PROCALCITONIN  --   --  0.08   LACTATE 1.3  --   --          Lab 09/11/23  1600   SODIUM 144   POTASSIUM 4.3   CHLORIDE 106   CO2 28.0   ANION GAP 10.0   BUN 33*   CREATININE 1.32*   EGFR 55.9*   GLUCOSE 108*   CALCIUM 9.5         Lab 09/11/23  1600   TOTAL PROTEIN 6.9   ALBUMIN 3.7   GLOBULIN 3.2   ALT (SGPT) 5   AST (SGOT) 13   BILIRUBIN 0.4   ALK PHOS 123*         Lab 09/11/23  1600   PROBNP 53.9                 Brief Urine Lab Results  (Last result in the past 365 days)        Color   Clarity   Blood   Leuk Est   Nitrite   Protein   CREAT   Urine HCG        06/17/23 1522 Yellow   Clear   Negative   Negative   Negative   Negative                 Microbiology Results (last 10 days)       Procedure Component Value - Date/Time    Respiratory Panel PCR w/COVID-19(SARS-CoV-2) KENJI/ASH/SHIRLEY/PAD/COR/MAD/CORNELIUS In-House, NP Swab in UTM/VTM, 3-4 HR TAT - Swab, Nasopharynx [[id number]]  (Normal) Collected: 09/11/23 2128    Lab Status: Final result Specimen: Swab from Nasopharynx Updated: 09/11/23 2222     ADENOVIRUS, PCR Not Detected     Coronavirus 229E Not Detected     Coronavirus HKU1 Not Detected     Coronavirus NL63 Not Detected     Coronavirus OC43 Not Detected     COVID19 Not Detected     Human Metapneumovirus Not Detected     Human Rhinovirus/Enterovirus Not Detected     Influenza A PCR Not Detected     Influenza B PCR Not Detected     Parainfluenza Virus 1 Not Detected     Parainfluenza Virus 2 Not Detected     Parainfluenza Virus 3 Not Detected     Parainfluenza Virus 4 Not Detected     RSV, PCR Not Detected     Bordetella pertussis pcr Not Detected     Bordetella parapertussis PCR Not Detected     Chlamydophila pneumoniae PCR Not Detected     Mycoplasma pneumo by PCR Not Detected    Narrative:      In the setting of a positive respiratory panel with a viral infection  PLUS a negative procalcitonin without other underlying concern for bacterial infection, consider observing off antibiotics or discontinuation of antibiotics and continue supportive care. If the respiratory panel is positive for atypical bacterial infection (Bordetella pertussis, Chlamydophila pneumoniae, or Mycoplasma pneumoniae), consider antibiotic de-escalation to target atypical bacterial infection.            XR Chest 1 View    Result Date: 9/11/2023  XR CHEST 1 VW Date of Exam: 9/11/2023 4:26 PM EDT Indication: cough Comparison: 6/17/2023 and prior Findings: Study is limited by overlying support and monitoring apparatus. The heart and mediastinal contours are within normal limits. Mild increase groundglass opacity noted in the periphery of the left mid to upper lung zone and at the left lung base. Osseous structures are unremarkable     Impression: Impression: Mild increase groundglass opacity in the periphery of the left upper lung zone and left lung base. Findings are accentuated by low lung volumes and are concerning for pneumonia. Electronically Signed: Tomi Jiménez MD  9/11/2023 4:47 PM EDT  Workstation ID: [id number]    CT Angiogram Chest    Result Date: 9/11/2023  CT ANGIOGRAM CHEST Date of Exam: 9/11/2023 9:17 PM CDT Indication: hypoxia, ? pna, productive cough, r/o DVT. Comparison: None available. Technique: CTA of the chest was performed after the uneventful intravenous administration of 80 cc Isovue-370. Reconstructed coronal and sagittal images were also obtained. In addition, a 3-D volume rendered image was created for interpretation. Automated exposure control and iterative reconstruction methods were used. Findings: PULMONARY VASCULATURE: Pulmonary arteries are widely patent without evidence of embolus.Main pulmonary artery is normal in size. No evidence of right heart strain. MEDIASTINUM:Unremarkable. Aortic and heart size are normal. No aortic dissection identified. No mass nor pericardial  effusion. CORONARY ARTERIES: Moderate to severe calcified atherosclerotic disease. LUNGS: There is 4 mm subpleural right middle lobe nodule (image 60, series 5). No other nodule identified. There is mild reticular and linear subpleural interstitial scarring bilaterally. PLEURAL SPACE: No effusion, mass, nor pneumothorax. LYMPH NODES: There are no pathologically enlarged lymph nodes. UPPER ABDOMEN: Unremarkable OSSEOUS STRUCTURES: Appropriate for age with no acute process identified.     Impression: Impression: 1.No evidence of pulmonary embolism nor other acute process. 2.There is a 4 mm subpleural right middle lobe nodule. See below recommendations. *The Fleischner society pulmonary nodule recommendations are for the follow-up and management of pulmonary nodules smaller than 8 mm detected incidentally in patients >35 years on non-screening CT. The initial guidelines for the management of solid nodules were released in 2005 1, and guidelines for the management of subsolid nodules were released in 2013 2. New revised 2017 recommendations for both solid and subsolid have since been released 4. 2017 guidelines Solid nodules Solitary nodule size: <6 mm *low risk patients: no follow-up needed *high risk patients: optional CT at 12 months Solitary nodule size: 6-8 mm *low risk patients: follow-up at 6-12 months, then consider further follow-up at 18-24 months *high risk patients: initial follow-up CT at 6-12 months and then at 18-24 months if no change Solitary nodule size: >8 mm *either low or high risk patients *consider follow-up CT at 3 months, and/or CT-PET, and/or biopsy Multiple nodules size: <6 mm *low risk patients: no routine follow-up *high risk patients: optional CT at 12 months Multiple nodules size: 6-8 mm *low risk patients: follow-up at 3-6 months, then consider further follow-up at 18-24 months *high risk patients: follow-up at 3-6 months, then at 18-24 months if no change Multiple nodules size: >8 mm  "*low risk patients: follow-up at 3-6 months, then consider further follow-up at 18-24 months *high risk patients: follow-up at 3-6 months, then at 18-24 months if no change * Note: newly detected indeterminate nodule in persons 35 years of age or older. *low risk patients: minimal or absent history of smoking and or other known risk factors *high risk patients: history of smoking or of other known risk factors (e.g. first degree relative with lung cancer, or exposure to asbestos, radon, uranium) *if a nodule up to 8 mm is partly solid or is ground glass further follow-up is required after 24 months to exclude possible slow growing adenocarcinoma (IKE) Subsolid nodules Solitary pure ground-glass nodule *nodule size <6mm *no CT follow-up required *nodule size \"e6mm *follow up CT at 6-12 months, then every 2 years until 5 years Solitary part-solid nodule *nodule size <6mm *no CT follow-up required *nodule size \"e6mm *follow-up CT at 3-6 months *if unchanged, and solid component remains <6mm, then annual follow-up for 5 years Multiple subsolid nodules *nodule size <6mm *follow-up CT at 3-6 months *consider further follow-up at 2 and 4 years if stable *nodule size \"e6mm *follow-up CT at 3-6 months *subsequent management based on the most suspicious nodule(s) Electronically Signed: Jonas Evans MD  9/11/2023 9:27 PM CDT  Workstation ID: [id number]     Results for orders placed during the hospital encounter of 01/12/21    Adult Transthoracic Echo Complete W/ Cont if Necessary Per Protocol    Interpretation Summary  · The right ventricle and right atrium are moderately dilated. Other chamber sizes and wall thicknesses are normal.  · Global and segmental LV wall motion is normal. The estimated left ventricular ejection fraction is 51% - 55%.  · The aortic and mitral valves are normal in structure and function.  · The estimated RV systolic pressure is mildly elevated 35 mmHg - 40 mmHg. There is as well noted to be less than " "50% inspiratory IVC collapse. The main pulmonary artery is \"borderline dilated\".  · There are no other important findings on this study.      Assessment & Plan   Assessment & Plan       Acute deep vein thrombosis (DVT) of right lower extremity    Anxiety    Depression    Hyperlipidemia    Parkinson's disease    Cellulitis of right lower extremity    Hypothyroidism (acquired)    Elevated serum creatinine    Pneumonia    Cordell Martin is a 78 y.o. male with a history of Parkinson disease followed by Dr. Ortiz, hypothyroidism, hyperlipidemia, anxiety, depression, was transferred from [facility] at [facility] with complaints of lower extremity swelling and redness.      Assessment and plan:    Acute RLE DVT  -- Duplex RLE  -- Eliquis  -- Neurovascular checks    Cellulitis RLE  -- Doxycycline and Rocephin  -- BC x2 pending  -- Pain control  -- A.m. labs    Pneumonia  -- Chest x-ray shows mild increased groundglass opacity in the periphery of the left upper lung zone and left lung base concerning for pneumonia  -- Respiratory panel PCR negative  -- Rocephin and doxycycline  -- BC x2 pending  -- Sputum culture  -- Urine Legionella and S.  Pneumo Ag  -- CTA chest  -- A.m. labs    Addendum: Pulm nodule  --CT showing pulm nodule, needs f/u with pulm nodule clinic    Elevated serum creatinine  -- Creatinine 1.32  -- Baseline creatinine ~1.0-1.2  -- Urine studies  -- BMP in the a.m.    Hyperlipidemia  -- Continue Lipitor    Parkinson's disease  -- Follows with Dr. Ortiz  -- Continue carbidopa-levodopa  -- PT/OT consult  -- Fall precautions    RLS  -- Continue Mirapex    Hypothyroidism  -- Continue levothyroxine    Anxiety/depression  -- Seroquel nightly     DVT prophylaxis: On Eliquis    CODE STATUS:    Level Of Support Discussed With: Patient  Code Status (Patient has no pulse and is not breathing): CPR (Attempt to Resuscitate)  Medical Interventions (Patient has pulse or is breathing): Full " Support      Expected Discharge   Expected Discharge Date: 9/13/2023; Expected Discharge Time:       This note has been completed as part of a split-shared workflow.     Signature: Electronically signed by ALEXUS Vincent, 09/11/23, 10:41 PM EDT.         Attending   Admission Attestation       I have performed an independent face-to-face diagnostic evaluation including performing an independent physical examination.  The documented plan of care above was reviewed and developed with the advanced practice clinician (APC).  I have updated the HPI as appropriate.    Brief HPI    Patient is a 78-year-old male with past medical history as above who presents with increasing lower extremity edema and shortness of air x7 days and increasing redness to his right thigh.  Patient states he has had a productive cough with white sputum for the past week.  He denies any fever or chills.  Started having worsening edema and now with some erythema and increased warmth with tenderness to his posterior right thigh.  Work-up in the ER concerning for possible left upper lobe and left lower lobe pneumonia.  Venous duplex concerning for right lower extremity DVT.  Cellulitic appearance to right lower extremity.  Started on antibiotics.    Attending Physical Exam:  Temp:  [97.6 °F (36.4 °C)-98 °F (36.7 °C)] 98 °F (36.7 °C)  Heart Rate:  [70-93] 73  Resp:  [16-18] 18  BP: ()/(56-83) 123/83    Constitutional: Awake, alert  Eyes: PERRLA, sclerae anicteric, no conjunctival injection  HENT: NCAT, mucous membranes moist  Neck: Supple, no thyromegaly, no lymphadenopathy, trachea midline  Respiratory: Clear to auscultation bilaterally, nonlabored respirations   Cardiovascular: RRR, no murmurs, rubs, or gallops, palpable pedal pulses bilaterally  Gastrointestinal: Positive bowel sounds, soft, nontender, nondistended  Musculoskeletal: + bilateral ankle edema, no clubbing or cyanosis to extremities  Psychiatric: Appropriate affect,  cooperative  Neurologic: Oriented x 3, strength symmetric in all extremities, Cranial Nerves grossly intact to confrontation, speech clear  Skin: erthema to lower ext b/l with worsening erythema to posterior thigh on right with increased warmth and tenderness.  RLE with some blistering due to swelling      Assessment and Plan:    See assessment and plan documented by APC above and updated/edited by me as appropriate.    River Rodríguez, DO  09/11/23

## 2023-09-12 NOTE — PROGRESS NOTES
[facility] Medicine Services  PROGRESS NOTE    Patient Name: Cordell Martin  : 1945  MRN: [id number]    Date of Admission: 2023  Primary Care Physician: Ben Holder DO    Subjective   Subjective     CC:  Right leg pain    HPI:  No acute events.  States that he feels better today.  He has had coughing over the last week states it is worse when he is eating or speaking.  Thinks the pain in his right leg is a bit better.      Objective   Objective     Vital Signs:   Temp:  [97.6 °F (36.4 °C)-98.7 °F (37.1 °C)] 97.7 °F (36.5 °C)  Heart Rate:  [69-93] 83  Resp:  [16-20] 20  BP: ()/(56-83) 128/69  Flow (L/min):  [2] 2     Physical Exam:  Constitutional: No acute distress, awake, alert  HENT: NCAT, mucous membranes moist  Respiratory: poor effort; no crackles  Cardiovascular: RRR, no murmurs, rubs, or gallops  Gastrointestinal: Positive bowel sounds, soft  Musculoskeletal: +1-2 lower ext edema; erythema of right lower leg with streaking up the medial thigh  Psychiatric: flat affect, cooperative  Neurologic: Oriented x 3, strength symmetric in all extremities, resting tremor right arm Cranial Nerves grossly intact to confrontation, speech clear        Results Reviewed:  LAB RESULTS:      Lab 23  0542 23  2105 23  1616 23  1600   WBC 5.40  --  6.67  --    HEMOGLOBIN 11.7*  --  12.2*  --    HEMATOCRIT 36.9*  --  38.9  --    PLATELETS 209  --  206  --    NEUTROS ABS 2.70  --  3.93  --    IMMATURE GRANS (ABS) 0.01  --  0.01  --    LYMPHS ABS 1.98  --  1.92  --    MONOS ABS 0.54  --  0.63  --    EOS ABS 0.15  --  0.16  --    MCV 96.6  --  99.0*  --    PROCALCITONIN  --   --   --  0.08   LACTATE  --  1.3  --   --          Lab 23  1600   SODIUM 144   POTASSIUM 4.3   CHLORIDE 106   CO2 28.0   ANION GAP 10.0   BUN 33*   CREATININE 1.32*   EGFR 55.9*   GLUCOSE 108*   CALCIUM 9.5         Lab 23  1600   TOTAL PROTEIN 6.9   ALBUMIN 3.7    GLOBULIN 3.2   ALT (SGPT) 5   AST (SGOT) 13   BILIRUBIN 0.4   ALK PHOS 123*         Lab 09/11/23  1600   PROBNP 53.9                 Brief Urine Lab Results  (Last result in the past 365 days)        Color   Clarity   Blood   Leuk Est   Nitrite   Protein   CREAT   Urine HCG        06/17/23 1522 Yellow   Clear   Negative   Negative   Negative   Negative                   Microbiology Results Abnormal       Procedure Component Value - Date/Time    Respiratory Panel PCR w/COVID-19(SARS-CoV-2) KENJI/ASH/SHIRLEY/PAD/COR/MAD/CORNELIUS In-House, NP Swab in UTM/VTM, 3-4 HR TAT - Swab, Nasopharynx [[id number]]  (Normal) Collected: 09/11/23 2128    Lab Status: Final result Specimen: Swab from Nasopharynx Updated: 09/11/23 2222     ADENOVIRUS, PCR Not Detected     Coronavirus 229E Not Detected     Coronavirus HKU1 Not Detected     Coronavirus NL63 Not Detected     Coronavirus OC43 Not Detected     COVID19 Not Detected     Human Metapneumovirus Not Detected     Human Rhinovirus/Enterovirus Not Detected     Influenza A PCR Not Detected     Influenza B PCR Not Detected     Parainfluenza Virus 1 Not Detected     Parainfluenza Virus 2 Not Detected     Parainfluenza Virus 3 Not Detected     Parainfluenza Virus 4 Not Detected     RSV, PCR Not Detected     Bordetella pertussis pcr Not Detected     Bordetella parapertussis PCR Not Detected     Chlamydophila pneumoniae PCR Not Detected     Mycoplasma pneumo by PCR Not Detected    Narrative:      In the setting of a positive respiratory panel with a viral infection PLUS a negative procalcitonin without other underlying concern for bacterial infection, consider observing off antibiotics or discontinuation of antibiotics and continue supportive care. If the respiratory panel is positive for atypical bacterial infection (Bordetella pertussis, Chlamydophila pneumoniae, or Mycoplasma pneumoniae), consider antibiotic de-escalation to target atypical bacterial infection.            XR Chest 1  View    Result Date: 9/11/2023  XR CHEST 1 VW Date of Exam: 9/11/2023 4:26 PM EDT Indication: cough Comparison: 6/17/2023 and prior Findings: Study is limited by overlying support and monitoring apparatus. The heart and mediastinal contours are within normal limits. Mild increase groundglass opacity noted in the periphery of the left mid to upper lung zone and at the left lung base. Osseous structures are unremarkable     Impression: Impression: Mild increase groundglass opacity in the periphery of the left upper lung zone and left lung base. Findings are accentuated by low lung volumes and are concerning for pneumonia. Electronically Signed: Tomi Jiménez MD  9/11/2023 4:47 PM EDT  Workstation ID: [id number]    Duplex Venous Lower Extremity - Bilateral CAR    Result Date: 9/12/2023    Sub-acute right lower extremity deep vein thrombosis noted in the peroneal.   Sub-acute left lower extremity superficial thrombophlebitis noted in the great saphenous (above knee).   All other veins appeared normal bilaterally.     CT Angiogram Chest    Result Date: 9/11/2023  CT ANGIOGRAM CHEST Date of Exam: 9/11/2023 9:17 PM CDT Indication: hypoxia, ? pna, productive cough, r/o DVT. Comparison: None available. Technique: CTA of the chest was performed after the uneventful intravenous administration of 80 cc Isovue-370. Reconstructed coronal and sagittal images were also obtained. In addition, a 3-D volume rendered image was created for interpretation. Automated exposure control and iterative reconstruction methods were used. Findings: PULMONARY VASCULATURE: Pulmonary arteries are widely patent without evidence of embolus.Main pulmonary artery is normal in size. No evidence of right heart strain. MEDIASTINUM:Unremarkable. Aortic and heart size are normal. No aortic dissection identified. No mass nor pericardial effusion. CORONARY ARTERIES: Moderate to severe calcified atherosclerotic disease. LUNGS: There is 4 mm subpleural right  middle lobe nodule (image 60, series 5). No other nodule identified. There is mild reticular and linear subpleural interstitial scarring bilaterally. PLEURAL SPACE: No effusion, mass, nor pneumothorax. LYMPH NODES: There are no pathologically enlarged lymph nodes. UPPER ABDOMEN: Unremarkable OSSEOUS STRUCTURES: Appropriate for age with no acute process identified.     Impression: Impression: 1.No evidence of pulmonary embolism nor other acute process. 2.There is a 4 mm subpleural right middle lobe nodule. See below recommendations. *The Fleischner society pulmonary nodule recommendations are for the follow-up and management of pulmonary nodules smaller than 8 mm detected incidentally in patients >35 years on non-screening CT. The initial guidelines for the management of solid nodules were released in 2005 1, and guidelines for the management of subsolid nodules were released in 2013 2. New revised 2017 recommendations for both solid and subsolid have since been released 4. 2017 guidelines Solid nodules Solitary nodule size: <6 mm *low risk patients: no follow-up needed *high risk patients: optional CT at 12 months Solitary nodule size: 6-8 mm *low risk patients: follow-up at 6-12 months, then consider further follow-up at 18-24 months *high risk patients: initial follow-up CT at 6-12 months and then at 18-24 months if no change Solitary nodule size: >8 mm *either low or high risk patients *consider follow-up CT at 3 months, and/or CT-PET, and/or biopsy Multiple nodules size: <6 mm *low risk patients: no routine follow-up *high risk patients: optional CT at 12 months Multiple nodules size: 6-8 mm *low risk patients: follow-up at 3-6 months, then consider further follow-up at 18-24 months *high risk patients: follow-up at 3-6 months, then at 18-24 months if no change Multiple nodules size: >8 mm *low risk patients: follow-up at 3-6 months, then consider further follow-up at 18-24 months *high risk patients: follow-up  "at 3-6 months, then at 18-24 months if no change * Note: newly detected indeterminate nodule in persons 35 years of age or older. *low risk patients: minimal or absent history of smoking and or other known risk factors *high risk patients: history of smoking or of other known risk factors (e.g. first degree relative with lung cancer, or exposure to asbestos, radon, uranium) *if a nodule up to 8 mm is partly solid or is ground glass further follow-up is required after 24 months to exclude possible slow growing adenocarcinoma (IKE) Subsolid nodules Solitary pure ground-glass nodule *nodule size <6mm *no CT follow-up required *nodule size \"e6mm *follow up CT at 6-12 months, then every 2 years until 5 years Solitary part-solid nodule *nodule size <6mm *no CT follow-up required *nodule size \"e6mm *follow-up CT at 3-6 months *if unchanged, and solid component remains <6mm, then annual follow-up for 5 years Multiple subsolid nodules *nodule size <6mm *follow-up CT at 3-6 months *consider further follow-up at 2 and 4 years if stable *nodule size \"e6mm *follow-up CT at 3-6 months *subsequent management based on the most suspicious nodule(s) Electronically Signed: Jonas Evans MD  9/11/2023 9:27 PM CDT  Workstation ID: [id number]     Results for orders placed during the hospital encounter of 01/12/21    Adult Transthoracic Echo Complete W/ Cont if Necessary Per Protocol    Interpretation Summary  · The right ventricle and right atrium are moderately dilated. Other chamber sizes and wall thicknesses are normal.  · Global and segmental LV wall motion is normal. The estimated left ventricular ejection fraction is 51% - 55%.  · The aortic and mitral valves are normal in structure and function.  · The estimated RV systolic pressure is mildly elevated 35 mmHg - 40 mmHg. There is as well noted to be less than 50% inspiratory IVC collapse. The main pulmonary artery is \"borderline dilated\".  · There are no other important findings on " this study.      Current medications:  Scheduled Meds:apixaban, 10 mg, Oral, Q12H  vitamin C, 500 mg, Oral, Daily  atorvastatin, 40 mg, Oral, Daily  carbidopa-levodopa, 2 tablet, Oral, 4x Daily  carbidopa-levodopa ER, 1 tablet, Oral, BID  cefTRIAXone, 2,000 mg, Intravenous, Q24H  cetirizine, 10 mg, Oral, Daily  doxycycline, 100 mg, Intravenous, Q12H  fluticasone, 2 spray, Each Nare, Daily  furosemide, 20 mg, Oral, Daily  levothyroxine, 88 mcg, Oral, Daily  potassium chloride, 20 mEq, Oral, Daily  pramipexole, 1 mg, Oral, TID  QUEtiapine, 12.5 mg, Oral, Nightly  senna-docusate sodium, 2 tablet, Oral, BID  sodium chloride, 10 mL, Intravenous, Q12H  tamsulosin, 0.4 mg, Oral, Daily  vitamin B-6, 25 mg, Oral, Daily  vitamin D3, 5,000 Units, Oral, Daily      Continuous Infusions:   PRN Meds:.  acetaminophen    senna-docusate sodium **AND** polyethylene glycol **AND** bisacodyl **AND** bisacodyl    ipratropium-albuterol    nitroglycerin    sodium chloride    sodium chloride    sodium chloride    Assessment & Plan   Assessment & Plan     Active Hospital Problems    Diagnosis  POA    **Acute deep vein thrombosis (DVT) of right lower extremity [I82.401]  Yes    Pulmonary nodule [R91.1]  Unknown    Pneumonia [J18.9]  Unknown    Hypothyroidism (acquired) [E03.9]  Yes    Elevated serum creatinine [R79.89]  Yes    Cellulitis of right lower extremity [L03.115]  Yes    Parkinson's disease [G20]  Yes    Anxiety [F41.9]  Yes    Depression [F32.A]  Yes    Hyperlipidemia [E78.5]  Yes      Resolved Hospital Problems   No resolved problems to display.        Brief Hospital Course to date:  Cordell Martin is a 78 y.o. male with a history of Parkinson disease followed by Dr. Ortiz, hypothyroidism, hyperlipidemia, anxiety, depression, was transferred from [facility] at [facility] [place] with complaints of lower extremity swelling and redness. Duplex with sub-acute right lower DVT in the peroneal and superfi blood is noted in  the great saphenous above the knee.  CT chest with no evidence of PE did show small 4 mm subpleural right middle lobe nodule.  Patient was started on IV antibiotics for cellulitis and admitted to medicine     Assessment and plan:     Acute RLE DVT  -- Duplex per above  -- Continue Eliquis  -- Neurovascular checks     Cellulitis RLE  -Right knee replacement by one year ago complicated by infection requiring wound VAC.  -- BC x2 pending  -Continue Rocephin and Doxy  -- Pain control     ? Pneumonia  -- Chest x-ray shows mild increased groundglass opacity in the periphery of the left upper lung zone and left lung base concerning for pneumonia  -CT chest with no acute findings  -Clinically with increased cough over the last week  -On Rocephin Doxy per above  -Follow-up micro  -speech consult    Pulm nodule  --CT showing pulm nodule, needs f/u with pulm nodule clinic     Elevated serum creatinine  -- Creatinine 1.32 on admission  -- Baseline creatinine ~1.0-1.2  -- Urine studies  -IV fluids.  A.m. labs     Hyperlipidemia  -- Continue Lipitor     Parkinson's disease  -- Follows with Dr. Ortiz  -- Continue carbidopa-levodopa  -- PT/OT consult  -- Fall precautions     RLS  -- Continue Mirapex     Hypothyroidism  -- Continue levothyroxine     Anxiety/depression  -- Seroquel nightly     Expected Discharge Location and Transportation: PT/OT pending  Expected Discharge   Expected Discharge Date: 9/13/2023; Expected Discharge Time:      DVT prophylaxis:  Medical DVT prophylaxis orders are present.          CODE STATUS:   Code Status and Medical Interventions:   Ordered at: 09/11/23 0318     Level Of Support Discussed With:    Patient     Code Status (Patient has no pulse and is not breathing):    CPR (Attempt to Resuscitate)     Medical Interventions (Patient has pulse or is breathing):    Full Support       Angely Mukherjee, DO  09/12/23

## 2023-09-12 NOTE — THERAPY EVALUATION
Acute Care - Speech Language Pathology   Swallow Initial Evaluation [facility]  Clinical Swallow Evaluation      Patient Name: Cordell Martin  : 1945  MRN: [id number]  Today's Date: 2023               Admit Date: 2023    Visit Dx:     ICD-10-CM ICD-9-CM   1. Bilateral lower extremity edema  R60.0 782.3   2. Cellulitis of right lower extremity  L03.115 682.6   3. Subacute cough  R05.2 786.2   4. Abnormal x-ray  R93.89 793.99   5. Acute deep vein thrombosis (DVT) of proximal vein of right lower extremity  I82.4Y1 453.41   6. Pulmonary nodule  R91.1 793.11   7. Dysphagia, unspecified type  R13.10 787.20     Patient Active Problem List   Diagnosis    Anxiety    Arthritis    Depression    Hyperlipidemia    Lumbar stenosis with neurogenic claudication    Parkinson's disease    Lumbar stenosis with neurogenic claudication status post L4-5 decompression    Status post lumbar laminectomy    Memory loss    Chronic edema    Cellulitis of right lower extremity    Exertional dyspnea    Right knee pain    Cellulitis of lower extremity    Hypothyroidism (acquired)    Elevated serum creatinine    Cellulitis of right leg    Acute deep vein thrombosis (DVT) of right lower extremity    Pneumonia    Pulmonary nodule     Past Medical History:   Diagnosis Date    Anxiety     Arthritis     Back pain     Bronchitis     Cognitive impairment     Depression     Disease of thyroid gland     Glucose intolerance (impaired glucose tolerance)     Hyperlipidemia     Kidney stone     Obesity     Parkinson disease     Pneumonia     Prostate disorder     Thyroid disease     Wears eyeglasses      Past Surgical History:   Procedure Laterality Date    COLONOSCOPY      5 years ago    EYE SURGERY      HERNIA REPAIR  10/20/2020    KNEE SURGERY      LUMBAR LAMINECTOMY DISCECTOMY DECOMPRESSION N/A 2017    Procedure: LUMBAR LAMINECTOMY L4-5;  Surgeon: Antonio Trent MD;  Location: [facility];  Service:     SHOULDER ROTATOR CUFF  REPAIR Right     x2    TONSILLECTOMY      VEIN SURGERY         SLP Recommendation and Plan  SLP Swallowing Diagnosis: R/O pharyngeal dysphagia, suspected esophageal dysphagia (09/12/23 0925)  SLP Diet Recommendation: other (see comments) (continue current diet per MD discretion until MBS + ltd upper GI 9/13) (09/12/23 0925)  Recommended Precautions and Strategies: general aspiration precautions (09/12/23 0925)  SLP Rec. for Method of Medication Administration: as tolerated (09/12/23 0925)     Monitor for Signs of Aspiration: notify SLP if any concerns (09/12/23 0925)  Re[place] Diagnostics[place] [place], other (see comments) (9/13) (09/12/23 0925)  Swallow Criteria for Skilled Therapeutic Interventions Met: demonstrates skilled criteria (09/12/23 0925)  Anticipated Discharge Disposition (SLP): skilled nursing facility (09/12/23 0925)  Rehab Potential/Prognosis, Swallowing: good, to achieve stated therapy goals (09/12/23 0925)     Predicted Duration Therapy Intervention (Days): until discharge (09/12/23 0925)  Oral Care Recommendations: Oral Care BID/PRN (09/12/23 0925)                                      Oral Care Recommendations: Oral Care BID/PRN (09/12/23 0925)    Plan of Care Reviewed With: patient      SWALLOW EVALUATION (last 72 hours)       SLP Adult Swallow Evaluation       Row Name 09/12/23 0925                   Rehab Evaluation    Document Type evaluation  -MP        Subjective Information no complaints  -MP        Patient Observations alert;cooperative  -MP        Patient/Family/Caregiver Comments/Observations No family present  -MP        Patient Effort good  -MP           General Information    Patient Profile Reviewed yes  -MP        Pertinent History Of Current Problem Adm 9/11 w/ acute RLE DVT, RLE cellulitis, PNA. CXR w/ mild increase groundglass opacity in periphery of ADELINA & L lung base. Hx Parkinson's, hypothyroid, HLD, anxiety/depression. Consulted 2' pt reported  "coughing while eating.  -MP        Current Method of Nutrition regular textures;thin liquids  -MP        Precautions/Limitations, Vision WFL with corrective lenses  -MP        Precautions/Limitations, Hearing WFL  -MP        Prior Level of Function-Communication WFL  -MP        Prior Level of Function-Swallowing no diet consistency restrictions;esophageal concerns;other (see comments)  pt reports hx esophageal dilations  -MP        Plans/Goals Discussed with patient  -MP        Barriers to Rehab none identified  -MP        Patient's Goals for Discharge patient did not state  -MP           Pain    Additional Documentation Pain Scale: FACES Pre/Post-Treatment (Group)  -MP           Pain Scale: FACES Pre/Post-Treatment    Pain: FACES Scale, Pretreatment 0-->no hurt  -MP        Posttreatment Pain Rating 0-->no hurt  -MP           Oral Motor Structure and Function    Dentition Assessment natural, present and adequate  -MP        Secretion Management WNL/WFL  -MP        Mucosal Quality moist, healthy  -MP           Oral Musculature and Cranial Nerve Assessment    Oral Motor General Assessment WFL  -MP           General Eating/Swallowing Observations    Respiratory Support Currently in Use nasal cannula  -MP        Eating/Swallowing Skills self-fed;fed by SLP  -MP        Positioning During Eating upright in bed  -MP        Utensils Used spoon;cup;straw  -MP        Consistencies Trialed thin liquids;pureed;regular textures  -MP           Clinical Swallow Eval    Clinical Swallow Evaluation Summary Baseline cough & cough t/o eval. Difficult to determine if ever directly r/t PO. Pt in agrmt to complete MBS tomorrow + ltd upper GI given hx esophageal dilations and reports of feeling \"crunched up\" and pointing to sternum region. OK to continue current diet per MD discretion until MBS.  -MP           SLP Evaluation Clinical Impression    SLP Swallowing Diagnosis R/O pharyngeal dysphagia;suspected esophageal dysphagia  -MP        " Functional Impact risk of aspiration/pneumonia  -MP        Rehab Potential/Prognosis, Swallowing good, to achieve stated therapy goals  -MP        Swallow Criteria for Skilled Therapeutic Interventions Met demonstrates skilled criteria  -MP           Recommendations    Predicted Duration Therapy Intervention (Days) until discharge  -MP        SLP Diet Recommendation other (see comments)  continue current diet per MD discretion until MBS + ltd upper GI 9/13  -MP        Recommended Diagnostics [place] ([facility] [place]with esophageal screen;other (see comments)  9/13  -MP        Recommended Precautions and Strategies general aspiration precautions  -MP        Oral Care Recommendations Oral Care BID/PRN  -MP        SLP Rec. for Method of Medication Administration as tolerated  -        Monitor for Signs of Aspiration notify SLP if any concerns  -MP        Anticipated Discharge Disposition (SLP) skilled nursing facility  -                  User Key  (r) = Recorded By, (t) = Taken By, (c) = Cosigned By      Initials Name Effective Dates    Bryan Vieira MS CCC-SLP 12/28/21 -                     EDUCATION  The patient has been educated in the following areas:   Dysphagia (Swallowing Impairment).              Time Calculation:    Time Calculation- SLP       Row Name 09/12/23 0953             Time Calculation- SLP    SLP Start Time 0925  -MP      SLP Received On 09/12/23  -         Untimed Charges    [id number] Eval Oral Pharyng Swallow Minutes 40  -MP         Total Minutes    Untimed Charges Total Minutes 40  -MP       Total Minutes 40  -MP                User Key  (r) = Recorded By, (t) = Taken By, (c) = Cosigned By      Initials Name Provider Type    Bryan Vieira MS CCC-SLP Speech and Language Pathologist                    Therapy Charges for Today       Code Description Service Date Service Provider Modifiers Qty    [id number] HC ST EVAL ORAL PHARYNG SWALLOW 3 9/12/2023 Bryan Vinson MS CCC-SLP  GN 1                 Bryan Otero, MS CCC-SLP  9/12/2023

## 2023-09-12 NOTE — THERAPY EVALUATION
Patient Name: Cordell Martin  : 1945    MRN: [id number]                              Today's Date: 2023       Admit Date: 2023    Visit Dx:     ICD-10-CM ICD-9-CM   1. Bilateral lower extremity edema  R60.0 782.3   2. Cellulitis of right lower extremity  L03.115 682.6   3. Subacute cough  R05.2 786.2   4. Abnormal x-ray  R93.89 793.99   5. Acute deep vein thrombosis (DVT) of proximal vein of right lower extremity  I82.4Y1 453.41   6. Pulmonary nodule  R91.1 793.11   7. Dysphagia, unspecified type  R13.10 787.20     Patient Active Problem List   Diagnosis    Anxiety    Arthritis    Depression    Hyperlipidemia    Lumbar stenosis with neurogenic claudication    Parkinson's disease    Lumbar stenosis with neurogenic claudication status post L4-5 decompression    Status post lumbar laminectomy    Memory loss    Chronic edema    Cellulitis of right lower extremity    Exertional dyspnea    Right knee pain    Cellulitis of lower extremity    Hypothyroidism (acquired)    Elevated serum creatinine    Cellulitis of right leg    Acute deep vein thrombosis (DVT) of right lower extremity    Pneumonia    Pulmonary nodule     Past Medical History:   Diagnosis Date    Anxiety     Arthritis     Back pain     Bronchitis     Cognitive impairment     Depression     Disease of thyroid gland     Glucose intolerance (impaired glucose tolerance)     Hyperlipidemia     Kidney stone     Obesity     Parkinson disease     Pneumonia     Prostate disorder     Thyroid disease     Wears eyeglasses      Past Surgical History:   Procedure Laterality Date    COLONOSCOPY      5 years ago    EYE SURGERY      HERNIA REPAIR  10/20/2020    KNEE SURGERY      LUMBAR LAMINECTOMY DISCECTOMY DECOMPRESSION N/A 2017    Procedure: LUMBAR LAMINECTOMY L4-5;  Surgeon: Antonio Trent MD;  Location: [facility] [place];  Service:     SHOULDER ROTATOR CUFF REPAIR Right     x2    TONSILLECTOMY      VEIN SURGERY        General Information       Row  Name 09/12/23 1513          Physical Therapy Time and Intention    Document Type evaluation (P)   -WB     Mode of Treatment individual therapy;physical therapy (P)   -WB       Row Name 09/12/23 1513          General Information    Patient Profile Reviewed yes (P)   -WB     Prior Level of Function independent:;all household mobility;gait;transfer;bed mobility;ADL's;dressing;bathing;dependent:;cooking;cleaning (P)   -WB     Existing Precautions/Restrictions fall;other (see comments) (P)   PD w/ noted R UE tremors, baseline R shoulder RC deficits, acute R LE DVT per chart  -WB     Barriers to Rehab medically complex;previous functional deficit (P)   -WB       Row Name 09/12/23 1513          Living Environment    People in Home alone;facility resident (P)   -WB       Row Name 09/12/23 1513          Home Main Entrance    Number of Stairs, Main Entrance none (P)   -WB       Row Name 09/12/23 1513          Stairs Within Home, Primary    Number of Stairs, Within Home, Primary none (P)   -WB       Row Name 09/12/23 1513          Cognition    Orientation Status (Cognition) oriented x 3 (P)   -WB       Row Name 09/12/23 1513          Safety Issues, Functional Mobility    Safety Issues Affecting Function (Mobility) insight into deficits/self-awareness;sequencing abilities (P)   -WB     Impairments Affecting Function (Mobility) balance;endurance/activity tolerance;postural/trunk control;sensation/sensory awareness;shortness of breath;strength;coordination (P)   -WB               User Key  (r) = Recorded By, (t) = Taken By, (c) = Cosigned By      Initials Name Provider Type    WB Miguel Joshua PT Student PT Student                   Mobility       Row Name 09/12/23 1515          Bed Mobility    Bed Mobility supine-sit;scooting/bridging (P)   -WB     Scooting/Bridging [place] (Bed Mobility) moderate assist (50% patient effort);2 person assist;verbal cues (P)   -WB     Supine-Sit [place] (Bed Mobility) moderate assist  (50% patient effort);2 person assist;verbal cues (P)   -WB     Assistive Device (Bed Mobility) bed rails;head of bed elevated;draw sheet (P)   -WB     Comment, (Bed Mobility) VC's required for hand placement and sequencing, modAx2 required to lift pt into sitting. (P)   -WB       Row Name 09/12/23 1515          Sit-Stand Transfer    Sit-Stand [place] (Transfers) moderate assist (50% patient effort);2 person assist;verbal cues (P)   -WB     Assistive Device (Sit-Stand Transfers) walker, front-wheeled (P)   -WB     Comment, (Sit-Stand Transfer) VC's required for hand placement and sequencing. (P)   -WB       Row Name 09/12/23 1515          Gait/Stairs (Locomotion)    [place] Level (Gait) minimum assist (75% patient effort);2 person assist;verbal cues;nonverbal cues (demo/gesture) (P)   -WB     Assistive Device (Gait) walker, front-wheeled (P)   -WB     Distance in Feet (Gait) 75 (P)   -WB     Deviations/Abnormal Patterns (Gait) base of support, wide;gait speed decreased;stride length decreased;bilateral deviations (P)   -WB     Bilateral Gait Deviations forward flexed posture;heel strike decreased (P)   -WB     Comment, (Gait/Stairs) Pt ambulated 75' with Clem x2 and VC's for sequencing and postural correction. L foot externally rotated while ambulating, decreased heel strike on L side. Further mobility limited by fatigue. (P)   -WB               User Key  (r) = Recorded By, (t) = Taken By, (c) = Cosigned By      Initials Name Provider Type    WB Miguel Joshua, PT Student PT Student                   Obj/Interventions       Row Name 09/12/23 1522          Range of Motion Comprehensive    General Range of Motion bilateral lower extremity ROM WFL (P)   -WB       Row Name 09/12/23 1522          Strength Comprehensive (MMT)    Comment, General Manual Muscle Testing (MMT) Assessment LLE 4/5, RLE 3+/5 except for RLE extension 2+/5 (P)   -WB       Row Name 09/12/23 1522          Balance    Balance Assessment  sitting static balance;sitting dynamic balance;sit to stand dynamic balance;standing dynamic balance;standing static balance (P)   -WB     Static Sitting Balance standby assist (P)   -WB     Dynamic Sitting Balance contact guard (P)   -WB     Position, Sitting Balance sitting edge of bed;supported (P)   -WB     Sit to Stand Dynamic Balance moderate assist;verbal cues;non-verbal cues (demo/gesture);2-person assist (P)   -WB     Static Standing Balance contact guard;2-person assist (P)   -WB     Dynamic Standing Balance minimal assist;verbal cues;non-verbal cues (demo/gesture);1 person to manage equipment (P)   -WB     Position/Device Used, Standing Balance walker, front-wheeled (P)   -WB     Balance Interventions sitting;standing;sit to stand;supported;static;dynamic;occupation based/functional task;weight shifting activity (P)   -WB     Comment, Balance No LOB (P)   -WB               User Key  (r) = Recorded By, (t) = Taken By, (c) = Cosigned By      Initials Name Provider Type    WB Miguel Joshua, PT Student PT Student                   Goals/Plan       Row Name 09/12/23 1530          Bed Mobility Goal 1 (PT)    Activity/Assistive Device (Bed Mobility Goal 1, PT) sit to supine;scooting (P)   -WB     [place] Level/Cues Needed (Bed Mobility Goal 1, PT) minimum assist (75% or more patient effort);other (see comments) (P)   Clem x1  -WB     Time Frame (Bed Mobility Goal 1, PT) long term goal (LTG);10 days (P)   -WB       Row Name 09/12/23 1530          Transfer Goal 1 (PT)    Activity/Assistive Device (Transfer Goal 1, PT) sit-to-stand/stand-to-sit (P)   -WB     [place] Level/Cues Needed (Transfer Goal 1, PT) minimum assist (75% or more patient effort) (P)   Clem x1  -WB     Time Frame (Transfer Goal 1, PT) long term goal (LTG);10 days (P)   -WB       Row Name 09/12/23 1530          Gait Training Goal 1 (PT)    Activity/Assistive Device (Gait Training Goal 1, PT) gait (walking locomotion);improve balance  and speed;increase endurance/gait distance (P)   -WB     [place] Level (Gait Training Goal 1, PT) minimum assist (75% or more patient effort);other (see comments) (P)   Clem x1 with no chair follow  -WB     Distance (Gait Training Goal 1, PT) 125 (P)   -WB     Time Frame (Gait Training Goal 1, PT) long term goal (LTG);10 days (P)   -WB       Row Name 09/12/23 1530          Patient Education Goal (PT)    Activity (Patient Education Goal, PT) HEP (P)   -WB     [place]/Cues/Accuracy (Memory Goal 2, PT) independent (P)   -WB     Time Frame (Patient Education Goal, PT) long term goal (LTG);10 days (P)   -WB       Row Name 09/12/23 1530          Therapy Assessment/Plan (PT)    Planned Therapy Interventions (PT) balance training;bed mobility training;gait training;home exercise program;neuromuscular re-education;patient/family education;postural re-education;ROM (range of motion);strengthening;stretching;transfer training (P)   -WB               User Key  (r) = Recorded By, (t) = Taken By, (c) = Cosigned By      Initials Name Provider Type    WB Miguel Joshua, PT Student PT Student                   Clinical Impression       Row Name 09/12/23 8254          Pain    Pretreatment Pain Rating 0/10 - no pain (P)   -WB     Posttreatment Pain Rating 0/10 - no pain (P)   -WB     Pain Intervention(s) Repositioned;Ambulation/increased activity (P)   -WB       Row Name 09/12/23 0171          Plan of Care Review    Plan of Care Reviewed With patient (P)   -WB     Progress no change (P)   -WB     Outcome Evaluation PT eval complete. Pt presents with impaired balance, gait, and strength and is below functional baseline. Pt required modA x2 to STS and supine-sit, and Clem x2 to ambulate with chair follow. Skilled IPPT is warranted, PT rec SNF at d/c. (P)   -WB       Row Name 09/12/23 5063          Therapy Assessment/Plan (PT)    Rehab Potential (PT) good, to achieve stated therapy goals (P)   -WB     Criteria for Skilled  Interventions Met (PT) yes;meets criteria;skilled treatment is necessary (P)   -WB     Therapy Frequency (PT) daily (P)   -WB       Row Name 09/12/23 1526          Vital Signs    Pre Systolic BP Rehab 110 (P)   -WB     Pre Treatment Diastolic BP 62 (P)   -WB     Post Systolic BP Rehab 119 (P)   -WB     Post Treatment Diastolic BP 56 (P)   -WB     Pretreatment Heart Rate (beats/min) 73 (P)   -WB     Posttreatment Heart Rate (beats/min) 74 (P)   -WB     Pre SpO2 (%) 93 (P)   -WB     O2 Delivery Pre Treatment room air (P)   -WB     O2 Delivery Intra Treatment room air (P)   -WB     Post SpO2 (%) 95 (P)   -WB     O2 Delivery Post Treatment room air (P)   -WB     Pre Patient Position Supine (P)   -WB     Intra Patient Position Standing (P)   -WB     Post Patient Position Sitting (P)   -WB       Row Name 09/12/23 1526          Positioning and Restraints    Pre-Treatment Position in bed (P)   -WB     Post Treatment Position chair (P)   -WB     In Chair notified nsg;reclined;sitting;call light within reach;encouraged to call for assist;exit alarm on;waffle cushion;legs elevated (P)   -WB               User Key  (r) = Recorded By, (t) = Taken By, (c) = Cosigned By      Initials Name Provider Type    WB Miguel Joshua, PT Student PT Student                   Outcome Measures       Row Name 09/12/23 1532          How much help from another person do you currently need...    Turning from your back to your side while in flat bed without using bedrails? 2 (P)   -WB     Moving from lying on back to sitting on the side of a flat bed without bedrails? 2 (P)   -WB     Moving to and from a bed to a chair (including a wheelchair)? 2 (P)   -WB     Standing up from a chair using your arms (e.g., wheelchair, bedside chair)? 2 (P)   -WB     Climbing 3-5 steps with a railing? 2 (P)   -WB     To walk in hospital room? 3 (P)   -WB     AM-PAC 6 Clicks Score (PT) 13 (P)   -WB     Highest level of mobility 4 --> Transferred to chair/commode  (P)   -WB       Row Name 09/12/23 1532 09/12/23 1513       Functional Assessment    Outcome Measure Options AM-PAC 6 Clicks Basic Mobility (PT) (P)   -WB AM-PAC 6 Clicks Daily Activity (OT)  -ANALI              User Key  (r) = Recorded By, (t) = Taken By, (c) = Cosigned By      Initials Name Provider Type    Priti Valiente, OT Occupational Therapist    WB Miguel Joshua, PT Student PT Student                                 Physical Therapy Education       Title: PT OT SLP Therapies (In Progress)       Topic: Physical Therapy (In Progress)       Point: Mobility training (Done)       Learning Progress Summary             Patient Acceptance, E,TB, VU by WB at 9/12/2023 1533                         Point: Home exercise program (Not Started)       Learner Progress:  Not documented in this visit.              Point: Body mechanics (Done)       Learning Progress Summary             Patient Acceptance, E,TB, VU by WB at 9/12/2023 1533                         Point: Precautions (Done)       Learning Progress Summary             Patient Acceptance, E,TB, VU by WB at 9/12/2023 1533                                         User Key       Initials Effective Dates Name Provider Type Discipline    WB 07/20/23 -  Miguel Joshua, PT Student PT Student PT                  PT Recommendation and Plan  Planned Therapy Interventions (PT): (P) balance training, bed mobility training, gait training, home exercise program, neuromuscular re-education, patient/family education, postural re-education, ROM (range of motion), strengthening, stretching, transfer training  Plan of Care Reviewed With: (P) patient  Progress: (P) no change  Outcome Evaluation: (P) PT eval complete. Pt presents with impaired balance, gait, and strength and is below functional baseline. Pt required modA x2 to STS and supine-sit, and Clem x2 to ambulate with chair follow. Skilled IPPT is warranted, PT rec SNF at d/c.     Time Calculation:   PT Evaluation  Complexity  History, PT Evaluation Complexity: (P) 1-2 personal factors and/or comorbidities  Examination of Body Systems (PT Eval Complexity): (P) total of 3 or more elements  Clinical Presentation (PT Evaluation Complexity): (P) evolving  Clinical Decision Making (PT Evaluation Complexity): (P) moderate complexity  Overall Complexity (PT Evaluation Complexity): (P) moderate complexity     PT Charges       Row Name 09/12/23 1534             Time Calculation    Start Time 1351 (P)   -WB      PT Received On 09/12/23 (P)   -WB      PT Goal Re-Cert Due Date 09/22/23 (P)   -WB         Time Calculation- PT    Total Timed Code Minutes- PT 14 minute(s) (P)   -WB         Timed Charges    98922 - Gait Training Minutes  14 (P)   -WB         Untimed Charges    PT Eval/Re-eval Minutes 35 (P)   -WB         Total Minutes    Timed Charges Total Minutes 14 (P)   -WB      Untimed Charges Total Minutes 35 (P)   -WB       Total Minutes 49 (P)   -WB                User Key  (r) = Recorded By, (t) = Taken By, (c) = Cosigned By      Initials Name Provider Type    WB Miguel Joshua, PT Student PT Student                  Therapy Charges for Today       Code Description Service Date Service Provider Modifiers Qty    04575240508 HC GAIT TRAINING EA 15 MIN 9/12/2023 Miguel Joshua, PT Student GP 1    [id number] HC PT EVAL MOD COMPLEXITY 3 9/12/2023 Miguel Joshua, PT Student GP 1            PT G-Codes  Outcome Measure Options: (P) AM-PAC 6 Clicks Basic Mobility (PT)  AM-PAC 6 Clicks Score (PT): (P) 13  AM-PAC 6 Clicks Score (OT): 13  PT Discharge Summary  Anticipated Discharge Disposition (PT): (P) skilled nursing facility    Miguel Joshua PT Student  9/12/2023

## 2023-09-12 NOTE — PLAN OF CARE
Goal Outcome Evaluation:  Plan of Care Reviewed With: (P) patient        Progress: (P) no change  Outcome Evaluation: (P) PT eval complete. Pt presents with impaired balance, gait, and strength and is below functional baseline. Pt required modA x2 to STS and supine-sit, and Clem x2 to ambulate with chair follow. Skilled IPPT is warranted, PT rec SNF at d/c.      Anticipated Discharge Disposition (PT): (P) skilled nursing facility

## 2023-09-12 NOTE — THERAPY EVALUATION
Patient Name: Cordell Martin  : 1945    MRN: [id number]                              Today's Date: 2023       Admit Date: 2023    Visit Dx:     ICD-10-CM ICD-9-CM   1. Bilateral lower extremity edema  R60.0 782.3   2. Cellulitis of right lower extremity  L03.115 682.6   3. Subacute cough  R05.2 786.2   4. Abnormal x-ray  R93.89 793.99   5. Acute deep vein thrombosis (DVT) of proximal vein of right lower extremity  I82.4Y1 453.41   6. Pulmonary nodule  R91.1 793.11   7. Dysphagia, unspecified type  R13.10 787.20     Patient Active Problem List   Diagnosis    Anxiety    Arthritis    Depression    Hyperlipidemia    Lumbar stenosis with neurogenic claudication    Parkinson's disease    Lumbar stenosis with neurogenic claudication status post L4-5 decompression    Status post lumbar laminectomy    Memory loss    Chronic edema    Cellulitis of right lower extremity    Exertional dyspnea    Right knee pain    Cellulitis of lower extremity    Hypothyroidism (acquired)    Elevated serum creatinine    Cellulitis of right leg    Acute deep vein thrombosis (DVT) of right lower extremity    Pneumonia    Pulmonary nodule     Past Medical History:   Diagnosis Date    Anxiety     Arthritis     Back pain     Bronchitis     Cognitive impairment     Depression     Disease of thyroid gland     Glucose intolerance (impaired glucose tolerance)     Hyperlipidemia     Kidney stone     Obesity     Parkinson disease     Pneumonia     Prostate disorder     Thyroid disease     Wears eyeglasses      Past Surgical History:   Procedure Laterality Date    COLONOSCOPY      5 years ago    EYE SURGERY      HERNIA REPAIR  10/20/2020    KNEE SURGERY      LUMBAR LAMINECTOMY DISCECTOMY DECOMPRESSION N/A 2017    Procedure: LUMBAR LAMINECTOMY L4-5;  Surgeon: Antonio Trent MD;  Location: [facility];  Service:     SHOULDER ROTATOR CUFF REPAIR Right     x2    TONSILLECTOMY      VEIN SURGERY        General Information       Row  Name 09/12/23 1429          OT Time and Intention    Document Type evaluation  -JY     Mode of Treatment occupational therapy  -JY       Row Name 09/12/23 1429          General Information    Patient Profile Reviewed yes  -JY     Prior Level of Function independent:;all household mobility;gait;transfer;bed mobility;ADL's;feeding;grooming;dressing;bathing;w/c or scooter;community mobility;dependent:;home management;cooking;cleaning  grossly I in ADLs, related t/fs and mob using FWW in apt mobility, used motorized scooter during community mobility; received staff A at [facility] for house mgmt tasks, laundry of linens and cooking; endorses 1 fall recently when fell asleep in bathroom(stool)  -JY     Existing Precautions/Restrictions fall;other (see comments)  PD w/ noted R UE tremors, baseline R shoulder RC deficits, acute R LE DVT per chart - per MD ELADIA approved OT tx  -JY     Barriers to Rehab medically complex;previous functional deficit  -JY       Row Name 09/12/23 1429          Occupational Profile    Environmental Supports and Barriers (Occupational Profile) step over tub/shower combo w/ bath seat and HH shower head, elevated toilet with BSC over top for extended height, DME: has 4WW (used primarily in apt), FWW, motorized scooter (in community use), w/c, cane, adjustable bed  -JY       Row Name 09/12/23 1429          Living Environment    People in Home alone;facility resident  [place] [facility]  -J       Row Name 09/12/23 1429          Home Main Entrance    Number of Stairs, Main Entrance none  -JY     Stair Railings, Main Entrance none  -JY       Row Name 09/12/23 1429          Stairs Within Home, Primary    Stairs, Within Home, Primary able to use elevator to 3rd floor  -JY     Number of Stairs, Within Home, Primary none  -JY     Stair Railings, Within Home, Primary none  -JY       Row Name 09/12/23 1429          Cognition    Orientation Status (Cognition) oriented to;person;place;situation;verbal cues/prompts  "needed for orientation;time  -JY       Row Name 09/12/23 1429          Safety Issues, Functional Mobility    Safety Issues Affecting Function (Mobility) awareness of need for assistance;insight into deficits/self-awareness;positioning of assistive device;problem-solving;safety precaution awareness;safety precautions follow-through/compliance;sequencing abilities  -JY     Impairments Affecting Function (Mobility) balance;endurance/activity tolerance;range of motion (ROM);sensation/sensory awareness;shortness of breath;strength  -JY     Comment, Safety Issues/Impairments (Mobility) pt alert and able to follow commands; baseline PD and other remote injuries and sxs impacting BLEs and RUE  -JY               User Key  (r) = Recorded By, (t) = Taken By, (c) = Cosigned By      Initials Name Provider Type    Priti Valiente OT Occupational Therapist                     Mobility/ADL's       Row Name 09/12/23 1437          Bed Mobility    Bed Mobility supine-sit;scooting/bridging  -JY     Scooting/Bridging [place] (Bed Mobility) moderate assist (50% patient effort);2 person assist;verbal cues  -JY     Supine-Sit [place] (Bed Mobility) moderate assist (50% patient effort);2 person assist;verbal cues  -JY     Bed Mobility, Safety Issues decreased use of arms for pushing/pulling;decreased use of legs for bridging/pushing;impaired trunk control for bed mobility  -JY     Assistive Device (Bed Mobility) bed rails;head of bed elevated;draw sheet  -JY     Comment, (Bed Mobility) skilled cues for optimal hand placement and seq to advance LEs to EOB and upright trunk into sitting; pt with increased time to advance LEs toward EOB and grossly maintained trunk extension ~ perpendicular to bed before attempting to upright trunk into sitting; pt req'd A x 2 for UB and LB mgmt; pt reported feeling \"weak\" at EOB, denied dizziness or feeling LH at EOB  -JY       Row Name 09/12/23 1437          Transfers    Transfers sit-stand " transfer;stand-sit transfer  -JY     Comment, (Transfers) skilled cues for optimal hand placement for controlled ascend, descend specifically to push up from seated surface and reach back prior to sitting only once in close proximity to seated surface and with AD close to pt; initially req'd increased time to ascend from EOB with height of bed already elevated to aid pt; pt req'd gross mod A x 2 for STS for L & R support  -[employer] Name 09/12/23 1437          Sit-Stand Transfer    Sit-Stand [place] (Transfers) moderate assist (50% patient effort);2 person assist;verbal cues  -JY     Assistive Device (Sit-Stand Transfers) walker, front-wheeled  -[employer] Name 09/12/23 1437          Stand-Sit Transfer    Stand-Sit [place] (Transfers) moderate assist (50% patient effort);2 person assist;verbal cues  -JY     Assistive Device (Stand-Sit Transfers) walker, front-wheeled  -[employer]       Row Name 09/12/23 1437          Functional Mobility    Functional Mobility- Ind. Level minimum assist (75% patient effort);1 person + 1 person to manage equipment;verbal cues required  -JY     Functional Mobility- Device walker, front-wheeled  -JY     Functional Mobility-Distance (Feet) --  in room ADL related mobility  -JY     Functional Mobility- Comment defer to PT for specifics however during in room ADL related mobility pt req'd gross min A x 1+2 for B support and chair follow A  -[employer] Name 09/12/23 1437          Activities of Daily Living    BADL Assessment/Intervention upper body dressing;lower body dressing;grooming  -[employer] Name 09/12/23 [facility]7          Upper Body Dressing Assessment/Training    [place] Level (Upper Body Dressing) don;pajama/robe;moderate assist (50% patient effort);verbal cues  -JY     Position (Upper Body Dressing) edge of bed sitting  -JY     Comment, (Upper Body Dressing) gross mod A for proximal and posterior mgmt of gown respectively for anterior and posterior gowns, pt limited  at R UE with baseline ROM deficits d/t RC injury; educated pt on optimal seq for threading and unthreading given more impacted RUE  -       Row Name 09/12/23 1437          Lower Body Dressing Assessment/Training    [place] Level (Lower Body Dressing) doff;don;socks;dependent (less than 25% patient effort)  -J     Position (Lower Body Dressing) supine  -JY     Comment, (Lower Body Dressing) pt did not attempt to d/d socks this date d/t unavailability of LH AE used at baseline and poor skin integrity of LEs, pt further limited by body habitus decreasing distal reach; cautioned pt on not using AE at LEs until further clearance d/t skin integrity issued  -       Row Name 09/12/23 1437          Grooming Assessment/Training    [place] Level (Grooming) wash face, hands;supervision;set up  -J     Position (Grooming) supported sitting  -J     Comment, (Grooming) spv for initiation and follow through, did not require physical A for washing hands/face using BUEs (tremor noted at RUE)  -               User Key  (r) = Recorded By, (t) = Taken By, (c) = Cosigned By      Initials Name Provider Type    Priti Valiente OT Occupational Therapist                   Obj/Interventions       Row Name 09/12/23 1442          Sensory Assessment (Somatosensory)    Sensory Assessment (Somatosensory) bilateral UE;sensation intact  -     Bilateral UE Sensory Assessment general sensation;light touch awareness;intact  -     Sensory Assessment denies any numbness or tingling and able to recognize LT stimuli as intact and symmetrical at BUEs; reports baseline sensory deficits at BLEs grossly around knees with decreased awareness at LLE (50%) compared to RLE (100%)  -       Row Name 09/12/23 1449          Vision Assessment/Intervention    Visual Impairment/Limitations corrective lenses for reading  -     Vision Assessment Comment denies any acute changes to vision  -[facility] Name 09/12/23 2100          Range of  Motion Comprehensive    General Range of Motion upper extremity range of motion deficits identified  -JY     Comment, General Range of Motion BUE AROM grossly WFL with exception of R shoulder AROM limited to ~ 90 degrees in multi planes  -JY       Row Name 09/12/23 1448          Strength Comprehensive (MMT)    General Manual Muscle Testing (MMT) Assessment upper extremity strength deficits identified  -JY     Comment, General Manual Muscle Testing (MMT) Assessment abbreviated MMT completed as pt presented with SOA with more dynamic tasks, based on MMT and observation pt MMS ~ 4/5, R shoulder 4-/5  -J       Row Name 09/12/23 1448          Motor Skills    Motor Skills functional endurance  -JY     Functional Endurance decreased activity tolerance toward more dynamic tasks, easily fatigued and appeared SOA w/ bed mobility, post fxl mobility; SPO2 > 90% throughout  -JY       Row Name 09/12/23 1448          Balance    Balance Assessment sitting static balance;sitting dynamic balance;standing static balance;standing dynamic balance  -JY     Static Sitting Balance supervision  -JY     Dynamic Sitting Balance standby assist  -JY     Position, Sitting Balance supported;sitting edge of bed  -JY     Static Standing Balance minimal assist;moderate assist;2-person assist  -JY     Dynamic Standing Balance minimal assist;1-person assist;1 person to manage equipment;other (see comments)  min A at L & R sides and chair follow  -JY     Position/Device Used, Standing Balance walker, front-wheeled  -JY     Balance Interventions sitting;standing;static;dynamic;sit to stand;supported;occupation based/functional task  -JY     Comment, Balance no overt LOB during seated or standing tasks, utilized FWW throughout standing  -JY               User Key  (r) = Recorded By, (t) = Taken By, (c) = Cosigned By      Initials Name Provider Type    Priti Valiente OT Occupational Therapist                   Goals/Plan       Row Name 09/12/23 3260           Transfer Goal 1 (OT)    Activity/Assistive Device (Transfer Goal 1, OT) sit-to-stand/stand-to-sit;bed-to-chair/chair-to-bed;toilet;commode, bedside without drop arms;walker, rolling  -JY     [place] Level/Cues Needed (Transfer Goal 1, OT) minimum assist (75% or more patient effort);verbal cues required  -JY     Time Frame (Transfer Goal 1, OT) long term goal (LTG);by discharge  -JY     Progress/Outcome (Transfer Goal 1, OT) new goal  -       Row Name 09/12/23 1508          Dressing Goal 1 (OT)    Activity/Device (Dressing Goal 1, OT) upper body dressing  -JY     [place]/Cues Needed (Dressing Goal 1, OT) minimum assist (75% or more patient effort);verbal cues required  -JY     Time Frame (Dressing Goal 1, OT) long term goal (LTG);by discharge  -JY     Progress/Outcome (Dressing Goal 1, OT) new goal  -       Row Name 09/12/23 1508          Toileting Goal 1 (OT)    Activity/Device (Toileting Goal 1, OT) adjust/manage clothing;perform perineal hygiene;commode, bedside without drop arms;grab bar/safety frame;raised toilet seat  -JY     [place] Level/Cues Needed (Toileting Goal 1, OT) moderate assist (50-74% patient effort);verbal cues required  -JY     Time Frame (Toileting Goal 1, OT) long term goal (LTG);by discharge  -JY     Progress/Outcome (Toileting Goal 1, OT) new goal  -JULIANY       Row Name 09/12/23 1508          Grooming Goal 1 (OT)    Activity/Device (Grooming Goal 1, OT) hair care;wash face, hands;oral care  -JY     [place] (Grooming Goal 1, OT) other (see comments);supervision required;verbal cues required  from upgraded sitting position  -JY     Time Frame (Grooming Goal 1, OT) long term goal (LTG);by discharge  -JY     Progress/Outcome (Grooming Goal 1, OT) new goal  -ANALI       Row Name 09/12/23 1508          Therapy Assessment/Plan (OT)    Planned Therapy Interventions (OT) activity tolerance training;adaptive equipment training;BADL retraining;functional balance  retraining;occupation/activity based interventions;patient/caregiver education/training;ROM/therapeutic exercise;transfer/mobility retraining;strengthening exercise  -JY               User Key  (r) = Recorded By, (t) = Taken By, (c) = Cosigned By      Initials Name Provider Type    Priti Valiente, JUNG Occupational Therapist                   Clinical Impression       Row Name 09/12/23 1454          Pain Assessment    Pretreatment Pain Rating 0/10 - no pain  -JY     Posttreatment Pain Rating 0/10 - no pain  -JY     Pre/Posttreatment Pain Comment denied any pain and tolerated all OT interventions  -JY       Row Name 09/12/23 1454          Plan of Care Review    Plan of Care Reviewed With patient  -ANALI     Progress no change  OT IE  -JY     Outcome Evaluation OT evaluation completed. Pt presents with decreased I in ADLs, related t/fs, mobility compared to PLOF limited by decreased activity tolerance, impaired balance, muscle weakness at BUEs, skin integrity concerns at BLEs, decreased distal reach impacting LB ADLs and SOA with more dynamic demands (SPO2 > 90% on RA, no significant change in vitals), Pt grossly mod A x 2 for UB and LB mgmt advancing LEs and uprighting trunk into sitting, gross mod A x 2 for initial STS w/ increased time to gain erect posture with improved level of I with in room ADL related mobility with FWW throughout. Cues for posture and AD mgmt for stability and safety. Pt u/a to reach distally d/t complete LB ADLs without AE and limited by body habitus and LE skin integrity deficits. Pt is below occupational baseline performance and would benefit from IPOT POC and SNF at d/c.  -JY       Row Name 09/12/23 1454          Therapy Assessment/Plan (OT)    Patient/Family Therapy Goal Statement (OT) to maximize I in ADLs, related t/fs, mobility, return to PLOF  -JY     Rehab Potential (OT) good, to achieve stated therapy goals  -JY     Criteria for Skilled Therapeutic Interventions Met (OT) yes;skilled  treatment is necessary  -JY     Therapy Frequency (OT) daily  -JY       Row Name 09/12/23 1454          Therapy Plan Review/Discharge Plan (OT)    Equipment Needs Upon Discharge (OT) dressing equipment;bathing equipment  -JY     Anticipated Discharge Disposition (OT) skilled nursing facility  -JY       Row Name 09/12/23 1454          Vital Signs    Pre Systolic BP Rehab 110  -JY     Pre Treatment Diastolic BP 62  -JY     Post Systolic BP Rehab 119  -JY     Post Treatment Diastolic BP 56  -JY     Pretreatment Heart Rate (beats/min) 73  -JY     Posttreatment Heart Rate (beats/min) 79  -JY     Pre SpO2 (%) 93  -JY     O2 Delivery Pre Treatment room air  -JY     O2 Delivery Intra Treatment room air  -JY     Post SpO2 (%) 94  -JY     O2 Delivery Post Treatment room air  -JY     Pre Patient Position Supine  -JY     Intra Patient Position Standing  -JY     Post Patient Position Sitting  -JY       Row Name 09/12/23 1454          Positioning and Restraints    Pre-Treatment Position in bed  -JY     Post Treatment Position chair  -JY     In Chair notified nsg;reclined;call light within reach;encouraged to call for assist;exit alarm on;RUE elevated;compression device;waffle cushion;legs elevated;heels elevated  -JY               User Key  (r) = Recorded By, (t) = Taken By, (c) = Cosigned By      Initials Name Provider Type    Priti Valiente, OT Occupational Therapist                   Outcome Measures       Row Name 09/12/23 6260          How much help from another is currently needed...    Putting on and taking off regular lower body clothing? 1  -JY     Bathing (including washing, rinsing, and drying) 2  -JY     Toileting (which includes using toilet bed pan or urinal) 2  -JY     Putting on and taking off regular upper body clothing 2  -JY     Taking care of personal grooming (such as brushing teeth) 3  -JY     Eating meals 3  -JY     AM-PAC 6 Clicks Score (OT) 13  -JY       Row Name 09/12/23 1513          Functional  Assessment    Outcome Measure Options AM-PAC 6 Clicks Daily Activity (OT)  -ANALI               User Key  (r) = Recorded By, (t) = Taken By, (c) = Cosigned By      Initials Name Provider Type    Priti Valiente OT Occupational Therapist                    Occupational Therapy Education       Title: PT OT SLP Therapies (In Progress)       Topic: Occupational Therapy (In Progress)       Point: ADL training (In Progress)       Description:   Instruct learner(s) on proper safety adaptation and remediation techniques during self care or transfers.   Instruct in proper use of assistive devices.                  Learning Progress Summary             Patient Acceptance, E,D, NR by ANALI at 9/12/2023 1340                         Point: Home exercise program (Not Started)       Description:   Instruct learner(s) on appropriate technique for monitoring, assisting and/or progressing therapeutic exercises/activities.                  Learner Progress:  Not documented in this visit.              Point: Precautions (In Progress)       Description:   Instruct learner(s) on prescribed precautions during self-care and functional transfers.                  Learning Progress Summary             Patient Acceptance, E,D, NR by ANALI at 9/12/2023 1340                         Point: Body mechanics (In Progress)       Description:   Instruct learner(s) on proper positioning and spine alignment during self-care, functional mobility activities and/or exercises.                  Learning Progress Summary             Patient Acceptance, E,D, NR by ANALI at 9/12/2023 1340                                         User Key       Initials Effective Dates Name Provider Type Discipline    ANALI 06/16/21 -  Priti Caro OT Occupational Therapist OT                  OT Recommendation and Plan  Planned Therapy Interventions (OT): activity tolerance training, adaptive equipment training, BADL retraining, functional balance retraining, occupation/activity based  interventions, patient/caregiver education/training, ROM/therapeutic exercise, transfer/mobility retraining, strengthening exercise  Therapy Frequency (OT): daily  Plan of Care Review  Plan of Care Reviewed With: patient  Progress: no change (OT IE)  Outcome Evaluation: OT evaluation completed. Pt presents with decreased I in ADLs, related t/fs, mobility compared to PLOF limited by decreased activity tolerance, impaired balance, muscle weakness at BUEs, skin integrity concerns at BLEs, decreased distal reach impacting LB ADLs and SOA with more dynamic demands (SPO2 > 90% on RA, no significant change in vitals), Pt grossly mod A x 2 for UB and LB mgmt advancing LEs and uprighting trunk into sitting, gross mod A x 2 for initial STS w/ increased time to gain erect posture with improved level of I with in room ADL related mobility with FWW throughout. Cues for posture and AD mgmt for stability and safety. Pt u/a to reach distally d/t complete LB ADLs without AE and limited by body habitus and LE skin integrity deficits. Pt is below occupational baseline performance and would benefit from IPOT POC and SNF at d/c.     Time Calculation:   Evaluation Complexity (OT)  Review Occupational Profile/Medical/Therapy History Complexity: expanded/moderate complexity  Assessment, Occupational Performance/Identification of Deficit Complexity: 3-5 performance deficits  Clinical Decision Making Complexity (OT): detailed assessment/moderate complexity  Overall Complexity of Evaluation (OT): moderate complexity     Time Calculation- OT       Row Name 09/12/23 1514             Time Calculation- OT    OT Start Time 1340  -JY      OT Received On 09/12/23  -JY      OT Goal Re-Cert Due Date 09/22/23  -JY         Timed Charges    71110 - OT Therapeutic Activity Minutes 6  -JY      10893 - OT Self Care/Mgmt Minutes 12  -JY         Untimed Charges    OT Eval/Re-eval Minutes 50  -JY         Total Minutes    Timed Charges Total Minutes 18  -JY       Untimed Charges Total Minutes 50  -JY       Total Minutes 68  -JY                User Key  (r) = Recorded By, (t) = Taken By, (c) = Cosigned By      Initials Name Provider Type    Priti Valiente OT Occupational Therapist                  Therapy Charges for Today       Code Description Service Date Service Provider Modifiers Qty    62689821050 HC OT SELF CARE/MGMT/TRAIN EA 15 MIN 9/12/2023 Priti Caro OT GO 1    [id number]  OT EVAL MOD COMPLEXITY 4 9/12/2023 Priti Caro OT GO 1                 Priti Caro OT  9/12/2023

## 2023-09-12 NOTE — PLAN OF CARE
Goal Outcome Evaluation:  Plan of Care Reviewed With: patient            SLP evaluation completed. Will  plan for [facility] upper [facility] tomorrow 9/13 . Please see note for further details and recommendations.

## 2023-09-12 NOTE — PLAN OF CARE
Goal Outcome Evaluation:  Plan of Care Reviewed With: patient        Progress: no change (OT IE)  Outcome Evaluation: OT evaluation completed. Pt presents with decreased I in ADLs, related t/fs, mobility compared to PLOF limited by decreased activity tolerance, impaired balance, muscle weakness at BUEs, skin integrity concerns at BLEs, decreased distal reach impacting LB ADLs and SOA with more dynamic demands (SPO2 > 90% on RA, no significant change in vitals), Pt grossly mod A x 2 for UB and LB mgmt advancing LEs and uprighting trunk into sitting, gross mod A x 2 for initial STS w/ increased time to gain erect posture with improved level of I with in room ADL related mobility with FWW throughout. Cues for posture and AD mgmt for stability and safety. Pt u/a to reach distally d/t complete LB ADLs without AE and limited by body habitus and LE skin integrity deficits. Pt is below occupational baseline performance and would benefit from IPOT POC and SNF at d/c.      Anticipated Discharge Disposition (OT): skilled nursing facility

## 2023-09-13 ENCOUNTER — APPOINTMENT (OUTPATIENT)
Dept: GENERAL RADIOLOGY | Facility: HOSPITAL | Age: 78
DRG: 299 | End: 2023-09-13
Payer: MEDICARE

## 2023-09-13 LAB
ANION GAP SERPL CALCULATED.3IONS-SCNC: 8 MMOL/L (ref 5–15)
BUN SERPL-MCNC: 27 MG/DL (ref 8–23)
BUN/CREAT SERPL: 22 (ref 7–25)
CALCIUM SPEC-SCNC: 9.1 MG/DL (ref 8.6–10.5)
CHLORIDE SERPL-SCNC: 105 MMOL/L (ref 98–107)
CO2 SERPL-SCNC: 27 MMOL/L (ref 22–29)
CREAT SERPL-MCNC: 1.23 MG/DL (ref 0.76–1.27)
DEPRECATED RDW RBC AUTO: 53.5 FL (ref 37–54)
EGFRCR SERPLBLD CKD-EPI 2021: 60.8 ML/MIN/1.73
ERYTHROCYTE [DISTWIDTH] IN BLOOD BY AUTOMATED COUNT: 15 % (ref 12.3–15.4)
GLUCOSE SERPL-MCNC: 101 MG/DL (ref 65–99)
HCT VFR BLD AUTO: 38.6 % (ref 37.5–51)
HGB BLD-MCNC: 12.4 G/DL (ref 13–17.7)
MCH RBC QN AUTO: 31 PG (ref 26.6–33)
MCHC RBC AUTO-ENTMCNC: 32.1 G/DL (ref 31.5–35.7)
MCV RBC AUTO: 96.5 FL (ref 79–97)
PLATELET # BLD AUTO: 213 10*3/MM3 (ref 140–450)
PMV BLD AUTO: 9 FL (ref 6–12)
POTASSIUM SERPL-SCNC: 4.2 MMOL/L (ref 3.5–5.2)
RBC # BLD AUTO: 4 10*6/MM3 (ref 4.14–5.8)
SODIUM SERPL-SCNC: 140 MMOL/L (ref 136–145)
WBC NRBC COR # BLD: 4.76 10*3/MM3 (ref 3.4–10.8)

## 2023-09-13 PROCEDURE — 74230 X-RAY XM SWLNG FUNCJ C+: CPT

## 2023-09-13 PROCEDURE — 97110 THERAPEUTIC EXERCISES: CPT

## 2023-09-13 PROCEDURE — 80048 BASIC METABOLIC PNL TOTAL CA: CPT | Performed by: INTERNAL MEDICINE

## 2023-09-13 PROCEDURE — 85027 COMPLETE CBC AUTOMATED: CPT | Performed by: INTERNAL MEDICINE

## 2023-09-13 PROCEDURE — 97116 GAIT TRAINING THERAPY: CPT

## 2023-09-13 PROCEDURE — 99231 SBSQ HOSP IP/OBS SF/LOW 25: CPT | Performed by: STUDENT IN AN ORGANIZED HEALTH CARE EDUCATION/TRAINING PROGRAM

## 2023-09-13 PROCEDURE — 74240 X-RAY XM UPR GI TRC 1CNTRST: CPT

## 2023-09-13 PROCEDURE — 25010000002 CEFTRIAXONE PER 250 MG: Performed by: NURSE PRACTITIONER

## 2023-09-13 PROCEDURE — 92611 MOTION FLUOROSCOPY/SWALLOW: CPT

## 2023-09-13 RX ADMIN — CARBIDOPA AND LEVODOPA 2 TABLET: 25; 100 TABLET ORAL at 18:24

## 2023-09-13 RX ADMIN — CARBIDOPA AND LEVODOPA 2 TABLET: 25; 100 TABLET ORAL at 11:29

## 2023-09-13 RX ADMIN — FUROSEMIDE 20 MG: 20 TABLET ORAL at 08:31

## 2023-09-13 RX ADMIN — Medication 5000 UNITS: at 08:31

## 2023-09-13 RX ADMIN — CARBIDOPA AND LEVODOPA 2 TABLET: 25; 100 TABLET ORAL at 08:31

## 2023-09-13 RX ADMIN — OXYCODONE HYDROCHLORIDE AND ACETAMINOPHEN 500 MG: 500 TABLET ORAL at 08:30

## 2023-09-13 RX ADMIN — CETIRIZINE HYDROCHLORIDE TABLETS 10 MG: 10 TABLET, FILM COATED ORAL at 08:31

## 2023-09-13 RX ADMIN — Medication 10 ML: at 19:21

## 2023-09-13 RX ADMIN — DOXYCYCLINE 100 MG: 100 INJECTION, POWDER, LYOPHILIZED, FOR SOLUTION INTRAVENOUS at 08:29

## 2023-09-13 RX ADMIN — QUETIAPINE FUMARATE 12.5 MG: 25 TABLET ORAL at 19:19

## 2023-09-13 RX ADMIN — DOXYCYCLINE 100 MG: 100 INJECTION, POWDER, LYOPHILIZED, FOR SOLUTION INTRAVENOUS at 19:20

## 2023-09-13 RX ADMIN — BARIUM SULFATE 100 ML: 0.81 POWDER, FOR SUSPENSION ORAL at 15:01

## 2023-09-13 RX ADMIN — CARBIDOPA AND LEVODOPA 1 TABLET: 25; 100 TABLET, EXTENDED RELEASE ORAL at 08:32

## 2023-09-13 RX ADMIN — PRAMIPEXOLE DIHYDROCHLORIDE 1 MG: 1 TABLET ORAL at 16:45

## 2023-09-13 RX ADMIN — LEVOTHYROXINE SODIUM 88 MCG: 0.09 TABLET ORAL at 08:33

## 2023-09-13 RX ADMIN — PRAMIPEXOLE DIHYDROCHLORIDE 1 MG: 1 TABLET ORAL at 08:30

## 2023-09-13 RX ADMIN — BARIUM SULFATE 100 ML: 0.81 POWDER, FOR SUSPENSION ORAL at 15:02

## 2023-09-13 RX ADMIN — APIXABAN 10 MG: 5 TABLET, FILM COATED ORAL at 08:33

## 2023-09-13 RX ADMIN — CARBIDOPA AND LEVODOPA 2 TABLET: 25; 100 TABLET ORAL at 21:20

## 2023-09-13 RX ADMIN — SODIUM CHLORIDE 2000 MG: 900 INJECTION INTRAVENOUS at 19:20

## 2023-09-13 RX ADMIN — APIXABAN 10 MG: 5 TABLET, FILM COATED ORAL at 19:19

## 2023-09-13 RX ADMIN — ATORVASTATIN CALCIUM 40 MG: 40 TABLET, FILM COATED ORAL at 08:32

## 2023-09-13 RX ADMIN — POTASSIUM CHLORIDE 20 MEQ: 1500 TABLET, EXTENDED RELEASE ORAL at 08:31

## 2023-09-13 RX ADMIN — SENNOSIDES AND DOCUSATE SODIUM 2 TABLET: 8.6; 5 TABLET ORAL at 08:30

## 2023-09-13 RX ADMIN — Medication 10 ML: at 08:38

## 2023-09-13 RX ADMIN — CARBIDOPA AND LEVODOPA 1 TABLET: 25; 100 TABLET, EXTENDED RELEASE ORAL at 16:45

## 2023-09-13 RX ADMIN — TAMSULOSIN HYDROCHLORIDE 0.4 MG: 0.4 CAPSULE ORAL at 08:31

## 2023-09-13 RX ADMIN — SENNOSIDES AND DOCUSATE SODIUM 2 TABLET: 8.6; 5 TABLET ORAL at 19:19

## 2023-09-13 RX ADMIN — PYRIDOXINE HCL TAB 50 MG 25 MG: 50 TAB at 08:32

## 2023-09-13 RX ADMIN — FLUTICASONE PROPIONATE 2 SPRAY: 50 SPRAY, METERED NASAL at 08:38

## 2023-09-13 RX ADMIN — PRAMIPEXOLE DIHYDROCHLORIDE 1 MG: 1 TABLET ORAL at 19:19

## 2023-09-13 RX ADMIN — BARIUM SULFATE 20 ML: 400 PASTE ORAL at 15:02

## 2023-09-13 NOTE — PROGRESS NOTES
Patient is on Apixaban.  Education provided on 9/13/23 verbally and in writing.  Information provided includes effects of medication, drug-drug and drug-food interactions, and signs/symptoms of bleeding and clotting.  Patient verbalized understanding through teach back.  All pertinent questions were answered.     Rahul LovelaceD, BCPS  9/13/2023  13:04 EDT

## 2023-09-13 NOTE — PLAN OF CARE
Goal Outcome Evaluation:  Plan of Care Reviewed With: patient            SLP evaluation with MBS + esophageal screen completed. Swallow WFL, no aspiration or significant residue. All barium media emptied through esophagus and into stomach without issue. No further SLP needs. Will sign-off. Please see note for further details and recommendations.

## 2023-09-13 NOTE — NURSING NOTE
WOC consult for bilateral lower extremity weeping.    Patient currently off the floor receiving swallow test.    Spoke with nurse who states that there are a few scabbed areas, few open areas and one area that is blistered.  Notes patient is still weeping but it is improved.  Encouraged cleansing and application of multilayer Xeroform and silicone foam dressing to open or weeping areas.  Encouraged cleaning other areas and leaving open to air if they are scabbed over.    Will follow.

## 2023-09-13 NOTE — THERAPY TREATMENT NOTE
Patient Name: Cordell Martin  : 1945    MRN: [id number]                              Today's Date: 2023       Admit Date: 2023    Visit Dx:     ICD-10-CM ICD-9-CM   1. Bilateral lower extremity edema  R60.0 782.3   2. Cellulitis of right lower extremity  L03.115 682.6   3. Subacute cough  R05.2 786.2   4. Abnormal x-ray  R93.89 793.99   5. Acute deep vein thrombosis (DVT) of proximal vein of right lower extremity  I82.4Y1 453.41   6. Pulmonary nodule  R91.1 793.11   7. Dysphagia, unspecified type  R13.10 787.20     Patient Active Problem List   Diagnosis    Anxiety    Arthritis    Depression    Hyperlipidemia    Lumbar stenosis with neurogenic claudication    Parkinson's disease    Lumbar stenosis with neurogenic claudication status post L4-5 decompression    Status post lumbar laminectomy    Memory loss    Chronic edema    Cellulitis of right lower extremity    Exertional dyspnea    Right knee pain    Cellulitis of lower extremity    Hypothyroidism (acquired)    Elevated serum creatinine    Cellulitis of right leg    Acute deep vein thrombosis (DVT) of right lower extremity    Pneumonia    Pulmonary nodule     Past Medical History:   Diagnosis Date    Anxiety     Arthritis     Back pain     Bronchitis     Cognitive impairment     Depression     Disease of thyroid gland     Glucose intolerance (impaired glucose tolerance)     Hyperlipidemia     Kidney stone     Obesity     Parkinson disease     Pneumonia     Prostate disorder     Thyroid disease     Wears eyeglasses      Past Surgical History:   Procedure Laterality Date    COLONOSCOPY      5 years ago    EYE SURGERY      HERNIA REPAIR  10/20/2020    KNEE SURGERY      LUMBAR LAMINECTOMY DISCECTOMY DECOMPRESSION N/A 2017    Procedure: LUMBAR LAMINECTOMY L4-5;  Surgeon: Antonio Trent MD;  Location: [facility];  Service:     SHOULDER ROTATOR CUFF REPAIR Right     x2    TONSILLECTOMY      VEIN SURGERY        General Information       Row  Name 09/13/23 1537          Physical Therapy Time and Intention    Document Type therapy note (daily note)  -     Mode of Treatment physical therapy  -       Row Name 09/13/23 1537          General Information    Patient Profile Reviewed yes  -     Existing Precautions/Restrictions fall;other (see comments)  Parkinson's Disease, R UE tremors  -     Barriers to Rehab medically complex;previous functional deficit  -       Row Name 09/13/23 1537          Cognition    Orientation Status (Cognition) oriented x 3  -       Row Name 09/13/23 1537          Safety Issues, Functional Mobility    Safety Issues Affecting Function (Mobility) awareness of need for assistance;insight into deficits/self-awareness;judgment;positioning of assistive device;problem-solving;safety precaution awareness;safety precautions follow-through/compliance;sequencing abilities  -     Impairments Affecting Function (Mobility) balance;endurance/activity tolerance;postural/trunk control;sensation/sensory awareness;shortness of breath;strength;coordination  -               User Key  (r) = Recorded By, (t) = Taken By, (c) = Cosigned By      Initials Name Provider Type     Mariana Huntley, JARAD Physical Therapist                   Mobility       Row Name 09/13/23 1544          Bed Mobility    Comment, (Bed Mobility) up in chair  -       Row Name 09/13/23 1544          Sit-Stand Transfer    Sit-Stand [place] (Transfers) 2 person assist;verbal cues;minimum assist (75% patient effort)  -     Assistive Device (Sit-Stand Transfers) walker, front-wheeled  -     Comment, (Sit-Stand Transfer) VC for hand placement, appropriate alignment, lowering with eccentric control  -       Row Name 09/13/23 1548          Gait/Stairs (Locomotion)    [place] Level (Gait) minimum assist (75% patient effort);2 person assist;verbal cues;nonverbal cues (demo/gesture)  -     Assistive Device (Gait) walker, front-wheeled  -     Distance in Feet  (Gait) 100  -     Deviations/Abnormal Patterns (Gait) base of support, wide;gait speed decreased;stride length decreased;bilateral deviations;[place] decreased  -     Bilateral Gait Deviations forward flexed posture;heel strike decreased  -     Comment, (Gait/Stairs) Pt. ambulated with a step through gait pattern at a decreased speed. VC for upright posture, decreased WB through BUE support, heel toe gait pattern. Activity limited by fatigue.  -               User Key  (r) = Recorded By, (t) = Taken By, (c) = Cosigned By      Initials Name Provider Type     Mariana Huntley PT Physical Therapist                   Obj/Interventions       Row Name 09/13/23 1549          Motor Skills    Therapeutic Exercise hip;knee;ankle  -       Row Name 09/13/23 1549          Hip (Therapeutic Exercise)    Hip (Therapeutic Exercise) isometric exercises  -     Hip Isometrics (Therapeutic Exercise) bilateral;gluteal sets;10 repetitions  -       Row Name 09/13/23 1549          Knee (Therapeutic Exercise)    Knee (Therapeutic Exercise) isometric exercises;strengthening exercise  -     Knee Isometrics (Therapeutic Exercise) bilateral;quad sets;10 repetitions  -     Knee Strengthening (Therapeutic Exercise) bilateral;heel slides;marching while seated;SLR (straight leg raise);LAQ (long arc quad);10 repetitions  -       Row Name 09/13/23 1549          Ankle (Therapeutic Exercise)    Ankle (Therapeutic Exercise) AROM (active range of motion)  -     Ankle AROM (Therapeutic Exercise) bilateral;dorsiflexion;plantarflexion;10 repetitions  -       Row Name 09/13/23 1549          Balance    Balance Assessment sitting static balance;sitting dynamic balance;sit to stand dynamic balance;standing static balance;standing dynamic balance  -     Static Sitting Balance standby assist  -     Dynamic Sitting Balance contact guard  -     Position, Sitting Balance unsupported;sitting edge of bed  -     Sit to Stand Dynamic  Balance minimal assist;2-person assist  -     Static Standing Balance contact guard  -SS     Dynamic Standing Balance minimal assist  -     Position/Device Used, Standing Balance supported;walker, rolling  -     Balance Interventions sitting;standing;sit to stand;supported;static;dynamic  -               User Key  (r) = Recorded By, (t) = Taken By, (c) = Cosigned By      Initials Name Provider Type     Mariana Huntley, PT Physical Therapist                   Goals/Plan    No documentation.                  Clinical Impression       [facility] Name 09/13/23 1551          Pain    Pretreatment Pain Rating 0/10 - no pain  -     Posttreatment Pain Rating 0/10 - no pain  -     Pain Intervention(s) Repositioned;Ambulation/increased activity;Elevated  -     Additional Documentation Pain Scale: Numbers Pre/Post-Treatment (Group)  -SS       Row Name 09/13/23 9121          Plan of Care Review    Plan of Care Reviewed With patient  -     Progress improving  -     Outcome Evaluation Pt. continues to present below baseline function w/generalized weakness, balance deficits and decreased functional endurance. Pt. performed transfers, ambulated 100' w/front wheeled walker, min assist of 2. Activity limited by fatigue. Pt. tolerated ther-ex well. Continue IPPT to progress as tolerated.  -[facility] Name 09/13/23 1551          Therapy Assessment/Plan (PT)    Rehab Potential (PT) good, to achieve stated therapy goals  -     Criteria for Skilled Interventions Met (PT) yes;meets criteria;skilled treatment is necessary  -     Therapy Frequency (PT) daily  -       Row Name 09/13/23 2931          Vital Signs    Pre Systolic BP Rehab 100  -SS     Pre Treatment Diastolic BP 62  -SS     Post Systolic BP Rehab 144  -SS     Post Treatment Diastolic BP 55  -SS     Pretreatment Heart Rate (beats/min) 71  -SS     Posttreatment Heart Rate (beats/min) 79  -SS     Post SpO2 (%) 98  -SS     O2 Delivery Post Treatment room air  -      Pre Patient Position Sitting  -     Post Patient Position Sitting  -       Row Name 09/13/23 1551          Positioning and Restraints    Pre-Treatment Position sitting in chair/recliner  -SS     Post Treatment Position chair  -SS     In Chair notified nsg;reclined;call light within reach;encouraged to call for assist;exit alarm on;with other staff;waffle cushion;legs elevated  -               User Key  (r) = Recorded By, (t) = Taken By, (c) = Cosigned By      Initials Name Provider Type     Mariana Huntley PT Physical Therapist                   Outcome Measures       Row Name 09/13/23 1558 09/13/23 0830       How much help from another person do you currently need...    Turning from your back to your side while in flat bed without using bedrails? 3  -SS 3  -RC    Moving from lying on back to sitting on the side of a flat bed without bedrails? 2  -SS 2  -RC    Moving to and from a bed to a chair (including a wheelchair)? 3  -SS 3  -RC    Standing up from a chair using your arms (e.g., wheelchair, bedside chair)? 3  -SS 3  -RC    Climbing 3-5 steps with a railing? 2  -SS 2  -RC    To walk in hospital room? 3  -SS 3  -RC    AM-PAC 6 Clicks Score (PT) 16  - 16  -RC    Highest level of mobility 5 --> Static standing  - 5 --> Static standing  -RC      Row Name 09/13/23 1558          Functional Assessment    Outcome Measure Options AM-PAC 6 Clicks Basic Mobility (PT)  -               User Key  (r) = Recorded By, (t) = Taken By, (c) = Cosigned By      Initials Name Provider Type    Mariana Sinha PT Physical Therapist    Suzie Kat, RN Registered Nurse                                 Physical Therapy Education       Title: PT OT SLP Therapies (In Progress)       Topic: Physical Therapy (Done)       Point: Mobility training (Done)       Learning Progress Summary             Patient Ella, E, CRISTIAN,LATESHA,NR by  at 9/13/2023 4015    Comment: Reviewed safety/technique w/transfers, ambulation, HEP, PT  POC    Acceptance, E,TB, VU by WB at 9/12/2023 1533                         Point: Home exercise program (Done)       Learning Progress Summary             Patient Eager, E, VU,DU,NR by  at 9/13/2023 1559    Comment: Reviewed safety/technique w/transfers, ambulation, HEP, PT POC                         Point: Body mechanics (Done)       Learning Progress Summary             Patient Eager, E, VU,DU,NR by  at 9/13/2023 1559    Comment: Reviewed safety/technique w/transfers, ambulation, HEP, PT POC    Acceptance, E,TB, VU by WB at 9/12/2023 1533                         Point: Precautions (Done)       Learning Progress Summary             Patient Eager, E, VU,DU,NR by  at 9/13/2023 1559    Comment: Reviewed safety/technique w/transfers, ambulation, HEP, PT POC    Acceptance, E,TB, VU by WB at 9/12/2023 1533                                         User Key       Initials Effective Dates Name Provider Type Discipline     06/01/21 -  Mariana Huntley, PT Physical Therapist PT     07/20/23 -  Miguel Joshua PT Student PT Student PT                  PT Recommendation and Plan     Plan of Care Reviewed With: patient  Progress: improving  Outcome Evaluation: Pt. continues to present below baseline function w/generalized weakness, balance deficits and decreased functional endurance. Pt. performed transfers, ambulated 100' w/front wheeled walker, min assist of 2. Activity limited by fatigue. Pt. tolerated ther-ex well. Continue IPPT to progress as tolerated.     Time Calculation:         PT Charges       Row Name 09/13/23 1559             Time Calculation    Start Time 1345  -SS      PT Received On 09/13/23  -SS         Timed Charges    35045 - PT Therapeutic Exercise Minutes 15  -SS      98528 - Gait Training Minutes  15  -SS         Total Minutes    Timed Charges Total Minutes 30  -SS       Total Minutes 30  -SS                User Key  (r) = Recorded By, (t) = Taken By, (c) = Cosigned By      Initials Name Provider  Type    SS Mariana Huntley, PT Physical Therapist                  Therapy Charges for Today       Code Description Service Date Service Provider Modifiers Qty    21044120415 HC PT THER PROC EA 15 MIN 9/13/2023 Mariana Huntley, PT GP 1    61455103266 HC GAIT TRAINING EA 15 MIN 9/13/2023 Mariana Huntley, PT GP 1    32961200709 HC PT THER SUPP EA 15 MIN 9/13/2023 Mariana Huntley, PT GP 2            PT G-Codes  Outcome Measure Options: AM-PAC 6 Clicks Basic Mobility (PT)  AM-PAC 6 Clicks Score (PT): 16  AM-PAC 6 Clicks Score (OT): 13  PT Discharge Summary  Anticipated Discharge Disposition (PT): skilled nursing facility    Mariana Huntley PT  9/13/2023

## 2023-09-13 NOTE — PROGRESS NOTES
[facility]  PROGRESS NOTE    Patient Name: Cordell Martin  : 1945  MRN: [id number]    Date of Admission: 2023  Primary Care Physician: Ben Holder DO    Subjective   Subjective     CC:  Leg pain     HPI:  Patient reports improvement in lower extremity discomfort.  Nuys fevers, chills, sweats.  Denies any difficulty tolerating current diet.      Objective   Objective     Vital Signs:   Temp:  [97.4 °F (36.3 °C)-98.2 °F (36.8 °C)] 97.5 °F (36.4 °C)  Heart Rate:  [66-81] 72  Resp:  [18-20] 18  BP: (104-124)/(54-68) 104/58     Physical Exam:  General appearance: alert, awake, oriented, no acute distress   Head/Eyes: atraumatic, normocephalic, non-injected conjunctivae  Cardiovascular: RRR, no murmurs or rubs, radial pulse full 2/4 BL   Respiratory: CTAB, no crackles or wheezes   Abdomen: soft, non-tender, no organomegaly, bowel sounds normoactive   Musculoskeletal: moves all 4 extremities  Neuro/CNS: alert and oriented x3, resting tremor   Skin: RLE erythema and edema     Results Reviewed:  LAB RESULTS:      Lab 23  04023  0542 23  2105 23  1616 23  1600   WBC 4.76 5.40  --  6.67  --    HEMOGLOBIN 12.4* 11.7*  --  12.2*  --    HEMATOCRIT 38.6 36.9*  --  38.9  --    PLATELETS 213 209  --  206  --    NEUTROS ABS  --  2.70  --  3.93  --    IMMATURE GRANS (ABS)  --  0.01  --  0.01  --    LYMPHS ABS  --  1.98  --  1.92  --    MONOS ABS  --  0.54  --  0.63  --    EOS ABS  --  0.15  --  0.16  --    MCV 96.5 96.6  --  99.0*  --    PROCALCITONIN  --   --   --   --  0.08   LACTATE  --   --  1.3  --   --          Lab 23  0402 23  1118 23  1600   SODIUM 140 144 144   POTASSIUM 4.2 3.9 4.3   CHLORIDE 105 106 106   CO2 27.0 29.0 28.0   ANION GAP 8.0 9.0 10.0   BUN 27* 28* 33*   CREATININE 1.23 1.29* 1.32*   EGFR 60.8 57.5* 55.9*   GLUCOSE 101* 143* 108*   CALCIUM 9.1 8.9 9.5         Lab 23  1600   TOTAL  PROTEIN 6.9   ALBUMIN 3.7   GLOBULIN 3.2   ALT (SGPT) 5   AST (SGOT) 13   BILIRUBIN 0.4   ALK PHOS 123*         Lab 09/11/23  1600   PROBNP 53.9         Lab 09/12/23  1118   CHOLESTEROL 102   LDL CHOL 48   HDL CHOL 39*   TRIGLYCERIDES 74             Brief Urine Lab Results  (Last result in the past 365 days)        Color   Clarity   Blood   Leuk Est   Nitrite   Protein   CREAT   Urine HCG        09/12/23 0131             54.2                 Microbiology Results Abnormal       Procedure Component Value - Date/Time    Blood Culture - Blood, Hand, Right [[id number]]  (Normal) Collected: 09/11/23 2105    Lab Status: Preliminary result Specimen: Blood from Hand, Right Updated: 09/12/23 2145     Blood Culture No growth at 24 hours    Blood Culture - Blood, Arm, Left [[id number]]  (Normal) Collected: 09/11/23 2118    Lab Status: Preliminary result Specimen: Blood from Arm, Left Updated: 09/12/23 2145     Blood Culture No growth at 24 hours    S. Pneumo Ag Urine or CSF - Urine, Urine, Clean Catch [[id number]]  (Normal) Collected: 09/12/23 0131    Lab Status: Final result Specimen: Urine, Clean Catch Updated: 09/12/23 0951     Strep Pneumo Ag Negative    Legionella Antigen, Urine - Urine, Urine, Clean Catch [[id number]]  (Normal) Collected: 09/12/23 0131    Lab Status: Final result Specimen: Urine, Clean Catch Updated: 09/12/23 0951     LEGIONELLA ANTIGEN, URINE Negative    Respiratory Panel PCR w/COVID-19(SARS-CoV-2) KENJI/ASH/SHIRLEY/PAD/COR/MAD/CORNELIUS In-House, NP Swab in UTM/VTM, 3-4 HR TAT - Swab, Nasopharynx [[id number]]  (Normal) Collected: 09/11/23 2128    Lab Status: Final result Specimen: Swab from Nasopharynx Updated: 09/11/23 2222     ADENOVIRUS, PCR Not Detected     Coronavirus 229E Not Detected     Coronavirus HKU1 Not Detected     Coronavirus NL63 Not Detected     Coronavirus OC43 Not Detected     COVID19 Not Detected     Human Metapneumovirus Not Detected     Human Rhinovirus/Enterovirus Not Detected     Influenza A PCR  Not Detected     Influenza B PCR Not Detected     Parainfluenza Virus 1 Not Detected     Parainfluenza Virus 2 Not Detected     Parainfluenza Virus 3 Not Detected     Parainfluenza Virus 4 Not Detected     RSV, PCR Not Detected     Bordetella pertussis pcr Not Detected     Bordetella parapertussis PCR Not Detected     Chlamydophila pneumoniae PCR Not Detected     Mycoplasma pneumo by PCR Not Detected    Narrative:      In the setting of a positive respiratory panel with a viral infection PLUS a negative procalcitonin without other underlying concern for bacterial infection, consider observing off antibiotics or discontinuation of antibiotics and continue supportive care. If the respiratory panel is positive for atypical bacterial infection (Bordetella pertussis, Chlamydophila pneumoniae, or Mycoplasma pneumoniae), consider antibiotic de-escalation to target atypical bacterial infection.            XR Chest 1 View    Result Date: 9/11/2023  XR CHEST 1 VW Date of Exam: 9/11/2023 4:26 PM EDT Indication: cough Comparison: 6/17/2023 and prior Findings: Study is limited by overlying support and monitoring apparatus. The heart and mediastinal contours are within normal limits. Mild increase groundglass opacity noted in the periphery of the left mid to upper lung zone and at the left lung base. Osseous structures are unremarkable     Impression: Impression: Mild increase groundglass opacity in the periphery of the left upper lung zone and left lung base. Findings are accentuated by low lung volumes and are concerning for pneumonia. Electronically Signed: Tomi Jiménez MD  9/11/2023 4:47 PM EDT  Workstation ID: [id number]    Duplex Venous Lower Extremity - Bilateral CAR    Result Date: 9/12/2023    Sub-acute right lower extremity deep vein thrombosis noted in the peroneal.   Sub-acute left lower extremity superficial thrombophlebitis noted in the great saphenous (above knee).   All other veins appeared normal bilaterally.      CT Angiogram Chest    Result Date: 9/11/2023  CT ANGIOGRAM CHEST Date of Exam: 9/11/2023 9:17 PM CDT Indication: hypoxia, ? pna, productive cough, r/o DVT. Comparison: None available. Technique: CTA of the chest was performed after the uneventful intravenous administration of 80 cc Isovue-370. Reconstructed coronal and sagittal images were also obtained. In addition, a 3-D volume rendered image was created for interpretation. Automated exposure control and iterative reconstruction methods were used. Findings: PULMONARY VASCULATURE: Pulmonary arteries are widely patent without evidence of embolus.Main pulmonary artery is normal in size. No evidence of right heart strain. MEDIASTINUM:Unremarkable. Aortic and heart size are normal. No aortic dissection identified. No mass nor pericardial effusion. CORONARY ARTERIES: Moderate to severe calcified atherosclerotic disease. LUNGS: There is 4 mm subpleural right middle lobe nodule (image 60, series 5). No other nodule identified. There is mild reticular and linear subpleural interstitial scarring bilaterally. PLEURAL SPACE: No effusion, mass, nor pneumothorax. LYMPH NODES: There are no pathologically enlarged lymph nodes. UPPER ABDOMEN: Unremarkable OSSEOUS STRUCTURES: Appropriate for age with no acute process identified.     Impression: Impression: 1.No evidence of pulmonary embolism nor other acute process. 2.There is a 4 mm subpleural right middle lobe nodule. See below recommendations. *The Fleischner society pulmonary nodule recommendations are for the follow-up and management of pulmonary nodules smaller than 8 mm detected incidentally in patients >35 years on non-screening CT. The initial guidelines for the management of solid nodules were released in 2005 1, and guidelines for the management of subsolid nodules were released in 2013 2. New revised 2017 recommendations for both solid and subsolid have since been released 4. 2017 guidelines Solid nodules Solitary  "nodule size: <6 mm *low risk patients: no follow-up needed *high risk patients: optional CT at 12 months Solitary nodule size: 6-8 mm *low risk patients: follow-up at 6-12 months, then consider further follow-up at 18-24 months *high risk patients: initial follow-up CT at 6-12 months and then at 18-24 months if no change Solitary nodule size: >8 mm *either low or high risk patients *consider follow-up CT at 3 months, and/or CT-PET, and/or biopsy Multiple nodules size: <6 mm *low risk patients: no routine follow-up *high risk patients: optional CT at 12 months Multiple nodules size: 6-8 mm *low risk patients: follow-up at 3-6 months, then consider further follow-up at 18-24 months *high risk patients: follow-up at 3-6 months, then at 18-24 months if no change Multiple nodules size: >8 mm *low risk patients: follow-up at 3-6 months, then consider further follow-up at 18-24 months *high risk patients: follow-up at 3-6 months, then at 18-24 months if no change * Note: newly detected indeterminate nodule in persons 35 years of age or older. *low risk patients: minimal or absent history of smoking and or other known risk factors *high risk patients: history of smoking or of other known risk factors (e.g. first degree relative with lung cancer, or exposure to asbestos, radon, uranium) *if a nodule up to 8 mm is partly solid or is ground glass further follow-up is required after 24 months to exclude possible slow growing adenocarcinoma (IKE) Subsolid nodules Solitary pure ground-glass nodule *nodule size <6mm *no CT follow-up required *nodule size \"e6mm *follow up CT at 6-12 months, then every 2 years until 5 years Solitary part-solid nodule *nodule size <6mm *no CT follow-up required *nodule size \"e6mm *follow-up CT at 3-6 months *if unchanged, and solid component remains <6mm, then annual follow-up for 5 years Multiple subsolid nodules *nodule size <6mm *follow-up CT at 3-6 months *consider further follow-up at 2 and 4 " "years if stable *nodule size \"e6mm *follow-up CT at 3-6 months *subsequent management based on the most suspicious nodule(s) Electronically Signed: Jonas Evans MD  9/11/2023 9:27 PM CDT  Workstation ID: [id number]     Results for orders placed during the hospital encounter of 01/12/21    Adult Transthoracic Echo Complete W/ Cont if Necessary Per Protocol    Interpretation Summary  · The right ventricle and right atrium are moderately dilated. Other chamber sizes and wall thicknesses are normal.  · Global and segmental LV wall motion is normal. The estimated left ventricular ejection fraction is 51% - 55%.  · The aortic and mitral valves are normal in structure and function.  · The estimated RV systolic pressure is mildly elevated 35 mmHg - 40 mmHg. There is as well noted to be less than 50% inspiratory IVC collapse. The main pulmonary artery is \"borderline dilated\".  · There are no other important findings on this study.      Current medications:  Scheduled Meds:apixaban, 10 mg, Oral, Q12H   Followed by  [START ON 9/19/2023] apixaban, 5 mg, Oral, Q12H  vitamin C, 500 mg, Oral, Daily  atorvastatin, 40 mg, Oral, Daily  carbidopa-levodopa, 2 tablet, Oral, 4x Daily  carbidopa-levodopa ER, 1 tablet, Oral, BID  cefTRIAXone, 2,000 mg, Intravenous, Q24H  cetirizine, 10 mg, Oral, Daily  doxycycline, 100 mg, Intravenous, Q12H  fluticasone, 2 spray, Each Nare, Daily  furosemide, 20 mg, Oral, Daily  levothyroxine, 88 mcg, Oral, Daily  potassium chloride, 20 mEq, Oral, Daily  pramipexole, 1 mg, Oral, TID  QUEtiapine, 12.5 mg, Oral, Nightly  senna-docusate sodium, 2 tablet, Oral, BID  sodium chloride, 10 mL, Intravenous, Q12H  tamsulosin, 0.4 mg, Oral, Daily  vitamin B-6, 25 mg, Oral, Daily  vitamin D3, 5,000 Units, Oral, Daily      Continuous Infusions:   PRN Meds:.  acetaminophen    senna-docusate sodium **AND** polyethylene glycol **AND** bisacodyl **AND** bisacodyl    ipratropium-albuterol    nitroglycerin    sodium " chloride    sodium chloride    sodium chloride    Assessment & Plan   Assessment & Plan     Active Hospital Problems    Diagnosis  POA    **Acute deep vein thrombosis (DVT) of right lower extremity [I82.401]  Yes    Pulmonary nodule [R91.1]  Unknown    Pneumonia [J18.9]  Unknown    Hypothyroidism (acquired) [E03.9]  Yes    Elevated serum creatinine [R79.89]  Yes    Cellulitis of right lower extremity [L03.115]  Yes    Parkinson's disease [G20]  Yes    Anxiety [F41.9]  Yes    Depression [F32.A]  Yes    Hyperlipidemia [E78.5]  Yes      Resolved Hospital Problems   No resolved problems to display.        Brief Hospital Course to date:  Cordell Martin is a 78 y.o. male with a history of Parkinson disease followed by Dr. Ortiz, hypothyroidism, hyperlipidemia, anxiety, depression, was transferred from [facility] with complaints of lower extremity swelling and redness. Duplex with sub-acute right lower DVT in the peroneal, and L LE superficial thrombophlebitis noted in the great saphenous above the knee.  CT chest with no evidence of PE did show small 4 mm subpleural right middle lobe nodule.  Patient was started on IV antibiotics for cellulitis and admitted to medicine.      Assessment and plan:     Acute RLE DVT  Subacute L Superficial Thrombophlebitis   Cellulitis RLE  -- Improving   -- Continue Eliquis 10mg BID (through 9/19), then 5mg BID   -- BC x2 NGTD  -- Continue Rocephin and Doxy  -- Pain control  -- Right knee replacement by one year ago complicated by infection requiring wound VAC.     Suspected Pneumonia vs Aspiration Pneumonitis   -- Chest x-ray shows mild increased groundglass opacity in the periphery of the left upper lung zone and left lung base concerning for pneumonia  -CT chest with no acute findings  -Clinically with increased cough over the last week  -On Rocephin Doxy per above  -Follow-up micro  -speech consult; [facility] today      Pulmonary nodule  --CT showing pulm  nodule, needs f/u with pulm nodule clinic     Elevated serum creatinine  -- Downtrending after IVF resuscitation; continue to monitor      Hyperlipidemia  -- Continue Lipitor     Parkinson's disease  -- Follows with Dr. Ortiz  -- Continue carbidopa-levodopa  -- PT/OT consult  -- Fall precautions     RLS  -- Continue Mirapex     Hypothyroidism  -- Continue levothyroxine     Anxiety/depression  -- Seroquel nightly    Expected Discharge Location and Transportation: [facility]  Expected Discharge   Expected Discharge Date: 9/15/2023; Expected Discharge Time:      DVT prophylaxis:  Medical DVT prophylaxis orders are present. ; Eliquis     -PAC 6 Clicks Score (PT): 16 (09/13/23 7630)    CODE STATUS:   Code Status and Medical Interventions:   Ordered at: 09/11/23 0979     Level Of Support Discussed With:    Patient     Code Status (Patient has no pulse and is not breathing):    CPR (Attempt to Resuscitate)     Medical Interventions (Patient has pulse or is breathing):    Full Support       Bhavesh Cabrales, DO  09/13/23

## 2023-09-13 NOTE — PLAN OF CARE
Goal Outcome Evaluation:            Patient on room air.  NSR.  No acute changes overnight.  Did not sleep much.

## 2023-09-13 NOTE — MBS/VFSS/FEES
Acute Care - Speech Language Pathology   Swallow Initial Evaluation  [place]  Modified Barium Swallow Study (MBS)     Patient Name: Cordell Martin  : 1945  MRN: [id number]  Today's Date: 2023               Admit Date: 2023    Visit Dx:     ICD-10-CM ICD-9-CM   1. Bilateral lower extremity edema  R60.0 782.3   2. Cellulitis of right lower extremity  L03.115 682.6   3. Subacute cough  R05.2 786.2   4. Abnormal x-ray  R93.89 793.99   5. Acute deep vein thrombosis (DVT) of proximal vein of right lower extremity  I82.4Y1 453.41   6. Pulmonary nodule  R91.1 793.11   7. Dysphagia, unspecified type  R13.10 787.20     Patient Active Problem List   Diagnosis    Anxiety    Arthritis    Depression    Hyperlipidemia    Lumbar stenosis with neurogenic claudication    Parkinson's disease    Lumbar stenosis with neurogenic claudication status post L4-5 decompression    Status post lumbar laminectomy    Memory loss    Chronic edema    Cellulitis of right lower extremity    Exertional dyspnea    Right knee pain    Cellulitis of lower extremity    Hypothyroidism (acquired)    Elevated serum creatinine    Cellulitis of right leg    Acute deep vein thrombosis (DVT) of right lower extremity    Pneumonia    Pulmonary nodule     Past Medical History:   Diagnosis Date    Anxiety     Arthritis     Back pain     Bronchitis     Cognitive impairment     Depression     Disease of thyroid gland     Glucose intolerance (impaired glucose tolerance)     Hyperlipidemia     Kidney stone     Obesity     Parkinson disease     Pneumonia     Prostate disorder     Thyroid disease     Wears eyeglasses      Past Surgical History:   Procedure Laterality Date    COLONOSCOPY      5 years ago    EYE SURGERY      HERNIA REPAIR  10/20/2020    KNEE SURGERY      LUMBAR LAMINECTOMY DISCECTOMY DECOMPRESSION N/A 2017    Procedure: LUMBAR LAMINECTOMY L4-5;  Surgeon: Antonio Trent MD;  Location: [facility];  Service:     SHOULDER ROTATOR  CUFF REPAIR Right     x2    TONSILLECTOMY      VEIN SURGERY         SLP Recommendation and Plan  SLP Swallowing Diagnosis: functional oral phase, functional pharyngeal phase (09/13/23 1450)  SLP Diet Recommendation: regular textures, thin liquids (09/13/23 1450)  Recommended Precautions and Strategies: general aspiration precautions, reflux precautions (09/13/23 1450)  SLP Rec. for Method of Medication Administration: as tolerated (09/13/23 1450)     Monitor for Signs of Aspiration: notify SLP if any concerns (09/13/23 1450)     Swallow Criteria for Skilled Therapeutic Interventions Met: no problems identified which require skilled intervention (09/13/23 1450)  Anticipated Discharge Disposition (SLP): No further SLP services warranted (09/13/23 1450)     Therapy Frequency (Swallow): evaluation only (09/13/23 1450)     Oral Care Recommendations: Oral Care BID/PRN, Toothbrush (09/13/23 1450)                                      Oral Care Recommendations: Oral Care BID/PRN, Toothbrush (09/13/23 1450)    Plan of Care Reviewed With: patient      SWALLOW EVALUATION (last 72 hours)       SLP Adult Swallow Evaluation       Row Name 09/13/23 1450 09/12/23 0925                Rehab Evaluation    Document Type evaluation  -SM evaluation  -MP       Subjective Information no complaints  -SM no complaints  -MP       Patient Observations alert;cooperative  -SM alert;cooperative  -MP       Patient/Family/Caregiver Comments/Observations -- No family present  -MP       Patient Effort -- good  -MP          General Information    Patient Profile Reviewed -- yes  -MP       Pertinent History Of Current Problem -- Adm 9/11 w/ acute RLE DVT, RLE cellulitis, PNA. CXR w/ mild increase groundglass opacity in periphery of ADELINA & L lung base. Hx Parkinson's, hypothyroid, HLD, anxiety/depression. Consulted 2' pt reported coughing while eating.  -MP       Current Method of Nutrition regular textures;thin liquids  -SM regular textures;thin liquids   "-MP       Precautions/Limitations, Vision -- WFL with corrective lenses  -MP       Precautions/Limitations, Hearing -- WFL  -MP       Prior Level of Function-Communication -- WFL  -MP       Prior Level of Function-Swallowing -- no diet consistency restrictions;esophageal concerns;other (see comments)  pt reports hx esophageal dilations  -MP       Plans/Goals Discussed with patient;agreed upon  - patient  -MP       Barriers to Rehab none identified  - none identified  -MP       Patient's Goals for Discharge patient did not state  - patient did not state  -MP          Pain    Additional Documentation -- Pain Scale: FACES Pre/Post-Treatment (Group)  -MP          Pain Scale: Numbers Pre/Post-Treatment    Pretreatment Pain Rating 0/10 - no pain  -SM --       Posttreatment Pain Rating 0/10 - no pain  -SM --          Pain Scale: FACES Pre/Post-Treatment    Pain: FACES Scale, Pretreatment -- 0-->no hurt  -MP       Posttreatment Pain Rating -- 0-->no hurt  -MP          Oral Motor Structure and Function    Dentition Assessment -- natural, present and adequate  -MP       Secretion Management -- WNL/WFL  -MP       Mucosal Quality -- moist, healthy  -MP          Oral Musculature and Cranial Nerve Assessment    Oral Motor General Assessment -- WFL  -MP          General Eating/Swallowing Observations    Respiratory Support Currently in Use -- nasal cannula  -MP       Eating/Swallowing Skills -- self-fed;fed by SLP  -MP       Positioning During Eating -- upright in bed  -       Utensils Used -- spoon;cup;straw  -       Consistencies Trialed -- thin liquids;pureed;regular textures  -          Clinical Swallow Eval    Clinical Swallow Evaluation Summary -- Baseline cough & cough t/o eval. Difficult to determine if ever directly r/t PO. Pt in agrmt to complete MBS tomorrow + ltd upper GI given hx esophageal dilations and reports of feeling \"crunched up\" and pointing to sternum region. OK to continue current diet per MD " discretion until MBS.  -MP          MBS/VFSS    Utensils Used spoon;cup;straw  -SM --       Consistencies Trialed thin liquids;pureed;regular textures  -SM --          MBS/VFSS Interpretation    Oral Prep Phase WFL  -SM --       Oral Transit Phase WFL  -SM --       Oral Residue WFL  -SM --       Oral Phase, Comment Mild prologned though adequate  -SM --          Initiation of Pharyngeal Swallow    Initiation of Pharyngeal Swallow bolus in valleculae  -SM --       Pharyngeal Phase functional pharyngeal phase of swallowing  -SM --          Esophageal Phase    Esophageal Phase see radiology report for further details;other (see comments)  -SM --       Esophageal Phase, Comment all barium media observed to empty into stomach without issue. Pt reports feels the discomfort might be stemming from difficulty feeding self and havig to bend over to reach utensil given R hand tremor. States he's tried weighted utensils in past and not liked. Conveys he will try to use L hand and did agree and wish for RD consult to assist with possible more finger food options.  -SM --          SLP Evaluation Clinical Impression    SLP Swallowing Diagnosis functional oral phase;functional pharyngeal phase  - R/O pharyngeal dysphagia;suspected esophageal dysphagia  -       Functional Impact -- risk of aspiration/pneumonia  -       Rehab Potential/Prognosis, Swallowing -- good, to achieve stated therapy goals  -       Swallow Criteria for Skilled Therapeutic Interventions Met no problems identified which require skilled intervention  - demonstrates skilled criteria  -          Recommendations    Therapy Frequency (Swallow) evaluation only  -SM --       Predicted Duration Therapy Intervention (Days) -- until discharge  -       SLP Diet Recommendation regular textures;thin liquids  -SM other (see comments)  continue current diet per MD discretion until MBS + ltd upper GI 9/13  -       Recommended Diagnostics -- VFSS ([facility]);with  esophageal screen;other (see comments)  9/13  -       Recommended Precautions and Strategies general aspiration precautions;reflux precautions  - general aspiration precautions  -MP       Oral Care Recommendations Oral Care BID/PRN;Toothbrush  - Oral Care BID/PRN  -MP       SLP Rec. for Method of Medication Administration as tolerated  -SM as tolerated  -       Monitor for Signs of Aspiration notify SLP if any concerns  - notify SLP if any concerns  -MP       Anticipated Discharge Disposition (SLP) No further SLP services warranted  - skilled nursing facility  -                 User Key  (r) = Recorded By, (t) = Taken By, (c) = Cosigned By      Initials Name Effective Dates    Barbara Washington MS CCC-SLP 02/03/23 -     MP Bryan Vinson MS CCC-SLP 12/28/21 -                     EDUCATION  The patient has been educated in the following areas:   Dysphagia (Swallowing Impairment) Modified Diet Instruction.              Time Calculation:    Time Calculation- SLP       Row Name 09/13/23 1538             Time Calculation- SLP    SLP Start Time 1445  -      SLP Received On 09/13/23  -         Untimed Charges    12775-FJ Motion Fluoro Eval Swallow Minutes 54  -SM         Total Minutes    Untimed Charges Total Minutes 54  -SM       Total Minutes 54  -SM                User Key  (r) = Recorded By, (t) = Taken By, (c) = Cosigned By      Initials Name Provider Type    Barbara Washington MS CCC-SLP Speech and Language Pathologist                    Therapy Charges for Today       Code Description Service Date Service Provider Modifiers Qty    [id number] HC ST MOTION FLUORO EVAL SWALLOW 4 9/13/2023 Barbara Pickens MS CCC-SLP GN 1                 Barbara Pickens MS CCC-SLP  9/13/2023

## 2023-09-13 NOTE — PLAN OF CARE
Goal Outcome Evaluation:  Plan of Care Reviewed With: patient        Progress: improving  Outcome Evaluation: Pt. continues to present below baseline function w/generalized weakness, balance deficits and decreased functional endurance. Pt. performed transfers, ambulated 100' w/front wheeled walker, min assist of 2. Activity limited by fatigue. Pt. tolerated ther-ex well. Continue IPPT to progress as tolerated.      Anticipated Discharge Disposition (PT): skilled nursing facility

## 2023-09-14 PROCEDURE — 25010000002 CEFTRIAXONE PER 250 MG: Performed by: NURSE PRACTITIONER

## 2023-09-14 PROCEDURE — 99231 SBSQ HOSP IP/OBS SF/LOW 25: CPT | Performed by: STUDENT IN AN ORGANIZED HEALTH CARE EDUCATION/TRAINING PROGRAM

## 2023-09-14 RX ADMIN — DOXYCYCLINE 100 MG: 100 INJECTION, POWDER, LYOPHILIZED, FOR SOLUTION INTRAVENOUS at 08:35

## 2023-09-14 RX ADMIN — LEVOTHYROXINE SODIUM 88 MCG: 0.09 TABLET ORAL at 04:39

## 2023-09-14 RX ADMIN — PYRIDOXINE HCL TAB 50 MG 25 MG: 50 TAB at 08:36

## 2023-09-14 RX ADMIN — QUETIAPINE FUMARATE 12.5 MG: 25 TABLET ORAL at 21:21

## 2023-09-14 RX ADMIN — TAMSULOSIN HYDROCHLORIDE 0.4 MG: 0.4 CAPSULE ORAL at 08:36

## 2023-09-14 RX ADMIN — APIXABAN 10 MG: 5 TABLET, FILM COATED ORAL at 21:20

## 2023-09-14 RX ADMIN — PRAMIPEXOLE DIHYDROCHLORIDE 1 MG: 1 TABLET ORAL at 21:20

## 2023-09-14 RX ADMIN — APIXABAN 10 MG: 5 TABLET, FILM COATED ORAL at 08:36

## 2023-09-14 RX ADMIN — OXYCODONE HYDROCHLORIDE AND ACETAMINOPHEN 500 MG: 500 TABLET ORAL at 08:36

## 2023-09-14 RX ADMIN — CARBIDOPA AND LEVODOPA 2 TABLET: 25; 100 TABLET ORAL at 18:08

## 2023-09-14 RX ADMIN — DOXYCYCLINE 100 MG: 100 INJECTION, POWDER, LYOPHILIZED, FOR SOLUTION INTRAVENOUS at 21:20

## 2023-09-14 RX ADMIN — PRAMIPEXOLE DIHYDROCHLORIDE 1 MG: 1 TABLET ORAL at 15:54

## 2023-09-14 RX ADMIN — Medication 5000 UNITS: at 08:36

## 2023-09-14 RX ADMIN — ATORVASTATIN CALCIUM 40 MG: 40 TABLET, FILM COATED ORAL at 08:36

## 2023-09-14 RX ADMIN — CARBIDOPA AND LEVODOPA 2 TABLET: 25; 100 TABLET ORAL at 08:35

## 2023-09-14 RX ADMIN — PRAMIPEXOLE DIHYDROCHLORIDE 1 MG: 1 TABLET ORAL at 08:36

## 2023-09-14 RX ADMIN — FUROSEMIDE 20 MG: 20 TABLET ORAL at 08:36

## 2023-09-14 RX ADMIN — CARBIDOPA AND LEVODOPA 1 TABLET: 25; 100 TABLET, EXTENDED RELEASE ORAL at 08:36

## 2023-09-14 RX ADMIN — POLYETHYLENE GLYCOL 3350 17 G: 17 POWDER, FOR SOLUTION ORAL at 11:36

## 2023-09-14 RX ADMIN — SENNOSIDES AND DOCUSATE SODIUM 2 TABLET: 8.6; 5 TABLET ORAL at 21:20

## 2023-09-14 RX ADMIN — FLUTICASONE PROPIONATE 2 SPRAY: 50 SPRAY, METERED NASAL at 08:35

## 2023-09-14 RX ADMIN — CARBIDOPA AND LEVODOPA 2 TABLET: 25; 100 TABLET ORAL at 13:28

## 2023-09-14 RX ADMIN — Medication 10 ML: at 08:37

## 2023-09-14 RX ADMIN — CETIRIZINE HYDROCHLORIDE TABLETS 10 MG: 10 TABLET, FILM COATED ORAL at 08:36

## 2023-09-14 RX ADMIN — POTASSIUM CHLORIDE 20 MEQ: 1500 TABLET, EXTENDED RELEASE ORAL at 08:36

## 2023-09-14 RX ADMIN — CARBIDOPA AND LEVODOPA 1 TABLET: 25; 100 TABLET, EXTENDED RELEASE ORAL at 15:53

## 2023-09-14 RX ADMIN — SODIUM CHLORIDE 2000 MG: 900 INJECTION INTRAVENOUS at 21:20

## 2023-09-14 RX ADMIN — CARBIDOPA AND LEVODOPA 2 TABLET: 25; 100 TABLET ORAL at 21:20

## 2023-09-14 NOTE — PROGRESS NOTES
[facility] Medicine Services  PROGRESS NOTE    Patient Name: Cordell Martin  : 1945  MRN: [id number]    Date of Admission: 2023  Primary Care Physician: Ben Holder DO    Subjective   Subjective     CC:  Leg pain    HPI:  Patient reports significant improvement in lower extremity swelling and discomfort.  He does endorse mild tenderness to palpation of the lower extremities but has a slight increase in range of motion.  Denies fevers, chills, sweats.      Objective   Objective     Vital Signs:   Temp:  [97 °F (36.1 °C)-98 °F (36.7 °C)] 98 °F (36.7 °C)  Heart Rate:  [67-90] 74  Resp:  [18] 18  BP: (118-134)/(61-77) 120/61     Physical Exam:  General appearance: alert, awake, oriented, no acute distress   Head/Eyes: atraumatic, normocephalic, non-injected conjunctivae  Cardiovascular: RRR, no murmurs or rubs, radial pulse full 2/4 BL   Respiratory: CTAB, no crackles or wheezes   Abdomen: soft, non-tender, no organomegaly, bowel sounds normoactive   Musculoskeletal: moves all 4 extremities  Neuro/CNS: alert and oriented x3, resting tremor RUE  Skin: RLE erythema and edema (1+) > LLE erythema and edema (trace)     Results Reviewed:  LAB RESULTS:      Lab 23  0402 23  0542 23  2105 23  1616 23  1600   WBC 4.76 5.40  --  6.67  --    HEMOGLOBIN 12.4* 11.7*  --  12.2*  --    HEMATOCRIT 38.6 36.9*  --  38.9  --    PLATELETS 213 209  --  206  --    NEUTROS ABS  --  2.70  --  3.93  --    IMMATURE GRANS (ABS)  --  0.01  --  0.01  --    LYMPHS ABS  --  1.98  --  1.92  --    MONOS ABS  --  0.54  --  0.63  --    EOS ABS  --  0.15  --  0.16  --    MCV 96.5 96.6  --  99.0*  --    PROCALCITONIN  --   --   --   --  0.08   LACTATE  --   --  1.3  --   --          Lab 23  0402 23  1118 23  1600   SODIUM 140 144 144   POTASSIUM 4.2 3.9 4.3   CHLORIDE 105 106 106   CO2 27.0 29.0 28.0   ANION GAP 8.0 9.0 10.0   BUN 27* 28* 33*   CREATININE  1.23 1.29* 1.32*   EGFR 60.8 57.5* 55.9*   GLUCOSE 101* 143* 108*   CALCIUM 9.1 8.9 9.5         Lab 09/11/23  1600   TOTAL PROTEIN 6.9   ALBUMIN 3.7   GLOBULIN 3.2   ALT (SGPT) 5   AST (SGOT) 13   BILIRUBIN 0.4   ALK PHOS 123*         Lab 09/11/23  1600   PROBNP 53.9         Lab 09/12/23  1118   CHOLESTEROL 102   LDL CHOL 48   HDL CHOL 39*   TRIGLYCERIDES 74             Brief Urine Lab Results  (Last result in the past 365 days)        Color   Clarity   Blood   Leuk Est   Nitrite   Protein   CREAT   Urine HCG        09/12/23 0131             54.2                 Microbiology Results Abnormal       Procedure Component Value - Date/Time    Blood Culture - Blood, Hand, Right [[id number]]  (Normal) Collected: 09/11/23 2105    Lab Status: Preliminary result Specimen: Blood from Hand, Right Updated: 09/13/23 2146     Blood Culture No growth at 2 days    Blood Culture - Blood, Arm, Left [[id number]]  (Normal) Collected: 09/11/23 2118    Lab Status: Preliminary result Specimen: Blood from Arm, Left Updated: 09/13/23 2146     Blood Culture No growth at 2 days    S. Pneumo Ag Urine or CSF - Urine, Urine, Clean Catch [[id number]]  (Normal) Collected: 09/12/23 0131    Lab Status: Final result Specimen: Urine, Clean Catch Updated: 09/12/23 0951     Strep Pneumo Ag Negative    Legionella Antigen, Urine - Urine, Urine, Clean Catch [[id number]]  (Normal) Collected: 09/12/23 0131    Lab Status: Final result Specimen: Urine, Clean Catch Updated: 09/12/23 0951     LEGIONELLA ANTIGEN, URINE Negative    Respiratory Panel PCR w/COVID-19(SARS-CoV-2) KENJI/ASH/SHIRLEY/PAD/COR/MAD/CORNELIUS In-House, NP Swab in UTM/VTM, 3-4 HR TAT - Swab, Nasopharynx [[id number]]  (Normal) Collected: 09/11/23 2128    Lab Status: Final result Specimen: Swab from Nasopharynx Updated: 09/11/23 2222     ADENOVIRUS, PCR Not Detected     Coronavirus 229E Not Detected     Coronavirus HKU1 Not Detected     Coronavirus NL63 Not Detected     Coronavirus OC43 Not Detected     COVID19  Not Detected     Human Metapneumovirus Not Detected     Human Rhinovirus/Enterovirus Not Detected     Influenza A PCR Not Detected     Influenza B PCR Not Detected     Parainfluenza Virus 1 Not Detected     Parainfluenza Virus 2 Not Detected     Parainfluenza Virus 3 Not Detected     Parainfluenza Virus 4 Not Detected     RSV, PCR Not Detected     Bordetella pertussis pcr Not Detected     Bordetella parapertussis PCR Not Detected     Chlamydophila pneumoniae PCR Not Detected     Mycoplasma pneumo by PCR Not Detected    Narrative:      In the setting of a positive respiratory panel with a viral infection PLUS a negative procalcitonin without other underlying concern for bacterial infection, consider observing off antibiotics or discontinuation of antibiotics and continue supportive care. If the respiratory panel is positive for atypical bacterial infection (Bordetella pertussis, Chlamydophila pneumoniae, or Mycoplasma pneumoniae), consider antibiotic de-escalation to target atypical bacterial infection.            FL Video Swallow With Speech Single Contrast    Result Date: 9/13/2023  FL VIDEO SWALLOW W SPEECH SINGLE-CONTRAST, FL LIMITED UGI FOR MBS REFLUX SINGLE-CONTRAST Date of Exam: 9/13/2023 2:45 PM EDT Indication: dysphagia. Comparison: None available. Technique:   The speech pathologist administered food and/or liquid mixed with barium to the patient with cine/video imaging.  Imaging assistance was provided to the speech pathologist and an image was saved. Fluoroscopic Time: 48 seconds Number of Images: 10 associated fluoroscopic loops were saved Findings: Please see speech therapy report for full details and recommendations. Limited view of the esophagus with the patient in the seated lateral position demonstrated no signs of a high-grade focal esophageal stricture, or significant gastroesophageal reflux.     Impression: Impression: Fluoroscopy provided for modified barium swallow, and limited upper GI series.  Please see speech therapy report for full details and recommendations. Limited upper GI series appeared within normal limits. Electronically Signed: Arlene Bernardo MD  9/13/2023 3:35 PM EDT  Workstation ID: [id number]    FL Limited Ugi For Mbs Reflux Single-Contrast    Result Date: 9/13/2023  FL VIDEO SWALLOW W SPEECH SINGLE-CONTRAST, FL LIMITED UGI FOR MBS REFLUX SINGLE-CONTRAST Date of Exam: 9/13/2023 2:45 PM EDT Indication: dysphagia. Comparison: None available. Technique:   The speech pathologist administered food and/or liquid mixed with barium to the patient with cine/video imaging.  Imaging assistance was provided to the speech pathologist and an image was saved. Fluoroscopic Time: 48 seconds Number of Images: 10 associated fluoroscopic loops were saved Findings: Please see speech therapy report for full details and recommendations. Limited view of the esophagus with the patient in the seated lateral position demonstrated no signs of a high-grade focal esophageal stricture, or significant gastroesophageal reflux.     Impression: Impression: Fluoroscopy provided for modified barium swallow, and limited upper GI series. Please see speech therapy report for full details and recommendations. Limited upper GI series appeared within normal limits. Electronically Signed: Arlene Bernardo MD  9/13/2023 3:35 PM EDT  Workstation ID: [id number]     Results for orders placed during the hospital encounter of 01/12/21    Adult Transthoracic Echo Complete W/ Cont if Necessary Per Protocol    Interpretation Summary  · The right ventricle and right atrium are moderately dilated. Other chamber sizes and wall thicknesses are normal.  · Global and segmental LV wall motion is normal. The estimated left ventricular ejection fraction is 51% - 55%.  · The aortic and mitral valves are normal in structure and function.  · The estimated RV systolic pressure is mildly elevated 35 mmHg - 40 mmHg. There is as well noted to be less than 50%  "inspiratory IVC collapse. The main pulmonary artery is \"borderline dilated\".  · There are no other important findings on this study.      Current medications:  Scheduled Meds:apixaban, 10 mg, Oral, Q12H   Followed by  [START ON 9/19/2023] apixaban, 5 mg, Oral, Q12H  vitamin C, 500 mg, Oral, Daily  atorvastatin, 40 mg, Oral, Daily  carbidopa-levodopa, 2 tablet, Oral, 4x Daily  carbidopa-levodopa ER, 1 tablet, Oral, BID  cefTRIAXone, 2,000 mg, Intravenous, Q24H  cetirizine, 10 mg, Oral, Daily  doxycycline, 100 mg, Intravenous, Q12H  fluticasone, 2 spray, Each Nare, Daily  furosemide, 20 mg, Oral, Daily  levothyroxine, 88 mcg, Oral, Daily  potassium chloride, 20 mEq, Oral, Daily  pramipexole, 1 mg, Oral, TID  QUEtiapine, 12.5 mg, Oral, Nightly  senna-docusate sodium, 2 tablet, Oral, BID  sodium chloride, 10 mL, Intravenous, Q12H  tamsulosin, 0.4 mg, Oral, Daily  vitamin B-6, 25 mg, Oral, Daily  vitamin D3, 5,000 Units, Oral, Daily      Continuous Infusions:   PRN Meds:.  acetaminophen    senna-docusate sodium **AND** polyethylene glycol **AND** bisacodyl **AND** bisacodyl    ipratropium-albuterol    nitroglycerin    sodium chloride    sodium chloride    sodium chloride    Assessment & Plan   Assessment & Plan     Active Hospital Problems    Diagnosis  POA    **Acute deep vein thrombosis (DVT) of right lower extremity [I82.401]  Yes    Pulmonary nodule [R91.1]  Unknown    Pneumonia [J18.9]  Unknown    Hypothyroidism (acquired) [E03.9]  Yes    Elevated serum creatinine [R79.89]  Yes    Cellulitis of right lower extremity [L03.115]  Yes    Parkinson's disease [G20]  Yes    Anxiety [F41.9]  Yes    Depression [F32.A]  Yes    Hyperlipidemia [E78.5]  Yes      Resolved Hospital Problems   No resolved problems to display.        Brief Hospital Course to date:  Cordell Martin is a 78 y.o. male with a history of Parkinson disease followed by Dr. Ortiz, hypothyroidism, hyperlipidemia, anxiety, depression, was transferred from " [place] at [facility] with complaints of lower extremity swelling and redness. Duplex with sub-acute right lower DVT in the peroneal, and L LE superficial thrombophlebitis noted in the great saphenous above the knee.  CT chest with no evidence of PE but did show small 4 mm subpleural right middle lobe nodule.  Patient was started on IV antibiotics for cellulitis and initiation of anticoagulation, and admitted to medicine.      Assessment and plan:     Acute RLE DVT  Subacute L Superficial Thrombophlebitis   Cellulitis RLE  -- Improving daily   -- Continue Eliquis 10mg BID (through 9/19), then 5mg BID   -- BC x2 NGTD  -- Continue Rocephin and Doxy  -- Pain control  -- Right knee replacement by one year ago complicated by infection requiring wound VAC.  -- PT/OT; pursuing SNF   -- Wound care following      Pneumonia   -- Chest x-ray shows mild increased groundglass opacity in the periphery of the left upper lung zone and left lung base concerning for pneumonia  -CT chest with no acute findings  -Clinically with increased cough over the last week  -On Rocephin and doxycycline with improvement, continue   -Follow-up micro, NGTD   -SLP consult; no significant abnormalities noted to suggest aspiration pneumonia      Pulmonary nodule  --CT showing pulm nodule, needs f/u with pulm nodule clinic     Elevated serum creatinine  -- Downtrending after IVF resuscitation; continue to monitor      Hyperlipidemia  -- Continue Lipitor     Parkinson's disease  -- Follows with Dr. Ortiz  -- Continue carbidopa-levodopa  -- PT/OT consult  -- Fall precautions     RLS  -- Continue Mirapex     Hypothyroidism  -- Continue levothyroxine     Anxiety/depression  -- Seroquel nightly     Expected Discharge Location and Transportation: SNF    Expected Discharge   Expected Discharge Date: 9/15/2023; Expected Discharge Time:      DVT prophylaxis:  Medical DVT prophylaxis orders are present.     AM-PAC 6 Clicks Score (PT): 16  (09/14/23 0835)    CODE STATUS:   Code Status and Medical Interventions:   Ordered at: 09/11/23 2114     Level Of Support Discussed With:    Patient     Code Status (Patient has no pulse and is not breathing):    CPR (Attempt to Resuscitate)     Medical Interventions (Patient has pulse or is breathing):    Full Support       Bhavesh Cabrales, DO  09/14/23

## 2023-09-14 NOTE — PROGRESS NOTES
Clinical Nutrition     Reason for Visit: Physician consult      Patient Name: Cordell Martin  YOB: 1945  MRN: [id number]  Date of Encounter: 09/14/23 16:14 EDT  Admission date: 9/11/2023      Nutrition Assessment   Admission Diagnosis:  Acute deep vein thrombosis (DVT) of right lower extremity [I82.401]  Pneumonia [J18.9]      Problem List:    Acute deep vein thrombosis (DVT) of right lower extremity    Anxiety    Depression    Hyperlipidemia    Parkinson's disease    Cellulitis of right lower extremity    Hypothyroidism (acquired)    Elevated serum creatinine    Pneumonia    Pulmonary nodule        PMH:   He  has a past medical history of Anxiety, Arthritis, Back pain, Bronchitis, Cognitive impairment, Depression, Disease of thyroid gland, Glucose intolerance (impaired glucose tolerance), Hyperlipidemia, Kidney stone, Obesity, Parkinson disease, Pneumonia, Prostate disorder, Thyroid disease, and Wears eyeglasses.    PSH:  He  has a past surgical history that includes Shoulder open rotator cuff repair (Right); Tonsillectomy; Colonoscopy; lumbar laminectomy discectomy decompression (N/A, 07/13/2017); Eye surgery; Hernia repair (10/20/2020); Knee surgery; and Vein Surgery.    Applicable Nutrition Concerns:   Skin:L ankle wound, R leg wounds, BLE weeping edema      Applicable Interval History:     SLP Recommendation and Plan  SLP Swallowing Diagnosis: functional oral phase, functional pharyngeal phase (09/13/23 1450)  SLP Diet Recommendation: regular textures, thin liquids (09/13/23 1450)  Recommended Precautions and Strategies: general aspiration precautions, reflux precautions (09/13/23 1450)  SLP Rec. for Method of Medication Administration: as tolerated (09/13/23 1450)    Reported/Observed/Food/Nutrition Related History:     Pt resting in bed, reports good appetite, states that it is easier for him to eat finger foods as he has Parkinson's, does not have any difficulty  "swallowing      Anthropometrics     Flowsheet Rows      Flowsheet Row First Filed Value   Admission Height 182.9 cm (72\") Documented at 09/11/2023 1519   Admission Weight 113 kg (250 lb) Documented at 09/11/2023 1519          Height: Height: 182.9 cm (72\")  Last Filed Weight: Weight: 118 kg (260 lb) (09/14/23 0335)  Method: Weight Method: Bed scale  BMI: BMI (Calculated): 35.3  BMI classification: Obese Class II: 35-39.9kg/m2    Weight change:   20lb wt gain over past 3 months, suspected fluid gain    Nutrition Focused Physical Exam             Unable to perform exam due to: Defer pending indication    Current Nutrition Prescription   PO: Diet: Cardiac Diets; Healthy Heart (2-3 Na+); Texture: Regular Texture (IDDSI 7); Fluid Consistency: Thin (IDDSI 0)  Oral Nutrition Supplement:   Intake:  88% of 4 meals      Nutrition Diagnosis   Date:  9-14-23            Updated:    Problem Increased nutrient needs: protein   Etiology Poor Skin Integrity   Signs/Symptoms Skin:cellulitis, L ankle wound, R leg wounds, BLE weeping edema     Problem Feeding difficulty   Etiology Parkinson's   Signs/Symptoms Pt has difficulty using utensils to eat       Goal:   General: Nutrition support treatment  PO: Tolerate PO  EN/PN: N/A    Nutrition Intervention      Follow treatment progress, Advised available snacks, Interview for preferences, Menu adjusted, Supplement provided    Will add finger foods all meals    Premier Protein 2x/day    As pt has difficulty eating 2nd Parkinson's, may benefit from weighted utensils, suggest request OT to evaluate      Monitoring/Evaluation:   Per protocol, I&O, PO intake, Pertinent labs, Weight, Skin status, GI status, Symptoms, Swallow function      Mandy Jackman RD  Time Spent: 30min  "

## 2023-09-14 NOTE — PLAN OF CARE
A&Ox4 with forgetfulness. Follows commands, conversant. BLE maroon/purple with some blisters. Dorsil/planter flexion equally moderate with delayed cap refill. Patient rests in bed without sx/si of pain/acute distress at this time. VSS on RA. SR on cardiac mx. Call light in reach.

## 2023-09-15 PROCEDURE — 97535 SELF CARE MNGMENT TRAINING: CPT

## 2023-09-15 PROCEDURE — 25010000002 CEFTRIAXONE PER 250 MG: Performed by: NURSE PRACTITIONER

## 2023-09-15 PROCEDURE — 97110 THERAPEUTIC EXERCISES: CPT

## 2023-09-15 PROCEDURE — 97116 GAIT TRAINING THERAPY: CPT

## 2023-09-15 PROCEDURE — 99231 SBSQ HOSP IP/OBS SF/LOW 25: CPT | Performed by: STUDENT IN AN ORGANIZED HEALTH CARE EDUCATION/TRAINING PROGRAM

## 2023-09-15 PROCEDURE — 97530 THERAPEUTIC ACTIVITIES: CPT

## 2023-09-15 RX ADMIN — CARBIDOPA AND LEVODOPA 2 TABLET: 25; 100 TABLET ORAL at 08:31

## 2023-09-15 RX ADMIN — CARBIDOPA AND LEVODOPA 1 TABLET: 25; 100 TABLET, EXTENDED RELEASE ORAL at 08:39

## 2023-09-15 RX ADMIN — CARBIDOPA AND LEVODOPA 2 TABLET: 25; 100 TABLET ORAL at 11:19

## 2023-09-15 RX ADMIN — APIXABAN 10 MG: 5 TABLET, FILM COATED ORAL at 08:31

## 2023-09-15 RX ADMIN — PRAMIPEXOLE DIHYDROCHLORIDE 1 MG: 1 TABLET ORAL at 20:51

## 2023-09-15 RX ADMIN — APIXABAN 10 MG: 5 TABLET, FILM COATED ORAL at 20:51

## 2023-09-15 RX ADMIN — LEVOTHYROXINE SODIUM 88 MCG: 0.09 TABLET ORAL at 06:23

## 2023-09-15 RX ADMIN — CARBIDOPA AND LEVODOPA 2 TABLET: 25; 100 TABLET ORAL at 18:00

## 2023-09-15 RX ADMIN — POTASSIUM CHLORIDE 20 MEQ: 1500 TABLET, EXTENDED RELEASE ORAL at 08:31

## 2023-09-15 RX ADMIN — FUROSEMIDE 20 MG: 20 TABLET ORAL at 08:32

## 2023-09-15 RX ADMIN — PYRIDOXINE HCL TAB 50 MG 25 MG: 50 TAB at 08:31

## 2023-09-15 RX ADMIN — TAMSULOSIN HYDROCHLORIDE 0.4 MG: 0.4 CAPSULE ORAL at 08:31

## 2023-09-15 RX ADMIN — SENNOSIDES AND DOCUSATE SODIUM 2 TABLET: 8.6; 5 TABLET ORAL at 20:51

## 2023-09-15 RX ADMIN — CARBIDOPA AND LEVODOPA 2 TABLET: 25; 100 TABLET ORAL at 20:51

## 2023-09-15 RX ADMIN — SENNOSIDES AND DOCUSATE SODIUM 2 TABLET: 8.6; 5 TABLET ORAL at 08:32

## 2023-09-15 RX ADMIN — Medication 5000 UNITS: at 08:32

## 2023-09-15 RX ADMIN — FLUTICASONE PROPIONATE 2 SPRAY: 50 SPRAY, METERED NASAL at 08:39

## 2023-09-15 RX ADMIN — DOXYCYCLINE 100 MG: 100 INJECTION, POWDER, LYOPHILIZED, FOR SOLUTION INTRAVENOUS at 21:21

## 2023-09-15 RX ADMIN — CETIRIZINE HYDROCHLORIDE TABLETS 10 MG: 10 TABLET, FILM COATED ORAL at 08:32

## 2023-09-15 RX ADMIN — PRAMIPEXOLE DIHYDROCHLORIDE 1 MG: 1 TABLET ORAL at 16:28

## 2023-09-15 RX ADMIN — OXYCODONE HYDROCHLORIDE AND ACETAMINOPHEN 500 MG: 500 TABLET ORAL at 08:32

## 2023-09-15 RX ADMIN — SODIUM CHLORIDE 2000 MG: 900 INJECTION INTRAVENOUS at 21:25

## 2023-09-15 RX ADMIN — DOXYCYCLINE 100 MG: 100 INJECTION, POWDER, LYOPHILIZED, FOR SOLUTION INTRAVENOUS at 08:31

## 2023-09-15 RX ADMIN — ATORVASTATIN CALCIUM 40 MG: 40 TABLET, FILM COATED ORAL at 08:32

## 2023-09-15 RX ADMIN — CARBIDOPA AND LEVODOPA 1 TABLET: 25; 100 TABLET, EXTENDED RELEASE ORAL at 16:28

## 2023-09-15 RX ADMIN — QUETIAPINE FUMARATE 12.5 MG: 25 TABLET ORAL at 20:51

## 2023-09-15 RX ADMIN — PRAMIPEXOLE DIHYDROCHLORIDE 1 MG: 1 TABLET ORAL at 08:32

## 2023-09-15 NOTE — PLAN OF CARE
Goal Outcome Evaluation:                        Problem: Fall Injury Risk  Goal: Absence of Fall and Fall-Related Injury  Intervention: Identify and Manage Contributors  Recent Flowsheet Documentation  Taken 9/15/2023 0400 by Amadeo Christy RN  Medication Review/Management: medications reviewed  Taken 9/15/2023 0200 by Amadeo Christy RN  Medication Review/Management: medications reviewed  Taken 9/15/2023 0000 by Amadeo Christy RN  Medication Review/Management: medications reviewed  Taken 9/14/2023 2201 by Amadeo Christy RN  Medication Review/Management: medications reviewed  Taken 9/14/2023 2000 by Amadeo Christy RN  Medication Review/Management: medications reviewed  Intervention: Promote Injury-Free Environment  Recent Flowsheet Documentation  Taken 9/15/2023 0400 by Amadeo Christy RN  Safety Promotion/Fall Prevention:   activity supervised   safety round/check completed   toileting scheduled  Taken 9/15/2023 0200 by Amadeo Christy RN  Safety Promotion/Fall Prevention:   activity supervised   safety round/check completed   toileting scheduled  Taken 9/15/2023 0000 by Amadeo Christy RN  Safety Promotion/Fall Prevention:   activity supervised   safety round/check completed   toileting scheduled  Taken 9/14/2023 2201 by Amadeo Christy RN  Safety Promotion/Fall Prevention:   activity supervised   safety round/check completed   toileting scheduled  Taken 9/14/2023 2000 by Amadeo Christy RN  Safety Promotion/Fall Prevention:   activity supervised   safety round/check completed   toileting scheduled     VSS, voids well, rested throughout the night, will continue to monitor for changes.

## 2023-09-15 NOTE — CASE MANAGEMENT/SOCIAL WORK
Case Management Discharge Note      Final Note:     Mr. Martin has a skilled bed at [facility] tomorrow, Saturday, 9/16/23, if medically ready.  Confirmed bed with Mariana at facility.  The patient has met his Medicare 3MN stay for the rehab admission.      Updated Mr. Martin and his sister, at the bedside, and they are agreeable to the DC plan.      [facility] will be transporting the patient to the facility at 3pm tomorrow.  [facility] will  the patient from the hospital room and no PCS form is needed.      The facility pharmacy provider is [facility] and it is noted in EPIC for e-scribing.      Call report to [facility] at ph [phone], and fax the DC summary to fax [phone].    Thank you.         Selected Continued Care - Admitted Since 9/11/2023       Destination Coordination complete.      Service Provider Selected Services Address Phone Fax Patient Preferred    [facility] [address] [place], [place] [id number] [phone] --                    Transportation Services  Ambulance:  ([facility])    Final Discharge Disposition Code: 03 - skilled nursing facility (SNF)

## 2023-09-15 NOTE — THERAPY TREATMENT NOTE
Patient Name: Cordell Martin  : 1945    MRN: [id number]                              Today's Date: 9/15/2023       Admit Date: 2023    Visit Dx:     ICD-10-CM ICD-9-CM   1. Bilateral lower extremity edema  R60.0 782.3   2. Cellulitis of right lower extremity  L03.115 682.6   3. Subacute cough  R05.2 786.2   4. Abnormal x-ray  R93.89 793.99   5. Acute deep vein thrombosis (DVT) of proximal vein of right lower extremity  I82.4Y1 453.41   6. Pulmonary nodule  R91.1 793.11   7. Dysphagia, unspecified type  R13.10 787.20     Patient Active Problem List   Diagnosis    Anxiety    Arthritis    Depression    Hyperlipidemia    Lumbar stenosis with neurogenic claudication    Parkinson's disease    Lumbar stenosis with neurogenic claudication status post L4-5 decompression    Status post lumbar laminectomy    Memory loss    Chronic edema    Cellulitis of right lower extremity    Exertional dyspnea    Right knee pain    Cellulitis of lower extremity    Hypothyroidism (acquired)    Elevated serum creatinine    Cellulitis of right leg    Acute deep vein thrombosis (DVT) of right lower extremity    Pneumonia    Pulmonary nodule     Past Medical History:   Diagnosis Date    Anxiety     Arthritis     Back pain     Bronchitis     Cognitive impairment     Depression     Disease of thyroid gland     Glucose intolerance (impaired glucose tolerance)     Hyperlipidemia     Kidney stone     Obesity     Parkinson disease     Pneumonia     Prostate disorder     Thyroid disease     Wears eyeglasses      Past Surgical History:   Procedure Laterality Date    COLONOSCOPY      5 years ago    EYE SURGERY      HERNIA REPAIR  10/20/2020    KNEE SURGERY      LUMBAR LAMINECTOMY DISCECTOMY DECOMPRESSION N/A 2017    Procedure: LUMBAR LAMINECTOMY L4-5;  Surgeon: Antonio Trent MD;  Location: [facility];  Service:     SHOULDER ROTATOR CUFF REPAIR Right     x2    TONSILLECTOMY      VEIN SURGERY        General Information       Row  Name 09/15/23 1103          OT Time and Intention    Document Type therapy note (daily note)  -JY     Mode of Treatment occupational therapy;individual therapy  -JY       Row Name 09/15/23 1103          General Information    Patient Profile Reviewed yes  -JY     Existing Precautions/Restrictions fall;other (see comments)  PD, RUE tremors  -JY     Barriers to Rehab medically complex;previous functional deficit  -JY       Row Name 09/15/23 1103          Cognition    Orientation Status (Cognition) oriented x 4  -JY       Row Name 09/15/23 1103          Safety Issues, Functional Mobility    Safety Issues Affecting Function (Mobility) awareness of need for assistance;insight into deficits/self-awareness;judgment;positioning of assistive device;problem-solving;safety precaution awareness;safety precautions follow-through/compliance;sequencing abilities  -JY     Impairments Affecting Function (Mobility) balance;endurance/activity tolerance;postural/trunk control;sensation/sensory awareness;shortness of breath;strength;coordination  -JY     Comment, Safety Issues/Impairments (Mobility) pt alert and able to follow commands; pt appeared more insightful to necessary repositioning of self in recliner prior to standing to gain leverage and seq to move LEs  -JY               User Key  (r) = Recorded By, (t) = Taken By, (c) = Cosigned By      Initials Name Provider Type    Priti Valiente, OT Occupational Therapist                     Mobility/ADL's       Row Name 09/15/23 1105          Bed Mobility    Bed Mobility other (see comments)  received UIC  -JY     Comment, (Bed Mobility) received UIC and preferred to remain OOB thus bed mobility not assessed this date  -JY       Row Name 09/15/23 1105          Transfers    Transfers sit-stand transfer;stand-sit transfer  -JY     Comment, (Transfers) skilled cues for optimal hand placement for controlled ascend, descend specifically to push up from seated surface, reach back prior to  sitting and placement of UB over LB in prep to stand for leverage to ascend; improved time and A x 1 to ascend vs A x 2; cues for pt ensursing closeness to seated surface at BLEs prior to sitting  -[employer]       [employer] Name 09/15/23 1105          Sit-Stand Transfer    Sit-Stand [place] (Transfers) minimum assist (75% patient effort);moderate assist (50% patient effort);1 person assist;verbal cues  -JY     Assistive Device (Sit-Stand Transfers) walker, front-wheeled  -[employer]       [employer] Name 09/15/23 1105          Stand-Sit Transfer    Stand-Sit [place] (Transfers) minimum assist (75% patient effort);1 person assist;verbal cues  -JY     Assistive Device (Stand-Sit Transfers) walker, front-wheeled  -[employer] [place] Name 09/15/23 1105          Functional Mobility    Functional Mobility- Ind. Level minimum assist (75% patient effort);1 person;verbal cues required  -JY     Functional Mobility- Device walker, front-wheeled  -[employer]     Functional Mobility-Distance (Feet) --  stepping around recliner for purposes of repositioning and pressure relief at buttocks  -     Functional Mobility- Comment defer to PT for specifics however during brief stepping forward and backward at recliner pt req'd gross min A x 1 with FWW; pt appeared to step with more fluidity and safety and appeared less fatigued with completion; intermittent cues for tolerable gains in posture  -[facility] Name 09/15/23 1105          Activities of Daily Living    BADL Assessment/Intervention upper body dressing;lower body dressing;grooming;feeding  -JY       Row Name 09/15/23 1105          Upper Body Dressing Assessment/Training    [place] Level (Upper Body Dressing) doff;don;pajama/robe;moderate assist (50% patient effort);verbal cues  -JY     Position (Upper Body Dressing) unsupported sitting  -J     Comment, (Upper Body Dressing) min A for proximal and posterior mgmt of gown after situational A for mgmt around LUE where IV in place, pt able to thread and  unthread and bring up toward shoulders; pt limited some at RUE d/t baseline deficits; reviewed with pt optimal seq for threading and unthreading given more impacted RUE  -JY       Row Name 09/15/23 1105          Lower Body Dressing Assessment/Training    [place] Level (Lower Body Dressing) doff;don;socks;dependent (less than 25% patient effort)  -JY     Position (Lower Body Dressing) long sitting  -JY     Comment, (Lower Body Dressing) AE continues to be contraindicated d/t skin integrity concerns at BLE; body habitus further limits pt in distal reach toward LEs and pt with some edematous and painful knees (R>L) and LEs thus dep A provided for d/d socks  -JY       Row Name 09/15/23 1105          Grooming Assessment/Training    [place] Level (Grooming) wash face, hands;supervision;set up  -JY     Position (Grooming) supported sitting  -JY       Row Name 09/15/23 1105          Self-Feeding Assessment/Training    [place] Level (Feeding) liquids to mouth;supervision;scoop food and bring to mouth;minimum assist (75% patient effort);verbal cues  -JY     Assistive Devices (Feeding) adapted cup;built-up handle utensils  -JY     Position (Self-Feeding) supported sitting  -JY     Comment, (Feeding) upon OT arrival, pt feeding self with observed difficulty using dominant R hand d/t tremors and shaking food off shaft, during LUE trial pt with less fxl grasp pattern and need for increased time and change in posture (stooped neck and head) increasing effort and A needed for continued feeding; pt reports previous trial of weighted utensils w/ PD dx and reports not helpful; issued pt utensils with built up handles, instructed pt on use and pt able to improve grasp pattern to combination of lateral and 3 jaw richard prehension and rest remaining of handle in thenar eminence of R hand for stabilization, pt denied trial fo bent shaft for angled direction to  mouth and reported improved performance as indicated; further  issued adapted cup and educated pt on lid/opening options with or without straw and different hand patterns for either gross grasp or extended palm through handle; pt presented with more control and postural gains with self feeding; would benefit from further training  -JY               User Key  (r) = Recorded By, (t) = Taken By, (c) = Cosigned By      Initials Name Provider Type    Priti Valiente OT Occupational Therapist                   Obj/Interventions       Row Name 09/15/23 1125          Motor Skills    Motor Skills functional endurance  -JY     Functional Endurance standing tolerance improved to ~ 2 minutes this date with fxl stepping, targeted reach in multi planes away from MIGDALIA, less fatigue with more dynamic tasks however still below baseline on activiyt tolearnce  -JY       Row Name 09/15/23 1125          Balance    Balance Assessment sitting static balance;sitting dynamic balance;standing static balance;standing dynamic balance  -JY     Static Sitting Balance standby assist  -JY     Dynamic Sitting Balance contact guard  -JY     Position, Sitting Balance unsupported;sitting in chair  -JY     Static Standing Balance contact guard;verbal cues  -JY     Dynamic Standing Balance minimal assist;verbal cues  -JY     Position/Device Used, Standing Balance supported;walker, front-wheeled  -JY     Balance Interventions sitting;standing;static;dynamic;sit to stand;supported;occupation based/functional task  -JY     Comment, Balance no overt LOB during seated or standing tasks, utilized FWW in standing; improved postural alignment aided pt in stability  -JY               User Key  (r) = Recorded By, (t) = Taken By, (c) = Cosigned By      Initials Name Provider Type    Priti Valiente OT Occupational Therapist                   Goals/Plan    No documentation.                  Clinical Impression       Row Name 09/15/23 1127          Pain Assessment    Pretreatment Pain Rating 2/10  -JY     Posttreatment  Pain Rating 2/10  -JY     Pain Location - Side/Orientation Right  -JY     Pain Location generalized  -JY     Pain Location - knee  -JY     Pre/Posttreatment Pain Comment reported only minimal soreness at R knee during standing and upon return to recliner  -JY     Pain Intervention(s) Repositioned;Ambulation/increased activity;Elevated;Rest  -JY       Row Name 09/15/23 1127          Plan of Care Review    Plan of Care Reviewed With patient  -JY     Progress improving  -JY     Outcome Evaluation Pt alert and engaged in OT interventions this date demonstrating improved UBD aside from situational A for mgmt of gown around IV, demonstrating improved seq for threading and unthreading given more impacted RUE w/ cues to demo, pt still limited by decreased distal reach and contraindication to use AE given skin integrity of LEs thus dep A for d/d socks. Most significant gains noted in self feeding when pt used adapted utensils (built up handle) to facilitate improved grasp and prehension. Pt reported decreased impact of tremors at R hand when holding, using built up handle. Pt also able to drink more effectively when provided cup w/ handle and straw/lid combo decreasing need for stooped head and neck posture and more difficulty breathing. Pt demonstrated gains in standing time to ~ 2 minutes while completing targeted reach, reach across midline, weight shifting and seq of steps where pt demonstrated more fluidity and safety (gross mod - min A x 1 provided). Pt is still below baseline occupational performance and would benefit from SNF at d/c when medically ready.  -JY       Row Name 09/15/23 1127          Therapy Assessment/Plan (OT)    Rehab Potential (OT) good, to achieve stated therapy goals  -JY     Criteria for Skilled Therapeutic Interventions Met (OT) yes;skilled treatment is necessary  -JY     Therapy Frequency (OT) daily  -JY       Row Name 09/15/23 1127          Vital Signs    Pre Systolic BP Rehab 113  -JY     Pre  Treatment Diastolic BP 64  -JY     Pretreatment Heart Rate (beats/min) 85  -JY     Posttreatment Heart Rate (beats/min) 81  -JY     Pre SpO2 (%) 90  -JY     O2 Delivery Pre Treatment room air  -JY     O2 Delivery Intra Treatment room air  -JY     Post SpO2 (%) 92  -JY     O2 Delivery Post Treatment room air  -JY     Pre Patient Position Sitting  -JY     Intra Patient Position Standing  -JY     Post Patient Position Sitting  -JY       Row Name 09/15/23 1127          Positioning and Restraints    Pre-Treatment Position sitting in chair/recliner  -JY     Post Treatment Position chair  -JY     In Chair notified nsg;reclined;call light within reach;encouraged to call for assist;with family/caregiver;waffle cushion;on mechanical lift sling;legs elevated;heels elevated  -JY               User Key  (r) = Recorded By, (t) = Taken By, (c) = Cosigned By      Initials Name Provider Type    Priti Valiente OT Occupational Therapist                   Outcome Measures       Row Name 09/15/23 1136          How much help from another is currently needed...    Putting on and taking off regular lower body clothing? 1  -JY     Bathing (including washing, rinsing, and drying) 2  -JY     Toileting (which includes using toilet bed pan or urinal) 2  -JY     Putting on and taking off regular upper body clothing 2  -JY     Taking care of personal grooming (such as brushing teeth) 3  -JY     Eating meals 3  -JY     AM-PAC 6 Clicks Score (OT) 13  -JY       Row Name 09/15/23 1136          Functional Assessment    Outcome Measure Options AM-PAC 6 Clicks Daily Activity (OT)  -JY               User Key  (r) = Recorded By, (t) = Taken By, (c) = Cosigned By      Initials Name Provider Type    Priti Valiente OT Occupational Therapist                    Occupational Therapy Education       Title: PT OT SLP Therapies (In Progress)       Topic: Occupational Therapy (In Progress)       Point: ADL training (In Progress)       Description:    Instruct learner(s) on proper safety adaptation and remediation techniques during self care or transfers.   Instruct in proper use of assistive devices.                  Learning Progress Summary             Patient Acceptance, E,D, NR by ANALI at 9/15/2023 1016    Acceptance, E,D, NR by ANALI at 9/12/2023 1340   Family Acceptance, E,D, NR by ANALI at 9/15/2023 1016                         Point: Home exercise program (Not Started)       Description:   Instruct learner(s) on appropriate technique for monitoring, assisting and/or progressing therapeutic exercises/activities.                  Learner Progress:  Not documented in this visit.              Point: Precautions (In Progress)       Description:   Instruct learner(s) on prescribed precautions during self-care and functional transfers.                  Learning Progress Summary             Patient Acceptance, E,D, NR by ANALI at 9/15/2023 1016    Acceptance, E,D, NR by ANALI at 9/12/2023 1340   Family Acceptance, E,D, NR by ANALI at 9/15/2023 1016                         Point: Body mechanics (In Progress)       Description:   Instruct learner(s) on proper positioning and spine alignment during self-care, functional mobility activities and/or exercises.                  Learning Progress Summary             Patient Acceptance, E,D, NR by ANALI at 9/15/2023 1016    Acceptance, E,D, NR by ANALI at 9/12/2023 1340   Family Acceptance, E,D, NR by ANALI at 9/15/2023 1016                                         User Key       Initials Effective Dates Name Provider Type Discipline    ANALI 06/16/21 -  Priti Caro OT Occupational Therapist OT                  OT Recommendation and Plan  Planned Therapy Interventions (OT): activity tolerance training, adaptive equipment training, BADL retraining, functional balance retraining, occupation/activity based interventions, patient/caregiver education/training, ROM/therapeutic exercise, transfer/mobility retraining, strengthening exercise  Therapy  Frequency (OT): daily  Plan of Care Review  Plan of Care Reviewed With: patient  Progress: improving  Outcome Evaluation: Pt alert and engaged in OT interventions this date demonstrating improved UBD aside from situational A for mgmt of gown around IV, demonstrating improved seq for threading and unthreading given more impacted RUE w/ cues to demo, pt still limited by decreased distal reach and contraindication to use AE given skin integrity of LEs thus dep A for d/d socks. Most significant gains noted in self feeding when pt used adapted utensils (built up handle) to facilitate improved grasp and prehension. Pt reported decreased impact of tremors at R hand when holding, using built up handle. Pt also able to drink more effectively when provided cup w/ handle and straw/lid combo decreasing need for stooped head and neck posture and more difficulty breathing. Pt demonstrated gains in standing time to ~ 2 minutes while completing targeted reach, reach across midline, weight shifting and seq of steps where pt demonstrated more fluidity and safety (gross mod - min A x 1 provided). Pt is still below baseline occupational performance and would benefit from SNF at d/c when medically ready.     Time Calculation:   Evaluation Complexity (OT)  Review Occupational Profile/Medical/Therapy History Complexity: expanded/moderate complexity  Assessment, Occupational Performance/Identification of Deficit Complexity: 3-5 performance deficits  Clinical Decision Making Complexity (OT): detailed assessment/moderate complexity  Overall Complexity of Evaluation (OT): moderate complexity     Time Calculation- OT       Row Name 09/15/23 1138             Time Calculation- OT    OT Start Time 1016  -JY      OT Received On 09/15/23  -JY      OT Goal Re-Cert Due Date 09/22/23  -JY         Timed Charges    05696 - OT Therapeutic Activity Minutes 20  -JY      84093 - OT Self Care/Mgmt Minutes 28  -JY         Total Minutes    Timed Charges Total  Minutes 48  -JY       Total Minutes 48  -JY                User Key  (r) = Recorded By, (t) = Taken By, (c) = Cosigned By      Initials Name Provider Type    Priti Valiente OT Occupational Therapist                  Therapy Charges for Today       Code Description Service Date Service Provider Modifiers Qty    [id number]  OT THERAPEUTIC ACT EA 15 MIN 9/15/2023 Priti Caro OT GO 1    [id number]  OT SELF CARE/MGMT/TRAIN EA 15 MIN 9/15/2023 Priti Caro OT GO 2                 Priti Caro OT  9/15/2023

## 2023-09-15 NOTE — PLAN OF CARE
Patient awake alert and oriented x 4. Redness and tenderness to ble. Patient sat in chair most of shift, waffle cushion to chair, encouraged patient to repositon, barrier cream applied to coccyx. Voiding per urinal, VSS. Anticipating d/c to [facility] tomorrow afternoon.

## 2023-09-15 NOTE — NURSING NOTE
"                             Wound, Ostomy and Continence (WOC) Note    Reason for WOC follow-up consultation: \"Bilateral lower extremity weeping\"     Weeping at bilateral lower extremity improved per RN.  Scattered blisters are now scabbed.  Recommend covering scabs with Xeroform and covering with an Optifoam dressing.  ABX management per MD.  Recommended patient invest in compression socks to help with any lower extremity edema once discharged.    Summary and Recommendation(s):     - Refer to wound care instructions in the \"Orders\" tab  - Specialty support surface in place: Foam Mattress with Waffle Overlay          Pressure Injury Prevention Measures (initiate for a Harmeet Scale Score of <18):     Most recent Harmeet Scale score:  Sensory Perception: 3-->slightly limited  Moisture: 3-->occasionally moist  Activity: 3-->walks occasionally  Mobility: 3-->slightly limited  Nutrition: 3-->adequate  Friction and Shear: 2-->potential problem  Harmeet Score: 17 (09/15/23 0400)     -Turn q 2 hr. using an offloading foam wedge, keep heels elevated and offloaded with offloading heel boots.    -Raise knee-gatch before elevating HOB to reduce shearing   -Follow C.A.R.E protocol if medical devices (Bipap, kc, Ng tube, etc) are being used.  -Apply moisture barrier cream to bottom BID & PRN, if incontinent.  -Clean skin gently with no-rinse PH-balanced foam cleanser and a soft, disposable cloth (barrier wipes-blue pack).   -Reduce layers under patient (one sheet as drawsheet and two incontinence pads)    Thank you for consulting the WOC Nurse.  WOC Team will sign off.  Please re consult if the wound(s) worsens.     Christiano Stover RN, BSN, CCRN, CWOCN  Wound, Ostomy and Continence (WOC) Department  [facility]          "

## 2023-09-15 NOTE — PROGRESS NOTES
[facility]  PROGRESS NOTE    Patient Name: Cordell Martin  : 1945  MRN: [id number]    Date of Admission: 2023  Primary Care Physician: Ben Holder DO    Subjective   Subjective     CC:  Leg pain    HPI:  Patient complains of only mild discomfort in lower extremities, endorses improvement in swelling and redness.  Denies fevers, chills, sweats.  He states he is ready for discharge.      Objective   Objective     Vital Signs:   Temp:  [97.3 °F (36.3 °C)-98 °F (36.7 °C)] 97.3 °F (36.3 °C)  Heart Rate:  [69-83] 81  Resp:  [16-18] 18  BP: (113-132)/(56-66) 115/56     Physical Exam:  General appearance: alert, awake, oriented, no acute distress   Head/Eyes: atraumatic, normocephalic, non-injected conjunctivae  Cardiovascular: RRR, no murmurs or rubs, radial pulse full 2/4 BL   Respiratory: CTAB, no crackles or wheezes   Abdomen: soft, non-tender, no organomegaly, bowel sounds normoactive   Musculoskeletal: moves all 4 extremities  Neuro/CNS: alert and oriented x3, resting tremor RUE  Skin: RLE erythema and edema (1+) > LLE erythema and edema (trace    Results Reviewed:  LAB RESULTS:      Lab 23  0402 23  0542 23  2105 23  1616 23  1600   WBC 4.76 5.40  --  6.67  --    HEMOGLOBIN 12.4* 11.7*  --  12.2*  --    HEMATOCRIT 38.6 36.9*  --  38.9  --    PLATELETS 213 209  --  206  --    NEUTROS ABS  --  2.70  --  3.93  --    IMMATURE GRANS (ABS)  --  0.01  --  0.01  --    LYMPHS ABS  --  1.98  --  1.92  --    MONOS ABS  --  0.54  --  0.63  --    EOS ABS  --  0.15  --  0.16  --    MCV 96.5 96.6  --  99.0*  --    PROCALCITONIN  --   --   --   --  0.08   LACTATE  --   --  1.3  --   --          Lab 23  0402 23  1118 23  1600   SODIUM 140 144 144   POTASSIUM 4.2 3.9 4.3   CHLORIDE 105 106 106   CO2 27.0 29.0 28.0   ANION GAP 8.0 9.0 10.0   BUN 27* 28* 33*   CREATININE 1.23 1.29* 1.32*   EGFR 60.8 57.5* 55.9*    GLUCOSE 101* 143* 108*   CALCIUM 9.1 8.9 9.5         Lab 09/11/23  1600   TOTAL PROTEIN 6.9   ALBUMIN 3.7   GLOBULIN 3.2   ALT (SGPT) 5   AST (SGOT) 13   BILIRUBIN 0.4   ALK PHOS 123*         Lab 09/11/23  1600   PROBNP 53.9         Lab 09/12/23  1118   CHOLESTEROL 102   LDL CHOL 48   HDL CHOL 39*   TRIGLYCERIDES 74             Brief Urine Lab Results  (Last result in the past 365 days)        Color   Clarity   Blood   Leuk Est   Nitrite   Protein   CREAT   Urine HCG        09/12/23 0131             54.2                 Microbiology Results Abnormal       Procedure Component Value - Date/Time    Blood Culture - Blood, Hand, Right [[id number]]  (Normal) Collected: 09/11/23 2105    Lab Status: Preliminary result Specimen: Blood from Hand, Right Updated: 09/14/23 2145     Blood Culture No growth at 3 days    Blood Culture - Blood, Arm, Left [[id number]]  (Normal) Collected: 09/11/23 2118    Lab Status: Preliminary result Specimen: Blood from Arm, Left Updated: 09/14/23 2145     Blood Culture No growth at 3 days    S. Pneumo Ag Urine or CSF - Urine, Urine, Clean Catch [[id number]]  (Normal) Collected: 09/12/23 0131    Lab Status: Final result Specimen: Urine, Clean Catch Updated: 09/12/23 0951     Strep Pneumo Ag Negative    Legionella Antigen, Urine - Urine, Urine, Clean Catch [[id number]]  (Normal) Collected: 09/12/23 0131    Lab Status: Final result Specimen: Urine, Clean Catch Updated: 09/12/23 0951     LEGIONELLA ANTIGEN, URINE Negative    Respiratory Panel PCR w/COVID-19(SARS-CoV-2) KENJI/ASH/SHIRLEY/PAD/COR/MAD/CORNELIUS In-House, NP Swab in UTM/VTM, 3-4 HR TAT - Swab, Nasopharynx [[id number]]  (Normal) Collected: 09/11/23 2128    Lab Status: Final result Specimen: Swab from Nasopharynx Updated: 09/11/23 2222     ADENOVIRUS, PCR Not Detected     Coronavirus 229E Not Detected     Coronavirus HKU1 Not Detected     Coronavirus NL63 Not Detected     Coronavirus OC43 Not Detected     COVID19 Not Detected     Human Metapneumovirus  Not Detected     Human Rhinovirus/Enterovirus Not Detected     Influenza A PCR Not Detected     Influenza B PCR Not Detected     Parainfluenza Virus 1 Not Detected     Parainfluenza Virus 2 Not Detected     Parainfluenza Virus 3 Not Detected     Parainfluenza Virus 4 Not Detected     RSV, PCR Not Detected     Bordetella pertussis pcr Not Detected     Bordetella parapertussis PCR Not Detected     Chlamydophila pneumoniae PCR Not Detected     Mycoplasma pneumo by PCR Not Detected    Narrative:      In the setting of a positive respiratory panel with a viral infection PLUS a negative procalcitonin without other underlying concern for bacterial infection, consider observing off antibiotics or discontinuation of antibiotics and continue supportive care. If the respiratory panel is positive for atypical bacterial infection (Bordetella pertussis, Chlamydophila pneumoniae, or Mycoplasma pneumoniae), consider antibiotic de-escalation to target atypical bacterial infection.            FL Video Swallow With Speech Single Contrast    Result Date: 9/13/2023  FL VIDEO SWALLOW W SPEECH SINGLE-CONTRAST, FL LIMITED UGI FOR MBS REFLUX SINGLE-CONTRAST Date of Exam: 9/13/2023 2:45 PM EDT Indication: dysphagia. Comparison: None available. Technique:   The speech pathologist administered food and/or liquid mixed with barium to the patient with cine/video imaging.  Imaging assistance was provided to the speech pathologist and an image was saved. Fluoroscopic Time: 48 seconds Number of Images: 10 associated fluoroscopic loops were saved Findings: Please see speech therapy report for full details and recommendations. Limited view of the esophagus with the patient in the seated lateral position demonstrated no signs of a high-grade focal esophageal stricture, or significant gastroesophageal reflux.     Impression: Impression: Fluoroscopy provided for modified barium swallow, and limited upper GI series. Please see speech therapy report for  full details and recommendations. Limited upper GI series appeared within normal limits. Electronically Signed: Arlene Bernardo MD  9/13/2023 3:35 PM EDT  Workstation ID: [id number]    FL Limited Ugi For Mbs Reflux Single-Contrast    Result Date: 9/13/2023  FL VIDEO SWALLOW W SPEECH SINGLE-CONTRAST, FL LIMITED UGI FOR MBS REFLUX SINGLE-CONTRAST Date of Exam: 9/13/2023 2:45 PM EDT Indication: dysphagia. Comparison: None available. Technique:   The speech pathologist administered food and/or liquid mixed with barium to the patient with cine/video imaging.  Imaging assistance was provided to the speech pathologist and an image was saved. Fluoroscopic Time: 48 seconds Number of Images: 10 associated fluoroscopic loops were saved Findings: Please see speech therapy report for full details and recommendations. Limited view of the esophagus with the patient in the seated lateral position demonstrated no signs of a high-grade focal esophageal stricture, or significant gastroesophageal reflux.     Impression: Impression: Fluoroscopy provided for modified barium swallow, and limited upper GI series. Please see speech therapy report for full details and recommendations. Limited upper GI series appeared within normal limits. Electronically Signed: Arlene Bernardo MD  9/13/2023 3:35 PM EDT  Workstation ID: [id number]     Results for orders placed during the hospital encounter of 01/12/21    Adult Transthoracic Echo Complete W/ Cont if Necessary Per Protocol    Interpretation Summary  · The right ventricle and right atrium are moderately dilated. Other chamber sizes and wall thicknesses are normal.  · Global and segmental LV wall motion is normal. The estimated left ventricular ejection fraction is 51% - 55%.  · The aortic and mitral valves are normal in structure and function.  · The estimated RV systolic pressure is mildly elevated 35 mmHg - 40 mmHg. There is as well noted to be less than 50% inspiratory IVC collapse. The main  "pulmonary artery is \"borderline dilated\".  · There are no other important findings on this study.      Current medications:  Scheduled Meds:apixaban, 10 mg, Oral, Q12H   Followed by  [START ON 9/19/2023] apixaban, 5 mg, Oral, Q12H  vitamin C, 500 mg, Oral, Daily  atorvastatin, 40 mg, Oral, Daily  carbidopa-levodopa, 2 tablet, Oral, 4x Daily  carbidopa-levodopa ER, 1 tablet, Oral, BID  cefTRIAXone, 2,000 mg, Intravenous, Q24H  cetirizine, 10 mg, Oral, Daily  doxycycline, 100 mg, Intravenous, Q12H  fluticasone, 2 spray, Each Nare, Daily  furosemide, 20 mg, Oral, Daily  levothyroxine, 88 mcg, Oral, Daily  potassium chloride, 20 mEq, Oral, Daily  pramipexole, 1 mg, Oral, TID  QUEtiapine, 12.5 mg, Oral, Nightly  senna-docusate sodium, 2 tablet, Oral, BID  sodium chloride, 10 mL, Intravenous, Q12H  tamsulosin, 0.4 mg, Oral, Daily  vitamin B-6, 25 mg, Oral, Daily  vitamin D3, 5,000 Units, Oral, Daily      Continuous Infusions:   PRN Meds:.  acetaminophen    senna-docusate sodium **AND** polyethylene glycol **AND** bisacodyl **AND** bisacodyl    ipratropium-albuterol    nitroglycerin    sodium chloride    sodium chloride    sodium chloride    Assessment & Plan   Assessment & Plan     Active Hospital Problems    Diagnosis  POA    **Acute deep vein thrombosis (DVT) of right lower extremity [I82.401]  Yes    Pulmonary nodule [R91.1]  Unknown    Pneumonia [J18.9]  Unknown    Hypothyroidism (acquired) [E03.9]  Yes    Elevated serum creatinine [R79.89]  Yes    Cellulitis of right lower extremity [L03.115]  Yes    Parkinson's disease [G20]  Yes    Anxiety [F41.9]  Yes    Depression [F32.A]  Yes    Hyperlipidemia [E78.5]  Yes      Resolved Hospital Problems   No resolved problems to display.        Brief Hospital Course to date:  Cordell Martin is a 78 y.o. male with a history of Parkinson disease followed by Dr. Ortiz, hypothyroidism, hyperlipidemia, anxiety, depression, was transferred from [facility] at [facility] with complaints of lower extremity swelling and redness. Duplex with sub-acute right lower DVT in the peroneal, and L LE superficial thrombophlebitis noted in the great saphenous above the knee.  CT chest with no evidence of PE but did show small 4 mm subpleural right middle lobe nodule.  Patient was started on IV antibiotics for cellulitis and initiation of anticoagulation, and admitted to medicine.      Acute RLE DVT  Subacute L Superficial Thrombophlebitis   Cellulitis RLE  -- Improving daily   -- Continue Eliquis 10mg BID (through 9/19), then 5mg BID   -- BC x2 NGTD  -- Continue Rocephin and Doxy  -- Pain control  -- Right knee replacement by one year ago complicated by infection requiring wound VAC.  -- PT/OT; pursuing SNF   -- Wound care following      Pneumonia   -- Chest x-ray shows mild increased groundglass opacity in the periphery of the left upper lung zone and left lung base concerning for pneumonia  -CT chest with no acute findings  -Clinically with increased cough over the last week  -On Rocephin and doxycycline with improvement, continue   -Follow-up micro, NGTD   -SLP consult; no significant abnormalities noted to suggest aspiration pneumonia      Pulmonary nodule  --CT showing pulm nodule, needs f/u with pulm nodule clinic     Elevated serum creatinine  -- Improved after IVF resuscitation     Hyperlipidemia  -- Continue Lipitor     Parkinson's disease  -- Follows with Dr. Ortiz  -- Continue carbidopa-levodopa  -- PT/OT consult  -- Fall precautions     RLS  -- Continue Mirapex     Hypothyroidism  -- Continue levothyroxine     Anxiety/depression  -- Seroquel nightly     Expected Discharge Location and Transportation: SNF  Expected Discharge   Expected Discharge Date: 9/16/2023; Expected Discharge Time:      DVT prophylaxis:  Medical DVT prophylaxis orders are present.     AM-PAC 6 Clicks Score (PT): 16 (09/14/23 7846)    CODE STATUS:   Code Status and Medical Interventions:   Ordered at:  09/11/23 6256     Level Of Support Discussed With:    Patient     Code Status (Patient has no pulse and is not breathing):    CPR (Attempt to Resuscitate)     Medical Interventions (Patient has pulse or is breathing):    Full Support       Bhavesh Cabrales, DO  09/15/23

## 2023-09-15 NOTE — PLAN OF CARE
Goal Outcome Evaluation:  Plan of Care Reviewed With: patient        Progress: improving  Outcome Evaluation: Patient increased gait distance with improved gait mechanics, limited by fatigue and weakness, requiring one standing rest break. Good effort with LE ther ex, demonstrating moderate weakness in LEs. Increased independence with all mobility today. Continues to be below demonstrating decreased functional mobility status, impaired balance, and decreased LE strength/ROM. Will address these deficits to promote return to PLOF. Recommend SNF at d/c.      Anticipated Discharge Disposition (PT): skilled nursing facility

## 2023-09-15 NOTE — PLAN OF CARE
Goal Outcome Evaluation:  Plan of Care Reviewed With: patient        Progress: improving  Outcome Evaluation: Pt alert and engaged in OT interventions this date demonstrating improved UBD aside from situational A for mgmt of gown around IV, demonstrating improved seq for threading and unthreading given more impacted RUE w/ cues to demo, pt still limited by decreased distal reach and contraindication to use AE given skin integrity of LEs thus dep A for d/d socks. Most significant gains noted in self feeding when pt used adapted utensils (built up handle) to facilitate improved grasp and prehension. Pt reported decreased impact of tremors at R hand when holding, using built up handle. Pt also able to drink more effectively when provided cup w/ handle and straw/lid combo decreasing need for stooped head and neck posture and more difficulty breathing. Pt demonstrated gains in standing time to ~ 2 minutes while completing targeted reach, reach across midline, weight shifting and seq of steps where pt demonstrated more fluidity and safety (gross mod - min A x 1 provided). Pt is still below baseline occupational performance and would benefit from SNF at d/c when medically ready.      Anticipated Discharge Disposition (OT): skilled nursing facility

## 2023-09-16 VITALS
TEMPERATURE: 97.8 F | OXYGEN SATURATION: 97 % | DIASTOLIC BLOOD PRESSURE: 60 MMHG | BODY MASS INDEX: 35.21 KG/M2 | RESPIRATION RATE: 18 BRPM | HEART RATE: 81 BPM | HEIGHT: 72 IN | WEIGHT: 260 LBS | SYSTOLIC BLOOD PRESSURE: 119 MMHG

## 2023-09-16 LAB
BACTERIA SPEC AEROBE CULT: NORMAL
BACTERIA SPEC AEROBE CULT: NORMAL

## 2023-09-16 PROCEDURE — 99239 HOSP IP/OBS DSCHRG MGMT >30: CPT | Performed by: NURSE PRACTITIONER

## 2023-09-16 RX ADMIN — PRAMIPEXOLE DIHYDROCHLORIDE 1 MG: 1 TABLET ORAL at 08:42

## 2023-09-16 RX ADMIN — CETIRIZINE HYDROCHLORIDE TABLETS 10 MG: 10 TABLET, FILM COATED ORAL at 08:43

## 2023-09-16 RX ADMIN — POTASSIUM CHLORIDE 20 MEQ: 1500 TABLET, EXTENDED RELEASE ORAL at 08:41

## 2023-09-16 RX ADMIN — APIXABAN 10 MG: 5 TABLET, FILM COATED ORAL at 08:42

## 2023-09-16 RX ADMIN — DOXYCYCLINE 100 MG: 100 INJECTION, POWDER, LYOPHILIZED, FOR SOLUTION INTRAVENOUS at 08:40

## 2023-09-16 RX ADMIN — CARBIDOPA AND LEVODOPA 2 TABLET: 25; 100 TABLET ORAL at 12:30

## 2023-09-16 RX ADMIN — OXYCODONE HYDROCHLORIDE AND ACETAMINOPHEN 500 MG: 500 TABLET ORAL at 08:43

## 2023-09-16 RX ADMIN — CARBIDOPA AND LEVODOPA 1 TABLET: 25; 100 TABLET, EXTENDED RELEASE ORAL at 08:43

## 2023-09-16 RX ADMIN — SENNOSIDES AND DOCUSATE SODIUM 2 TABLET: 8.6; 5 TABLET ORAL at 08:43

## 2023-09-16 RX ADMIN — ATORVASTATIN CALCIUM 40 MG: 40 TABLET, FILM COATED ORAL at 08:42

## 2023-09-16 RX ADMIN — FLUTICASONE PROPIONATE 2 SPRAY: 50 SPRAY, METERED NASAL at 08:40

## 2023-09-16 RX ADMIN — FUROSEMIDE 20 MG: 20 TABLET ORAL at 08:43

## 2023-09-16 RX ADMIN — PYRIDOXINE HCL TAB 50 MG 25 MG: 50 TAB at 08:42

## 2023-09-16 RX ADMIN — LEVOTHYROXINE SODIUM 88 MCG: 0.09 TABLET ORAL at 06:38

## 2023-09-16 RX ADMIN — Medication 5000 UNITS: at 08:42

## 2023-09-16 RX ADMIN — CARBIDOPA AND LEVODOPA 2 TABLET: 25; 100 TABLET ORAL at 08:41

## 2023-09-16 RX ADMIN — TAMSULOSIN HYDROCHLORIDE 0.4 MG: 0.4 CAPSULE ORAL at 08:41

## 2023-09-16 NOTE — DISCHARGE SUMMARY
[facility] Medicine Services  DISCHARGE SUMMARY    Patient Name: Cordell Martin  : 1945  MRN: [id number]    Date of Admission: 2023  3:12 PM  Date of Discharge:  2023  Primary Care Physician: Ben Holder DO    Consults       No orders found from 2023 to 2023.            Hospital Course       Active Hospital Problems    Diagnosis  POA    **Acute deep vein thrombosis (DVT) of right lower extremity [I82.401]  Yes    Pulmonary nodule [R91.1]  Unknown    Pneumonia [J18.9]  Unknown    Hypothyroidism (acquired) [E03.9]  Yes    Elevated serum creatinine [R79.89]  Yes    Cellulitis of right lower extremity [L03.115]  Yes    Parkinson's disease [G20]  Yes    Anxiety [F41.9]  Yes    Depression [F32.A]  Yes    Hyperlipidemia [E78.5]  Yes      Resolved Hospital Problems   No resolved problems to display.          Hospital Course:  Cordell Martin is a 78 y.o. male   with a history of Parkinson disease followed by Dr. Ortiz, hypothyroidism, hyperlipidemia, anxiety, depression, was transferred from [facility] with complaints of lower extremity swelling and redness. Duplex with sub-acute right lower DVT in the peroneal, and L LE superficial thrombophlebitis noted in the great saphenous above the knee.  CT chest with no evidence of PE but did show small 4 mm subpleural right middle lobe nodule.  Patient was started on IV antibiotics for cellulitis and initiation of anticoagulation, and admitted to medicine.      Acute RLE DVT  Subacute L Superficial Thrombophlebitis   Cellulitis RLE  -- Improving   -- Continue Eliquis 10mg BID (through ), then 5mg BID   -- BC x2 NGTD  -- Continue Rocephin and Doxy  -- Pain control  -- Right knee replacement by one year ago complicated by infection requiring wound VAC.  -- PT/OT- transfer to rehab today   -- Wound care following      Pneumonia   -- Chest x-ray shows mild increased groundglass  opacity in the periphery of the left upper lung zone and left lung base concerning for pneumonia  -CT chest with no acute findings  -Clinically with increased cough over the last week  -On Rocephin and doxycycline with improvement, continue   -Follow-up micro, NGTD   -SLP consult; no significant abnormalities noted to suggest aspiration pneumonia      Pulmonary nodule  --CT showing pulm nodule, needs f/u with pulm nodule clinic     Elevated serum creatinine  -- Improved after IVF resuscitation     Hyperlipidemia  -- Continue Lipitor     Parkinson's disease  -- Follows with Dr. Ortiz  -- Continue carbidopa-levodopa  -- PT/OT consult  -- Fall precautions     RLS  -- Continue Mirapex     Hypothyroidism  -- Continue levothyroxine     Anxiety/depression  -- Seroquel nightly     Discharge Follow Up Recommendations for outpatient labs/diagnostics:   PCP 1 week   Pulmonary clinic 1 month     Day of Discharge     HPI:   Doing well. No NAD. No soa, mildly productive cough. On room air. No pain. Leg edema and redness has improved. Eager to transfer to rehab today. No f/c, n/v/d.     Review of Systems  All other systems negative     Vital Signs:   Temp:  [97.5 °F (36.4 °C)-97.8 °F (36.6 °C)] 97.8 °F (36.6 °C)  Heart Rate:  [68-85] 81  Resp:  [16-18] 18  BP: (105-122)/(56-66) 119/60      Physical Exam:  Constitutional: No acute distress, awake, alert up in chair   HENT: NCAT, mucous membranes moist  Respiratory: Clear to auscultation bilaterally, respiratory effort normal   Cardiovascular: RRR, no murmurs, rubs, or gallops  Gastrointestinal: Positive bowel sounds, soft, nontender, nondistended; obese  Musculoskeletal: RLE edema and redness > LLE (both improved)  Psychiatric: Appropriate affect, cooperative  Neurologic: Oriented x 3, MARIE/ resting RUE tremor and pill rolling, speech clear  Skin: No rashes     Pertinent  and/or Most Recent Results     LAB RESULTS:      Lab 09/13/23  0402 09/12/23  0542 09/11/23  2105 09/11/23  1616  09/11/23  1600   WBC 4.76 5.40  --  6.67  --    HEMOGLOBIN 12.4* 11.7*  --  12.2*  --    HEMATOCRIT 38.6 36.9*  --  38.9  --    PLATELETS 213 209  --  206  --    NEUTROS ABS  --  2.70  --  3.93  --    IMMATURE GRANS (ABS)  --  0.01  --  0.01  --    LYMPHS ABS  --  1.98  --  1.92  --    MONOS ABS  --  0.54  --  0.63  --    EOS ABS  --  0.15  --  0.16  --    MCV 96.5 96.6  --  99.0*  --    PROCALCITONIN  --   --   --   --  0.08   LACTATE  --   --  1.3  --   --          Lab 09/13/23  0402 09/12/23  1118 09/11/23  1600   SODIUM 140 144 144   POTASSIUM 4.2 3.9 4.3   CHLORIDE 105 106 106   CO2 27.0 29.0 28.0   ANION GAP 8.0 9.0 10.0   BUN 27* 28* 33*   CREATININE 1.23 1.29* 1.32*   EGFR 60.8 57.5* 55.9*   GLUCOSE 101* 143* 108*   CALCIUM 9.1 8.9 9.5         Lab 09/11/23  1600   TOTAL PROTEIN 6.9   ALBUMIN 3.7   GLOBULIN 3.2   ALT (SGPT) 5   AST (SGOT) 13   BILIRUBIN 0.4   ALK PHOS 123*         Lab 09/11/23  1600   PROBNP 53.9         Lab 09/12/23  1118   CHOLESTEROL 102   LDL CHOL 48   HDL CHOL 39*   TRIGLYCERIDES 74             Brief Urine Lab Results  (Last result in the past 365 days)        Color   Clarity   Blood   Leuk Est   Nitrite   Protein   CREAT   Urine HCG        09/12/23 0131             54.2               Microbiology Results (last 10 days)       Procedure Component Value - Date/Time    Legionella Antigen, Urine - Urine, Urine, Clean Catch [[id number]]  (Normal) Collected: 09/12/23 0131    Lab Status: Final result Specimen: Urine, Clean Catch Updated: 09/12/23 0951     LEGIONELLA ANTIGEN, URINE Negative    S. Pneumo Ag Urine or CSF - Urine, Urine, Clean Catch [[id number]]  (Normal) Collected: 09/12/23 0131    Lab Status: Final result Specimen: Urine, Clean Catch Updated: 09/12/23 0951     Strep Pneumo Ag Negative    Respiratory Panel PCR w/COVID-19(SARS-CoV-2) KENJI/ASH/SHIRLEY/PAD/COR/MAD/CORNELIUS In-House, NP Swab in UTM/VTM, 3-4 HR TAT - Swab, Nasopharynx [[id number]]  (Normal) Collected: 09/11/23 2128    Lab Status:  Final result Specimen: Swab from Nasopharynx Updated: 09/11/23 2222     ADENOVIRUS, PCR Not Detected     Coronavirus 229E Not Detected     Coronavirus HKU1 Not Detected     Coronavirus NL63 Not Detected     Coronavirus OC43 Not Detected     COVID19 Not Detected     Human Metapneumovirus Not Detected     Human Rhinovirus/Enterovirus Not Detected     Influenza A PCR Not Detected     Influenza B PCR Not Detected     Parainfluenza Virus 1 Not Detected     Parainfluenza Virus 2 Not Detected     Parainfluenza Virus 3 Not Detected     Parainfluenza Virus 4 Not Detected     RSV, PCR Not Detected     Bordetella pertussis pcr Not Detected     Bordetella parapertussis PCR Not Detected     Chlamydophila pneumoniae PCR Not Detected     Mycoplasma pneumo by PCR Not Detected    Narrative:      In the setting of a positive respiratory panel with a viral infection PLUS a negative procalcitonin without other underlying concern for bacterial infection, consider observing off antibiotics or discontinuation of antibiotics and continue supportive care. If the respiratory panel is positive for atypical bacterial infection (Bordetella pertussis, Chlamydophila pneumoniae, or Mycoplasma pneumoniae), consider antibiotic de-escalation to target atypical bacterial infection.    Blood Culture - Blood, Arm, Left [[id number]]  (Normal) Collected: 09/11/23 2118    Lab Status: Preliminary result Specimen: Blood from Arm, Left Updated: 09/15/23 2145     Blood Culture No growth at 4 days    Blood Culture - Blood, Hand, Right [[id number]]  (Normal) Collected: 09/11/23 2105    Lab Status: Preliminary result Specimen: Blood from Hand, Right Updated: 09/15/23 2145     Blood Culture No growth at 4 days            FL Video Swallow With Speech Single Contrast    Result Date: 9/13/2023  FL VIDEO SWALLOW W SPEECH SINGLE-CONTRAST, FL LIMITED UGI FOR MBS REFLUX SINGLE-CONTRAST Date of Exam: 9/13/2023 2:45 PM EDT Indication: dysphagia. Comparison: None available.  Technique:   The speech pathologist administered food and/or liquid mixed with barium to the patient with cine/video imaging.  Imaging assistance was provided to the speech pathologist and an image was saved. Fluoroscopic Time: 48 seconds Number of Images: 10 associated fluoroscopic loops were saved Findings: Please see speech therapy report for full details and recommendations. Limited view of the esophagus with the patient in the seated lateral position demonstrated no signs of a high-grade focal esophageal stricture, or significant gastroesophageal reflux.     Impression: Fluoroscopy provided for modified barium swallow, and limited upper GI series. Please see speech therapy report for full details and recommendations. Limited upper GI series appeared within normal limits. Electronically Signed: Arlene Bernardo MD  9/13/2023 3:35 PM EDT  Workstation ID: [id number]    XR Chest 1 View    Result Date: 9/11/2023  XR CHEST 1 VW Date of Exam: 9/11/2023 4:26 PM EDT Indication: cough Comparison: 6/17/2023 and prior Findings: Study is limited by overlying support and monitoring apparatus. The heart and mediastinal contours are within normal limits. Mild increase groundglass opacity noted in the periphery of the left mid to upper lung zone and at the left lung base. Osseous structures are unremarkable     Impression: Mild increase groundglass opacity in the periphery of the left upper lung zone and left lung base. Findings are accentuated by low lung volumes and are concerning for pneumonia. Electronically Signed: Tomi Jiménez MD  9/11/2023 4:47 PM EDT  Workstation ID: [id number]    Duplex Venous Lower Extremity - Bilateral CAR    Result Date: 9/12/2023    Sub-acute right lower extremity deep vein thrombosis noted in the peroneal.   Sub-acute left lower extremity superficial thrombophlebitis noted in the great saphenous (above knee).   All other veins appeared normal bilaterally.     FL Limited Ugi For Mbs Reflux  Single-Contrast    Result Date: 9/13/2023  FL VIDEO SWALLOW W SPEECH SINGLE-CONTRAST, FL LIMITED UGI FOR MBS REFLUX SINGLE-CONTRAST Date of Exam: 9/13/2023 2:45 PM EDT Indication: dysphagia. Comparison: None available. Technique:   The speech pathologist administered food and/or liquid mixed with barium to the patient with cine/video imaging.  Imaging assistance was provided to the speech pathologist and an image was saved. Fluoroscopic Time: 48 seconds Number of Images: 10 associated fluoroscopic loops were saved Findings: Please see speech therapy report for full details and recommendations. Limited view of the esophagus with the patient in the seated lateral position demonstrated no signs of a high-grade focal esophageal stricture, or significant gastroesophageal reflux.     Impression: Fluoroscopy provided for modified barium swallow, and limited upper GI series. Please see speech therapy report for full details and recommendations. Limited upper GI series appeared within normal limits. Electronically Signed: Arlene eBrnardo MD  9/13/2023 3:35 PM EDT  Workstation ID: [id number]    CT Angiogram Chest    Result Date: 9/11/2023  CT ANGIOGRAM CHEST Date of Exam: 9/11/2023 9:17 PM CDT Indication: hypoxia, ? pna, productive cough, r/o DVT. Comparison: None available. Technique: CTA of the chest was performed after the uneventful intravenous administration of 80 cc Isovue-370. Reconstructed coronal and sagittal images were also obtained. In addition, a 3-D volume rendered image was created for interpretation. Automated exposure control and iterative reconstruction methods were used. Findings: PULMONARY VASCULATURE: Pulmonary arteries are widely patent without evidence of embolus.Main pulmonary artery is normal in size. No evidence of right heart strain. MEDIASTINUM:Unremarkable. Aortic and heart size are normal. No aortic dissection identified. No mass nor pericardial effusion. CORONARY ARTERIES: Moderate to severe  calcified atherosclerotic disease. LUNGS: There is 4 mm subpleural right middle lobe nodule (image 60, series 5). No other nodule identified. There is mild reticular and linear subpleural interstitial scarring bilaterally. PLEURAL SPACE: No effusion, mass, nor pneumothorax. LYMPH NODES: There are no pathologically enlarged lymph nodes. UPPER ABDOMEN: Unremarkable OSSEOUS STRUCTURES: Appropriate for age with no acute process identified.     Impression: 1.No evidence of pulmonary embolism nor other acute process. 2.There is a 4 mm subpleural right middle lobe nodule. See below recommendations. *The Fleischner society pulmonary nodule recommendations are for the follow-up and management of pulmonary nodules smaller than 8 mm detected incidentally in patients >35 years on non-screening CT. The initial guidelines for the management of solid nodules were released in 2005 1, and guidelines for the management of subsolid nodules were released in 2013 2. New revised 2017 recommendations for both solid and subsolid have since been released 4. 2017 guidelines Solid nodules Solitary nodule size: <6 mm *low risk patients: no follow-up needed *high risk patients: optional CT at 12 months Solitary nodule size: 6-8 mm *low risk patients: follow-up at 6-12 months, then consider further follow-up at 18-24 months *high risk patients: initial follow-up CT at 6-12 months and then at 18-24 months if no change Solitary nodule size: >8 mm *either low or high risk patients *consider follow-up CT at 3 months, and/or CT-PET, and/or biopsy Multiple nodules size: <6 mm *low risk patients: no routine follow-up *high risk patients: optional CT at 12 months Multiple nodules size: 6-8 mm *low risk patients: follow-up at 3-6 months, then consider further follow-up at 18-24 months *high risk patients: follow-up at 3-6 months, then at 18-24 months if no change Multiple nodules size: >8 mm *low risk patients: follow-up at 3-6 months, then consider  "further follow-up at 18-24 months *high risk patients: follow-up at 3-6 months, then at 18-24 months if no change * Note: newly detected indeterminate nodule in persons 35 years of age or older. *low risk patients: minimal or absent history of smoking and or other known risk factors *high risk patients: history of smoking or of other known risk factors (e.g. first degree relative with lung cancer, or exposure to asbestos, radon, uranium) *if a nodule up to 8 mm is partly solid or is ground glass further follow-up is required after 24 months to exclude possible slow growing adenocarcinoma (IKE) Subsolid nodules Solitary pure ground-glass nodule *nodule size <6mm *no CT follow-up required *nodule size \"e6mm *follow up CT at 6-12 months, then every 2 years until 5 years Solitary part-solid nodule *nodule size <6mm *no CT follow-up required *nodule size \"e6mm *follow-up CT at 3-6 months *if unchanged, and solid component remains <6mm, then annual follow-up for 5 years Multiple subsolid nodules *nodule size <6mm *follow-up CT at 3-6 months *consider further follow-up at 2 and 4 years if stable *nodule size \"e6mm *follow-up CT at 3-6 months *subsequent management based on the most suspicious nodule(s) Electronically Signed: Jonas Evans MD  9/11/2023 9:27 PM CDT  Workstation ID: [id number]     Results for orders placed during the hospital encounter of 09/11/23    Duplex Venous Lower Extremity - Bilateral CAR    Interpretation Summary    Sub-acute right lower extremity deep vein thrombosis noted in the peroneal.    Sub-acute left lower extremity superficial thrombophlebitis noted in the great saphenous (above knee).    All other veins appeared normal bilaterally.      Results for orders placed during the hospital encounter of 09/11/23    Duplex Venous Lower Extremity - Bilateral CAR    Interpretation Summary    Sub-acute right lower extremity deep vein thrombosis noted in the peroneal.    Sub-acute left lower extremity " "superficial thrombophlebitis noted in the great saphenous (above knee).    All other veins appeared normal bilaterally.      Results for orders placed during the hospital encounter of 01/12/21    Adult Transthoracic Echo Complete W/ Cont if Necessary Per Protocol    Interpretation Summary  · The right ventricle and right atrium are moderately dilated. Other chamber sizes and wall thicknesses are normal.  · Global and segmental LV wall motion is normal. The estimated left ventricular ejection fraction is 51% - 55%.  · The aortic and mitral valves are normal in structure and function.  · The estimated RV systolic pressure is mildly elevated 35 mmHg - 40 mmHg. There is as well noted to be less than 50% inspiratory IVC collapse. The main pulmonary artery is \"borderline dilated\".  · There are no other important findings on this study.      Plan for Follow-up of Pending Labs/Results: PCP   Pending Labs       Order Current Status    Blood Culture - Blood, Arm, Left Preliminary result    Blood Culture - Blood, Hand, Right Preliminary result          Discharge Details        Discharge Medications        New Medications        Instructions Start Date   apixaban 5 MG tablet tablet  Commonly known as: ELIQUIS   10 mg, Oral, Every 12 Hours Scheduled      apixaban 5 MG tablet tablet  Commonly known as: ELIQUIS   5 mg, Oral, Every 12 Hours Scheduled   Start Date: September 19, 2023     doxycycline 100 mg in sodium chloride 0.9 % 100 mL IVPB   100 mg, Intravenous, Every 12 Hours             Continue These Medications        Instructions Start Date   atorvastatin 40 MG tablet  Commonly known as: LIPITOR   40 mg, Oral, Daily      carbidopa-levodopa ER  MG per tablet  Commonly known as: SINEMET CR   1 tablet, Oral, 2 Times Daily, To be taken at 8 AM and 4 PM      carbidopa-levodopa  MG per tablet  Commonly known as: SINEMET   2 tablets, Oral, 4 Times Daily      fluorometholone 0.1 % ophthalmic suspension  Commonly known " as: FML   fluorometholone 0.1 % eye drops,suspension      fluticasone 50 MCG/ACT nasal spray  Commonly known as: FLONASE   USE 2 SPRAYS IN EACH NOSTRIL ONCE DAILY      furosemide 20 MG tablet  Commonly known as: Lasix   20 mg, Oral, Daily      levothyroxine 88 MCG tablet  Commonly known as: SYNTHROID, LEVOTHROID   TAKE 1 TABLET BY MOUTH EVERY DAY      loratadine 10 MG tablet  Commonly known as: Claritin   10 mg, Oral, Daily      Non-Aspirin Pain Reliever 325 MG tablet  Generic drug: acetaminophen   650 mg, Oral, Every 6 Hours PRN      potassium chloride 20 MEQ CR tablet  Commonly known as: K-DUR,KLOR-CON   20 mEq, Oral, Daily      pramipexole 1 MG tablet  Commonly known as: MIRAPEX   1 mg, Oral, 3 Times Daily      QUEtiapine 25 MG tablet  Commonly known as: SEROquel   12.5 mg, Oral, Nightly      tamsulosin 0.4 MG capsule 24 hr capsule  Commonly known as: FLOMAX   tamsulosin 0.4 mg capsule   TAKE 1 CAPSULE BY MOUTH EVERY DAY      vitamin B-6 25 MG tablet  Commonly known as: PYRIDOXINE   25 mg, Oral, Daily      vitamin C 500 MG tablet  Commonly known as: ASCORBIC ACID   500 mg, Oral, Daily      vitamin D3 125 MCG (5000 UT) capsule capsule   5,000 Units, Oral, Daily             Stop These Medications      azithromycin 250 MG tablet  Commonly known as: ZITHROMAX     lidocaine 5 %  Commonly known as: LIDODERM     oxyCODONE 5 MG immediate release tablet  Commonly known as: ROXICODONE     sildenafil 20 MG tablet  Commonly known as: REVATIO              No Known Allergies      Discharge Disposition:  Rehab Facility or Unit (DC - External)    Diet:  Hospital:  Diet Order   Procedures    Diet: Cardiac Diets; Healthy Heart (2-3 Na+); Texture: Regular Texture (IDDSI 7); Fluid Consistency: Thin (IDDSI 0)            CODE STATUS:    Code Status and Medical Interventions:   Ordered at: 09/11/23 6951     Level Of Support Discussed With:    Patient     Code Status (Patient has no pulse and is not breathing):    CPR (Attempt to  Resuscitate)     Medical Interventions (Patient has pulse or is breathing):    Full Support       Future Appointments   Date Time Provider Department Center   10/23/2023  1:45 PM Bernice Ortiz MD [facility]       Additional Instructions for the Follow-ups that You Need to Schedule       Ambulatory Referral to Lung Nodule Clinic - [place]   As directed      Discharge Follow-up with PCP   As directed       Currently Documented PCP:    Ben Holder DO    PCP Phone Number:    [phone]     Follow Up Details: 1 week        Discharge Follow-up with Specified Provider: pulm clinic; 1 Month   As directed      To: pulm clinic   Follow Up: 1 Month                      Mell Bowers, ALEXUS  09/16/23      Time Spent on Discharge:  I spent  40  minutes on this discharge activity which included: face-to-face encounter with the patient, reviewing the data in the system, coordination of the care with the nursing staff as well as consultants, documentation, and entering orders.

## 2023-10-13 ENCOUNTER — TELEPHONE (OUTPATIENT)
Dept: FAMILY MEDICINE CLINIC | Facility: CLINIC | Age: 78
End: 2023-10-13
Payer: MEDICARE

## 2023-10-13 NOTE — TELEPHONE ENCOUNTER
Connie from [facility] called to say the pt is being discharged from their facility on 10/18. They wanted his PCP to know that his truck is at their facility and they would like to know if the patient is still safe to drive or if he will need to be seen by his PCP before he takes his car home to [place]. He does not have anyone else to drive his car for him. Please advise. Their call back number is [phone].

## 2023-10-16 NOTE — TELEPHONE ENCOUNTER
Ben Holder, DO  [facility] [address] Clinical Pool2 days ago       Why would he not be able to drive? Recent discharge summary says he was admitted for DVT, cellulitis and pneumonia.   I spoke with Theodora from [facility] and asked if there were any changes aside from the discharge summary that would cause the patient not to be able to drive. Theodora stated there were no changes the patient had just asked and they couldn't make that call it would have to come from Dr. Holder. I did advised her from the information we have Dr. Holder did not see a problem with him not being able to drive home unless he felt like he could not. She verbalized understanding and had no further questions at this time.

## 2023-10-17 ENCOUNTER — TELEPHONE (OUTPATIENT)
Dept: FAMILY MEDICINE CLINIC | Facility: CLINIC | Age: 78
End: 2023-10-17
Payer: MEDICARE

## 2023-10-17 NOTE — TELEPHONE ENCOUNTER
Caller: [facility]        Best call back number: [phone]     What is the best time to reach you: ANYTIME    Who are you requesting to speak with (clinical staff, provider,  specific staff member): PCP/MA        What was the call regarding: [facility] NEEDING VERBAL ORDERS THAT PCP WILL CONTINUE WITH PATIENT FOR CARE FOR HOME HEALTH ORDERS    Is it okay if the provider responds through MyChart: CALLBACK

## 2023-10-18 ENCOUNTER — READMISSION MANAGEMENT (OUTPATIENT)
Dept: CALL CENTER | Facility: HOSPITAL | Age: 78
End: 2023-10-18
Payer: MEDICARE

## 2023-10-18 NOTE — TELEPHONE ENCOUNTER
Informed Vandana with [facility] that Dr. Holder would be following  orders for patient. No further questions or concerns at this time.

## 2023-10-19 ENCOUNTER — TRANSITIONAL CARE MANAGEMENT TELEPHONE ENCOUNTER (OUTPATIENT)
Dept: CALL CENTER | Facility: HOSPITAL | Age: 78
End: 2023-10-19
Payer: MEDICARE

## 2023-10-19 NOTE — OUTREACH NOTE
Prep Survey      Flowsheet Row Responses   [facility] facility patient discharged from? Non-[facility]   Is LACE score < 7 ? Non-BH Discharge   Eligibility [facility]   Date of Discharge 10/18/23   Discharge diagnosis Acute embolism and thrombosis of other specified deep vein of right lower extremity   Does the patient have one of the following disease processes/diagnoses(primary or secondary)? Other   Does the patient have Home health ordered? No   Is there a DME ordered? No   Prep survey completed? Yes            STEPHANIE HOLGUIN - Registered Nurse

## 2023-10-19 NOTE — OUTREACH NOTE
Call Center TCM Note      Flowsheet Row Responses   [facility] patient discharged from? Non-   Does the patient have one of the following disease processes/diagnoses(primary or secondary)? Other   TCM attempt successful? Yes   Call start time 0920   Revoked Reason Patient/Family Refused  [Hung up]   Call end time 0920   Call end time 0920            Velasquez Alvarez RN    10/19/2023, 09:20 EDT

## 2023-10-23 ENCOUNTER — OFFICE VISIT (OUTPATIENT)
Dept: NEUROLOGY | Facility: CLINIC | Age: 78
End: 2023-10-23
Payer: MEDICARE

## 2023-10-23 VITALS
SYSTOLIC BLOOD PRESSURE: 120 MMHG | DIASTOLIC BLOOD PRESSURE: 76 MMHG | WEIGHT: 260 LBS | HEART RATE: 85 BPM | BODY MASS INDEX: 35.21 KG/M2 | OXYGEN SATURATION: 95 % | HEIGHT: 72 IN

## 2023-10-23 DIAGNOSIS — G20.A2 PARKINSON'S DISEASE WITHOUT DYSKINESIA, WITH FLUCTUATING MANIFESTATIONS: Primary | ICD-10-CM

## 2023-10-23 DIAGNOSIS — G25.81 RESTLESS LEGS SYNDROME (RLS): ICD-10-CM

## 2023-10-23 PROCEDURE — 1160F RVW MEDS BY RX/DR IN RCRD: CPT | Performed by: PSYCHIATRY & NEUROLOGY

## 2023-10-23 PROCEDURE — 99214 OFFICE O/P EST MOD 30 MIN: CPT | Performed by: PSYCHIATRY & NEUROLOGY

## 2023-10-23 PROCEDURE — 1159F MED LIST DOCD IN RCRD: CPT | Performed by: PSYCHIATRY & NEUROLOGY

## 2023-10-23 RX ORDER — MIRTAZAPINE 30 MG/1
TABLET, FILM COATED ORAL
Qty: 30 TABLET | Refills: 6 | Status: SHIPPED | OUTPATIENT
Start: 2023-10-23

## 2023-10-23 NOTE — PROGRESS NOTES
Subjective:    CC: Cordell Martin is seen today for Parkinson's Disease       Current visit-patient was in the hospital a few months ago for lymphedema/DVT for which she has been started on Eliquis.  After that event to inpatient rehab for 4 weeks and [facility] where he has his meals delivered to the room.  Takes out his own medications..  He states that his tremors have improved since adding Sinemet ER 25/100 mg 1 tablet twice a day that he takes in addition to regular Sinemet 25/100 mg, 2 tablets 4 times daily and Mirapex 1 mg 3 times daily.  He denies having any visual or auditory hallucinations.  He still has some difficulty sleeping at night despite starting Seroquel 12.5 mg nightly.  He will also be restarting home health therapy (including PT/OT) soon. Is using a rollator walker now.    Initial visit -78-year-old male accompanied by his cousin with a history of hypothyroidism, anxiety, depression, RLS, hyperlipidemia, lumbar stenosis status post L4-5 decompression presents with Parkinson's disease.  As per patient he started having a tremor in his right hand in 2015 along with balance issues.  Over time his symptoms have progressed remarkably.  In addition he has symptoms of anxiety, depression, difficulty sleeping at night where he only gets 4 to 6 hours of sleep and anosmia for the past 2 years.  Also has mild constipation.  Was previously having hallucinations but they have stopped now.  He denies having any significant memory problems.  He lives at home by himself and is currently carrying out his ADLs but with much difficulty.  Cooks his meals by supporting himself against the countertop with 1 hand.  His mother lives out of state gets his groceries shipped to the house.  Was started on Sinemet 25/100 mg, currently at 2 tablets 4 times a day that he takes at 10 AM, 3 PM, 8 PM and 11 PM.  Also takes Requip 1 mg 3 times daily for restless leg  syndrome that helps.  Was previously tried on amantadine that caused hallucinations.  Has been getting outpatient PT that he will be finishing this week.  Sustained a fall 2 months ago for which she had a CT scan of the head that was unremarkable.  Also had a CT cervical spine that showed marked degenerative changes with disc bulges most prominent at C4-5 and C5-6 but no acute abnormalities such as fractures.  Of note-I personally reviewed his CT head and Priti's notes as follows-    Cordell Martin is a 77 y.o. male who returns to clinic today with a history of Parkinson's Disease. He has had symptoms since at least 2015 marked initially by a resting tremor primarily in his right hand. He notes occasional associated imbalance, though denies a shuffling gait.    He has also noted short term memory impairment for several years. He notes associated word-finding difficulties and impairments in concentration.    Prior evaluation has included screening blood work and a head CT which were unremarkable. Screening bloodwork has been unremarkable with the exception of a mildly elevated B6 level (and he has subsequently stopped taking a B6 supplement). He is currently taking Sinemet 1.5 tabs tid and pramipexole 1 mg tid. He was intolerant of amantadine due to hallucinations. He has completed a round of PT, but unfortunately did not find it to be significantly beneficial  Last visit-: Since his last visit in 9/21, he notes that his tremor has worsened. He reports increased freezing. He continues to note numbness and tingling in his hands and feet.     The following portions of the patient's history were reviewed today and updated as of 06/15/2023  : allergies, current medications, past family history, past medical history, past social history, past surgical history, and problem list  These document will be scanned to patient's chart.      Current Outpatient Medications:     acetaminophen (TYLENOL) 325 MG tablet, Take 2  tablets by mouth Every 6 (Six) Hours As Needed for Mild Pain ., Disp: 28 tablet, Rfl: 0    apixaban (ELIQUIS) 5 MG tablet tablet, Take 1 tablet by mouth Every 12 (Twelve) Hours. Indications: DVT/PE (active thrombosis), Disp: 60 tablet, Rfl:     atorvastatin (LIPITOR) 40 MG tablet, Take 1 tablet by mouth Daily., Disp: 90 tablet, Rfl: 3    carbidopa-levodopa (SINEMET)  MG per tablet, Take 2 tablets by mouth 4 (Four) Times a Day., Disp: 240 tablet, Rfl: 11    carbidopa-levodopa ER (SINEMET CR)  MG per tablet, Take 1 tablet by mouth 2 (Two) Times a Day. To be taken at 8 AM and 4 PM, Disp: 60 tablet, Rfl: 11    Cholecalciferol (VITAMIN D3) 5000 units capsule capsule, Take 1 capsule by mouth Daily., Disp: , Rfl:     fluorometholone (FML) 0.1 % ophthalmic suspension, fluorometholone 0.1 % eye drops,suspension, Disp: , Rfl:     fluticasone (FLONASE) 50 MCG/ACT nasal spray, USE 2 SPRAYS IN EACH NOSTRIL ONCE DAILY, Disp: 48 mL, Rfl: 2    furosemide (Lasix) 20 MG tablet, Take 1 tablet by mouth Daily., Disp: 90 tablet, Rfl: 3    levothyroxine (SYNTHROID, LEVOTHROID) 88 MCG tablet, TAKE 1 TABLET BY MOUTH EVERY DAY, Disp: 90 tablet, Rfl: 3    loratadine (Claritin) 10 MG tablet, Take 1 tablet by mouth Daily., Disp: 90 tablet, Rfl: 1    potassium chloride (K-DUR,KLOR-CON) 20 MEQ CR tablet, Take 1 tablet by mouth Daily., Disp: 90 tablet, Rfl: 3    pramipexole (MIRAPEX) 1 MG tablet, Take 1 tablet by mouth 3 (Three) Times a Day., Disp: 270 tablet, Rfl: 3    tamsulosin (FLOMAX) 0.4 MG capsule 24 hr capsule, tamsulosin 0.4 mg capsule  TAKE 1 CAPSULE BY MOUTH EVERY DAY, Disp: , Rfl:     vitamin B-6 (PYRIDOXINE) 25 MG tablet, Take 1 tablet by mouth Daily., Disp: 30 tablet, Rfl: 11    vitamin C (ASCORBIC ACID) 500 MG tablet, Take 1 tablet by mouth Daily., Disp: , Rfl:     mirtazapine (Remeron) 30 MG tablet, Start with half a tablet at night for 2 weeks then increase to 1 tablet at night, Disp: 30 tablet, Rfl: 6   Past Medical  "History:   Diagnosis Date    Anxiety     Arthritis     Back pain     Bronchitis     Cognitive impairment     Depression     Disease of thyroid gland     Glucose intolerance (impaired glucose tolerance)     Hyperlipidemia     Kidney stone     Obesity     Parkinson disease     Pneumonia     Prostate disorder     Thyroid disease     Wears eyeglasses       Past Surgical History:   Procedure Laterality Date    COLONOSCOPY      5 years ago    EYE SURGERY      HERNIA REPAIR  10/20/2020    KNEE SURGERY      LUMBAR LAMINECTOMY DISCECTOMY DECOMPRESSION N/A 2017    Procedure: LUMBAR LAMINECTOMY L4-5;  Surgeon: Antonio Trent MD;  [facility];  Service:     SHOULDER ROTATOR CUFF REPAIR Right     x2    TONSILLECTOMY      VEIN SURGERY        Family History   Problem Relation Age of Onset    Alzheimer's disease Other     Cancer Other     Diabetes Other     Heart disease Other     Stroke Other     Cancer Father       Social History     Socioeconomic History    Marital status:    Tobacco Use    Smoking status: Former     Packs/day: 1.00     Years: 15.00     Additional pack years: 0.00     Total pack years: 15.00     Types: Cigarettes     Start date:      Quit date:      Years since quittin.8     Passive exposure: Past    Smokeless tobacco: Never    Tobacco comments:     quit    Vaping Use    Vaping Use: Never used   Substance and Sexual Activity    Alcohol use: Yes     Comment: occasionally    Drug use: No    Sexual activity: Defer     Partners: Female     Birth control/protection: None     Review of Systems   Musculoskeletal:  Positive for gait problem.   Neurological:  Positive for tremors.   All other systems reviewed and are negative.      Objective:    /76   Pulse 85   Ht 182.9 cm (72\")   Wt 118 kg (260 lb)   SpO2 95%   BMI 35.26 kg/m²     Neurology Exam:    General apperance: Masklike facies    Mental status: Alert, awake and oriented to time place and person.    Recent and " Remote memory: Intact.    Attention span and Concentration: Normal.     Language and Speech: Intact- No dysarthria.    Fluency, Naming , Repitition and Comprehension:  Intact    Cranial Nerves:   CN II: Visual fields are full. Intact. Fundi - Normal, No papillederma, Pupils - BELLA  CN III, IV and VI: Extraocular movements are intact. Normal saccades.   CN V: Facial sensation is intact.   CN VII: Muscles of facial expression reveal no asymmetry. Intact.   CN VIII: Hearing is intact. Whispered voice intact.   CN IX and X: Palate elevates symmetrically. Intact  CN XI: Shoulder shrug is intact.   CN XII: Tongue is midline without evidence of atrophy or fasciculation.     Ophthalmoscopic exam of optic disc-normal    Motor:-Occasional resting tremor noted in his right hand (last dose of Sinemet was about 4 hours ago).  Right UE muscle strength 5/5. Normal tone.     Left UE muscle strength 5/5. Normal tone.      Right LE muscle strength5/5. Normal tone.     Left LE muscle strength 5/5. Normal tone.  Mild bilateral lymphedema with vasostatic changes noted    Sensory: Normal light touch, vibration and pinprick sensation bilaterally.    DTRs: 2+ bilaterally in upper and lower extremities.    Babinski: Negative bilaterally.    Co-ordination: Normal finger-to-nose, heel to shin B/L.    Rhomberg: Negative.    Gait: Slow and cautious gait with knees bent (due to pain).  Using a motorized scooter today    Cardiovascular: Regular rate and rhythm without murmur, gallop or rub.    Assessment and Plan:  1. Parkinson's disease  I have told him to continue Sinemet 25/100 mg, 2 tablets 4 times daily at 10 AM, 3 PM, 8 PM and 11 PM in addition to Sinemet ER 1 tablet twice a day-8 AM and 4 PM  Since he is still having difficulty sleeping I will switch him from Seroquel to Remeron gradually increasing to 30 mg nightly for  He will be resuming PT/OT soon  I have also given him a prescription for a adjustable rollator walker    2. Restless  legs syndrome (RLS)  He should continue Mirapex 1 mg 3 times daily  Should keep himself well-hydrated    Return in about 4 months (around 2/23/2024).     I spent over 25 minutes with the patient face to face out of which over 50% (20 minutes) was spent in management, instructions and education.     Bernice Ortiz MD

## 2023-10-25 ENCOUNTER — OFFICE VISIT (OUTPATIENT)
Dept: PULMONOLOGY | Facility: CLINIC | Age: 78
End: 2023-10-25
Payer: MEDICARE

## 2023-10-25 ENCOUNTER — TELEPHONE (OUTPATIENT)
Dept: FAMILY MEDICINE CLINIC | Facility: CLINIC | Age: 78
End: 2023-10-25
Payer: MEDICARE

## 2023-10-25 VITALS
WEIGHT: 250 LBS | HEIGHT: 72 IN | DIASTOLIC BLOOD PRESSURE: 68 MMHG | HEART RATE: 50 BPM | OXYGEN SATURATION: 95 % | SYSTOLIC BLOOD PRESSURE: 130 MMHG | TEMPERATURE: 97.5 F | BODY MASS INDEX: 33.86 KG/M2

## 2023-10-25 DIAGNOSIS — K21.9 CHRONIC GERD: ICD-10-CM

## 2023-10-25 DIAGNOSIS — R91.1 PULMONARY NODULE: Primary | ICD-10-CM

## 2023-10-25 DIAGNOSIS — Z87.891 FORMER SMOKER: ICD-10-CM

## 2023-10-25 DIAGNOSIS — R94.2 RESTRICTIVE PATTERN PRESENT ON PULMONARY FUNCTION TESTING: ICD-10-CM

## 2023-10-25 DIAGNOSIS — R05.3 CHRONIC COUGH: ICD-10-CM

## 2023-10-25 NOTE — TELEPHONE ENCOUNTER
OSMEL @ [facility] CALLED.  PATIENT HAS BEEN D/C FROM HOSPITAL.  PATIENT HAS A BLOOD CLOT ON RLE NEAR KNEE.  OSMEL HAS A FEW CONCERNS.  PATIENT IS ON A BLOOD THINNER & TAKES IBUPROFEN FOR PAIN WHICH IS CONTRAINDICATED; OSMEL IS REQUESTING TRAMADOL FOR HIS PAIN.  THE PATIENT ALSO USED LYMPHEDEMA PUMPS BEFORE THE HOSPITAL AND WANTS TO KNOW IF HE CAN USE THEM AGAIN BUT IS CONCERNED ABOUT DISLODGING CLOT.      PH:  [phone]

## 2023-10-25 NOTE — PROGRESS NOTES
PULMONARY  NOTE    Chief Complaint     Pulmonary nodule, remote smoker, Parkinson's, chronic cough, esophageal dysphagia    History of Present Illness     78-year-old male referred for an abnormal CT scan of the chest    He was recently admitted to [facility]  During his hospital stay he had a CT scan of the chest  This revealed a noncalcified 4 mm pulmonary nodule  No other chest imaging available for comparison    Patient has not smoked since 1980  No past history of known lung disease  No other recent chest imaging for comparison    He gets around in a power wheelchair due to his Parkinson's disease    During his recent hospital stay he underwent an dysphagia evaluation  Modified barium swallow was unremarkable but he was felt to have esophageal dysphagia based on speech therapy evaluation  He does have a hiatal hernia  He sleeps elevated but does not strictly follow reflux precautions    Patient Active Problem List   Diagnosis    Anxiety    Arthritis    Depression    Hyperlipidemia    Lumbar stenosis with neurogenic claudication    Parkinson's disease    Lumbar stenosis with neurogenic claudication status post L4-5 decompression    Status post lumbar laminectomy    Memory loss    Chronic edema    Cellulitis of right lower extremity    Exertional dyspnea    Right knee pain    Cellulitis of lower extremity    Hypothyroidism (acquired)    Elevated serum creatinine    Cellulitis of right leg    Acute deep vein thrombosis (DVT) of right lower extremity    Pneumonia    Pulmonary nodule (4mm RML incidental)    Chronic cough    GERD    Remote smoker (Stopped 1980)    Restrictive pattern on PFTs w/o evidence of ILD      No Known Allergies    Current Outpatient Medications:     acetaminophen (TYLENOL) 325 MG tablet, Take 2 tablets by mouth Every 6 (Six) Hours As Needed for Mild Pain ., Disp: 28 tablet, Rfl: 0    apixaban (ELIQUIS) 5 MG tablet tablet, Take 1 tablet by mouth Every 12 (Twelve) Hours.  Indications: DVT/PE (active thrombosis), Disp: 60 tablet, Rfl:     atorvastatin (LIPITOR) 40 MG tablet, Take 1 tablet by mouth Daily., Disp: 90 tablet, Rfl: 3    carbidopa-levodopa (SINEMET)  MG per tablet, Take 2 tablets by mouth 4 (Four) Times a Day., Disp: 240 tablet, Rfl: 11    carbidopa-levodopa ER (SINEMET CR)  MG per tablet, Take 1 tablet by mouth 2 (Two) Times a Day. To be taken at 8 AM and 4 PM, Disp: 60 tablet, Rfl: 11    Cholecalciferol (VITAMIN D3) 5000 units capsule capsule, Take 1 capsule by mouth Daily., Disp: , Rfl:     fluorometholone (FML) 0.1 % ophthalmic suspension, fluorometholone 0.1 % eye drops,suspension, Disp: , Rfl:     fluticasone (FLONASE) 50 MCG/ACT nasal spray, USE 2 SPRAYS IN EACH NOSTRIL ONCE DAILY, Disp: 48 mL, Rfl: 2    furosemide (Lasix) 20 MG tablet, Take 1 tablet by mouth Daily., Disp: 90 tablet, Rfl: 3    levothyroxine (SYNTHROID, LEVOTHROID) 88 MCG tablet, TAKE 1 TABLET BY MOUTH EVERY DAY, Disp: 90 tablet, Rfl: 3    loratadine (Claritin) 10 MG tablet, Take 1 tablet by mouth Daily., Disp: 90 tablet, Rfl: 1    mirtazapine (Remeron) 30 MG tablet, Start with half a tablet at night for 2 weeks then increase to 1 tablet at night, Disp: 30 tablet, Rfl: 6    potassium chloride (K-DUR,KLOR-CON) 20 MEQ CR tablet, Take 1 tablet by mouth Daily., Disp: 90 tablet, Rfl: 3    pramipexole (MIRAPEX) 1 MG tablet, Take 1 tablet by mouth 3 (Three) Times a Day., Disp: 270 tablet, Rfl: 3    tamsulosin (FLOMAX) 0.4 MG capsule 24 hr capsule, tamsulosin 0.4 mg capsule  TAKE 1 CAPSULE BY MOUTH EVERY DAY, Disp: , Rfl:     vitamin B-6 (PYRIDOXINE) 25 MG tablet, Take 1 tablet by mouth Daily., Disp: 30 tablet, Rfl: 11    vitamin C (ASCORBIC ACID) 500 MG tablet, Take 1 tablet by mouth Daily., Disp: , Rfl:   MEDICATION LIST AND ALLERGIES REVIEWED.    Family History   Problem Relation Age of Onset    Alzheimer's disease Other     Cancer Other     Diabetes Other     Heart disease Other     Stroke  "Other     Cancer Father      Social History     Tobacco Use    Smoking status: Former     Packs/day: 1.00     Years: 15.00     Additional pack years: 0.00     Total pack years: 15.00     Types: Cigarettes     Start date:      Quit date:      Years since quittin.8     Passive exposure: Past    Smokeless tobacco: Never    Tobacco comments:     quit    Vaping Use    Vaping Use: Never used   Substance Use Topics    Alcohol use: Yes     Comment: occasionally    Drug use: No     Social History     Social History Narrative    Lives alone. Uses walker    Has not smoked since about      FAMILY AND SOCIAL HISTORY REVIEWED.    Review of Systems  IF PRESENT REFER TO SCANNED ROS SHEET FROM SAME DATE  OTHERWISE ROS OBTAINED AND NON-CONTRIBUTORY OVER HPI.    /68   Pulse 50   Temp 97.5 °F (36.4 °C)   Ht 182.9 cm (72\")   Wt 113 kg (250 lb)   SpO2 95%   BMI 33.91 kg/m²   Physical Exam  Vitals and nursing note reviewed.   Constitutional:       General: He is not in acute distress.     Appearance: He is well-developed. He is not diaphoretic.   HENT:      Head: Normocephalic and atraumatic.   Neck:      Thyroid: No thyromegaly.   Cardiovascular:      Rate and Rhythm: Normal rate and regular rhythm.      Heart sounds: Normal heart sounds. No murmur heard.  Pulmonary:      Effort: Pulmonary effort is normal.      Breath sounds: Normal breath sounds. No stridor.   Lymphadenopathy:      Cervical: No cervical adenopathy.      Upper Body:      Right upper body: No supraclavicular or epitrochlear adenopathy.      Left upper body: No supraclavicular or epitrochlear adenopathy.   Skin:     General: Skin is warm and dry.   Neurological:      Mental Status: He is alert and oriented to person, place, and time.      Comments: Got around the office today in a power wheelchair.  Resting tremor in the right upper extremity greater than the left   Psychiatric:         Behavior: Behavior normal.         Results     CT " angiogram from 9/11/2023 reviewed on PACS  No pulmonary emboli, effusions, infiltrates, or consolidation  4 mm right middle lobe pulmonary nodule  Hiatal hernia    Immunization History   Administered Date(s) Administered    COVID-19 (MODERNA) 1st,2nd,3rd Dose Monovalent 03/10/2021, 04/07/2021, 10/28/2021    Flu Vaccine Quad PF >36MO 06/16/2022    Fluad Quad 65+ 08/31/2020    Fluzone High Dose =>65 Years ([employer] ONLY) 02/23/2018, 09/12/2018, 09/02/2019, 10/13/2020    Pneumococcal Conjugate 13-Valent (PCV13) 04/30/2015    Pneumococcal Polysaccharide (PPSV23) 01/29/2013    Tdap 12/04/2009    Zostavax 01/26/2011     Problem List       ICD-10-CM ICD-9-CM   1. Pulmonary nodule (4mm RML incidental)  R91.1 793.11   2. Chronic cough  R05.3 786.2   3. GERD  K21.9 530.81   4. Remote smoker (Stopped 1980)  Z87.891 V15.82   5. Restrictive pattern on PFTs w/o evidence of ILD  R94.2 794.2       Discussion     We reviewed his chest imaging  He has an incidentally identified 4 mm noncalcified pulmonary nodule  He is low risk  I gave him literature on pulmonary nodules and management guidelines  Based on Fleischner Society guidelines no further work-up would be indicated for this pulmonary nodule    We discussed his chronic cough  I suspect that this is due to esophageal dysphagia and reflux  He does have quite a large hiatal hernia on his CT scan of his chest  We discussed reflux precautions including strict n.p.o. 2 hours before going to bed at night and elevation of the head of the bed    He does have a restrictive defect on PFTs  I think this is probably due to his body habitus  He has no evidence of interstitial lung disease by his recent CT scan of the chest    I will plan to see him back on an as-needed basis    Moderate level of Medical Decision Making complexity based on 1 undiagnosed new problem or 2 stable chronic conditions, independent interpretation of tests, and/or prescription drug management     Gregor Bingham  MD Michael  Note electronically signed    CC: Ben Holder, DO

## 2023-10-26 NOTE — TELEPHONE ENCOUNTER
Left message for Cordell Martin  to return my call.    Hub may relay message and document/schedule.    Due to the concerns that  called with, Dr. Holder recommends patient to come in for an appointment so he can discuss further.

## 2023-10-31 ENCOUNTER — OFFICE VISIT (OUTPATIENT)
Dept: FAMILY MEDICINE CLINIC | Facility: CLINIC | Age: 78
End: 2023-10-31
Payer: MEDICARE

## 2023-10-31 VITALS
BODY MASS INDEX: 33.86 KG/M2 | HEIGHT: 72 IN | SYSTOLIC BLOOD PRESSURE: 122 MMHG | OXYGEN SATURATION: 98 % | DIASTOLIC BLOOD PRESSURE: 64 MMHG | WEIGHT: 250 LBS | HEART RATE: 80 BPM

## 2023-10-31 DIAGNOSIS — M25.561 CHRONIC PAIN OF RIGHT KNEE: ICD-10-CM

## 2023-10-31 DIAGNOSIS — G89.29 CHRONIC PAIN OF RIGHT KNEE: ICD-10-CM

## 2023-10-31 DIAGNOSIS — I82.4Y1 ACUTE DEEP VEIN THROMBOSIS (DVT) OF PROXIMAL VEIN OF RIGHT LOWER EXTREMITY: Primary | ICD-10-CM

## 2023-10-31 DIAGNOSIS — I89.0 LYMPHEDEMA: ICD-10-CM

## 2023-10-31 DIAGNOSIS — R05.9 COUGH, UNSPECIFIED TYPE: ICD-10-CM

## 2023-10-31 NOTE — PROGRESS NOTES
Chief Complaint   Patient presents with    Hospital Follow Up Visit     23   DVT, lymphedema    HPI:  Cordell Martin is a 78 y.o. male who presents today for f/u DVT, knee pain, and lymphedema. Pt reports the lymphedema pumps at home are causing pain. He is interested in going back to edema clinic at [facility].     ROS:  Constitutional: no fevers, night sweats or unexplained weight loss  Eyes: no vision changes  ENT: no runny nose, ear pain, sore throat  Cardio: no chest pain, palpitations  Pulm: no shortness of breath, wheezing, or cough  GI: no abdominal pain or changes in bowel movements  : no difficulty urinating  MSK: no difficulty ambulating, no joint pain  Neuro: no weakness, dizziness or headache  Psych: no trouble sleeping  Endo: no change in appetite      Past Medical History:   Diagnosis Date    Anxiety     Arthritis     Back pain     Bronchitis     Cognitive impairment     Depression     Disease of thyroid gland     Glucose intolerance (impaired glucose tolerance)     Hyperlipidemia     Kidney stone     Obesity     Parkinson disease     Pneumonia     Prostate disorder     Thyroid disease     Wears eyeglasses       Family History   Problem Relation Age of Onset    Alzheimer's disease Other     Cancer Other     Diabetes Other     Heart disease Other     Stroke Other     Cancer Father       Social History     Socioeconomic History    Marital status:    Tobacco Use    Smoking status: Former     Packs/day: 1.00     Years: 15.00     Additional pack years: 0.00     Total pack years: 15.00     Types: Cigarettes     Start date:      Quit date:      Years since quittin.8     Passive exposure: Past    Smokeless tobacco: Never    Tobacco comments:     quit    Vaping Use    Vaping Use: Never used   Substance and Sexual Activity    Alcohol use: Yes     Comment: occasionally    Drug use: No    Sexual activity: Defer     Partners: Female     Birth control/protection: None      No Known  Allergies   Immunization History   Administered Date(s) Administered    COVID-19 (MODERNA) 1st,2nd,3rd Dose Monovalent 03/10/2021, 04/07/2021, 10/28/2021    Flu Vaccine Quad PF >36MO 06/16/2022    Fluad Quad 65+ 08/31/2020    Fluzone High Dose =>65 Years (Vaxcare ONLY) 02/23/2018, 09/12/2018, 09/02/2019, 10/13/2020    Pneumococcal Conjugate 13-Valent (PCV13) 04/30/2015    Pneumococcal Polysaccharide (PPSV23) 01/29/2013    Tdap 12/04/2009    Zostavax 01/26/2011        PE:  Vitals:    10/31/23 1259   BP: 122/64   Pulse: 80   SpO2: 98%      Body mass index is 33.91 kg/m².    Gen Appearance: NAD  HEENT: Normocephalic, PERRLA, no thyromegaly, trache midline  Heart: RRR, normal S1 and S2, no murmur  Lungs: CTA b/l, no wheezing, no crackles  Abdomen: Soft, non-tender, non-distended, no guarding and BSx4  MSK: Moves all extremities well, normal gait, no peripheral edema  Pulses: Palpable and equal b/l  Lymph nodes: No palpable lymphadenopathy   Neuro: No focal deficits      Current Outpatient Medications   Medication Sig Dispense Refill    acetaminophen (TYLENOL) 325 MG tablet Take 2 tablets by mouth Every 6 (Six) Hours As Needed for Mild Pain . 28 tablet 0    apixaban (ELIQUIS) 5 MG tablet tablet Take 1 tablet by mouth Every 12 (Twelve) Hours for 90 days. Indications: DVT/PE (active thrombosis) 180 tablet 0    atorvastatin (LIPITOR) 40 MG tablet Take 1 tablet by mouth Daily. 90 tablet 3    carbidopa-levodopa (SINEMET)  MG per tablet Take 2 tablets by mouth 4 (Four) Times a Day. 240 tablet 11    carbidopa-levodopa ER (SINEMET CR)  MG per tablet Take 1 tablet by mouth 2 (Two) Times a Day. To be taken at 8 AM and 4 PM 60 tablet 11    Cholecalciferol (VITAMIN D3) 5000 units capsule capsule Take 1 capsule by mouth Daily.      fluorometholone (FML) 0.1 % ophthalmic suspension fluorometholone 0.1 % eye drops,suspension      fluticasone (FLONASE) 50 MCG/ACT nasal spray USE 2 SPRAYS IN EACH NOSTRIL ONCE DAILY 48 mL 2     furosemide (Lasix) 20 MG tablet Take 1 tablet by mouth Daily. 90 tablet 3    levothyroxine (SYNTHROID, LEVOTHROID) 88 MCG tablet TAKE 1 TABLET BY MOUTH EVERY DAY 90 tablet 3    loratadine (Claritin) 10 MG tablet Take 1 tablet by mouth Daily. 90 tablet 1    mirtazapine (Remeron) 30 MG tablet Start with half a tablet at night for 2 weeks then increase to 1 tablet at night 30 tablet 6    potassium chloride (K-DUR,KLOR-CON) 20 MEQ CR tablet Take 1 tablet by mouth Daily. 90 tablet 3    pramipexole (MIRAPEX) 1 MG tablet Take 1 tablet by mouth 3 (Three) Times a Day. 270 tablet 3    tamsulosin (FLOMAX) 0.4 MG capsule 24 hr capsule tamsulosin 0.4 mg capsule   TAKE 1 CAPSULE BY MOUTH EVERY DAY      vitamin B-6 (PYRIDOXINE) 25 MG tablet Take 1 tablet by mouth Daily. 30 tablet 11    vitamin C (ASCORBIC ACID) 500 MG tablet Take 1 tablet by mouth Daily.       No current facility-administered medications for this visit.      Currently using ibuprofen as needed for knee pain.  Suggested alternative such as Tylenol or tramadol while he is on blood thinner.  He would like to trial Tylenol initially.    Refer back to  lymphedema clinic.  He would like to use the [facility] location since he had success there in the past.    Continue Eliquis for at least 3 months.    Diagnoses and all orders for this visit:    1. Acute deep vein thrombosis (DVT) of proximal vein of right lower extremity (Primary)    2. Lymphedema  -     Ambulatory Referral to Physical Therapy Lymphedema (Pt requesting lyphedema clinic at [facility]. has been seen there in the past)    3. Chronic pain of right knee    4. Cough, unspecified type    Other orders  -     apixaban (ELIQUIS) 5 MG tablet tablet; Take 1 tablet by mouth Every 12 (Twelve) Hours for 90 days. Indications: DVT/PE (active thrombosis)  Dispense: 180 tablet; Refill: 0         No follow-ups on file.     Dictated Utilizing Dragon Dictation    Please note that portions of this note were completed  with a voice recognition program.    Part of this note may be an electronic transcription/translation of spoken language to printed text using the Dragon Dictation System.

## 2023-11-01 ENCOUNTER — TELEPHONE (OUTPATIENT)
Dept: FAMILY MEDICINE CLINIC | Facility: CLINIC | Age: 78
End: 2023-11-01
Payer: MEDICARE

## 2023-11-01 NOTE — TELEPHONE ENCOUNTER
Caller: [facility]    Relationship:     Best call back number: [phone]     What is the medical concern/diagnosis: LEGS    What specialty or service is being requested: LYMPHEDEMA CLINIC    What is the provider, practice or medical service name: [facility]        Any additional details: BETO CHAVIRAED THE PATIENT IS OPEN TO GO TO THE CLINIC AND ASKING FOR A REFERRAL

## 2023-11-01 NOTE — TELEPHONE ENCOUNTER
ALSO HE HAS SAMPLES OF AN INHALER AND HE DOES WANT THE RX CALLED INTO THE PHARMACY BELOW.       [facility] [address] [place] [address] [address] - [phone]  - [phone]  [phone]

## 2023-11-03 ENCOUNTER — OUTSIDE FACILITY SERVICE (OUTPATIENT)
Dept: FAMILY MEDICINE CLINIC | Facility: CLINIC | Age: 78
End: 2023-11-03
Payer: MEDICARE

## 2023-11-03 DIAGNOSIS — R05.3 CHRONIC COUGH: ICD-10-CM

## 2023-11-03 DIAGNOSIS — J42 CHRONIC BRONCHITIS, UNSPECIFIED CHRONIC BRONCHITIS TYPE: ICD-10-CM

## 2023-11-03 DIAGNOSIS — R94.2 RESTRICTIVE PATTERN PRESENT ON PULMONARY FUNCTION TESTING: Primary | ICD-10-CM

## 2023-11-03 RX ORDER — DIPHENHYDRAMINE HYDROCHLORIDE 25 MG/1
25 CAPSULE ORAL DAILY
Qty: 30 TABLET | Refills: 11 | Status: SHIPPED | OUTPATIENT
Start: 2023-11-03

## 2023-11-03 NOTE — TELEPHONE ENCOUNTER
Caller: ELIZA    Relationship:     Best call back number: [phone]     Requested Prescriptions:   Requested Prescriptions     Pending Prescriptions Disp Refills    vitamin B-6 (PYRIDOXINE) 25 MG tablet 30 tablet 11     Sig: Take 1 tablet by mouth Daily.        Pharmacy where request should be sent: [facility] [address] [place] [address] [address] - [phone]  - [phone] FX     Last office visit with prescribing clinician: 10/23/2023   Last telemedicine visit with prescribing clinician: Visit date not found   Next office visit with prescribing clinician: Visit date not found     Additional details provided by patient: N/A    Does the patient have less than a 3 day supply:  [] Yes  [x] No    Would you like a call back once the refill request has been completed: [] Yes [x] No    If the office needs to give you a call back, can they leave a voicemail: [] Yes [x] No    Zuleyma Rubalcava Rep   11/03/23 12:36 EDT

## 2023-11-03 NOTE — TELEPHONE ENCOUNTER
Rx Refill Note  Requested Prescriptions     Pending Prescriptions Disp Refills    vitamin B-6 (PYRIDOXINE) 25 MG tablet 30 tablet 11     Sig: Take 1 tablet by mouth Daily.      Last filled:by Priti Ross  Last office visit with prescribing clinician: 10/23/2023      Next office visit with prescribing clinician: Visit date not found     WALTER CANALES  11/03/23, 15:03 EDT      Sent to pharmacy with 11 refills.

## 2023-11-20 ENCOUNTER — TELEPHONE (OUTPATIENT)
Dept: FAMILY MEDICINE CLINIC | Facility: CLINIC | Age: 78
End: 2023-11-20
Payer: MEDICARE

## 2023-11-20 NOTE — TELEPHONE ENCOUNTER
Thalia called with [facility].    She needs verbal orders for: RN visit for wound assessment.    Pt has two new wounds. One on each leg. She thinks caused by weeping edema and they are red and warm.    Home health had been trying to reach patient for two weeks.    Has patient had JOHNNA test before on his legs?    Call Thalia can leave detailed message  [phone]

## 2023-11-21 DIAGNOSIS — R09.89 DIMINISHED PULSES IN LOWER EXTREMITY: Primary | ICD-10-CM

## 2023-11-21 NOTE — TELEPHONE ENCOUNTER
Corinne from  called regarding Mr. Martin,    Trauma wound on left leg from scraping it getting into the bed   And has a blister on right leg     New policy for any kimberly boots or compression is they need an JOHNNA on file to confirm it is okay. This is something they cannot complete through .     In the meantime asked for verbal orders to cleanse the wound, use border foam, and aquafoam on wound care 2 times weekly.     She mentioned that his legs are swelling, red and warm to the touch and she is unable to palpate any pulse on his legs. He informed her that this has been an issue with is legs before.           Callback # [phone]

## 2023-11-21 NOTE — TELEPHONE ENCOUNTER
Ben Holder, DO  [facility] Clinical Pool1 minute ago (3:42 PM)       Okay with wound care orders, will order JOHNNA.   Informed Corinne of verbal orders for wound care, and also that we have ordered JOHNNA to at least get that process started incase compressions are recommended in the future.   Corinne verbalized understanding and had no further questions at this time.

## 2024-01-04 ENCOUNTER — HOSPITAL ENCOUNTER (OUTPATIENT)
Dept: CARDIOLOGY | Facility: HOSPITAL | Age: 79
Discharge: HOME | End: 2024-01-04
Payer: MEDICARE

## 2024-01-04 VITALS — BODY MASS INDEX: 33.86 KG/M2 | WEIGHT: 250 LBS | HEIGHT: 72 IN

## 2024-01-04 DIAGNOSIS — R09.89 DIMINISHED PULSES IN LOWER EXTREMITY: ICD-10-CM

## 2024-01-04 LAB
BH CV LOWER ARTERIAL LEFT ABI RATIO: 1.32
BH CV LOWER ARTERIAL LEFT DORSALIS PEDIS SYS MAX: 151
BH CV LOWER ARTERIAL LEFT GREAT TOE SYS MAX: 91
BH CV LOWER ARTERIAL LEFT POST TIBIAL SYS MAX: 169
BH CV LOWER ARTERIAL LEFT TBI RATIO: 0.71
BH CV LOWER ARTERIAL RIGHT ABI RATIO: 1.19
BH CV LOWER ARTERIAL RIGHT DORSALIS PEDIS SYS MAX: 152
BH CV LOWER ARTERIAL RIGHT GREAT TOE SYS MAX: 88
BH CV LOWER ARTERIAL RIGHT POST TIBIAL SYS MAX: 138
BH CV LOWER ARTERIAL RIGHT TBI RATIO: 0.69
UPPER ARTERIAL LEFT ARM BRACHIAL SYS MAX: 111
UPPER ARTERIAL RIGHT ARM BRACHIAL SYS MAX: 128

## 2024-01-04 PROCEDURE — 93923 UPR/LXTR ART STDY 3+ LVLS: CPT

## 2024-01-17 ENCOUNTER — TELEPHONE (OUTPATIENT)
Dept: FAMILY MEDICINE CLINIC | Facility: CLINIC | Age: 79
End: 2024-01-17
Payer: MEDICARE

## 2024-01-17 DIAGNOSIS — L85.3 DRY SKIN: Primary | ICD-10-CM

## 2024-01-17 RX ORDER — AMMONIUM LACTATE 12 G/100G
LOTION TOPICAL AS NEEDED
Qty: 225 G | Refills: 1 | Status: SHIPPED | OUTPATIENT
Start: 2024-01-17

## 2024-01-17 NOTE — TELEPHONE ENCOUNTER
[facility] CALLED, STATING THAT THEY HAD JUST VISITED WITH THE PATIENT AND WOULD LIKE FOR THE PCP TO SEND IN A PRESCRIPTION OF AMLACTIN FOR HIM        PLEASE ADVISE

## 2024-01-18 ENCOUNTER — OUTSIDE FACILITY SERVICE (OUTPATIENT)
Dept: FAMILY MEDICINE CLINIC | Facility: CLINIC | Age: 79
End: 2024-01-18
Payer: MEDICARE

## 2024-01-18 PROCEDURE — G0179 MD RECERTIFICATION HHA PT: HCPCS | Performed by: FAMILY MEDICINE

## 2024-02-13 ENCOUNTER — TELEPHONE (OUTPATIENT)
Dept: FAMILY MEDICINE CLINIC | Facility: CLINIC | Age: 79
End: 2024-02-13
Payer: MEDICARE

## 2024-02-15 DIAGNOSIS — M79.89 SWELLING OF LOWER EXTREMITY: Primary | ICD-10-CM

## 2024-02-15 DIAGNOSIS — R09.89 DIMINISHED PULSES IN LOWER EXTREMITY: ICD-10-CM

## 2024-02-19 ENCOUNTER — TELEPHONE (OUTPATIENT)
Dept: FAMILY MEDICINE CLINIC | Facility: CLINIC | Age: 79
End: 2024-02-19
Payer: MEDICARE

## 2024-02-19 NOTE — TELEPHONE ENCOUNTER
Caller: [facility] RODOLFO CASAS    Relationship: Self    Best call back number: [phone]    What orders are you requesting (i.e. lab or imaging): F/U ON DVT FROM 09.2023    In what timeframe would the patient need to come in: ASAP    Where will you receive your lab/imaging services:     Additional notes: DOES HE STILL NEED TO BE ON ELIQUIS AND THE mirtazapine (Remeron) 30 MG tablet . PLEASE ADVISE    PATIENT HAS     pramipexole (MIRAPEX) 1 MG tablet AND mirtazapine (Remeron) 30 MG tablet .     AND NOT ON THEIR LIST, PLEASE ADVISE  SOMEONE WILL CALL BACK WITH PHARMACY

## 2024-02-23 DIAGNOSIS — G20.A1 PARKINSON'S DISEASE: ICD-10-CM

## 2024-02-23 RX ORDER — PRAMIPEXOLE DIHYDROCHLORIDE 1 MG/1
1 TABLET ORAL 3 TIMES DAILY
Qty: 270 TABLET | Refills: 3 | Status: SHIPPED | OUTPATIENT
Start: 2024-02-23

## 2024-02-23 RX ORDER — MIRTAZAPINE 30 MG/1
30 TABLET, FILM COATED ORAL NIGHTLY
Qty: 30 TABLET | Refills: 6 | Status: SHIPPED | OUTPATIENT
Start: 2024-02-23

## 2024-02-28 ENCOUNTER — TELEPHONE (OUTPATIENT)
Dept: FAMILY MEDICINE CLINIC | Facility: CLINIC | Age: 79
End: 2024-02-28
Payer: MEDICARE

## 2024-02-28 NOTE — TELEPHONE ENCOUNTER
Caller: ESVIN LOUIS - [facility]    Relationship to patient:     Best call back number: [phone]     Patient is needing: ESVIN STATES SHE PERFORMED WOUND CARE ON THE PATIENT. SHE STATES HE HAD INCREASED PURULENT DRAINAGE AND MALODOROUS DRAINAGE. SHE STATES HE IS COMPLAINING OF A COUGH WITH YELLOW SPUTUM. SHE STATES HIS LUNGS WERE CLEAR AND HIS VITALS WERE FINE.     ESVIN STATES SHE RECOMMENDED HE CALL AND MAKE AN APPOINTMENT WITH HIS PRIMARY CARE PROVIDER.

## 2024-03-01 ENCOUNTER — OFFICE VISIT (OUTPATIENT)
Dept: FAMILY MEDICINE CLINIC | Facility: CLINIC | Age: 79
End: 2024-03-01
Payer: MEDICARE

## 2024-03-01 VITALS
HEIGHT: 72 IN | BODY MASS INDEX: 33.86 KG/M2 | DIASTOLIC BLOOD PRESSURE: 86 MMHG | SYSTOLIC BLOOD PRESSURE: 128 MMHG | OXYGEN SATURATION: 95 % | WEIGHT: 250 LBS | HEART RATE: 81 BPM

## 2024-03-01 DIAGNOSIS — Z86.718 HISTORY OF DVT (DEEP VEIN THROMBOSIS): ICD-10-CM

## 2024-03-01 DIAGNOSIS — K21.9 GASTROESOPHAGEAL REFLUX DISEASE, UNSPECIFIED WHETHER ESOPHAGITIS PRESENT: ICD-10-CM

## 2024-03-01 DIAGNOSIS — L03.116 CELLULITIS OF LEFT LOWER EXTREMITY: Primary | ICD-10-CM

## 2024-03-01 PROCEDURE — 1159F MED LIST DOCD IN RCRD: CPT | Performed by: INTERNAL MEDICINE

## 2024-03-01 PROCEDURE — 1160F RVW MEDS BY RX/DR IN RCRD: CPT | Performed by: INTERNAL MEDICINE

## 2024-03-01 PROCEDURE — 99214 OFFICE O/P EST MOD 30 MIN: CPT | Performed by: INTERNAL MEDICINE

## 2024-03-01 RX ORDER — SULFAMETHOXAZOLE AND TRIMETHOPRIM 800; 160 MG/1; MG/1
1 TABLET ORAL 2 TIMES DAILY
Qty: 14 TABLET | Refills: 0 | Status: SHIPPED | OUTPATIENT
Start: 2024-03-01 | End: 2024-03-08

## 2024-03-01 NOTE — PROGRESS NOTES
Chief Complaint   Patient presents with    Cough     Has had for several months.    cellulitis    HPI:  Cordell Martin is a 79 y.o. male who presents today concerned for infection on left leg.  He has chronic swelling and lymphedema.  Would like to discuss chronic cough.    ROS:  Constitutional: no fevers, night sweats or unexplained weight loss  Eyes: no vision changes  ENT: no runny nose, ear pain, sore throat  Cardio: no chest pain, palpitations  Pulm: no shortness of breath, wheezing, + cough  GI: no abdominal pain or changes in bowel movements  : no difficulty urinating  MSK: no difficulty ambulating, no joint pain  Neuro: no weakness, dizziness or headache  Psych: no trouble sleeping  Endo: no change in appetite      Past Medical History:   Diagnosis Date    Anxiety     Arthritis     Back pain     Bronchitis     Cognitive impairment     Depression     Disease of thyroid gland     Glucose intolerance (impaired glucose tolerance)     Hyperlipidemia     Kidney stone     Obesity     Parkinson disease     Pneumonia     Prostate disorder     Thyroid disease     Wears eyeglasses       Family History   Problem Relation Age of Onset    Alzheimer's disease Other     Cancer Other     Diabetes Other     Heart disease Other     Stroke Other     Cancer Father       Social History     Socioeconomic History    Marital status:    Tobacco Use    Smoking status: Former     Packs/day: 1.00     Years: 15.00     Additional pack years: 0.00     Total pack years: 15.00     Types: Cigarettes     Start date:      Quit date:      Years since quittin.1     Passive exposure: Past    Smokeless tobacco: Never    Tobacco comments:     quit    Vaping Use    Vaping Use: Never used   Substance and Sexual Activity    Alcohol use: Yes     Comment: occasionally    Drug use: No    Sexual activity: Defer     Partners: Female     Birth control/protection: None      No Known Allergies   Immunization History   Administered  Date(s) Administered    COVID-19 (MODERNA) 1st,2nd,3rd Dose Monovalent 03/10/2021, 04/07/2021, 10/28/2021    Flu Vaccine Quad PF >36MO 06/16/2022    Fluad Quad 65+ 08/31/2020    Fluzone High Dose =>65 Years (Vaxcare ONLY) 02/23/2018, 09/12/2018, 09/02/2019, 10/13/2020    Pneumococcal Conjugate 13-Valent (PCV13) 04/30/2015    Pneumococcal Polysaccharide (PPSV23) 01/29/2013    Tdap 12/04/2009    Zostavax 01/26/2011        PE:  Vitals:    03/01/24 1344   BP: 128/86   Pulse: 81   SpO2: 95%      Body mass index is 33.9 kg/m².    Gen Appearance: NAD  HEENT: Normocephalic, PERRLA, no thyromegaly, trache midline  Heart: RRR, normal S1 and S2, no murmur  Lungs: CTA b/l, no wheezing, no crackles  Abdomen: Soft, non-tender, non-distended, no guarding and BSx4  MSK: Moves all extremities well, normal gait, no peripheral edema  Pulses: Palpable and equal b/l  Lymph nodes: No palpable lymphadenopathy   Neuro: No focal deficits      Current Outpatient Medications   Medication Sig Dispense Refill    acetaminophen (TYLENOL) 325 MG tablet Take 2 tablets by mouth Every 6 (Six) Hours As Needed for Mild Pain . 28 tablet 0    ammonium lactate (AmLactin) 12 % lotion Apply  topically to the appropriate area as directed As Needed for Dry Skin. 225 g 1    atorvastatin (LIPITOR) 40 MG tablet Take 1 tablet by mouth Daily. 90 tablet 3    Budeson-Glycopyrrol-Formoterol (BREZTRI) 160-9-4.8 MCG/ACT aerosol inhaler Inhale 2 puffs 2 (Two) Times a Day. 10.7 g 5    carbidopa-levodopa (SINEMET)  MG per tablet Take 2 tablets by mouth 4 (Four) Times a Day. 240 tablet 11    carbidopa-levodopa ER (SINEMET CR)  MG per tablet Take 1 tablet by mouth 2 (Two) Times a Day. To be taken at 8 AM and 4 PM 60 tablet 11    Cholecalciferol (VITAMIN D3) 5000 units capsule capsule Take 1 capsule by mouth Daily.      Diclofenac Sodium (VOLTAREN) 1 % gel gel APPLY 4GM TO AFFECTED AREA UP TO 4 TIMES DAILY      fluorometholone (FML) 0.1 % ophthalmic suspension  fluorometholone 0.1 % eye drops,suspension      fluticasone (FLONASE) 50 MCG/ACT nasal spray USE 2 SPRAYS IN EACH NOSTRIL ONCE DAILY 48 mL 2    furosemide (Lasix) 20 MG tablet Take 1 tablet by mouth Daily. 90 tablet 3    gabapentin (NEURONTIN) 300 MG capsule       levothyroxine (SYNTHROID, LEVOTHROID) 88 MCG tablet TAKE 1 TABLET BY MOUTH EVERY DAY 90 tablet 3    loratadine (Claritin) 10 MG tablet Take 1 tablet by mouth Daily. 90 tablet 1    mirtazapine (Remeron) 30 MG tablet Take 1 tablet by mouth Every Night. 30 tablet 6    potassium chloride (K-DUR,KLOR-CON) 20 MEQ CR tablet Take 1 tablet by mouth Daily. 90 tablet 3    pramipexole (MIRAPEX) 1 MG tablet Take 1 tablet by mouth 3 (Three) Times a Day. 270 tablet 3    QUEtiapine (SEROquel) 25 MG tablet Take 0.5 tablets by mouth Every Night.      tamsulosin (FLOMAX) 0.4 MG capsule 24 hr capsule tamsulosin 0.4 mg capsule   TAKE 1 CAPSULE BY MOUTH EVERY DAY      traMADol (ULTRAM) 50 MG tablet Take 1 tablet by mouth Daily.      traZODone (DESYREL) 50 MG tablet       vitamin B-6 (PYRIDOXINE) 25 MG tablet Take 1 tablet by mouth Daily. 30 tablet 11    vitamin C (ASCORBIC ACID) 500 MG tablet Take 1 tablet by mouth Daily.      sulfamethoxazole-trimethoprim (Bactrim DS) 800-160 MG per tablet Take 1 tablet by mouth 2 (Two) Times a Day for 7 days. 14 tablet 0     No current facility-administered medications for this visit.      Starting a PPI therapy, cough may be reflux related with hiatal hernia.    Bactrim for cellulitis, recommend follow-up in 7 days for reassessment.    He has been off of Eliquis for the last month.  Took anticoagulation for 6 months due to DVT.  Recommend checking updated ultrasound.    Diagnoses and all orders for this visit:    1. Cellulitis of left lower extremity (Primary)  -     sulfamethoxazole-trimethoprim (Bactrim DS) 800-160 MG per tablet; Take 1 tablet by mouth 2 (Two) Times a Day for 7 days.  Dispense: 14 tablet; Refill: 0    2. History of DVT  (deep vein thrombosis)  -     Duplex Venous Lower Extremity - Bilateral CAR; Future    3. Gastroesophageal reflux disease, unspecified whether esophagitis present         Return in about 10 days (around 3/11/2024).     Dictated Utilizing Dragon Dictation    Please note that portions of this note were completed with a voice recognition program.    Part of this note may be an electronic transcription/translation of spoken language to printed text using the Dragon Dictation System.

## 2024-03-05 ENCOUNTER — TELEPHONE (OUTPATIENT)
Dept: FAMILY MEDICINE CLINIC | Facility: CLINIC | Age: 79
End: 2024-03-05
Payer: MEDICARE

## 2024-03-05 RX ORDER — PANTOPRAZOLE SODIUM 40 MG/1
40 TABLET, DELAYED RELEASE ORAL DAILY
Qty: 30 TABLET | Refills: 1 | Status: SHIPPED | OUTPATIENT
Start: 2024-03-05

## 2024-03-05 NOTE — TELEPHONE ENCOUNTER
Caller: [facility] [address] [facility] [address] [place] [address] [address] - [phone]  - [phone] FX    Relationship: Pharmacy    Best call back number: [phone]     What medication are you requesting: MEDICATION FOR REFLUX    What are your current symptoms:     How long have you been experiencing symptoms:     Have you had these symptoms before:    [] Yes  [x] No    Have you been treated for these symptoms before:   [] Yes  [x] No    If a prescription is needed, what is your preferred pharmacy and phone number:  [facility]     Additional notes: PATIENT THOUGHT A REFLUX MEDICATION WAS GOING JESSICA BE SENT TO THE PHARMACY AND THEY DID NOT RECEIVE PRESCRIPTION.            Mid-Level Procedure Text (A): After obtaining clear surgical margins the patient was sent to a mid-level provider for surgical repair.  The patient understands they will receive post-surgical care and follow-up from the mid-level provider.

## 2024-03-05 NOTE — TELEPHONE ENCOUNTER
Reviewed Dr. Holder's note from 3/1. Appears PPI was recommended. Sent pantoprazole to his pharmacy.

## 2024-03-11 ENCOUNTER — HOSPITAL ENCOUNTER (OUTPATIENT)
Dept: CARDIOLOGY | Facility: HOSPITAL | Age: 79
Discharge: HOME | End: 2024-03-11
Admitting: INTERNAL MEDICINE
Payer: MEDICARE

## 2024-03-11 DIAGNOSIS — Z86.718 HISTORY OF DVT (DEEP VEIN THROMBOSIS): ICD-10-CM

## 2024-03-11 LAB
BH CV LOW VAS LEFT GREATER SAPH AK VESSEL: 1
BH CV LOW VAS LEFT GREATER SAPH BK VESSEL: 1
BH CV LOW VAS RIGHT PERONEAL VESSEL: 1
BH CV LOWER VASCULAR LEFT COMMON FEMORAL AUGMENT: NORMAL
BH CV LOWER VASCULAR LEFT COMMON FEMORAL COMPRESS: NORMAL
BH CV LOWER VASCULAR LEFT COMMON FEMORAL PHASIC: NORMAL
BH CV LOWER VASCULAR LEFT COMMON FEMORAL SPONT: NORMAL
BH CV LOWER VASCULAR LEFT DISTAL FEMORAL COMPRESS: NORMAL
BH CV LOWER VASCULAR LEFT GASTRONEMIUS COMPRESS: NORMAL
BH CV LOWER VASCULAR LEFT GREATER SAPH AK COMPRESS: NORMAL
BH CV LOWER VASCULAR LEFT GREATER SAPH AK THROMBUS: NORMAL
BH CV LOWER VASCULAR LEFT GREATER SAPH BK COMPRESS: NORMAL
BH CV LOWER VASCULAR LEFT GREATER SAPH BK THROMBUS: NORMAL
BH CV LOWER VASCULAR LEFT LESSER SAPH COMPRESS: NORMAL
BH CV LOWER VASCULAR LEFT MID FEMORAL AUGMENT: NORMAL
BH CV LOWER VASCULAR LEFT MID FEMORAL COMPRESS: NORMAL
BH CV LOWER VASCULAR LEFT MID FEMORAL PHASIC: NORMAL
BH CV LOWER VASCULAR LEFT MID FEMORAL SPONT: NORMAL
BH CV LOWER VASCULAR LEFT PERONEAL COMPRESS: NORMAL
BH CV LOWER VASCULAR LEFT POPLITEAL AUGMENT: NORMAL
BH CV LOWER VASCULAR LEFT POPLITEAL COMPRESS: NORMAL
BH CV LOWER VASCULAR LEFT POPLITEAL PHASIC: NORMAL
BH CV LOWER VASCULAR LEFT POPLITEAL SPONT: NORMAL
BH CV LOWER VASCULAR LEFT POSTERIOR TIBIAL COMPRESS: NORMAL
BH CV LOWER VASCULAR LEFT PROXIMAL FEMORAL COMPRESS: NORMAL
BH CV LOWER VASCULAR LEFT SAPHENOFEMORAL JUNCTION COMPRESS: NORMAL
BH CV LOWER VASCULAR RIGHT COMMON FEMORAL AUGMENT: NORMAL
BH CV LOWER VASCULAR RIGHT COMMON FEMORAL COMPRESS: NORMAL
BH CV LOWER VASCULAR RIGHT COMMON FEMORAL PHASIC: NORMAL
BH CV LOWER VASCULAR RIGHT COMMON FEMORAL SPONT: NORMAL
BH CV LOWER VASCULAR RIGHT DISTAL FEMORAL COMPRESS: NORMAL
BH CV LOWER VASCULAR RIGHT GASTRONEMIUS COMPRESS: NORMAL
BH CV LOWER VASCULAR RIGHT GREATER SAPH AK COMPRESS: NORMAL
BH CV LOWER VASCULAR RIGHT GREATER SAPH BK COMPRESS: NORMAL
BH CV LOWER VASCULAR RIGHT LESSER SAPH COMPRESS: NORMAL
BH CV LOWER VASCULAR RIGHT MID FEMORAL AUGMENT: NORMAL
BH CV LOWER VASCULAR RIGHT MID FEMORAL COMPRESS: NORMAL
BH CV LOWER VASCULAR RIGHT MID FEMORAL PHASIC: NORMAL
BH CV LOWER VASCULAR RIGHT MID FEMORAL SPONT: NORMAL
BH CV LOWER VASCULAR RIGHT PERONEAL COMPRESS: NORMAL
BH CV LOWER VASCULAR RIGHT PERONEAL THROMBUS: NORMAL
BH CV LOWER VASCULAR RIGHT POPLITEAL AUGMENT: NORMAL
BH CV LOWER VASCULAR RIGHT POPLITEAL COMPRESS: NORMAL
BH CV LOWER VASCULAR RIGHT POPLITEAL PHASIC: NORMAL
BH CV LOWER VASCULAR RIGHT POPLITEAL SPONT: NORMAL
BH CV LOWER VASCULAR RIGHT POSTERIOR TIBIAL COMPRESS: NORMAL
BH CV LOWER VASCULAR RIGHT PROXIMAL FEMORAL COMPRESS: NORMAL
BH CV LOWER VASCULAR RIGHT SAPHENOFEMORAL JUNCTION COMPRESS: NORMAL
BH CV VAS PRELIMINARY FINDINGS SCRIPTING: 1

## 2024-03-11 PROCEDURE — 93970 EXTREMITY STUDY: CPT

## 2024-03-11 PROCEDURE — 93970 EXTREMITY STUDY: CPT | Performed by: INTERNAL MEDICINE

## 2024-03-13 ENCOUNTER — OFFICE VISIT (OUTPATIENT)
Dept: FAMILY MEDICINE CLINIC | Facility: CLINIC | Age: 79
End: 2024-03-13
Payer: MEDICARE

## 2024-03-13 VITALS
BODY MASS INDEX: 33.86 KG/M2 | HEART RATE: 80 BPM | DIASTOLIC BLOOD PRESSURE: 84 MMHG | SYSTOLIC BLOOD PRESSURE: 130 MMHG | HEIGHT: 72 IN | OXYGEN SATURATION: 98 % | WEIGHT: 250 LBS

## 2024-03-13 DIAGNOSIS — L03.90 RECURRENT CELLULITIS: Primary | ICD-10-CM

## 2024-03-13 DIAGNOSIS — S81.802D WOUND OF LEFT LOWER EXTREMITY, SUBSEQUENT ENCOUNTER: ICD-10-CM

## 2024-03-13 DIAGNOSIS — I89.0 LYMPHEDEMA: ICD-10-CM

## 2024-03-13 DIAGNOSIS — Z86.718 HISTORY OF DVT (DEEP VEIN THROMBOSIS): ICD-10-CM

## 2024-03-13 RX ORDER — SULFAMETHOXAZOLE AND TRIMETHOPRIM 800; 160 MG/1; MG/1
TABLET ORAL
COMMUNITY
Start: 2024-03-11

## 2024-03-14 NOTE — PROGRESS NOTES
Chief Complaint   Patient presents with    Cellulitis     Follow up     Heartburn     Follow up        HPI:  Cordell Martin is a 79 y.o. male who presents today for f/u cellulitis and leg swelling.    ROS:  Constitutional: no fevers, night sweats or unexplained weight loss  Eyes: no vision changes  ENT: no runny nose, ear pain, sore throat  Cardio: no chest pain, palpitations  Pulm: no shortness of breath, wheezing, or cough  GI: no abdominal pain or changes in bowel movements  : no difficulty urinating  MSK: no difficulty ambulating, no joint pain  Neuro: no weakness, dizziness or headache  Psych: no trouble sleeping  Endo: no change in appetite      Past Medical History:   Diagnosis Date    Anxiety     Arthritis     Back pain     Bronchitis     Cognitive impairment     Depression     Disease of thyroid gland     Glucose intolerance (impaired glucose tolerance)     Hyperlipidemia     Kidney stone     Obesity     Parkinson disease     Pneumonia     Prostate disorder     Thyroid disease     Wears eyeglasses       Family History   Problem Relation Age of Onset    Alzheimer's disease Other     Cancer Other     Diabetes Other     Heart disease Other     Stroke Other     Cancer Father       Social History     Socioeconomic History    Marital status:    Tobacco Use    Smoking status: Former     Current packs/day: 0.00     Average packs/day: 1 pack/day for 30.0 years (30.0 ttl pk-yrs)     Types: Cigarettes     Start date:      Quit date:      Years since quittin.2     Passive exposure: Past    Smokeless tobacco: Never    Tobacco comments:     quit    Vaping Use    Vaping status: Never Used   Substance and Sexual Activity    Alcohol use: Yes     Comment: occasionally    Drug use: No    Sexual activity: Defer     Partners: Female     Birth control/protection: None      No Known Allergies   Immunization History   Administered Date(s) Administered    COVID-19 (MODERNA) 1st,2nd,3rd Dose Monovalent  03/10/2021, 04/07/2021, 10/28/2021    Flu Vaccine Quad PF >36MO 06/16/2022    Fluad Quad 65+ 08/31/2020    Fluzone (or Fluarix & Flulaval for VFC) >6mos 06/16/2022    Fluzone High Dose =>65 Years ([facility] ONLY) 02/23/2018, 09/12/2018, 09/02/2019, 10/13/2020    Fluzone High-Dose 65+yrs 10/05/2023    Influenza Quad Vaccine (Inpatient) 12/13/2010    Influenza, Unspecified 01/01/2022    Pneumococcal Conjugate 13-Valent (PCV13) 04/30/2015    Pneumococcal Polysaccharide (PPSV23) 01/29/2013, 10/10/2021    Tdap 12/04/2009, 04/18/2023    Zostavax 01/26/2011        PE:  Vitals:    03/13/24 1421   BP: 130/84   Pulse: 80   SpO2: 98%      Body mass index is 33.9 kg/m².    Gen Appearance: NAD  HEENT: Normocephalic, PERRLA, no thyromegaly, trache midline  Heart: RRR, normal S1 and S2, no murmur  Lungs: CTA b/l, no wheezing, no crackles  Abdomen: Soft, non-tender, non-distended, no guarding and BSx4  MSK: Moves all extremities well, normal gait, no peripheral edema  Pulses: Palpable and equal b/l  Lymph nodes: No palpable lymphadenopathy   Neuro: No focal deficits      Current Outpatient Medications   Medication Sig Dispense Refill    acetaminophen (TYLENOL) 325 MG tablet Take 2 tablets by mouth Every 6 (Six) Hours As Needed for Mild Pain . 28 tablet 0    ammonium lactate (AmLactin) 12 % lotion Apply  topically to the appropriate area as directed As Needed for Dry Skin. 225 g 1    atorvastatin (LIPITOR) 40 MG tablet Take 1 tablet by mouth Daily. 90 tablet 3    Budeson-Glycopyrrol-Formoterol (BREZTRI) 160-9-4.8 MCG/ACT aerosol inhaler Inhale 2 puffs 2 (Two) Times a Day. 10.7 g 5    carbidopa-levodopa (SINEMET)  MG per tablet Take 2 tablets by mouth 4 (Four) Times a Day. 240 tablet 11    carbidopa-levodopa ER (SINEMET CR)  MG per tablet Take 1 tablet by mouth 2 (Two) Times a Day. To be taken at 8 AM and 4 PM 60 tablet 11    Cholecalciferol (VITAMIN D3) 5000 units capsule capsule Take 1 capsule by mouth Daily.       Diclofenac Sodium (VOLTAREN) 1 % gel gel APPLY 4GM TO AFFECTED AREA UP TO 4 TIMES DAILY      fluorometholone (FML) 0.1 % ophthalmic suspension fluorometholone 0.1 % eye drops,suspension      fluticasone (FLONASE) 50 MCG/ACT nasal spray USE 2 SPRAYS IN EACH NOSTRIL ONCE DAILY 48 mL 2    furosemide (Lasix) 20 MG tablet Take 1 tablet by mouth Daily. 90 tablet 3    gabapentin (NEURONTIN) 300 MG capsule       levothyroxine (SYNTHROID, LEVOTHROID) 88 MCG tablet TAKE 1 TABLET BY MOUTH EVERY DAY 90 tablet 3    loratadine (Claritin) 10 MG tablet Take 1 tablet by mouth Daily. 90 tablet 1    mirtazapine (Remeron) 30 MG tablet Take 1 tablet by mouth Every Night. 30 tablet 6    pantoprazole (Protonix) 40 MG EC tablet Take 1 tablet by mouth Daily. 30 tablet 1    potassium chloride (K-DUR,KLOR-CON) 20 MEQ CR tablet Take 1 tablet by mouth Daily. 90 tablet 3    pramipexole (MIRAPEX) 1 MG tablet Take 1 tablet by mouth 3 (Three) Times a Day. 270 tablet 3    QUEtiapine (SEROquel) 25 MG tablet Take 0.5 tablets by mouth Every Night.      sulfamethoxazole-trimethoprim (BACTRIM DS,SEPTRA DS) 800-160 MG per tablet       tamsulosin (FLOMAX) 0.4 MG capsule 24 hr capsule tamsulosin 0.4 mg capsule   TAKE 1 CAPSULE BY MOUTH EVERY DAY      traMADol (ULTRAM) 50 MG tablet Take 1 tablet by mouth Daily.      traZODone (DESYREL) 50 MG tablet       vitamin B-6 (PYRIDOXINE) 25 MG tablet Take 1 tablet by mouth Daily. 30 tablet 11    vitamin C (ASCORBIC ACID) 500 MG tablet Take 1 tablet by mouth Daily.       No current facility-administered medications for this visit.      Venous stasis versus cellulitis.  Recommend finishing Bactrim, he has 3 to 4 days left.  New superficial clots on ultrasound, recommend resuming Eliquis.  Continue lymphedema therapy and wound care with home health.    Recommend establishing care with infectious disease due to recurrent episodes of cellulitis and wounds over the past year.    Diagnoses and all orders for this  visit:    1. Recurrent cellulitis (Primary)    2. Wound of left lower extremity, subsequent encounter    3. History of DVT (deep vein thrombosis)    4. Lymphedema         No follow-ups on file.     Dictated Utilizing Dragon Dictation    Please note that portions of this note were completed with a voice recognition program.    Part of this note may be an electronic transcription/translation of spoken language to printed text using the Dragon Dictation System.

## 2024-03-15 ENCOUNTER — OUTSIDE FACILITY SERVICE (OUTPATIENT)
Dept: FAMILY MEDICINE CLINIC | Facility: CLINIC | Age: 79
End: 2024-03-15
Payer: MEDICARE

## 2024-03-19 ENCOUNTER — TELEPHONE (OUTPATIENT)
Dept: FAMILY MEDICINE CLINIC | Facility: CLINIC | Age: 79
End: 2024-03-19
Payer: MEDICARE

## 2024-03-19 DIAGNOSIS — L03.90 RECURRENT CELLULITIS: ICD-10-CM

## 2024-03-19 DIAGNOSIS — Z86.718 HISTORY OF DVT (DEEP VEIN THROMBOSIS): Primary | ICD-10-CM

## 2024-03-19 DIAGNOSIS — R09.89 OTHER SPECIFIED SYMPTOMS AND SIGNS INVOLVING THE CIRCULATORY AND RESPIRATORY SYSTEMS: ICD-10-CM

## 2024-03-19 DIAGNOSIS — I89.0 LYMPHEDEMA: Primary | ICD-10-CM

## 2024-03-19 DIAGNOSIS — S81.802D WOUND OF LEFT LOWER EXTREMITY, SUBSEQUENT ENCOUNTER: Primary | ICD-10-CM

## 2024-03-19 NOTE — TELEPHONE ENCOUNTER
SHE WANTS TO KNOW HOW YOU FEEL ABOUT COMPRESSION. HE IS USING COMPRESSION SOCKS BUT SHE WANTS TO KNOW HOW YOU FEEL ABOUT UNNA BOOT (WRAP) FOR HIS LEFT LEG. SHE STATES THAT HE NEEDS A REFERRAL TO WOUND CARE.

## 2024-03-19 NOTE — TELEPHONE ENCOUNTER
THEY WOULD LIKE A JOHNNA ORDER FAXED OVER FOR THEM TO GET THE UNNA BOOT. SHE IS GOING TO CALLBACK WHEN SHE GETS THE FAX NUMBER

## 2024-03-19 NOTE — TELEPHONE ENCOUNTER
RODOLFO HERNANDEZ RN W/ [facility] CALLED TO VERIFY PATIENT DX OF DVT L LEG; PLEASE CALL [phone]

## 2024-03-22 ENCOUNTER — TELEPHONE (OUTPATIENT)
Dept: FAMILY MEDICINE CLINIC | Facility: CLINIC | Age: 79
End: 2024-03-22
Payer: MEDICARE

## 2024-03-22 NOTE — TELEPHONE ENCOUNTER
Caller: ALCIRA    Relationship:     [facility]    Best call back number:       [phone]     What form or medical record are you requesting:     CALLER REQUESTED THE OFFICE NOTES FROM RECENT APPOINTMENT WITH DR BREEN COMPLETED ON 3/13    CALLER ALSO REQUESTED A CALL BACK FROM DR BREEN OR NURSE WITH CONFIRMATION IF ANYTHING NEEDS TO BE DONE DIFFERENTLY NOW WITH PATIENT'S NEW DIAGNOSIS OF DVT    FAX NUMBER FOR OFFICE NOTES:    [phone]

## 2024-03-26 ENCOUNTER — TELEPHONE (OUTPATIENT)
Dept: FAMILY MEDICINE CLINIC | Facility: CLINIC | Age: 79
End: 2024-03-26
Payer: MEDICARE

## 2024-03-26 DIAGNOSIS — S81.802A WOUND OF LEFT LOWER EXTREMITY, INITIAL ENCOUNTER: ICD-10-CM

## 2024-03-26 DIAGNOSIS — I89.0 LYMPHEDEMA: Primary | ICD-10-CM

## 2024-03-26 DIAGNOSIS — M79.89 SWELLING OF LOWER EXTREMITY: ICD-10-CM

## 2024-03-26 DIAGNOSIS — S81.801A WOUND OF RIGHT LOWER EXTREMITY, INITIAL ENCOUNTER: ICD-10-CM

## 2024-03-26 DIAGNOSIS — I87.8 VENOUS STASIS: ICD-10-CM

## 2024-03-26 DIAGNOSIS — I82.409 RECURRENT DEEP VEIN THROMBOSIS (DVT): ICD-10-CM

## 2024-03-26 NOTE — TELEPHONE ENCOUNTER
Caller: ASHLEY WITH [facility]    Best call back number:   [phone]     Who are you requesting to speak with (clinical staff, provider,  specific staff member):   CLINICAL     What was the call regarding:  ASHLEY IS REQUESTING A CALL BACK FROM THE NURSE   SHE WANTS TO SEE IF THE PATIENT CAN HE BE BE REFERRED TO [facility]  FOR HIS DVT'S AND CHRONIC WOUNDS     ALSO SHE WILL CHANGE HIS WOUND CARE BECAUSE HE HAS MULTIPLE STAFIS WOUNDS, CLEANSE THE AREA WITH SALINE AND APPLY XEROFROM TO THE WOUND BEDS THEN WRAP THE EXTREMITY WITH ROLLED GALLS AND SECURE THE DRESSING WITH A WRAP     PLEASE CALL

## 2024-03-27 NOTE — TELEPHONE ENCOUNTER
Called Home Health, Delmy was in a meeting. They took a message and stated she will call back. Faxed office notes from 3/13 to provided fax number.     Relay    No changes right now other than resuming eliquis BID. He has been referred to wound care and infectious disease for recurring infections/swelling/thrombus

## 2024-04-01 ENCOUNTER — TELEPHONE (OUTPATIENT)
Dept: FAMILY MEDICINE CLINIC | Facility: CLINIC | Age: 79
End: 2024-04-01
Payer: MEDICARE

## 2024-04-01 NOTE — TELEPHONE ENCOUNTER
Attempted to contact sanna back no answer unable to leave vm due to mailbox being full       ..HUB MAY RELAY MESSAGE AND DOCUMENT   Eliquis samples on first floor patient pickup ready for patient

## 2024-04-01 NOTE — TELEPHONE ENCOUNTER
Caller: RODOLFO    Relationship:[facility]    Callback number: [phone]   Is it ok to leave a message: [x] Yes [] No    Requested medication for samples: ELIQUIS    How much medication does the patient currently have left: COMPLETELY OUT    Who will be picking up the samples: [facility] AS THE PATIENT IS HOMEBOUND    Do you need information about patient financial assistance for this medication: [] Yes [x] No    Additional details provided: PATIENT CANNOT AFFORD THE PRESCRIPTION AND HE IS COMPLETELY OUT

## 2024-04-08 ENCOUNTER — TELEPHONE (OUTPATIENT)
Dept: FAMILY MEDICINE CLINIC | Facility: CLINIC | Age: 79
End: 2024-04-08

## 2024-04-24 DIAGNOSIS — R60.0 LOWER EXTREMITY EDEMA: ICD-10-CM

## 2024-04-24 RX ORDER — FUROSEMIDE 20 MG/1
20 TABLET ORAL DAILY
Qty: 90 TABLET | Refills: 0 | Status: SHIPPED | OUTPATIENT
Start: 2024-04-24

## 2024-05-02 ENCOUNTER — TELEPHONE (OUTPATIENT)
Dept: FAMILY MEDICINE CLINIC | Facility: CLINIC | Age: 79
End: 2024-05-02
Payer: MEDICARE

## 2024-05-02 NOTE — TELEPHONE ENCOUNTER
Name: Mario Cordell Angel      Relationship: Self      Best Callback Number: [phone]      HUB PROVIDED THE RELAY MESSAGE FROM THE OFFICE      PATIENT: VOICED UNDERSTANDING AND HAS NO FURTHER QUESTIONS AT THIS TIME    ADDITIONAL INFORMATION: YES AND WILL BE OVER TOMORROW TO GET THEM PER PATIENT

## 2024-07-26 DIAGNOSIS — R60.0 LOWER EXTREMITY EDEMA: ICD-10-CM

## 2024-07-26 DIAGNOSIS — E03.8 HYPOTHYROIDISM DUE TO HASHIMOTO'S THYROIDITIS: ICD-10-CM

## 2024-07-26 DIAGNOSIS — E06.3 HYPOTHYROIDISM DUE TO HASHIMOTO'S THYROIDITIS: ICD-10-CM

## 2024-07-26 NOTE — TELEPHONE ENCOUNTER
Rx Refill Note  Requested Prescriptions     Pending Prescriptions Disp Refills    furosemide (LASIX) 20 MG tablet [Pharmacy Med Name: Furosemide 20 MG Oral Tablet] 90 tablet 0     Sig: Take 1 tablet by mouth once daily    atorvastatin (LIPITOR) 40 MG tablet [Pharmacy Med Name: Atorvastatin Calcium 40 MG Oral Tablet] 90 tablet 0     Sig: Take 1 tablet by mouth once daily    levothyroxine (SYNTHROID, LEVOTHROID) 88 MCG tablet [Pharmacy Med Name: Levothyroxine Sodium 88 MCG Oral Tablet] 90 tablet 0     Sig: Take 1 tablet by mouth once daily      Last office visit with prescribing clinician: 3/13/2024   Last telemedicine visit with prescribing clinician: Visit date not found   Next office visit with prescribing clinician: Visit date not found                         Would you like a call back once the refill request has been completed: [] Yes [] No    If the office needs to give you a call back, can they leave a voicemail: [] Yes [] No    Jennyfer Sanches MA  07/26/24, 16:18 EDT

## 2024-07-29 RX ORDER — CARBIDOPA AND LEVODOPA 25; 100 MG/1; MG/1
1 TABLET, EXTENDED RELEASE ORAL 2 TIMES DAILY
Qty: 180 TABLET | Refills: 0 | Status: SHIPPED | OUTPATIENT
Start: 2024-07-29

## 2024-07-29 NOTE — TELEPHONE ENCOUNTER
Rx Refill Note  Requested Prescriptions     Pending Prescriptions Disp Refills    carbidopa-levodopa ER (SINEMET CR)  MG per tablet [Pharmacy Med Name: Carbidopa-Levodopa ER  MG Oral Tablet Extended Release] 60 tablet 0     Sig: TAKE 1 TABLET BY MOUTH TWICE DAILY. TO BE TAKEN AT 8 AM AND 4 PM.    carbidopa-levodopa (SINEMET)  MG per tablet [Pharmacy Med Name: Carbidopa-Levodopa  MG Oral Tablet] 720 tablet 0     Sig: TAKE 2 TABLETS BY MOUTH 4 TIMES DAILY      Last filled:6/15/23 11 refill  Last office visit with prescribing clinician: 10/23/2023      Next office visit with prescribing clinician: Visit date not found     Last filled:6/15/23 11 refill  Last office visit with prescribing clinician: 10/23/2023      Next office visit with prescribing clinician: Visit date not found     WALTER CANALES  07/29/24, 10:05 EDT    Sent to pharmacy  90DS with 0 refills-Patient must schedule follow up for additional refills

## 2024-07-30 RX ORDER — ATORVASTATIN CALCIUM 40 MG/1
40 TABLET, FILM COATED ORAL DAILY
Qty: 90 TABLET | Refills: 0 | Status: SHIPPED | OUTPATIENT
Start: 2024-07-30

## 2024-07-30 RX ORDER — LEVOTHYROXINE SODIUM 88 UG/1
88 TABLET ORAL DAILY
Qty: 90 TABLET | Refills: 0 | Status: SHIPPED | OUTPATIENT
Start: 2024-07-30

## 2024-07-30 RX ORDER — FUROSEMIDE 20 MG/1
20 TABLET ORAL DAILY
Qty: 90 TABLET | Refills: 0 | Status: SHIPPED | OUTPATIENT
Start: 2024-07-30

## 2024-09-13 ENCOUNTER — TELEPHONE (OUTPATIENT)
Dept: FAMILY MEDICINE CLINIC | Facility: CLINIC | Age: 79
End: 2024-09-13
Payer: MEDICARE

## 2024-09-15 ENCOUNTER — APPOINTMENT (OUTPATIENT)
Dept: CARDIOLOGY | Facility: HOSPITAL | Age: 79
End: 2024-09-15
Payer: MEDICARE

## 2024-09-15 ENCOUNTER — APPOINTMENT (OUTPATIENT)
Dept: GENERAL RADIOLOGY | Facility: HOSPITAL | Age: 79
End: 2024-09-15
Payer: MEDICARE

## 2024-09-15 ENCOUNTER — HOSPITAL ENCOUNTER (EMERGENCY)
Facility: HOSPITAL | Age: 79
Discharge: HOME | End: 2024-09-15
Attending: EMERGENCY MEDICINE | Admitting: EMERGENCY MEDICINE
Payer: MEDICARE

## 2024-09-15 VITALS
RESPIRATION RATE: 18 BRPM | BODY MASS INDEX: 35.21 KG/M2 | SYSTOLIC BLOOD PRESSURE: 126 MMHG | HEART RATE: 77 BPM | DIASTOLIC BLOOD PRESSURE: 64 MMHG | WEIGHT: 260 LBS | OXYGEN SATURATION: 96 % | TEMPERATURE: 97.5 F | HEIGHT: 72 IN

## 2024-09-15 DIAGNOSIS — L03.115 BILATERAL LOWER LEG CELLULITIS: Primary | ICD-10-CM

## 2024-09-15 DIAGNOSIS — I87.2 CHRONIC VENOUS INSUFFICIENCY: ICD-10-CM

## 2024-09-15 DIAGNOSIS — I82.501 CHRONIC DEEP VEIN THROMBOSIS (DVT) OF RIGHT LOWER EXTREMITY, UNSPECIFIED VEIN: ICD-10-CM

## 2024-09-15 DIAGNOSIS — L03.116 BILATERAL LOWER LEG CELLULITIS: Primary | ICD-10-CM

## 2024-09-15 LAB
ALBUMIN SERPL-MCNC: 3.1 G/DL (ref 3.5–5.2)
ALBUMIN/GLOB SERPL: 0.8 G/DL
ALP SERPL-CCNC: 137 U/L (ref 39–117)
ALT SERPL W P-5'-P-CCNC: <5 U/L (ref 1–41)
ANION GAP SERPL CALCULATED.3IONS-SCNC: 9 MMOL/L (ref 5–15)
AST SERPL-CCNC: 14 U/L (ref 1–40)
BASOPHILS # BLD AUTO: 0.04 10*3/MM3 (ref 0–0.2)
BASOPHILS NFR BLD AUTO: 0.6 % (ref 0–1.5)
BILIRUB SERPL-MCNC: 0.3 MG/DL (ref 0–1.2)
BILIRUB UR QL STRIP: NEGATIVE
BUN SERPL-MCNC: 33 MG/DL (ref 8–23)
BUN/CREAT SERPL: 22.3 (ref 7–25)
CALCIUM SPEC-SCNC: 8.7 MG/DL (ref 8.6–10.5)
CHLORIDE SERPL-SCNC: 104 MMOL/L (ref 98–107)
CLARITY UR: CLEAR
CO2 SERPL-SCNC: 28 MMOL/L (ref 22–29)
COLOR UR: YELLOW
CREAT SERPL-MCNC: 1.48 MG/DL (ref 0.76–1.27)
CRP SERPL-MCNC: 3.99 MG/DL (ref 0–0.5)
D-LACTATE SERPL-SCNC: 0.9 MMOL/L (ref 0.5–2)
DEPRECATED RDW RBC AUTO: 55.5 FL (ref 37–54)
EGFRCR SERPLBLD CKD-EPI 2021: 48.4 ML/MIN/1.73
EOSINOPHIL # BLD AUTO: 0.29 10*3/MM3 (ref 0–0.4)
EOSINOPHIL NFR BLD AUTO: 4 % (ref 0.3–6.2)
ERYTHROCYTE [DISTWIDTH] IN BLOOD BY AUTOMATED COUNT: 15.6 % (ref 12.3–15.4)
ERYTHROCYTE [SEDIMENTATION RATE] IN BLOOD: 67 MM/HR (ref 0–20)
FLUAV RNA RESP QL NAA+PROBE: NOT DETECTED
FLUBV RNA RESP QL NAA+PROBE: NOT DETECTED
GLOBULIN UR ELPH-MCNC: 3.7 GM/DL
GLUCOSE SERPL-MCNC: 113 MG/DL (ref 65–99)
GLUCOSE UR STRIP-MCNC: NEGATIVE MG/DL
HCT VFR BLD AUTO: 37.7 % (ref 37.5–51)
HGB BLD-MCNC: 11.6 G/DL (ref 13–17.7)
HGB UR QL STRIP.AUTO: NEGATIVE
IMM GRANULOCYTES # BLD AUTO: 0.04 10*3/MM3 (ref 0–0.05)
IMM GRANULOCYTES NFR BLD AUTO: 0.6 % (ref 0–0.5)
KETONES UR QL STRIP: NEGATIVE
LEUKOCYTE ESTERASE UR QL STRIP.AUTO: NEGATIVE
LYMPHOCYTES # BLD AUTO: 1.89 10*3/MM3 (ref 0.7–3.1)
LYMPHOCYTES NFR BLD AUTO: 26.3 % (ref 19.6–45.3)
MAGNESIUM SERPL-MCNC: 2 MG/DL (ref 1.6–2.4)
MCH RBC QN AUTO: 30 PG (ref 26.6–33)
MCHC RBC AUTO-ENTMCNC: 30.8 G/DL (ref 31.5–35.7)
MCV RBC AUTO: 97.4 FL (ref 79–97)
MONOCYTES # BLD AUTO: 0.68 10*3/MM3 (ref 0.1–0.9)
MONOCYTES NFR BLD AUTO: 9.4 % (ref 5–12)
NEUTROPHILS NFR BLD AUTO: 4.26 10*3/MM3 (ref 1.7–7)
NEUTROPHILS NFR BLD AUTO: 59.1 % (ref 42.7–76)
NITRITE UR QL STRIP: NEGATIVE
NRBC BLD AUTO-RTO: 0 /100 WBC (ref 0–0.2)
NT-PROBNP SERPL-MCNC: 51.4 PG/ML (ref 0–1800)
PH UR STRIP.AUTO: 5.5 [PH] (ref 5–8)
PLATELET # BLD AUTO: 254 10*3/MM3 (ref 140–450)
PMV BLD AUTO: 8.7 FL (ref 6–12)
POTASSIUM SERPL-SCNC: 3.8 MMOL/L (ref 3.5–5.2)
PROCALCITONIN SERPL-MCNC: 0.1 NG/ML (ref 0–0.25)
PROT SERPL-MCNC: 6.8 G/DL (ref 6–8.5)
PROT UR QL STRIP: NEGATIVE
RBC # BLD AUTO: 3.87 10*6/MM3 (ref 4.14–5.8)
SARS-COV-2 RNA RESP QL NAA+PROBE: NOT DETECTED
SODIUM SERPL-SCNC: 141 MMOL/L (ref 136–145)
SP GR UR STRIP: 1.02 (ref 1–1.03)
UROBILINOGEN UR QL STRIP: NORMAL
WBC NRBC COR # BLD AUTO: 7.2 10*3/MM3 (ref 3.4–10.8)

## 2024-09-15 PROCEDURE — 36415 COLL VENOUS BLD VENIPUNCTURE: CPT

## 2024-09-15 PROCEDURE — 87636 SARSCOV2 & INF A&B AMP PRB: CPT | Performed by: EMERGENCY MEDICINE

## 2024-09-15 PROCEDURE — 85025 COMPLETE CBC W/AUTO DIFF WBC: CPT | Performed by: EMERGENCY MEDICINE

## 2024-09-15 PROCEDURE — 84145 PROCALCITONIN (PCT): CPT | Performed by: EMERGENCY MEDICINE

## 2024-09-15 PROCEDURE — 99284 EMERGENCY DEPT VISIT MOD MDM: CPT

## 2024-09-15 PROCEDURE — 83880 ASSAY OF NATRIURETIC PEPTIDE: CPT | Performed by: EMERGENCY MEDICINE

## 2024-09-15 PROCEDURE — 85652 RBC SED RATE AUTOMATED: CPT | Performed by: EMERGENCY MEDICINE

## 2024-09-15 PROCEDURE — 93970 EXTREMITY STUDY: CPT

## 2024-09-15 PROCEDURE — 87040 BLOOD CULTURE FOR BACTERIA: CPT | Performed by: EMERGENCY MEDICINE

## 2024-09-15 PROCEDURE — 71045 X-RAY EXAM CHEST 1 VIEW: CPT

## 2024-09-15 PROCEDURE — 86140 C-REACTIVE PROTEIN: CPT | Performed by: EMERGENCY MEDICINE

## 2024-09-15 PROCEDURE — 81003 URINALYSIS AUTO W/O SCOPE: CPT | Performed by: EMERGENCY MEDICINE

## 2024-09-15 PROCEDURE — 93970 EXTREMITY STUDY: CPT | Performed by: INTERNAL MEDICINE

## 2024-09-15 PROCEDURE — 80053 COMPREHEN METABOLIC PANEL: CPT | Performed by: EMERGENCY MEDICINE

## 2024-09-15 PROCEDURE — 83735 ASSAY OF MAGNESIUM: CPT | Performed by: EMERGENCY MEDICINE

## 2024-09-15 PROCEDURE — 73610 X-RAY EXAM OF ANKLE: CPT

## 2024-09-15 PROCEDURE — 73590 X-RAY EXAM OF LOWER LEG: CPT

## 2024-09-15 PROCEDURE — 83605 ASSAY OF LACTIC ACID: CPT | Performed by: EMERGENCY MEDICINE

## 2024-09-15 RX ORDER — DOXYCYCLINE 100 MG/1
100 CAPSULE ORAL 2 TIMES DAILY
Qty: 14 CAPSULE | Refills: 0 | Status: SHIPPED | OUTPATIENT
Start: 2024-09-15

## 2024-09-15 RX ORDER — CEPHALEXIN 500 MG/1
500 CAPSULE ORAL 3 TIMES DAILY
Qty: 21 CAPSULE | Refills: 0 | Status: SHIPPED | OUTPATIENT
Start: 2024-09-15 | End: 2024-09-22

## 2024-09-16 ENCOUNTER — HOME HEALTH ADMISSION (OUTPATIENT)
Dept: HOME HEALTH SERVICES | Facility: HOME HEALTHCARE | Age: 79
End: 2024-09-16
Payer: MEDICARE

## 2024-09-16 DIAGNOSIS — R60.0 LOWER EXTREMITY EDEMA: Primary | ICD-10-CM

## 2024-09-16 DIAGNOSIS — L03.119 CELLULITIS OF LOWER EXTREMITY, UNSPECIFIED LATERALITY: ICD-10-CM

## 2024-09-16 DIAGNOSIS — I89.0 LYMPHEDEMA: ICD-10-CM

## 2024-09-16 LAB
BH CV LOW VAS LEFT GREATER SAPH AK VESSEL: 1
BH CV LOW VAS LEFT GREATER SAPH BK VESSEL: 1
BH CV LOW VAS RIGHT PERONEAL VESSEL: 1
BH CV LOWER VASCULAR LEFT COMMON FEMORAL AUGMENT: NORMAL
BH CV LOWER VASCULAR LEFT COMMON FEMORAL COMPRESS: NORMAL
BH CV LOWER VASCULAR LEFT COMMON FEMORAL PHASIC: NORMAL
BH CV LOWER VASCULAR LEFT COMMON FEMORAL SPONT: NORMAL
BH CV LOWER VASCULAR LEFT DISTAL FEMORAL COMPRESS: NORMAL
BH CV LOWER VASCULAR LEFT GASTRONEMIUS COMPRESS: NORMAL
BH CV LOWER VASCULAR LEFT GREATER SAPH AK COMPRESS: NORMAL
BH CV LOWER VASCULAR LEFT GREATER SAPH BK COMPRESS: NORMAL
BH CV LOWER VASCULAR LEFT LESSER SAPH COMPRESS: NORMAL
BH CV LOWER VASCULAR LEFT MID FEMORAL AUGMENT: NORMAL
BH CV LOWER VASCULAR LEFT MID FEMORAL COMPRESS: NORMAL
BH CV LOWER VASCULAR LEFT MID FEMORAL PHASIC: NORMAL
BH CV LOWER VASCULAR LEFT MID FEMORAL SPONT: NORMAL
BH CV LOWER VASCULAR LEFT PERONEAL COMPRESS: NORMAL
BH CV LOWER VASCULAR LEFT POPLITEAL AUGMENT: NORMAL
BH CV LOWER VASCULAR LEFT POPLITEAL COMPRESS: NORMAL
BH CV LOWER VASCULAR LEFT POPLITEAL PHASIC: NORMAL
BH CV LOWER VASCULAR LEFT POPLITEAL SPONT: NORMAL
BH CV LOWER VASCULAR LEFT POSTERIOR TIBIAL COMPRESS: NORMAL
BH CV LOWER VASCULAR LEFT PROXIMAL FEMORAL COMPRESS: NORMAL
BH CV LOWER VASCULAR LEFT SAPHENOFEMORAL JUNCTION COMPRESS: NORMAL
BH CV LOWER VASCULAR RIGHT COMMON FEMORAL AUGMENT: NORMAL
BH CV LOWER VASCULAR RIGHT COMMON FEMORAL COMPRESS: NORMAL
BH CV LOWER VASCULAR RIGHT COMMON FEMORAL PHASIC: NORMAL
BH CV LOWER VASCULAR RIGHT COMMON FEMORAL SPONT: NORMAL
BH CV LOWER VASCULAR RIGHT DISTAL FEMORAL COMPRESS: NORMAL
BH CV LOWER VASCULAR RIGHT GASTRONEMIUS COMPRESS: NORMAL
BH CV LOWER VASCULAR RIGHT GREATER SAPH BK COMPRESS: NORMAL
BH CV LOWER VASCULAR RIGHT LESSER SAPH COMPRESS: NORMAL
BH CV LOWER VASCULAR RIGHT MID FEMORAL AUGMENT: NORMAL
BH CV LOWER VASCULAR RIGHT MID FEMORAL COMPRESS: NORMAL
BH CV LOWER VASCULAR RIGHT MID FEMORAL PHASIC: NORMAL
BH CV LOWER VASCULAR RIGHT MID FEMORAL SPONT: NORMAL
BH CV LOWER VASCULAR RIGHT PERONEAL COMPRESS: NORMAL
BH CV LOWER VASCULAR RIGHT POPLITEAL AUGMENT: NORMAL
BH CV LOWER VASCULAR RIGHT POPLITEAL COMPRESS: NORMAL
BH CV LOWER VASCULAR RIGHT POPLITEAL PHASIC: NORMAL
BH CV LOWER VASCULAR RIGHT POPLITEAL SPONT: NORMAL
BH CV LOWER VASCULAR RIGHT POSTERIOR TIBIAL COMPRESS: NORMAL
BH CV LOWER VASCULAR RIGHT PROXIMAL FEMORAL COMPRESS: NORMAL
BH CV LOWER VASCULAR RIGHT SAPHENOFEMORAL JUNCTION COMPRESS: NORMAL
BH CV VAS PRELIMINARY FINDINGS SCRIPTING: 1

## 2024-09-18 ENCOUNTER — TELEPHONE (OUTPATIENT)
Dept: FAMILY MEDICINE CLINIC | Facility: CLINIC | Age: 79
End: 2024-09-18
Payer: MEDICARE

## 2024-09-19 ENCOUNTER — HOME CARE VISIT (OUTPATIENT)
Dept: HOME HEALTH SERVICES | Facility: HOME HEALTHCARE | Age: 79
End: 2024-09-19
Payer: MEDICARE

## 2024-09-19 PROCEDURE — G0299 HHS/HOSPICE OF RN EA 15 MIN: HCPCS

## 2024-09-20 ENCOUNTER — HOME CARE VISIT (OUTPATIENT)
Dept: HOME HEALTH SERVICES | Facility: HOME HEALTHCARE | Age: 79
End: 2024-09-20
Payer: MEDICARE

## 2024-09-20 ENCOUNTER — TELEPHONE (OUTPATIENT)
Dept: FAMILY MEDICINE CLINIC | Facility: CLINIC | Age: 79
End: 2024-09-20
Payer: MEDICARE

## 2024-09-20 DIAGNOSIS — L03.119 CELLULITIS OF LOWER EXTREMITY, UNSPECIFIED LATERALITY: ICD-10-CM

## 2024-09-20 DIAGNOSIS — S81.801A WOUND OF RIGHT LOWER EXTREMITY, INITIAL ENCOUNTER: ICD-10-CM

## 2024-09-20 DIAGNOSIS — I87.8 VENOUS STASIS: ICD-10-CM

## 2024-09-20 DIAGNOSIS — R60.0 LOWER EXTREMITY EDEMA: Primary | ICD-10-CM

## 2024-09-20 DIAGNOSIS — S81.802A WOUND OF LEFT LOWER EXTREMITY, INITIAL ENCOUNTER: ICD-10-CM

## 2024-09-20 DIAGNOSIS — I89.0 LYMPHEDEMA: ICD-10-CM

## 2024-09-20 DIAGNOSIS — I82.409 RECURRENT DEEP VEIN THROMBOSIS (DVT): ICD-10-CM

## 2024-09-20 DIAGNOSIS — G20.A1 PARKINSON'S DISEASE, UNSPECIFIED WHETHER DYSKINESIA PRESENT, UNSPECIFIED WHETHER MANIFESTATIONS FLUCTUATE: ICD-10-CM

## 2024-09-20 LAB
BACTERIA SPEC AEROBE CULT: NORMAL
BACTERIA SPEC AEROBE CULT: NORMAL

## 2024-09-20 PROCEDURE — G0152 HHCP-SERV OF OT,EA 15 MIN: HCPCS

## 2024-09-21 ENCOUNTER — HOME CARE VISIT (OUTPATIENT)
Dept: HOME HEALTH SERVICES | Facility: HOME HEALTHCARE | Age: 79
End: 2024-09-21
Payer: MEDICARE

## 2024-09-21 PROCEDURE — G0299 HHS/HOSPICE OF RN EA 15 MIN: HCPCS

## 2024-09-22 VITALS
RESPIRATION RATE: 16 BRPM | SYSTOLIC BLOOD PRESSURE: 148 MMHG | HEART RATE: 70 BPM | DIASTOLIC BLOOD PRESSURE: 77 MMHG | OXYGEN SATURATION: 94 %

## 2024-09-22 VITALS
RESPIRATION RATE: 18 BRPM | TEMPERATURE: 98 F | OXYGEN SATURATION: 96 % | SYSTOLIC BLOOD PRESSURE: 120 MMHG | DIASTOLIC BLOOD PRESSURE: 74 MMHG | HEART RATE: 76 BPM

## 2024-09-24 ENCOUNTER — HOME CARE VISIT (OUTPATIENT)
Dept: HOME HEALTH SERVICES | Facility: HOME HEALTHCARE | Age: 79
End: 2024-09-24
Payer: MEDICARE

## 2024-09-24 VITALS
RESPIRATION RATE: 18 BRPM | OXYGEN SATURATION: 100 % | HEART RATE: 63 BPM | TEMPERATURE: 97.5 F | DIASTOLIC BLOOD PRESSURE: 69 MMHG | SYSTOLIC BLOOD PRESSURE: 113 MMHG

## 2024-09-24 PROCEDURE — G0299 HHS/HOSPICE OF RN EA 15 MIN: HCPCS

## 2024-09-25 ENCOUNTER — HOME CARE VISIT (OUTPATIENT)
Dept: HOME HEALTH SERVICES | Facility: HOME HEALTHCARE | Age: 79
End: 2024-09-25
Payer: MEDICARE

## 2024-09-25 PROCEDURE — G0151 HHCP-SERV OF PT,EA 15 MIN: HCPCS

## 2024-09-25 NOTE — HOME HEALTH
"Physical Therapy Initial Evaluation:    HH-PT Focus of Care:     Patient Presentation: Pt is a 78 yo male, living in an ILF in a single apt. PMH of : Parkinsons, venous insufficiency, BLE edema. Pt currently has HHSN for unnaboots, LE wound care. Pt is A/o x 4 and agreeable to OT evaluation in home, w/o CG present. Pt reports living in ILF for approx 3 months. Prior to this he was at [facility]. Pt has a power WC, as well as a scooter. Pt has DME and AE in home, along with a rollator. Pts apt is cluttered. When OT addressed clutter and bathing needs pt reports that would cost extra at this facility. OT wrote for HHA, pt is agreeable at this time. Pt was dep on sock doff/shahid as they were saturated at time of evaluation. Pt reports using sock aide. Pt would not have been able to indep'ly doff/shahid socks today with use of aide. PT is Mod assist to shahid short today as he could stand for less than one min with environemntal support from power scooter. Pt reports completing home mgt tasks indeply.    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    Prior Level of Function: Modified I with ADL, some assist with IADLs    Pt/CG Stated Goal(s): \" I'd like to move around better\"    Pt was seen today for an PT eval. Pt demonstrates: decreased I with functional mobility/balance/transfers, decreased LE AROM/strength,  decreased I with ADL/IADLs. Pt would benefit from skilled PT services 1w1 to address PT problems/interventions/goals per POC. Pt in agreement with plan. However during the course of this encounter a representative from another HH agency entered " pt's residence. They provide in-house services and pt elected to change HH Agencies due to access to exercise equipment.    Based on pt's request, OASIS DC was completed.    POC shared with: HH care team and Ben Holder, DO

## 2024-09-25 NOTE — Clinical Note
Pt was seen today for an PT eval. Pt demonstrates: decreased I with functional mobility/balance/transfers, decreased LE AROM/strength,  decreased I with ADL/IADLs. Pt would benefit from skilled PT services 1w1 to address PT problems/interventions/goals per POC. Pt in agreement with plan. However during the course of this encounter a representative from another  agency entered pt's residence. They provide in-house services and pt elected to change HH Agencies due to access to exercise equipment.    Based on pt's request, OASIS DC was completed.    POC shared with:  care team and Ben Holder,

## 2024-09-26 ENCOUNTER — HOME CARE VISIT (OUTPATIENT)
Dept: HOME HEALTH SERVICES | Facility: HOME HEALTHCARE | Age: 79
End: 2024-09-26
Payer: MEDICARE

## 2024-09-26 PROCEDURE — G0152 HHCP-SERV OF OT,EA 15 MIN: HCPCS

## 2024-09-27 ENCOUNTER — OUTSIDE FACILITY SERVICE (OUTPATIENT)
Dept: FAMILY MEDICINE CLINIC | Facility: CLINIC | Age: 79
End: 2024-09-27
Payer: MEDICARE

## 2024-09-27 ENCOUNTER — TELEPHONE (OUTPATIENT)
Dept: FAMILY MEDICINE CLINIC | Facility: CLINIC | Age: 79
End: 2024-09-27
Payer: MEDICARE

## 2024-09-27 NOTE — TELEPHONE ENCOUNTER
[facility] CALLED; AND REQUESTED AN ORDER FOR AN JOHNNA FOR THIS PATIENT D/T HAVING XUAN BOOTS

## 2024-09-27 NOTE — TELEPHONE ENCOUNTER
[facility] CALLED TO REQUEST WOUND CARE FOR PATIENT FOREST LEGS; NURSE GOING OUT TOMORROW    PH:  [phone]  F:  [phone]

## 2024-09-30 VITALS
DIASTOLIC BLOOD PRESSURE: 60 MMHG | HEART RATE: 68 BPM | SYSTOLIC BLOOD PRESSURE: 110 MMHG | OXYGEN SATURATION: 94 % | TEMPERATURE: 97.4 F | RESPIRATION RATE: 18 BRPM

## 2024-10-08 ENCOUNTER — TELEPHONE (OUTPATIENT)
Dept: FAMILY MEDICINE CLINIC | Facility: CLINIC | Age: 79
End: 2024-10-08

## 2024-10-08 ENCOUNTER — APPOINTMENT (OUTPATIENT)
Dept: GENERAL RADIOLOGY | Facility: HOSPITAL | Age: 79
DRG: 602 | End: 2024-10-08
Payer: MEDICARE

## 2024-10-08 ENCOUNTER — HOSPITAL ENCOUNTER (INPATIENT)
Facility: HOSPITAL | Age: 79
LOS: 10 days | Discharge: NURSING, REHAB OR LONG-TERM CARE | DRG: 602 | End: 2024-10-19
Attending: STUDENT IN AN ORGANIZED HEALTH CARE EDUCATION/TRAINING PROGRAM | Admitting: INTERNAL MEDICINE
Payer: MEDICARE

## 2024-10-08 DIAGNOSIS — M79.605 PAIN IN BOTH LOWER EXTREMITIES: ICD-10-CM

## 2024-10-08 DIAGNOSIS — M79.604 PAIN IN BOTH LOWER EXTREMITIES: ICD-10-CM

## 2024-10-08 DIAGNOSIS — M19.90 ARTHRITIS: ICD-10-CM

## 2024-10-08 DIAGNOSIS — G20.A1 PARKINSON'S DISEASE, UNSPECIFIED WHETHER DYSKINESIA PRESENT, UNSPECIFIED WHETHER MANIFESTATIONS FLUCTUATE: ICD-10-CM

## 2024-10-08 DIAGNOSIS — R60.9 CHRONIC EDEMA: ICD-10-CM

## 2024-10-08 DIAGNOSIS — L03.119 CELLULITIS OF LOWER EXTREMITY, UNSPECIFIED LATERALITY: Primary | ICD-10-CM

## 2024-10-08 DIAGNOSIS — I89.0 LYMPHEDEMA: ICD-10-CM

## 2024-10-08 LAB
ALBUMIN SERPL-MCNC: 2.7 G/DL (ref 3.5–5.2)
ALBUMIN/GLOB SERPL: 0.8 G/DL
ALP SERPL-CCNC: 108 U/L (ref 39–117)
ALT SERPL W P-5'-P-CCNC: <5 U/L (ref 1–41)
ANION GAP SERPL CALCULATED.3IONS-SCNC: 8 MMOL/L (ref 5–15)
AST SERPL-CCNC: 12 U/L (ref 1–40)
ATMOSPHERIC PRESS: ABNORMAL MM[HG]
BASE EXCESS BLDV CALC-SCNC: 1.3 MMOL/L (ref -2–2)
BASOPHILS # BLD AUTO: 0.03 10*3/MM3 (ref 0–0.2)
BASOPHILS NFR BLD AUTO: 0.4 % (ref 0–1.5)
BDY SITE: ABNORMAL
BILIRUB SERPL-MCNC: 0.3 MG/DL (ref 0–1.2)
BILIRUB UR QL STRIP: NEGATIVE
BODY TEMPERATURE: 37
BUN SERPL-MCNC: 22 MG/DL (ref 8–23)
BUN/CREAT SERPL: 17.5 (ref 7–25)
CA-I SERPL ISE-MCNC: 1.17 MMOL/L (ref 1.15–1.3)
CALCIUM SPEC-SCNC: 8.5 MG/DL (ref 8.6–10.5)
CHLORIDE SERPL-SCNC: 106 MMOL/L (ref 98–107)
CLARITY UR: CLEAR
CO2 BLDA-SCNC: 27.4 MMOL/L (ref 22–33)
CO2 SERPL-SCNC: 24 MMOL/L (ref 22–29)
COHGB MFR BLD: 1.3 %
COLOR UR: YELLOW
CREAT SERPL-MCNC: 1.26 MG/DL (ref 0.76–1.27)
CRP SERPL-MCNC: 4.81 MG/DL (ref 0–0.5)
D DIMER PPP FEU-MCNC: 0.9 MCGFEU/ML (ref 0–0.77)
D-LACTATE SERPL-SCNC: 1.6 MMOL/L (ref 0.5–2)
DEPRECATED RDW RBC AUTO: 53.7 FL (ref 37–54)
EGFRCR SERPLBLD CKD-EPI 2021: 58.7 ML/MIN/1.73
EOSINOPHIL # BLD AUTO: 0.31 10*3/MM3 (ref 0–0.4)
EOSINOPHIL NFR BLD AUTO: 4.2 % (ref 0.3–6.2)
EPAP: 0
ERYTHROCYTE [DISTWIDTH] IN BLOOD BY AUTOMATED COUNT: 15.4 % (ref 12.3–15.4)
FLUAV RNA RESP QL NAA+PROBE: NOT DETECTED
FLUBV RNA RESP QL NAA+PROBE: NOT DETECTED
GLOBULIN UR ELPH-MCNC: 3.3 GM/DL
GLUCOSE SERPL-MCNC: 148 MG/DL (ref 65–99)
GLUCOSE UR STRIP-MCNC: NEGATIVE MG/DL
HCO3 BLDV-SCNC: 26.2 MMOL/L (ref 22–28)
HCT VFR BLD AUTO: 33.4 % (ref 37.5–51)
HGB BLD-MCNC: 10.4 G/DL (ref 13–17.7)
HGB BLDA-MCNC: 10.6 G/DL (ref 13.5–17.5)
HGB UR QL STRIP.AUTO: NEGATIVE
HOLD SPECIMEN: NORMAL
IMM GRANULOCYTES # BLD AUTO: 0.04 10*3/MM3 (ref 0–0.05)
IMM GRANULOCYTES NFR BLD AUTO: 0.5 % (ref 0–0.5)
INHALED O2 CONCENTRATION: 21 %
IPAP: 0
KETONES UR QL STRIP: NEGATIVE
LEUKOCYTE ESTERASE UR QL STRIP.AUTO: NEGATIVE
LIPASE SERPL-CCNC: 11 U/L (ref 13–60)
LYMPHOCYTES # BLD AUTO: 1.45 10*3/MM3 (ref 0.7–3.1)
LYMPHOCYTES NFR BLD AUTO: 19.7 % (ref 19.6–45.3)
MAGNESIUM SERPL-MCNC: 2.2 MG/DL (ref 1.6–2.4)
MCH RBC QN AUTO: 29.3 PG (ref 26.6–33)
MCHC RBC AUTO-ENTMCNC: 31.1 G/DL (ref 31.5–35.7)
MCV RBC AUTO: 94.1 FL (ref 79–97)
METHGB BLD QL: 0.2 %
MODALITY: ABNORMAL
MONOCYTES # BLD AUTO: 0.66 10*3/MM3 (ref 0.1–0.9)
MONOCYTES NFR BLD AUTO: 9 % (ref 5–12)
NEUTROPHILS NFR BLD AUTO: 4.88 10*3/MM3 (ref 1.7–7)
NEUTROPHILS NFR BLD AUTO: 66.2 % (ref 42.7–76)
NITRITE UR QL STRIP: NEGATIVE
NRBC BLD AUTO-RTO: 0 /100 WBC (ref 0–0.2)
NT-PROBNP SERPL-MCNC: 98.4 PG/ML (ref 0–1800)
OXYHGB MFR BLDV: 90.9 %
PAW @ PEAK INSP FLOW SETTING VENT: 0 CMH2O
PCO2 BLDV: 41.5 MM HG (ref 41–51)
PH BLDV: 7.41 PH UNITS (ref 7.31–7.41)
PH UR STRIP.AUTO: 6 [PH] (ref 5–8)
PHOSPHATE SERPL-MCNC: 3 MG/DL (ref 2.5–4.5)
PLATELET # BLD AUTO: 250 10*3/MM3 (ref 140–450)
PMV BLD AUTO: 8.8 FL (ref 6–12)
PO2 BLDV: 62.1 MM HG (ref 27–53)
POTASSIUM SERPL-SCNC: 4.2 MMOL/L (ref 3.5–5.2)
PROCALCITONIN SERPL-MCNC: 0.1 NG/ML (ref 0–0.25)
PROT SERPL-MCNC: 6 G/DL (ref 6–8.5)
PROT UR QL STRIP: NEGATIVE
RBC # BLD AUTO: 3.55 10*6/MM3 (ref 4.14–5.8)
RSV RNA RESP QL NAA+PROBE: NOT DETECTED
SARS-COV-2 RNA RESP QL NAA+PROBE: NOT DETECTED
SODIUM SERPL-SCNC: 138 MMOL/L (ref 136–145)
SP GR UR STRIP: 1.01 (ref 1–1.03)
TOTAL RATE: 0 BREATHS/MINUTE
TSH SERPL DL<=0.05 MIU/L-ACNC: 15.85 UIU/ML (ref 0.27–4.2)
UROBILINOGEN UR QL STRIP: NORMAL
WBC NRBC COR # BLD AUTO: 7.37 10*3/MM3 (ref 3.4–10.8)
WHOLE BLOOD HOLD COAG: NORMAL
WHOLE BLOOD HOLD SPECIMEN: NORMAL

## 2024-10-08 PROCEDURE — 81003 URINALYSIS AUTO W/O SCOPE: CPT | Performed by: STUDENT IN AN ORGANIZED HEALTH CARE EDUCATION/TRAINING PROGRAM

## 2024-10-08 PROCEDURE — 99285 EMERGENCY DEPT VISIT HI MDM: CPT

## 2024-10-08 PROCEDURE — 83540 ASSAY OF IRON: CPT | Performed by: NURSE PRACTITIONER

## 2024-10-08 PROCEDURE — 85025 COMPLETE CBC W/AUTO DIFF WBC: CPT | Performed by: STUDENT IN AN ORGANIZED HEALTH CARE EDUCATION/TRAINING PROGRAM

## 2024-10-08 PROCEDURE — 86140 C-REACTIVE PROTEIN: CPT | Performed by: STUDENT IN AN ORGANIZED HEALTH CARE EDUCATION/TRAINING PROGRAM

## 2024-10-08 PROCEDURE — 82805 BLOOD GASES W/O2 SATURATION: CPT

## 2024-10-08 PROCEDURE — 87637 SARSCOV2&INF A&B&RSV AMP PRB: CPT | Performed by: STUDENT IN AN ORGANIZED HEALTH CARE EDUCATION/TRAINING PROGRAM

## 2024-10-08 PROCEDURE — 83880 ASSAY OF NATRIURETIC PEPTIDE: CPT | Performed by: STUDENT IN AN ORGANIZED HEALTH CARE EDUCATION/TRAINING PROGRAM

## 2024-10-08 PROCEDURE — 25010000002 CEFAZOLIN PER 500 MG: Performed by: STUDENT IN AN ORGANIZED HEALTH CARE EDUCATION/TRAINING PROGRAM

## 2024-10-08 PROCEDURE — 83605 ASSAY OF LACTIC ACID: CPT | Performed by: STUDENT IN AN ORGANIZED HEALTH CARE EDUCATION/TRAINING PROGRAM

## 2024-10-08 PROCEDURE — 87040 BLOOD CULTURE FOR BACTERIA: CPT | Performed by: STUDENT IN AN ORGANIZED HEALTH CARE EDUCATION/TRAINING PROGRAM

## 2024-10-08 PROCEDURE — 82330 ASSAY OF CALCIUM: CPT

## 2024-10-08 PROCEDURE — 36415 COLL VENOUS BLD VENIPUNCTURE: CPT

## 2024-10-08 PROCEDURE — 84100 ASSAY OF PHOSPHORUS: CPT | Performed by: STUDENT IN AN ORGANIZED HEALTH CARE EDUCATION/TRAINING PROGRAM

## 2024-10-08 PROCEDURE — 93005 ELECTROCARDIOGRAM TRACING: CPT | Performed by: STUDENT IN AN ORGANIZED HEALTH CARE EDUCATION/TRAINING PROGRAM

## 2024-10-08 PROCEDURE — 25010000002 HYDROMORPHONE PER 4 MG: Performed by: STUDENT IN AN ORGANIZED HEALTH CARE EDUCATION/TRAINING PROGRAM

## 2024-10-08 PROCEDURE — 80053 COMPREHEN METABOLIC PANEL: CPT | Performed by: STUDENT IN AN ORGANIZED HEALTH CARE EDUCATION/TRAINING PROGRAM

## 2024-10-08 PROCEDURE — 82728 ASSAY OF FERRITIN: CPT | Performed by: NURSE PRACTITIONER

## 2024-10-08 PROCEDURE — 71045 X-RAY EXAM CHEST 1 VIEW: CPT

## 2024-10-08 PROCEDURE — 82746 ASSAY OF FOLIC ACID SERUM: CPT | Performed by: NURSE PRACTITIONER

## 2024-10-08 PROCEDURE — 84145 PROCALCITONIN (PCT): CPT | Performed by: STUDENT IN AN ORGANIZED HEALTH CARE EDUCATION/TRAINING PROGRAM

## 2024-10-08 PROCEDURE — 82607 VITAMIN B-12: CPT | Performed by: NURSE PRACTITIONER

## 2024-10-08 PROCEDURE — 83690 ASSAY OF LIPASE: CPT | Performed by: STUDENT IN AN ORGANIZED HEALTH CARE EDUCATION/TRAINING PROGRAM

## 2024-10-08 PROCEDURE — 87205 SMEAR GRAM STAIN: CPT | Performed by: STUDENT IN AN ORGANIZED HEALTH CARE EDUCATION/TRAINING PROGRAM

## 2024-10-08 PROCEDURE — 85379 FIBRIN DEGRADATION QUANT: CPT | Performed by: STUDENT IN AN ORGANIZED HEALTH CARE EDUCATION/TRAINING PROGRAM

## 2024-10-08 PROCEDURE — 85652 RBC SED RATE AUTOMATED: CPT | Performed by: NURSE PRACTITIONER

## 2024-10-08 PROCEDURE — 87070 CULTURE OTHR SPECIMN AEROBIC: CPT | Performed by: STUDENT IN AN ORGANIZED HEALTH CARE EDUCATION/TRAINING PROGRAM

## 2024-10-08 PROCEDURE — 84443 ASSAY THYROID STIM HORMONE: CPT | Performed by: STUDENT IN AN ORGANIZED HEALTH CARE EDUCATION/TRAINING PROGRAM

## 2024-10-08 PROCEDURE — 84466 ASSAY OF TRANSFERRIN: CPT | Performed by: NURSE PRACTITIONER

## 2024-10-08 PROCEDURE — 25010000002 FUROSEMIDE PER 20 MG: Performed by: STUDENT IN AN ORGANIZED HEALTH CARE EDUCATION/TRAINING PROGRAM

## 2024-10-08 PROCEDURE — 85045 AUTOMATED RETICULOCYTE COUNT: CPT | Performed by: NURSE PRACTITIONER

## 2024-10-08 PROCEDURE — 83735 ASSAY OF MAGNESIUM: CPT | Performed by: STUDENT IN AN ORGANIZED HEALTH CARE EDUCATION/TRAINING PROGRAM

## 2024-10-08 RX ORDER — SODIUM CHLORIDE 0.9 % (FLUSH) 0.9 %
10 SYRINGE (ML) INJECTION AS NEEDED
Status: DISCONTINUED | OUTPATIENT
Start: 2024-10-08 | End: 2024-10-19 | Stop reason: HOSPADM

## 2024-10-08 RX ORDER — FUROSEMIDE 10 MG/ML
80 INJECTION INTRAMUSCULAR; INTRAVENOUS ONCE
Status: COMPLETED | OUTPATIENT
Start: 2024-10-08 | End: 2024-10-08

## 2024-10-08 RX ORDER — METRONIDAZOLE 500 MG/1
500 TABLET ORAL ONCE
Status: COMPLETED | OUTPATIENT
Start: 2024-10-08 | End: 2024-10-08

## 2024-10-08 RX ORDER — HYDROMORPHONE HYDROCHLORIDE 1 MG/ML
0.25 INJECTION, SOLUTION INTRAMUSCULAR; INTRAVENOUS; SUBCUTANEOUS ONCE
Status: COMPLETED | OUTPATIENT
Start: 2024-10-08 | End: 2024-10-08

## 2024-10-08 RX ADMIN — METRONIDAZOLE 500 MG: 500 TABLET ORAL at 21:31

## 2024-10-08 RX ADMIN — FUROSEMIDE 80 MG: 10 INJECTION, SOLUTION INTRAMUSCULAR; INTRAVENOUS at 21:31

## 2024-10-08 RX ADMIN — HYDROMORPHONE HYDROCHLORIDE 0.25 MG: 1 INJECTION, SOLUTION INTRAMUSCULAR; INTRAVENOUS; SUBCUTANEOUS at 23:18

## 2024-10-08 RX ADMIN — SODIUM CHLORIDE 2000 MG: 900 INJECTION INTRAVENOUS at 21:31

## 2024-10-08 NOTE — TELEPHONE ENCOUNTER
Patient called stating he needed Dr. Holder to admit him into the hospital.  Spoke w/ Kelly & she messaged Dr. Holder;He advised patient go directly to ED for this

## 2024-10-09 ENCOUNTER — APPOINTMENT (OUTPATIENT)
Dept: CT IMAGING | Facility: HOSPITAL | Age: 79
DRG: 602 | End: 2024-10-09
Payer: MEDICARE

## 2024-10-09 ENCOUNTER — APPOINTMENT (OUTPATIENT)
Dept: MRI IMAGING | Facility: HOSPITAL | Age: 79
DRG: 602 | End: 2024-10-09
Payer: MEDICARE

## 2024-10-09 ENCOUNTER — APPOINTMENT (OUTPATIENT)
Dept: CARDIOLOGY | Facility: HOSPITAL | Age: 79
DRG: 602 | End: 2024-10-09
Payer: MEDICARE

## 2024-10-09 PROBLEM — D63.8 ANEMIA, CHRONIC DISEASE: Status: ACTIVE | Noted: 2024-10-09

## 2024-10-09 PROBLEM — R79.89 ELEVATED D-DIMER: Status: ACTIVE | Noted: 2024-10-09

## 2024-10-09 PROBLEM — L03.90 CELLULITIS: Status: ACTIVE | Noted: 2024-10-09

## 2024-10-09 PROBLEM — Z86.718 HISTORY OF DVT (DEEP VEIN THROMBOSIS): Status: ACTIVE | Noted: 2024-10-09

## 2024-10-09 PROBLEM — R79.89 ELEVATED TSH: Status: ACTIVE | Noted: 2024-10-09

## 2024-10-09 LAB
ANION GAP SERPL CALCULATED.3IONS-SCNC: 10 MMOL/L (ref 5–15)
B PARAPERT DNA SPEC QL NAA+PROBE: NOT DETECTED
B PERT DNA SPEC QL NAA+PROBE: NOT DETECTED
BASOPHILS # BLD AUTO: 0.03 10*3/MM3 (ref 0–0.2)
BASOPHILS NFR BLD AUTO: 0.4 % (ref 0–1.5)
BH CV LEA RIGHT ANT TIBIAL A MID PSV: 40 CM/S
BH CV LEA RIGHT ANT TIBIAL A PROX PSV: 39.6 CM/S
BH CV LEA RIGHT CFA PROX PSV: 125 CM/S
BH CV LEA RIGHT DFA PROX PSV: 58.8 CM/S
BH CV LEA RIGHT PERONEAL  MID PSV: 59.3 CM/S
BH CV LEA RIGHT POPITEAL A  DISTAL PSV: 138 CM/S
BH CV LEA RIGHT POPITEAL A  PROX PSV: 117 CM/S
BH CV LEA RIGHT PTA DISTAL PSV: 28.3 CM/S
BH CV LEA RIGHT PTA MID PSV: 86.2 CM/S
BH CV LEA RIGHT PTA PROX PSV: 75.8 CM/S
BH CV LEA RIGHT SFA DISTAL PSV: 110 CM/S
BH CV LEA RIGHT SFA MID PSV: 111 CM/S
BH CV LEA RIGHT SFA PROX PSV: 78.5 CM/S
BH CV LEA RIGHT TIBEOPERONEAL PSV: 120 CM/S
BH CV LOW VAS LEFT GREATER SAPH BK VESSEL: 1
BH CV LOW VAS RIGHT PERONEAL VESSEL: 1
BH CV LOWER VASCULAR LEFT COMMON FEMORAL COMPRESS: NORMAL
BH CV LOWER VASCULAR LEFT COMMON FEMORAL PHASIC: NORMAL
BH CV LOWER VASCULAR LEFT COMMON FEMORAL SPONT: NORMAL
BH CV LOWER VASCULAR LEFT DISTAL FEMORAL COMPRESS: NORMAL
BH CV LOWER VASCULAR LEFT DISTAL FEMORAL PHASIC: NORMAL
BH CV LOWER VASCULAR LEFT DISTAL FEMORAL SPONT: NORMAL
BH CV LOWER VASCULAR LEFT GASTRONEMIUS COMPRESS: NORMAL
BH CV LOWER VASCULAR LEFT GREATER SAPH BK COMPRESS: NORMAL
BH CV LOWER VASCULAR LEFT GREATER SAPH BK THROMBUS: NORMAL
BH CV LOWER VASCULAR LEFT LESSER SAPH COMPRESS: NORMAL
BH CV LOWER VASCULAR LEFT MID FEMORAL COMPRESS: NORMAL
BH CV LOWER VASCULAR LEFT MID FEMORAL PHASIC: NORMAL
BH CV LOWER VASCULAR LEFT MID FEMORAL SPONT: NORMAL
BH CV LOWER VASCULAR LEFT PERONEAL COMPRESS: NORMAL
BH CV LOWER VASCULAR LEFT PERONEAL SPONT: NORMAL
BH CV LOWER VASCULAR LEFT POPLITEAL COMPRESS: NORMAL
BH CV LOWER VASCULAR LEFT POPLITEAL PHASIC: NORMAL
BH CV LOWER VASCULAR LEFT POPLITEAL SPONT: NORMAL
BH CV LOWER VASCULAR LEFT POSTERIOR TIBIAL COMPRESS: NORMAL
BH CV LOWER VASCULAR LEFT POSTERIOR TIBIAL SPONT: NORMAL
BH CV LOWER VASCULAR LEFT PROFUNDA FEMORAL SPONT: NORMAL
BH CV LOWER VASCULAR LEFT PROXIMAL FEMORAL COMPRESS: NORMAL
BH CV LOWER VASCULAR LEFT PROXIMAL FEMORAL PHASIC: NORMAL
BH CV LOWER VASCULAR LEFT PROXIMAL FEMORAL SPONT: NORMAL
BH CV LOWER VASCULAR LEFT SAPHENOFEMORAL JUNCTION COMPRESS: NORMAL
BH CV LOWER VASCULAR LEFT SAPHENOFEMORAL JUNCTION PHASIC: NORMAL
BH CV LOWER VASCULAR LEFT SAPHENOFEMORAL JUNCTION SPONT: NORMAL
BH CV LOWER VASCULAR RIGHT COMMON FEMORAL COMPRESS: NORMAL
BH CV LOWER VASCULAR RIGHT COMMON FEMORAL PHASIC: NORMAL
BH CV LOWER VASCULAR RIGHT COMMON FEMORAL SPONT: NORMAL
BH CV LOWER VASCULAR RIGHT DISTAL FEMORAL COMPRESS: NORMAL
BH CV LOWER VASCULAR RIGHT DISTAL FEMORAL PHASIC: NORMAL
BH CV LOWER VASCULAR RIGHT DISTAL FEMORAL SPONT: NORMAL
BH CV LOWER VASCULAR RIGHT GASTRONEMIUS COMPRESS: NORMAL
BH CV LOWER VASCULAR RIGHT GREATER SAPH BK COMPRESS: NORMAL
BH CV LOWER VASCULAR RIGHT LESSER SAPH COMPRESS: NORMAL
BH CV LOWER VASCULAR RIGHT MID FEMORAL COMPRESS: NORMAL
BH CV LOWER VASCULAR RIGHT MID FEMORAL PHASIC: NORMAL
BH CV LOWER VASCULAR RIGHT MID FEMORAL SPONT: NORMAL
BH CV LOWER VASCULAR RIGHT PERONEAL COMPRESS: NORMAL
BH CV LOWER VASCULAR RIGHT PERONEAL SPONT: NORMAL
BH CV LOWER VASCULAR RIGHT PERONEAL THROMBUS: NORMAL
BH CV LOWER VASCULAR RIGHT POPLITEAL COMPRESS: NORMAL
BH CV LOWER VASCULAR RIGHT POPLITEAL PHASIC: NORMAL
BH CV LOWER VASCULAR RIGHT POPLITEAL SPONT: NORMAL
BH CV LOWER VASCULAR RIGHT POSTERIOR TIBIAL COMPRESS: NORMAL
BH CV LOWER VASCULAR RIGHT POSTERIOR TIBIAL SPONT: NORMAL
BH CV LOWER VASCULAR RIGHT PROFUNDA FEMORAL SPONT: NORMAL
BH CV LOWER VASCULAR RIGHT PROXIMAL FEMORAL COMPRESS: NORMAL
BH CV LOWER VASCULAR RIGHT PROXIMAL FEMORAL PHASIC: NORMAL
BH CV LOWER VASCULAR RIGHT PROXIMAL FEMORAL SPONT: NORMAL
BH CV LOWER VASCULAR RIGHT SAPHENOFEMORAL JUNCTION COMPRESS: NORMAL
BH CV LOWER VASCULAR RIGHT SAPHENOFEMORAL JUNCTION PHASIC: NORMAL
BH CV LOWER VASCULAR RIGHT SAPHENOFEMORAL JUNCTION SPONT: NORMAL
BUN SERPL-MCNC: 21 MG/DL (ref 8–23)
BUN/CREAT SERPL: 18.4 (ref 7–25)
C PNEUM DNA NPH QL NAA+NON-PROBE: NOT DETECTED
CALCIUM SPEC-SCNC: 8.4 MG/DL (ref 8.6–10.5)
CHLORIDE SERPL-SCNC: 107 MMOL/L (ref 98–107)
CK SERPL-CCNC: 84 U/L (ref 20–200)
CO2 SERPL-SCNC: 25 MMOL/L (ref 22–29)
CREAT SERPL-MCNC: 1.14 MG/DL (ref 0.76–1.27)
DEPRECATED RDW RBC AUTO: 53.1 FL (ref 37–54)
EGFRCR SERPLBLD CKD-EPI 2021: 66.2 ML/MIN/1.73
EOSINOPHIL # BLD AUTO: 0.38 10*3/MM3 (ref 0–0.4)
EOSINOPHIL NFR BLD AUTO: 4.6 % (ref 0.3–6.2)
ERYTHROCYTE [DISTWIDTH] IN BLOOD BY AUTOMATED COUNT: 15.3 % (ref 12.3–15.4)
ERYTHROCYTE [SEDIMENTATION RATE] IN BLOOD: 92 MM/HR (ref 0–20)
FERRITIN SERPL-MCNC: 241.9 NG/ML (ref 30–400)
FLUAV SUBTYP SPEC NAA+PROBE: NOT DETECTED
FLUBV RNA ISLT QL NAA+PROBE: NOT DETECTED
FOLATE SERPL-MCNC: 8.42 NG/ML (ref 4.78–24.2)
GLUCOSE BLDC GLUCOMTR-MCNC: 116 MG/DL (ref 70–130)
GLUCOSE SERPL-MCNC: 120 MG/DL (ref 65–99)
HADV DNA SPEC NAA+PROBE: NOT DETECTED
HBA1C MFR BLD: 6.4 % (ref 4.8–5.6)
HCOV 229E RNA SPEC QL NAA+PROBE: NOT DETECTED
HCOV HKU1 RNA SPEC QL NAA+PROBE: NOT DETECTED
HCOV NL63 RNA SPEC QL NAA+PROBE: NOT DETECTED
HCOV OC43 RNA SPEC QL NAA+PROBE: NOT DETECTED
HCT VFR BLD AUTO: 34.7 % (ref 37.5–51)
HGB BLD-MCNC: 10.8 G/DL (ref 13–17.7)
HMPV RNA NPH QL NAA+NON-PROBE: NOT DETECTED
HPIV1 RNA ISLT QL NAA+PROBE: NOT DETECTED
HPIV2 RNA SPEC QL NAA+PROBE: NOT DETECTED
HPIV3 RNA NPH QL NAA+PROBE: NOT DETECTED
HPIV4 P GENE NPH QL NAA+PROBE: NOT DETECTED
IMM GRANULOCYTES # BLD AUTO: 0.03 10*3/MM3 (ref 0–0.05)
IMM GRANULOCYTES NFR BLD AUTO: 0.4 % (ref 0–0.5)
IRON 24H UR-MRATE: 21 MCG/DL (ref 59–158)
IRON 24H UR-MRATE: 21 MCG/DL (ref 59–158)
IRON SATN MFR SERPL: 10 % (ref 20–50)
LYMPHOCYTES # BLD AUTO: 1.31 10*3/MM3 (ref 0.7–3.1)
LYMPHOCYTES NFR BLD AUTO: 16 % (ref 19.6–45.3)
M PNEUMO IGG SER IA-ACNC: NOT DETECTED
MACROSCOPIC EXAM: NORMAL
MCH RBC QN AUTO: 29.3 PG (ref 26.6–33)
MCHC RBC AUTO-ENTMCNC: 31.1 G/DL (ref 31.5–35.7)
MCV RBC AUTO: 94.3 FL (ref 79–97)
MONOCYTES # BLD AUTO: 0.64 10*3/MM3 (ref 0.1–0.9)
MONOCYTES NFR BLD AUTO: 7.8 % (ref 5–12)
NEUTROPHILS NFR BLD AUTO: 5.82 10*3/MM3 (ref 1.7–7)
NEUTROPHILS NFR BLD AUTO: 70.8 % (ref 42.7–76)
NRBC BLD AUTO-RTO: 0 /100 WBC (ref 0–0.2)
PLATELET # BLD AUTO: 244 10*3/MM3 (ref 140–450)
PMV BLD AUTO: 8.3 FL (ref 6–12)
POTASSIUM SERPL-SCNC: 3.4 MMOL/L (ref 3.5–5.2)
POTASSIUM SERPL-SCNC: 4.3 MMOL/L (ref 3.5–5.2)
RBC # BLD AUTO: 3.68 10*6/MM3 (ref 4.14–5.8)
RETICS # AUTO: 0.06 10*6/MM3 (ref 0.02–0.13)
RETICS/RBC NFR AUTO: 1.59 % (ref 0.7–1.9)
RHINOVIRUS RNA SPEC NAA+PROBE: NOT DETECTED
RIGHT GROIN CFA SYS: 125 CM/SEC
RSV RNA NPH QL NAA+NON-PROBE: NOT DETECTED
SARS-COV-2 RNA NPH QL NAA+NON-PROBE: NOT DETECTED
SODIUM SERPL-SCNC: 142 MMOL/L (ref 136–145)
T4 FREE SERPL-MCNC: 1.14 NG/DL (ref 0.92–1.68)
TIBC SERPL-MCNC: 206 MCG/DL (ref 298–536)
TRANSFERRIN SERPL-MCNC: 138 MG/DL (ref 200–360)
VIT B12 BLD-MCNC: 313 PG/ML (ref 211–946)
WBC NRBC COR # BLD AUTO: 8.21 10*3/MM3 (ref 3.4–10.8)

## 2024-10-09 PROCEDURE — 82550 ASSAY OF CK (CPK): CPT | Performed by: INTERNAL MEDICINE

## 2024-10-09 PROCEDURE — 87077 CULTURE AEROBIC IDENTIFY: CPT | Performed by: HOSPITALIST

## 2024-10-09 PROCEDURE — 87205 SMEAR GRAM STAIN: CPT | Performed by: HOSPITALIST

## 2024-10-09 PROCEDURE — 25010000002 KETOROLAC TROMETHAMINE PER 15 MG: Performed by: STUDENT IN AN ORGANIZED HEALTH CARE EDUCATION/TRAINING PROGRAM

## 2024-10-09 PROCEDURE — 82747 ASSAY OF FOLIC ACID RBC: CPT | Performed by: NURSE PRACTITIONER

## 2024-10-09 PROCEDURE — 25010000002 VANCOMYCIN 10 G RECONSTITUTED SOLUTION: Performed by: STUDENT IN AN ORGANIZED HEALTH CARE EDUCATION/TRAINING PROGRAM

## 2024-10-09 PROCEDURE — 25010000002 CEFAZOLIN PER 500 MG: Performed by: NURSE PRACTITIONER

## 2024-10-09 PROCEDURE — 71275 CT ANGIOGRAPHY CHEST: CPT

## 2024-10-09 PROCEDURE — 87070 CULTURE OTHR SPECIMN AEROBIC: CPT | Performed by: HOSPITALIST

## 2024-10-09 PROCEDURE — 82948 REAGENT STRIP/BLOOD GLUCOSE: CPT

## 2024-10-09 PROCEDURE — 94799 UNLISTED PULMONARY SVC/PX: CPT

## 2024-10-09 PROCEDURE — 73720 MRI LWR EXTREMITY W/O&W/DYE: CPT

## 2024-10-09 PROCEDURE — A9577 INJ MULTIHANCE: HCPCS | Performed by: HOSPITALIST

## 2024-10-09 PROCEDURE — 93926 LOWER EXTREMITY STUDY: CPT

## 2024-10-09 PROCEDURE — 93970 EXTREMITY STUDY: CPT

## 2024-10-09 PROCEDURE — 25510000001 IOPAMIDOL PER 1 ML: Performed by: FAMILY MEDICINE

## 2024-10-09 PROCEDURE — 85025 COMPLETE CBC W/AUTO DIFF WBC: CPT | Performed by: NURSE PRACTITIONER

## 2024-10-09 PROCEDURE — 25010000002 HYDROMORPHONE PER 4 MG: Performed by: STUDENT IN AN ORGANIZED HEALTH CARE EDUCATION/TRAINING PROGRAM

## 2024-10-09 PROCEDURE — 94640 AIRWAY INHALATION TREATMENT: CPT

## 2024-10-09 PROCEDURE — 0202U NFCT DS 22 TRGT SARS-COV-2: CPT | Performed by: NURSE PRACTITIONER

## 2024-10-09 PROCEDURE — 93926 LOWER EXTREMITY STUDY: CPT | Performed by: INTERNAL MEDICINE

## 2024-10-09 PROCEDURE — 0 GADOBENATE DIMEGLUMINE 529 MG/ML SOLUTION: Performed by: HOSPITALIST

## 2024-10-09 PROCEDURE — 25010000002 DAPTOMYCIN PER 1 MG: Performed by: INTERNAL MEDICINE

## 2024-10-09 PROCEDURE — 25010000002 HEPARIN (PORCINE) PER 1000 UNITS: Performed by: HOSPITALIST

## 2024-10-09 PROCEDURE — 84132 ASSAY OF SERUM POTASSIUM: CPT | Performed by: NURSE PRACTITIONER

## 2024-10-09 PROCEDURE — 99222 1ST HOSP IP/OBS MODERATE 55: CPT | Performed by: HOSPITALIST

## 2024-10-09 PROCEDURE — 83036 HEMOGLOBIN GLYCOSYLATED A1C: CPT | Performed by: NURSE PRACTITIONER

## 2024-10-09 PROCEDURE — 84439 ASSAY OF FREE THYROXINE: CPT | Performed by: NURSE PRACTITIONER

## 2024-10-09 PROCEDURE — 25010000002 PIPERACILLIN SOD-TAZOBACTAM PER 1 G: Performed by: HOSPITALIST

## 2024-10-09 PROCEDURE — 87169 MACROSCOPIC EXAM PARASITE: CPT | Performed by: HOSPITALIST

## 2024-10-09 PROCEDURE — 87186 SC STD MICRODIL/AGAR DIL: CPT | Performed by: HOSPITALIST

## 2024-10-09 PROCEDURE — 93970 EXTREMITY STUDY: CPT | Performed by: INTERNAL MEDICINE

## 2024-10-09 PROCEDURE — 80048 BASIC METABOLIC PNL TOTAL CA: CPT | Performed by: NURSE PRACTITIONER

## 2024-10-09 PROCEDURE — 85014 HEMATOCRIT: CPT | Performed by: NURSE PRACTITIONER

## 2024-10-09 PROCEDURE — 25810000003 SODIUM CHLORIDE 0.9 % SOLUTION: Performed by: STUDENT IN AN ORGANIZED HEALTH CARE EDUCATION/TRAINING PROGRAM

## 2024-10-09 RX ORDER — KETOROLAC TROMETHAMINE 15 MG/ML
15 INJECTION, SOLUTION INTRAMUSCULAR; INTRAVENOUS ONCE
Status: COMPLETED | OUTPATIENT
Start: 2024-10-09 | End: 2024-10-09

## 2024-10-09 RX ORDER — CARBIDOPA AND LEVODOPA 25; 100 MG/1; MG/1
1 TABLET, EXTENDED RELEASE ORAL 2 TIMES DAILY
Status: DISCONTINUED | OUTPATIENT
Start: 2024-10-09 | End: 2024-10-19 | Stop reason: HOSPADM

## 2024-10-09 RX ORDER — GABAPENTIN 100 MG/1
100 CAPSULE ORAL EVERY 12 HOURS SCHEDULED
Status: DISCONTINUED | OUTPATIENT
Start: 2024-10-09 | End: 2024-10-19 | Stop reason: HOSPADM

## 2024-10-09 RX ORDER — HEPARIN SODIUM 5000 [USP'U]/ML
5000 INJECTION, SOLUTION INTRAVENOUS; SUBCUTANEOUS EVERY 8 HOURS SCHEDULED
Status: DISCONTINUED | OUTPATIENT
Start: 2024-10-09 | End: 2024-10-19 | Stop reason: HOSPADM

## 2024-10-09 RX ORDER — SODIUM CHLORIDE FOR INHALATION 7 %
4 VIAL, NEBULIZER (ML) INHALATION
Status: COMPLETED | OUTPATIENT
Start: 2024-10-09 | End: 2024-10-11

## 2024-10-09 RX ORDER — LEVOTHYROXINE SODIUM 88 UG/1
88 TABLET ORAL
Status: DISCONTINUED | OUTPATIENT
Start: 2024-10-09 | End: 2024-10-19 | Stop reason: HOSPADM

## 2024-10-09 RX ORDER — HYDROMORPHONE HYDROCHLORIDE 1 MG/ML
0.25 INJECTION, SOLUTION INTRAMUSCULAR; INTRAVENOUS; SUBCUTANEOUS ONCE
Status: COMPLETED | OUTPATIENT
Start: 2024-10-09 | End: 2024-10-09

## 2024-10-09 RX ORDER — POLYETHYLENE GLYCOL 3350 17 G/17G
17 POWDER, FOR SOLUTION ORAL DAILY PRN
Status: DISCONTINUED | OUTPATIENT
Start: 2024-10-09 | End: 2024-10-16

## 2024-10-09 RX ORDER — SODIUM CHLORIDE 0.9 % (FLUSH) 0.9 %
10 SYRINGE (ML) INJECTION EVERY 12 HOURS SCHEDULED
Status: DISCONTINUED | OUTPATIENT
Start: 2024-10-09 | End: 2024-10-19 | Stop reason: HOSPADM

## 2024-10-09 RX ORDER — SODIUM CHLORIDE 9 MG/ML
40 INJECTION, SOLUTION INTRAVENOUS AS NEEDED
Status: ACTIVE | OUTPATIENT
Start: 2024-10-09 | End: 2024-10-10

## 2024-10-09 RX ORDER — BISACODYL 10 MG
10 SUPPOSITORY, RECTAL RECTAL DAILY PRN
Status: DISCONTINUED | OUTPATIENT
Start: 2024-10-09 | End: 2024-10-16

## 2024-10-09 RX ORDER — METRONIDAZOLE 500 MG/1
500 TABLET ORAL EVERY 8 HOURS SCHEDULED
Status: DISCONTINUED | OUTPATIENT
Start: 2024-10-09 | End: 2024-10-09

## 2024-10-09 RX ORDER — VANCOMYCIN/0.9 % SOD CHLORIDE 1.5G/250ML
1500 PLASTIC BAG, INJECTION (ML) INTRAVENOUS EVERY 24 HOURS
Status: DISCONTINUED | OUTPATIENT
Start: 2024-10-09 | End: 2024-10-09

## 2024-10-09 RX ORDER — QUETIAPINE FUMARATE 25 MG/1
12.5 TABLET, FILM COATED ORAL NIGHTLY
Status: DISCONTINUED | OUTPATIENT
Start: 2024-10-09 | End: 2024-10-19 | Stop reason: HOSPADM

## 2024-10-09 RX ORDER — IOPAMIDOL 755 MG/ML
100 INJECTION, SOLUTION INTRAVASCULAR
Status: COMPLETED | OUTPATIENT
Start: 2024-10-09 | End: 2024-10-09

## 2024-10-09 RX ORDER — HYDROCODONE BITARTRATE AND ACETAMINOPHEN 5; 325 MG/1; MG/1
1 TABLET ORAL EVERY 6 HOURS PRN
Status: DISPENSED | OUTPATIENT
Start: 2024-10-09 | End: 2024-10-11

## 2024-10-09 RX ORDER — PANTOPRAZOLE SODIUM 40 MG/1
40 TABLET, DELAYED RELEASE ORAL DAILY
Status: DISCONTINUED | OUTPATIENT
Start: 2024-10-09 | End: 2024-10-19 | Stop reason: HOSPADM

## 2024-10-09 RX ORDER — HYDROMORPHONE HYDROCHLORIDE 1 MG/ML
0.5 INJECTION, SOLUTION INTRAMUSCULAR; INTRAVENOUS; SUBCUTANEOUS
Status: DISCONTINUED | OUTPATIENT
Start: 2024-10-09 | End: 2024-10-14

## 2024-10-09 RX ORDER — CARBIDOPA AND LEVODOPA 25; 100 MG/1; MG/1
2 TABLET ORAL 4 TIMES DAILY
Status: DISCONTINUED | OUTPATIENT
Start: 2024-10-09 | End: 2024-10-19 | Stop reason: HOSPADM

## 2024-10-09 RX ORDER — AMOXICILLIN 250 MG
2 CAPSULE ORAL 2 TIMES DAILY PRN
Status: DISCONTINUED | OUTPATIENT
Start: 2024-10-09 | End: 2024-10-16

## 2024-10-09 RX ORDER — PRAMIPEXOLE DIHYDROCHLORIDE 1 MG/1
1 TABLET ORAL 3 TIMES DAILY
Status: DISCONTINUED | OUTPATIENT
Start: 2024-10-09 | End: 2024-10-19 | Stop reason: HOSPADM

## 2024-10-09 RX ORDER — BISACODYL 5 MG/1
5 TABLET, DELAYED RELEASE ORAL DAILY PRN
Status: DISCONTINUED | OUTPATIENT
Start: 2024-10-09 | End: 2024-10-16

## 2024-10-09 RX ORDER — ATORVASTATIN CALCIUM 40 MG/1
40 TABLET, FILM COATED ORAL DAILY
Status: DISCONTINUED | OUTPATIENT
Start: 2024-10-09 | End: 2024-10-09

## 2024-10-09 RX ORDER — POTASSIUM CHLORIDE 1500 MG/1
40 TABLET, EXTENDED RELEASE ORAL EVERY 4 HOURS
Status: COMPLETED | OUTPATIENT
Start: 2024-10-09 | End: 2024-10-09

## 2024-10-09 RX ORDER — SODIUM CHLORIDE 0.9 % (FLUSH) 0.9 %
10 SYRINGE (ML) INJECTION AS NEEDED
Status: DISCONTINUED | OUTPATIENT
Start: 2024-10-09 | End: 2024-10-19 | Stop reason: HOSPADM

## 2024-10-09 RX ORDER — GUAIFENESIN 600 MG/1
600 TABLET, EXTENDED RELEASE ORAL EVERY 12 HOURS SCHEDULED
Status: DISCONTINUED | OUTPATIENT
Start: 2024-10-09 | End: 2024-10-19 | Stop reason: HOSPADM

## 2024-10-09 RX ORDER — TAMSULOSIN HYDROCHLORIDE 0.4 MG/1
0.4 CAPSULE ORAL DAILY
Status: DISCONTINUED | OUTPATIENT
Start: 2024-10-09 | End: 2024-10-19 | Stop reason: HOSPADM

## 2024-10-09 RX ORDER — FLUTICASONE PROPIONATE 50 MCG
2 SPRAY, SUSPENSION (ML) NASAL DAILY
Status: DISCONTINUED | OUTPATIENT
Start: 2024-10-09 | End: 2024-10-19 | Stop reason: HOSPADM

## 2024-10-09 RX ORDER — FUROSEMIDE 20 MG/1
20 TABLET ORAL DAILY
Status: DISCONTINUED | OUTPATIENT
Start: 2024-10-09 | End: 2024-10-19 | Stop reason: HOSPADM

## 2024-10-09 RX ADMIN — HYDROCODONE BITARTRATE AND ACETAMINOPHEN 1 TABLET: 5; 325 TABLET ORAL at 05:34

## 2024-10-09 RX ADMIN — CARBIDOPA AND LEVODOPA 2 TABLET: 25; 100 TABLET ORAL at 08:09

## 2024-10-09 RX ADMIN — PIPERACILLIN AND TAZOBACTAM 3.38 G: 3; .375 INJECTION, POWDER, LYOPHILIZED, FOR SOLUTION INTRAVENOUS at 14:47

## 2024-10-09 RX ADMIN — CARBIDOPA AND LEVODOPA 1 TABLET: 25; 100 TABLET, EXTENDED RELEASE ORAL at 20:30

## 2024-10-09 RX ADMIN — HEPARIN SODIUM 5000 UNITS: 5000 INJECTION INTRAVENOUS; SUBCUTANEOUS at 20:28

## 2024-10-09 RX ADMIN — PIPERACILLIN AND TAZOBACTAM 3.38 G: 3; .375 INJECTION, POWDER, LYOPHILIZED, FOR SOLUTION INTRAVENOUS at 20:30

## 2024-10-09 RX ADMIN — QUETIAPINE FUMARATE 12.5 MG: 25 TABLET ORAL at 20:29

## 2024-10-09 RX ADMIN — HEPARIN SODIUM 5000 UNITS: 5000 INJECTION INTRAVENOUS; SUBCUTANEOUS at 14:47

## 2024-10-09 RX ADMIN — HEPARIN SODIUM 5000 UNITS: 5000 INJECTION INTRAVENOUS; SUBCUTANEOUS at 08:10

## 2024-10-09 RX ADMIN — GUAIFENESIN 600 MG: 600 TABLET, EXTENDED RELEASE ORAL at 08:10

## 2024-10-09 RX ADMIN — VANCOMYCIN HYDROCHLORIDE 2250 MG: 10 INJECTION, POWDER, LYOPHILIZED, FOR SOLUTION INTRAVENOUS at 00:49

## 2024-10-09 RX ADMIN — GUAIFENESIN 600 MG: 600 TABLET, EXTENDED RELEASE ORAL at 20:29

## 2024-10-09 RX ADMIN — PIPERACILLIN AND TAZOBACTAM 3.38 G: 3; .375 INJECTION, POWDER, LYOPHILIZED, FOR SOLUTION INTRAVENOUS at 08:10

## 2024-10-09 RX ADMIN — SODIUM CHLORIDE 2000 MG: 900 INJECTION INTRAVENOUS at 04:31

## 2024-10-09 RX ADMIN — PRAMIPEXOLE DIHYDROCHLORIDE 1 MG: 1 TABLET ORAL at 16:21

## 2024-10-09 RX ADMIN — TAMSULOSIN HYDROCHLORIDE 0.4 MG: 0.4 CAPSULE ORAL at 08:09

## 2024-10-09 RX ADMIN — GABAPENTIN 100 MG: 100 CAPSULE ORAL at 08:10

## 2024-10-09 RX ADMIN — LEVOTHYROXINE SODIUM 88 MCG: 0.09 TABLET ORAL at 05:34

## 2024-10-09 RX ADMIN — HYDROCODONE BITARTRATE AND ACETAMINOPHEN 1 TABLET: 5; 325 TABLET ORAL at 14:47

## 2024-10-09 RX ADMIN — Medication 10 ML: at 20:30

## 2024-10-09 RX ADMIN — FUROSEMIDE 20 MG: 20 TABLET ORAL at 08:09

## 2024-10-09 RX ADMIN — DAPTOMYCIN 550 MG: 500 INJECTION, POWDER, LYOPHILIZED, FOR SOLUTION INTRAVENOUS at 11:52

## 2024-10-09 RX ADMIN — GABAPENTIN 100 MG: 100 CAPSULE ORAL at 20:29

## 2024-10-09 RX ADMIN — KETOROLAC TROMETHAMINE 15 MG: 15 INJECTION, SOLUTION INTRAMUSCULAR; INTRAVENOUS at 00:49

## 2024-10-09 RX ADMIN — PANTOPRAZOLE SODIUM 40 MG: 40 TABLET, DELAYED RELEASE ORAL at 12:33

## 2024-10-09 RX ADMIN — CARBIDOPA AND LEVODOPA 1 TABLET: 25; 100 TABLET, EXTENDED RELEASE ORAL at 08:09

## 2024-10-09 RX ADMIN — SODIUM CHLORIDE SOLN NEBU 7% 4 ML: 7 NEBU SOLN at 11:57

## 2024-10-09 RX ADMIN — METRONIDAZOLE 500 MG: 500 TABLET ORAL at 05:34

## 2024-10-09 RX ADMIN — IOPAMIDOL 85 ML: 755 INJECTION, SOLUTION INTRAVENOUS at 05:00

## 2024-10-09 RX ADMIN — PRAMIPEXOLE DIHYDROCHLORIDE 1 MG: 1 TABLET ORAL at 20:29

## 2024-10-09 RX ADMIN — PRAMIPEXOLE DIHYDROCHLORIDE 1 MG: 1 TABLET ORAL at 08:09

## 2024-10-09 RX ADMIN — GADOBENATE DIMEGLUMINE 20 ML: 529 INJECTION, SOLUTION INTRAVENOUS at 18:01

## 2024-10-09 RX ADMIN — Medication 10 ML: at 08:20

## 2024-10-09 RX ADMIN — POTASSIUM CHLORIDE 40 MEQ: 1500 TABLET, EXTENDED RELEASE ORAL at 12:33

## 2024-10-09 RX ADMIN — CARBIDOPA AND LEVODOPA 2 TABLET: 25; 100 TABLET ORAL at 12:33

## 2024-10-09 RX ADMIN — CARBIDOPA AND LEVODOPA 2 TABLET: 25; 100 TABLET ORAL at 20:28

## 2024-10-09 RX ADMIN — POTASSIUM CHLORIDE 40 MEQ: 1500 TABLET, EXTENDED RELEASE ORAL at 06:34

## 2024-10-09 RX ADMIN — HYDROMORPHONE HYDROCHLORIDE 0.25 MG: 1 INJECTION, SOLUTION INTRAMUSCULAR; INTRAVENOUS; SUBCUTANEOUS at 00:49

## 2024-10-09 RX ADMIN — ATORVASTATIN CALCIUM 40 MG: 40 TABLET, FILM COATED ORAL at 08:09

## 2024-10-09 NOTE — PROGRESS NOTES
[facility]  PROGRESS NOTE    Patient Name: Cordell Martin  : 1945  MRN: [id number]    Date of Admission: 10/8/2024  Primary Care Physician: Ben Holder DO    Subjective   Subjective     CC: Leg pain    HPI: Notes burning in legs. Cough/congestion, no sputum. No f/c. No n/v. No SOA/chest pain. Tired.       Objective   Objective     Vital Signs:   Temp:  [94.4 °F (34.7 °C)-97.5 °F (36.4 °C)] 94.4 °F (34.7 °C)  Heart Rate:  [71-87] 78  Resp:  [14-18] 14  BP: (101-125)/(50-68) 101/59     Physical Exam:  NAD, alert  OP clear, dry MM  RRR  CTAB  +BS, soft  MARIE  B LE erythema/edema, with redness, worse on L  Reviewed with wound care, R great secondary toe erythematous, warm, edematous, TTP, possible wound track per wound care, but no tunneling noted, but drainage noted between first/second toe, with maggots noted    Results Reviewed:  LAB RESULTS:      Lab 10/09/24  0404 10/08/24  1936   WBC 8.21 7.37   HEMOGLOBIN 10.8* 10.4*   HEMATOCRIT 34.7* 33.4*   PLATELETS 244 250   NEUTROS ABS 5.82 4.88   IMMATURE GRANS (ABS) 0.03 0.04   LYMPHS ABS 1.31 1.45   MONOS ABS 0.64 0.66   EOS ABS 0.38 0.31   MCV 94.3 94.1   SED RATE  --  92*   CRP  --  4.81*   PROCALCITONIN  --  0.10   LACTATE  --  1.6   D DIMER QUANT  --  0.90*         Lab 10/09/24  0404 10/08/24  2226 10/08/24  1936   SODIUM 142  --  138   POTASSIUM 3.4*  --  4.2   CHLORIDE 107  --  106   CO2 25.0  --  24.0   ANION GAP 10.0  --  8.0   BUN 21  --  22   CREATININE 1.14  --  1.26   EGFR 66.2  --  58.7*   GLUCOSE 120*  --  148*   CALCIUM 8.4*  --  8.5*   IONIZED CALCIUM  --  1.17  --    MAGNESIUM  --   --  2.2   PHOSPHORUS  --   --  3.0   HEMOGLOBIN A1C 6.40*  --   --    TSH  --   --  15.850*         Lab 10/08/24  1936   TOTAL PROTEIN 6.0   ALBUMIN 2.7*   GLOBULIN 3.3   ALT (SGPT) <5   AST (SGOT) 12   BILIRUBIN 0.3   ALK PHOS 108   LIPASE 11*         Lab 10/08/24  1936   PROBNP 98.4             Lab  10/08/24  1936   IRON 21*  21*   IRON SATURATION (TSAT) 10*   TIBC 206*   TRANSFERRIN 138*   FERRITIN 241.90         Lab 10/08/24  2049   FIO2 21   CARBOXYHEMOGLOBIN (VENOUS) 1.3     Brief Urine Lab Results  (Last result in the past 365 days)        Color   Clarity   Blood   Leuk Est   Nitrite   Protein   CREAT   Urine HCG        10/08/24 2219 Yellow   Clear   Negative   Negative   Negative   Negative                   Microbiology Results Abnormal       Procedure Component Value - Date/Time    Respiratory Panel PCR w/COVID-19(SARS-CoV-2) KENJI/ASH/SHIRLEY/PAD/COR/CORNELIUS In-House, NP Swab in UTM/VTM, 2 HR TAT - Swab, Nasopharynx [[id number]]  (Normal) Collected: 10/09/24 0548    Lab Status: Final result Specimen: Swab from Nasopharynx Updated: 10/09/24 0643     ADENOVIRUS, PCR Not Detected     Coronavirus 229E Not Detected     Coronavirus HKU1 Not Detected     Coronavirus NL63 Not Detected     Coronavirus OC43 Not Detected     COVID19 Not Detected     Human Metapneumovirus Not Detected     Human Rhinovirus/Enterovirus Not Detected     Influenza A PCR Not Detected     Influenza B PCR Not Detected     Parainfluenza Virus 1 Not Detected     Parainfluenza Virus 2 Not Detected     Parainfluenza Virus 3 Not Detected     Parainfluenza Virus 4 Not Detected     RSV, PCR Not Detected     Bordetella pertussis pcr Not Detected     Bordetella parapertussis PCR Not Detected     Chlamydophila pneumoniae PCR Not Detected     Mycoplasma pneumo by PCR Not Detected    Narrative:      In the setting of a positive respiratory panel with a viral infection PLUS a negative procalcitonin without other underlying concern for bacterial infection, consider observing off antibiotics or discontinuation of antibiotics and continue supportive care. If the respiratory panel is positive for atypical bacterial infection (Bordetella pertussis, Chlamydophila pneumoniae, or Mycoplasma pneumoniae), consider antibiotic de-escalation to target atypical bacterial  infection.    COVID PRE-OP / PRE-PROCEDURE SCREENING ORDER (NO ISOLATION) - Swab, Nasopharynx [[id number]]  (Normal) Collected: 10/08/24 2126    Lab Status: Final result Specimen: Swab from Nasopharynx Updated: 10/08/24 2220    Narrative:      The following orders were created for panel order COVID PRE-OP / PRE-PROCEDURE SCREENING ORDER (NO ISOLATION) - Swab, Nasopharynx.  Procedure                               Abnormality         Status                     ---------                               -----------         ------                     COVID-19, FLU A/B, RSV P...[[id number]]  Normal              Final result                 Please view results for these tests on the individual orders.    COVID-19, FLU A/B, RSV PCR 1 HR TAT - Swab, Nasopharynx [[id number]]  (Normal) Collected: 10/08/24 2126    Lab Status: Final result Specimen: Swab from Nasopharynx Updated: 10/08/24 2220     COVID19 Not Detected     Influenza A PCR Not Detected     Influenza B PCR Not Detected     RSV, PCR Not Detected    Narrative:      Fact sheet for providers: https://www.fda.gov/media/050434/download    Fact sheet for patients: https://www.fda.gov/media/125697/download    Test performed by PCR.    Wound Culture - Drainage, Leg, Right [[id number]] Collected: 10/08/24 2050    Lab Status: Preliminary result Specimen: Drainage from Leg, Right Updated: 10/08/24 2146     Gram Stain Moderate (3+) WBCs seen      Moderate (3+) Gram negative bacilli            XR Chest 1 View    Result Date: 10/8/2024  XR CHEST 1 VW Date of Exam: 10/8/2024 8:14 PM EDT Indication: sepsis, chronic cough, infectious workup Comparison: Chest radiograph 9/15/2024 Findings: Mediastinum: Cardiomediastinal silhouette appears unchanged and enlarged Lungs: Mild left basal consolidative opacity. Pleura: No convincing pleural effusion or pneumothorax Bones and soft tissues: No acute osseous abnormality.     Impression: Impression: Left basal opacity which may represent  "atelectasis, though aspiration or pneumonia can be considered in the appropriate clinical setting. Electronically Signed: Babak MARVIN Natarajan  10/8/2024 8:42 PM EDT  Workstation ID: [id number]     Results for orders placed during the hospital encounter of 01/12/21    Adult Transthoracic Echo Complete W/ Cont if Necessary Per Protocol    Interpretation Summary  · The right ventricle and right atrium are moderately dilated. Other chamber sizes and wall thicknesses are normal.  · Global and segmental LV wall motion is normal. The estimated left ventricular ejection fraction is 51% - 55%.  · The aortic and mitral valves are normal in structure and function.  · The estimated RV systolic pressure is mildly elevated 35 mmHg - 40 mmHg. There is as well noted to be less than 50% inspiratory IVC collapse. The main pulmonary artery is \"borderline dilated\".  · There are no other important findings on this study.      Current medications:  Scheduled Meds:atorvastatin, 40 mg, Oral, Daily  carbidopa-levodopa, 2 tablet, Oral, 4x Daily  carbidopa-levodopa ER, 1 tablet, Oral, BID  fluticasone, 2 spray, Each Nare, Daily  furosemide, 20 mg, Oral, Daily  gabapentin, 100 mg, Oral, Q12H  guaiFENesin, 600 mg, Oral, Q12H  heparin (porcine), 5,000 Units, Subcutaneous, Q8H  levothyroxine, 88 mcg, Oral, Q AM  pantoprazole, 40 mg, Oral, Daily  piperacillin-tazobactam, 3.375 g, Intravenous, Once  piperacillin-tazobactam, 3.375 g, Intravenous, Q8H  potassium chloride ER, 40 mEq, Oral, Q4H  pramipexole, 1 mg, Oral, TID  QUEtiapine, 12.5 mg, Oral, Nightly  sodium chloride, 10 mL, Intravenous, Q12H  sodium chloride, 4 mL, Nebulization, Daily - RT  tamsulosin, 0.4 mg, Oral, Daily  vancomycin, 1,500 mg, Intravenous, Q24H      Continuous Infusions:Pharmacy to dose vancomycin,       PRN Meds:.  senna-docusate sodium **AND** polyethylene glycol **AND** bisacodyl **AND** bisacodyl    HYDROcodone-acetaminophen    Magnesium Standard Dose Replacement - Follow " Nurse / BPA Driven Protocol    Pharmacy to dose vancomycin    Potassium Replacement - Follow Nurse / BPA Driven Protocol    sodium chloride    sodium chloride    sodium chloride    Assessment & Plan   Assessment & Plan     Active Hospital Problems    Diagnosis  POA    **Cellulitis [L03.90]  Yes    History of DVT (deep vein thrombosis) [Z86.718]  Not Applicable    Anemia, chronic disease [D63.8]  Yes    Elevated TSH [R79.89]  Yes    GERD [K21.9]  Yes    Chronic cough [R05.3]  Yes    Hypothyroidism (acquired) [E03.9]  Yes    Chronic edema [R60.9]  Yes    Parkinson's disease [G20.A1]  Yes    Anxiety [F41.9]  Yes    Hyperlipidemia [E78.5]  Yes    Depression [F32.A]  Yes      Resolved Hospital Problems   No resolved problems to display.        Brief Hospital Course to date:  Cordell Martin is a 79 y.o. male with history of Parkinson's, history of DVT, HL, hypothyroidism, GERD, lymphedema, anxiety/depression, with wheelchair use/walker use at [facility] here with cellulitis, B LE wounds    B LE wounds  Edema  Cellulitis, maggots noted, R second toe markedly abnormal, wound between first/second toe with drainage  -zosyn/vancomycin  -duplex pending  -wound care  -consult ID/orthopedic surgery  -MRI foot  -s/p diuresis    Cough  Possible LLL PNA  CTA pending  -zosyn/vancomycin  -pulmonary toilet  -respiratory PCR negative    Chronic anemia  -monitor    Parkinson's disease  Anxiety/depression  -continue appropriate home meds    Hypothyroidism  -on synthorid, with elevated TSH  -follow up outpatient    HL  -statin    GERD  -PPI      Expected Discharge Location and Transportation: Rehab  Expected Discharge   Expected Discharge Date: 10/11/2024; Expected Discharge Time:      VTE Prophylaxis:  Pharmacologic VTE prophylaxis orders are present.         AM-PAC 6 Clicks Score (PT): 13 (10/09/24 5591)    CODE STATUS:   Code Status and Medical Interventions: No CPR (Do Not Attempt to Resuscitate); Limited Support; No intubation (DNI);  DNR/DNI   Ordered at: 10/09/24 0206     Medical Intervention Limits:    No intubation (DNI)     Level Of Support Discussed With:    Patient     Code Status (Patient has no pulse and is not breathing):    No CPR (Do Not Attempt to Resuscitate)     Medical Interventions (Patient has pulse or is breathing):    Limited Support     Comments:    DNR/DNI       Fabian Lopez MD  10/09/24

## 2024-10-09 NOTE — ED NOTES
Cordell Martin    Nursing Report ED to Floor:  Mental status: A&O x3 (Alzheimer's)    Ambulatory status: Non Ambulatory in ED  Oxygen Therapy:  RA  Cardiac Rhythm: Sinus  [facility]   Safety Concerns:  Fall Risk. Cellulitis  Social Issues: None  ED Room #:  16    ED Nurse Phone Extension - [phone] or may call [phone].      HPI:   Chief Complaint   Patient presents with    Edema       Past Medical History:  Past Medical History:   Diagnosis Date    Anxiety     Arthritis     Back pain     Bronchitis     Cognitive impairment     Depression     Disease of thyroid gland     Glucose intolerance (impaired glucose tolerance)     Hyperlipidemia     Kidney stone     Obesity     Parkinson disease     Pneumonia     Prostate disorder     Thyroid disease     Wears eyeglasses         Past Surgical History:  Past Surgical History:   Procedure Laterality Date    COLONOSCOPY      5 years ago    EYE SURGERY      HERNIA REPAIR  10/20/2020    KNEE SURGERY      LUMBAR LAMINECTOMY DISCECTOMY DECOMPRESSION N/A 07/13/2017    Procedure: LUMBAR LAMINECTOMY L4-5;  Surgeon: Antonio Trent MD;  Location: [facility];  Service:     SHOULDER ROTATOR CUFF REPAIR Right     x2    TONSILLECTOMY      VEIN SURGERY          Admitting Doctor:   Eagle Paniagua DO    Consulting Provider(s):  Consults       No orders found for last 30 day(s).             Admitting Diagnosis:   The primary encounter diagnosis was Cellulitis of lower extremity, unspecified laterality. Diagnoses of Lymphedema, Pain in both lower extremities, Chronic edema, and Parkinson's disease, unspecified whether dyskinesia present, unspecified whether manifestations fluctuate were also pertinent to this visit.    Most Recent Vitals:   Vitals:    10/09/24 0000 10/09/24 0018 10/09/24 0040 10/09/24 0100   BP: 117/68 118/64 117/63    Pulse: 79 87 77 82   Resp:       Temp:       TempSrc:       SpO2: 94% 95% 97% 97%   Weight:       Height:           Active LDAs/IV Access:   Lines,  "Drains & Airways       Active LDAs       Name Placement date Placement time Site Days    Peripheral IV 10/08/24 1935 Right Antecubital 10/08/24  1935  Antecubital  less than 1                    Labs (abnormal labs have a star):   Labs Reviewed   COMPREHENSIVE METABOLIC PANEL - Abnormal; Notable for the following components:       Result Value    Glucose 148 (*)     Calcium 8.5 (*)     Albumin 2.7 (*)     eGFR 58.7 (*)     All other components within normal limits    Narrative:     GFR Normal >60  Chronic Kidney Disease <60  Kidney Failure <15    The GFR formula is only valid for adults with stable renal function between ages 18 and 70.   LIPASE - Abnormal; Notable for the following components:    Lipase 11 (*)     All other components within normal limits   D-DIMER, QUANTITATIVE - Abnormal; Notable for the following components:    D-Dimer, Quantitative 0.90 (*)     All other components within normal limits    Narrative:     According to the assay 's published package insert, a normal (<0.50 MCGFEU/mL) D-dimer result in conjunction with a non-high clinical probability assessment, excludes deep vein thrombosis (DVT) and pulmonary embolism (PE) with high sensitivity.    D-dimer values increase with age and this can make VTE exclusion of an older population difficult. To address this, the [employer], based on best available evidence and recent guidelines, recommends that clinicians use age-adjusted D-dimer thresholds in patients greater than 50 years of age with: a) a low probability of PE who do not meet all Pulmonary Embolism Rule Out Criteria, or b) in those with intermediate probability of PE.   The formula for an age-adjusted D-dimer cut-off is \"age/100\".  For example, a 60 year old patient would have an age-adjusted cut-off of 0.60 MCGFEU/mL and an 80 year old 0.80 MCGFEU/mL.   C-REACTIVE PROTEIN - Abnormal; Notable for the following components:    C-Reactive Protein 4.81 (*)     " All other components within normal limits   TSH - Abnormal; Notable for the following components:    TSH 15.850 (*)     All other components within normal limits    Narrative:     Due to abnormal TSH results, suggest ordering Free T4.   CBC WITH AUTO DIFFERENTIAL - Abnormal; Notable for the following components:    RBC 3.55 (*)     Hemoglobin 10.4 (*)     Hematocrit 33.4 (*)     MCHC 31.1 (*)     All other components within normal limits   BLOOD GAS, VENOUS W/CO-OXIMETRY - Abnormal; Notable for the following components:    pO2, Venous 62.1 (*)     Hemoglobin, Blood Gas 10.6 (*)     All other components within normal limits   COVID-19/FLUA&B/RSV, NP SWAB IN TRANSPORT MEDIA 1 HR TAT - Normal    Narrative:     Fact sheet for providers: https://www.fda.gov/media/600755/download    Fact sheet for patients: https://www.fda.gov/media/597678/download    Test performed by PCR.   URINALYSIS W/ MICROSCOPIC IF INDICATED (NO CULTURE) - Normal    Narrative:     Urine microscopic not indicated.   BNP (IN-HOUSE) - Normal    Narrative:     This assay is used as an aid in the diagnosis of individuals suspected of having heart failure. It can be used as an aid in the diagnosis of acute decompensated heart failure (ADHF) in patients presenting with signs and symptoms of ADHF to the emergency department (ED). In addition, NT-proBNP of <300 pg/mL indicates ADHF is not likely.    Age Range Result Interpretation  NT-proBNP Concentration (pg/mL:      <50             Positive            >450                   Gray                 300-450                    Negative             <300    50-75           Positive            >900                  Gray                300-900                  Negative            <300      >75             Positive            >1800                  Gray                300-1800                  Negative            <300   LACTIC ACID, PLASMA - Normal   PROCALCITONIN - Normal    Narrative:     As a Marker for Sepsis  "(Non-Neonates):    1. <0.5 ng/mL represents a low risk of severe sepsis and/or septic shock.  2. >2 ng/mL represents a high risk of severe sepsis and/or septic shock.    As a Marker for Lower Respiratory Tract Infections that require antibiotic therapy:    PCT on Admission    Antibiotic Therapy       6-12 Hrs later    >0.5                Strongly Recommended  >0.25 - <0.5        Recommended   0.1 - 0.25          Discouraged              Remeasure/reassess PCT  <0.1                Strongly Discouraged     Remeasure/reassess PCT    As 28 day mortality risk marker: \"Change in Procalcitonin Result\" (>80% or <=80%) if Day 0 (or Day 1) and Day 4 values are available. Refer to http://www.[employer]ancesPowerStorespct-calculator.com    Change in PCT <=80%  A decrease of PCT levels below or equal to 80% defines a positive change in PCT test result representing a higher risk for 28-day all-cause mortality of patients diagnosed with severe sepsis for septic shock.    Change in PCT >80%  A decrease of PCT levels of more than 80% defines a negative change in PCT result representing a lower risk for 28-day all-cause mortality of patients diagnosed with severe sepsis or septic shock.      MAGNESIUM - Normal   PHOSPHORUS - Normal   CALCIUM, IONIZED - Normal   COVID PRE-OP / PRE-PROCEDURE SCREENING ORDER (NO ISOLATION)    Narrative:     The following orders were created for panel order COVID PRE-OP / PRE-PROCEDURE SCREENING ORDER (NO ISOLATION) - Swab, Nasopharynx.  Procedure                               Abnormality         Status                     ---------                               -----------         ------                     COVID-19, FLU A/B, RSV P...[[id number]]  Normal              Final result                 Please view results for these tests on the individual orders.   WOUND CULTURE   BLOOD CULTURE   BLOOD CULTURE   RAINBOW DRAW    Narrative:     The following orders were created for panel order Page Draw.  Procedure               "                 Abnormality         Status                     ---------                               -----------         ------                     Green Top (Gel)[[id number]]                                  Final result               Lavender Top[[id number]]                                     Final result               Gold Top - SST[[id number]]                                   Final result               Dupree Top[[id number]]                                         Final result               Light Blue Top[[id number]]                                   Final result                 Please view results for these tests on the individual orders.   BLOOD GAS, VENOUS   GREEN TOP   LAVENDER TOP   GOLD TOP - SST   GRAY TOP   LIGHT BLUE TOP   CBC AND DIFFERENTIAL    Narrative:     The following orders were created for panel order CBC & Differential.  Procedure                               Abnormality         Status                     ---------                               -----------         ------                     CBC Auto Differential[[id number]]        Abnormal            Final result                 Please view results for these tests on the individual orders.       Meds Given in ED:   Medications   sodium chloride 0.9 % flush 10 mL (has no administration in time range)   vancomycin 2250 mg/500 mL 0.9% NS IVPB ([facility]) (2,250 mg Intravenous New Bag 10/9/24 0049)   furosemide (LASIX) injection 80 mg (80 mg Intravenous Given 10/8/24 2131)   ceFAZolin 2000 mg IVPB in 100 mL NS (MBP) (0 mg Intravenous Stopped 10/8/24 2203)   metroNIDAZOLE (FLAGYL) tablet 500 mg (500 mg Oral Given 10/8/24 2131)   HYDROmorphone (DILAUDID) injection 0.25 mg (0.25 mg Intravenous Given 10/8/24 2318)   ketorolac (TORADOL) injection 15 mg (15 mg Intravenous Given 10/9/24 0049)   HYDROmorphone (DILAUDID) injection 0.25 mg (0.25 mg Intravenous Given 10/9/24 0049)           Last NIH score:                                                           Dysphagia screening results:  Patient Factors Component (Dysphagia:Stroke or Rule-out)  Best Eye Response: 4-->(E4) spontaneous (10/08/24 2324)  Best Motor Response: 6-->(M6) obeys commands (10/08/24 2324)  Best Verbal Response: 5-->(V5) oriented (10/08/24 2324)  [employer] Coma Scale Score: 15 (10/08/24 2324)     Estephania Coma Scale:  No data recorded     CIWA:        Restraint Type:            Isolation Status:  No active isolations

## 2024-10-09 NOTE — PROGRESS NOTES
"Pharmacy Consult - Vancomycin Dosing and Monitoring    Cordell Martin is a 79 y.o. male receiving vancomycin therapy.   Indication: Cellulitis  Consulting Provider: Hospitalist    Goal AUC: 400-600 mg/L*hr    Current Antimicrobial Therapy  Anti-Infectives (From admission, onward)      Ordered     Dose/Rate Route Frequency Start Stop    10/09/24 0315  vancomycin IVPB 1500 mg in 0.9% NaCl (Premix) 500 mL        Ordering Provider: Ayla Dominguez, PharmD    1,500 mg  333.3 mL/hr over 90 Minutes Intravenous Every 24 Hours 10/09/24 1200 10/13/24 1159    10/09/24 0312  metroNIDAZOLE (FLAGYL) tablet 500 mg        Ordering Provider: Daya Ospina APRN    500 mg Oral Every 8 Hours Scheduled 10/09/24 0600 10/16/24 0559    10/09/24 0312  ceFAZolin 2000 mg IVPB in 100 mL NS (MBP)        Ordering Provider: Daya Ospina APRN    2,000 mg  over 30 Minutes Intravenous Every 8 Hours 10/09/24 0400 10/16/24 0359    10/09/24 0312  Pharmacy to dose vancomycin        Ordering Provider: Daya Ospina APRN     Does not apply Continuous PRN 10/09/24 0312 10/14/24 0311    10/08/24 2008  vancomycin 2250 mg/500 mL 0.9% NS IVPB ([facility])        Ordering Provider: Yuri Kessler MD    20 mg/kg × 118 kg  over 135 Minutes Intravenous Once 10/08/24 2100 10/09/24 0304    10/08/24 2024  ceFAZolin 2000 mg IVPB in 100 mL NS (MBP)        Ordering Provider: Yuri Kessler MD    2,000 mg  over 30 Minutes Intravenous Once 10/08/24 2040 10/08/24 2203    10/08/24 2024  metroNIDAZOLE (FLAGYL) tablet 500 mg        Ordering Provider: Yuri Kessler MD    500 mg Oral Once 10/08/24 2040 10/08/24 2131          Allergies  Allergies as of 10/08/2024    (No Known Allergies)     Labs  Results from last 7 days   Lab Units 10/08/24  1936   BUN mg/dL 22   CREATININE mg/dL 1.26     Results from last 7 days   Lab Units 10/08/24  1936   WBC 10*3/mm3 7.37     Evaluation of Dosing   Height - 182.9 cm (72.01\")  Weight - 118 kg (260 lb 2.3 " oz)    Estimated Creatinine Clearance: 65.1 mL/min (by C-G formula based on SCr of 1.26 mg/dL).    No intake/output data recorded.    Microbiology and Radiology  Microbiology Results (last 10 days)       Procedure Component Value - Date/Time    COVID PRE-OP / PRE-PROCEDURE SCREENING ORDER (NO ISOLATION) - Swab, Nasopharynx [[id number]]  (Normal) Collected: 10/08/24 2126    Lab Status: Final result Specimen: Swab from Nasopharynx Updated: 10/08/24 2220    Narrative:      The following orders were created for panel order COVID PRE-OP / PRE-PROCEDURE SCREENING ORDER (NO ISOLATION) - Swab, Nasopharynx.  Procedure                               Abnormality         Status                     ---------                               -----------         ------                     COVID-19, FLU A/B, RSV P...[[id number]]  Normal              Final result                 Please view results for these tests on the individual orders.    COVID-19, FLU A/B, RSV PCR 1 HR TAT - Swab, Nasopharynx [[id number]]  (Normal) Collected: 10/08/24 2126    Lab Status: Final result Specimen: Swab from Nasopharynx Updated: 10/08/24 2220     COVID19 Not Detected     Influenza A PCR Not Detected     Influenza B PCR Not Detected     RSV, PCR Not Detected    Narrative:      Fact sheet for providers: https://www.fda.gov/media/184902/download    Fact sheet for patients: https://www.fda.gov/media/393463/download    Test performed by PCR.    Wound Culture - Drainage, Leg, Right [[id number]] Collected: 10/08/24 2050    Lab Status: Preliminary result Specimen: Drainage from Leg, Right Updated: 10/08/24 2146     Gram Stain Moderate (3+) WBCs seen      Moderate (3+) Gram negative bacilli          Reported Vancomycin Levels              InsightRX AUC Calculation:    Current AUC:  mg/L*hr    Predicted Steady State AUC on Current Dose: 452 mg/L*hr  _________________________________    Predicted Steady State AUC on New Dose:  mg/L*hr    Assessment/Plan:   Pharmacy to  dose Vancomycin for cellulitis  Patient received loading dose of 2.25gm IV Vancomycin x 1 in the ER  Continue maintenance dose of 1500mg IV Vancomycin daily based on Insight Rx recommendations.  Pharmacy will monitor for changed in renal clearance and ADR's    Thanks,   Ayla Dominguez PharmD BCPS        Ayla Dominguez, PharmD  10/9/2024  03:18 EDT

## 2024-10-09 NOTE — CASE MANAGEMENT/SOCIAL WORK
Discharge Planning Assessment  [facility]     Patient Name: Cordell Martin  MRN: [id number]  Today's Date: 10/9/2024    Admit Date: 10/8/2024    Plan: discharge plan   Discharge Needs Assessment       Row Name 10/09/24 1143       Living Environment    People in Home alone;other (see comments)  Pt resides in an apartment at [facility](per sister, Katie)    Current Living Arrangements apartment;other (see comments)  [facility]    Primary Care Provided by self    Provides Primary Care For no one, unable/limited ability to care for self    Family Caregiver if Needed sibling(s)    Family Caregiver Names Lennie Guillen or Katie Reilly(sisters)    Quality of Family Relationships involved;helpful    Able to Return to Prior Arrangements yes    Living Arrangement Comments Pt is in isolation. I spoke with pt's sister, Katie on the phone regarding discharge plan. Pt resides in [facility] apartment       Transition Planning    Patient/Family Anticipates Transition to home    Patient/Family Anticipated Services at Transition     Transportation Anticipated family or friend will provide       Discharge Needs Assessment    Readmission Within the Last 30 Days no previous admission in last 30 days    Current Outpatient/Agency/Support Group other (see comments)  [facility]    Equipment Currently Used at Home wheelchair, motorized;rollator;walker, rolling;shower chair    Concerns to be Addressed discharge planning    Equipment Needed After Discharge wheelchair, power;shower chair;walker, rolling;rollator    Discharge Coordination/Progress Pt's sisiter confirms that pt has Medicare and [employer] with some prescription coverage Pt uses [facility]. Per sister, pt is independent with ADLS and drives himself to his appts. Pt has has a rollator and motorized w/c that he uses. Pt has a  history of  Parkinsons, chronic edema/lymphedema, chronic cough, anxiety, depression who presented to the ED w/ c/o leg edema and erythema with diagnosis of cellulitis. Ortho, wound and ID have been consulted. Goal is back to [facility]. CM will cont to follow for discharge needs.                   Discharge Plan       Row Name 10/09/24 1158       Plan    Plan discharge plan    Plan Comments Pt has a history of Parkinsons, chronic edema/lymphedema, chronic cough, anxiety, depression who presented to the ED w/ c/o leg edema and erythema with diagnosis of cellulitis. Ortho, wound and ID have been consulted. [facility]. CM will cont to follow for discharge needs.    Final Discharge Disposition Code 03 - skilled nursing facility (SNF)                  Continued Care and Services - Admitted Since 10/8/2024    No active coordination exists for this encounter.       Expected Discharge Date and Time       Expected Discharge Date Expected Discharge Time    Oct 11, 2024            Demographic Summary       Row Name 10/09/24 1141       General Information    General Information Comments PCP is [facility]       Contact Information    Permission Granted to Share Info With     Contact Information Obtained for     Contact Information Comments Lennie Wilmer(sister) [phone] or Katie Grover(sister) [phone]                   Functional Status    No documentation.                  Psychosocial    No documentation.                  Abuse/Neglect    No documentation.                  Legal    No documentation.                  Substance Abuse    No documentation.                  Patient Forms    No documentation.                     Kenisha Tom RN

## 2024-10-09 NOTE — PROGRESS NOTES
Pharmacy Consult - Vancomycin Dosing and Monitoring    Cordell Martin is a 79 y.o. male receiving vancomycin therapy.   Indication: Cellulitis  Consulting Provider: Hospitalist    Goal AUC: 400-600 mg/L*hr    Current Antimicrobial Therapy  Anti-Infectives (From admission, onward)      Ordered     Dose/Rate Route Frequency Start Stop    10/09/24 0735  piperacillin-tazobactam (ZOSYN) 3.375 g IVPB in 100 mL NS MBP (CD)        Ordering Provider: Fabian Lopez MD    3.375 g  over 4 Hours Intravenous Every 8 Hours 10/09/24 1430 10/13/24 1429    10/09/24 0315  vancomycin IVPB 1500 mg in 0.9% NaCl (Premix) 500 mL        Ordering Provider: Ayla Dominguez, PharmD    1,500 mg  333.3 mL/hr over 90 Minutes Intravenous Every 24 Hours 10/09/24 1200 10/13/24 1159    10/09/24 0735  piperacillin-tazobactam (ZOSYN) 3.375 g IVPB in 100 mL NS MBP (CD)        Ordering Provider: Fabian Lopez MD    3.375 g  over 30 Minutes Intravenous Once 10/09/24 0830 10/09/24 0840    10/09/24 0312  Pharmacy to dose vancomycin        Ordering Provider: Daya Ospina APRN     Does not apply Continuous PRN 10/09/24 0312 10/14/24 0311    10/08/24 2008  vancomycin 2250 mg/500 mL 0.9% NS IVPB ([facility])        Ordering Provider: Yuri Kessler MD    20 mg/kg × 118 kg  over 135 Minutes Intravenous Once 10/08/24 2100 10/09/24 0346    10/08/24 2024  ceFAZolin 2000 mg IVPB in 100 mL NS (MBP)        Ordering Provider: Yuri Kessler MD    2,000 mg  over 30 Minutes Intravenous Once 10/08/24 2040 10/08/24 2203    10/08/24 2024  metroNIDAZOLE (FLAGYL) tablet 500 mg        Ordering Provider: Yuri Kessler MD    500 mg Oral Once 10/08/24 2040 10/08/24 2131          Allergies  Allergies as of 10/08/2024    (No Known Allergies)     Labs  Results from last 7 days   Lab Units 10/09/24  0404 10/08/24  1936   BUN mg/dL 21 22   CREATININE mg/dL 1.14 1.26     Results from last 7 days   Lab Units 10/09/24  0404 10/08/24  1936   WBC 10*3/mm3 8.21  "7.37     Evaluation of Dosing   Height - 182.9 cm (72\")  Weight - 116 kg (255 lb 1.6 oz)    Estimated Creatinine Clearance: 71.4 mL/min (by C-G formula based on SCr of 1.14 mg/dL).    I/O last 3 completed shifts:  In: 850 [P.O.:250; IV Piggyback:600]  Out: 525 [Urine:525]    Microbiology and Radiology  Microbiology Results (last 10 days)       Procedure Component Value - Date/Time    Respiratory Panel PCR w/COVID-19(SARS-CoV-2) KENJI/ASH/SHIRLEY/PAD/COR/CORNELIUS In-House, NP Swab in UTM/VTM, 2 HR TAT - Swab, Nasopharynx [[id number]]  (Normal) Collected: 10/09/24 0548    Lab Status: Final result Specimen: Swab from Nasopharynx Updated: 10/09/24 0643     ADENOVIRUS, PCR Not Detected     Coronavirus 229E Not Detected     Coronavirus HKU1 Not Detected     Coronavirus NL63 Not Detected     Coronavirus OC43 Not Detected     COVID19 Not Detected     Human Metapneumovirus Not Detected     Human Rhinovirus/Enterovirus Not Detected     Influenza A PCR Not Detected     Influenza B PCR Not Detected     Parainfluenza Virus 1 Not Detected     Parainfluenza Virus 2 Not Detected     Parainfluenza Virus 3 Not Detected     Parainfluenza Virus 4 Not Detected     RSV, PCR Not Detected     Bordetella pertussis pcr Not Detected     Bordetella parapertussis PCR Not Detected     Chlamydophila pneumoniae PCR Not Detected     Mycoplasma pneumo by PCR Not Detected    Narrative:      In the setting of a positive respiratory panel with a viral infection PLUS a negative procalcitonin without other underlying concern for bacterial infection, consider observing off antibiotics or discontinuation of antibiotics and continue supportive care. If the respiratory panel is positive for atypical bacterial infection (Bordetella pertussis, Chlamydophila pneumoniae, or Mycoplasma pneumoniae), consider antibiotic de-escalation to target atypical bacterial infection.    COVID PRE-OP / PRE-PROCEDURE SCREENING ORDER (NO ISOLATION) - Swab, Nasopharynx [[id number]]  (Normal) " Collected: 10/08/24 2126    Lab Status: Final result Specimen: Swab from Nasopharynx Updated: 10/08/24 2220    Narrative:      The following orders were created for panel order COVID PRE-OP / PRE-PROCEDURE SCREENING ORDER (NO ISOLATION) - Swab, Nasopharynx.  Procedure                               Abnormality         Status                     ---------                               -----------         ------                     COVID-19, FLU A/B, RSV P...[[id number]]  Normal              Final result                 Please view results for these tests on the individual orders.    COVID-19, FLU A/B, RSV PCR 1 HR TAT - Swab, Nasopharynx [[id number]]  (Normal) Collected: 10/08/24 2126    Lab Status: Final result Specimen: Swab from Nasopharynx Updated: 10/08/24 2220     COVID19 Not Detected     Influenza A PCR Not Detected     Influenza B PCR Not Detected     RSV, PCR Not Detected    Narrative:      Fact sheet for providers: https://www.fda.gov/media/911827/download    Fact sheet for patients: https://www.fda.gov/media/453125/download    Test performed by PCR.    Wound Culture - Drainage, Leg, Right [[id number]] Collected: 10/08/24 2050    Lab Status: Preliminary result Specimen: Drainage from Leg, Right Updated: 10/09/24 0807     Wound Culture Growth present, too young to evaluate     Gram Stain Moderate (3+) WBCs seen      Moderate (3+) Gram negative bacilli          Reported Vancomycin Levels              InsightRX AUC Calculation:    Current AUC: -- mg/L*hr    Predicted Steady State AUC on Current Dose: 452 mg/L*hr  _________________________________    Predicted Steady State AUC on New Dose: -- mg/L*hr    Assessment/Plan:   Pharmacy to dose vancomycin for cellulitis, goal -600.  Patient received loading dose of 2.25gm IV vancomycin x 1 in the ER  Agree with maintenance dose of 1500mg IV vancomycin Q24h (~13 mg/kg) based on Insight Rx recommendations.  Will order vancomycin random level for 10/10 @0600. Will  also order vancomycin 'peak' level 10/9 to assist in patient specific pharmacokinetics, given weight >100 kg.   Pharmacy will monitor clinical status, renal function, cultures.     Thank you,    Kat Gage MAGALIS  10/9/2024  09:28 EDT

## 2024-10-09 NOTE — PLAN OF CARE
Goal Outcome Evaluation:              Outcome Evaluation: Patient is alert and oriented times four with moments of confusion. Alzheimers at baseline. VSS. NSR on the monitor. RA. Temperature was at 97.2 at 0800, removed the bear hugger. While assessing the patient along side WOC, maggots were found in between great toe and second toe. Charge and Hospitalist were immediately notified and in the room. Maggots were removed and sent to lab. Wound culture complete. New orders for wound care, see orders. applied xeroform and loose gauze over top. WOC, PT wound and ortho to follow. Had MRI of foot today, results are pending. Complained of burning pain in bilateral extremities. See MAR. Turned q2, patient refused turns starting at 1200. Able to turn at 1400 during bed change. Replaced potassium per protocol. Patient is resting comfortably with both legs elevated. Care ongoing.

## 2024-10-09 NOTE — CONSULTS
Cordell Martin  1945  [id number]    Date of Consult: 10/9/2024    Admit Date:  10/8/2024      Requesting Provider: Dr Lopez  Evaluating Physician: Trevor Jorgensen MD    Chief Complaint: right leg red and painful    Reason for Consultation: RLE infection    History of present illness:     Patient is a 79 y.o.  Yr old male prior patient of Dr. Benites, with history of Parkinson's disease and chronically debilitated, uses a wheelchair primarily although apparently has occasionally used a walker.  He has other extensive comorbidities as below.  He had reported trauma to the right second toe having jammed it into a wall while in his wheelchair several weeks prior to admission.  He had developed increasing redness/swelling and discoloration of the right second toe and foot/lower leg over that period of time.  He does have bilateral lower leg discoloration and swelling but the right side is out of proportion of the left side.  He was admitted on October 8 with concern for lower extremity cellulitis; subsequently noted to have maggots at the right second toe    Aside from the trauma in the wheelchair, he denied any other specific exposures.  No other blunt force or penetrating trauma.  No animal insect or arthropod bite.  No fresh/brackish/salt water exposure.  No prior history MRSA VRE C. difficile or ESBL/KPC/CRE organisms that he knows of    Right lower leg/foot pain out of proportion to left, constant, nonradiating, worse with palpation, generally better with pain meds and if keeping pressure off it, 3 out of 10 at present.    No headache photophobia or neck stiffness.  He has chronic intermittent cough and reports this is at baseline, currently on room air with no hemoptysis.  No nausea vomiting diarrhea or abdominal pain.  No dysuria hematuria or pyuria.  No other specific rash    Past Medical History:   Diagnosis Date    Anxiety     Arthritis     Back pain     Bronchitis     Cognitive impairment      Depression     Disease of thyroid gland     Glucose intolerance (impaired glucose tolerance)     Hyperlipidemia     Kidney stone     Obesity     Parkinson disease     Pneumonia     Prostate disorder     Thyroid disease     Wears eyeglasses        Past Surgical History:   Procedure Laterality Date    COLONOSCOPY      5 years ago    EYE SURGERY      HERNIA REPAIR  10/20/2020    KNEE SURGERY      LUMBAR LAMINECTOMY DISCECTOMY DECOMPRESSION N/A 07/13/2017    Procedure: LUMBAR LAMINECTOMY L4-5;  Surgeon: Antonio Trent MD;  Location: [facility];  Service:     SHOULDER ROTATOR CUFF REPAIR Right     x2    TONSILLECTOMY      VEIN SURGERY         Pediatric History   Patient Parents    Not on file     Other Topics Concern    Not on file   Social History Narrative    Lives alone. Uses walker    Has not smoked since about 1980       family history includes Alzheimer's disease in an other family member; Cancer in his father and another family member; Diabetes in an other family member; Heart disease in an other family member; Stroke in an other family member.    No Known Allergies    Medication:  Current Facility-Administered Medications   Medication Dose Route Frequency Provider Last Rate Last Admin    sennosides-docusate (PERICOLACE) 8.6-50 MG per tablet 2 tablet  2 tablet Oral BID PRN Daya Ospina APRN        And    polyethylene glycol (MIRALAX) packet 17 g  17 g Oral Daily PRN Daya Ospina APRN        And    bisacodyl (DULCOLAX) EC tablet 5 mg  5 mg Oral Daily PRN Daya Ospina APRN        And    bisacodyl (DULCOLAX) suppository 10 mg  10 mg Rectal Daily PRN Daya Ospina APRN        carbidopa-levodopa (SINEMET)  MG per tablet 2 tablet  2 tablet Oral 4x Daily Daya Ospina APRN   2 tablet at 10/09/24 0809    carbidopa-levodopa ER (SINEMET CR)  MG per tablet 1 tablet  1 tablet Oral BID Daya Ospina APRN   1 tablet at 10/09/24 0809    DAPTOmycin (CUBICIN) 550 mg in sodium chloride 0.9 % 50 mL IVPB   6 mg/kg (Adjusted) Intravenous Q24H Trevor Jorgensen MD        fluticasone (FLONASE) 50 MCG/ACT nasal spray 2 spray  2 spray Each Nare Daily Daya Ospina APRN        furosemide (LASIX) tablet 20 mg  20 mg Oral Daily Daya Ospina APRN   20 mg at 10/09/24 0809    gabapentin (NEURONTIN) capsule 100 mg  100 mg Oral Q12H Fabian Lopez MD   100 mg at 10/09/24 0810    guaiFENesin (MUCINEX) 12 hr tablet 600 mg  600 mg Oral Q12H Fabian Lopez MD   600 mg at 10/09/24 0810    heparin (porcine) 5000 UNIT/ML injection 5,000 Units  5,000 Units Subcutaneous Q8H Fabian Lopez MD   5,000 Units at 10/09/24 0810    HYDROcodone-acetaminophen (NORCO) 5-325 MG per tablet 1 tablet  1 tablet Oral Q6H PRN Eagle Paniagua DO   1 tablet at 10/09/24 0534    levothyroxine (SYNTHROID, LEVOTHROID) tablet 88 mcg  88 mcg Oral Q AM Daya Ospina APRN   88 mcg at 10/09/24 0534    Magnesium Standard Dose Replacement - Follow Nurse / BPA Driven Protocol   Does not apply PRN Daya Ospina APRN        pantoprazole (PROTONIX) EC tablet 40 mg  40 mg Oral Daily Daya Ospina APRN        Pharmacy to dose vancomycin   Does not apply Continuous PRN Daya Ospina APRN        piperacillin-tazobactam (ZOSYN) 3.375 g IVPB in 100 mL NS MBP (CD)  3.375 g Intravenous Q8H Fabian Lopez MD        potassium chloride (KLOR-CON M20) CR tablet 40 mEq  40 mEq Oral Q4H Daya Ospina APRN   40 mEq at 10/09/24 0634    Potassium Replacement - Follow Nurse / BPA Driven Protocol   Does not apply PRN Daya Ospina APRN        pramipexole (MIRAPEX) tablet 1 mg  1 mg Oral TID Daya Ospina APRN   1 mg at 10/09/24 0809    QUEtiapine (SEROquel) tablet 12.5 mg  12.5 mg Oral Nightly Daya Ospina APRN        sodium chloride 0.9 % flush 10 mL  10 mL Intravenous PRN Yuri Kessler MD        sodium chloride 0.9 % flush 10 mL  10 mL Intravenous Q12H Daya Ospina APRN   10 mL at 10/09/24 0820    sodium chloride 0.9 % flush 10 mL  10 mL  Intravenous PRN Daya Ospina APRN        sodium chloride 0.9 % infusion 40 mL  40 mL Intravenous PRN Daya Ospina APRN        sodium chloride 7 % nebulizer solution nebulizer solution 4 mL  4 mL Nebulization Daily - RT Fabian Lopez MD        tamsulosin (FLOMAX) 24 hr capsule 0.4 mg  0.4 mg Oral Daily Daya Ospina APRN   0.4 mg at 10/09/24 0809       Antibiotics:  Anti-Infectives (From admission, onward)      Ordered     Dose/Rate Route Frequency Start Stop    10/09/24 0735  piperacillin-tazobactam (ZOSYN) 3.375 g IVPB in 100 mL NS MBP (CD)        Ordering Provider: Fabian Lopez MD    3.375 g  over 4 Hours Intravenous Every 8 Hours 10/09/24 1430 10/13/24 1429    10/09/24 0948  DAPTOmycin (CUBICIN) 550 mg in sodium chloride 0.9 % 50 mL IVPB        Ordering Provider: Trevor Jorgensen MD    6 mg/kg × 93 kg (Adjusted)  100 mL/hr over 30 Minutes Intravenous Every 24 Hours 10/09/24 1200 10/23/24 1159    10/09/24 0735  piperacillin-tazobactam (ZOSYN) 3.375 g IVPB in 100 mL NS MBP (CD)        Ordering Provider: Fabian Lopez MD    3.375 g  over 30 Minutes Intravenous Once 10/09/24 0830 10/09/24 0840    10/09/24 0312  Pharmacy to dose vancomycin        Ordering Provider: Daya Ospina APRN     Does not apply Continuous PRN 10/09/24 0312 10/14/24 0311    10/08/24 2008  vancomycin 2250 mg/500 mL 0.9% NS IVPB ([facility])        Ordering Provider: Yuri Kessler MD    20 mg/kg × 118 kg  over 135 Minutes Intravenous Once 10/08/24 2100 10/09/24 0346    10/08/24 2024  ceFAZolin 2000 mg IVPB in 100 mL NS (MBP)        Ordering Provider: Yuri Kessler MD    2,000 mg  over 30 Minutes Intravenous Once 10/08/24 2040 10/08/24 2203    10/08/24 2024  metroNIDAZOLE (FLAGYL) tablet 500 mg        Ordering Provider: Yuri Kessler MD    500 mg Oral Once 10/08/24 2040 10/08/24 2131              Review of Systems    Constitutional-- No Fever, chills or sweats.  Appetite good, and no malaise. No  "fatigue.  Heent-- No new vision, hearing or throat complaints.  No epistaxis or oral sores.  Denies odynophagia or dysphagia.  No flashers, floaters or eye pain. No odynophagia or dysphagia. No headache, photophobia or neck stiffness.  CV-- No chest pain, palpitation or syncope  Resp--see above, no shortness of breath at present  GI- No nausea, vomiting, or diarrhea.  No hematochezia, melena, or hematemesis. Denies jaundice or chronic liver disease.  -- No dysuria, hematuria, or flank pain.  Denies hesitancy, urgency or flank pain.  Lymph- no swollen lymph nodes in neck/axilla or groin.   Heme- No active bruising or bleeding; no Hx of DVT or PE.  MS-- no swelling or pain in the bones or joints of arms/legs.  No new back pain.  Neuro-- No acute focal weakness or numbness in the arms or legs.  No seizures.  Chronic Parkinson's and chronically debilitated    Full 12 point review of systems reviewed and negative otherwise for acute complaints, except for above    Physical Exam:   Vital Signs   /58 (BP Location: Right arm, Patient Position: Lying)   Pulse 83   Temp 96.2 °F (35.7 °C) (Oral)   Resp 18   Ht 182.9 cm (72\")   Wt 116 kg (255 lb 1.6 oz)   SpO2 92%   BMI 34.60 kg/m²     GENERAL: Awake and alert, in no acute distress.  Room air  HEENT: Normocephalic, atraumatic.    No conjunctival injection. No icterus. Oropharynx clear without evidence of thrush or exudate. No evidence of periodontal disease.    NECK: Supple without nuchal rigidity. No mass.  LYMPH: No cervical, axillary or inguinal lymphadenopathy.  HEART: RRR; No murmur, rubs, gallops.   LUNGS: Diminished at bases but otherwise  clear to auscultation bilaterally without wheezing, rales, rhonchi. Normal respiratory effort. Nonlabored. No dullness.  ABDOMEN: Soft, nontender, nondistended. Positive bowel sounds. No rebound or guarding. NO mass or HSM.  EXT: See below  : Genitalia generally unremarkable.  Without Bird catheter.  MSK: FROM without " joint effusions noted arms/legs.    SKIN: Warm and dry without cutaneous eruptions on Inspection/palpation.    NEURO: Oriented to PPT.  He has difficult time cooperating with a detailed motor exam given his chronic debility and positioning in bed with Parkinson's.    Bilateral lower legs appeared edematous and discolored with vague scale.  However right side between knee and foot has warmth/tenderness and erythema out of proportion to left side.  Right side second toe is diffusely discolored with vague edema/erythema and tenderness out of proportion to other toes, crusted wound and unable to determine any depth with no definitive probe to bone tendon joint or ligament at present; no discrete mass bulge or fluctuance.  No crepitus or bulla    Laboratory Data    Results from last 7 days   Lab Units 10/09/24  0404 10/08/24  1936   WBC 10*3/mm3 8.21 7.37   HEMOGLOBIN g/dL 10.8* 10.4*   HEMATOCRIT % 34.7* 33.4*   PLATELETS 10*3/mm3 244 250     Results from last 7 days   Lab Units 10/09/24  0404   SODIUM mmol/L 142   POTASSIUM mmol/L 3.4*   CHLORIDE mmol/L 107   CO2 mmol/L 25.0   BUN mg/dL 21   CREATININE mg/dL 1.14   GLUCOSE mg/dL 120*   CALCIUM mg/dL 8.4*     Results from last 7 days   Lab Units 10/08/24  1936   ALK PHOS U/L 108   BILIRUBIN mg/dL 0.3   ALT (SGPT) U/L <5   AST (SGOT) U/L 12     Results from last 7 days   Lab Units 10/08/24  1936   SED RATE mm/hr 92*     Results from last 7 days   Lab Units 10/08/24  1936   CRP mg/dL 4.81*       Estimated Creatinine Clearance: 71.4 mL/min (by C-G formula based on SCr of 1.14 mg/dL).      Microbiology:      Radiology:  Imaging Results (Last 72 Hours)       Procedure Component Value Units Date/Time    CT Angiogram Chest [[id number]] Collected: 10/09/24 0759     Updated: 10/09/24 0810    Narrative:      CT ANGIOGRAM CHEST    Date of Exam: 10/9/2024 4:37 AM EDT    Indication: cough, + D.Dimer; hx of DVTs.    Comparison: None available.    Technique: CTA of the chest was  performed after the uneventful intravenous administration of 85 mL of Isovue-370. Reconstructed coronal and sagittal images were also obtained. In addition, a 3-D volume rendered image was created for interpretation.   Automated exposure control and iterative reconstruction methods were used.    FINDINGS:    Thoracic inlet: Unremarkable.    Pulmonary arteries: No filling defects are identified within the pulmonary arteries to suggest acute pulmonary embolism.    Great vessels: Mild vascular calcifications are present. Otherwise, the thoracic aorta and proximal arch vessels appear unremarkable.    Mediastinum/Mariia: No pathologically enlarged mediastinal lymph nodes are seen. The esophagus is unremarkable.    Lung parenchyma: Scattered bibasilar and lingular atelectasis. Calcified granuloma within the right lung. No acute infiltrate is seen. Unchanged 4 mm subpleural nodule within the right lung (series 5, image 51), too small to warrant imaging follow-up by   Fleischner Society guidelines.    Trachea and airways: The trachea and central airways appear unremarkable.    Pleural space: No significant pleural effusion or pneumothorax.    Heart and pericardium: Coronary artery calcifications. Otherwise, the heart and pericardium appear unremarkable.    Chest wall: No acute osseous lesion is identified. Bones are demineralized. There are degenerative changes within the spine. Contiguous anterior osteophytes noted within the thoracic spine. There is also bony fusion across multiple spinous processes.   These findings may be seen in diffuse idiopathic skeletal hyperostosis. Superficial soft tissues demonstrate no acute abnormality. Tendon anchors are seen within the right proximal humerus.    Upper abdomen: No acute abnormality is identified within the visualized upper abdomen.      Impression:      1.No acute abnormality is identified within the thorax. Specifically, there is no evidence of acute pulmonary  embolism.  2.Bilateral lower lobe and lingular atelectasis.  3.Unchanged 4 mm subpleural nodule within the right lung (series 5, image 51), too small to warrant imaging follow-up by Fleischner Society guidelines.  4.Additional findings as detailed above.      Electronically Signed: Henok Castanon MD    10/9/2024 8:07 AM EDT    Workstation ID: [id number]    XR Chest 1 View [[id number]] Collected: 10/08/24 2040     Updated: 10/08/24 2045    Narrative:      XR CHEST 1 VW    Date of Exam: 10/8/2024 8:14 PM EDT    Indication: sepsis, chronic cough, infectious workup    Comparison: Chest radiograph 9/15/2024    Findings:      Mediastinum: Cardiomediastinal silhouette appears unchanged and enlarged    Lungs: Mild left basal consolidative opacity.    Pleura: No convincing pleural effusion or pneumothorax    Bones and soft tissues: No acute osseous abnormality.        Impression:      Impression:  Left basal opacity which may represent atelectasis, though aspiration or pneumonia can be considered in the appropriate clinical setting.      Electronically Signed: Babak Natarajan    10/8/2024 8:42 PM EDT    Workstation ID: [id number]              Impression:     --Acute right lower leg/foot and second toe infection out of proportion to his chronic discoloration by his report, trauma to the right second toe several weeks preceding admission as above.  Further complicated by his chronic comorbidity, wound at the second toe with maggots noted earlier in stay, with ongoing risk for further serious morbidity and other serious sequela; high risk for persistent/recurrent or nonhealing wounds, persistent/progressive or recurrent infection and risk for further functional/limb loss, risk for amputation etc.  Orthopedic team to help determine salvageability here.    --Chronic/intermittent cough at presentation, CT scan without acute infiltrate per radiology and some atelectasis noted by them.  Encouraged incentive spirometry.  No productive  cough at present.  Respiratory PCR negative.  Further pulmonary workup or referral at discretion of medicine team; pneumonia less likely at present but certainly could evolve; monitor for new process    --Hx trauma and maggots    --Parkinson's disease, chronically debilitated and primarily wheelchair-bound by his report.    --Prior history of DVT    PLAN: Thank you for asking us to see Cordell Martin, I recommend the following:    -- IV daptomycin/Zosyn for now.  Consider taper after culture data back    -- Check/review labs cultures and scans    -- Partial history per nursing staff    -- Discussed with microbiology    -- Highly complex set of issues with high risk for further serious morbidity and other serious sequela    -- Radiographic and surgical workup ongoing    -- Discussed with Dr. John Jorgensen MD  10/9/2024

## 2024-10-09 NOTE — CONSULTS
[facility]  [address] [facility] [address]  [phone]       Orthopedic Consult    Patient: Cordell Martin    Date of Admission: 10/8/2024  7:22 PM    YOB: 1945    Medical Record Number: [id number]    Attending Physician: Fabian Lopez MD    Consulting Physician: Amadeo Alvarez PA-C    Chief Complaint: Cellulitis [L03.90]    History of Present Illness: 79 y.o. male admitted to [facility] with Cellulitis [L03.90]. I was consulted for further evaluation and treatment. Patient states he has been dealing with redness, swelling, and drainage from his legs for 2 years ever since he had right TKA with Dr. Cuevas. He has been receiving home health for wound care. He has a medical history significant for Parkinson's, venous insufficiency, and DVT.     No Known Allergies     Home Medications:  Medications Prior to Admission   Medication Sig Dispense Refill Last Dose    ammonium lactate (AmLactin) 12 % lotion Apply  topically to the appropriate area as directed As Needed for Dry Skin. 225 g 1 Past Week    atorvastatin (LIPITOR) 40 MG tablet Take 1 tablet by mouth once daily 90 tablet 0 10/8/2024    Budeson-Glycopyrrol-Formoterol (BREZTRI) 160-9-4.8 MCG/ACT aerosol inhaler Inhale 2 puffs 2 (Two) Times a Day. 10.7 g 5 Past Month    carbidopa-levodopa (SINEMET)  MG per tablet Take 2 tablets by mouth 4 (Four) Times a Day. PATIENT MUST SCHEDULE FOLLOW UP APPOINTMENT FOR ADDITIONAL REFILLS. 720 tablet 0 10/8/2024    carbidopa-levodopa ER (SINEMET CR)  MG per tablet Take 1 tablet by mouth 2 (Two) Times a Day. PATIENT MUST SCHEDULE FOLLOW UP APPOINTMENT FOR ADDITIONAL REFILLS. 180 tablet 0 10/8/2024    Cholecalciferol (VITAMIN D3) 5000 units capsule capsule Take 1 capsule by mouth Daily.   Past Month    fluorometholone (FML) 0.1 % ophthalmic suspension fluorometholone 0.1 % eye drops,suspension   Past Month    fluticasone (FLONASE) 50 MCG/ACT nasal spray USE 2 SPRAYS IN EACH  NOSTRIL ONCE DAILY (Patient taking differently: Administer 2 sprays into the nostril(s) as directed by provider 2 (Two) Times a Day. Indications: Allergic Rhinitis, Stuffy Nose) 48 mL 2 10/8/2024    furosemide (LASIX) 20 MG tablet Take 1 tablet by mouth once daily 90 tablet 0 10/8/2024    levothyroxine (SYNTHROID, LEVOTHROID) 88 MCG tablet Take 1 tablet by mouth once daily 90 tablet 0 10/8/2024    pantoprazole (Protonix) 40 MG EC tablet Take 1 tablet by mouth Daily. 30 tablet 1 Past Month    pramipexole (MIRAPEX) 1 MG tablet Take 1 tablet by mouth 3 (Three) Times a Day. 270 tablet 3 10/9/2024    QUEtiapine (SEROquel) 25 MG tablet Take 0.5 tablets by mouth Every Night. Indications: mood   10/7/2024    tamsulosin (FLOMAX) 0.4 MG capsule 24 hr capsule tamsulosin 0.4 mg capsule   TAKE 1 CAPSULE BY MOUTH EVERY DAY   10/8/2024    acetaminophen (TYLENOL) 325 MG tablet Take 2 tablets by mouth Every 6 (Six) Hours As Needed for Mild Pain . 28 tablet 0     apixaban (ELIQUIS) 5 MG tablet tablet Take 1 tablet by mouth 2 (Two) Times a Day. 60 tablet 5     Diclofenac Sodium (VOLTAREN) 1 % gel gel APPLY 4GM TO AFFECTED AREA UP TO 4 TIMES DAILY       doxycycline (MONODOX) 100 MG capsule Take 1 capsule by mouth 2 (Two) Times a Day. 14 capsule 0     fludrocortisone 0.1 MG tablet Take 1 tablet by mouth Daily. Indications: Blood Pressure Drop Upon Standing       gabapentin (NEURONTIN) 300 MG capsule Take 1 capsule by mouth Every Night. Indications: Fibromyalgia Syndrome   10/7/2024    loratadine (Claritin) 10 MG tablet Take 1 tablet by mouth Daily. 90 tablet 1     mirtazapine (Remeron) 30 MG tablet Take 1 tablet by mouth Every Night. 30 tablet 6     potassium chloride (K-DUR,KLOR-CON) 20 MEQ CR tablet Take 1 tablet by mouth Daily. 90 tablet 3 More than a month    sulfamethoxazole-trimethoprim (BACTRIM DS,SEPTRA DS) 800-160 MG per tablet        traMADol (ULTRAM) 50 MG tablet Take 1 tablet by mouth Daily.       traZODone (DESYREL) 50 MG  tablet        vitamin B-6 (PYRIDOXINE) 25 MG tablet Take 1 tablet by mouth Daily. 30 tablet 11     vitamin C (ASCORBIC ACID) 500 MG tablet Take 1 tablet by mouth Daily.          Current Medications:  Scheduled Meds:carbidopa-levodopa, 2 tablet, Oral, 4x Daily  carbidopa-levodopa ER, 1 tablet, Oral, BID  DAPTOmycin, 6 mg/kg (Adjusted), Intravenous, Q24H  fluticasone, 2 spray, Each Nare, Daily  furosemide, 20 mg, Oral, Daily  gabapentin, 100 mg, Oral, Q12H  guaiFENesin, 600 mg, Oral, Q12H  heparin (porcine), 5,000 Units, Subcutaneous, Q8H  levothyroxine, 88 mcg, Oral, Q AM  pantoprazole, 40 mg, Oral, Daily  piperacillin-tazobactam, 3.375 g, Intravenous, Q8H  pramipexole, 1 mg, Oral, TID  QUEtiapine, 12.5 mg, Oral, Nightly  sodium chloride, 10 mL, Intravenous, Q12H  sodium chloride, 4 mL, Nebulization, Daily - RT  tamsulosin, 0.4 mg, Oral, Daily      Continuous Infusions:   PRN Meds:.  senna-docusate sodium **AND** polyethylene glycol **AND** bisacodyl **AND** bisacodyl    HYDROcodone-acetaminophen    Magnesium Standard Dose Replacement - Follow Nurse / BPA Driven Protocol    Potassium Replacement - Follow Nurse / BPA Driven Protocol    sodium chloride    sodium chloride    sodium chloride    Past Medical History:   Diagnosis Date    Anxiety     Arthritis     Back pain     Bronchitis     Cognitive impairment     Depression     Disease of thyroid gland     Glucose intolerance (impaired glucose tolerance)     Hyperlipidemia     Kidney stone     Obesity     Parkinson disease     Pneumonia     Prostate disorder     Thyroid disease     Wears eyeglasses         Past Surgical History:   Procedure Laterality Date    COLONOSCOPY      5 years ago    EYE SURGERY      HERNIA REPAIR  10/20/2020    KNEE SURGERY      LUMBAR LAMINECTOMY DISCECTOMY DECOMPRESSION N/A 07/13/2017    Procedure: LUMBAR LAMINECTOMY L4-5;  Surgeon: Antonio Trent MD;  Location: [facility];  Service:     SHOULDER ROTATOR CUFF REPAIR Right     x2     TONSILLECTOMY      VEIN SURGERY          Social History     Occupational History    Not on file   Tobacco Use    Smoking status: Former     Current packs/day: 0.00     Average packs/day: 1 pack/day for 30.0 years (30.0 ttl pk-yrs)     Types: Cigarettes     Start date:      Quit date:      Years since quittin.8     Passive exposure: Past    Smokeless tobacco: Never    Tobacco comments:     quit    Vaping Use    Vaping status: Never Used   Substance and Sexual Activity    Alcohol use: Yes     Comment: occasionally    Drug use: No    Sexual activity: Defer     Partners: Female     Birth control/protection: None      Social History     Social History Narrative    Lives alone. Uses walker    Has not smoked since about         Family History   Problem Relation Age of Onset    Alzheimer's disease Other     Cancer Other     Diabetes Other     Heart disease Other     Stroke Other     Cancer Father          Review of Systems:   HEENT: Patient denies any headaches, vision changes, change in hearing, or tinnitus, Patient denies any rhinorrhea,epistaxis, sinus pain, mouth or dental problems, sore throat or hoarseness, or dysphagia  Pulmonary: Patient denies any cough, congestion, SOA, or wheezing  Cardiovascular: Patient denies any chest pain, dyspnea, palpitations, weakness, intolerance of exercise, varicosities, swelling of extremities, known murmur  Gastrointestinal:  Patient denies nausea, vomiting, diarrhea, constipation, loss  of appetite, change in appetite, dysphagia, gas, heartburn, melena, change in bowel habits, use of laxatives or other drugs to alter the function of the gastrointestinal tract.  Genital/Urinary: Patient denies dysuria, change in color of urine, change in frequency of urination, pain with urgency, incontinence, retention, or nocturia.  Musculoskeletal: Patient denies increased warmth; redness; or swelling of joints; limitation of function; deformity; crepitation: pain in a joint  or an extremity, the neck, or the back, especially with movement.  Neurological: Patient denies dizziness, tremor, ataxia, difficulty in speaking, change in speech, paresthesia, loss of sensation, seizures, syncope, changes in memory.  Endocrine system: Patient denies tremors, palpitations, intolerance of heat or cold, polyuria, polydipsia, polyphagia, diaphoresis, exophthalmos, or goiter.  Psychological: Patient denies thoughts/plans or harming self or other; depression,  insomnia, night terrors, cruz, memory loss, disorientation.  Skin: Patient denies any bruising, rashes, discoloration, pruritus, wounds, ulcers, decubiti, changes in the hair or nails  Hematopoietic: Patient denies history of spontaneous or excessive bleeding, epistaxis, hematuria, melena, fatigue, enlarged or tender lymph nodes, pallor, history of anemia.    Physical Exam: 79 y.o. male    General Appearance:    Alert, cooperative, in no acute distress                   Vitals:    10/09/24 0812 10/09/24 1140 10/09/24 1145 10/09/24 1200   BP:       BP Location:       Patient Position:       Pulse:  81 81 80   Resp:    18   Temp: 96.2 °F (35.7 °C)      TempSrc: Oral      SpO2:    91%   Weight:       Height:            Head:    Normocephalic, without obvious abnormality, atraumatic               Back:     No C-T-L spine tenderness   Lungs:   Symmetric chest rise and fall,respirations regular, even and    unlabored.  No accessory muscle use, speaks in complete sentences.   Chest Wall:    No abnormalities observed       Extremities:   Tenderness noted at bilateral lower legs and right great toe and second toe as well as first webspace. There are bilateral medial lower leg ulcerations with serous drainage. There is erythema of the right second toe with ulceration in first webspace     Pulses:   Pulses palpable and equal bilaterally   Skin:   No bleeding, bruising or rash   Lymph nodes:   No palpable adenopathy   Neurologic:  Cranial nerves 2 - 12  grossly intact, DTR present and equal bilaterally           Diagnostic Tests:    No results displayed because visit has over 200 results.          Duplex Venous Lower Extremity - Bilateral CAR    Result Date: 10/9/2024  Narrative:   Chronic right lower extremity deep vein thrombosis noted in the peroneal.   Chronic left lower extremity superficial thrombophlebitis noted in the great saphenous (below knee).   All other veins appeared normal bilaterally.     CT Angiogram Chest    Result Date: 10/9/2024  Narrative: CT ANGIOGRAM CHEST Date of Exam: 10/9/2024 4:37 AM EDT Indication: cough, + D.Dimer; hx of DVTs. Comparison: None available. Technique: CTA of the chest was performed after the uneventful intravenous administration of 85 mL of Isovue-370. Reconstructed coronal and sagittal images were also obtained. In addition, a 3-D volume rendered image was created for interpretation. Automated exposure control and iterative reconstruction methods were used. FINDINGS: Thoracic inlet: Unremarkable. Pulmonary arteries: No filling defects are identified within the pulmonary arteries to suggest acute pulmonary embolism. Great vessels: Mild vascular calcifications are present. Otherwise, the thoracic aorta and proximal arch vessels appear unremarkable. Mediastinum/Mariia: No pathologically enlarged mediastinal lymph nodes are seen. The esophagus is unremarkable. Lung parenchyma: Scattered bibasilar and lingular atelectasis. Calcified granuloma within the right lung. No acute infiltrate is seen. Unchanged 4 mm subpleural nodule within the right lung (series 5, image 51), too small to warrant imaging follow-up by Fleischner Society guidelines. Trachea and airways: The trachea and central airways appear unremarkable. Pleural space: No significant pleural effusion or pneumothorax. Heart and pericardium: Coronary artery calcifications. Otherwise, the heart and pericardium appear unremarkable. Chest wall: No acute osseous lesion is  identified. Bones are demineralized. There are degenerative changes within the spine. Contiguous anterior osteophytes noted within the thoracic spine. There is also bony fusion across multiple spinous processes. These findings may be seen in diffuse idiopathic skeletal hyperostosis. Superficial soft tissues demonstrate no acute abnormality. Tendon anchors are seen within the right proximal humerus. Upper abdomen: No acute abnormality is identified within the visualized upper abdomen.     Impression: 1.No acute abnormality is identified within the thorax. Specifically, there is no evidence of acute pulmonary embolism. 2.Bilateral lower lobe and lingular atelectasis. 3.Unchanged 4 mm subpleural nodule within the right lung (series 5, image 51), too small to warrant imaging follow-up by Fleischner Society guidelines. 4.Additional findings as detailed above. Electronically Signed: Henok Castanon MD  10/9/2024 8:07 AM EDT  Workstation ID: [id number]    XR Chest 1 View    Result Date: 10/8/2024  Narrative: XR CHEST 1 VW Date of Exam: 10/8/2024 8:14 PM EDT Indication: sepsis, chronic cough, infectious workup Comparison: Chest radiograph 9/15/2024 Findings: Mediastinum: Cardiomediastinal silhouette appears unchanged and enlarged Lungs: Mild left basal consolidative opacity. Pleura: No convincing pleural effusion or pneumothorax Bones and soft tissues: No acute osseous abnormality.     Impression: Impression: Left basal opacity which may represent atelectasis, though aspiration or pneumonia can be considered in the appropriate clinical setting. Electronically Signed: Babak Natarajan  10/8/2024 8:42 PM EDT  Workstation ID: [id number]    Duplex Venous Lower Extremity - BILATERAL    Result Date: 9/16/2024  Narrative:   There is evidence of chronic venous thrombus in the right peroneal vein.  The right lower extremity is otherwise free of deep and superficial venous thrombus.  Noted on prior duplex 3/11/2024.   No evidence of deep  venous thrombosis in the left lower extremity.   Chronic superficial venous thrombus is seen in the left lower extremity the greater saphenous vein above and below the knee.  This was noted on the previous study of 3/11/2024.     XR Tibia Fibula 2 View Right    Result Date: 9/15/2024  Narrative: XR TIBIA FIBULA 2 VW RIGHT Date of Exam: 9/15/2024 2:41 PM EDT Indication: fall right leg pain redness and swelling. Comparison: None available. FINDINGS: There is no displaced fracture or dislocation. Postoperative changes are noted status post prior right knee arthroplasty. There are no signs of hardware failure or loosening.  There is no evidence for osseous erosion or destruction. Soft tissue swelling is noted. There is no gas production in the soft tissues.     Impression: 1.No evidence for displaced fracture or dislocation. 2.No evidence for osteomyelitis. 3.No evidence for gas production in the soft tissues. Electronically Signed: Lake Barnes MD  9/15/2024 3:10 PM EDT  Workstation ID: [id number]    XR Ankle 3+ View Right    Result Date: 9/15/2024  Narrative: XR ANKLE 3+ VW RIGHT Date of Exam: 9/15/2024 2:41 PM EDT Indication: fall right ankle pain and redness with swelling. Comparison: None available. FINDINGS: There is no displaced fracture or dislocation. No focal osseous abnormalities are identified. There is no evidence for osseous erosion or destruction. Soft tissue swelling is noted. There is no gas production in the soft tissues.     Impression: 1.No evidence for displaced fracture or dislocation. 2.No evidence for osteomyelitis. 3.No evidence for gas production in the soft tissues. Electronically Signed: Lake Barnes MD  9/15/2024 3:09 PM EDT  Workstation ID: [id number]    XR Chest 1 View    Result Date: 9/15/2024  Narrative: XR CHEST 1 VW Date of Exam: 9/15/2024 2:40 PM EDT Indication: cough Comparison: 9/11/2023 at 1623 hours, 6/17/2023 1146 hours FINDINGS: No new consolidations or pleural effusions are  observed. The cardiac silhouette and mediastinum are stable. No acute osseous abnormalities are identified.     Impression: There is no significant change when compared to the prior study. There is no evidence for acute cardiopulmonary process. Electronically Signed: Lake Barnes MD  9/15/2024 3:07 PM EDT  Workstation ID: [id number]       Assessment:      Cellulitis    Anxiety    Depression    Hyperlipidemia    Parkinson's disease    Chronic edema    Hypothyroidism (acquired)    Chronic cough    GERD    History of DVT (deep vein thrombosis)    Anemia, chronic disease    Elevated TSH           Plan:  MRI has been ordered to evaluate his right foot for osteomyelitis at the great toe, second toe, or metatarsals. He reports he has been previously treated by Dr. Connor for vascular pathology. PT wound care is managing lower leg wounds at this time. Infectious disease consultation has been made and Dr. Jorgensen has seen the patient. Will follow up upon completion of MRI.             Date: 10/9/2024    Amadeo Alvarez PA-C

## 2024-10-09 NOTE — NURSING NOTE
"                             Wound, Ostomy and Continence (WOC) Note    Reason for WOC Consultation: Bilateral lower extremity wound    Patient awake.  Family/support person not present.   Subjective: Patient states,\" legs are burning\"    Patient presents with suspected cellulitis to his bilateral lower extremities.  There are open partial-thickness wounds to the circumferential bilateral calfs.  Suspect peripheral vascular disease-venous versus arterial versus mixed.    Interestingly, I noted his right second toe appearance to be more concerning. On further assessment of his right toes I noted maggots between the right great toe and right second toe.  The right second toe is edematous, erythemic and warm when compared to his left second toe.  Pulses in the right foot are diminished.  Unable to probe bone between right great toe and right second toe. Malodorous, purulent/brown drainage.  Maggots removed and placed in specimen cup.  MD notified immediately.  Recommend MRI, wound culture and Ortho consult.  Discussed with patient's nurse regarding plan.    Skin/Wound Assessment:     Wound Type: Soft Tissue Necrosis  Location: Right foot-between right great toe and right second toe  Wound Bed: moist, necroitc, and slough maggots  Wound Edges: Irregular  Periwound Skin: moist and pale white   Drainage Characteristics/Odor: malodorous  and purulent/brown  Drainage Amount: moderate  Pain: No   Care provided: maggots removed. Leg elevated.  Image:           Summary and Recommendation(s):     Obtain wound culture between right great toe and right second toe.  2.   Avoid compression therapy to bilateral lower extremities until ABIs are obtained.  WOC will follow.  3.   Needs MRI.  Keep legs elevated.  After lower extremity studies have been completed, cleanse legs with normal saline 4 x 4 gauze.  Gently wrap open partial-thickness wounds to the lower extremities with multilayer Xeroform and rolled gauze.  4.   Refer to wound " "care instructions in the \"Orders\" tab  5.   Specialty support surface in place: Foam Mattress with Waffle Overlay         Pressure Injury Prevention Protocol (initiate for a Harmeet Scale Score of <18):     Most recent Harmeet Scale score:  Sensory Perception: 3-->slightly limited  Moisture: 3-->occasionally moist  Activity: 2-->chairfast  Mobility: 2-->very limited  Nutrition: 3-->adequate  Friction and Shear: 2-->potential problem  Harmeet Score: 15 (10/09/24 0307)       -Apply Allevyn foam dressing to sacrum/coccyx and heels.     -Turn q 2 hr. using an offloading foam wedge/pillow.    -Apply moisture barrier cream to bottom BID & PRN, if incontinent.      Thank you for consulting the WOC Nurse.  WOC Team will continue to follow.  Please re consult if the wound(s) worsens.     Christiano Stover RN, BSN, CCRN, CWOCN  Wound, Ostomy and Continence (WOC) Department  [facility]          "

## 2024-10-09 NOTE — H&P
"    [facility] Medicine Services  HISTORY AND PHYSICAL    Patient Name: Cordell Martin  : 1945  MRN: [id number]  Primary Care Physician: Ben Holder DO  Date of admission: 10/8/2024    Subjective   Subjective     Chief Complaint:  Leg edema and erythema     HPI:  Cordell Martin is a 79 y.o. male w/ a hx of Parkinson's Disease, hx of DVT, HLD, hypothyroidism, GERD, chronic edema/lymphedema, chronic cough, anxiety, depression who presented to the ED w/ c/o leg edema and erythema.   Pt is a resident at [facility]. Pt uses a wheelchair and ambulates w/ a walker.   Pt c/o progressively worsening erythema, edema and discomfort in his BLE over the past few weeks. Pt reports that the wounds on his legs began draining and weeping a few days ago. Pt denies fever. Pt c/o a dry cough that \"comes and goes\" chronically. He reports that his cough has been worse lately. Denies dyspnea.   Pt evaluated in the ED. Exam concerning for cellulitis of the BLE. Pt admitted to the hospital medicine service for further evaluation.     Review of Systems   Constitutional: Negative.  Negative for chills, fatigue and fever.   HENT: Negative.  Negative for congestion, postnasal drip, rhinorrhea, sinus pain and trouble swallowing.    Eyes: Negative.  Negative for visual disturbance.   Respiratory:  Positive for cough. Negative for chest tightness, shortness of breath and wheezing.    Cardiovascular:  Positive for leg swelling. Negative for chest pain and palpitations.   Gastrointestinal: Negative.  Negative for abdominal distention, abdominal pain, constipation, diarrhea, nausea and vomiting.   Endocrine: Negative.    Genitourinary: Negative.  Negative for difficulty urinating, dysuria, flank pain, frequency and hematuria.   Musculoskeletal: Negative.  Negative for arthralgias and myalgias.   Skin:  Positive for wound.   Allergic/Immunologic: Negative.  Negative for immunocompromised " state.   Neurological: Negative.  Negative for dizziness, syncope, weakness, light-headedness and headaches.   Hematological: Negative.  Does not bruise/bleed easily.   Psychiatric/Behavioral: Negative.  Negative for confusion.    All other systems reviewed and are negative.     Personal History     Past Medical History:   Diagnosis Date    Anxiety     Arthritis     Back pain     Bronchitis     Cognitive impairment     Depression     Disease of thyroid gland     Glucose intolerance (impaired glucose tolerance)     Hyperlipidemia     Kidney stone     Obesity     Parkinson disease     Pneumonia     Prostate disorder     Thyroid disease     Wears eyeglasses      Past Surgical History:   Procedure Laterality Date    COLONOSCOPY      5 years ago    EYE SURGERY      HERNIA REPAIR  10/20/2020    KNEE SURGERY      LUMBAR LAMINECTOMY DISCECTOMY DECOMPRESSION N/A 07/13/2017    Procedure: LUMBAR LAMINECTOMY L4-5;  Surgeon: Antonio Trent MD;  Location: [facility];  Service:     SHOULDER ROTATOR CUFF REPAIR Right     x2    TONSILLECTOMY      VEIN SURGERY       Family History: family history includes Alzheimer's disease in an other family member; Cancer in his father and another family member; Diabetes in an other family member; Heart disease in an other family member; Stroke in an other family member.     Social History:  reports that he quit smoking about 44 years ago. His smoking use included cigarettes. He started smoking about 74 years ago. He has a 30 pack-year smoking history. He has been exposed to tobacco smoke. He has never used smokeless tobacco. He reports current alcohol use. He reports that he does not use drugs.  Social History     Social History Narrative    Lives alone. Uses walker    Has not smoked since about 1980     Medications:  Budeson-Glycopyrrol-Formoterol, Diclofenac Sodium, QUEtiapine, acetaminophen, ammonium lactate, apixaban, atorvastatin, carbidopa-levodopa, carbidopa-levodopa ER, doxycycline,  fludrocortisone, fluorometholone, fluticasone, furosemide, gabapentin, levothyroxine, loratadine, mirtazapine, pantoprazole, potassium chloride, pramipexole, sulfamethoxazole-trimethoprim, tamsulosin, traMADol, traZODone, vitamin B-6, vitamin C, and vitamin D3    No Known Allergies    Objective   Objective     Vital Signs:   Temp:  [97.5 °F (36.4 °C)] 97.5 °F (36.4 °C)  Heart Rate:  [76-87] 82  Resp:  [14-18] 14  BP: (101-125)/(50-68) 117/63    Physical Exam     Constitutional: Awake, alert; chronically ill appearing   Eyes: PERRLA, sclerae anicteric, no conjunctival injection  HENT: NCAT, mucous membranes moist  Neck: Supple, no thyromegaly, no lymphadenopathy, trachea midline  Respiratory: Clear to auscultation bilaterally, nonlabored respirations   Cardiovascular: RRR, no murmurs, rubs, or gallops, BLE w/ trace edema   Gastrointestinal: Positive bowel sounds, soft, nontender, nondistended  Musculoskeletal: Normal ROM bilaterally   Psychiatric: Appropriate affect, cooperative  Neurologic: Oriented x 3, strength symmetric in all extremities, speech clear  Skin: No rashes; see photo below of BLE- noted erythema extending from toes to above knees (mid thigh bilaterally)         Result Review:  I have personally reviewed the results from the time of this admission to 10/9/2024 02:28 EDT and agree with these findings:  [x]  Laboratory list / accordion  []  Microbiology  [x]  Radiology  [x]  EKG/Telemetry   []  Cardiology/Vascular   []  Pathology  [x]  Old records    LAB RESULTS:      Lab 10/08/24  1936   WBC 7.37   HEMOGLOBIN 10.4*   HEMATOCRIT 33.4*   PLATELETS 250   NEUTROS ABS 4.88   IMMATURE GRANS (ABS) 0.04   LYMPHS ABS 1.45   MONOS ABS 0.66   EOS ABS 0.31   MCV 94.1   CRP 4.81*   PROCALCITONIN 0.10   LACTATE 1.6   D DIMER QUANT 0.90*         Lab 10/08/24  2226 10/08/24  1936   SODIUM  --  138   POTASSIUM  --  4.2   CHLORIDE  --  106   CO2  --  24.0   ANION GAP  --  8.0   BUN  --  22   CREATININE  --  1.26   EGFR   --  58.7*   GLUCOSE  --  148*   CALCIUM  --  8.5*   IONIZED CALCIUM 1.17  --    MAGNESIUM  --  2.2   PHOSPHORUS  --  3.0   TSH  --  15.850*         Lab 10/08/24  1936   TOTAL PROTEIN 6.0   ALBUMIN 2.7*   GLOBULIN 3.3   ALT (SGPT) <5   AST (SGOT) 12   BILIRUBIN 0.3   ALK PHOS 108   LIPASE 11*         Lab 10/08/24  1936   PROBNP 98.4                 Lab 10/08/24  2049   FIO2 21   CARBOXYHEMOGLOBIN (VENOUS) 1.3     Brief Urine Lab Results  (Last result in the past 365 days)        Color   Clarity   Blood   Leuk Est   Nitrite   Protein   CREAT   Urine HCG        10/08/24 2219 Yellow   Clear   Negative   Negative   Negative   Negative                 Microbiology Results (last 10 days)       Procedure Component Value - Date/Time    COVID PRE-OP / PRE-PROCEDURE SCREENING ORDER (NO ISOLATION) - Swab, Nasopharynx [[id number]]  (Normal) Collected: 10/08/24 2126    Lab Status: Final result Specimen: Swab from Nasopharynx Updated: 10/08/24 2220    Narrative:      The following orders were created for panel order COVID PRE-OP / PRE-PROCEDURE SCREENING ORDER (NO ISOLATION) - Swab, Nasopharynx.  Procedure                               Abnormality         Status                     ---------                               -----------         ------                     COVID-19, FLU A/B, RSV P...[[id number]]  Normal              Final result                 Please view results for these tests on the individual orders.    COVID-19, FLU A/B, RSV PCR 1 HR TAT - Swab, Nasopharynx [[id number]]  (Normal) Collected: 10/08/24 2126    Lab Status: Final result Specimen: Swab from Nasopharynx Updated: 10/08/24 2220     COVID19 Not Detected     Influenza A PCR Not Detected     Influenza B PCR Not Detected     RSV, PCR Not Detected    Narrative:      Fact sheet for providers: https://www.fda.gov/media/576987/download    Fact sheet for patients: https://www.fda.gov/media/009200/download    Test performed by PCR.    Wound Culture - Drainage, Leg,  "Right [[id number]] Collected: 10/08/24 2050    Lab Status: Preliminary result Specimen: Drainage from Leg, Right Updated: 10/08/24 2146     Gram Stain Moderate (3+) WBCs seen      Moderate (3+) Gram negative bacilli          XR Chest 1 View    Result Date: 10/8/2024  XR CHEST 1 VW Date of Exam: 10/8/2024 8:14 PM EDT Indication: sepsis, chronic cough, infectious workup Comparison: Chest radiograph 9/15/2024 Findings: Mediastinum: Cardiomediastinal silhouette appears unchanged and enlarged Lungs: Mild left basal consolidative opacity. Pleura: No convincing pleural effusion or pneumothorax Bones and soft tissues: No acute osseous abnormality.     Impression: Impression: Left basal opacity which may represent atelectasis, though aspiration or pneumonia can be considered in the appropriate clinical setting. Electronically Signed: Babak Natarajan  10/8/2024 8:42 PM EDT  Workstation ID: [id number]     Results for orders placed during the hospital encounter of 01/12/21    Adult Transthoracic Echo Complete W/ Cont if Necessary Per Protocol    Interpretation Summary  · The right ventricle and right atrium are moderately dilated. Other chamber sizes and wall thicknesses are normal.  · Global and segmental LV wall motion is normal. The estimated left ventricular ejection fraction is 51% - 55%.  · The aortic and mitral valves are normal in structure and function.  · The estimated RV systolic pressure is mildly elevated 35 mmHg - 40 mmHg. There is as well noted to be less than 50% inspiratory IVC collapse. The main pulmonary artery is \"borderline dilated\".  · There are no other important findings on this study.    Assessment & Plan   Assessment & Plan       Cellulitis    Anxiety    Depression    Hyperlipidemia    Parkinson's disease    Chronic edema    Hypothyroidism (acquired)    Chronic cough    GERD    History of DVT (deep vein thrombosis)    Anemia, chronic disease    Elevated TSH    Cordell Martin is a 79 y.o. male w/ a hx " "of Parkinson's Disease, hx of DVT, HLD, hypothyroidism, GERD, chronic edema/lymphedema, chronic cough, anxiety, depression who presented to the ED w/ c/o leg edema and erythema.     **Cellulitis- BLE   **Chronic LE lymphedema   **Hx of DVT   -venous duplex 9/2023: BLE DVTs; duplex on 9/15/2024- chronic venous thrombus in the right peroneal vein; Chronic superficial venous thrombus is seen in the left lower extremity the greater saphenous vein above and below the knee   -previously on Eliquis but reports he \"has not taken in years\" (noted to last be filled 12/2023)  -D.Dimer 0.9; BLE venous duplex pending   -consider XRAYS vs CT scans of BLE  -blood and wound cx pending   -WBC, lactic acid and procal all WNL   -CRP 4.81, sed rate pending   -s/p Cefazolin, oral Flagyl and Vanc given in ED; continue   -s/p Lasix 80mg IV given in ED; continue routine 20mg/day- pending med rec completion   -symptom mgt  -WOC consult   -pt/ot and case mgt consult     **Cough (Acute/chronic)   -denies dyspnea  -currently 97% on room air   -respiratory panel pcr pending   -proBNP WNL   -CXR- \"Left basal opacity which may represent atelectasis, though aspiration or pneumonia can be considered in the appropriate clinical setting\"  -CTA Chest pending   -symptom mgt     **Chronic anemia   -H/H previously 11.6/37.7 on 9/15 and 12.4/38.6 in 2023  -current H/H 10.4/33.4  -anemia panel pending   -repeat CBC this morning     **Parkinson's disease  **Anxiety, depression   -awaiting med rec completion; plan to continue Sinemet, Mirapex  -continue Seroquel, Remeron, Trazodone   -pt/ot and case mgt consult   -fall precautions     **Hypothyroidism   **Elevated TSH  -TSH 15.850 (previously 4.76 in 2022)  -unclear if dose change recently   -free T4 pending   -continue synthroid  -will need f/u w/ PCP     **HLD  -continue statin     **GERD  -continue PPI      DVT prophylaxis:  Eliquis     CODE STATUS:    Medical Intervention Limits: No intubation " (DNI)  Level Of Support Discussed With: Patient  Code Status (Patient has no pulse and is not breathing): No CPR (Do Not Attempt to Resuscitate)  Medical Interventions (Patient has pulse or is breathing): Limited Support  Comments: DNR/DNI    Expected Discharge  Expected discharge date/ time has not been documented.    Signature: Electronically signed by ALEXUS Sutton, 10/09/24, 2:41 AM EDT.    Time spent: 55 minutes

## 2024-10-09 NOTE — ED PROVIDER NOTES
EMERGENCY DEPARTMENT ENCOUNTER    Pt Name: Cordell Martin  MRN: [id number]  Pt :   1945  Room Number:  16/16  Date of encounter:  10/8/2024  PCP: Ben Holder DO  ED Provider: Yuri Kessler MD    Historian: EMS, patient    HPI:  Chief Complaint: Leg pain redness and drainage        Context: Cordell Martin is a 79-year-old man with history of lymphedema, blood clots, Parkinson's disease, sent from his nursing facility for worsening leg swelling redness and drainage she says it has been progressively worsening over the last 2 weeks he is having pain with dressing changes and weeping drainage from the legs.  He has not appreciated fevers.  Besides the leg pain and drainage he denies other infectious type symptoms.  No other complaints at this time.      PAST MEDICAL HISTORY  Past Medical History:   Diagnosis Date    Anxiety     Arthritis     Back pain     Bronchitis     Cognitive impairment     Depression     Disease of thyroid gland     Glucose intolerance (impaired glucose tolerance)     Hyperlipidemia     Kidney stone     Obesity     Parkinson disease     Pneumonia     Prostate disorder     Thyroid disease     Wears eyeglasses          PAST SURGICAL HISTORY  Past Surgical History:   Procedure Laterality Date    COLONOSCOPY      5 years ago    EYE SURGERY      HERNIA REPAIR  10/20/2020    KNEE SURGERY      LUMBAR LAMINECTOMY DISCECTOMY DECOMPRESSION N/A 2017    Procedure: LUMBAR LAMINECTOMY L4-5;  Surgeon: Antonio Trent MD;  Location: [facility];  Service:     SHOULDER ROTATOR CUFF REPAIR Right     x2    TONSILLECTOMY      VEIN SURGERY           FAMILY HISTORY  Family History   Problem Relation Age of Onset    Alzheimer's disease Other     Cancer Other     Diabetes Other     Heart disease Other     Stroke Other     Cancer Father          SOCIAL HISTORY  Social History     Socioeconomic History    Marital status:    Tobacco Use    Smoking status: Former     Current  packs/day: 0.00     Average packs/day: 1 pack/day for 30.0 years (30.0 ttl pk-yrs)     Types: Cigarettes     Start date:      Quit date:      Years since quittin.8     Passive exposure: Past    Smokeless tobacco: Never    Tobacco comments:     quit    Vaping Use    Vaping status: Never Used   Substance and Sexual Activity    Alcohol use: Yes     Comment: occasionally    Drug use: No    Sexual activity: Defer     Partners: Female     Birth control/protection: None         ALLERGIES  Patient has no known allergies.        REVIEW OF SYSTEMS  Review of Systems       All systems reviewed and negative except for those discussed in HPI.       PHYSICAL EXAM    I have reviewed the triage vital signs and nursing notes.    ED Triage Vitals   Temp Heart Rate Resp BP SpO2   10/08/24 2032 10/08/24 1924 10/08/24 1924 10/08/24 1924 10/08/24 1924   97.5 °F (36.4 °C) 76 18 102/50 96 %      Temp src Heart Rate Source Patient Position BP Location FiO2 (%)   10/08/24 2032 -- -- -- --   Oral           Physical Exam  GENERAL:   Appears acute on chronically ill  HENT: Nares patent.  EYES: No scleral icterus.  CV: Regular rhythm, regular rate.  RESPIRATORY: Normal effort.  No audible wheezes, rales or rhonchi.  ABDOMEN: Soft, nontender  MUSCULOSKELETAL: No deformities.  Impressive symmetric nonpitting lower extremity edema consistent with lymphedema bilateral erythema and warmth extending from the knees to ankles somewhat confounded by likely chronic changes, bilateral ulcerations with anaerobic smelling purulent drainage worse on the right than left  NEURO: Alert, moves all extremities, follows commands.  SKIN: Warm, dry, no rash visualized.      LAB RESULTS  Recent Results (from the past 24 hour(s))   Green Top (Gel)    Collection Time: 10/08/24  7:36 PM   Result Value Ref Range    Extra Tube Hold for add-ons.    Lavender Top    Collection Time: 10/08/24  7:36 PM   Result Value Ref Range    Extra Tube hold for add-on     Gold Top - SST    Collection Time: 10/08/24  7:36 PM   Result Value Ref Range    Extra Tube Hold for add-ons.    Gray Top    Collection Time: 10/08/24  7:36 PM   Result Value Ref Range    Extra Tube Hold for add-ons.    Light Blue Top    Collection Time: 10/08/24  7:36 PM   Result Value Ref Range    Extra Tube Hold for add-ons.    Comprehensive Metabolic Panel    Collection Time: 10/08/24  7:36 PM    Specimen: Blood   Result Value Ref Range    Glucose 148 (H) 65 - 99 mg/dL    BUN 22 8 - 23 mg/dL    Creatinine 1.26 0.76 - 1.27 mg/dL    Sodium 138 136 - 145 mmol/L    Potassium 4.2 3.5 - 5.2 mmol/L    Chloride 106 98 - 107 mmol/L    CO2 24.0 22.0 - 29.0 mmol/L    Calcium 8.5 (L) 8.6 - 10.5 mg/dL    Total Protein 6.0 6.0 - 8.5 g/dL    Albumin 2.7 (L) 3.5 - 5.2 g/dL    ALT (SGPT) <5 1 - 41 U/L    AST (SGOT) 12 1 - 40 U/L    Alkaline Phosphatase 108 39 - 117 U/L    Total Bilirubin 0.3 0.0 - 1.2 mg/dL    Globulin 3.3 gm/dL    A/G Ratio 0.8 g/dL    BUN/Creatinine Ratio 17.5 7.0 - 25.0    Anion Gap 8.0 5.0 - 15.0 mmol/L    eGFR 58.7 (L) >60.0 mL/min/1.73   Lipase    Collection Time: 10/08/24  7:36 PM    Specimen: Blood   Result Value Ref Range    Lipase 11 (L) 13 - 60 U/L   BNP    Collection Time: 10/08/24  7:36 PM    Specimen: Blood   Result Value Ref Range    proBNP 98.4 0.0 - 1,800.0 pg/mL   D-dimer, Quantitative    Collection Time: 10/08/24  7:36 PM    Specimen: Blood   Result Value Ref Range    D-Dimer, Quantitative 0.90 (H) 0.00 - 0.77 MCGFEU/mL   Lactic Acid, Plasma    Collection Time: 10/08/24  7:36 PM    Specimen: Blood   Result Value Ref Range    Lactate 1.6 0.5 - 2.0 mmol/L   Procalcitonin    Collection Time: 10/08/24  7:36 PM    Specimen: Blood   Result Value Ref Range    Procalcitonin 0.10 0.00 - 0.25 ng/mL   C-reactive Protein    Collection Time: 10/08/24  7:36 PM    Specimen: Blood   Result Value Ref Range    C-Reactive Protein 4.81 (H) 0.00 - 0.50 mg/dL   Magnesium    Collection Time: 10/08/24  7:36 PM     Specimen: Blood   Result Value Ref Range    Magnesium 2.2 1.6 - 2.4 mg/dL   Phosphorus    Collection Time: 10/08/24  7:36 PM    Specimen: Blood   Result Value Ref Range    Phosphorus 3.0 2.5 - 4.5 mg/dL   TSH    Collection Time: 10/08/24  7:36 PM    Specimen: Blood   Result Value Ref Range    TSH 15.850 (H) 0.270 - 4.200 uIU/mL   CBC Auto Differential    Collection Time: 10/08/24  7:36 PM    Specimen: Blood   Result Value Ref Range    WBC 7.37 3.40 - 10.80 10*3/mm3    RBC 3.55 (L) 4.14 - 5.80 10*6/mm3    Hemoglobin 10.4 (L) 13.0 - 17.7 g/dL    Hematocrit 33.4 (L) 37.5 - 51.0 %    MCV 94.1 79.0 - 97.0 fL    MCH 29.3 26.6 - 33.0 pg    MCHC 31.1 (L) 31.5 - 35.7 g/dL    RDW 15.4 12.3 - 15.4 %    RDW-SD 53.7 37.0 - 54.0 fl    MPV 8.8 6.0 - 12.0 fL    Platelets 250 140 - 450 10*3/mm3    Neutrophil % 66.2 42.7 - 76.0 %    Lymphocyte % 19.7 19.6 - 45.3 %    Monocyte % 9.0 5.0 - 12.0 %    Eosinophil % 4.2 0.3 - 6.2 %    Basophil % 0.4 0.0 - 1.5 %    Immature Grans % 0.5 0.0 - 0.5 %    Neutrophils, Absolute 4.88 1.70 - 7.00 10*3/mm3    Lymphocytes, Absolute 1.45 0.70 - 3.10 10*3/mm3    Monocytes, Absolute 0.66 0.10 - 0.90 10*3/mm3    Eosinophils, Absolute 0.31 0.00 - 0.40 10*3/mm3    Basophils, Absolute 0.03 0.00 - 0.20 10*3/mm3    Immature Grans, Absolute 0.04 0.00 - 0.05 10*3/mm3    nRBC 0.0 0.0 - 0.2 /100 WBC   ECG 12 Lead Electrolyte Imbalance    Collection Time: 10/08/24  8:25 PM   Result Value Ref Range    QT Interval 422 ms    QTC Interval 477 ms   Blood Gas, Venous With Co-Ox    Collection Time: 10/08/24  8:49 PM    Specimen: Venous Blood   Result Value Ref Range    Site Nurse/Dr Draw     pH, Venous 7.409 7.310 - 7.410 pH Units    pCO2, Venous 41.5 41.0 - 51.0 mm Hg    pO2, Venous 62.1 (H) 27.0 - 53.0 mm Hg    HCO3, Venous 26.2 22.0 - 28.0 mmol/L    Base Excess, Venous 1.3 -2.0 - 2.0 mmol/L    Hemoglobin, Blood Gas 10.6 (L) 13.5 - 17.5 g/dL    Oxyhemoglobin Venous 90.9 %    Methemoglobin Venous 0.2 %     Carboxyhemoglobin Venous 1.3 %    CO2 Content 27.4 22 - 33 mmol/L    Temperature 37.0     Barometric Pressure for Blood Gas      Modality Room Air     FIO2 21 %    Rate 0 Breaths/minute    PIP 0 cmH2O    IPAP 0     EPAP 0    Wound Culture - Drainage, Leg, Right    Collection Time: 10/08/24  8:50 PM    Specimen: Leg, Right; Drainage   Result Value Ref Range    Gram Stain Moderate (3+) WBCs seen     Gram Stain Moderate (3+) Gram negative bacilli    COVID-19, FLU A/B, RSV PCR 1 HR TAT - Swab, Nasopharynx    Collection Time: 10/08/24  9:26 PM    Specimen: Nasopharynx; Swab   Result Value Ref Range    COVID19 Not Detected Not Detected - Ref. Range    Influenza A PCR Not Detected Not Detected    Influenza B PCR Not Detected Not Detected    RSV, PCR Not Detected Not Detected   Urinalysis With Microscopic If Indicated (No Culture) - Urine, Clean Catch    Collection Time: 10/08/24 10:19 PM    Specimen: Urine, Clean Catch   Result Value Ref Range    Color, UA Yellow Yellow, Straw    Appearance, UA Clear Clear    pH, UA 6.0 5.0 - 8.0    Specific Gravity, UA 1.007 1.001 - 1.030    Glucose, UA Negative Negative    Ketones, UA Negative Negative    Bilirubin, UA Negative Negative    Blood, UA Negative Negative    Protein, UA Negative Negative    Leuk Esterase, UA Negative Negative    Nitrite, UA Negative Negative    Urobilinogen, UA 0.2 E.U./dL 0.2 - 1.0 E.U./dL   Calcium, Ionized    Collection Time: 10/08/24 10:26 PM    Specimen: Blood   Result Value Ref Range    Ionized Calcium 1.17 1.15 - 1.30 mmol/L       If labs were ordered, I independently reviewed the results and considered them in treating the patient.        RADIOLOGY  XR Chest 1 View    Result Date: 10/8/2024  XR CHEST 1 VW Date of Exam: 10/8/2024 8:14 PM EDT Indication: sepsis, chronic cough, infectious workup Comparison: Chest radiograph 9/15/2024 Findings: Mediastinum: Cardiomediastinal silhouette appears unchanged and enlarged Lungs: Mild left basal consolidative  opacity. Pleura: No convincing pleural effusion or pneumothorax Bones and soft tissues: No acute osseous abnormality.     Impression: Left basal opacity which may represent atelectasis, though aspiration or pneumonia can be considered in the appropriate clinical setting. Electronically Signed: Babak Natarajan  10/8/2024 8:42 PM EDT  Workstation ID: [id number]     I ordered and independently reviewed the above noted radiographic studies.      I viewed images of chest x-ray which showed left lower lobe opacity versus atelectasis per my independent interpretation.    See radiologist's dictation for official interpretation.        PROCEDURES    Procedures    ECG 12 Lead Electrolyte Imbalance   Preliminary Result   Test Reason : Electrolyte Imbalance   Blood Pressure :   */*   mmHG   Vent. Rate :  77 BPM     Atrial Rate :  77 BPM      P-R Int : 224 ms          QRS Dur : 110 ms       QT Int : 422 ms       P-R-T Axes :  29 -34   6 degrees      QTc Int : 477 ms      Sinus rhythm with 1st degree AV block   Left axis deviation   Abnormal ECG   When compared with ECG of 17-JUN-2023 12:17,   Sinus rhythm has replaced Junctional rhythm      Referred By: EDMD           Confirmed By:           MEDICATIONS GIVEN IN ER    Medications   sodium chloride 0.9 % flush 10 mL (has no administration in time range)   vancomycin 2250 mg/500 mL 0.9% NS IVPB ([facility]) (2,250 mg Intravenous New Bag 10/9/24 0049)   furosemide (LASIX) injection 80 mg (80 mg Intravenous Given 10/8/24 2131)   ceFAZolin 2000 mg IVPB in 100 mL NS (MBP) (0 mg Intravenous Stopped 10/8/24 2203)   metroNIDAZOLE (FLAGYL) tablet 500 mg (500 mg Oral Given 10/8/24 2131)   HYDROmorphone (DILAUDID) injection 0.25 mg (0.25 mg Intravenous Given 10/8/24 2318)   ketorolac (TORADOL) injection 15 mg (15 mg Intravenous Given 10/9/24 0049)   HYDROmorphone (DILAUDID) injection 0.25 mg (0.25 mg Intravenous Given 10/9/24 0049)         MEDICAL DECISION MAKING, PROGRESS, and CONSULTS    All labs,  if obtained, have been independently reviewed by me.  All radiology studies, if obtained, have been reviewed by me and the radiologist dictating the report.  All EKG's, if obtained, have been independently viewed and interpreted by me/my attending physician.      Discussion below represents my analysis of pertinent findings related to patient's condition, differential diagnosis, treatment plan and final disposition.                         Differential diagnosis:    Sepsis, cellulitis, bacteremia, abscess, necrotizing soft tissue infection, DVT, pneumonia, anemia, electrolyte abnormality      Additional sources:    - Discussed/ obtained information from independent historians: EMS    - External (non-ED) record review:  Chart review PCP notes with Dr. Saleh has history of:  Acute embolism and thrombosis of right peroneal vein    Depression, unspecified    Parkinson's disease without dyskinesia, without mention of fluctuations    Solitary pulmonary nodule    Hypothyroidism, unspecified    Anxiety disorder, unspecified    History of falling    Body mass index (BMI) 34.0-34.9, adult    Personal history of nicotine dependence    Long term (current) use of anticoagulants    Acute embolism and thrombosis of unspecified deep veins of right lower extremity    Restless legs syndrome    Obesity, unspecified    Phlebitis and thrombophlebitis of superficial vessels of left lower extremity    Cellulitis of right lower limb    Pneumonia, unspecified organism    - Chronic or social conditions impacting care: Lymphedema, recurrent lower extremity cellulitis, Parkinson's disease, history of blood clots on chronic anticoagulation    - Shared decision making: Patient/patient representative in complete agreement with current plans for evaluation and management.      Orders placed during this visit:  Orders Placed This Encounter   Procedures    COVID PRE-OP / PRE-PROCEDURE SCREENING ORDER (NO ISOLATION) - Swab, Nasopharynx    Blood  Culture - Blood,    Blood Culture - Blood,    COVID-19, FLU A/B, RSV PCR 1 HR TAT - Swab, Nasopharynx    Wound Culture - Drainage, Leg, Right    XR Chest 1 View    [place] Draw    Comprehensive Metabolic Panel    Lipase    Urinalysis With Microscopic If Indicated (No Culture) - Urine, Clean Catch    Blood Gas, Venous -With Co-Ox Panel: Yes    BNP    D-dimer, Quantitative    Lactic Acid, Plasma    Procalcitonin    C-reactive Protein    Magnesium    Phosphorus    TSH    CBC Auto Differential    Blood Gas, Venous With Co-Ox    Calcium, Ionized    ECG 12 Lead Electrolyte Imbalance    Insert peripheral IV    Green Top (Gel)    Lavender Top    Gold Top - SST    Gray Top    Light Blue Top    CBC & Differential         Additional orders considered but not ordered:      ED Course:    Consultants:      ED Course as of 10/09/24 0051   Tue Oct 08, 2024   2006 Chart review PCP notes with Dr. Saleh has history of:  Acute embolism and thrombosis of right peroneal vein    Depression, unspecified    Parkinson's disease without dyskinesia, without mention of fluctuations    Solitary pulmonary nodule    Hypothyroidism, unspecified    Anxiety disorder, unspecified    History of falling    Body mass index (BMI) 34.0-34.9, adult    Personal history of nicotine dependence    Long term (current) use of anticoagulants    Acute embolism and thrombosis of unspecified deep veins of right lower extremity    Restless legs syndrome    Obesity, unspecified    Phlebitis and thrombophlebitis of superficial vessels of left lower extremity    Cellulitis of right lower limb    Pneumonia, unspecified organism   [CC]   2009 This a very nice 79-year-old man with history of lymphedema, blood clots, Parkinson's disease, sent from his nursing facility for worsening leg swelling redness and drainage she says it has been progressively worsening over the last 2 weeks he is having pain with dressing changes and weeping drainage from the legs.  He has not  appreciated fevers.  Besides the leg pain and drainage he denies other infectious type symptoms.  No other complaints at this time. [CC]   2009 He arrived awake and alert but has significant cellulitis and draining infections from the bilateral legs and leg ulcerations, he is afebrile and other vitals are within normal limits he has impressive symmetric pitting lower extremity edema he says he is treated with diuretic medicine so empirically treating with 80 of IV Lasix but also starting on broad-spectrum antibiotics with vancomycin and cefepime obtaining full infectious workup including wound and blood cultures he will need hospital admission for this. [CC]   Wed Oct 09, 2024   0049 Chest x-ray was read as possible left lower lobe pneumonia he denies any symptoms and is oxygenating well current antibiotic regimen should cover any infection anyway infectious labs are better than I expected he has no leukocytosis or lactic acid no elevation in procalcitonin he does have significantly elevated CRP.  Also concerning with history of blood clot as D-dimer is 0.9 I have ordered bilateral duplexes to be done.  At this point I think he needs hospital admission for continued IV antibiotics as cultures result he is required multiple doses of pain medicine as well.  Agreeable to hospital admission.  Medicine consulted for admission [CC]      ED Course User Index  [CC] Yuri Kessler MD              Shared Decision Making:  After my consideration of clinical presentation and any laboratory/radiology studies obtained, I discussed the findings with the patient/patient representative who is in agreement with the treatment plan and the final disposition.   Risks and benefits of discharge and/or observation/admission were discussed.       AS OF 00:51 EDT VITALS:    BP - 118/64  HR - 87  TEMP - 97.5 °F (36.4 °C) (Oral)  O2 SATS - 95%                  DIAGNOSIS  Final diagnoses:   Cellulitis of lower extremity, unspecified  laterality   Lymphedema   Pain in both lower extremities   Chronic edema   Parkinson's disease, unspecified whether dyskinesia present, unspecified whether manifestations fluctuate         DISPOSITION  Admit      Please note that portions of this document were completed with voice recognition software.        Yuri Kessler MD  10/09/24 0054

## 2024-10-09 NOTE — PROGRESS NOTES
Nutrition Services    Patient Name:  Cordell Martin  YOB: 1945  MRN: [id number]  Admit Date:  10/8/2024    Patient screened for possible pressure injury. Chart reviewed. Per [facility] evaluation, no pressure injury stage 2 or greater noted at this time. Pt also screened for chewing/swallowing difficulties however pt on regular/thin diet, SLP not consulted. RDN following per protocol. Available via consult.    Electronically signed by:  Kyra Santos MS,RD,LD  10/09/24 16:12 EDT

## 2024-10-09 NOTE — PLAN OF CARE
Goal Outcome Evaluation:  Plan of Care Reviewed With: patient        Progress: no change  Outcome Evaluation: Pt is A&O with VSS, NSR with first degree AV block on the monitor, and on RA. Pt's axillary temp was 95.8 upon admission to the floor from the ED and didn't come up with warmed blankets. Rectal temp was 94.4 so APRN that admitted him ordered a stat BG, which was 116, and a warming blanket that was applied. The pt also complained of pain and burning in his legs from his cellulitis and the APRN that admitted him ordered him PRN norco that hasn't relieved his symptoms yet but he is resting comfortably. His potassium was 3.4 and the replacement was started per protocol. There are no other complaints at this time.

## 2024-10-10 LAB
ALBUMIN SERPL-MCNC: 2.7 G/DL (ref 3.5–5.2)
ALBUMIN/GLOB SERPL: 1 G/DL
ALP SERPL-CCNC: 105 U/L (ref 39–117)
ALT SERPL W P-5'-P-CCNC: <5 U/L (ref 1–41)
ANION GAP SERPL CALCULATED.3IONS-SCNC: 10 MMOL/L (ref 5–15)
AST SERPL-CCNC: 9 U/L (ref 1–40)
BILIRUB SERPL-MCNC: 0.3 MG/DL (ref 0–1.2)
BUN SERPL-MCNC: 20 MG/DL (ref 8–23)
BUN/CREAT SERPL: 18.3 (ref 7–25)
CALCIUM SPEC-SCNC: 8.3 MG/DL (ref 8.6–10.5)
CHLORIDE SERPL-SCNC: 106 MMOL/L (ref 98–107)
CO2 SERPL-SCNC: 24 MMOL/L (ref 22–29)
CREAT SERPL-MCNC: 1.09 MG/DL (ref 0.76–1.27)
DEPRECATED RDW RBC AUTO: 52.3 FL (ref 37–54)
EGFRCR SERPLBLD CKD-EPI 2021: 69.9 ML/MIN/1.73
ERYTHROCYTE [DISTWIDTH] IN BLOOD BY AUTOMATED COUNT: 15.3 % (ref 12.3–15.4)
FOLATE BLD-MCNC: 364 NG/ML
FOLATE RBC-MCNC: 1163 NG/ML
GLOBULIN UR ELPH-MCNC: 2.7 GM/DL
GLUCOSE SERPL-MCNC: 98 MG/DL (ref 65–99)
HCT VFR BLD AUTO: 31.3 % (ref 37.5–51)
HCT VFR BLD AUTO: 32.7 % (ref 37.5–51)
HGB BLD-MCNC: 10.5 G/DL (ref 13–17.7)
MCH RBC QN AUTO: 29.7 PG (ref 26.6–33)
MCHC RBC AUTO-ENTMCNC: 32.1 G/DL (ref 31.5–35.7)
MCV RBC AUTO: 92.4 FL (ref 79–97)
PLATELET # BLD AUTO: 241 10*3/MM3 (ref 140–450)
PMV BLD AUTO: 8.5 FL (ref 6–12)
POTASSIUM SERPL-SCNC: 4.4 MMOL/L (ref 3.5–5.2)
PROT SERPL-MCNC: 5.4 G/DL (ref 6–8.5)
RBC # BLD AUTO: 3.54 10*6/MM3 (ref 4.14–5.8)
SODIUM SERPL-SCNC: 140 MMOL/L (ref 136–145)
WBC NRBC COR # BLD AUTO: 6.75 10*3/MM3 (ref 3.4–10.8)

## 2024-10-10 PROCEDURE — 97166 OT EVAL MOD COMPLEX 45 MIN: CPT

## 2024-10-10 PROCEDURE — 97162 PT EVAL MOD COMPLEX 30 MIN: CPT

## 2024-10-10 PROCEDURE — 25010000002 DAPTOMYCIN PER 1 MG: Performed by: INTERNAL MEDICINE

## 2024-10-10 PROCEDURE — 94799 UNLISTED PULMONARY SVC/PX: CPT

## 2024-10-10 PROCEDURE — 99232 SBSQ HOSP IP/OBS MODERATE 35: CPT | Performed by: HOSPITALIST

## 2024-10-10 PROCEDURE — 25010000002 HEPARIN (PORCINE) PER 1000 UNITS: Performed by: HOSPITALIST

## 2024-10-10 PROCEDURE — 25010000002 PIPERACILLIN SOD-TAZOBACTAM PER 1 G: Performed by: HOSPITALIST

## 2024-10-10 PROCEDURE — 94664 DEMO&/EVAL PT USE INHALER: CPT

## 2024-10-10 PROCEDURE — 80053 COMPREHEN METABOLIC PANEL: CPT | Performed by: HOSPITALIST

## 2024-10-10 PROCEDURE — 85027 COMPLETE CBC AUTOMATED: CPT | Performed by: HOSPITALIST

## 2024-10-10 RX ADMIN — PIPERACILLIN AND TAZOBACTAM 3.38 G: 3; .375 INJECTION, POWDER, LYOPHILIZED, FOR SOLUTION INTRAVENOUS at 22:52

## 2024-10-10 RX ADMIN — Medication 10 ML: at 22:46

## 2024-10-10 RX ADMIN — CARBIDOPA AND LEVODOPA 2 TABLET: 25; 100 TABLET ORAL at 08:47

## 2024-10-10 RX ADMIN — PRAMIPEXOLE DIHYDROCHLORIDE 1 MG: 1 TABLET ORAL at 22:46

## 2024-10-10 RX ADMIN — CARBIDOPA AND LEVODOPA 2 TABLET: 25; 100 TABLET ORAL at 13:20

## 2024-10-10 RX ADMIN — PRAMIPEXOLE DIHYDROCHLORIDE 1 MG: 1 TABLET ORAL at 08:47

## 2024-10-10 RX ADMIN — Medication 10 ML: at 08:48

## 2024-10-10 RX ADMIN — DAPTOMYCIN 350 MG: 500 INJECTION, POWDER, LYOPHILIZED, FOR SOLUTION INTRAVENOUS at 13:20

## 2024-10-10 RX ADMIN — PIPERACILLIN AND TAZOBACTAM 3.38 G: 3; .375 INJECTION, POWDER, LYOPHILIZED, FOR SOLUTION INTRAVENOUS at 06:00

## 2024-10-10 RX ADMIN — CARBIDOPA AND LEVODOPA 2 TABLET: 25; 100 TABLET ORAL at 22:45

## 2024-10-10 RX ADMIN — TAMSULOSIN HYDROCHLORIDE 0.4 MG: 0.4 CAPSULE ORAL at 08:47

## 2024-10-10 RX ADMIN — CARBIDOPA AND LEVODOPA 2 TABLET: 25; 100 TABLET ORAL at 18:40

## 2024-10-10 RX ADMIN — HEPARIN SODIUM 5000 UNITS: 5000 INJECTION INTRAVENOUS; SUBCUTANEOUS at 22:45

## 2024-10-10 RX ADMIN — GABAPENTIN 100 MG: 100 CAPSULE ORAL at 22:46

## 2024-10-10 RX ADMIN — GUAIFENESIN 600 MG: 600 TABLET, EXTENDED RELEASE ORAL at 08:47

## 2024-10-10 RX ADMIN — CARBIDOPA AND LEVODOPA 1 TABLET: 25; 100 TABLET, EXTENDED RELEASE ORAL at 08:47

## 2024-10-10 RX ADMIN — GUAIFENESIN 600 MG: 600 TABLET, EXTENDED RELEASE ORAL at 22:45

## 2024-10-10 RX ADMIN — FUROSEMIDE 20 MG: 20 TABLET ORAL at 08:47

## 2024-10-10 RX ADMIN — LEVOTHYROXINE SODIUM 88 MCG: 0.09 TABLET ORAL at 05:59

## 2024-10-10 RX ADMIN — QUETIAPINE FUMARATE 12.5 MG: 25 TABLET ORAL at 22:45

## 2024-10-10 RX ADMIN — GABAPENTIN 100 MG: 100 CAPSULE ORAL at 08:47

## 2024-10-10 RX ADMIN — SODIUM CHLORIDE SOLN NEBU 7% 4 ML: 7 NEBU SOLN at 09:45

## 2024-10-10 RX ADMIN — PRAMIPEXOLE DIHYDROCHLORIDE 1 MG: 1 TABLET ORAL at 16:26

## 2024-10-10 RX ADMIN — PIPERACILLIN AND TAZOBACTAM 3.38 G: 3; .375 INJECTION, POWDER, LYOPHILIZED, FOR SOLUTION INTRAVENOUS at 16:26

## 2024-10-10 RX ADMIN — CARBIDOPA AND LEVODOPA 1 TABLET: 25; 100 TABLET, EXTENDED RELEASE ORAL at 22:46

## 2024-10-10 RX ADMIN — HEPARIN SODIUM 5000 UNITS: 5000 INJECTION INTRAVENOUS; SUBCUTANEOUS at 05:59

## 2024-10-10 NOTE — THERAPY EVALUATION
Patient Name: Cordell Martin  : 1945    MRN: [id number]                              Today's Date: 10/10/2024       Admit Date: 10/8/2024    Visit Dx:     ICD-10-CM ICD-9-CM   1. Cellulitis of lower extremity, unspecified laterality  L03.119 682.6   2. Lymphedema  I89.0 457.1   3. Pain in both lower extremities  M79.604 729.5    M79.605    4. Chronic edema  R60.9 782.3   5. Parkinson's disease, unspecified whether dyskinesia present, unspecified whether manifestations fluctuate  G20.A1 332.0     Patient Active Problem List   Diagnosis    Anxiety    Arthritis    Depression    Hyperlipidemia    Lumbar stenosis with neurogenic claudication    Parkinson's disease    Lumbar stenosis with neurogenic claudication status post L4-5 decompression    Status post lumbar laminectomy    Memory loss    Chronic edema    Cellulitis of right lower extremity    Exertional dyspnea    Right knee pain    Cellulitis of lower extremity    Hypothyroidism (acquired)    Elevated serum creatinine    Cellulitis of right leg    Acute deep vein thrombosis (DVT) of right lower extremity    Pneumonia    Pulmonary nodule (4mm RML incidental)    Chronic cough    GERD    Remote smoker (Stopped )    Restrictive pattern on PFTs w/o evidence of ILD    Cellulitis    History of DVT (deep vein thrombosis)    Anemia, chronic disease    Elevated TSH     Past Medical History:   Diagnosis Date    Anxiety     Arthritis     Back pain     Bronchitis     Cognitive impairment     Depression     Disease of thyroid gland     Glucose intolerance (impaired glucose tolerance)     Hyperlipidemia     Kidney stone     Obesity     Parkinson disease     Pneumonia     Prostate disorder     Thyroid disease     Wears eyeglasses      Past Surgical History:   Procedure Laterality Date    COLONOSCOPY      5 years ago    EYE SURGERY      HERNIA REPAIR  10/20/2020    KNEE SURGERY      LUMBAR LAMINECTOMY DISCECTOMY DECOMPRESSION N/A 2017    Procedure: LUMBAR  LAMINECTOMY L4-5;  Surgeon: Antonio Trent MD;  Location: [facility];  Service:     SHOULDER ROTATOR CUFF REPAIR Right     x2    TONSILLECTOMY      VEIN SURGERY        General Information       Row Name 10/10/24 1058          OT Time and Intention    Document Type evaluation  -JONATHAN     Mode of Treatment occupational therapy  -JONATHAN       Row Name 10/10/24 1058          General Information    Patient Profile Reviewed yes  -JONATHAN     Prior Level of Function independent:;all household mobility;community mobility;gait;transfer;w/c or scooter;bed mobility;feeding;grooming;dressing;bathing;shopping;driving;dependent:;cooking;max assist:;cleaning  per pt. he uses his scooter or electric w/c to dining area and to shop, rollator mostly in apartment, per pt. he does his own laundry, but has housekeeping, pt. uses AE to dress LB and bath LB  -JONATHAN     Existing Precautions/Restrictions fall  contact, pt. with maggots R foot per report  -JONATHAN     Barriers to Rehab medically complex;previous functional deficit;physical barrier  -JONATHAN       Row Name 10/10/24 1058          Occupational Profile    Environmental Supports and Barriers (Occupational Profile) scooter, motorized w/c, rollator, rw wx, step in shower with grab bars and seat, BSC over toilet, sock-aid use, per pt. his  shoe horn and reacher need replaced due to bent or not working well, pt. with  device for LBB  -JONATHAN       Row Name 10/10/24 1058          Living Environment    People in Home alone;other (see comments)  Senior Living Apartment  -JONATHAN       Row Name 10/10/24 1058          Home Main Entrance    Number of Stairs, Main Entrance none  -JONATHAN       Row Name 10/10/24 1058          Stairs Within Home, Primary    Number of Stairs, Within Home, Primary none  -JONATHAN       Row Name 10/10/24 1058          Cognition    Orientation Status (Cognition) oriented x 4  -JONATHAN       Row Name 10/10/24 1058          Safety Issues, Functional Mobility    Safety Issues Affecting Function (Mobility)  insight into deficits/self-awareness;safety precaution awareness;safety precautions follow-through/compliance;sequencing abilities  -JONATHAN     Impairments Affecting Function (Mobility) balance;endurance/activity tolerance;coordination;pain;sensation/sensory awareness;strength;range of motion (ROM);postural/trunk control;motor control  -JONATHAN     Comment, Safety Issues/Impairments (Mobility) pt. with Parkinson's, noted temor, diminished hand and LE sensation per pt.  -JONATHAN               User Key  (r) = Recorded By, (t) = Taken By, (c) = Cosigned By      Initials Name Provider Type    Angélica Espinal, OT Occupational Therapist                     Mobility/ADL's       Row Name 10/10/24 1103          Bed Mobility    Bed Mobility supine-sit  -JONATHAN     Supine-Sit [place] (Bed Mobility) moderate assist (50% patient effort);2 person assist;verbal cues;nonverbal cues (demo/gesture)  -JONATHAN     Bed Mobility, Safety Issues decreased use of legs for bridging/pushing;impaired trunk control for bed mobility  -JONATHAN     Assistive Device (Bed Mobility) bed rails;head of bed elevated;draw sheet  -JONATHAN     Comment, (Bed Mobility) most assist with scooting and getting trunk upright  -JONATHAN       Row Name 10/10/24 1103          Transfers    Transfers sit-stand transfer;stand-sit transfer;bed-chair transfer  -JONATHAN       Row Name 10/10/24 1103          Bed-Chair Transfer    Bed-Chair [place] (Transfers) 2 person assist;verbal cues;minimum assist (75% patient effort)  -JONATHAN     Assistive Device (Bed-Chair Transfers) walker, front-wheeled  -JONATHAN     Comment, (Bed-Chair Transfer) cues to fully align with chair  -JONATHAN       Row Name 10/10/24 1103          Sit-Stand Transfer    Sit-Stand [place] (Transfers) moderate assist (50% patient effort);2 person assist;verbal cues  -JONATHAN     Assistive Device (Sit-Stand Transfers) walker, front-wheeled  -JONATHAN     Comment, (Sit-Stand Transfer) cues for hand placement, bed elevated  -JONATHAN       Row Name 10/10/24 1103           Stand-Sit Transfer    Stand-Sit [place] (Transfers) minimum assist (75% patient effort);2 person assist;verbal cues  -     Assistive Device (Stand-Sit Transfers) walker, front-wheeled  -JONATHAN     Comment, (Stand-Sit Transfer) cues to reach back, assist to slow lowering  -       Row Name 10/10/24 1103          Functional Mobility    Functional Mobility- Ind. Level unable to perform  -     Functional Mobility- Comment limited by LE pain and weakness  -       Row Name 10/10/24 1103          Activities of Daily Living    BADL Assessment/Intervention lower body dressing;upper body dressing  -       Row Name 10/10/24 1103          Lower Body Dressing Assessment/Training    [place] Level (Lower Body Dressing) doff;don;socks;dependent (less than 25% patient effort)  -JONATHAN     Comment, (Lower Body Dressing) pt. uses AE at home  -       Row Name 10/10/24 1103          Upper Body Dressing Assessment/Training    [place] Level (Upper Body Dressing) doff;don;moderate assist (50% patient effort)  -JONTAHAN     Position (Upper Body Dressing) edge of bed sitting  -JONATHAN     Comment, (Upper Body Dressing) limited by lines  -               User Key  (r) = Recorded By, (t) = Taken By, (c) = Cosigned By      Initials Name Provider Type    JONATHAN Angélica Mera, OT Occupational Therapist                   Obj/Interventions       Row Name 10/10/24 1106          Sensory Assessment (Somatosensory)    Sensory Assessment (Somatosensory) bilateral UE;bilateral LE  -JONATHAN     Sensory Subjective Reports numbness  -     Sensory Assessment per pt. report diminished sensation in hands and LE's with burning pain in LE's  -       Row Name 10/10/24 1106          Vision Assessment/Intervention    Visual Impairment/Limitations corrective lenses for distance;corrective lenses for reading  -[facility] Name 10/10/24 1106          Range of Motion Comprehensive    General Range of Motion upper extremity range of motion deficits  identified  -JONATHAN     Comment, General Range of Motion per pt. rotator injury to right shoulder, AROM grossly 90 degress  -       Row Name 10/10/24 1106          Strength Comprehensive (MMT)    General Manual Muscle Testing (MMT) Assessment upper extremity strength deficits identified  -JONATHAN     Comment, General Manual Muscle Testing (MMT) Assessment BUE 5/5 except R shoulder with rotator injury, formal MMT not completed R shoulder  -JONATHAN       Row Name 10/10/24 1106          Motor Skills    Motor Skills functional endurance  -JONATHAN     Functional Endurance fair  -JONATHAN       Row Name 10/10/24 1106          Balance    Balance Assessment sitting static balance;sitting dynamic balance;standing static balance;standing dynamic balance;sit to stand dynamic balance  -JONATHAN     Static Sitting Balance standby assist  -JONATHAN     Dynamic Sitting Balance standby assist  -JONATHAN     Position, Sitting Balance unsupported;sitting edge of bed  -JONATHAN     Sit to Stand Dynamic Balance moderate assist;2-person assist;verbal cues;non-verbal cues (demo/gesture)  -JONATHAN     Static Standing Balance minimal assist;2-person assist;verbal cues  -JONATHAN     Dynamic Standing Balance minimal assist;2-person assist;verbal cues  -JONATHAN     Position/Device Used, Standing Balance supported;walker, front-wheeled  -JONATHAN     Comment, Balance pt. with posterior lean on standing  -JONATHAN               User Key  (r) = Recorded By, (t) = Taken By, (c) = Cosigned By      Initials Name Provider Type    Angélica Espinal OT Occupational Therapist                   Goals/Plan       Row Name 10/10/24 1114          Bed Mobility Goal 1 (OT)    Activity/Assistive Device (Bed Mobility Goal 1, OT) supine to sit  -JONATHAN     [place] Level/Cues Needed (Bed Mobility Goal 1, OT) minimum assist (75% or more patient effort);verbal cues required;nonverbal cues (demo/gesture) required  -JONATHAN     Time Frame (Bed Mobility Goal 1, OT) short term goal (STG);5 days  -JONATHAN     Progress/Outcomes (Bed Mobility Goal 1,  OT) new goal  -JONATHAN       Row Name 10/10/24 1114          Transfer Goal 1 (OT)    Activity/Assistive Device (Transfer Goal 1, OT) sit-to-stand/stand-to-sit;toilet;commode, bedside without drop arms;walker, rolling  -JONATHAN     [place] Level/Cues Needed (Transfer Goal 1, OT) minimum assist (75% or more patient effort)  -JONATHAN     Time Frame (Transfer Goal 1, OT) long term goal (LTG);10 days  -JONATHAN     Progress/Outcome (Transfer Goal 1, OT) new goal  -JONATHAN       Row Name 10/10/24 1114          Dressing Goal 1 (OT)    Activity/Device (Dressing Goal 1, OT) lower body dressing;reacher;sock-aid;long-handled shoe horn  -JONATHAN     [place]/Cues Needed (Dressing Goal 1, OT) minimum assist (75% or more patient effort)  -JONATHAN     Time Frame (Dressing Goal 1, OT) short term goal (STG);5 days  -JONATHAN     Strategies/Barriers (Dressing Goal 1, OT) doff sock, shahid pants to knees  -JONATHAN     Progress/Outcome (Dressing Goal 1, OT) new goal  -JONATHAN       Row Name 10/10/24 1114          Therapy Assessment/Plan (OT)    Planned Therapy Interventions (OT) activity tolerance training;BADL retraining;adaptive equipment training;occupation/activity based interventions;patient/caregiver education/training;strengthening exercise;transfer/mobility retraining;ROM/therapeutic exercise;functional balance retraining;neuromuscular control/coordination retraining  -JONATHAN               User Key  (r) = Recorded By, (t) = Taken By, (c) = Cosigned By      Initials Name Provider Type    JONATHAN Angélica Mera, OT Occupational Therapist                   Clinical Impression       Row Name 10/10/24 1108          Pain Assessment    Pretreatment Pain Rating 8/10  -JONATHAN     Posttreatment Pain Rating 8/10  -JONATHAN     Pain Location - Side/Orientation Right  -JONATHAN     Pain Location lower  -JONATHAN     Pain Location - extremity  -JONATHAN     Pre/Posttreatment Pain Comment per nurse to speak with MD about other pain meds, but had Neurontin earlier  -JONATHAN     Pain Intervention(s) Ambulation/increased  activity;Repositioned;Nursing Notified  -       Row Name 10/10/24 1108          Plan of Care Review    Plan of Care Reviewed With patient  -     Progress no change  -     Outcome Evaluation Patient is alert and oriented x 3.  Patient typically able to standing, perform bed mobility and BADLs on his own with AD/AE use.  Pt. needed mod A UBD, dependent LBD and mod of 2 sit to stand and had a posterior lean only moving bed to recliner. Pt. is appropriate for continued IP skilled OT services to address deficits of strength, balance, coordination, endurance and promote return to higher PLOF.  Pt.'s pain and sensory loss also has an impact of independence.  Recommend IRF at this time when pt. ready to discharge.  -       Row Name 10/10/24 1106          Therapy Assessment/Plan (OT)    Patient/Family Therapy Goal Statement (OT) regain ability to return to Senior Living at PLOF  -     Rehab Potential (OT) good, to achieve stated therapy goals  -     Criteria for Skilled Therapeutic Interventions Met (OT) yes;meets criteria;skilled treatment is necessary  -     Therapy Frequency (OT) daily  -JONATHAN     Predicted Duration of Therapy Intervention (OT) 10 days  -       Row Name 10/10/24 1108          Therapy Plan Review/Discharge Plan (OT)    Equipment Needs Upon Discharge (OT) other (see comments)  new reacher and shoehorn  -     Anticipated Discharge Disposition (OT) inpatient rehabilitation facility  -       Row Name 10/10/24 1108          Vital Signs    Pre Systolic BP Rehab 104  -JONATHAN     Pre Treatment Diastolic BP 51  -JONATHAN     Pretreatment Heart Rate (beats/min) 85  -JONATHAN     Posttreatment Heart Rate (beats/min) 90  -JONATHAN     Pre SpO2 (%) 92  -JONATHAN     O2 Delivery Pre Treatment room air  -JONATHAN     O2 Delivery Intra Treatment room air  -JONATHAN     Post SpO2 (%) 94  -JONATHAN     O2 Delivery Post Treatment room air  -JONATHAN     Pre Patient Position Supine  -JONATHAN     Intra Patient Position Standing  -JONATHAN     Post Patient Position Sitting   -JONATHAN       Row Name 10/10/24 1108          Positioning and Restraints    Pre-Treatment Position in bed  -JONATHAN     Post Treatment Position chair  -JONATHAN     In Chair notified nsg;reclined;call light within reach;encouraged to call for assist;exit alarm on;legs elevated;heels elevated  -JONATHAN               User Key  (r) = Recorded By, (t) = Taken By, (c) = Cosigned By      Initials Name Provider Type    Angélica Espinal, OT Occupational Therapist                   Outcome Measures       Row Name 10/10/24 1116          How much help from another is currently needed...    Putting on and taking off regular lower body clothing? 2  -JONATHAN     Bathing (including washing, rinsing, and drying) 2  -JONATHAN     Toileting (which includes using toilet bed pan or urinal) 2  -JONATHAN     Putting on and taking off regular upper body clothing 2  -JONATHAN     Taking care of personal grooming (such as brushing teeth) 3  -JONATHAN     Eating meals 3  -JONATHAN     AM-PAC 6 Clicks Score (OT) 14  -JONATHAN       Row Name 10/10/24 1100          How much help from another person do you currently need...    Turning from your back to your side while in flat bed without using bedrails? 3 (P)   -LE     Moving from lying on back to sitting on the side of a flat bed without bedrails? 2 (P)   -LE     Moving to and from a bed to a chair (including a wheelchair)? 3 (P)   -LE     Standing up from a chair using your arms (e.g., wheelchair, bedside chair)? 2 (P)   -LE     Climbing 3-5 steps with a railing? 2 (P)   -LE     To walk in hospital room? 2 (P)   -LE     AM-PAC 6 Clicks Score (PT) 14 (P)   -LE     Highest Level of Mobility Goal 4 --> Transfer to chair/commode (P)   -LE       Row Name 10/10/24 1116 10/10/24 1100       Functional Assessment    Outcome Measure Options AM-PAC 6 Clicks Daily Activity (OT)  -JONATHAN AM-PAC 6 Clicks Basic Mobility (PT) (P)   -LE              User Key  (r) = Recorded By, (t) = Taken By, (c) = Cosigned By      Initials Name Provider Type    Angélica Espinal, OT  Occupational Therapist    River Alexander, PT Student PT Student                    Occupational Therapy Education       Title: PT OT SLP Therapies (In Progress)       Topic: Occupational Therapy (In Progress)       Point: ADL training (Done)       Description:   Instruct learner(s) on proper safety adaptation and remediation techniques during self care or transfers.   Instruct in proper use of assistive devices.                  Learning Progress Summary             Patient Acceptance, E, VU,NR by  at 10/10/2024 1116    Comment: reason for consult, evaluation results, posterior lean correction, transfer safety                         Point: Home exercise program (Not Started)       Description:   Instruct learner(s) on appropriate technique for monitoring, assisting and/or progressing therapeutic exercises/activities.                  Learner Progress:  Not documented in this visit.              Point: Precautions (Done)       Description:   Instruct learner(s) on prescribed precautions during self-care and functional transfers.                  Learning Progress Summary             Patient Acceptance, E, VU,NR by  at 10/10/2024 1116    Comment: reason for consult, evaluation results, posterior lean correction, transfer safety                         Point: Body mechanics (Done)       Description:   Instruct learner(s) on proper positioning and spine alignment during self-care, functional mobility activities and/or exercises.                  Learning Progress Summary             Patient Acceptance, E, VU,NR by JONATHAN at 10/10/2024 1116    Comment: reason for consult, evaluation results, posterior lean correction, transfer safety                                         User Key       Initials Effective Dates Name Provider Type Discipline     07/11/23 -  Angélica Mera OT Occupational Therapist OT                  OT Recommendation and Plan  Planned Therapy Interventions (OT): activity tolerance training,  BADL retraining, adaptive equipment training, occupation/activity based interventions, patient/caregiver education/training, strengthening exercise, transfer/mobility retraining, ROM/therapeutic exercise, functional balance retraining, neuromuscular control/coordination retraining  Therapy Frequency (OT): daily  Plan of Care Review  Plan of Care Reviewed With: patient  Progress: no change  Outcome Evaluation: Patient is alert and oriented x 3.  Patient typically able to standing, perform bed mobility and BADLs on his own with AD/AE use.  Pt. needed mod A UBD, dependent LBD and mod of 2 sit to stand and had a posterior lean only moving bed to recliner. Pt. is appropriate for continued IP skilled OT services to address deficits of strength, balance, coordination, endurance and promote return to higher PLOF.  Pt.'s pain and sensory loss also has an impact of independence.  Recommend IRF at this time when pt. ready to discharge.     Time Calculation:   Evaluation Complexity (OT)  Review Occupational Profile/Medical/Therapy History Complexity: expanded/moderate complexity  Assessment, Occupational Performance/Identification of Deficit Complexity: 3-5 performance deficits  Clinical Decision Making Complexity (OT): detailed assessment/moderate complexity  Overall Complexity of Evaluation (OT): moderate complexity     Time Calculation- OT       Row Name 10/10/24 1118             Time Calculation- OT    OT Start Time 1008  -JONATHAN      OT Received On 10/10/24  -JONATHAN      OT Goal Re-Cert Due Date 10/20/24  -JONATHAN         Untimed Charges    OT Eval/Re-eval Minutes 53  -JONATHAN         Total Minutes    Untimed Charges Total Minutes 53  -JONATHAN       Total Minutes 53  -JONATHAN                User Key  (r) = Recorded By, (t) = Taken By, (c) = Cosigned By      Initials Name Provider Type    Angélica Espinal OT Occupational Therapist                  Therapy Charges for Today       Code Description Service Date Service Provider Modifiers Qty     [id number]  OT EVAL MOD COMPLEXITY 4 10/10/2024 Angélica Mera, JUNG GO 1                 Angélica Mera OT  10/10/2024

## 2024-10-10 NOTE — PLAN OF CARE
Goal Outcome Evaluation:      PT progressing toward goals. Call bell in reach and PT verbalized understanding of use. PT refuses to turn at times. Educated on necessity of turning and repositioning. No c/o pain or discomfort at this time. PT A&Ox4, on room air, and bed alarm in use.

## 2024-10-10 NOTE — THERAPY EVALUATION
Patient Name: Cordell Martin  : 1945    MRN: [id number]                              Today's Date: 10/10/2024       Admit Date: 10/8/2024    Visit Dx:     ICD-10-CM ICD-9-CM   1. Cellulitis of lower extremity, unspecified laterality  L03.119 682.6   2. Lymphedema  I89.0 457.1   3. Pain in both lower extremities  M79.604 729.5    M79.605    4. Chronic edema  R60.9 782.3   5. Parkinson's disease, unspecified whether dyskinesia present, unspecified whether manifestations fluctuate  G20.A1 332.0     Patient Active Problem List   Diagnosis    Anxiety    Arthritis    Depression    Hyperlipidemia    Lumbar stenosis with neurogenic claudication    Parkinson's disease    Lumbar stenosis with neurogenic claudication status post L4-5 decompression    Status post lumbar laminectomy    Memory loss    Chronic edema    Cellulitis of right lower extremity    Exertional dyspnea    Right knee pain    Cellulitis of lower extremity    Hypothyroidism (acquired)    Elevated serum creatinine    Cellulitis of right leg    Acute deep vein thrombosis (DVT) of right lower extremity    Pneumonia    Pulmonary nodule (4mm RML incidental)    Chronic cough    GERD    Remote smoker (Stopped )    Restrictive pattern on PFTs w/o evidence of ILD    Cellulitis    History of DVT (deep vein thrombosis)    Anemia, chronic disease    Elevated TSH     Past Medical History:   Diagnosis Date    Anxiety     Arthritis     Back pain     Bronchitis     Cognitive impairment     Depression     Disease of thyroid gland     Glucose intolerance (impaired glucose tolerance)     Hyperlipidemia     Kidney stone     Obesity     Parkinson disease     Pneumonia     Prostate disorder     Thyroid disease     Wears eyeglasses      Past Surgical History:   Procedure Laterality Date    COLONOSCOPY      5 years ago    EYE SURGERY      HERNIA REPAIR  10/20/2020    KNEE SURGERY      LUMBAR LAMINECTOMY DISCECTOMY DECOMPRESSION N/A 2017    Procedure: LUMBAR  LAMINECTOMY L4-5;  Surgeon: Antonio Trent MD;  Location: [facility];  Service:     SHOULDER ROTATOR CUFF REPAIR Right     x2    TONSILLECTOMY      VEIN SURGERY        General Information       Row Name 10/10/24 1044          Physical Therapy Time and Intention    Document Type evaluation (P)   -LE     Mode of Treatment physical therapy (P)   -       Row Name 10/10/24 1044          General Information    Patient Profile Reviewed yes (P)   -LE     Prior Level of Function independent:;all household mobility;community mobility;ADL's;feeding;grooming;driving;shopping;dependent:;home management;cooking;cleaning (P)   Pt used a power chair to move most of the time including around the grocery store and to go down to dining mata at [facility]. Pt still drives himself and ambulates short distances in his apartment with rollator.  -LE     Existing Precautions/Restrictions fall;other (see comments) (P)   Cellulitis in BLE; Parkinson's Disease  -LE     Barriers to Rehab medically complex;previous functional deficit;cognitive status (P)   -       Row Name 10/10/24 1044          Living Environment    People in Home alone;other (see comments) (P)   Pt resides in an apartment at [facility]  -       Row Name 10/10/24 1044          Stairs Within Home, Primary    Number of Stairs, Within Home, Primary none (P)   -       Row Name 10/10/24 1044          Cognition    Orientation Status (Cognition) oriented x 3 (P)   -       Row Name 10/10/24 1044          Safety Issues, Functional Mobility    Safety Issues Affecting Function (Mobility) awareness of need for assistance;insight into deficits/self-awareness;judgment;problem-solving;safety precaution awareness;safety precautions follow-through/compliance;sequencing abilities (P)   -LE     Impairments Affecting Function (Mobility) cognition;endurance/activity tolerance;pain;strength;sensation/sensory awareness (P)   -LE     Cognitive Impairments, Mobility  Safety/Performance awareness, need for assistance;insight into deficits/self-awareness;judgment;problem-solving/reasoning;safety precaution awareness;safety precaution follow-through;sequencing abilities (P)   -LE               User Key  (r) = Recorded By, (t) = Taken By, (c) = Cosigned By      Initials Name Provider Type    River Alexander, PT Student PT Student                   Mobility       Row Name 10/10/24 1050          Bed Mobility    Bed Mobility supine-sit (P)   -LE     Supine-Sit [place] (Bed Mobility) moderate assist (50% patient effort);2 person assist;verbal cues;nonverbal cues (demo/gesture) (P)   -LE     Assistive Device (Bed Mobility) bed rails;draw sheet;head of bed elevated (P)   -LE     Comment, (Bed Mobility) Need help getting upper body upright and to get hips to EOB (P)   -LE       Row Name 10/10/24 1050          Bed-Chair Transfer    Bed-Chair [place] (Transfers) minimum assist (75% patient effort);verbal cues;2 person assist (P)   -LE     Assistive Device (Bed-Chair Transfers) walker, front-wheeled (P)   -LE     Comment, (Bed-Chair Transfer) Difficulty moving the walker to get into position in front of the chair. Decreased weight shift ability (P)   -LE       Row Name 10/10/24 1050          Sit-Stand Transfer    Sit-Stand [place] (Transfers) verbal cues;moderate assist (50% patient effort);2 person assist (P)   -LE     Assistive Device (Sit-Stand Transfers) walker, front-wheeled (P)   -LE     Comment, (Sit-Stand Transfer) Pt was fearful of falling and needed encouragment to stand upright (P)   -LE       Row Name 10/10/24 1050          Gait/Stairs (Locomotion)    [place] Level (Gait) unable to assess (P)   -LE               User Key  (r) = Recorded By, (t) = Taken By, (c) = Cosigned By      Initials Name Provider Type    River Alexander, PT Student PT Student                   Obj/Interventions       Row Name 10/10/24 1053          Range of Motion Comprehensive     General Range of Motion bilateral lower extremity ROM WFL (P)   -LE       Row Name 10/10/24 1053          Strength Comprehensive (MMT)    General Manual Muscle Testing (MMT) Assessment lower extremity strength deficits identified (P)   -LE     Comment, General Manual Muscle Testing (MMT) Assessment BLE grossly 4-/5 (P)   -LE       Row Name 10/10/24 1053          Balance    Balance Assessment sitting static balance;sitting dynamic balance;sit to stand dynamic balance;standing static balance;standing dynamic balance (P)   -LE     Static Sitting Balance standby assist (P)   -LE     Dynamic Sitting Balance contact guard (P)   -LE     Position, Sitting Balance unsupported;sitting edge of bed (P)   -LE     Sit to Stand Dynamic Balance moderate assist;2-person assist (P)   -LE     Static Standing Balance minimal assist;2-person assist (P)   -LE     Dynamic Standing Balance moderate assist;2-person assist (P)   -LE     Position/Device Used, Standing Balance supported;walker, front-wheeled (P)   -LE     Balance Interventions sitting;standing;sit to stand;supported;dynamic;occupation based/functional task (P)   -LE       Row Name 10/10/24 1053          Sensory Assessment (Somatosensory)    Sensory Assessment (Somatosensory) bilateral LE (P)   -LE     Bilateral LE Sensory Assessment impaired (P)   -LE               User Key  (r) = Recorded By, (t) = Taken By, (c) = Cosigned By      Initials Name Provider Type    River Alexander, PT Student PT Student                   Goals/Plan       Row Name 10/10/24 1050          Bed Mobility Goal 1 (PT)    Activity/Assistive Device (Bed Mobility Goal 1, PT) sit to supine/supine to sit (P)   -LE     [place] Level/Cues Needed (Bed Mobility Goal 1, PT) minimum assist (75% or more patient effort) (P)   -LE     Time Frame (Bed Mobility Goal 1, PT) short term goal (STG);5 days (P)   -LE     Progress/Outcomes (Bed Mobility Goal 1, PT) new goal (P)   -LE       Row Name 10/10/24 1055           Transfer Goal 1 (PT)    Activity/Assistive Device (Transfer Goal 1, PT) bed-to-chair/chair-to-bed;walker, rolling (P)   -LE     [place] Level/Cues Needed (Transfer Goal 1, PT) minimum assist (75% or more patient effort) (P)   -LE     Time Frame (Transfer Goal 1, PT) long term goal (LTG);10 days (P)   -LE     Progress/Outcome (Transfer Goal 1, PT) new goal (P)   -LE       Row Name 10/10/24 1059          Gait Training Goal 1 (PT)    Activity/Assistive Device (Gait Training Goal 1, PT) gait (walking locomotion);assistive device use;improve balance and speed;increase endurance/gait distance;walker, rolling (P)   -LE     [place] Level (Gait Training Goal 1, PT) minimum assist (75% or more patient effort) (P)   -LE     Distance (Gait Training Goal 1, PT) 10 (P)   -LE     Time Frame (Gait Training Goal 1, PT) long term goal (LTG);10 days (P)   -LE     Progress/Outcome (Gait Training Goal 1, PT) new goal (P)   -LE       Row Name 10/10/24 105          Therapy Assessment/Plan (PT)    Planned Therapy Interventions (PT) balance training;bed mobility training;gait training;home exercise program;neuromuscular re-education;patient/family education;postural re-education;ROM (range of motion);strengthening;stretching;transfer training (P)   -LE               User Key  (r) = Recorded By, (t) = Taken By, (c) = Cosigned By      Initials Name Provider Type    River Alexander, PT Student PT Student                   Clinical Impression       Row Name 10/10/24 1054          Pain    Pretreatment Pain Rating 8/10 (P)   -LE     Posttreatment Pain Rating 8/10 (P)   -LE     Pain Location - Side/Orientation Right (P)   -LE     Pain Location lower (P)   -LE     Pain Location - extremity (P)   -LE     Pain Intervention(s) Repositioned;Ambulation/increased activity;Elevated;Nursing Notified (P)   -LE       Row Name 10/10/24 1054          Plan of Care Review    Plan of Care Reviewed With patient (P)   -LE     Outcome Evaluation  Patient presents to PT evaluation with decreased strength in LE and decreased ambulation ability. Patient transferred from bed to chair with Clem x2 but required modA x2 for STS from EOB. Patient had high pain in his right lower extremity that felt like a burning pain. Nursing was notified abou the pain. Patient will benefit from further skilled PT in order to improve activity tolerance and ambulation. Recommended discharge is IRF. (P)   -LE       Row Name 10/10/24 1054          Therapy Assessment/Plan (PT)    Patient/Family Therapy Goals Statement (PT) Return to PLOF (P)   -LE     Rehab Potential (PT) fair, will monitor progress closely (P)   -LE     Criteria for Skilled Interventions Met (PT) yes;meets criteria;skilled treatment is necessary (P)   -LE     Therapy Frequency (PT) daily (P)   -LE     Predicted Duration of Therapy Intervention (PT) 10 days (P)   -LE       Row Name 10/10/24 1054          Vital Signs    Pretreatment Heart Rate (beats/min) 85 (P)   -LE     Posttreatment Heart Rate (beats/min) 90 (P)   -LE     Pre SpO2 (%) 94 (P)   -LE     O2 Delivery Pre Treatment room air (P)   -LE     Post SpO2 (%) 95 (P)   -LE     O2 Delivery Post Treatment room air (P)   -LE     Pre Patient Position Supine (P)   -LE     Intra Patient Position Standing (P)   -LE     Post Patient Position Sitting (P)   -LE       Row Name 10/10/24 1054          Positioning and Restraints    Pre-Treatment Position in bed (P)   -LE     Post Treatment Position chair (P)   -LE     In Chair notified nsg;reclined;call light within reach;encouraged to call for assist;exit alarm on;waffle cushion;legs elevated;heels elevated (P)   -LE               User Key  (r) = Recorded By, (t) = Taken By, (c) = Cosigned By      Initials Name Provider Type    River Alexander, PT Student PT Student                   Outcome Measures       Row Name 10/10/24 1100          How much help from another person do you currently need...    Turning from your back to  your side while in flat bed without using bedrails? 3 (P)   -LE     Moving from lying on back to sitting on the side of a flat bed without bedrails? 2 (P)   -LE     Moving to and from a bed to a chair (including a wheelchair)? 3 (P)   -LE     Standing up from a chair using your arms (e.g., wheelchair, bedside chair)? 2 (P)   -LE     Climbing 3-5 steps with a railing? 2 (P)   -LE     To walk in hospital room? 2 (P)   -LE     AM-PAC 6 Clicks Score (PT) 14 (P)   -LE     Highest Level of Mobility Goal 4 --> Transfer to chair/commode (P)   -LE       Row Name 10/10/24 1100          Functional Assessment    Outcome Measure Options AM-PAC 6 Clicks Basic Mobility (PT) (P)   -LE               User Key  (r) = Recorded By, (t) = Taken By, (c) = Cosigned By      Initials Name Provider Type    LE River Ring, PT Student PT Student                                 Physical Therapy Education       Title: PT OT SLP Therapies (In Progress)       Topic: Physical Therapy (In Progress)       Point: Mobility training (Done)       Learning Progress Summary             Patient Acceptance, E, VU by LE at 10/10/2024 1100                         Point: Home exercise program (Not Started)       Learner Progress:  Not documented in this visit.              Point: Body mechanics (Done)       Learning Progress Summary             Patient Acceptance, E, VU by LE at 10/10/2024 1100                         Point: Precautions (Done)       Learning Progress Summary             Patient Acceptance, E, VU by LE at 10/10/2024 1100                                         User Key       Initials Effective Dates Name Provider Type Discipline    RHETT 07/30/24 -  River Ring, PT Student PT Student PT                  PT Recommendation and Plan  Planned Therapy Interventions (PT): (P) balance training, bed mobility training, gait training, home exercise program, neuromuscular re-education, patient/family education, postural re-education, ROM (range of  motion), strengthening, stretching, transfer training  Plan of Care Reviewed With: (P) patient  Outcome Evaluation: (P) Patient presents to PT evaluation with decreased strength in LE and decreased ambulation ability. Patient transferred from bed to chair with Clem x2 but required modA x2 for STS from EOB. Patient had high pain in his right lower extremity that felt like a burning pain. Nursing was notified abou the pain. Patient will benefit from further skilled PT in order to improve activity tolerance and ambulation. Recommended discharge is IRF.     Time Calculation:   PT Evaluation Complexity  History, PT Evaluation Complexity: (P) 3 or more personal factors and/or comorbidities  Examination of Body Systems (PT Eval Complexity): (P) total of 3 or more elements  Clinical Presentation (PT Evaluation Complexity): (P) evolving  Clinical Decision Making (PT Evaluation Complexity): (P) moderate complexity  Overall Complexity (PT Evaluation Complexity): (P) moderate complexity     PT Charges       Row Name 10/10/24 1101             Time Calculation    Start Time 1012 (P)   -LE      PT Received On 10/10/24 (P)   -LE      PT Goal Re-Cert Due Date 10/20/24 (P)   -LE         Untimed Charges    PT Eval/Re-eval Minutes 53 (P)   -LE         Total Minutes    Untimed Charges Total Minutes 53 (P)   -LE       Total Minutes 53 (P)   -LE                User Key  (r) = Recorded By, (t) = Taken By, (c) = Cosigned By      Initials Name Provider Type    River Alexander, PT Student PT Student                  Therapy Charges for Today       Code Description Service Date Service Provider Modifiers Qty    72718896901 HC PT EVAL MOD COMPLEXITY 4 10/10/2024 River Ring, PT Student GP 1            PT G-Codes  Outcome Measure Options: (P) AM-PAC 6 Clicks Basic Mobility (PT)  AM-PAC 6 Clicks Score (PT): (P) 14  PT Discharge Summary  Anticipated Discharge Disposition (PT): (P) inpatient rehabilitation facility    River Ring PT  Student  10/10/2024

## 2024-10-10 NOTE — PROGRESS NOTES
"Cordell Martin       LOS: 1 day   Patient Care Team:  Ben Holder DO as PCP - General (Internal Medicine)  Anthony Masters MD (Inactive) as Consulting Physician (General Surgery)  Nirav Connor MD as Surgeon (Vascular Surgery)  Mgiuel Banda MD as Consulting Physician (Otolaryngology)    Chief Complaint: Right lower extremity cellulitis    Subjective     Interval History:     Resting comfortably in bed this morning.  Pain tolerable, no acute events overnight.  Drowsy.    Review of Systems:      Gen- No fevers, chills  CV- No chest pain, palpitations  Resp- No cough, dyspnea  GI- No N/V/D, abd pain    Objective     Vital Signs  Vital Signs (last 24 hours)         10/09 0700  10/10 0659 10/10 0700  10/10 0839   Most Recent      Temp (°F) 96.2 -  97.7       97.2 (36.2) 10/09 2050    Heart Rate 76 -  83       79 10/10 0430    Resp 14 -  20       20 10/10 0430    BP 94/53 -  110/59       110/59 10/10 0430    SpO2 (%) 90 -  94       90 10/10 0430              Physical Exam:     No acute distress.  Nonlabored respirations.  Regular rate and rhythm.  Abdomen nondistended.  Right lower extremity: Dressing present of the right leg is clean, dry, intact.  Right second toe with persistent erythema, ulceration first webspace with scant amount of serous drainage.     Results Review:     I reviewed the patient's new clinical results.    Medication Review:   Hospital Medications (active)         Dose Frequency Start End    bisacodyl (DULCOLAX) EC tablet 5 mg 5 mg Daily PRN 10/9/2024 --    Admin Instructions: Use if no bowel movement after 12 hours.  Swallow whole. Do not crush, split, or chew tablet.    Route: Oral    Linked Group 1: Placed in \"And\" Linked Group        bisacodyl (DULCOLAX) suppository 10 mg 10 mg Daily PRN 10/9/2024 --    Admin Instructions: Use if no bowel movement after 12 hours.  Hold for diarrhea    Route: Rectal    Linked Group 1: Placed in \"And\" Linked Group        carbidopa-levodopa " (SINEMET)  MG per tablet 2 tablet 2 tablet 4 Times Daily 10/9/2024 --    Admin Instructions: May take with food if GI upset occurs.    Route: Oral    carbidopa-levodopa ER (SINEMET CR)  MG per tablet 1 tablet 1 tablet 2 Times Daily 10/9/2024 --    Admin Instructions: Do not crush or chew the capsules or tablets. The drug may not work as designed if the capsule or tablet is crushed or chewed. Swallow whole.  Swallow whole.  Do not crush or chew.  May take with food if GI upset occurs.    Route: Oral    DAPTOmycin (CUBICIN) 350 mg in sodium chloride 0.9 % 50 mL IVPB 4 mg/kg × 93 kg (Adjusted) Every 24 Hours 10/10/2024 10/23/2024    Admin Instructions: Caution: Look alike/sound alike drug alert.  Refrigerate. Do not shake.    Route: Intravenous    fluticasone (FLONASE) 50 MCG/ACT nasal spray 2 spray 2 spray Daily 10/9/2024 --    Route: Each Nare    furosemide (LASIX) tablet 20 mg 20 mg Daily 10/9/2024 --    Admin Instructions: Hold for SBP less than 100, DBP less than 60.    Route: Oral    gabapentin (NEURONTIN) capsule 100 mg 100 mg Every 12 Hours Scheduled 10/9/2024 --    Admin Instructions:     Route: Oral    guaiFENesin (MUCINEX) 12 hr tablet 600 mg 600 mg Every 12 Hours Scheduled 10/9/2024 --    Admin Instructions: Caution: Look alike/sound alike drug alert               Do not crush, split, or chew.    Route: Oral    heparin (porcine) 5000 UNIT/ML injection 5,000 Units 5,000 Units Every 8 Hours Scheduled 10/9/2024 --    Route: Subcutaneous    HYDROcodone-acetaminophen (NORCO) 5-325 MG per tablet 1 tablet 1 tablet Every 6 Hours PRN 10/9/2024 10/11/2024    Admin Instructions: Based on patient request - if ordered for moderate or severe pain, provider allows for administration of a medication prescribed for a lower pain scale.  [KAYLYN]    Do not exceed 4 grams of acetaminophen in a 24 hr period. Max dose of 2gm for AST/ALT greater than 120 units/L        If given for pain, use the following pain scale:  "  Mild Pain = Pain Score of 1-3, CPOT 1-2  Moderate Pain = Pain Score of 4-6, CPOT 3-4  Severe Pain = Pain Score of 7-10, CPOT 5-8    Route: Oral    HYDROmorphone (DILAUDID) injection 0.5 mg 0.5 mg Every 2 Hours PRN 10/9/2024 10/14/2024    Admin Instructions: Based on patient request - if ordered for moderate or severe pain, provider allows for administration of a medication prescribed for a lower pain scale.  If given for pain, use the following pain scale:  Mild Pain = Pain Score of 1-3, CPOT 1-2  Moderate Pain = Pain Score of 4-6, CPOT 3-4  Severe Pain = Pain Score of 7-10, CPOT 5-8    Route: Intravenous    levothyroxine (SYNTHROID, LEVOTHROID) tablet 88 mcg 88 mcg Every Early Morning 10/9/2024 --    Admin Instructions: Take on empty stomach.    Route: Oral    Magnesium Standard Dose Replacement - Follow Nurse / BPA Driven Protocol  As Needed 10/9/2024 --    Admin Instructions: Open Order & Select \"BHS Electrolyte Replacement Protocol Algorithm\" to View Details    Route: Does not apply    pantoprazole (PROTONIX) EC tablet 40 mg 40 mg Daily 10/9/2024 --    Admin Instructions: Do not crush or chew the capsules or tablets. The drug may not work as designed if the capsule or tablet is crushed or chewed. Swallow whole.  Swallow whole; do not crush, split, or chew.    Route: Oral    piperacillin-tazobactam (ZOSYN) 3.375 g IVPB in 100 mL NS MBP (CD) 3.375 g Every 8 Hours 10/9/2024 10/13/2024    Route: Intravenous    polyethylene glycol (MIRALAX) packet 17 g 17 g Daily PRN 10/9/2024 --    Admin Instructions: Use if no bowel movement after 12 hours. Mix in 6-8 ounces of water.  Use 4-8 ounces of water, tea, or juice for each 17 gram dose.    Route: Oral    Linked Group 1: Placed in \"And\" Linked Group        Potassium Replacement - Follow Nurse / BPA Driven Protocol  As Needed 10/9/2024 --    Admin Instructions: Open Order & Select \"BHS Electrolyte Replacement Protocol Algorithm\" to View Details    Route: Does not apply    " "pramipexole (MIRAPEX) tablet 1 mg 1 mg 3 Times Daily 10/9/2024 --    Route: Oral    QUEtiapine (SEROquel) tablet 12.5 mg 12.5 mg Nightly 10/9/2024 --    Admin Instructions: Caution: Look alike/sound alike drug alert    Route: Oral    sennosides-docusate (PERICOLACE) 8.6-50 MG per tablet 2 tablet 2 tablet 2 Times Daily PRN 10/9/2024 --    Admin Instructions: Start bowel management regimen if patient has not had a bowel movement after 12 hours.    Route: Oral    Linked Group 1: Placed in \"And\" Linked Group        sodium chloride 0.9 % flush 10 mL 10 mL As Needed 10/8/2024 --    Route: Intravenous    Cosign for Ordering: Accepted by Yuri Kessler MD on 10/9/2024 12:55 AM    sodium chloride 0.9 % flush 10 mL 10 mL Every 12 Hours Scheduled 10/9/2024 --    Route: Intravenous    sodium chloride 0.9 % flush 10 mL 10 mL As Needed 10/9/2024 --    Route: Intravenous    sodium chloride 7 % nebulizer solution nebulizer solution 4 mL 4 mL Daily - RT 10/9/2024 10/12/2024    Admin Instructions: Include Respiratory Treatment Education  CAUTION:  Hypertonic    Route: Nebulization    tamsulosin (FLOMAX) 24 hr capsule 0.4 mg 0.4 mg Daily 10/9/2024 --    Admin Instructions: Do not crush or chew the capsules or tablets. The drug may not work as designed if the capsule or tablet is crushed or chewed. Swallow whole. If patient unable to swallow whole, contact pharmacy for alternative.    Route: Oral              Assessment & Plan     79-year-old male with right lower extremity ulcerations, cellulitis.      Cellulitis    Anxiety    Depression    Hyperlipidemia    Parkinson's disease    Chronic edema    Hypothyroidism (acquired)    Chronic cough    GERD    History of DVT (deep vein thrombosis)    Anemia, chronic disease    Elevated TSH      MRI of the right foot is without focal signs of osteomyelitis or abscess.  MRI of the right tibia/fibula pending at this time.  Plan to continue wound care per PT/wound care.  Antibiotic " management per infectious disease, appreciate their recommendations.    Further recommendations following MRI of right tibia/fibula and physician review.  No imminent plan for orthopedic intervention today.    Will follow.    JASMEET Hebert  10/10/24  08:39 EDT

## 2024-10-10 NOTE — CONSULTS
Cordell Martin  1945  [id number]    Date of Consult: 10/10/2024    Admit Date:  10/8/2024      Requesting Provider: Dr Lopez  Evaluating Physician: Trevor Jorgensen MD    Chief Complaint: right leg red and painful    Reason for Consultation: RLE infection    History of present illness:     Patient is a 79 y.o.  Yr old male prior patient of Dr. Benites, with history of Parkinson's disease and chronically debilitated, uses a wheelchair primarily although apparently has occasionally used a walker.  He has other extensive comorbidities as below.  He had reported trauma to the right second toe having jammed it into a wall while in his wheelchair several weeks prior to admission.  He had developed increasing redness/swelling and discoloration of the right second toe and foot/lower leg over that period of time.  He does have bilateral lower leg discoloration and swelling but the right side is out of proportion of the left side.  He was admitted on October 8 with concern for lower extremity cellulitis; subsequently noted to have maggots at the right second toe    Aside from the trauma in the wheelchair, he denied any other specific exposures.    10/10/24 sleepy; Right lower leg/foot pain out of proportion to left, constant, nonradiating, worse with palpation, generally better with pain meds and if keeping pressure off it, 3 out of 10 at present.    No headache photophobia or neck stiffness.  He has chronic intermittent cough and reports this is at baseline, currently on room air with no hemoptysis.  No nausea vomiting diarrhea or abdominal pain.  No dysuria hematuria or pyuria.  No other specific rash    Past Medical History:   Diagnosis Date    Anxiety     Arthritis     Back pain     Bronchitis     Cognitive impairment     Depression     Disease of thyroid gland     Glucose intolerance (impaired glucose tolerance)     Hyperlipidemia     Kidney stone     Obesity     Parkinson disease     Pneumonia     Prostate  disorder     Thyroid disease     Wears eyeglasses        Past Surgical History:   Procedure Laterality Date    COLONOSCOPY      5 years ago    EYE SURGERY      HERNIA REPAIR  10/20/2020    KNEE SURGERY      LUMBAR LAMINECTOMY DISCECTOMY DECOMPRESSION N/A 07/13/2017    Procedure: LUMBAR LAMINECTOMY L4-5;  Surgeon: Antonio Trent MD;  Location: [facility];  Service:     SHOULDER ROTATOR CUFF REPAIR Right     x2    TONSILLECTOMY      VEIN SURGERY         Pediatric History   Patient Parents    Not on file     Other Topics Concern    Not on file   Social History Narrative    Lives alone. Uses walker    Has not smoked since about 1980       family history includes Alzheimer's disease in an other family member; Cancer in his father and another family member; Diabetes in an other family member; Heart disease in an other family member; Stroke in an other family member.    No Known Allergies    Medication:  Current Facility-Administered Medications   Medication Dose Route Frequency Provider Last Rate Last Admin    sennosides-docusate (PERICOLACE) 8.6-50 MG per tablet 2 tablet  2 tablet Oral BID PRN Daya Ospina APRN        And    polyethylene glycol (MIRALAX) packet 17 g  17 g Oral Daily PRN Daya Ospina APRN        And    bisacodyl (DULCOLAX) EC tablet 5 mg  5 mg Oral Daily PRN Daya Ospina APRN        And    bisacodyl (DULCOLAX) suppository 10 mg  10 mg Rectal Daily PRN Daya Ospina APRN        carbidopa-levodopa (SINEMET)  MG per tablet 2 tablet  2 tablet Oral 4x Daily Daya Ospina APRN   2 tablet at 10/09/24 2028    carbidopa-levodopa ER (SINEMET CR)  MG per tablet 1 tablet  1 tablet Oral BID Daya Ospina APRN   1 tablet at 10/09/24 2030    DAPTOmycin (CUBICIN) 550 mg in sodium chloride 0.9 % 50 mL IVPB  6 mg/kg (Adjusted) Intravenous Q24H Trevor Jorgensen  mL/hr at 10/09/24 1152 550 mg at 10/09/24 1152    fluticasone (FLONASE) 50 MCG/ACT nasal spray 2 spray  2 spray Each Nare Daily  Daya Ospina APRN        furosemide (LASIX) tablet 20 mg  20 mg Oral Daily Daya Ospina APRN   20 mg at 10/09/24 0809    gabapentin (NEURONTIN) capsule 100 mg  100 mg Oral Q12H Fabian Lopez MD   100 mg at 10/09/24 2029    guaiFENesin (MUCINEX) 12 hr tablet 600 mg  600 mg Oral Q12H Fabian Lopez MD   600 mg at 10/09/24 2029    heparin (porcine) 5000 UNIT/ML injection 5,000 Units  5,000 Units Subcutaneous Q8H Fabian Lopez MD   5,000 Units at 10/10/24 0559    HYDROcodone-acetaminophen (NORCO) 5-325 MG per tablet 1 tablet  1 tablet Oral Q6H PRN Eagle Paniagua DO   1 tablet at 10/09/24 1447    HYDROmorphone (DILAUDID) injection 0.5 mg  0.5 mg Intravenous Q2H PRN Fabian Lopez MD        levothyroxine (SYNTHROID, LEVOTHROID) tablet 88 mcg  88 mcg Oral Q AM Daya Ospina APRN   88 mcg at 10/10/24 0559    Magnesium Standard Dose Replacement - Follow Nurse / BPA Driven Protocol   Does not apply PRN Daya Ospina APRN        pantoprazole (PROTONIX) EC tablet 40 mg  40 mg Oral Daily Daya Ospina APRN   40 mg at 10/09/24 1233    piperacillin-tazobactam (ZOSYN) 3.375 g IVPB in 100 mL NS MBP (CD)  3.375 g Intravenous Q8H Fabian Lopez MD   3.375 g at 10/10/24 0600    Potassium Replacement - Follow Nurse / BPA Driven Protocol   Does not apply PRN Daya Ospina APRN        pramipexole (MIRAPEX) tablet 1 mg  1 mg Oral TID Daya Ospina APRN   1 mg at 10/09/24 2029    QUEtiapine (SEROquel) tablet 12.5 mg  12.5 mg Oral Nightly Daya Ospina APRN   12.5 mg at 10/09/24 2029    sodium chloride 0.9 % flush 10 mL  10 mL Intravenous PRN Yuri Kessler MD        sodium chloride 0.9 % flush 10 mL  10 mL Intravenous Q12H Daya Ospina APRN   10 mL at 10/09/24 2030    sodium chloride 0.9 % flush 10 mL  10 mL Intravenous PRN Daya Ospina APRN        sodium chloride 7 % nebulizer solution nebulizer solution 4 mL  4 mL Nebulization Daily - RT Fabian Lopez MD   4 mL at 10/09/24 1157    tamsulosin  (FLOMAX) 24 hr capsule 0.4 mg  0.4 mg Oral Daily BhaveshDaya APRN   0.4 mg at 10/09/24 0809       Antibiotics:  Anti-Infectives (From admission, onward)      Ordered     Dose/Rate Route Frequency Start Stop    10/09/24 0735  piperacillin-tazobactam (ZOSYN) 3.375 g IVPB in 100 mL NS MBP (CD)        Ordering Provider: Fabian Lopez MD    3.375 g  over 4 Hours Intravenous Every 8 Hours 10/09/24 1430 10/13/24 1429    10/09/24 0948  DAPTOmycin (CUBICIN) 550 mg in sodium chloride 0.9 % 50 mL IVPB        Ordering Provider: Trevor Jorgensen MD    6 mg/kg × 93 kg (Adjusted)  100 mL/hr over 30 Minutes Intravenous Every 24 Hours 10/09/24 1200 10/23/24 1159    10/09/24 0735  piperacillin-tazobactam (ZOSYN) 3.375 g IVPB in 100 mL NS MBP (CD)        Ordering Provider: Fabian Lopez MD    3.375 g  over 30 Minutes Intravenous Once 10/09/24 0830 10/09/24 0840    10/08/24 2008  vancomycin 2250 mg/500 mL 0.9% NS IVPB ([facility])        Ordering Provider: Yuri Kessler MD    20 mg/kg × 118 kg  over 135 Minutes Intravenous Once 10/08/24 2100 10/09/24 0346    10/08/24 2024  ceFAZolin 2000 mg IVPB in 100 mL NS (MBP)        Ordering Provider: Yuri Kessler MD    2,000 mg  over 30 Minutes Intravenous Once 10/08/24 2040 10/08/24 2203    10/08/24 2024  metroNIDAZOLE (FLAGYL) tablet 500 mg        Ordering Provider: Yuri Kessler MD    500 mg Oral Once 10/08/24 2040 10/08/24 2131              Review of Systems    10/10/24 per staff also    Constitutional-- No Fever, chills or sweats.  Appetite good, and no malaise. No fatigue.  Heent-- No new vision, hearing or throat complaints.  No epistaxis or oral sores.  Denies odynophagia or dysphagia.  No flashers, floaters or eye pain. No odynophagia or dysphagia. No headache, photophobia or neck stiffness.  CV-- No chest pain, palpitation or syncope  Resp--see above, no shortness of breath at present  GI- No nausea, vomiting, or diarrhea.  No hematochezia, melena, or  "hematemesis. Denies jaundice or chronic liver disease.  -- No dysuria, hematuria, or flank pain.  Denies hesitancy, urgency or flank pain.  Lymph- no swollen lymph nodes in neck/axilla or groin.   Heme- No active bruising or bleeding; no Hx of DVT or PE.  MS-- no swelling or pain in the bones or joints of arms/legs.  No new back pain.  Neuro-- No acute focal weakness or numbness in the arms or legs.  No seizures.  Chronic Parkinson's and chronically debilitated    Full 12 point review of systems reviewed and negative otherwise for acute complaints, except for above    Physical Exam:   Vital Signs   /59 (BP Location: Left arm, Patient Position: Sitting)   Pulse 79   Temp 97.2 °F (36.2 °C) (Oral)   Resp 20   Ht 182.9 cm (72\")   Wt 116 kg (256 lb 12.8 oz)   SpO2 90%   BMI 34.83 kg/m²     GENERAL: sleepy, in no acute distress.  Room air  HEENT: Normocephalic, atraumatic.    No conjunctival injection. No icterus. Oropharynx clear without evidence of thrush or exudate. No evidence of periodontal disease.    NECK: Supple without nuchal rigidity. No mass.   HEART: RRR; No murmur, rubs, gallops.   LUNGS: Diminished at bases but otherwise  clear to auscultation bilaterally without wheezing, rales, rhonchi. Normal respiratory effort. Nonlabored. No dullness.  ABDOMEN: Soft, nontender, nondistended. Positive bowel sounds. No rebound or guarding. NO mass or HSM.  EXT: See below   MSK: FROM without joint effusions noted arms/legs.    SKIN: Warm and dry without cutaneous eruptions on Inspection/palpation.    NEURO: sleepy.       Bilateral lower legs appeared edematous and discolored with vague scale.  However right side between knee and foot has warmth/tenderness and erythema out of proportion to left side.  Right side second toe is diffusely discolored with vague edema/erythema and tenderness out of proportion to other toes, crusted wound and unable to determine any depth with no definitive probe to bone tendon " joint or ligament at present; no discrete mass bulge or fluctuance.  No crepitus or bulla    Laboratory Data    Results from last 7 days   Lab Units 10/10/24  0416 10/09/24  0404 10/08/24  1936   WBC 10*3/mm3 6.75 8.21 7.37   HEMOGLOBIN g/dL 10.5* 10.8* 10.4*   HEMATOCRIT % 32.7* 34.7* 33.4*   PLATELETS 10*3/mm3 241 244 250     Results from last 7 days   Lab Units 10/10/24  0416   SODIUM mmol/L 140   POTASSIUM mmol/L 4.4   CHLORIDE mmol/L 106   CO2 mmol/L 24.0   BUN mg/dL 20   CREATININE mg/dL 1.09   GLUCOSE mg/dL 98   CALCIUM mg/dL 8.3*     Results from last 7 days   Lab Units 10/10/24  0416   ALK PHOS U/L 105   BILIRUBIN mg/dL 0.3   ALT (SGPT) U/L <5   AST (SGOT) U/L 9     Results from last 7 days   Lab Units 10/08/24  1936   SED RATE mm/hr 92*     Results from last 7 days   Lab Units 10/08/24  1936   CRP mg/dL 4.81*       Estimated Creatinine Clearance: 74.7 mL/min (by C-G formula based on SCr of 1.09 mg/dL).      Microbiology:      Radiology:  Imaging Results (Last 72 Hours)       Procedure Component Value Units Date/Time    MRI Foot Right With & Without Contrast [[id number]] Collected: 10/09/24 2300     Updated: 10/09/24 2307    Narrative:        MRI FOOT RIGHT W WO CONTRAST    Date of Exam: 10/9/2024 5:10 PM EDT    Indication: osteomyelitis.     Comparison: 9/15/2024.    Technique:  Routine multiplanar/multisequence sequence images of the right foot were obtained before and after the uneventful administration of 20 Multihance.        Findings:  Soft tissue swelling is seen along the dorsum of the foot with mild bullous changes present. No suspicious osseous edema identified. No abnormal enhancement present. Diffuse soft tissue swelling is present within the second digit with diffuse skin   thickening. No suspicious osseous edema identified. No destructive changes identified. No evidence of significant tenosynovitis. Mild muscular edema is present likely related to denervation. No significant joint effusion  identified. Mild to moderate   osteoarthritic changes are present within the interphalangeal joints of the and the first MTP joint. No erosions identified. No evidence of periostitis. Enhancement is seen within the subcutaneous tissues of the second digit. No suspicious osseous   enhancement identified. No drainable collections identified. No significant enhancement identified within the dorsum of the foot.      Impression:      Impression:  Cellulitis of the second digit with no evidence of osteomyelitis. No drainable collections identified. No suspicious osseous edema or enhancement identified. Soft tissue swelling is seen along the dorsum of the foot with no significant enhancement like   related to third spacing of fluid.        Electronically Signed: Norma Ahn MD    10/9/2024 11:04 PM EDT    Workstation ID: [id number]    CT Angiogram Chest [[id number]] Collected: 10/09/24 0759     Updated: 10/09/24 0810    Narrative:      CT ANGIOGRAM CHEST    Date of Exam: 10/9/2024 4:37 AM EDT    Indication: cough, + D.Dimer; hx of DVTs.    Comparison: None available.    Technique: CTA of the chest was performed after the uneventful intravenous administration of 85 mL of Isovue-370. Reconstructed coronal and sagittal images were also obtained. In addition, a 3-D volume rendered image was created for interpretation.   Automated exposure control and iterative reconstruction methods were used.    FINDINGS:    Thoracic inlet: Unremarkable.    Pulmonary arteries: No filling defects are identified within the pulmonary arteries to suggest acute pulmonary embolism.    Great vessels: Mild vascular calcifications are present. Otherwise, the thoracic aorta and proximal arch vessels appear unremarkable.    Mediastinum/Mariia: No pathologically enlarged mediastinal lymph nodes are seen. The esophagus is unremarkable.    Lung parenchyma: Scattered bibasilar and lingular atelectasis. Calcified granuloma within the right lung. No acute  infiltrate is seen. Unchanged 4 mm subpleural nodule within the right lung (series 5, image 51), too small to warrant imaging follow-up by   Fleischner Society guidelines.    Trachea and airways: The trachea and central airways appear unremarkable.    Pleural space: No significant pleural effusion or pneumothorax.    Heart and pericardium: Coronary artery calcifications. Otherwise, the heart and pericardium appear unremarkable.    Chest wall: No acute osseous lesion is identified. Bones are demineralized. There are degenerative changes within the spine. Contiguous anterior osteophytes noted within the thoracic spine. There is also bony fusion across multiple spinous processes.   These findings may be seen in diffuse idiopathic skeletal hyperostosis. Superficial soft tissues demonstrate no acute abnormality. Tendon anchors are seen within the right proximal humerus.    Upper abdomen: No acute abnormality is identified within the visualized upper abdomen.      Impression:      1.No acute abnormality is identified within the thorax. Specifically, there is no evidence of acute pulmonary embolism.  2.Bilateral lower lobe and lingular atelectasis.  3.Unchanged 4 mm subpleural nodule within the right lung (series 5, image 51), too small to warrant imaging follow-up by Fleischner Society guidelines.  4.Additional findings as detailed above.      Electronically Signed: Henok Castanon MD    10/9/2024 8:07 AM EDT    Workstation ID: [id number]    XR Chest 1 View [[id number]] Collected: 10/08/24 2040     Updated: 10/08/24 2045    Narrative:      XR CHEST 1 VW    Date of Exam: 10/8/2024 8:14 PM EDT    Indication: sepsis, chronic cough, infectious workup    Comparison: Chest radiograph 9/15/2024    Findings:      Mediastinum: Cardiomediastinal silhouette appears unchanged and enlarged    Lungs: Mild left basal consolidative opacity.    Pleura: No convincing pleural effusion or pneumothorax    Bones and soft tissues: No acute osseous  abnormality.        Impression:      Impression:  Left basal opacity which may represent atelectasis, though aspiration or pneumonia can be considered in the appropriate clinical setting.      Electronically Signed: Babak Natarajan    10/8/2024 8:42 PM EDT    Workstation ID: [id number]              Impression:     --Acute right lower leg/foot and second toe infection out of proportion to his chronic discoloration by his report, trauma to the right second toe several weeks preceding admission as above.  Further complicated by his chronic comorbidity, wound at the second toe with maggots noted earlier in stay, with ongoing risk for further serious morbidity and other serious sequela; high risk for persistent/recurrent or nonhealing wounds, persistent/progressive or recurrent infection and risk for further functional/limb loss, risk for amputation etc.   MRI no osteomyelitis per radiology.     --Chronic/intermittent cough at presentation, CT scan without acute infiltrate per radiology and some atelectasis noted by them.  Encouraged incentive spirometry.  No productive cough at present.  Respiratory PCR negative.  Further pulmonary workup or referral at discretion of medicine team; pneumonia less likely at present but certainly could evolve; monitor for new process    --Hx trauma and maggots    --Parkinson's disease, chronically debilitated and primarily wheelchair-bound by his report.    --Prior history of DVT    PLAN:      -- IV daptomycin/Zosyn for now.  Consider taper after culture data back    -- Check/review labs cultures and scans    -- Partial history per nursing staff    -- Discussed with microbiology    -- Highly complex set of issues with high risk for further serious morbidity and other serious sequela    -- Radiographic and surgical workup ongoing    -- Discussed with Dr. Lopez    Copied text in this note has been reviewed and is accurate as of 10/10/24.        Trevor Jorgensen,  MD  10/10/2024

## 2024-10-10 NOTE — PROGRESS NOTES
[facility] Medicine Services  PROGRESS NOTE    Patient Name: Cordell Martin  : 1945  MRN: [id number]    Date of Admission: 10/8/2024  Primary Care Physician: Ben Holder DO    Subjective   Subjective     CC: Leg pain    HPI: Notes burning in legs. No f/c/sweats. No n/v. No dyspnea.       Objective   Objective     Vital Signs:   Temp:  [97.2 °F (36.2 °C)-97.7 °F (36.5 °C)] 97.2 °F (36.2 °C)  Heart Rate:  [76-81] 79  Resp:  [14-20] 20  BP: ()/(53-59) 110/59     Physical Exam:  NAD, alert  OP clear, dry MM  RRR  CTAB  +BS, soft  MARIE  B LE wrapped, redness/edema better    Results Reviewed:  LAB RESULTS:      Lab 10/10/24  0416 10/09/24  0404 10/08/24  1936   WBC 6.75 8.21 7.37   HEMOGLOBIN 10.5* 10.8* 10.4*   HEMATOCRIT 32.7* 34.7* 33.4*   PLATELETS 241 244 250   NEUTROS ABS  --  5.82 4.88   IMMATURE GRANS (ABS)  --  0.03 0.04   LYMPHS ABS  --  1.31 1.45   MONOS ABS  --  0.64 0.66   EOS ABS  --  0.38 0.31   MCV 92.4 94.3 94.1   SED RATE  --   --  92*   CRP  --   --  4.81*   PROCALCITONIN  --   --  0.10   LACTATE  --   --  1.6   D DIMER QUANT  --   --  0.90*         Lab 10/10/24  0416 10/09/24  1529 10/09/24  0404 10/08/24  2226 10/08/24  1936   SODIUM 140  --  142  --  138   POTASSIUM 4.4 4.3 3.4*  --  4.2   CHLORIDE 106  --  107  --  106   CO2 24.0  --  25.0  --  24.0   ANION GAP 10.0  --  10.0  --  8.0   BUN 20  --  21  --  22   CREATININE 1.09  --  1.14  --  1.26   EGFR 69.9  --  66.2  --  58.7*   GLUCOSE 98  --  120*  --  148*   CALCIUM 8.3*  --  8.4*  --  8.5*   IONIZED CALCIUM  --   --   --  1.17  --    MAGNESIUM  --   --   --   --  2.2   PHOSPHORUS  --   --   --   --  3.0   HEMOGLOBIN A1C  --   --  6.40*  --   --    TSH  --   --   --   --  15.850*         Lab 10/10/24  0416 10/08/24  1936   TOTAL PROTEIN 5.4* 6.0   ALBUMIN 2.7* 2.7*   GLOBULIN 2.7 3.3   ALT (SGPT) <5 <5   AST (SGOT) 9 12   BILIRUBIN 0.3 0.3   ALK PHOS 105 108   LIPASE  --  11*         Lab  10/08/24  1936   PROBNP 98.4             Lab 10/08/24  1936   IRON 21*  21*   IRON SATURATION (TSAT) 10*   TIBC 206*   TRANSFERRIN 138*   FERRITIN 241.90   FOLATE 8.42   VITAMIN B 12 313         Lab 10/08/24  2049   FIO2 21   CARBOXYHEMOGLOBIN (VENOUS) 1.3     Brief Urine Lab Results  (Last result in the past 365 days)        Color   Clarity   Blood   Leuk Est   Nitrite   Protein   CREAT   Urine HCG        10/08/24 2219 Yellow   Clear   Negative   Negative   Negative   Negative                   Microbiology Results Abnormal       Procedure Component Value - Date/Time    Blood Culture - Blood, Hand, Left [[id number]]  (Normal) Collected: 10/08/24 2120    Lab Status: Preliminary result Specimen: Blood from Hand, Left Updated: 10/09/24 2147     Blood Culture No growth at 24 hours    Narrative:      Less than seven (7) mL's of blood was collected.  Insufficient quantity may yield false negative results.    Blood Culture - Blood, Hand, Right [[id number]]  (Normal) Collected: 10/08/24 2050    Lab Status: Preliminary result Specimen: Blood from Hand, Right Updated: 10/09/24 2117     Blood Culture No growth at 24 hours    Parasite Identification - Parasite, Foot, Right [[id number]] Collected: 10/09/24 1358    Lab Status: Final result Specimen: Parasite from Foot, Right Updated: 10/09/24 1407     Macroscopic Exam Maggots    Respiratory Panel PCR w/COVID-19(SARS-CoV-2) KENJI/ASH/SHIRLEY/PAD/COR/CORNELIUS In-House, NP Swab in UTM/VTM, 2 HR TAT - Swab, Nasopharynx [[id number]]  (Normal) Collected: 10/09/24 0548    Lab Status: Final result Specimen: Swab from Nasopharynx Updated: 10/09/24 0643     ADENOVIRUS, PCR Not Detected     Coronavirus 229E Not Detected     Coronavirus HKU1 Not Detected     Coronavirus NL63 Not Detected     Coronavirus OC43 Not Detected     COVID19 Not Detected     Human Metapneumovirus Not Detected     Human Rhinovirus/Enterovirus Not Detected     Influenza A PCR Not Detected     Influenza B PCR Not Detected      Parainfluenza Virus 1 Not Detected     Parainfluenza Virus 2 Not Detected     Parainfluenza Virus 3 Not Detected     Parainfluenza Virus 4 Not Detected     RSV, PCR Not Detected     Bordetella pertussis pcr Not Detected     Bordetella parapertussis PCR Not Detected     Chlamydophila pneumoniae PCR Not Detected     Mycoplasma pneumo by PCR Not Detected    Narrative:      In the setting of a positive respiratory panel with a viral infection PLUS a negative procalcitonin without other underlying concern for bacterial infection, consider observing off antibiotics or discontinuation of antibiotics and continue supportive care. If the respiratory panel is positive for atypical bacterial infection (Bordetella pertussis, Chlamydophila pneumoniae, or Mycoplasma pneumoniae), consider antibiotic de-escalation to target atypical bacterial infection.    COVID PRE-OP / PRE-PROCEDURE SCREENING ORDER (NO ISOLATION) - Swab, Nasopharynx [[id number]]  (Normal) Collected: 10/08/24 2126    Lab Status: Final result Specimen: Swab from Nasopharynx Updated: 10/08/24 2220    Narrative:      The following orders were created for panel order COVID PRE-OP / PRE-PROCEDURE SCREENING ORDER (NO ISOLATION) - Swab, Nasopharynx.  Procedure                               Abnormality         Status                     ---------                               -----------         ------                     COVID-19, FLU A/B, RSV P...[[id number]]  Normal              Final result                 Please view results for these tests on the individual orders.    COVID-19, FLU A/B, RSV PCR 1 HR TAT - Swab, Nasopharynx [[id number]]  (Normal) Collected: 10/08/24 2126    Lab Status: Final result Specimen: Swab from Nasopharynx Updated: 10/08/24 2220     COVID19 Not Detected     Influenza A PCR Not Detected     Influenza B PCR Not Detected     RSV, PCR Not Detected    Narrative:      Fact sheet for providers: https://www.fda.gov/media/984971/download    Fact sheet for  patients: https://www.fda.gov/media/016323/download    Test performed by PCR.            MRI Foot Right With & Without Contrast    Result Date: 10/9/2024  MRI FOOT RIGHT W WO CONTRAST Date of Exam: 10/9/2024 5:10 PM EDT Indication: osteomyelitis.  Comparison: 9/15/2024. Technique:  Routine multiplanar/multisequence sequence images of the right foot were obtained before and after the uneventful administration of 20 Multihance.  Findings: Soft tissue swelling is seen along the dorsum of the foot with mild bullous changes present. No suspicious osseous edema identified. No abnormal enhancement present. Diffuse soft tissue swelling is present within the second digit with diffuse skin thickening. No suspicious osseous edema identified. No destructive changes identified. No evidence of significant tenosynovitis. Mild muscular edema is present likely related to denervation. No significant joint effusion identified. Mild to moderate osteoarthritic changes are present within the interphalangeal joints of the and the first MTP joint. No erosions identified. No evidence of periostitis. Enhancement is seen within the subcutaneous tissues of the second digit. No suspicious osseous enhancement identified. No drainable collections identified. No significant enhancement identified within the dorsum of the foot.     Impression: Impression: Cellulitis of the second digit with no evidence of osteomyelitis. No drainable collections identified. No suspicious osseous edema or enhancement identified. Soft tissue swelling is seen along the dorsum of the foot with no significant enhancement like related to third spacing of fluid. Electronically Signed: Norma Ahn MD  10/9/2024 11:04 PM EDT  Workstation ID: [id number]    Duplex Lower Extremity Art / Grafts - Right CAR    Result Date: 10/9/2024    No evidence of focal hemodynamically significant stenosis.   Monophasic flow starting at the level of the distal femoral artery and continuing into  the calf vessels.   JOHNNA was not able to be obtained secondary to wound dressing and patient tolerance.     Duplex Venous Lower Extremity - Bilateral CAR    Result Date: 10/9/2024    Chronic right lower extremity deep vein thrombosis noted in the peroneal.   Chronic left lower extremity superficial thrombophlebitis noted in the great saphenous (below knee).   All other veins appeared normal bilaterally.     CT Angiogram Chest    Result Date: 10/9/2024  CT ANGIOGRAM CHEST Date of Exam: 10/9/2024 4:37 AM EDT Indication: cough, + D.Dimer; hx of DVTs. Comparison: None available. Technique: CTA of the chest was performed after the uneventful intravenous administration of 85 mL of Isovue-370. Reconstructed coronal and sagittal images were also obtained. In addition, a 3-D volume rendered image was created for interpretation. Automated exposure control and iterative reconstruction methods were used. FINDINGS: Thoracic inlet: Unremarkable. Pulmonary arteries: No filling defects are identified within the pulmonary arteries to suggest acute pulmonary embolism. Great vessels: Mild vascular calcifications are present. Otherwise, the thoracic aorta and proximal arch vessels appear unremarkable. Mediastinum/Mariia: No pathologically enlarged mediastinal lymph nodes are seen. The esophagus is unremarkable. Lung parenchyma: Scattered bibasilar and lingular atelectasis. Calcified granuloma within the right lung. No acute infiltrate is seen. Unchanged 4 mm subpleural nodule within the right lung (series 5, image 51), too small to warrant imaging follow-up by Fleischner Society guidelines. Trachea and airways: The trachea and central airways appear unremarkable. Pleural space: No significant pleural effusion or pneumothorax. Heart and pericardium: Coronary artery calcifications. Otherwise, the heart and pericardium appear unremarkable. Chest wall: No acute osseous lesion is identified. Bones are demineralized. There are degenerative  changes within the spine. Contiguous anterior osteophytes noted within the thoracic spine. There is also bony fusion across multiple spinous processes. These findings may be seen in diffuse idiopathic skeletal hyperostosis. Superficial soft tissues demonstrate no acute abnormality. Tendon anchors are seen within the right proximal humerus. Upper abdomen: No acute abnormality is identified within the visualized upper abdomen.     Impression: 1.No acute abnormality is identified within the thorax. Specifically, there is no evidence of acute pulmonary embolism. 2.Bilateral lower lobe and lingular atelectasis. 3.Unchanged 4 mm subpleural nodule within the right lung (series 5, image 51), too small to warrant imaging follow-up by Fleischner Society guidelines. 4.Additional findings as detailed above. Electronically Signed: Henok Castanon MD  10/9/2024 8:07 AM EDT  Workstation ID: [id number]    XR Chest 1 View    Result Date: 10/8/2024  XR CHEST 1 VW Date of Exam: 10/8/2024 8:14 PM EDT Indication: sepsis, chronic cough, infectious workup Comparison: Chest radiograph 9/15/2024 Findings: Mediastinum: Cardiomediastinal silhouette appears unchanged and enlarged Lungs: Mild left basal consolidative opacity. Pleura: No convincing pleural effusion or pneumothorax Bones and soft tissues: No acute osseous abnormality.     Impression: Impression: Left basal opacity which may represent atelectasis, though aspiration or pneumonia can be considered in the appropriate clinical setting. Electronically Signed: Babak Natarajan  10/8/2024 8:42 PM EDT  Workstation ID: [id number]     Results for orders placed during the hospital encounter of 01/12/21    Adult Transthoracic Echo Complete W/ Cont if Necessary Per Protocol    Interpretation Summary  · The right ventricle and right atrium are moderately dilated. Other chamber sizes and wall thicknesses are normal.  · Global and segmental LV wall motion is normal. The estimated left ventricular  "ejection fraction is 51% - 55%.  · The aortic and mitral valves are normal in structure and function.  · The estimated RV systolic pressure is mildly elevated 35 mmHg - 40 mmHg. There is as well noted to be less than 50% inspiratory IVC collapse. The main pulmonary artery is \"borderline dilated\".  · There are no other important findings on this study.      Current medications:  Scheduled Meds:carbidopa-levodopa, 2 tablet, Oral, 4x Daily  carbidopa-levodopa ER, 1 tablet, Oral, BID  DAPTOmycin, 4 mg/kg (Adjusted), Intravenous, Q24H  fluticasone, 2 spray, Each Nare, Daily  furosemide, 20 mg, Oral, Daily  gabapentin, 100 mg, Oral, Q12H  guaiFENesin, 600 mg, Oral, Q12H  heparin (porcine), 5,000 Units, Subcutaneous, Q8H  levothyroxine, 88 mcg, Oral, Q AM  pantoprazole, 40 mg, Oral, Daily  piperacillin-tazobactam, 3.375 g, Intravenous, Q8H  pramipexole, 1 mg, Oral, TID  QUEtiapine, 12.5 mg, Oral, Nightly  sodium chloride, 10 mL, Intravenous, Q12H  sodium chloride, 4 mL, Nebulization, Daily - RT  tamsulosin, 0.4 mg, Oral, Daily      Continuous Infusions:     PRN Meds:.  senna-docusate sodium **AND** polyethylene glycol **AND** bisacodyl **AND** bisacodyl    HYDROcodone-acetaminophen    HYDROmorphone    Magnesium Standard Dose Replacement - Follow Nurse / BPA Driven Protocol    Potassium Replacement - Follow Nurse / BPA Driven Protocol    sodium chloride    sodium chloride    Assessment & Plan   Assessment & Plan     Active Hospital Problems    Diagnosis  POA    **Cellulitis [L03.90]  Yes    History of DVT (deep vein thrombosis) [Z86.718]  Not Applicable    Anemia, chronic disease [D63.8]  Yes    Elevated TSH [R79.89]  Yes    GERD [K21.9]  Yes    Chronic cough [R05.3]  Yes    Hypothyroidism (acquired) [E03.9]  Yes    Chronic edema [R60.9]  Yes    Parkinson's disease [G20.A1]  Yes    Anxiety [F41.9]  Yes    Hyperlipidemia [E78.5]  Yes    Depression [F32.A]  Yes      Resolved Hospital Problems   No resolved problems to " display.        Brief Hospital Course to date:  Cordell Martin is a 79 y.o. male with history of Parkinson's, history of DVT, HL, hypothyroidism, GERD, lymphedema, anxiety/depression, with wheelchair use/walker use at [facility] here with cellulitis, B LE wounds    B LE wounds  Edema  Cellulitis, maggots noted, R second toe markedly abnormal, wound between first/second toe with drainage  -zosyn/daptomycin  -duplex pending, with chronic DVT noted  -wound care  -consult ID/orthopedic surgery  -MRI foot reviewed, no definitive osteomyelitis    Cough  Possible LLL PNA on CXR, not as impressive on CT  -pulmonary toilet  -respiratory PCR negative    Chronic anemia  -monitor    Parkinson's disease  Anxiety/depression  -continue appropriate home meds    Hypothyroidism  -on synthorid, with elevated TSH  -follow up outpatient    HL  -statin    GERD  -PPI      Expected Discharge Location and Transportation: Rehab  Expected Discharge   Expected Discharge Date: 10/11/2024; Expected Discharge Time:      VTE Prophylaxis:  Pharmacologic VTE prophylaxis orders are present.         AM-PAC 6 Clicks Score (PT): 15 (10/09/24 2117)    CODE STATUS:   Code Status and Medical Interventions: No CPR (Do Not Attempt to Resuscitate); Limited Support; No intubation (DNI); DNR/DNI   Ordered at: 10/09/24 0206     Medical Intervention Limits:    No intubation (DNI)     Level Of Support Discussed With:    Patient     Code Status (Patient has no pulse and is not breathing):    No CPR (Do Not Attempt to Resuscitate)     Medical Interventions (Patient has pulse or is breathing):    Limited Support     Comments:    DNR/DNI       Fabian Lopez MD  10/10/24

## 2024-10-10 NOTE — PLAN OF CARE
Goal Outcome Evaluation:  Plan of Care Reviewed With: patient        Progress: no change  Outcome Evaluation: Patient is alert and oriented x 3.  Patient typically able to standing, perform bed mobility and BADLs on his own with AD/AE use.  Pt. needed mod A UBD, dependent LBD and mod of 2 sit to stand and had a posterior lean only moving bed to recliner. Pt. is appropriate for continued IP skilled OT services to address deficits of strength, balance, coordination, endurance and promote return to higher PLOF.  Pt.'s pain and sensory loss also has an impact of independence.  Recommend IRF at this time when pt. ready to discharge.      Anticipated Discharge Disposition (OT): inpatient rehabilitation facility

## 2024-10-11 ENCOUNTER — APPOINTMENT (OUTPATIENT)
Dept: MRI IMAGING | Facility: HOSPITAL | Age: 79
DRG: 602 | End: 2024-10-11
Payer: MEDICARE

## 2024-10-11 ENCOUNTER — APPOINTMENT (OUTPATIENT)
Dept: CARDIOLOGY | Facility: HOSPITAL | Age: 79
DRG: 602 | End: 2024-10-11
Payer: MEDICARE

## 2024-10-11 LAB
ALBUMIN SERPL-MCNC: 2.6 G/DL (ref 3.5–5.2)
ALBUMIN/GLOB SERPL: 0.7 G/DL
ALP SERPL-CCNC: 98 U/L (ref 39–117)
ALT SERPL W P-5'-P-CCNC: <5 U/L (ref 1–41)
ANION GAP SERPL CALCULATED.3IONS-SCNC: 7 MMOL/L (ref 5–15)
AST SERPL-CCNC: 9 U/L (ref 1–40)
BACTERIA SPEC AEROBE CULT: ABNORMAL
BILIRUB SERPL-MCNC: 0.2 MG/DL (ref 0–1.2)
BUN SERPL-MCNC: 18 MG/DL (ref 8–23)
BUN/CREAT SERPL: 16.2 (ref 7–25)
CALCIUM SPEC-SCNC: 8.4 MG/DL (ref 8.6–10.5)
CHLORIDE SERPL-SCNC: 104 MMOL/L (ref 98–107)
CO2 SERPL-SCNC: 27 MMOL/L (ref 22–29)
CREAT SERPL-MCNC: 1.11 MG/DL (ref 0.76–1.27)
DEPRECATED RDW RBC AUTO: 54 FL (ref 37–54)
EGFRCR SERPLBLD CKD-EPI 2021: 68.4 ML/MIN/1.73
ERYTHROCYTE [DISTWIDTH] IN BLOOD BY AUTOMATED COUNT: 15.8 % (ref 12.3–15.4)
GLOBULIN UR ELPH-MCNC: 3.7 GM/DL
GLUCOSE SERPL-MCNC: 128 MG/DL (ref 65–99)
GRAM STN SPEC: ABNORMAL
HCT VFR BLD AUTO: 34.9 % (ref 37.5–51)
HGB BLD-MCNC: 10.9 G/DL (ref 13–17.7)
MCH RBC QN AUTO: 29.5 PG (ref 26.6–33)
MCHC RBC AUTO-ENTMCNC: 31.2 G/DL (ref 31.5–35.7)
MCV RBC AUTO: 94.3 FL (ref 79–97)
PLATELET # BLD AUTO: 254 10*3/MM3 (ref 140–450)
PMV BLD AUTO: 8.7 FL (ref 6–12)
POTASSIUM SERPL-SCNC: 4.4 MMOL/L (ref 3.5–5.2)
PROT SERPL-MCNC: 6.3 G/DL (ref 6–8.5)
RBC # BLD AUTO: 3.7 10*6/MM3 (ref 4.14–5.8)
SODIUM SERPL-SCNC: 138 MMOL/L (ref 136–145)
WBC NRBC COR # BLD AUTO: 5.79 10*3/MM3 (ref 3.4–10.8)

## 2024-10-11 PROCEDURE — 99232 SBSQ HOSP IP/OBS MODERATE 35: CPT | Performed by: INTERNAL MEDICINE

## 2024-10-11 PROCEDURE — 80053 COMPREHEN METABOLIC PANEL: CPT | Performed by: HOSPITALIST

## 2024-10-11 PROCEDURE — 94799 UNLISTED PULMONARY SVC/PX: CPT

## 2024-10-11 PROCEDURE — 73718 MRI LOWER EXTREMITY W/O DYE: CPT

## 2024-10-11 PROCEDURE — 94664 DEMO&/EVAL PT USE INHALER: CPT

## 2024-10-11 PROCEDURE — 25010000002 HEPARIN (PORCINE) PER 1000 UNITS: Performed by: HOSPITALIST

## 2024-10-11 PROCEDURE — 92610 EVALUATE SWALLOWING FUNCTION: CPT

## 2024-10-11 PROCEDURE — 25010000002 HYDROMORPHONE PER 4 MG: Performed by: HOSPITALIST

## 2024-10-11 PROCEDURE — 85027 COMPLETE CBC AUTOMATED: CPT | Performed by: HOSPITALIST

## 2024-10-11 PROCEDURE — 25010000002 PIPERACILLIN SOD-TAZOBACTAM PER 1 G: Performed by: HOSPITALIST

## 2024-10-11 PROCEDURE — 25010000002 DAPTOMYCIN PER 1 MG: Performed by: INTERNAL MEDICINE

## 2024-10-11 RX ADMIN — CARBIDOPA AND LEVODOPA 2 TABLET: 25; 100 TABLET ORAL at 08:28

## 2024-10-11 RX ADMIN — PIPERACILLIN AND TAZOBACTAM 3.38 G: 3; .375 INJECTION, POWDER, LYOPHILIZED, FOR SOLUTION INTRAVENOUS at 22:59

## 2024-10-11 RX ADMIN — PIPERACILLIN AND TAZOBACTAM 3.38 G: 3; .375 INJECTION, POWDER, LYOPHILIZED, FOR SOLUTION INTRAVENOUS at 15:07

## 2024-10-11 RX ADMIN — SODIUM CHLORIDE SOLN NEBU 7% 4 ML: 7 NEBU SOLN at 08:41

## 2024-10-11 RX ADMIN — GABAPENTIN 100 MG: 100 CAPSULE ORAL at 20:08

## 2024-10-11 RX ADMIN — LEVOTHYROXINE SODIUM 88 MCG: 0.09 TABLET ORAL at 06:07

## 2024-10-11 RX ADMIN — Medication 10 ML: at 08:35

## 2024-10-11 RX ADMIN — CARBIDOPA AND LEVODOPA 2 TABLET: 25; 100 TABLET ORAL at 13:12

## 2024-10-11 RX ADMIN — PRAMIPEXOLE DIHYDROCHLORIDE 1 MG: 1 TABLET ORAL at 17:43

## 2024-10-11 RX ADMIN — CARBIDOPA AND LEVODOPA 1 TABLET: 25; 100 TABLET, EXTENDED RELEASE ORAL at 20:08

## 2024-10-11 RX ADMIN — PIPERACILLIN AND TAZOBACTAM 3.38 G: 3; .375 INJECTION, POWDER, LYOPHILIZED, FOR SOLUTION INTRAVENOUS at 06:07

## 2024-10-11 RX ADMIN — FLUTICASONE PROPIONATE 2 SPRAY: 50 SPRAY, METERED NASAL at 08:30

## 2024-10-11 RX ADMIN — PRAMIPEXOLE DIHYDROCHLORIDE 1 MG: 1 TABLET ORAL at 08:28

## 2024-10-11 RX ADMIN — TAMSULOSIN HYDROCHLORIDE 0.4 MG: 0.4 CAPSULE ORAL at 08:29

## 2024-10-11 RX ADMIN — PRAMIPEXOLE DIHYDROCHLORIDE 1 MG: 1 TABLET ORAL at 20:07

## 2024-10-11 RX ADMIN — CARBIDOPA AND LEVODOPA 2 TABLET: 25; 100 TABLET ORAL at 17:43

## 2024-10-11 RX ADMIN — Medication 10 ML: at 20:08

## 2024-10-11 RX ADMIN — GUAIFENESIN 600 MG: 600 TABLET, EXTENDED RELEASE ORAL at 20:07

## 2024-10-11 RX ADMIN — CARBIDOPA AND LEVODOPA 1 TABLET: 25; 100 TABLET, EXTENDED RELEASE ORAL at 10:08

## 2024-10-11 RX ADMIN — HEPARIN SODIUM 5000 UNITS: 5000 INJECTION INTRAVENOUS; SUBCUTANEOUS at 06:07

## 2024-10-11 RX ADMIN — GABAPENTIN 100 MG: 100 CAPSULE ORAL at 08:28

## 2024-10-11 RX ADMIN — FUROSEMIDE 20 MG: 20 TABLET ORAL at 08:29

## 2024-10-11 RX ADMIN — HYDROMORPHONE HYDROCHLORIDE 0.5 MG: 1 INJECTION, SOLUTION INTRAMUSCULAR; INTRAVENOUS; SUBCUTANEOUS at 20:13

## 2024-10-11 RX ADMIN — HEPARIN SODIUM 5000 UNITS: 5000 INJECTION INTRAVENOUS; SUBCUTANEOUS at 20:08

## 2024-10-11 RX ADMIN — CARBIDOPA AND LEVODOPA 2 TABLET: 25; 100 TABLET ORAL at 20:08

## 2024-10-11 RX ADMIN — DAPTOMYCIN 350 MG: 500 INJECTION, POWDER, LYOPHILIZED, FOR SOLUTION INTRAVENOUS at 14:18

## 2024-10-11 RX ADMIN — HEPARIN SODIUM 5000 UNITS: 5000 INJECTION INTRAVENOUS; SUBCUTANEOUS at 13:12

## 2024-10-11 RX ADMIN — GUAIFENESIN 600 MG: 600 TABLET, EXTENDED RELEASE ORAL at 08:29

## 2024-10-11 RX ADMIN — QUETIAPINE FUMARATE 12.5 MG: 25 TABLET ORAL at 20:08

## 2024-10-11 RX ADMIN — PANTOPRAZOLE SODIUM 40 MG: 40 TABLET, DELAYED RELEASE ORAL at 13:12

## 2024-10-11 NOTE — PROGRESS NOTES
Cordell Martin  1945  [id number]    Evaluating Physician: Trevor Jorgensen MD    Chief Complaint: right leg red and painful    Reason for Consultation: RLE infection    History of present illness:     Patient is a 79 y.o.  Yr old male prior patient of Dr. Benites, with history of Parkinson's disease and chronically debilitated, uses a wheelchair primarily although apparently has occasionally used a walker.  He has other extensive comorbidities as below.  He had reported trauma to the right second toe having jammed it into a wall while in his wheelchair several weeks prior to admission.  He had developed increasing redness/swelling and discoloration of the right second toe and foot/lower leg over that period of time.  He does have bilateral lower leg discoloration and swelling but the right side is out of proportion of the left side.  He was admitted on October 8 with concern for lower extremity cellulitis; subsequently noted to have maggots at the right second toe    Aside from the trauma in the wheelchair, he denied any other specific exposures.    10/11/24 Cx pending; sleepy; Right lower leg/foot pain out of proportion to left, constant, nonradiating, worse with palpation, generally better with pain meds and if keeping pressure off it, 3 out of 10 at present. Redness less since admit    No headache photophobia or neck stiffness.  He has chronic intermittent cough and reports this is at baseline, currently on room air with no hemoptysis.  No nausea vomiting diarrhea or abdominal pain.  No dysuria hematuria or pyuria.  No other specific rash    Past Medical History:   Diagnosis Date    Anxiety     Arthritis     Back pain     Bronchitis     Cognitive impairment     Depression     Disease of thyroid gland     Glucose intolerance (impaired glucose tolerance)     Hyperlipidemia     Kidney stone     Obesity     Parkinson disease     Pneumonia     Prostate disorder     Thyroid disease     Wears eyeglasses         Past Surgical History:   Procedure Laterality Date    COLONOSCOPY      5 years ago    EYE SURGERY      HERNIA REPAIR  10/20/2020    KNEE SURGERY      LUMBAR LAMINECTOMY DISCECTOMY DECOMPRESSION N/A 07/13/2017    Procedure: LUMBAR LAMINECTOMY L4-5;  Surgeon: Antonio Trent MD;  Location: [facility];  Service:     SHOULDER ROTATOR CUFF REPAIR Right     x2    TONSILLECTOMY      VEIN SURGERY         Pediatric History   Patient Parents    Not on file     Other Topics Concern    Not on file   Social History Narrative    Lives alone. Uses walker    Has not smoked since about 1980       family history includes Alzheimer's disease in an other family member; Cancer in his father and another family member; Diabetes in an other family member; Heart disease in an other family member; Stroke in an other family member.    No Known Allergies    Medication:  Current Facility-Administered Medications   Medication Dose Route Frequency Provider Last Rate Last Admin    sennosides-docusate (PERICOLACE) 8.6-50 MG per tablet 2 tablet  2 tablet Oral BID PRN Daya Ospina APRN        And    polyethylene glycol (MIRALAX) packet 17 g  17 g Oral Daily PRN Daya Ospina APRN        And    bisacodyl (DULCOLAX) EC tablet 5 mg  5 mg Oral Daily PRN Daya Ospina APRN        And    bisacodyl (DULCOLAX) suppository 10 mg  10 mg Rectal Daily PRN Daya Ospina APRN        carbidopa-levodopa (SINEMET)  MG per tablet 2 tablet  2 tablet Oral 4x Daily Daya Ospina APRN   2 tablet at 10/10/24 2245    carbidopa-levodopa ER (SINEMET CR)  MG per tablet 1 tablet  1 tablet Oral BID Daya Ospina APRN   1 tablet at 10/10/24 2246    DAPTOmycin (CUBICIN) 350 mg in sodium chloride 0.9 % 50 mL IVPB  4 mg/kg (Adjusted) Intravenous Q24H Trevor Jorgensen  mL/hr at 10/10/24 1320 350 mg at 10/10/24 1320    fluticasone (FLONASE) 50 MCG/ACT nasal spray 2 spray  2 spray Each Nare Daily Daya Ospina APRN        furosemide (LASIX)  tablet 20 mg  20 mg Oral Daily Daya Ospina APRN   20 mg at 10/10/24 0847    gabapentin (NEURONTIN) capsule 100 mg  100 mg Oral Q12H Fabian Lopez MD   100 mg at 10/10/24 2246    guaiFENesin (MUCINEX) 12 hr tablet 600 mg  600 mg Oral Q12H Fabian Lopez MD   600 mg at 10/10/24 2245    heparin (porcine) 5000 UNIT/ML injection 5,000 Units  5,000 Units Subcutaneous Q8H Fabian Lopez MD   5,000 Units at 10/11/24 0607    HYDROmorphone (DILAUDID) injection 0.5 mg  0.5 mg Intravenous Q2H PRN Fabian Lopez MD        levothyroxine (SYNTHROID, LEVOTHROID) tablet 88 mcg  88 mcg Oral Q AM Daya Ospina APRN   88 mcg at 10/11/24 0607    Magnesium Standard Dose Replacement - Follow Nurse / BPA Driven Protocol   Does not apply PRN Daya Ospina APRN        pantoprazole (PROTONIX) EC tablet 40 mg  40 mg Oral Daily Daya Ospina APRN   40 mg at 10/09/24 1233    piperacillin-tazobactam (ZOSYN) 3.375 g IVPB in 100 mL NS MBP (CD)  3.375 g Intravenous Q8H Fabian Lopez MD   3.375 g at 10/11/24 0607    Potassium Replacement - Follow Nurse / BPA Driven Protocol   Does not apply PRN Daya Ospina APRN        pramipexole (MIRAPEX) tablet 1 mg  1 mg Oral TID Daya Ospina APRN   1 mg at 10/10/24 2246    QUEtiapine (SEROquel) tablet 12.5 mg  12.5 mg Oral Nightly Daya Ospina APRN   12.5 mg at 10/10/24 2245    sodium chloride 0.9 % flush 10 mL  10 mL Intravenous PRN Yuri Kessler MD        sodium chloride 0.9 % flush 10 mL  10 mL Intravenous Q12H Daya Ospina APRN   10 mL at 10/10/24 2246    sodium chloride 0.9 % flush 10 mL  10 mL Intravenous PRN Daya Ospina APRN        sodium chloride 7 % nebulizer solution nebulizer solution 4 mL  4 mL Nebulization Daily - RT Fabian Lopez MD   4 mL at 10/10/24 0945    tamsulosin (FLOMAX) 24 hr capsule 0.4 mg  0.4 mg Oral Daily Daya Ospina APRN   0.4 mg at 10/10/24 0860       Antibiotics:  Anti-Infectives (From admission, onward)      Ordered      Dose/Rate Route Frequency Start Stop    10/10/24 0752  DAPTOmycin (CUBICIN) 350 mg in sodium chloride 0.9 % 50 mL IVPB        Ordering Provider: Trevor Jorgensen MD    4 mg/kg × 93 kg (Adjusted)  100 mL/hr over 30 Minutes Intravenous Every 24 Hours 10/10/24 1200 10/23/24 1159    10/09/24 0735  piperacillin-tazobactam (ZOSYN) 3.375 g IVPB in 100 mL NS MBP (CD)        Ordering Provider: Fabian Lopez MD    3.375 g  over 4 Hours Intravenous Every 8 Hours 10/09/24 1430 10/13/24 1429    10/09/24 0735  piperacillin-tazobactam (ZOSYN) 3.375 g IVPB in 100 mL NS MBP (CD)        Ordering Provider: Fabian Lopez MD    3.375 g  over 30 Minutes Intravenous Once 10/09/24 0830 10/09/24 0840    10/08/24 2008  vancomycin 2250 mg/500 mL 0.9% NS IVPB ([facility])        Ordering Provider: Yuri Kessler MD    20 mg/kg × 118 kg  over 135 Minutes Intravenous Once 10/08/24 2100 10/09/24 0346    10/08/24 2024  ceFAZolin 2000 mg IVPB in 100 mL NS (MBP)        Ordering Provider: Yuri Keslser MD    2,000 mg  over 30 Minutes Intravenous Once 10/08/24 2040 10/08/24 2203    10/08/24 2024  metroNIDAZOLE (FLAGYL) tablet 500 mg        Ordering Provider: Yuri Kessler MD    500 mg Oral Once 10/08/24 2040 10/08/24 2131              Review of Systems    10/11/24 per staff also    Constitutional-- No Fever, chills or sweats.  Appetite good, and no malaise. No fatigue.  Heent-- No new vision, hearing or throat complaints.  No epistaxis or oral sores.  Denies odynophagia or dysphagia.  No flashers, floaters or eye pain. No odynophagia or dysphagia. No headache, photophobia or neck stiffness.  CV-- No chest pain, palpitation or syncope  Resp--see above, no shortness of breath at present  GI- No nausea, vomiting, or diarrhea.  No hematochezia, melena, or hematemesis. Denies jaundice or chronic liver disease.  -- No dysuria, hematuria, or flank pain.  Denies hesitancy, urgency or flank pain.  Lymph- no swollen lymph nodes in  "neck/axilla or groin.   Heme- No active bruising or bleeding; no Hx of DVT or PE.  MS-- no swelling or pain in the bones or joints of arms/legs.  No new back pain.  Neuro-- No acute focal weakness or numbness in the arms or legs.  No seizures.  Chronic Parkinson's and chronically debilitated    Full 12 point review of systems reviewed and negative otherwise for acute complaints, except for above    Physical Exam:   Vital Signs   /58 (BP Location: Right arm, Patient Position: Lying)   Pulse 66   Temp 97.5 °F (36.4 °C) (Oral)   Resp 18   Ht 182.9 cm (72\")   Wt 116 kg (256 lb 12.8 oz)   SpO2 92%   BMI 34.83 kg/m²     GENERAL: sleepy, in no acute distress.  Room air  HEENT: Normocephalic, atraumatic.    No conjunctival injection. No icterus. Oropharynx clear without evidence of thrush or exudate. No evidence of periodontal disease.    NECK: Supple without nuchal rigidity. No mass.   HEART: RRR; No murmur, rubs, gallops.   LUNGS: Diminished at bases but otherwise  clear to auscultation bilaterally without wheezing, rales, rhonchi. Normal respiratory effort. Nonlabored. No dullness.  ABDOMEN: Soft, nontender, nondistended. Positive bowel sounds. No rebound or guarding. NO mass or HSM.  EXT: See below   MSK: FROM without joint effusions noted arms/legs.    SKIN: Warm and dry without cutaneous eruptions on Inspection/palpation.    NEURO: sleepy.       Bilateral lower legs appeared edematous and discolored with vague scale.  However right side between knee and foot has warmth/tenderness and erythema out of proportion to left side.  Right side second toe is diffusely discolored with vague edema/erythema and tenderness out of proportion to other toes, crusted wound and unable to determine any depth with no definitive probe to bone tendon joint or ligament at present; no discrete mass bulge or fluctuance.  No crepitus or bulla    Laboratory Data    Results from last 7 days   Lab Units 10/11/24  0641 10/10/24  0416 " 10/09/24  0404   WBC 10*3/mm3 5.79 6.75 8.21   HEMOGLOBIN g/dL 10.9* 10.5* 10.8*   HEMATOCRIT % 34.9* 32.7* 34.7*  31.3*   PLATELETS 10*3/mm3 254 241 244     Results from last 7 days   Lab Units 10/10/24  0416   SODIUM mmol/L 140   POTASSIUM mmol/L 4.4   CHLORIDE mmol/L 106   CO2 mmol/L 24.0   BUN mg/dL 20   CREATININE mg/dL 1.09   GLUCOSE mg/dL 98   CALCIUM mg/dL 8.3*     Results from last 7 days   Lab Units 10/10/24  0416   ALK PHOS U/L 105   BILIRUBIN mg/dL 0.3   ALT (SGPT) U/L <5   AST (SGOT) U/L 9     Results from last 7 days   Lab Units 10/08/24  1936   SED RATE mm/hr 92*     Results from last 7 days   Lab Units 10/08/24  1936   CRP mg/dL 4.81*       Estimated Creatinine Clearance: 74.7 mL/min (by C-G formula based on SCr of 1.09 mg/dL).      Microbiology:      Radiology:  Imaging Results (Last 72 Hours)       Procedure Component Value Units Date/Time    MRI Tibia Fibula Right Without Contrast [[id number]] Collected: 10/11/24 0451     Updated: 10/11/24 0456    Narrative:      MRI TIBIA FIBULA RIGHT WO CONTRAST    Date of Exam: 10/11/2024 4:39 AM EDT    Indication: Right lower extremity ulcer.     Comparison: 9/15/2024.    Technique:  Routine multiplanar/multisequence images of the right tibia and fibula were obtained without contrast administration.        Findings:  Osseous structures appear intact. No suspicious osseous edema identified. No evidence of fracture. No evidence of periostitis. Musculature appears unremarkable. No evidence of myofascial defect identified. No drainable collection identified. Limited   evaluation due to lack of intravenous contrast. Edema is seen tracking along the subcutaneous tissues as well as the superficial fascia distally which may be related to third spacing of fluid. No joint effusion identified. Vascular flow voids appear   grossly unremarkable.      Impression:      Impression:  No evidence of osteomyelitis. No drainable collection identified. No joint  effusion.        Electronically Signed: Norma Ahn MD    10/11/2024 4:53 AM EDT    Workstation ID: [id number]    MRI Foot Right With & Without Contrast [[id number]] Collected: 10/09/24 2300     Updated: 10/09/24 2307    Narrative:        MRI FOOT RIGHT W WO CONTRAST    Date of Exam: 10/9/2024 5:10 PM EDT    Indication: osteomyelitis.     Comparison: 9/15/2024.    Technique:  Routine multiplanar/multisequence sequence images of the right foot were obtained before and after the uneventful administration of 20 Multihance.        Findings:  Soft tissue swelling is seen along the dorsum of the foot with mild bullous changes present. No suspicious osseous edema identified. No abnormal enhancement present. Diffuse soft tissue swelling is present within the second digit with diffuse skin   thickening. No suspicious osseous edema identified. No destructive changes identified. No evidence of significant tenosynovitis. Mild muscular edema is present likely related to denervation. No significant joint effusion identified. Mild to moderate   osteoarthritic changes are present within the interphalangeal joints of the and the first MTP joint. No erosions identified. No evidence of periostitis. Enhancement is seen within the subcutaneous tissues of the second digit. No suspicious osseous   enhancement identified. No drainable collections identified. No significant enhancement identified within the dorsum of the foot.      Impression:      Impression:  Cellulitis of the second digit with no evidence of osteomyelitis. No drainable collections identified. No suspicious osseous edema or enhancement identified. Soft tissue swelling is seen along the dorsum of the foot with no significant enhancement like   related to third spacing of fluid.        Electronically Signed: Norma Ahn MD    10/9/2024 11:04 PM EDT    Workstation ID: [id number]    CT Angiogram Chest [[id number]] Collected: 10/09/24 0759     Updated: 10/09/24 0810    Narrative:       CT ANGIOGRAM CHEST    Date of Exam: 10/9/2024 4:37 AM EDT    Indication: cough, + D.Dimer; hx of DVTs.    Comparison: None available.    Technique: CTA of the chest was performed after the uneventful intravenous administration of 85 mL of Isovue-370. Reconstructed coronal and sagittal images were also obtained. In addition, a 3-D volume rendered image was created for interpretation.   Automated exposure control and iterative reconstruction methods were used.    FINDINGS:    Thoracic inlet: Unremarkable.    Pulmonary arteries: No filling defects are identified within the pulmonary arteries to suggest acute pulmonary embolism.    Great vessels: Mild vascular calcifications are present. Otherwise, the thoracic aorta and proximal arch vessels appear unremarkable.    Mediastinum/Mariia: No pathologically enlarged mediastinal lymph nodes are seen. The esophagus is unremarkable.    Lung parenchyma: Scattered bibasilar and lingular atelectasis. Calcified granuloma within the right lung. No acute infiltrate is seen. Unchanged 4 mm subpleural nodule within the right lung (series 5, image 51), too small to warrant imaging follow-up by   Fleischner Society guidelines.    Trachea and airways: The trachea and central airways appear unremarkable.    Pleural space: No significant pleural effusion or pneumothorax.    Heart and pericardium: Coronary artery calcifications. Otherwise, the heart and pericardium appear unremarkable.    Chest wall: No acute osseous lesion is identified. Bones are demineralized. There are degenerative changes within the spine. Contiguous anterior osteophytes noted within the thoracic spine. There is also bony fusion across multiple spinous processes.   These findings may be seen in diffuse idiopathic skeletal hyperostosis. Superficial soft tissues demonstrate no acute abnormality. Tendon anchors are seen within the right proximal humerus.    Upper abdomen: No acute abnormality is identified within the  visualized upper abdomen.      Impression:      1.No acute abnormality is identified within the thorax. Specifically, there is no evidence of acute pulmonary embolism.  2.Bilateral lower lobe and lingular atelectasis.  3.Unchanged 4 mm subpleural nodule within the right lung (series 5, image 51), too small to warrant imaging follow-up by Fleischner Society guidelines.  4.Additional findings as detailed above.      Electronically Signed: Henok Castanon MD    10/9/2024 8:07 AM EDT    Workstation ID: [id number]    XR Chest 1 View [[id number]] Collected: 10/08/24 2040     Updated: 10/08/24 2045    Narrative:      XR CHEST 1 VW    Date of Exam: 10/8/2024 8:14 PM EDT    Indication: sepsis, chronic cough, infectious workup    Comparison: Chest radiograph 9/15/2024    Findings:      Mediastinum: Cardiomediastinal silhouette appears unchanged and enlarged    Lungs: Mild left basal consolidative opacity.    Pleura: No convincing pleural effusion or pneumothorax    Bones and soft tissues: No acute osseous abnormality.        Impression:      Impression:  Left basal opacity which may represent atelectasis, though aspiration or pneumonia can be considered in the appropriate clinical setting.      Electronically Signed: Babak Natarajan    10/8/2024 8:42 PM EDT    Workstation ID: [id number]              Impression:     --Acute right lower leg/foot and second toe infection out of proportion to his chronic discoloration by his report, trauma to the right second toe several weeks preceding admission as above.  Further complicated by his chronic comorbidity, wound at the second toe with maggots noted earlier in stay, with ongoing risk for further serious morbidity and other serious sequela; high risk for persistent/recurrent or nonhealing wounds, persistent/progressive or recurrent infection and risk for further functional/limb loss, risk for amputation etc.   MRI no osteomyelitis per radiology.     --Chronic/intermittent cough at  presentation, CT scan without acute infiltrate per radiology and some atelectasis noted by them.  Encouraged incentive spirometry.  No productive cough at present.  Respiratory PCR negative.  Further pulmonary workup or referral at discretion of medicine team; pneumonia less likely at present but certainly could evolve; monitor for new process    --Hx trauma and maggots    --Parkinson's disease, chronically debilitated and primarily wheelchair-bound by his report.    --Prior history of DVT    PLAN:      -- IV daptomycin/Zosyn for now.  Consider taper after culture data back    -- Check/review labs cultures and scans    -- Partial history per nursing staff    -- Discussed with microbiology    -- Highly complex set of issues with high risk for further serious morbidity and other serious sequela    -- Radiographic and surgical workup ongoing    -- Discussed with Dr. Lopez    Copied text in this note has been reviewed and is accurate as of 10/11/24.        Trevor Jorgensen MD  10/11/2024

## 2024-10-11 NOTE — PROGRESS NOTES
[facility] Medicine Services  PROGRESS NOTE    Patient Name: Cordell Martin  : 1945  MRN: [id number]    Date of Admission: 10/8/2024  Primary Care Physician: Ben Holder DO    Subjective   Subjective     CC:  Cellulitis     HPI:  No acute events. Having leg pain and states it feels cold. Reviewed imaging.      Objective   Objective     Vital Signs:   Temp:  [96.1 °F (35.6 °C)-97.6 °F (36.4 °C)] 96.4 °F (35.8 °C)  Heart Rate:  [65-78] 65  Resp:  [16-18] 18  BP: ()/(50-79) 114/50     Physical Exam:  Constitutional: No acute distress, awake, alert  Respiratory: Clear to auscultation bilaterally, respiratory effort normal   Cardiovascular: RRR, no murmurs,  Gastrointestinal: Positive bowel sounds, soft, nontender, nondistended  Musculoskeletal: +1-2 edema; bandage clean/dry  Psychiatric: flat  Neurologic: Oriented x 3, strength symmetric in all extremities, Cranial Nerves grossly intact to confrontation, speech clear      Results Reviewed:  LAB RESULTS:      Lab 10/11/24  0641 10/10/24  0416 10/09/24  0404 10/08/24  193   WBC 5.79 6.75 8.21 7.37   HEMOGLOBIN 10.9* 10.5* 10.8* 10.4*   HEMATOCRIT 34.9* 32.7* 34.7*  31.3* 33.4*   PLATELETS 254 241 244 250   NEUTROS ABS  --   --  5.82 4.88   IMMATURE GRANS (ABS)  --   --  0.03 0.04   LYMPHS ABS  --   --  1.31 1.45   MONOS ABS  --   --  0.64 0.66   EOS ABS  --   --  0.38 0.31   MCV 94.3 92.4 94.3 94.1   SED RATE  --   --   --  92*   CRP  --   --   --  4.81*   PROCALCITONIN  --   --   --  0.10   LACTATE  --   --   --  1.6   D DIMER QUANT  --   --   --  0.90*         Lab 10/11/24  0641 10/10/24  0416 10/09/24  1529 10/09/24  0404 10/08/24  2226 10/08/24  1936   SODIUM 138 140  --  142  --  138   POTASSIUM 4.4 4.4 4.3 3.4*  --  4.2   CHLORIDE 104 106  --  107  --  106   CO2 27.0 24.0  --  25.0  --  24.0   ANION GAP 7.0 10.0  --  10.0  --  8.0   BUN 18 20  --  21  --  22   CREATININE 1.11 1.09  --  1.14  --  1.26   EGFR  68.4 69.9  --  66.2  --  58.7*   GLUCOSE 128* 98  --  120*  --  148*   CALCIUM 8.4* 8.3*  --  8.4*  --  8.5*   IONIZED CALCIUM  --   --   --   --  1.17  --    MAGNESIUM  --   --   --   --   --  2.2   PHOSPHORUS  --   --   --   --   --  3.0   HEMOGLOBIN A1C  --   --   --  6.40*  --   --    TSH  --   --   --   --   --  15.850*         Lab 10/11/24  0641 10/10/24  0416 10/08/24  1936   TOTAL PROTEIN 6.3 5.4* 6.0   ALBUMIN 2.6* 2.7* 2.7*   GLOBULIN 3.7 2.7 3.3   ALT (SGPT) <5 <5 <5   AST (SGOT) 9 9 12   BILIRUBIN 0.2 0.3 0.3   ALK PHOS 98 105 108   LIPASE  --   --  11*         Lab 10/08/24  1936   PROBNP 98.4             Lab 10/08/24  1936   IRON 21*  21*   IRON SATURATION (TSAT) 10*   TIBC 206*   TRANSFERRIN 138*   FERRITIN 241.90   FOLATE 8.42   VITAMIN B 12 313         Lab 10/08/24  2049   FIO2 21   CARBOXYHEMOGLOBIN (VENOUS) 1.3     Brief Urine Lab Results  (Last result in the past 365 days)        Color   Clarity   Blood   Leuk Est   Nitrite   Protein   CREAT   Urine HCG        10/08/24 2219 Yellow   Clear   Negative   Negative   Negative   Negative                   Microbiology Results Abnormal       Procedure Component Value - Date/Time    Blood Culture - Blood, Hand, Left [[id number]]  (Normal) Collected: 10/08/24 2120    Lab Status: Preliminary result Specimen: Blood from Hand, Left Updated: 10/10/24 2145     Blood Culture No growth at 2 days    Narrative:      Less than seven (7) mL's of blood was collected.  Insufficient quantity may yield false negative results.    Blood Culture - Blood, Hand, Right [[id number]]  (Normal) Collected: 10/08/24 2050    Lab Status: Preliminary result Specimen: Blood from Hand, Right Updated: 10/10/24 2116     Blood Culture No growth at 2 days    Parasite Identification - Parasite, Foot, Right [[id number]] Collected: 10/09/24 1358    Lab Status: Final result Specimen: Parasite from Foot, Right Updated: 10/09/24 1409     Macroscopic Exam Maggots    Respiratory Panel PCR  w/COVID-19(SARS-CoV-2) KENJI/ASH/SHIRLEY/PAD/COR/CORNELIUS In-House, NP Swab in UTM/VTM, 2 HR TAT - Swab, Nasopharynx [[id number]]  (Normal) Collected: 10/09/24 0548    Lab Status: Final result Specimen: Swab from Nasopharynx Updated: 10/09/24 0643     ADENOVIRUS, PCR Not Detected     Coronavirus 229E Not Detected     Coronavirus HKU1 Not Detected     Coronavirus NL63 Not Detected     Coronavirus OC43 Not Detected     COVID19 Not Detected     Human Metapneumovirus Not Detected     Human Rhinovirus/Enterovirus Not Detected     Influenza A PCR Not Detected     Influenza B PCR Not Detected     Parainfluenza Virus 1 Not Detected     Parainfluenza Virus 2 Not Detected     Parainfluenza Virus 3 Not Detected     Parainfluenza Virus 4 Not Detected     RSV, PCR Not Detected     Bordetella pertussis pcr Not Detected     Bordetella parapertussis PCR Not Detected     Chlamydophila pneumoniae PCR Not Detected     Mycoplasma pneumo by PCR Not Detected    Narrative:      In the setting of a positive respiratory panel with a viral infection PLUS a negative procalcitonin without other underlying concern for bacterial infection, consider observing off antibiotics or discontinuation of antibiotics and continue supportive care. If the respiratory panel is positive for atypical bacterial infection (Bordetella pertussis, Chlamydophila pneumoniae, or Mycoplasma pneumoniae), consider antibiotic de-escalation to target atypical bacterial infection.    COVID PRE-OP / PRE-PROCEDURE SCREENING ORDER (NO ISOLATION) - Swab, Nasopharynx [[id number]]  (Normal) Collected: 10/08/24 2126    Lab Status: Final result Specimen: Swab from Nasopharynx Updated: 10/08/24 2220    Narrative:      The following orders were created for panel order COVID PRE-OP / PRE-PROCEDURE SCREENING ORDER (NO ISOLATION) - Swab, Nasopharynx.  Procedure                               Abnormality         Status                     ---------                               -----------          ------                     COVID-19, FLU A/B, RSV P...[[id number]]  Normal              Final result                 Please view results for these tests on the individual orders.    COVID-19, FLU A/B, RSV PCR 1 HR TAT - Swab, Nasopharynx [[id number]]  (Normal) Collected: 10/08/24 2126    Lab Status: Final result Specimen: Swab from Nasopharynx Updated: 10/08/24 2220     COVID19 Not Detected     Influenza A PCR Not Detected     Influenza B PCR Not Detected     RSV, PCR Not Detected    Narrative:      Fact sheet for providers: https://www.fda.gov/media/869642/download    Fact sheet for patients: https://www.fda.gov/media/856096/download    Test performed by PCR.            MRI Tibia Fibula Right Without Contrast    Result Date: 10/11/2024  MRI TIBIA FIBULA RIGHT WO CONTRAST Date of Exam: 10/11/2024 4:39 AM EDT Indication: Right lower extremity ulcer.  Comparison: 9/15/2024. Technique:  Routine multiplanar/multisequence images of the right tibia and fibula were obtained without contrast administration.  Findings: Osseous structures appear intact. No suspicious osseous edema identified. No evidence of fracture. No evidence of periostitis. Musculature appears unremarkable. No evidence of myofascial defect identified. No drainable collection identified. Limited evaluation due to lack of intravenous contrast. Edema is seen tracking along the subcutaneous tissues as well as the superficial fascia distally which may be related to third spacing of fluid. No joint effusion identified. Vascular flow voids appear grossly unremarkable.     Impression: Impression: No evidence of osteomyelitis. No drainable collection identified. No joint effusion. Electronically Signed: Norma Ahn MD  10/11/2024 4:53 AM EDT  Workstation ID: [id number]    MRI Foot Right With & Without Contrast    Result Date: 10/9/2024  MRI FOOT RIGHT W WO CONTRAST Date of Exam: 10/9/2024 5:10 PM EDT Indication: osteomyelitis.  Comparison: 9/15/2024. Technique:   Routine multiplanar/multisequence sequence images of the right foot were obtained before and after the uneventful administration of 20 Multihance.  Findings: Soft tissue swelling is seen along the dorsum of the foot with mild bullous changes present. No suspicious osseous edema identified. No abnormal enhancement present. Diffuse soft tissue swelling is present within the second digit with diffuse skin thickening. No suspicious osseous edema identified. No destructive changes identified. No evidence of significant tenosynovitis. Mild muscular edema is present likely related to denervation. No significant joint effusion identified. Mild to moderate osteoarthritic changes are present within the interphalangeal joints of the and the first MTP joint. No erosions identified. No evidence of periostitis. Enhancement is seen within the subcutaneous tissues of the second digit. No suspicious osseous enhancement identified. No drainable collections identified. No significant enhancement identified within the dorsum of the foot.     Impression: Impression: Cellulitis of the second digit with no evidence of osteomyelitis. No drainable collections identified. No suspicious osseous edema or enhancement identified. Soft tissue swelling is seen along the dorsum of the foot with no significant enhancement like related to third spacing of fluid. Electronically Signed: Norma Anh MD  10/9/2024 11:04 PM EDT  Workstation ID: [id number]    Duplex Lower Extremity Art / Grafts - Right CAR    Result Date: 10/9/2024    No evidence of focal hemodynamically significant stenosis.   Monophasic flow starting at the level of the distal femoral artery and continuing into the calf vessels.   JOHNNA was not able to be obtained secondary to wound dressing and patient tolerance.     Duplex Venous Lower Extremity - Bilateral CAR    Result Date: 10/9/2024    Chronic right lower extremity deep vein thrombosis noted in the peroneal.   Chronic left lower  "extremity superficial thrombophlebitis noted in the great saphenous (below knee).   All other veins appeared normal bilaterally.      Results for orders placed during the hospital encounter of 01/12/21    Adult Transthoracic Echo Complete W/ Cont if Necessary Per Protocol    Interpretation Summary  · The right ventricle and right atrium are moderately dilated. Other chamber sizes and wall thicknesses are normal.  · Global and segmental LV wall motion is normal. The estimated left ventricular ejection fraction is 51% - 55%.  · The aortic and mitral valves are normal in structure and function.  · The estimated RV systolic pressure is mildly elevated 35 mmHg - 40 mmHg. There is as well noted to be less than 50% inspiratory IVC collapse. The main pulmonary artery is \"borderline dilated\".  · There are no other important findings on this study.      Current medications:  Scheduled Meds:carbidopa-levodopa, 2 tablet, Oral, 4x Daily  carbidopa-levodopa ER, 1 tablet, Oral, BID  DAPTOmycin, 4 mg/kg (Adjusted), Intravenous, Q24H  fluticasone, 2 spray, Each Nare, Daily  furosemide, 20 mg, Oral, Daily  gabapentin, 100 mg, Oral, Q12H  guaiFENesin, 600 mg, Oral, Q12H  heparin (porcine), 5,000 Units, Subcutaneous, Q8H  levothyroxine, 88 mcg, Oral, Q AM  pantoprazole, 40 mg, Oral, Daily  piperacillin-tazobactam, 3.375 g, Intravenous, Q8H  pramipexole, 1 mg, Oral, TID  QUEtiapine, 12.5 mg, Oral, Nightly  sodium chloride, 10 mL, Intravenous, Q12H  tamsulosin, 0.4 mg, Oral, Daily      Continuous Infusions:   PRN Meds:.  senna-docusate sodium **AND** polyethylene glycol **AND** bisacodyl **AND** bisacodyl    HYDROmorphone    Magnesium Standard Dose Replacement - Follow Nurse / BPA Driven Protocol    Potassium Replacement - Follow Nurse / BPA Driven Protocol    sodium chloride    sodium chloride    Assessment & Plan   Assessment & Plan     Active Hospital Problems    Diagnosis  POA    **Cellulitis [L03.90]  Yes    History of DVT (deep " vein thrombosis) [Z86.718]  Not Applicable    Anemia, chronic disease [D63.8]  Yes    Elevated TSH [R79.89]  Yes    GERD [K21.9]  Yes    Chronic cough [R05.3]  Yes    Hypothyroidism (acquired) [E03.9]  Yes    Chronic edema [R60.9]  Yes    Parkinson's disease [G20.A1]  Yes    Anxiety [F41.9]  Yes    Hyperlipidemia [E78.5]  Yes    Depression [F32.A]  Yes      Resolved Hospital Problems   No resolved problems to display.        Brief Hospital Course to date:  Cordell Martin is a 79 y.o. male with history of Parkinson's, history of DVT, HL, hypothyroidism, GERD, lymphedema, anxiety/depression, with wheelchair use/walker use at [facility] here with cellulitis, B LE wounds     B LE wounds  Edema  Cellulitis, maggots noted, R second toe markedly abnormal, wound between first/second toe with drainage  -duplex with chronic DVT noted  - MRI foot and tib/fib with no evidence of osteo   - Wound culture with pseudomonas   - Evaluated by ortho -- conservative management and monitoring  - ID consulted - dapto/zosyn   -ABIs  -wound care    Cough  Possible LLL PNA on CXR, not as impressive on CT  -pulmonary toilet  -respiratory PCR negative  - speech consult      Chronic anemia  -monitor     Parkinson's disease  Anxiety/depression  -continue sinemet, seroquel, mirapex     Hypothyroidism  -on synthorid, with elevated TSH  -follow up outpatient     HL  -statin     GERD  -PPI     Expected Discharge Location and Transportation: rehab  Expected Discharge   Expected Discharge Date: 10/11/2024; Expected Discharge Time:      VTE Prophylaxis:  Pharmacologic VTE prophylaxis orders are present.         AM-PAC 6 Clicks Score (PT): 14 (10/10/24 2040)    CODE STATUS:   Code Status and Medical Interventions: No CPR (Do Not Attempt to Resuscitate); Limited Support; No intubation (DNI); DNR/DNI   Ordered at: 10/09/24 0206     Medical Intervention Limits:    No intubation (DNI)     Level Of Support Discussed With:    Patient     Code Status (Patient  has no pulse and is not breathing):    No CPR (Do Not Attempt to Resuscitate)     Medical Interventions (Patient has pulse or is breathing):    Limited Support     Comments:    DNR/DNI       Angely Mukherjee DO  10/11/24

## 2024-10-11 NOTE — CASE MANAGEMENT/SOCIAL WORK
Continued Stay Note  [facility]     Patient Name: Cordell Martin  MRN: [id number]  Today's Date: 10/11/2024    Admit Date: 10/8/2024    Plan: discharge plan   Discharge Plan       Row Name 10/11/24 1504       Plan    Plan discharge plan    Plan Comments I spoke with pt regarding discharge plan and PT's recommendations for inpatient rehab and pt is agreeable. Per pt request, referrals made to [facility], [facility] and [facility]. ID and ortho following. ID's final recs pending. CM will follow up Monday    Final Discharge Disposition Code 03 - skilled nursing facility (SNF)                   Discharge Codes    No documentation.                 Expected Discharge Date and Time       Expected Discharge Date Expected Discharge Time    Oct 11, 2024               Kenisha Tom RN

## 2024-10-11 NOTE — THERAPY EVALUATION
Acute Care - Speech Language Pathology   Swallow Initial Evaluation [facility]  Clinical Swallow Evaluation     Patient Name: Cordell Martin  : 1945  MRN: [id number]  Today's Date: 10/11/2024               Admit Date: 10/8/2024    Visit Dx:     ICD-10-CM ICD-9-CM   1. Cellulitis of lower extremity, unspecified laterality  L03.119 682.6   2. Lymphedema  I89.0 457.1   3. Pain in both lower extremities  M79.604 729.5    M79.605    4. Chronic edema  R60.9 782.3   5. Parkinson's disease, unspecified whether dyskinesia present, unspecified whether manifestations fluctuate  G20.A1 332.0   6. Dysphagia, unspecified type  R13.10 787.20     Patient Active Problem List   Diagnosis    Anxiety    Arthritis    Depression    Hyperlipidemia    Lumbar stenosis with neurogenic claudication    Parkinson's disease    Lumbar stenosis with neurogenic claudication status post L4-5 decompression    Status post lumbar laminectomy    Memory loss    Chronic edema    Cellulitis of right lower extremity    Exertional dyspnea    Right knee pain    Cellulitis of lower extremity    Hypothyroidism (acquired)    Elevated serum creatinine    Cellulitis of right leg    Acute deep vein thrombosis (DVT) of right lower extremity    Pneumonia    Pulmonary nodule (4mm RML incidental)    Chronic cough    GERD    Remote smoker (Stopped )    Restrictive pattern on PFTs w/o evidence of ILD    Cellulitis    History of DVT (deep vein thrombosis)    Anemia, chronic disease    Elevated TSH     Past Medical History:   Diagnosis Date    Anxiety     Arthritis     Back pain     Bronchitis     Cognitive impairment     Depression     Disease of thyroid gland     Glucose intolerance (impaired glucose tolerance)     Hyperlipidemia     Kidney stone     Obesity     Parkinson disease     Pneumonia     Prostate disorder     Thyroid disease     Wears eyeglasses      Past Surgical History:   Procedure Laterality Date    COLONOSCOPY      5 years ago    EYE  SURGERY      HERNIA REPAIR  10/20/2020    KNEE SURGERY      LUMBAR LAMINECTOMY DISCECTOMY DECOMPRESSION N/A 07/13/2017    Procedure: LUMBAR LAMINECTOMY L4-5;  Surgeon: Antonio Trent MD;  Location: On license of [facility];  Service:     SHOULDER ROTATOR CUFF REPAIR Right     x2    TONSILLECTOMY      VEIN SURGERY         SLP Recommendation and Plan  SLP Swallowing Diagnosis: R/O pharyngeal dysphagia (10/11/24 1545)  SLP Diet Recommendation: regular textures, thin liquids, other (see comments) (as tolerated--consider NPO if concerns arise prior to FEES. Pt requested finger food.) (10/11/24 1545)  Recommended Precautions and Strategies: upright posture during/after eating, general aspiration precautions, reflux precautions (10/11/24 1545)  SLP Rec. for Method of Medication Administration: as tolerated (10/11/24 1545)     Monitor for Signs of Aspiration: yes, notify SLP if any concerns (10/11/24 1545)  Recommended Diagnostics: FEES (10/11/24 1545)  Swallow Criteria for Skilled Therapeutic Interventions Met: demonstrates skilled criteria (10/11/24 1545)  Anticipated Discharge Disposition (SLP): inpatient rehabilitation facility (10/11/24 1545)  Rehab Potential/Prognosis, Swallowing: good, to achieve stated therapy goals (10/11/24 1545)     Predicted Duration Therapy Intervention (Days): 1 week (10/11/24 1545)  Oral Care Recommendations: Oral Care BID/PRN, Toothbrush (10/11/24 1545)  Demonstrates Need for Referral to Another Service: occupational therapy (pt reported he uses adaptive utensils @ home) (10/11/24 1545)                                     Plan of Care Reviewed With: patient      SWALLOW EVALUATION (Last 72 Hours)       SLP Adult Swallow Evaluation       Row Name 10/11/24 1545                   Rehab Evaluation    Document Type evaluation  -AC        Subjective Information no complaints  -AC        Patient Observations alert;cooperative  -AC        Patient/Family/Caregiver Comments/Observations No family present.  -AC         Patient Effort excellent  -AC           General Information    Patient Profile Reviewed yes  -AC        Pertinent History Of Current Problem Cellulitis B LE. Consulted 2' concern for LLL pna. RN reported pt w/ chronic cough & says sometimes worse after eats. Hx PD, GERD, memory loss, pna. Adm 9/'23 w/ pna. CSE at that time revealed chronic cough, reported hx esophageal dilations, MBS during prior adm was WFL.  -AC        Current Method of Nutrition regular textures;thin liquids  -AC        Precautions/Limitations, Vision WFL;for purposes of eval  -AC        Precautions/Limitations, Hearing WFL;for purposes of eval  -AC        Prior Level of Function-Swallowing esophageal concerns  difficulty self-feeding--tends to try finger foods; chronic difficulty w/ dry noncohesive textures  -AC        Plans/Goals Discussed with patient;agreed upon  -AC        Barriers to Rehab medically complex  -AC        Patient's Goals for Discharge patient did not state  -AC           Pain    Additional Documentation Pain Scale: FACES Pre/Post-Treatment (Group)  -AC           Pain Scale: FACES Pre/Post-Treatment    Pain: FACES Scale, Pretreatment 2-->hurts little bit  -AC        Posttreatment Pain Rating 2-->hurts little bit  -AC        Pain Location - Side/Orientation Bilateral  -AC        Pain Location lower  -AC        Pain Location - extremity  -AC        Pre/Posttreatment Pain Comment Reported ongoing pain  -AC           Oral Motor Structure and Function    Dentition Assessment missing teeth  molars  -AC        Secretion Management other (see comments)  excess oral secretions  -AC        Mucosal Quality dry  -AC        Volitional Cough WFL  -AC           Oral Musculature and Cranial Nerve Assessment    Oral Motor General Assessment generalized oral motor weakness  -AC           General Eating/Swallowing Observations    Respiratory Support Currently in Use room air  -AC        Eating/Swallowing Skills fed by SLP;self-fed;needed  assist;other (see comments)  2' tremor  -        Positioning During Eating upright 90 degree;upright in bed  -        Utensils Used spoon;cup;straw  typically uses a straw  -        Consistencies Trialed thin liquids;pureed;regular textures  -           Clinical Swallow Eval    Oral Prep Phase WFL  -AC        Oral Transit WFL  -AC        Oral Residue WFL  -AC        Pharyngeal Phase suspected pharyngeal impairment  -        Esophageal Phase suspected esophageal impairment  -           Pharyngeal Phase Concerns    Pharyngeal Phase Concerns multiple swallows  -        Multiple Swallows thin;other (see comments)  intermittent  -AC        Pharyngeal Phase Concerns, Comment Baseline coughing. Seemingly delayed swallow initiation/incoordination per laryngeal palpation. Trialed 3oz H2O test--cough x1 w/i 1 minute, but difficult to determine if r/t swallow vs. baseline chronic cough. Discussed risks, options w/ pt. He would like to proceed w/ repeat instrumental swallow study to further assess swallowing while admitted.  -           Esophageal Phase Concerns    Esophageal Phase Concerns belching  -        Belching other (see comments)  @ end of eval  -           Swallowing Quality of Life Assessment    Education and counseling provided Signs of aspiration;Silent aspiration;Risks of aspiration;Aspiration precautions  -           SLP Evaluation Clinical Impression    SLP Swallowing Diagnosis R/O pharyngeal dysphagia  -        Functional Impact risk of aspiration/pneumonia  -        Rehab Potential/Prognosis, Swallowing good, to achieve stated therapy goals  -        Swallow Criteria for Skilled Therapeutic Interventions Met demonstrates skilled criteria  -           Recommendations    Predicted Duration Therapy Intervention (Days) 1 week  -        SLP Diet Recommendation regular textures;thin liquids;other (see comments)  as tolerated--consider NPO if concerns arise prior to FEES. Pt requested  finger food.  -        Recommended Diagnostics FEES  -        Recommended Precautions and Strategies upright posture during/after eating;general aspiration precautions;reflux precautions  -        Oral Care Recommendations Oral Care BID/PRN;Toothbrush  -        SLP Rec. for Method of Medication Administration as tolerated  -        Monitor for Signs of Aspiration yes;notify SLP if any concerns  -        Anticipated Discharge Disposition (SLP) inpatient rehabilitation facility  -        Demonstrates Need for Referral to Another Service occupational therapy  pt reported he uses adaptive utensils @ home  -                  User Key  (r) = Recorded By, (t) = Taken By, (c) = Cosigned By      Initials Name Effective Dates     Mandi Ross MS CCC-SLP 02/03/23 -                     EDUCATION  The patient has been educated in the following areas:   Dysphagia (Swallowing Impairment).                Time Calculation:    Time Calculation- SLP       Row Name 10/11/24 1626             Time Calculation- [facility]    SLP Start Time 1545  -      SLP Received On 10/11/24  -         Untimed Charges    59834-YD Eval Oral Pharyng Swallow Minutes 65  -         Total Minutes    Untimed Charges Total Minutes 65  -AC       Total Minutes 65  -AC                User Key  (r) = Recorded By, (t) = Taken By, (c) = Cosigned By      Initials Name Provider Type     Mandi Ross MS CCC-SLP Speech and Language Pathologist                    Therapy Charges for Today       Code Description Service Date Service Provider Modifiers Qty    10500623791 HC ST EVAL ORAL PHARYNG SWALLOW 4 10/11/2024 Mandi Ross MS CCC-SLP GN 1                 Mandi Ross MS CCC-SLP  10/11/2024

## 2024-10-11 NOTE — PROGRESS NOTES
"Cordell Martin       LOS: 2 days   Patient Care Team:  Ben Holder DO as PCP - General (Internal Medicine)  Anthony Masters MD (Inactive) as Consulting Physician (General Surgery)  Nirav Connor MD as Surgeon (Vascular Surgery)  Miguel Banda MD as Consulting Physician (Otolaryngology)    Chief Complaint: Right lower extremity cellulitis    Subjective     Interval History:     Resting comfortably in bed this morning.  Pain tolerable, no acute events overnight.  Drowsy.    Review of Systems:      Gen- No fevers, chills  CV- No chest pain, palpitations  Resp- No cough, dyspnea  GI- No N/V/D, abd pain    Objective     Vital Signs  Vital Signs (last 24 hours)         10/09 0700  10/10 0659 10/10 0700  10/10 0839   Most Recent      Temp (°F) 96.2 -  97.7       97.2 (36.2) 10/09 2050    Heart Rate 76 -  83       79 10/10 0430    Resp 14 -  20       20 10/10 0430    BP 94/53 -  110/59       110/59 10/10 0430    SpO2 (%) 90 -  94       90 10/10 0430              Physical Exam:     No acute distress.  Nonlabored respirations.  Regular rate and rhythm.  Abdomen nondistended.  Right lower extremity: Dressing present of the right leg is clean, dry, intact.  Right second toe with persistent erythema, dorsal 2nd toe base with scant amount of serous drainage.     Results Review:     I reviewed the patient's new clinical results.    Medication Review:   Hospital Medications (active)         Dose Frequency Start End    bisacodyl (DULCOLAX) EC tablet 5 mg 5 mg Daily PRN 10/9/2024 --    Admin Instructions: Use if no bowel movement after 12 hours.  Swallow whole. Do not crush, split, or chew tablet.    Route: Oral    Linked Group 1: Placed in \"And\" Linked Group        bisacodyl (DULCOLAX) suppository 10 mg 10 mg Daily PRN 10/9/2024 --    Admin Instructions: Use if no bowel movement after 12 hours.  Hold for diarrhea    Route: Rectal    Linked Group 1: Placed in \"And\" Linked Group        carbidopa-levodopa (SINEMET) "  MG per tablet 2 tablet 2 tablet 4 Times Daily 10/9/2024 --    Admin Instructions: May take with food if GI upset occurs.    Route: Oral    carbidopa-levodopa ER (SINEMET CR)  MG per tablet 1 tablet 1 tablet 2 Times Daily 10/9/2024 --    Admin Instructions: Do not crush or chew the capsules or tablets. The drug may not work as designed if the capsule or tablet is crushed or chewed. Swallow whole.  Swallow whole.  Do not crush or chew.  May take with food if GI upset occurs.    Route: Oral    DAPTOmycin (CUBICIN) 350 mg in sodium chloride 0.9 % 50 mL IVPB 4 mg/kg × 93 kg (Adjusted) Every 24 Hours 10/10/2024 10/23/2024    Admin Instructions: Caution: Look alike/sound alike drug alert.  Refrigerate. Do not shake.    Route: Intravenous    fluticasone (FLONASE) 50 MCG/ACT nasal spray 2 spray 2 spray Daily 10/9/2024 --    Route: Each Nare    furosemide (LASIX) tablet 20 mg 20 mg Daily 10/9/2024 --    Admin Instructions: Hold for SBP less than 100, DBP less than 60.    Route: Oral    gabapentin (NEURONTIN) capsule 100 mg 100 mg Every 12 Hours Scheduled 10/9/2024 --    Admin Instructions:     Route: Oral    guaiFENesin (MUCINEX) 12 hr tablet 600 mg 600 mg Every 12 Hours Scheduled 10/9/2024 --    Admin Instructions: Caution: Look alike/sound alike drug alert               Do not crush, split, or chew.    Route: Oral    heparin (porcine) 5000 UNIT/ML injection 5,000 Units 5,000 Units Every 8 Hours Scheduled 10/9/2024 --    Route: Subcutaneous    HYDROcodone-acetaminophen (NORCO) 5-325 MG per tablet 1 tablet 1 tablet Every 6 Hours PRN 10/9/2024 10/11/2024    Admin Instructions: Based on patient request - if ordered for moderate or severe pain, provider allows for administration of a medication prescribed for a lower pain scale.  [KAYLYN]    Do not exceed 4 grams of acetaminophen in a 24 hr period. Max dose of 2gm for AST/ALT greater than 120 units/L        If given for pain, use the following pain scale:   Mild Pain =  "Pain Score of 1-3, CPOT 1-2  Moderate Pain = Pain Score of 4-6, CPOT 3-4  Severe Pain = Pain Score of 7-10, CPOT 5-8    Route: Oral    HYDROmorphone (DILAUDID) injection 0.5 mg 0.5 mg Every 2 Hours PRN 10/9/2024 10/14/2024    Admin Instructions: Based on patient request - if ordered for moderate or severe pain, provider allows for administration of a medication prescribed for a lower pain scale.  If given for pain, use the following pain scale:  Mild Pain = Pain Score of 1-3, CPOT 1-2  Moderate Pain = Pain Score of 4-6, CPOT 3-4  Severe Pain = Pain Score of 7-10, CPOT 5-8    Route: Intravenous    levothyroxine (SYNTHROID, LEVOTHROID) tablet 88 mcg 88 mcg Every Early Morning 10/9/2024 --    Admin Instructions: Take on empty stomach.    Route: Oral    Magnesium Standard Dose Replacement - Follow Nurse / BPA Driven Protocol  As Needed 10/9/2024 --    Admin Instructions: Open Order & Select \"BHS Electrolyte Replacement Protocol Algorithm\" to View Details    Route: Does not apply    pantoprazole (PROTONIX) EC tablet 40 mg 40 mg Daily 10/9/2024 --    Admin Instructions: Do not crush or chew the capsules or tablets. The drug may not work as designed if the capsule or tablet is crushed or chewed. Swallow whole.  Swallow whole; do not crush, split, or chew.    Route: Oral    piperacillin-tazobactam (ZOSYN) 3.375 g IVPB in 100 mL NS MBP (CD) 3.375 g Every 8 Hours 10/9/2024 10/13/2024    Route: Intravenous    polyethylene glycol (MIRALAX) packet 17 g 17 g Daily PRN 10/9/2024 --    Admin Instructions: Use if no bowel movement after 12 hours. Mix in 6-8 ounces of water.  Use 4-8 ounces of water, tea, or juice for each 17 gram dose.    Route: Oral    Linked Group 1: Placed in \"And\" Linked Group        Potassium Replacement - Follow Nurse / BPA Driven Protocol  As Needed 10/9/2024 --    Admin Instructions: Open Order & Select \"BHS Electrolyte Replacement Protocol Algorithm\" to View Details    Route: Does not apply    pramipexole " "(MIRAPEX) tablet 1 mg 1 mg 3 Times Daily 10/9/2024 --    Route: Oral    QUEtiapine (SEROquel) tablet 12.5 mg 12.5 mg Nightly 10/9/2024 --    Admin Instructions: Caution: Look alike/sound alike drug alert    Route: Oral    sennosides-docusate (PERICOLACE) 8.6-50 MG per tablet 2 tablet 2 tablet 2 Times Daily PRN 10/9/2024 --    Admin Instructions: Start bowel management regimen if patient has not had a bowel movement after 12 hours.    Route: Oral    Linked Group 1: Placed in \"And\" Linked Group        sodium chloride 0.9 % flush 10 mL 10 mL As Needed 10/8/2024 --    Route: Intravenous    Cosign for Ordering: Accepted by Yuri Kessler MD on 10/9/2024 12:55 AM    sodium chloride 0.9 % flush 10 mL 10 mL Every 12 Hours Scheduled 10/9/2024 --    Route: Intravenous    sodium chloride 0.9 % flush 10 mL 10 mL As Needed 10/9/2024 --    Route: Intravenous    sodium chloride 7 % nebulizer solution nebulizer solution 4 mL 4 mL Daily - RT 10/9/2024 10/12/2024    Admin Instructions: Include Respiratory Treatment Education  CAUTION:  Hypertonic    Route: Nebulization    tamsulosin (FLOMAX) 24 hr capsule 0.4 mg 0.4 mg Daily 10/9/2024 --    Admin Instructions: Do not crush or chew the capsules or tablets. The drug may not work as designed if the capsule or tablet is crushed or chewed. Swallow whole. If patient unable to swallow whole, contact pharmacy for alternative.    Route: Oral              Assessment & Plan     79-year-old male with right lower cellulitis / thrombophlebitis with superficial ulceration 2nd toe, distal shins.      Cellulitis    Anxiety    Depression    Hyperlipidemia    Parkinson's disease    Chronic edema    Hypothyroidism (acquired)    Chronic cough    GERD    History of DVT (deep vein thrombosis)    Anemia, chronic disease    Elevated TSH      MRI tib/fib without abscess, osteomyelitis.    Toe has superficial proximal ulcer.  MRI foot without abscess / osteomyelitis.    Plan for continued expectant " observation with empiric antibiotics, local wound care per PT wound care.    Consider vascular consultation.    Rajendra Collins Jr, MD  10/11/24  07:36 EDT

## 2024-10-11 NOTE — PLAN OF CARE
Goal Outcome Evaluation:  Plan of Care Reviewed With: patient                  Anticipated Discharge Disposition (SLP): inpatient rehabilitation facility          SLP Swallowing Diagnosis: R/O pharyngeal dysphagia (10/11/24 4578)

## 2024-10-12 ENCOUNTER — APPOINTMENT (OUTPATIENT)
Dept: CARDIOLOGY | Facility: HOSPITAL | Age: 79
DRG: 602 | End: 2024-10-12
Payer: MEDICARE

## 2024-10-12 ENCOUNTER — ANCILLARY PROCEDURE (OUTPATIENT)
Dept: SPEECH THERAPY | Facility: HOSPITAL | Age: 79
DRG: 602 | End: 2024-10-12
Payer: MEDICARE

## 2024-10-12 LAB
ANION GAP SERPL CALCULATED.3IONS-SCNC: 10 MMOL/L (ref 5–15)
BH CV LOWER ARTERIAL LEFT ABI RATIO: 1.4
BH CV LOWER ARTERIAL LEFT DORSALIS PEDIS SYS MAX: 157
BH CV LOWER ARTERIAL RIGHT ABI RATIO: 1.46
BH CV LOWER ARTERIAL RIGHT DORSALIS PEDIS SYS MAX: 137
BH CV LOWER ARTERIAL RIGHT POST TIBIAL SYS MAX: 170
BUN SERPL-MCNC: 18 MG/DL (ref 8–23)
BUN/CREAT SERPL: 14.3 (ref 7–25)
CALCIUM SPEC-SCNC: 8.6 MG/DL (ref 8.6–10.5)
CHLORIDE SERPL-SCNC: 105 MMOL/L (ref 98–107)
CO2 SERPL-SCNC: 23 MMOL/L (ref 22–29)
CREAT SERPL-MCNC: 1.26 MG/DL (ref 0.76–1.27)
DEPRECATED RDW RBC AUTO: 52.6 FL (ref 37–54)
EGFRCR SERPLBLD CKD-EPI 2021: 58.7 ML/MIN/1.73
ERYTHROCYTE [DISTWIDTH] IN BLOOD BY AUTOMATED COUNT: 15.5 % (ref 12.3–15.4)
GLUCOSE SERPL-MCNC: 143 MG/DL (ref 65–99)
HCT VFR BLD AUTO: 34.8 % (ref 37.5–51)
HGB BLD-MCNC: 10.9 G/DL (ref 13–17.7)
MCH RBC QN AUTO: 29.2 PG (ref 26.6–33)
MCHC RBC AUTO-ENTMCNC: 31.3 G/DL (ref 31.5–35.7)
MCV RBC AUTO: 93.3 FL (ref 79–97)
PLATELET # BLD AUTO: 253 10*3/MM3 (ref 140–450)
PMV BLD AUTO: 8.5 FL (ref 6–12)
POTASSIUM SERPL-SCNC: 4 MMOL/L (ref 3.5–5.2)
RBC # BLD AUTO: 3.73 10*6/MM3 (ref 4.14–5.8)
SODIUM SERPL-SCNC: 138 MMOL/L (ref 136–145)
UPPER ARTERIAL LEFT ARM BRACHIAL SYS MAX: 112
UPPER ARTERIAL RIGHT ARM BRACHIAL SYS MAX: 116
WBC NRBC COR # BLD AUTO: 4.75 10*3/MM3 (ref 3.4–10.8)

## 2024-10-12 PROCEDURE — 25010000002 PIPERACILLIN SOD-TAZOBACTAM PER 1 G: Performed by: INTERNAL MEDICINE

## 2024-10-12 PROCEDURE — 85027 COMPLETE CBC AUTOMATED: CPT | Performed by: INTERNAL MEDICINE

## 2024-10-12 PROCEDURE — 93923 UPR/LXTR ART STDY 3+ LVLS: CPT

## 2024-10-12 PROCEDURE — 99232 SBSQ HOSP IP/OBS MODERATE 35: CPT | Performed by: INTERNAL MEDICINE

## 2024-10-12 PROCEDURE — 80048 BASIC METABOLIC PNL TOTAL CA: CPT | Performed by: INTERNAL MEDICINE

## 2024-10-12 PROCEDURE — 93923 UPR/LXTR ART STDY 3+ LVLS: CPT | Performed by: INTERNAL MEDICINE

## 2024-10-12 PROCEDURE — 25010000002 PIPERACILLIN SOD-TAZOBACTAM PER 1 G: Performed by: HOSPITALIST

## 2024-10-12 PROCEDURE — 25010000002 HEPARIN (PORCINE) PER 1000 UNITS: Performed by: HOSPITALIST

## 2024-10-12 PROCEDURE — 92612 ENDOSCOPY SWALLOW (FEES) VID: CPT

## 2024-10-12 RX ADMIN — GABAPENTIN 100 MG: 100 CAPSULE ORAL at 08:39

## 2024-10-12 RX ADMIN — CARBIDOPA AND LEVODOPA 2 TABLET: 25; 100 TABLET ORAL at 21:40

## 2024-10-12 RX ADMIN — CARBIDOPA AND LEVODOPA 2 TABLET: 25; 100 TABLET ORAL at 13:07

## 2024-10-12 RX ADMIN — FUROSEMIDE 20 MG: 20 TABLET ORAL at 08:39

## 2024-10-12 RX ADMIN — PRAMIPEXOLE DIHYDROCHLORIDE 1 MG: 1 TABLET ORAL at 21:40

## 2024-10-12 RX ADMIN — PIPERACILLIN AND TAZOBACTAM 3.38 G: 3; .375 INJECTION, POWDER, LYOPHILIZED, FOR SOLUTION INTRAVENOUS at 15:18

## 2024-10-12 RX ADMIN — HEPARIN SODIUM 5000 UNITS: 5000 INJECTION INTRAVENOUS; SUBCUTANEOUS at 06:04

## 2024-10-12 RX ADMIN — GUAIFENESIN 600 MG: 600 TABLET, EXTENDED RELEASE ORAL at 21:40

## 2024-10-12 RX ADMIN — CARBIDOPA AND LEVODOPA 1 TABLET: 25; 100 TABLET, EXTENDED RELEASE ORAL at 08:40

## 2024-10-12 RX ADMIN — GABAPENTIN 100 MG: 100 CAPSULE ORAL at 21:40

## 2024-10-12 RX ADMIN — GUAIFENESIN 600 MG: 600 TABLET, EXTENDED RELEASE ORAL at 08:40

## 2024-10-12 RX ADMIN — CARBIDOPA AND LEVODOPA 2 TABLET: 25; 100 TABLET ORAL at 08:39

## 2024-10-12 RX ADMIN — CARBIDOPA AND LEVODOPA 2 TABLET: 25; 100 TABLET ORAL at 18:11

## 2024-10-12 RX ADMIN — PRAMIPEXOLE DIHYDROCHLORIDE 1 MG: 1 TABLET ORAL at 08:39

## 2024-10-12 RX ADMIN — TAMSULOSIN HYDROCHLORIDE 0.4 MG: 0.4 CAPSULE ORAL at 08:40

## 2024-10-12 RX ADMIN — HEPARIN SODIUM 5000 UNITS: 5000 INJECTION INTRAVENOUS; SUBCUTANEOUS at 21:40

## 2024-10-12 RX ADMIN — PIPERACILLIN AND TAZOBACTAM 3.38 G: 3; .375 INJECTION, POWDER, LYOPHILIZED, FOR SOLUTION INTRAVENOUS at 21:40

## 2024-10-12 RX ADMIN — CARBIDOPA AND LEVODOPA 1 TABLET: 25; 100 TABLET, EXTENDED RELEASE ORAL at 21:40

## 2024-10-12 RX ADMIN — PANTOPRAZOLE SODIUM 40 MG: 40 TABLET, DELAYED RELEASE ORAL at 13:07

## 2024-10-12 RX ADMIN — QUETIAPINE FUMARATE 12.5 MG: 25 TABLET ORAL at 21:40

## 2024-10-12 RX ADMIN — HEPARIN SODIUM 5000 UNITS: 5000 INJECTION INTRAVENOUS; SUBCUTANEOUS at 13:07

## 2024-10-12 RX ADMIN — PRAMIPEXOLE DIHYDROCHLORIDE 1 MG: 1 TABLET ORAL at 15:18

## 2024-10-12 RX ADMIN — FLUTICASONE PROPIONATE 2 SPRAY: 50 SPRAY, METERED NASAL at 08:40

## 2024-10-12 RX ADMIN — PIPERACILLIN AND TAZOBACTAM 3.38 G: 3; .375 INJECTION, POWDER, LYOPHILIZED, FOR SOLUTION INTRAVENOUS at 06:04

## 2024-10-12 RX ADMIN — LEVOTHYROXINE SODIUM 88 MCG: 0.09 TABLET ORAL at 06:05

## 2024-10-12 RX ADMIN — Medication 10 ML: at 08:40

## 2024-10-12 NOTE — PROGRESS NOTES
[facility] Medicine Services  PROGRESS NOTE    Patient Name: Cordell Martin  : 1945  MRN: [id number]    Date of Admission: 10/8/2024  Primary Care Physician: Ben Holder DO    Subjective   Subjective     CC:  Cellulitis     HPI:  No acute events. Still with some leg pain but thinks it may be better. Reviewed current finding and plans       Objective   Objective     Vital Signs:   Temp:  [96 °F (35.6 °C)-97.6 °F (36.4 °C)] 96.1 °F (35.6 °C)  Heart Rate:  [65-73] 65  Resp:  [16-18] 16  BP: (102-124)/(59-67) 114/59     Physical Exam:  Constitutional: No acute distress, awake, alert  Respiratory: Clear to auscultation bilaterally, respiratory effort normal   Cardiovascular: RRR, no murmurs  Gastrointestinal: Positive bowel sounds, soft, nontender, nondistended  Musculoskeletal: +2 bilateral ankle edema; lower ext wounds  Psychiatric: flat  Neurologic: Oriented x 3, strength symmetric in all extremities, Cranial Nerves grossly intact to confrontation, speech clear      Results Reviewed:  LAB RESULTS:      Lab 10/12/24  0358 10/11/24  0641 10/10/24  0416 10/09/24  0404 10/08/24  1936   WBC 4.75 5.79 6.75 8.21 7.37   HEMOGLOBIN 10.9* 10.9* 10.5* 10.8* 10.4*   HEMATOCRIT 34.8* 34.9* 32.7* 34.7*  31.3* 33.4*   PLATELETS 253 254 241 244 250   NEUTROS ABS  --   --   --  5.82 4.88   IMMATURE GRANS (ABS)  --   --   --  0.03 0.04   LYMPHS ABS  --   --   --  1.31 1.45   MONOS ABS  --   --   --  0.64 0.66   EOS ABS  --   --   --  0.38 0.31   MCV 93.3 94.3 92.4 94.3 94.1   SED RATE  --   --   --   --  92*   CRP  --   --   --   --  4.81*   PROCALCITONIN  --   --   --   --  0.10   LACTATE  --   --   --   --  1.6   D DIMER QUANT  --   --   --   --  0.90*         Lab 10/12/24  0358 10/11/24  0641 10/10/24  0416 10/09/24  1529 10/09/24  0404 10/08/24  2226 10/08/24  1936   SODIUM 138 138 140  --  142  --  138   POTASSIUM 4.0 4.4 4.4 4.3 3.4*  --  4.2   CHLORIDE 105 104 106  --  107  --   106   CO2 23.0 27.0 24.0  --  25.0  --  24.0   ANION GAP 10.0 7.0 10.0  --  10.0  --  8.0   BUN 18 18 20  --  21  --  22   CREATININE 1.26 1.11 1.09  --  1.14  --  1.26   EGFR 58.7* 68.4 69.9  --  66.2  --  58.7*   GLUCOSE 143* 128* 98  --  120*  --  148*   CALCIUM 8.6 8.4* 8.3*  --  8.4*  --  8.5*   IONIZED CALCIUM  --   --   --   --   --  1.17  --    MAGNESIUM  --   --   --   --   --   --  2.2   PHOSPHORUS  --   --   --   --   --   --  3.0   HEMOGLOBIN A1C  --   --   --   --  6.40*  --   --    TSH  --   --   --   --   --   --  15.850*         Lab 10/11/24  0641 10/10/24  0416 10/08/24  1936   TOTAL PROTEIN 6.3 5.4* 6.0   ALBUMIN 2.6* 2.7* 2.7*   GLOBULIN 3.7 2.7 3.3   ALT (SGPT) <5 <5 <5   AST (SGOT) 9 9 12   BILIRUBIN 0.2 0.3 0.3   ALK PHOS 98 105 108   LIPASE  --   --  11*         Lab 10/08/24  1936   PROBNP 98.4             Lab 10/08/24  1936   IRON 21*  21*   IRON SATURATION (TSAT) 10*   TIBC 206*   TRANSFERRIN 138*   FERRITIN 241.90   FOLATE 8.42   VITAMIN B 12 313         Lab 10/08/24  2049   FIO2 21   CARBOXYHEMOGLOBIN (VENOUS) 1.3     Brief Urine Lab Results  (Last result in the past 365 days)        Color   Clarity   Blood   Leuk Est   Nitrite   Protein   CREAT   Urine HCG        10/08/24 2219 Yellow   Clear   Negative   Negative   Negative   Negative                   Microbiology Results Abnormal       Procedure Component Value - Date/Time    Blood Culture - Blood, Hand, Left [[id number]]  (Normal) Collected: 10/08/24 2120    Lab Status: Preliminary result Specimen: Blood from Hand, Left Updated: 10/11/24 2145     Blood Culture No growth at 3 days    Narrative:      Less than seven (7) mL's of blood was collected.  Insufficient quantity may yield false negative results.    Blood Culture - Blood, Hand, Right [[id number]]  (Normal) Collected: 10/08/24 2050    Lab Status: Preliminary result Specimen: Blood from Hand, Right Updated: 10/11/24 2117     Blood Culture No growth at 3 days    Parasite  Identification - Parasite, Foot, Right [[id number]] Collected: 10/09/24 1358    Lab Status: Final result Specimen: Parasite from Foot, Right Updated: 10/09/24 1407     Macroscopic Exam Maggots    Respiratory Panel PCR w/COVID-19(SARS-CoV-2) KENJI/ASH/SHIRLEY/PAD/COR/CORNELIUS In-House, NP Swab in UTM/VTM, 2 HR TAT - Swab, Nasopharynx [[id number]]  (Normal) Collected: 10/09/24 0548    Lab Status: Final result Specimen: Swab from Nasopharynx Updated: 10/09/24 0643     ADENOVIRUS, PCR Not Detected     Coronavirus 229E Not Detected     Coronavirus HKU1 Not Detected     Coronavirus NL63 Not Detected     Coronavirus OC43 Not Detected     COVID19 Not Detected     Human Metapneumovirus Not Detected     Human Rhinovirus/Enterovirus Not Detected     Influenza A PCR Not Detected     Influenza B PCR Not Detected     Parainfluenza Virus 1 Not Detected     Parainfluenza Virus 2 Not Detected     Parainfluenza Virus 3 Not Detected     Parainfluenza Virus 4 Not Detected     RSV, PCR Not Detected     Bordetella pertussis pcr Not Detected     Bordetella parapertussis PCR Not Detected     Chlamydophila pneumoniae PCR Not Detected     Mycoplasma pneumo by PCR Not Detected    Narrative:      In the setting of a positive respiratory panel with a viral infection PLUS a negative procalcitonin without other underlying concern for bacterial infection, consider observing off antibiotics or discontinuation of antibiotics and continue supportive care. If the respiratory panel is positive for atypical bacterial infection (Bordetella pertussis, Chlamydophila pneumoniae, or Mycoplasma pneumoniae), consider antibiotic de-escalation to target atypical bacterial infection.    COVID PRE-OP / PRE-PROCEDURE SCREENING ORDER (NO ISOLATION) - Swab, Nasopharynx [[id number]]  (Normal) Collected: 10/08/24 2126    Lab Status: Final result Specimen: Swab from Nasopharynx Updated: 10/08/24 2220    Narrative:      The following orders were created for panel order COVID PRE-OP /  PRE-PROCEDURE SCREENING ORDER (NO ISOLATION) - Swab, Nasopharynx.  Procedure                               Abnormality         Status                     ---------                               -----------         ------                     COVID-19, FLU A/B, RSV P...[[id number]]  Normal              Final result                 Please view results for these tests on the individual orders.    COVID-19, FLU A/B, RSV PCR 1 HR TAT - Swab, Nasopharynx [[id number]]  (Normal) Collected: 10/08/24 2126    Lab Status: Final result Specimen: Swab from Nasopharynx Updated: 10/08/24 2220     COVID19 Not Detected     Influenza A PCR Not Detected     Influenza B PCR Not Detected     RSV, PCR Not Detected    Narrative:      Fact sheet for providers: https://www.fda.gov/media/894247/download    Fact sheet for patients: https://www.fda.gov/media/807030/download    Test performed by PCR.            MRI Tibia Fibula Right Without Contrast    Result Date: 10/11/2024  MRI TIBIA FIBULA RIGHT WO CONTRAST Date of Exam: 10/11/2024 4:39 AM EDT Indication: Right lower extremity ulcer.  Comparison: 9/15/2024. Technique:  Routine multiplanar/multisequence images of the right tibia and fibula were obtained without contrast administration.  Findings: Osseous structures appear intact. No suspicious osseous edema identified. No evidence of fracture. No evidence of periostitis. Musculature appears unremarkable. No evidence of myofascial defect identified. No drainable collection identified. Limited evaluation due to lack of intravenous contrast. Edema is seen tracking along the subcutaneous tissues as well as the superficial fascia distally which may be related to third spacing of fluid. No joint effusion identified. Vascular flow voids appear grossly unremarkable.     Impression: Impression: No evidence of osteomyelitis. No drainable collection identified. No joint effusion. Electronically Signed: Norma Ahn MD  10/11/2024 4:53 AM EDT  Workstation  "ID: [id number]     Results for orders placed during the hospital encounter of 01/12/21    Adult Transthoracic Echo Complete W/ Cont if Necessary Per Protocol    Interpretation Summary  · The right ventricle and right atrium are moderately dilated. Other chamber sizes and wall thicknesses are normal.  · Global and segmental LV wall motion is normal. The estimated left ventricular ejection fraction is 51% - 55%.  · The aortic and mitral valves are normal in structure and function.  · The estimated RV systolic pressure is mildly elevated 35 mmHg - 40 mmHg. There is as well noted to be less than 50% inspiratory IVC collapse. The main pulmonary artery is \"borderline dilated\".  · There are no other important findings on this study.      Current medications:  Scheduled Meds:carbidopa-levodopa, 2 tablet, Oral, 4x Daily  carbidopa-levodopa ER, 1 tablet, Oral, BID  fluticasone, 2 spray, Each Nare, Daily  furosemide, 20 mg, Oral, Daily  gabapentin, 100 mg, Oral, Q12H  guaiFENesin, 600 mg, Oral, Q12H  heparin (porcine), 5,000 Units, Subcutaneous, Q8H  levothyroxine, 88 mcg, Oral, Q AM  pantoprazole, 40 mg, Oral, Daily  piperacillin-tazobactam, 3.375 g, Intravenous, Q8H  pramipexole, 1 mg, Oral, TID  QUEtiapine, 12.5 mg, Oral, Nightly  sodium chloride, 10 mL, Intravenous, Q12H  tamsulosin, 0.4 mg, Oral, Daily      Continuous Infusions:   PRN Meds:.  senna-docusate sodium **AND** polyethylene glycol **AND** bisacodyl **AND** bisacodyl    HYDROmorphone    Magnesium Standard Dose Replacement - Follow Nurse / BPA Driven Protocol    Potassium Replacement - Follow Nurse / BPA Driven Protocol    sodium chloride    sodium chloride    Assessment & Plan   Assessment & Plan     Active Hospital Problems    Diagnosis  POA    **Cellulitis [L03.90]  Yes    History of DVT (deep vein thrombosis) [Z86.718]  Not Applicable    Anemia, chronic disease [D63.8]  Yes    Elevated TSH [R79.89]  Yes    GERD [K21.9]  Yes    Chronic cough [R05.3]  Yes    " Hypothyroidism (acquired) [E03.9]  Yes    Chronic edema [R60.9]  Yes    Parkinson's disease [G20.A1]  Yes    Anxiety [F41.9]  Yes    Hyperlipidemia [E78.5]  Yes    Depression [F32.A]  Yes      Resolved Hospital Problems   No resolved problems to display.        Brief Hospital Course to date:  Cordell Martin is a 79 y.o. male with history of Parkinson's, history of DVT, HL, hypothyroidism, GERD, lymphedema, anxiety/depression, with wheelchair use/walker use at [facility] here with cellulitis, B LE wounds     B LE wounds  Edema  Cellulitis, maggots noted, R second toe markedly abnormal, wound between first/second toe with drainage  - duplex with chronic DVT noted  - MRI foot and tib/fib with no evidence of osteo   - Wound culture with pseudomonas   - Evaluated by ortho -- conservative management and monitoring  - ID consulted - initially on dapto/zosyn -- dapto discontinued 10/12 after culture resulted with pseudomonas   -ABIs pending   - WOC consult     Cough  Possible LLL PNA on CXR, not as impressive on CT  -pulmonary toilet  -respiratory PCR negative  - speech consult with FEES pending      Chronic anemia  -monitor     Parkinson's disease  Anxiety/depression  -continue sinemet, seroquel, mirapex     Hypothyroidism  -on synthorid, with elevated TSH and normal T4  -follow up outpatient     HL  -statin     GERD  -PPI    Expected Discharge Location and Transportation: Rehab   Expected Discharge   Expected Discharge Date: 10/11/2024; Expected Discharge Time:      VTE Prophylaxis:  Pharmacologic VTE prophylaxis orders are present.         AM-PAC 6 Clicks Score (PT): 14 (10/10/24 2040)    CODE STATUS:   Code Status and Medical Interventions: No CPR (Do Not Attempt to Resuscitate); Limited Support; No intubation (DNI); DNR/DNI   Ordered at: 10/09/24 0206     Medical Intervention Limits:    No intubation (DNI)     Level Of Support Discussed With:    Patient     Code Status (Patient has no pulse and is not breathing):     No CPR (Do Not Attempt to Resuscitate)     Medical Interventions (Patient has pulse or is breathing):    Limited Support     Comments:    DNR/DNI       Angely Mukherjee DO  10/12/24

## 2024-10-12 NOTE — DISCHARGE INSTR - DIET
FEES Reason for Referral 10/12/2024    Patient was referred for a FEES to assess the efficiency of his/her swallow function, rule out aspiration and make recommendations regarding safe dietary consistencies, effective compensatory strategies, and safe eating environment.         Recommendations/Treatment  SLP Swallowing Diagnosis: swallow WFL/no suspected pharyngeal impairment (10/12/24 1430)  Functional Impact: no impact on function (10/12/24 1430)  Swallow Criteria for Skilled Therapeutic Interventions Met: no problems identified which require skilled intervention (10/12/24 1430)  Therapy Frequency (Swallow): evaluation only (10/12/24 1430)  SLP Diet Recommendation: regular textures, thin liquids (10/12/24 1430)  Recommended Diagnostics: No further SLP services recommended (10/12/24 1430)  Recommended Precautions and Strategies: upright posture during/after eating, reflux precautions (10/12/24 1430)  SLP Rec. for Method of Medication Administration: as tolerated (10/12/24 1430)  Monitor for Signs of Aspiration: notify SLP if any concerns (10/12/24 1430)  Anticipated Discharge Disposition (SLP): inpatient rehabilitation facility (10/12/24 1430)    Instrumental Set-up  Risks/Benefits Reviewed: risks/benefits explained, patient, agreed to eval (10/12/24 1430)  Nasal Entry: right: (10/12/24 1430)  Anatomic Considerations: no anatomic structural deviation (10/12/24 1430)  Utensils Used: Spoon, Cup, Straw (10/12/24 1430)    Oral Preparation/ Oral Phase  Oral Phase: WFL (10/12/24 1430)    Pharyngeal Phase  Initiation of Pharyngeal Swallow: WFL (10/12/24 1430)  Pharyngeal Phase: functional pharyngeal phase of swallow (10/12/24 1430)  FEES Summary: Functional swallow. No penetration/aspiration. No residue. Recommend continue regular/thin diet. (10/12/24 1430)

## 2024-10-12 NOTE — MBS/VFSS/FEES
Acute Care - Speech Language Pathology   Swallow Initial Evaluation [facility]  Fiberoptic Endoscopic Evaluation of Swallowing (FEES)       Patient Name: Cordell Martin  : 1945  MRN: [id number]  Today's Date: 10/12/2024               Admit Date: 10/8/2024    Visit Dx:     ICD-10-CM ICD-9-CM   1. Cellulitis of lower extremity, unspecified laterality  L03.119 682.6   2. Lymphedema  I89.0 457.1   3. Pain in both lower extremities  M79.604 729.5    M79.605    4. Chronic edema  R60.9 782.3   5. Parkinson's disease, unspecified whether dyskinesia present, unspecified whether manifestations fluctuate  G20.A1 332.0     Patient Active Problem List   Diagnosis    Anxiety    Arthritis    Depression    Hyperlipidemia    Lumbar stenosis with neurogenic claudication    Parkinson's disease    Lumbar stenosis with neurogenic claudication status post L4-5 decompression    Status post lumbar laminectomy    Memory loss    Chronic edema    Cellulitis of right lower extremity    Exertional dyspnea    Right knee pain    Cellulitis of lower extremity    Hypothyroidism (acquired)    Elevated serum creatinine    Cellulitis of right leg    Acute deep vein thrombosis (DVT) of right lower extremity    Pneumonia    Pulmonary nodule (4mm RML incidental)    Chronic cough    GERD    Remote smoker (Stopped )    Restrictive pattern on PFTs w/o evidence of ILD    Cellulitis    History of DVT (deep vein thrombosis)    Anemia, chronic disease    Elevated TSH     Past Medical History:   Diagnosis Date    Anxiety     Arthritis     Back pain     Bronchitis     Cognitive impairment     Depression     Disease of thyroid gland     Glucose intolerance (impaired glucose tolerance)     Hyperlipidemia     Kidney stone     Obesity     Parkinson disease     Pneumonia     Prostate disorder     Thyroid disease     Wears eyeglasses      Past Surgical History:   Procedure Laterality Date    COLONOSCOPY      5 years ago    EYE SURGERY      HERNIA  REPAIR  10/20/2020    KNEE SURGERY      LUMBAR LAMINECTOMY DISCECTOMY DECOMPRESSION N/A 07/13/2017    Procedure: LUMBAR LAMINECTOMY L4-5;  Surgeon: Antonio Trent MD;  Location: [facility];  Service:     SHOULDER ROTATOR CUFF REPAIR Right     x2    TONSILLECTOMY      VEIN SURGERY         SLP Recommendation and Plan  SLP Swallowing Diagnosis: swallow WFL/no suspected pharyngeal impairment (10/12/24 1430)  SLP Diet Recommendation: regular textures, thin liquids (10/12/24 1430)  Recommended Precautions and Strategies: upright posture during/after eating, reflux precautions (10/12/24 1430)  SLP Rec. for Method of Medication Administration: as tolerated (10/12/24 1430)     Monitor for Signs of Aspiration: notify SLP if any concerns (10/12/24 1430)  Recommended Diagnostics: No further SLP services recommended (10/12/24 1430)  Swallow Criteria for Skilled Therapeutic Interventions Met: no problems identified which require skilled intervention (10/12/24 1430)  Anticipated Discharge Disposition (SLP): inpatient rehabilitation facility (10/12/24 1430)     Therapy Frequency (Swallow): evaluation only (10/12/24 1430)     Oral Care Recommendations: Oral Care BID/PRN, Toothbrush (10/12/24 1430)                                               SWALLOW EVALUATION (Last 72 Hours)       SLP Adult Swallow Evaluation       Row Name 10/12/24 1430 10/11/24 154                Rehab Evaluation    Document Type evaluation  -DV evaluation  -AC       Subjective Information no complaints  -DV no complaints  -AC       Patient Observations alert;cooperative  -DV alert;cooperative  -AC       Patient/Family/Caregiver Comments/Observations no family present  -DV No family present.  -AC       Patient Effort excellent  -DV excellent  -AC       Symptoms Noted During/After Treatment none  -DV --          General Information    Patient Profile Reviewed yes  -DV yes  -AC       Pertinent History Of Current Problem see initial eval  -DV Cellulitis B LE.  Consulted 2' concern for LLL pna. RN reported pt w/ chronic cough & says sometimes worse after eats. Hx PD, GERD, memory loss, pna. Adm 9/'23 w/ pna. CSE at that time revealed chronic cough, reported hx esophageal dilations, MBS during prior adm was WFL.  -AC       Current Method of Nutrition regular textures;thin liquids  -DV regular textures;thin liquids  -AC       Precautions/Limitations, Vision WFL;for purposes of eval  -DV WFL;for purposes of eval  -AC       Precautions/Limitations, Hearing WFL;for purposes of eval  -DV WFL;for purposes of eval  -AC       Prior Level of Function-Swallowing esophageal concerns;other (see comments)  and chronic difficulty w/ dry noncohesive textures  -DV esophageal concerns  difficulty self-feeding--tends to try finger foods; chronic difficulty w/ dry noncohesive textures  -AC       Plans/Goals Discussed with patient;agreed upon  -DV patient;agreed upon  -AC       Barriers to Rehab medically complex  -DV medically complex  -AC       Patient's Goals for Discharge patient did not state  -DV patient did not state  -AC          Pain    Pretreatment Pain Rating 0/10 - no pain  -DV --       Posttreatment Pain Rating 0/10 - no pain  -DV --       Additional Documentation -- Pain Scale: FACES Pre/Post-Treatment (Group)  -AC          Pain Scale: FACES Pre/Post-Treatment    Pain: FACES Scale, Pretreatment -- 2-->hurts little bit  -AC       Posttreatment Pain Rating -- 2-->hurts little bit  -AC       Pre/Posttreatment Pain Comment -- Reported ongoing pain  -AC       Retired Pain Location - Side/Orientation -- Bilateral  -AC       Retired Pain Location -- lower  -AC       Retired Pain Location - -- extremity  -AC          Oral Motor Structure and Function    Dentition Assessment -- missing teeth  molars  -AC       Secretion Management -- other (see comments)  excess oral secretions  -AC       Mucosal Quality -- dry  -AC       Volitional Cough -- WFL  -AC          Oral Musculature and Cranial  Nerve Assessment    Oral Motor General Assessment -- generalized oral motor weakness  -          General Eating/Swallowing Observations    Respiratory Support Currently in Use -- room air  -       Eating/Swallowing Skills -- fed by SLP;self-fed;needed assist;other (see comments)  2' tremor  -       Positioning During Eating -- upright 90 degree;upright in bed  -       Utensils Used -- spoon;cup;straw  typically uses a straw  -       Consistencies Trialed -- thin liquids;pureed;regular textures  -          Clinical Swallow Eval    Oral Prep Phase -- WFL  -AC       Oral Transit -- WFL  -AC       Oral Residue -- WFL  -AC       Pharyngeal Phase -- suspected pharyngeal impairment  -       Esophageal Phase -- suspected esophageal impairment  -          Pharyngeal Phase Concerns    Pharyngeal Phase Concerns -- multiple swallows  -       Multiple Swallows -- thin;other (see comments)  intermittent  -AC       Pharyngeal Phase Concerns, Comment -- Baseline coughing. Seemingly delayed swallow initiation/incoordination per laryngeal palpation. Trialed 3oz H2O test--cough x1 w/i 1 minute, but difficult to determine if r/t swallow vs. baseline chronic cough. Discussed risks, options w/ pt. He would like to proceed w/ repeat instrumental swallow study to further assess swallowing while admitted.  -          Esophageal Phase Concerns    Esophageal Phase Concerns -- belching  -       Belching -- other (see comments)  @ end of eval  -AC          Fiberoptic Endoscopic Evaluation of Swallowing (FEES)    Risks/Benefits Reviewed risks/benefits explained;patient;agreed to eval  -DV --       Nasal Entry right:  -DV --       Scope serial number/identification [id number]  -DV --          Anatomy and Physiology    Anatomic Considerations no anatomic structural deviation  -DV --       Velopharyngeal WFL  -DV --       Base of Tongue symmetrical  -DV --       Epiglottis WFL  -DV --       Laryngeal Function Breathing symmetrical   -DV --       Laryngeal Function Phonation symmetrical  -DV --       Laryngeal Function to Breath Hold TVF/FVF/Arytenoid  -DV --       Secretion Rating Scale (Henok et al. 1996) 0- normal, no visible secretions  -DV --       Ice Chips DNA  -DV --       Spontaneous Swallow frequency adequate  -DV --       Sensory sensed scope  -DV --       Utensils Used Spoon;Cup;Straw  -DV --          FEES Interpretation    Oral Phase WFL  -DV --          Initiation of Pharyngeal Swallow    Initiation of Pharyngeal Swallow WFL  -DV --       Pharyngeal Phase functional pharyngeal phase of swallow  -DV --       FEES Summary Functional swallow. No penetration/aspiration. No residue. Recommend continue regular/thin diet.  -DV --          Swallowing Quality of Life Assessment    Education and counseling provided -- Signs of aspiration;Silent aspiration;Risks of aspiration;Aspiration precautions  -          SLP Evaluation Clinical Impression    SLP Swallowing Diagnosis swallow WFL/no suspected pharyngeal impairment  -DV R/O pharyngeal dysphagia  -       Functional Impact no impact on function  - risk of aspiration/pneumonia  -       Rehab Potential/Prognosis, Swallowing -- good, to achieve stated therapy goals  -       Swallow Criteria for Skilled Therapeutic Interventions Met no problems identified which require skilled intervention  - demonstrates skilled criteria  -          Recommendations    Therapy Frequency (Swallow) evaluation only  -DV --       Predicted Duration Therapy Intervention (Days) -- 1 week  -       SLP Diet Recommendation regular textures;thin liquids  - regular textures;thin liquids;other (see comments)  as tolerated--consider NPO if concerns arise prior to FEES. Pt requested finger food.  -       Recommended Diagnostics No further SLP services recommended  - FEES  -       Recommended Precautions and Strategies upright posture during/after eating;reflux precautions  - upright posture  during/after eating;general aspiration precautions;reflux precautions  -AC       Oral Care Recommendations Oral Care BID/PRN;Toothbrush  -DV Oral Care BID/PRN;Toothbrush  -AC       SLP Rec. for Method of Medication Administration as tolerated  -DV as tolerated  -AC       Monitor for Signs of Aspiration notify SLP if any concerns  -DV yes;notify SLP if any concerns  -AC       Anticipated Discharge Disposition (SLP) inpatient rehabilitation facility  -DV inpatient rehabilitation facility  -AC       Demonstrates Need for Referral to Another Service -- occupational therapy  pt reported he uses adaptive utensils @ home  -AC                 User Key  (r) = Recorded By, (t) = Taken By, (c) = Cosigned By      Initials Name Effective Dates    AC Cody Mandi EAGLE MS CCC-SLP 02/03/23 -     DV Darling Barragan MS CCC-SLP 06/16/21 -                     EDUCATION  The patient has been educated in the following areas:   Dysphagia (Swallowing Impairment).                Time Calculation:    Time Calculation- SLP       Row Name 10/12/24 1533             Time Calculation- SLP    SLP Start Time 1430  -DV      SLP Received On 10/12/24  -DV         Untimed Charges    SLP Eval/Re-eval  ST Fiberoptic Endo Eval Swallow - 48883  -DV      [id number]-NG Fiberoptic Endo Eval Swallow Minutes 88  -DV         Total Minutes    Untimed Charges Total Minutes 88  -DV       Total Minutes 88  -DV                User Key  (r) = Recorded By, (t) = Taken By, (c) = Cosigned By      Initials Name Provider Type    DV Darling Barragan MS CCC-SLP Speech and Language Pathologist                    Therapy Charges for Today       Code Description Service Date Service Provider Modifiers Qty    [id number]  ST FIBEROPTIC ENDO EVAL SWALL 6 10/12/2024 Darling Barragan MS CCC-SLP GN 1                 Darling Barragan MS CCC-JOSE ENRIQUE  10/12/2024

## 2024-10-12 NOTE — PROGRESS NOTES
Cordell Martin  1945  [id number]    Evaluating Physician: Trevor Jorgensen MD    Chief Complaint: right leg red and painful    Reason for Consultation: RLE infection    History of present illness:     Patient is a 79 y.o.  Yr old male prior patient of Dr. Benites, with history of Parkinson's disease and chronically debilitated, uses a wheelchair primarily although apparently has occasionally used a walker.  He has other extensive comorbidities as below.  He had reported trauma to the right second toe having jammed it into a wall while in his wheelchair several weeks prior to admission.  He had developed increasing redness/swelling and discoloration of the right second toe and foot/lower leg over that period of time.  He does have bilateral lower leg discoloration and swelling but the right side is out of proportion of the left side.  He was admitted on October 8 with concern for lower extremity cellulitis; subsequently noted to have maggots at the right second toe    Aside from the trauma in the wheelchair, he denied any other specific exposures.    10/12/24 Cx  PSA, skin derrell / GNR  ; Right lower leg/foot pain out of proportion to left, constant, nonradiating, worse with palpation, generally better with pain meds and if keeping pressure off it, 3 out of 10 at present. Redness less since admit    No headache photophobia or neck stiffness.  He has chronic intermittent cough and reports this is at baseline, currently on room air with no hemoptysis.  No nausea vomiting diarrhea or abdominal pain.  No dysuria hematuria or pyuria.  No other specific rash    Past Medical History:   Diagnosis Date    Anxiety     Arthritis     Back pain     Bronchitis     Cognitive impairment     Depression     Disease of thyroid gland     Glucose intolerance (impaired glucose tolerance)     Hyperlipidemia     Kidney stone     Obesity     Parkinson disease     Pneumonia     Prostate disorder     Thyroid disease     Wears  eyeglasses        Past Surgical History:   Procedure Laterality Date    COLONOSCOPY      5 years ago    EYE SURGERY      HERNIA REPAIR  10/20/2020    KNEE SURGERY      LUMBAR LAMINECTOMY DISCECTOMY DECOMPRESSION N/A 07/13/2017    Procedure: LUMBAR LAMINECTOMY L4-5;  Surgeon: Antonio Trent MD;  Location: [facility];  Service:     SHOULDER ROTATOR CUFF REPAIR Right     x2    TONSILLECTOMY      VEIN SURGERY         Pediatric History   Patient Parents    Not on file     Other Topics Concern    Not on file   Social History Narrative    Lives alone. Uses walker    Has not smoked since about 1980       family history includes Alzheimer's disease in an other family member; Cancer in his father and another family member; Diabetes in an other family member; Heart disease in an other family member; Stroke in an other family member.    No Known Allergies    Medication:  Current Facility-Administered Medications   Medication Dose Route Frequency Provider Last Rate Last Admin    sennosides-docusate (PERICOLACE) 8.6-50 MG per tablet 2 tablet  2 tablet Oral BID PRN Daya Ospina APRN        And    polyethylene glycol (MIRALAX) packet 17 g  17 g Oral Daily PRN Daya Ospina APRN        And    bisacodyl (DULCOLAX) EC tablet 5 mg  5 mg Oral Daily PRN Daya Ospina APRN        And    bisacodyl (DULCOLAX) suppository 10 mg  10 mg Rectal Daily PRN Daya Ospina APRN        carbidopa-levodopa (SINEMET)  MG per tablet 2 tablet  2 tablet Oral 4x Daily Daya Ospina APRN   2 tablet at 10/11/24 2008    carbidopa-levodopa ER (SINEMET CR)  MG per tablet 1 tablet  1 tablet Oral BID Daya Ospina APRN   1 tablet at 10/11/24 2008    fluticasone (FLONASE) 50 MCG/ACT nasal spray 2 spray  2 spray Each Nare Daily Daya Ospina APRN   2 spray at 10/11/24 0830    furosemide (LASIX) tablet 20 mg  20 mg Oral Daily Daya Ospina APRN   20 mg at 10/11/24 0829    gabapentin (NEURONTIN) capsule 100 mg  100 mg Oral Q12H John  Fabian NOBLES MD   100 mg at 10/11/24 2008    guaiFENesin (MUCINEX) 12 hr tablet 600 mg  600 mg Oral Q12H Fabian Lopez MD   600 mg at 10/11/24 2007    heparin (porcine) 5000 UNIT/ML injection 5,000 Units  5,000 Units Subcutaneous Q8H Fabian Lopez MD   5,000 Units at 10/12/24 0604    HYDROmorphone (DILAUDID) injection 0.5 mg  0.5 mg Intravenous Q2H PRN Fabian Lopez MD   0.5 mg at 10/11/24 2013    levothyroxine (SYNTHROID, LEVOTHROID) tablet 88 mcg  88 mcg Oral Q AM Daya Ospina APRN   88 mcg at 10/12/24 0605    Magnesium Standard Dose Replacement - Follow Nurse / BPA Driven Protocol   Does not apply PRN Daya Ospina APRN        pantoprazole (PROTONIX) EC tablet 40 mg  40 mg Oral Daily Daya Ospina APRN   40 mg at 10/11/24 1312    piperacillin-tazobactam (ZOSYN) 3.375 g IVPB in 100 mL NS MBP (CD)  3.375 g Intravenous Q8H Trevor Jorgensen MD   3.375 g at 10/12/24 0604    Potassium Replacement - Follow Nurse / BPA Driven Protocol   Does not apply PRN Daya Ospina APRN        pramipexole (MIRAPEX) tablet 1 mg  1 mg Oral TID Daya Ospina APRN   1 mg at 10/11/24 2007    QUEtiapine (SEROquel) tablet 12.5 mg  12.5 mg Oral Nightly Daya Ospina APRN   12.5 mg at 10/11/24 2008    sodium chloride 0.9 % flush 10 mL  10 mL Intravenous PRN Yuri Kessler MD        sodium chloride 0.9 % flush 10 mL  10 mL Intravenous Q12H Daya Ospina APRN   10 mL at 10/11/24 2008    sodium chloride 0.9 % flush 10 mL  10 mL Intravenous PRN Daya Ospina APRN        tamsulosin (FLOMAX) 24 hr capsule 0.4 mg  0.4 mg Oral Daily Daya Ospina APRN   0.4 mg at 10/11/24 0829       Antibiotics:  Anti-Infectives (From admission, onward)      Ordered     Dose/Rate Route Frequency Start Stop    10/09/24 0735  piperacillin-tazobactam (ZOSYN) 3.375 g IVPB in 100 mL NS MBP (CD)        Ordering Provider: Trevor Jorgensen MD    3.375 g  over 4 Hours Intravenous Every 8 Hours 10/09/24 1430 10/16/24 0759    10/09/24 0735   piperacillin-tazobactam (ZOSYN) 3.375 g IVPB in 100 mL NS MBP (CD)        Ordering Provider: Fabian Lopez MD    3.375 g  over 30 Minutes Intravenous Once 10/09/24 0830 10/09/24 0840    10/08/24 2008  vancomycin 2250 mg/500 mL 0.9% NS IVPB ([facility])        Ordering Provider: Yuri Kessler MD    20 mg/kg × 118 kg  over 135 Minutes Intravenous Once 10/08/24 2100 10/09/24 0346    10/08/24 2024  ceFAZolin 2000 mg IVPB in 100 mL NS (MBP)        Ordering Provider: Yuri Kessler MD    2,000 mg  over 30 Minutes Intravenous Once 10/08/24 2040 10/08/24 2203    10/08/24 2024  metroNIDAZOLE (FLAGYL) tablet 500 mg        Ordering Provider: Yuri Kessler MD    500 mg Oral Once 10/08/24 2040 10/08/24 2131              Review of Systems    10/12/24 per staff also    Constitutional-- No Fever, chills or sweats.  Appetite good, and no malaise. No fatigue.  Heent-- No new vision, hearing or throat complaints.  No epistaxis or oral sores.  Denies odynophagia or dysphagia.  No flashers, floaters or eye pain. No odynophagia or dysphagia. No headache, photophobia or neck stiffness.  CV-- No chest pain, palpitation or syncope  Resp--see above, no shortness of breath at present  GI- No nausea, vomiting, or diarrhea.  No hematochezia, melena, or hematemesis. Denies jaundice or chronic liver disease.  -- No dysuria, hematuria, or flank pain.  Denies hesitancy, urgency or flank pain.  Lymph- no swollen lymph nodes in neck/axilla or groin.   Heme- No active bruising or bleeding; no Hx of DVT or PE.  MS-- no swelling or pain in the bones or joints of arms/legs.  No new back pain.  Neuro-- No acute focal weakness or numbness in the arms or legs.  No seizures.  Chronic Parkinson's and chronically debilitated    Full 12 point review of systems reviewed and negative otherwise for acute complaints, except for above    Physical Exam:   Vital Signs   /67 (BP Location: Left arm, Patient Position: Lying)   Pulse 65    "Temp 96 °F (35.6 °C) (Axillary)   Resp 16   Ht 182.9 cm (72\")   Wt 119 kg (262 lb 3.2 oz)   SpO2 94%   BMI 35.56 kg/m²     GENERAL: sleepy, in no acute distress.  Room air  HEENT: Normocephalic, atraumatic.    No conjunctival injection. No icterus. Oropharynx clear without evidence of thrush or exudate. No evidence of periodontal disease.    NECK: Supple without nuchal rigidity. No mass.   HEART: RRR; No murmur, rubs, gallops.   LUNGS: Diminished at bases but otherwise  clear to auscultation bilaterally without wheezing, rales, rhonchi. Normal respiratory effort. Nonlabored. No dullness.  ABDOMEN: Soft, nontender, nondistended. Positive bowel sounds. No rebound or guarding. NO mass or HSM.  EXT: See below   MSK: FROM without joint effusions noted arms/legs.    SKIN: Warm and dry without cutaneous eruptions on Inspection/palpation.    NEURO: sleepy.       Bilateral lower legs appeared edematous and discolored with vague scale.  However right side between knee and foot has warmth/tenderness and erythema out of proportion to left side and improving.  Right side second toe is  discolored with vague edema/erythema and tenderness out of proportion to other toes, crusted wound and unable to determine any depth with no definitive probe to bone tendon joint or ligament at present; no discrete mass bulge or fluctuance.  No crepitus or bulla    Laboratory Data    Results from last 7 days   Lab Units 10/12/24  0358 10/11/24  0641 10/10/24  0416   WBC 10*3/mm3 4.75 5.79 6.75   HEMOGLOBIN g/dL 10.9* 10.9* 10.5*   HEMATOCRIT % 34.8* 34.9* 32.7*   PLATELETS 10*3/mm3 253 254 241     Results from last 7 days   Lab Units 10/12/24  0358   SODIUM mmol/L 138   POTASSIUM mmol/L 4.0   CHLORIDE mmol/L 105   CO2 mmol/L 23.0   BUN mg/dL 18   CREATININE mg/dL 1.26   GLUCOSE mg/dL 143*   CALCIUM mg/dL 8.6     Results from last 7 days   Lab Units 10/11/24  0641   ALK PHOS U/L 98   BILIRUBIN mg/dL 0.2   ALT (SGPT) U/L <5   AST (SGOT) U/L 9 "     Results from last 7 days   Lab Units 10/08/24  1936   SED RATE mm/hr 92*     Results from last 7 days   Lab Units 10/08/24  1936   CRP mg/dL 4.81*       Estimated Creatinine Clearance: 65.4 mL/min (by C-G formula based on SCr of 1.26 mg/dL).      Microbiology:      Radiology:  Imaging Results (Last 72 Hours)       Procedure Component Value Units Date/Time    MRI Tibia Fibula Right Without Contrast [[id number]] Collected: 10/11/24 0451     Updated: 10/11/24 0456    Narrative:      MRI TIBIA FIBULA RIGHT WO CONTRAST    Date of Exam: 10/11/2024 4:39 AM EDT    Indication: Right lower extremity ulcer.     Comparison: 9/15/2024.    Technique:  Routine multiplanar/multisequence images of the right tibia and fibula were obtained without contrast administration.        Findings:  Osseous structures appear intact. No suspicious osseous edema identified. No evidence of fracture. No evidence of periostitis. Musculature appears unremarkable. No evidence of myofascial defect identified. No drainable collection identified. Limited   evaluation due to lack of intravenous contrast. Edema is seen tracking along the subcutaneous tissues as well as the superficial fascia distally which may be related to third spacing of fluid. No joint effusion identified. Vascular flow voids appear   grossly unremarkable.      Impression:      Impression:  No evidence of osteomyelitis. No drainable collection identified. No joint effusion.        Electronically Signed: Norma Ahn MD    10/11/2024 4:53 AM EDT    Workstation ID: [id number]    MRI Foot Right With & Without Contrast [[id number]] Collected: 10/09/24 2300     Updated: 10/09/24 2307    Narrative:        MRI FOOT RIGHT W WO CONTRAST    Date of Exam: 10/9/2024 5:10 PM EDT    Indication: osteomyelitis.     Comparison: 9/15/2024.    Technique:  Routine multiplanar/multisequence sequence images of the right foot were obtained before and after the uneventful administration of 20 Multihance.         Findings:  Soft tissue swelling is seen along the dorsum of the foot with mild bullous changes present. No suspicious osseous edema identified. No abnormal enhancement present. Diffuse soft tissue swelling is present within the second digit with diffuse skin   thickening. No suspicious osseous edema identified. No destructive changes identified. No evidence of significant tenosynovitis. Mild muscular edema is present likely related to denervation. No significant joint effusion identified. Mild to moderate   osteoarthritic changes are present within the interphalangeal joints of the and the first MTP joint. No erosions identified. No evidence of periostitis. Enhancement is seen within the subcutaneous tissues of the second digit. No suspicious osseous   enhancement identified. No drainable collections identified. No significant enhancement identified within the dorsum of the foot.      Impression:      Impression:  Cellulitis of the second digit with no evidence of osteomyelitis. No drainable collections identified. No suspicious osseous edema or enhancement identified. Soft tissue swelling is seen along the dorsum of the foot with no significant enhancement like   related to third spacing of fluid.        Electronically Signed: Norma Ahn MD    10/9/2024 11:04 PM EDT    Workstation ID: [id number]    CT Angiogram Chest [[id number]] Collected: 10/09/24 0759     Updated: 10/09/24 0810    Narrative:      CT ANGIOGRAM CHEST    Date of Exam: 10/9/2024 4:37 AM EDT    Indication: cough, + D.Dimer; hx of DVTs.    Comparison: None available.    Technique: CTA of the chest was performed after the uneventful intravenous administration of 85 mL of Isovue-370. Reconstructed coronal and sagittal images were also obtained. In addition, a 3-D volume rendered image was created for interpretation.   Automated exposure control and iterative reconstruction methods were used.    FINDINGS:    Thoracic inlet:  Unremarkable.    Pulmonary arteries: No filling defects are identified within the pulmonary arteries to suggest acute pulmonary embolism.    Great vessels: Mild vascular calcifications are present. Otherwise, the thoracic aorta and proximal arch vessels appear unremarkable.    Mediastinum/Mariia: No pathologically enlarged mediastinal lymph nodes are seen. The esophagus is unremarkable.    Lung parenchyma: Scattered bibasilar and lingular atelectasis. Calcified granuloma within the right lung. No acute infiltrate is seen. Unchanged 4 mm subpleural nodule within the right lung (series 5, image 51), too small to warrant imaging follow-up by   Fleischner Society guidelines.    Trachea and airways: The trachea and central airways appear unremarkable.    Pleural space: No significant pleural effusion or pneumothorax.    Heart and pericardium: Coronary artery calcifications. Otherwise, the heart and pericardium appear unremarkable.    Chest wall: No acute osseous lesion is identified. Bones are demineralized. There are degenerative changes within the spine. Contiguous anterior osteophytes noted within the thoracic spine. There is also bony fusion across multiple spinous processes.   These findings may be seen in diffuse idiopathic skeletal hyperostosis. Superficial soft tissues demonstrate no acute abnormality. Tendon anchors are seen within the right proximal humerus.    Upper abdomen: No acute abnormality is identified within the visualized upper abdomen.      Impression:      1.No acute abnormality is identified within the thorax. Specifically, there is no evidence of acute pulmonary embolism.  2.Bilateral lower lobe and lingular atelectasis.  3.Unchanged 4 mm subpleural nodule within the right lung (series 5, image 51), too small to warrant imaging follow-up by Fleischner Society guidelines.  4.Additional findings as detailed above.      Electronically Signed: Henok Castanon MD    10/9/2024 8:07 AM EDT    Workstation  ID: [id number]              Impression:     --Acute right lower leg/foot and second toe infection/cellulitis out of proportion to his chronic discoloration by his report, trauma to the right second toe several weeks preceding admission as above.  Further complicated by his chronic comorbidity, wound at the second toe with maggots noted earlier in stay, with ongoing risk for further serious morbidity and other serious sequela; high risk for persistent/recurrent or nonhealing wounds, persistent/progressive or recurrent infection and risk for further functional/limb loss, risk for amputation etc.   MRI no osteomyelitis per radiology.     --Chronic/intermittent cough at presentation, CT scan without acute infiltrate per radiology and some atelectasis noted by them.  Encouraged incentive spirometry.  No productive cough at present.  Respiratory PCR negative.  Further pulmonary workup or referral at discretion of medicine team; pneumonia less likely at present but certainly could evolve; monitor for new process    --Hx trauma and maggots    --Parkinson's disease, chronically debilitated and primarily wheelchair-bound by his report.    --Prior history of DVT    PLAN:      -- IV  Zosyn for now.       -- Daptomycin stopped as no MDR GPC in culture so far.  If worse with this change or stronger evidence for MDR GPC as pathogen, then consideration could be given to adding back versus other adjustments    -- Check/review labs cultures and scans    -- Partial history per nursing staff    -- Discussed with microbiology    -- Highly complex set of issues with high risk for further serious morbidity and other serious sequela    -- Radiographic and surgical workup ongoing        Copied text in this note has been reviewed and is accurate as of 10/12/24.        Trevor Jorgensen MD  10/12/2024

## 2024-10-12 NOTE — PLAN OF CARE
Problem: Adult Inpatient Plan of Care  Goal: Plan of Care Review  Outcome: Progressing  Flowsheets (Taken 10/12/2024 2328)  Plan of Care Reviewed With: patient   Goal Outcome Evaluation:  Plan of Care Reviewed With: patient      SLP evaluation completed. Will sign-off. Please see note for further details and recommendations.            Anticipated Discharge Disposition (SLP): inpatient rehabilitation facility          SLP Swallowing Diagnosis: swallow WFL/no suspected pharyngeal impairment (10/12/24 1430)

## 2024-10-12 NOTE — PLAN OF CARE
Goal Outcome Evaluation:      Patient is NSR on the on monitor and on room air.  Alert and oriented times four.  Patient assist times two with walker.  Fees competed at bedside, see notes.  Bed in lowest position, phone and call light in reach.

## 2024-10-13 LAB
BACTERIA SPEC AEROBE CULT: NORMAL
BACTERIA SPEC AEROBE CULT: NORMAL

## 2024-10-13 PROCEDURE — 99232 SBSQ HOSP IP/OBS MODERATE 35: CPT | Performed by: INTERNAL MEDICINE

## 2024-10-13 PROCEDURE — 25010000002 HEPARIN (PORCINE) PER 1000 UNITS: Performed by: HOSPITALIST

## 2024-10-13 PROCEDURE — 25010000002 PIPERACILLIN SOD-TAZOBACTAM PER 1 G: Performed by: INTERNAL MEDICINE

## 2024-10-13 RX ADMIN — HEPARIN SODIUM 5000 UNITS: 5000 INJECTION INTRAVENOUS; SUBCUTANEOUS at 22:25

## 2024-10-13 RX ADMIN — HEPARIN SODIUM 5000 UNITS: 5000 INJECTION INTRAVENOUS; SUBCUTANEOUS at 06:10

## 2024-10-13 RX ADMIN — PIPERACILLIN AND TAZOBACTAM 3.38 G: 3; .375 INJECTION, POWDER, LYOPHILIZED, FOR SOLUTION INTRAVENOUS at 22:25

## 2024-10-13 RX ADMIN — PIPERACILLIN AND TAZOBACTAM 3.38 G: 3; .375 INJECTION, POWDER, LYOPHILIZED, FOR SOLUTION INTRAVENOUS at 15:03

## 2024-10-13 RX ADMIN — CARBIDOPA AND LEVODOPA 1 TABLET: 25; 100 TABLET, EXTENDED RELEASE ORAL at 19:56

## 2024-10-13 RX ADMIN — GABAPENTIN 100 MG: 100 CAPSULE ORAL at 19:56

## 2024-10-13 RX ADMIN — GABAPENTIN 100 MG: 100 CAPSULE ORAL at 09:14

## 2024-10-13 RX ADMIN — HEPARIN SODIUM 5000 UNITS: 5000 INJECTION INTRAVENOUS; SUBCUTANEOUS at 15:03

## 2024-10-13 RX ADMIN — PIPERACILLIN AND TAZOBACTAM 3.38 G: 3; .375 INJECTION, POWDER, LYOPHILIZED, FOR SOLUTION INTRAVENOUS at 06:11

## 2024-10-13 RX ADMIN — PRAMIPEXOLE DIHYDROCHLORIDE 1 MG: 1 TABLET ORAL at 19:56

## 2024-10-13 RX ADMIN — PRAMIPEXOLE DIHYDROCHLORIDE 1 MG: 1 TABLET ORAL at 09:14

## 2024-10-13 RX ADMIN — CARBIDOPA AND LEVODOPA 2 TABLET: 25; 100 TABLET ORAL at 12:29

## 2024-10-13 RX ADMIN — CARBIDOPA AND LEVODOPA 2 TABLET: 25; 100 TABLET ORAL at 19:56

## 2024-10-13 RX ADMIN — TAMSULOSIN HYDROCHLORIDE 0.4 MG: 0.4 CAPSULE ORAL at 09:14

## 2024-10-13 RX ADMIN — GUAIFENESIN 600 MG: 600 TABLET, EXTENDED RELEASE ORAL at 19:55

## 2024-10-13 RX ADMIN — PANTOPRAZOLE SODIUM 40 MG: 40 TABLET, DELAYED RELEASE ORAL at 12:29

## 2024-10-13 RX ADMIN — Medication 10 ML: at 19:55

## 2024-10-13 RX ADMIN — CARBIDOPA AND LEVODOPA 1 TABLET: 25; 100 TABLET, EXTENDED RELEASE ORAL at 09:14

## 2024-10-13 RX ADMIN — LEVOTHYROXINE SODIUM 88 MCG: 0.09 TABLET ORAL at 06:10

## 2024-10-13 RX ADMIN — Medication 10 ML: at 09:15

## 2024-10-13 RX ADMIN — PRAMIPEXOLE DIHYDROCHLORIDE 1 MG: 1 TABLET ORAL at 15:03

## 2024-10-13 RX ADMIN — CARBIDOPA AND LEVODOPA 2 TABLET: 25; 100 TABLET ORAL at 09:14

## 2024-10-13 RX ADMIN — CARBIDOPA AND LEVODOPA 2 TABLET: 25; 100 TABLET ORAL at 18:08

## 2024-10-13 RX ADMIN — FLUTICASONE PROPIONATE 2 SPRAY: 50 SPRAY, METERED NASAL at 09:15

## 2024-10-13 RX ADMIN — QUETIAPINE FUMARATE 12.5 MG: 25 TABLET ORAL at 19:56

## 2024-10-13 RX ADMIN — FUROSEMIDE 20 MG: 20 TABLET ORAL at 09:14

## 2024-10-13 RX ADMIN — GUAIFENESIN 600 MG: 600 TABLET, EXTENDED RELEASE ORAL at 09:14

## 2024-10-13 NOTE — PLAN OF CARE
Goal Outcome Evaluation:         Patient is NSR on the monitor and on room air.  Alert and oriented times four.  Patient is assist times two.  Purewick in use.  Bed in lowest position, phone and call light in reach.

## 2024-10-13 NOTE — PROGRESS NOTES
"Cordell Martin  1945  [id number]    Evaluating Physician: Trevor Jorgensen MD    Chief Complaint: right leg red and painful    Reason for Consultation: RLE infection    History of present illness:     Patient is a 79 y.o.  Yr old male prior patient of Dr. Benites, with history of Parkinson's disease and chronically debilitated, uses a wheelchair primarily although apparently has occasionally used a walker.  He has other extensive comorbidities as below.  He had reported trauma to the right second toe having jammed it into a wall while in his wheelchair several weeks prior to admission.  He had developed increasing redness/swelling and discoloration of the right second toe and foot/lower leg over that period of time.  He does have bilateral lower leg discoloration and swelling but the right side is out of proportion of the left side.  He was admitted on October 8 with concern for lower extremity cellulitis; subsequently noted to have maggots at the right second toe    Aside from the trauma in the wheelchair, he denied any other specific exposures.    10/13/24 Cx  PSA, skin derrell / \"mixed\" GNR  ; Right lower leg/foot pain out of proportion to left, constant, nonradiating, worse with palpation, generally better with pain meds and if keeping pressure off it, 3 out of 10 at present. Redness less since admit    No headache photophobia or neck stiffness.  He has chronic intermittent cough and reports this is at baseline, currently on room air with no hemoptysis.  No nausea vomiting diarrhea or abdominal pain.  No dysuria hematuria or pyuria.  No other specific rash    Past Medical History:   Diagnosis Date    Anxiety     Arthritis     Back pain     Bronchitis     Cognitive impairment     Depression     Disease of thyroid gland     Glucose intolerance (impaired glucose tolerance)     Hyperlipidemia     Kidney stone     Obesity     Parkinson disease     Pneumonia     Prostate disorder     Thyroid disease     Wears " eyeglasses        Past Surgical History:   Procedure Laterality Date    COLONOSCOPY      5 years ago    EYE SURGERY      HERNIA REPAIR  10/20/2020    KNEE SURGERY      LUMBAR LAMINECTOMY DISCECTOMY DECOMPRESSION N/A 07/13/2017    Procedure: LUMBAR LAMINECTOMY L4-5;  Surgeon: Antonio Trent MD;  Location: [facility];  Service:     SHOULDER ROTATOR CUFF REPAIR Right     x2    TONSILLECTOMY      VEIN SURGERY         Pediatric History   Patient Parents    Not on file     Other Topics Concern    Not on file   Social History Narrative    Lives alone. Uses walker    Has not smoked since about 1980       family history includes Alzheimer's disease in an other family member; Cancer in his father and another family member; Diabetes in an other family member; Heart disease in an other family member; Stroke in an other family member.    No Known Allergies    Medication:  Current Facility-Administered Medications   Medication Dose Route Frequency Provider Last Rate Last Admin    sennosides-docusate (PERICOLACE) 8.6-50 MG per tablet 2 tablet  2 tablet Oral BID PRN Daya Ospina APRN        And    polyethylene glycol (MIRALAX) packet 17 g  17 g Oral Daily PRN Daya Ospina APRN        And    bisacodyl (DULCOLAX) EC tablet 5 mg  5 mg Oral Daily PRN Daya Ospina APRN        And    bisacodyl (DULCOLAX) suppository 10 mg  10 mg Rectal Daily PRN Daya Ospina APRN        carbidopa-levodopa (SINEMET)  MG per tablet 2 tablet  2 tablet Oral 4x Daily Daya Ospina APRN   2 tablet at 10/12/24 2140    carbidopa-levodopa ER (SINEMET CR)  MG per tablet 1 tablet  1 tablet Oral BID Daya Ospina APRN   1 tablet at 10/12/24 2140    fluticasone (FLONASE) 50 MCG/ACT nasal spray 2 spray  2 spray Each Nare Daily Daya Ospina APRN   2 spray at 10/12/24 0840    furosemide (LASIX) tablet 20 mg  20 mg Oral Daily Daya Ospina APRN   20 mg at 10/12/24 0839    gabapentin (NEURONTIN) capsule 100 mg  100 mg Oral Q12H John  Fabian NOBLES MD   100 mg at 10/12/24 2140    guaiFENesin (MUCINEX) 12 hr tablet 600 mg  600 mg Oral Q12H Fabian Lopez MD   600 mg at 10/12/24 2140    heparin (porcine) 5000 UNIT/ML injection 5,000 Units  5,000 Units Subcutaneous Q8H Fabian Lopez MD   5,000 Units at 10/13/24 0610    HYDROmorphone (DILAUDID) injection 0.5 mg  0.5 mg Intravenous Q2H PRN Fabian Lopez MD   0.5 mg at 10/11/24 2013    levothyroxine (SYNTHROID, LEVOTHROID) tablet 88 mcg  88 mcg Oral Q AM Daya Ospina APRN   88 mcg at 10/13/24 0610    Magnesium Standard Dose Replacement - Follow Nurse / BPA Driven Protocol   Does not apply PRN Daya Ospina APRN        pantoprazole (PROTONIX) EC tablet 40 mg  40 mg Oral Daily Daya Ospina APRN   40 mg at 10/12/24 1307    piperacillin-tazobactam (ZOSYN) 3.375 g IVPB in 100 mL NS MBP (CD)  3.375 g Intravenous Q8H Trevor Jorgensen MD   3.375 g at 10/13/24 0611    Potassium Replacement - Follow Nurse / BPA Driven Protocol   Does not apply PRN Daya Ospina APRN        pramipexole (MIRAPEX) tablet 1 mg  1 mg Oral TID Daya Ospina APRN   1 mg at 10/12/24 2140    QUEtiapine (SEROquel) tablet 12.5 mg  12.5 mg Oral Nightly Daya Ospina APRN   12.5 mg at 10/12/24 2140    sodium chloride 0.9 % flush 10 mL  10 mL Intravenous PRN Yuri Kessler MD        sodium chloride 0.9 % flush 10 mL  10 mL Intravenous Q12H Daya Ospina APRN   10 mL at 10/12/24 0840    sodium chloride 0.9 % flush 10 mL  10 mL Intravenous PRN Daya Ospina APRN        tamsulosin (FLOMAX) 24 hr capsule 0.4 mg  0.4 mg Oral Daily Daya Ospina APRN   0.4 mg at 10/12/24 0840       Antibiotics:  Anti-Infectives (From admission, onward)      Ordered     Dose/Rate Route Frequency Start Stop    10/09/24 0735  piperacillin-tazobactam (ZOSYN) 3.375 g IVPB in 100 mL NS MBP (CD)        Ordering Provider: Trevor Jorgensen MD    3.375 g  over 4 Hours Intravenous Every 8 Hours 10/09/24 1430 10/16/24 0759    10/09/24 0735   piperacillin-tazobactam (ZOSYN) 3.375 g IVPB in 100 mL NS MBP (CD)        Ordering Provider: Fabian Lopez MD    3.375 g  over 30 Minutes Intravenous Once 10/09/24 0830 10/09/24 0840    10/08/24 2008  vancomycin 2250 mg/500 mL 0.9% NS IVPB ([facility])        Ordering Provider: Yuri Kessler MD    20 mg/kg × 118 kg  over 135 Minutes Intravenous Once 10/08/24 2100 10/09/24 0346    10/08/24 2024  ceFAZolin 2000 mg IVPB in 100 mL NS (MBP)        Ordering Provider: Yuri Kessler MD    2,000 mg  over 30 Minutes Intravenous Once 10/08/24 2040 10/08/24 2203    10/08/24 2024  metroNIDAZOLE (FLAGYL) tablet 500 mg        Ordering Provider: Yuri Kessler MD    500 mg Oral Once 10/08/24 2040 10/08/24 2131              Review of Systems    10/13/24 per staff also    Constitutional-- No Fever, chills or sweats.  Appetite good, and no malaise. No fatigue.  Heent-- No new vision, hearing or throat complaints.  No epistaxis or oral sores.  Denies odynophagia or dysphagia.  No flashers, floaters or eye pain. No odynophagia or dysphagia. No headache, photophobia or neck stiffness.  CV-- No chest pain, palpitation or syncope  Resp--see above, no shortness of breath at present  GI- No nausea, vomiting, or diarrhea.  No hematochezia, melena, or hematemesis. Denies jaundice or chronic liver disease.  -- No dysuria, hematuria, or flank pain.  Denies hesitancy, urgency or flank pain.  Lymph- no swollen lymph nodes in neck/axilla or groin.   Heme- No active bruising or bleeding; no Hx of DVT or PE.  MS-- no swelling or pain in the bones or joints of arms/legs.  No new back pain.  Neuro-- No acute focal weakness or numbness in the arms or legs.  No seizures.  Chronic Parkinson's and chronically debilitated    Full 12 point review of systems reviewed and negative otherwise for acute complaints, except for above    Physical Exam:   Vital Signs   /67 (BP Location: Left arm, Patient Position: Lying)   Pulse 65    "Temp 96.3 °F (35.7 °C) (Oral)   Resp 16   Ht 182.9 cm (72.01\")   Wt 119 kg (262 lb 5.6 oz)   SpO2 94%   BMI 35.57 kg/m²     GENERAL: sleepy, in no acute distress.    HEENT: Normocephalic, atraumatic.    No conjunctival injection. No icterus. Oropharynx clear without evidence of thrush or exudate. No evidence of periodontal disease.    NECK: Supple without nuchal rigidity. No mass.   HEART: RRR; No murmur, rubs, gallops.   LUNGS: Diminished at bases but otherwise  clear to auscultation bilaterally without wheezing, rales, rhonchi. Normal respiratory effort. Nonlabored. No dullness.  ABDOMEN: Soft, nontender, nondistended. Positive bowel sounds. No rebound or guarding. NO mass or HSM.  EXT: See below   MSK: FROM without joint effusions noted arms/legs.    SKIN: Warm and dry without cutaneous eruptions on Inspection/palpation.    NEURO: sleepy.       Bilateral lower legs appeared edematous and discolored with vague scale.  However right side between knee and foot has warmth/tenderness and erythema out of proportion to left side and improving.  Right side second toe is  discolored with vague edema/erythema and tenderness out of proportion to other toes, crusted wound and unable to determine any depth with no definitive probe to bone tendon joint or ligament at present; no discrete mass bulge or fluctuance.  No crepitus or bulla    Laboratory Data    Results from last 7 days   Lab Units 10/12/24  0358 10/11/24  0641 10/10/24  0416   WBC 10*3/mm3 4.75 5.79 6.75   HEMOGLOBIN g/dL 10.9* 10.9* 10.5*   HEMATOCRIT % 34.8* 34.9* 32.7*   PLATELETS 10*3/mm3 253 254 241     Results from last 7 days   Lab Units 10/12/24  0358   SODIUM mmol/L 138   POTASSIUM mmol/L 4.0   CHLORIDE mmol/L 105   CO2 mmol/L 23.0   BUN mg/dL 18   CREATININE mg/dL 1.26   GLUCOSE mg/dL 143*   CALCIUM mg/dL 8.6     Results from last 7 days   Lab Units 10/11/24  0641   ALK PHOS U/L 98   BILIRUBIN mg/dL 0.2   ALT (SGPT) U/L <5   AST (SGOT) U/L 9 "     Results from last 7 days   Lab Units 10/08/24  1936   SED RATE mm/hr 92*     Results from last 7 days   Lab Units 10/08/24  1936   CRP mg/dL 4.81*       Estimated Creatinine Clearance: 65.4 mL/min (by C-G formula based on SCr of 1.26 mg/dL).      Microbiology:      Radiology:  Imaging Results (Last 72 Hours)       Procedure Component Value Units Date/Time    SLP FEES - Fiberoptic Endo Eval Swallow [[id number]] Resulted: 10/12/24 1532     Updated: 10/12/24 1532    Narrative:      This procedure was auto-finalized with no dictation required.    MRI Tibia Fibula Right Without Contrast [[id number]] Collected: 10/11/24 0451     Updated: 10/11/24 0456    Narrative:      MRI TIBIA FIBULA RIGHT WO CONTRAST    Date of Exam: 10/11/2024 4:39 AM EDT    Indication: Right lower extremity ulcer.     Comparison: 9/15/2024.    Technique:  Routine multiplanar/multisequence images of the right tibia and fibula were obtained without contrast administration.        Findings:  Osseous structures appear intact. No suspicious osseous edema identified. No evidence of fracture. No evidence of periostitis. Musculature appears unremarkable. No evidence of myofascial defect identified. No drainable collection identified. Limited   evaluation due to lack of intravenous contrast. Edema is seen tracking along the subcutaneous tissues as well as the superficial fascia distally which may be related to third spacing of fluid. No joint effusion identified. Vascular flow voids appear   grossly unremarkable.      Impression:      Impression:  No evidence of osteomyelitis. No drainable collection identified. No joint effusion.        Electronically Signed: Norma Ahn MD    10/11/2024 4:53 AM EDT    Workstation ID: [id number]              Impression:     --Acute right lower leg/foot and second toe infection/cellulitis out of proportion to his chronic discoloration by his report, trauma to the right second toe several weeks preceding admission as above.   Further complicated by his chronic comorbidity, wound at the second toe with maggots noted earlier in stay, with ongoing risk for further serious morbidity and other serious sequela; high risk for persistent/recurrent or nonhealing wounds, persistent/progressive or recurrent infection and risk for further functional/limb loss, risk for amputation etc.   MRI no osteomyelitis per radiology.     --Chronic/intermittent cough at presentation, CT scan without acute infiltrate per radiology and some atelectasis noted by them.  Encouraged incentive spirometry.  No productive cough at present.  Respiratory PCR negative.  Further pulmonary workup or referral at discretion of medicine team; pneumonia less likely at present but certainly could evolve; monitor for new process    --Hx trauma and maggots    --Parkinson's disease, chronically debilitated and primarily wheelchair-bound by his report.    --Prior history of DVT    PLAN:      -- IV  Zosyn for now.       -- Daptomycin stopped as no MDR GPC in culture so far.  If worse with this change or stronger evidence for MDR GPC as pathogen, then consideration could be given to adding back versus other adjustments    -- Check/review labs cultures and scans    -- Partial history per nursing staff    -- Discussed with microbiology    -- Highly complex set of issues with high risk for further serious morbidity and other serious sequela       Copied text in this note has been reviewed and is accurate as of 10/13/24.        Trevor Jorgensen MD  10/13/2024

## 2024-10-13 NOTE — PROGRESS NOTES
[facility] Medicine Services  PROGRESS NOTE    Patient Name: Cordell Martin  : 1945  MRN: [id number]    Date of Admission: 10/8/2024  Primary Care Physician: Ben Holder DO    Subjective   Subjective     CC:  Cellulitis     HPI:  No acute events. States he feels ok. Pain in his legs improved. Reviewed current plan.       Objective   Objective     Vital Signs:   Temp:  [96.3 °F (35.7 °C)-97.5 °F (36.4 °C)] 97.5 °F (36.4 °C)  Resp:  [16-17] 17  BP: (112-125)/(57-70) 112/59     Physical Exam:  Constitutional: chronically ill appearing   Respiratory: Clear to auscultation bilaterally, respiratory effort normal   Cardiovascular: RRR, no murmurs  Gastrointestinal: Positive bowel sounds, soft, nontender, nondistended  Musculoskeletal: +2 edema; legs wrapped with weeping   Psychiatric: flat  Neurologic: Oriented x 3, strength symmetric in all extremities, Cranial Nerves grossly intact to confrontation, speech clear    Results Reviewed:  LAB RESULTS:      Lab 10/12/24  0358 10/11/24  0641 10/10/24  0416 10/09/24  0404 10/08/24  1936   WBC 4.75 5.79 6.75 8.21 7.37   HEMOGLOBIN 10.9* 10.9* 10.5* 10.8* 10.4*   HEMATOCRIT 34.8* 34.9* 32.7* 34.7*  31.3* 33.4*   PLATELETS 253 254 241 244 250   NEUTROS ABS  --   --   --  5.82 4.88   IMMATURE GRANS (ABS)  --   --   --  0.03 0.04   LYMPHS ABS  --   --   --  1.31 1.45   MONOS ABS  --   --   --  0.64 0.66   EOS ABS  --   --   --  0.38 0.31   MCV 93.3 94.3 92.4 94.3 94.1   SED RATE  --   --   --   --  92*   CRP  --   --   --   --  4.81*   PROCALCITONIN  --   --   --   --  0.10   LACTATE  --   --   --   --  1.6   D DIMER QUANT  --   --   --   --  0.90*         Lab 10/12/24  0358 10/11/24  0641 10/10/24  0416 10/09/24  1529 10/09/24  0404 10/08/24  2226 10/08/24  1936   SODIUM 138 138 140  --  142  --  138   POTASSIUM 4.0 4.4 4.4 4.3 3.4*  --  4.2   CHLORIDE 105 104 106  --  107  --  106   CO2 23.0 27.0 24.0  --  25.0  --  24.0   ANION GAP  10.0 7.0 10.0  --  10.0  --  8.0   BUN 18 18 20  --  21  --  22   CREATININE 1.26 1.11 1.09  --  1.14  --  1.26   EGFR 58.7* 68.4 69.9  --  66.2  --  58.7*   GLUCOSE 143* 128* 98  --  120*  --  148*   CALCIUM 8.6 8.4* 8.3*  --  8.4*  --  8.5*   IONIZED CALCIUM  --   --   --   --   --  1.17  --    MAGNESIUM  --   --   --   --   --   --  2.2   PHOSPHORUS  --   --   --   --   --   --  3.0   HEMOGLOBIN A1C  --   --   --   --  6.40*  --   --    TSH  --   --   --   --   --   --  15.850*         Lab 10/11/24  0641 10/10/24  0416 10/08/24  1936   TOTAL PROTEIN 6.3 5.4* 6.0   ALBUMIN 2.6* 2.7* 2.7*   GLOBULIN 3.7 2.7 3.3   ALT (SGPT) <5 <5 <5   AST (SGOT) 9 9 12   BILIRUBIN 0.2 0.3 0.3   ALK PHOS 98 105 108   LIPASE  --   --  11*         Lab 10/08/24  1936   PROBNP 98.4             Lab 10/08/24  1936   IRON 21*  21*   IRON SATURATION (TSAT) 10*   TIBC 206*   TRANSFERRIN 138*   FERRITIN 241.90   FOLATE 8.42   VITAMIN B 12 313         Lab 10/08/24  2049   FIO2 21   CARBOXYHEMOGLOBIN (VENOUS) 1.3     Brief Urine Lab Results  (Last result in the past 365 days)        Color   Clarity   Blood   Leuk Est   Nitrite   Protein   CREAT   Urine HCG        10/08/24 2219 Yellow   Clear   Negative   Negative   Negative   Negative                   Microbiology Results Abnormal       Procedure Component Value - Date/Time    Blood Culture - Blood, Hand, Left [[id number]]  (Normal) Collected: 10/08/24 2120    Lab Status: Preliminary result Specimen: Blood from Hand, Left Updated: 10/12/24 2146     Blood Culture No growth at 4 days    Narrative:      Less than seven (7) mL's of blood was collected.  Insufficient quantity may yield false negative results.    Blood Culture - Blood, Hand, Right [[id number]]  (Normal) Collected: 10/08/24 2050    Lab Status: Preliminary result Specimen: Blood from Hand, Right Updated: 10/12/24 2131     Blood Culture No growth at 4 days    Parasite Identification - Parasite, Foot, Right [[id number]] Collected:  10/09/24 1358    Lab Status: Final result Specimen: Parasite from Foot, Right Updated: 10/09/24 1407     Macroscopic Exam Maggots    Respiratory Panel PCR w/COVID-19(SARS-CoV-2) KENJI/ASH/SHIRLEY/PAD/COR/CORNELIUS In-House, NP Swab in UTM/VTM, 2 HR TAT - Swab, Nasopharynx [[id number]]  (Normal) Collected: 10/09/24 0548    Lab Status: Final result Specimen: Swab from Nasopharynx Updated: 10/09/24 0643     ADENOVIRUS, PCR Not Detected     Coronavirus 229E Not Detected     Coronavirus HKU1 Not Detected     Coronavirus NL63 Not Detected     Coronavirus OC43 Not Detected     COVID19 Not Detected     Human Metapneumovirus Not Detected     Human Rhinovirus/Enterovirus Not Detected     Influenza A PCR Not Detected     Influenza B PCR Not Detected     Parainfluenza Virus 1 Not Detected     Parainfluenza Virus 2 Not Detected     Parainfluenza Virus 3 Not Detected     Parainfluenza Virus 4 Not Detected     RSV, PCR Not Detected     Bordetella pertussis pcr Not Detected     Bordetella parapertussis PCR Not Detected     Chlamydophila pneumoniae PCR Not Detected     Mycoplasma pneumo by PCR Not Detected    Narrative:      In the setting of a positive respiratory panel with a viral infection PLUS a negative procalcitonin without other underlying concern for bacterial infection, consider observing off antibiotics or discontinuation of antibiotics and continue supportive care. If the respiratory panel is positive for atypical bacterial infection (Bordetella pertussis, Chlamydophila pneumoniae, or Mycoplasma pneumoniae), consider antibiotic de-escalation to target atypical bacterial infection.    COVID PRE-OP / PRE-PROCEDURE SCREENING ORDER (NO ISOLATION) - Swab, Nasopharynx [[id number]]  (Normal) Collected: 10/08/24 2126    Lab Status: Final result Specimen: Swab from Nasopharynx Updated: 10/08/24 2220    Narrative:      The following orders were created for panel order COVID PRE-OP / PRE-PROCEDURE SCREENING ORDER (NO ISOLATION) - Swab,  Nasopharynx.  Procedure                               Abnormality         Status                     ---------                               -----------         ------                     COVID-19, FLU A/B, RSV P...[[id number]]  Normal              Final result                 Please view results for these tests on the individual orders.    COVID-19, FLU A/B, RSV PCR 1 HR TAT - Swab, Nasopharynx [[id number]]  (Normal) Collected: 10/08/24 2126    Lab Status: Final result Specimen: Swab from Nasopharynx Updated: 10/08/24 2220     COVID19 Not Detected     Influenza A PCR Not Detected     Influenza B PCR Not Detected     RSV, PCR Not Detected    Narrative:      Fact sheet for providers: https://www.fda.gov/media/638464/download    Fact sheet for patients: https://www.fda.gov/media/093476/download    Test performed by PCR.            Doppler Arterial Multi Level Lower Extremity - Bilateral CAR    Result Date: 10/12/2024    Right Conclusion: The right JOHNNA is unable to be assessed due to vessel incompressibility.  Multiphasic waveforms in the femoral and dorsalis pedis segments.  Monophasic intermediate resistance waveforms in the popliteal and posterior tibial segments.   Left Conclusion: The left JOHNNA is unable to be assessed due to vessel incompressibility.  Multiphasic waveforms in the femoral-popliteal segments.  Monophasic intermediate resistance waveforms in the dorsalis pedis.  The posterior tibial waveform was not assessed due to the presence of wounds.     SLP FEES - Fiberoptic Endo Eval Swallow    Result Date: 10/12/2024  This procedure was auto-finalized with no dictation required.     Results for orders placed during the hospital encounter of 01/12/21    Adult Transthoracic Echo Complete W/ Cont if Necessary Per Protocol    Interpretation Summary  · The right ventricle and right atrium are moderately dilated. Other chamber sizes and wall thicknesses are normal.  · Global and segmental LV wall motion is normal.  "The estimated left ventricular ejection fraction is 51% - 55%.  · The aortic and mitral valves are normal in structure and function.  · The estimated RV systolic pressure is mildly elevated 35 mmHg - 40 mmHg. There is as well noted to be less than 50% inspiratory IVC collapse. The main pulmonary artery is \"borderline dilated\".  · There are no other important findings on this study.      Current medications:  Scheduled Meds:carbidopa-levodopa, 2 tablet, Oral, 4x Daily  carbidopa-levodopa ER, 1 tablet, Oral, BID  fluticasone, 2 spray, Each Nare, Daily  furosemide, 20 mg, Oral, Daily  gabapentin, 100 mg, Oral, Q12H  guaiFENesin, 600 mg, Oral, Q12H  heparin (porcine), 5,000 Units, Subcutaneous, Q8H  levothyroxine, 88 mcg, Oral, Q AM  pantoprazole, 40 mg, Oral, Daily  piperacillin-tazobactam, 3.375 g, Intravenous, Q8H  pramipexole, 1 mg, Oral, TID  QUEtiapine, 12.5 mg, Oral, Nightly  sodium chloride, 10 mL, Intravenous, Q12H  tamsulosin, 0.4 mg, Oral, Daily      Continuous Infusions:   PRN Meds:.  senna-docusate sodium **AND** polyethylene glycol **AND** bisacodyl **AND** bisacodyl    HYDROmorphone    Magnesium Standard Dose Replacement - Follow Nurse / BPA Driven Protocol    Potassium Replacement - Follow Nurse / BPA Driven Protocol    sodium chloride    sodium chloride    Assessment & Plan   Assessment & Plan     Active Hospital Problems    Diagnosis  POA    **Cellulitis [L03.90]  Yes    History of DVT (deep vein thrombosis) [Z86.718]  Not Applicable    Anemia, chronic disease [D63.8]  Yes    Elevated TSH [R79.89]  Yes    GERD [K21.9]  Yes    Chronic cough [R05.3]  Yes    Hypothyroidism (acquired) [E03.9]  Yes    Chronic edema [R60.9]  Yes    Parkinson's disease [G20.A1]  Yes    Anxiety [F41.9]  Yes    Hyperlipidemia [E78.5]  Yes    Depression [F32.A]  Yes      Resolved Hospital Problems   No resolved problems to display.        Brief Hospital Course to date:  Cordell Martin is a 79 y.o. male with history of " Parkinson's, history of DVT, HL, hypothyroidism, GERD, lymphedema, anxiety/depression, with wheelchair use/walker use at [facility] here with cellulitis, B LE wounds     B LE wounds  Edema  Cellulitis, maggots noted, R second toe markedly abnormal, wound between first/second toe with drainage  - Duplex with chronic DVT noted  - MRI foot and tib/fib with no evidence of osteo   - Wound culture with pseudomonas   - Evaluated by ortho -- conservative management and monitoring  - ID consulted - initially on dapto/zosyn -- dapto discontinued 10/12 after culture resulted with pseudomonas   -ABIs unable to be assessed. Consider vascular in AM   - WOC consult     Cough  Possible LLL PNA on CXR, not as impressive on CT  -pulmonary toilet  -respiratory PCR negative  - speech consulted and s/p FEES with regular/thins      Chronic anemia  -monitor     Parkinson's disease  Anxiety/depression  -continue sinemet, seroquel, mirapex     Hypothyroidism  -on synthorid, with elevated TSH and normal T4  -follow up outpatient     HL  -statin     GERD  -PPI    Expected Discharge Location and Transportation: rehab recs   Expected Discharge   Expected Discharge Date: 10/15/2024; Expected Discharge Time:      VTE Prophylaxis:  Pharmacologic VTE prophylaxis orders are present.         AM-PAC 6 Clicks Score (PT): 14 (10/12/24 1600)    CODE STATUS:   Code Status and Medical Interventions: No CPR (Do Not Attempt to Resuscitate); Limited Support; No intubation (DNI); DNR/DNI   Ordered at: 10/09/24 0206     Medical Intervention Limits:    No intubation (DNI)     Level Of Support Discussed With:    Patient     Code Status (Patient has no pulse and is not breathing):    No CPR (Do Not Attempt to Resuscitate)     Medical Interventions (Patient has pulse or is breathing):    Limited Support     Comments:    DNR/DNI       Angely Mukherjee,   10/13/24

## 2024-10-14 LAB
ANION GAP SERPL CALCULATED.3IONS-SCNC: 6 MMOL/L (ref 5–15)
BUN SERPL-MCNC: 20 MG/DL (ref 8–23)
BUN/CREAT SERPL: 18.3 (ref 7–25)
CALCIUM SPEC-SCNC: 8.6 MG/DL (ref 8.6–10.5)
CHLORIDE SERPL-SCNC: 106 MMOL/L (ref 98–107)
CO2 SERPL-SCNC: 27 MMOL/L (ref 22–29)
CREAT SERPL-MCNC: 1.09 MG/DL (ref 0.76–1.27)
DEPRECATED RDW RBC AUTO: 54.3 FL (ref 37–54)
EGFRCR SERPLBLD CKD-EPI 2021: 69.9 ML/MIN/1.73
ERYTHROCYTE [DISTWIDTH] IN BLOOD BY AUTOMATED COUNT: 15.9 % (ref 12.3–15.4)
GLUCOSE SERPL-MCNC: 129 MG/DL (ref 65–99)
HCT VFR BLD AUTO: 35 % (ref 37.5–51)
HGB BLD-MCNC: 10.8 G/DL (ref 13–17.7)
MCH RBC QN AUTO: 29 PG (ref 26.6–33)
MCHC RBC AUTO-ENTMCNC: 30.9 G/DL (ref 31.5–35.7)
MCV RBC AUTO: 93.8 FL (ref 79–97)
PLATELET # BLD AUTO: 266 10*3/MM3 (ref 140–450)
PMV BLD AUTO: 8.7 FL (ref 6–12)
POTASSIUM SERPL-SCNC: 4.1 MMOL/L (ref 3.5–5.2)
QT INTERVAL: 422 MS
QTC INTERVAL: 477 MS
RBC # BLD AUTO: 3.73 10*6/MM3 (ref 4.14–5.8)
SODIUM SERPL-SCNC: 139 MMOL/L (ref 136–145)
WBC NRBC COR # BLD AUTO: 6.89 10*3/MM3 (ref 3.4–10.8)

## 2024-10-14 PROCEDURE — 85027 COMPLETE CBC AUTOMATED: CPT | Performed by: INTERNAL MEDICINE

## 2024-10-14 PROCEDURE — 99232 SBSQ HOSP IP/OBS MODERATE 35: CPT | Performed by: NURSE PRACTITIONER

## 2024-10-14 PROCEDURE — 25010000002 PIPERACILLIN SOD-TAZOBACTAM PER 1 G: Performed by: INTERNAL MEDICINE

## 2024-10-14 PROCEDURE — 25010000002 HEPARIN (PORCINE) PER 1000 UNITS: Performed by: HOSPITALIST

## 2024-10-14 PROCEDURE — 25010000002 HYDROMORPHONE PER 4 MG: Performed by: HOSPITALIST

## 2024-10-14 PROCEDURE — 80048 BASIC METABOLIC PNL TOTAL CA: CPT | Performed by: INTERNAL MEDICINE

## 2024-10-14 PROCEDURE — 97530 THERAPEUTIC ACTIVITIES: CPT

## 2024-10-14 RX ORDER — ATORVASTATIN CALCIUM 40 MG/1
40 TABLET, FILM COATED ORAL DAILY
Status: DISCONTINUED | OUTPATIENT
Start: 2024-10-14 | End: 2024-10-19 | Stop reason: HOSPADM

## 2024-10-14 RX ORDER — HYDROCODONE BITARTRATE AND ACETAMINOPHEN 5; 325 MG/1; MG/1
1 TABLET ORAL EVERY 6 HOURS PRN
Status: DISCONTINUED | OUTPATIENT
Start: 2024-10-14 | End: 2024-10-19 | Stop reason: HOSPADM

## 2024-10-14 RX ORDER — HYDROMORPHONE HYDROCHLORIDE 1 MG/ML
0.25 INJECTION, SOLUTION INTRAMUSCULAR; INTRAVENOUS; SUBCUTANEOUS
Status: DISCONTINUED | OUTPATIENT
Start: 2024-10-14 | End: 2024-10-19 | Stop reason: HOSPADM

## 2024-10-14 RX ADMIN — CARBIDOPA AND LEVODOPA 1 TABLET: 25; 100 TABLET, EXTENDED RELEASE ORAL at 08:43

## 2024-10-14 RX ADMIN — ATORVASTATIN CALCIUM 40 MG: 40 TABLET, FILM COATED ORAL at 11:53

## 2024-10-14 RX ADMIN — FLUTICASONE PROPIONATE 2 SPRAY: 50 SPRAY, METERED NASAL at 08:43

## 2024-10-14 RX ADMIN — GUAIFENESIN 600 MG: 600 TABLET, EXTENDED RELEASE ORAL at 08:42

## 2024-10-14 RX ADMIN — CARBIDOPA AND LEVODOPA 2 TABLET: 25; 100 TABLET ORAL at 21:25

## 2024-10-14 RX ADMIN — PIPERACILLIN AND TAZOBACTAM 3.38 G: 3; .375 INJECTION, POWDER, LYOPHILIZED, FOR SOLUTION INTRAVENOUS at 21:47

## 2024-10-14 RX ADMIN — PRAMIPEXOLE DIHYDROCHLORIDE 1 MG: 1 TABLET ORAL at 15:01

## 2024-10-14 RX ADMIN — CARBIDOPA AND LEVODOPA 2 TABLET: 25; 100 TABLET ORAL at 08:42

## 2024-10-14 RX ADMIN — HEPARIN SODIUM 5000 UNITS: 5000 INJECTION INTRAVENOUS; SUBCUTANEOUS at 21:26

## 2024-10-14 RX ADMIN — FUROSEMIDE 20 MG: 20 TABLET ORAL at 08:43

## 2024-10-14 RX ADMIN — PIPERACILLIN AND TAZOBACTAM 3.38 G: 3; .375 INJECTION, POWDER, LYOPHILIZED, FOR SOLUTION INTRAVENOUS at 06:09

## 2024-10-14 RX ADMIN — HEPARIN SODIUM 5000 UNITS: 5000 INJECTION INTRAVENOUS; SUBCUTANEOUS at 06:08

## 2024-10-14 RX ADMIN — PRAMIPEXOLE DIHYDROCHLORIDE 1 MG: 1 TABLET ORAL at 21:25

## 2024-10-14 RX ADMIN — CARBIDOPA AND LEVODOPA 2 TABLET: 25; 100 TABLET ORAL at 17:14

## 2024-10-14 RX ADMIN — HEPARIN SODIUM 5000 UNITS: 5000 INJECTION INTRAVENOUS; SUBCUTANEOUS at 15:02

## 2024-10-14 RX ADMIN — Medication 10 ML: at 08:43

## 2024-10-14 RX ADMIN — Medication 10 ML: at 21:26

## 2024-10-14 RX ADMIN — GUAIFENESIN 600 MG: 600 TABLET, EXTENDED RELEASE ORAL at 21:25

## 2024-10-14 RX ADMIN — TAMSULOSIN HYDROCHLORIDE 0.4 MG: 0.4 CAPSULE ORAL at 08:42

## 2024-10-14 RX ADMIN — CARBIDOPA AND LEVODOPA 2 TABLET: 25; 100 TABLET ORAL at 11:52

## 2024-10-14 RX ADMIN — CARBIDOPA AND LEVODOPA 1 TABLET: 25; 100 TABLET, EXTENDED RELEASE ORAL at 21:26

## 2024-10-14 RX ADMIN — LEVOTHYROXINE SODIUM 88 MCG: 0.09 TABLET ORAL at 06:08

## 2024-10-14 RX ADMIN — HYDROMORPHONE HYDROCHLORIDE 0.5 MG: 1 INJECTION, SOLUTION INTRAMUSCULAR; INTRAVENOUS; SUBCUTANEOUS at 12:39

## 2024-10-14 RX ADMIN — PRAMIPEXOLE DIHYDROCHLORIDE 1 MG: 1 TABLET ORAL at 08:42

## 2024-10-14 RX ADMIN — GABAPENTIN 100 MG: 100 CAPSULE ORAL at 21:25

## 2024-10-14 RX ADMIN — PANTOPRAZOLE SODIUM 40 MG: 40 TABLET, DELAYED RELEASE ORAL at 11:53

## 2024-10-14 RX ADMIN — GABAPENTIN 100 MG: 100 CAPSULE ORAL at 08:43

## 2024-10-14 RX ADMIN — PIPERACILLIN AND TAZOBACTAM 3.38 G: 3; .375 INJECTION, POWDER, LYOPHILIZED, FOR SOLUTION INTRAVENOUS at 15:01

## 2024-10-14 RX ADMIN — QUETIAPINE FUMARATE 12.5 MG: 25 TABLET ORAL at 21:26

## 2024-10-14 RX ADMIN — HYDROMORPHONE HYDROCHLORIDE 0.5 MG: 1 INJECTION, SOLUTION INTRAMUSCULAR; INTRAVENOUS; SUBCUTANEOUS at 15:01

## 2024-10-14 NOTE — THERAPY TREATMENT NOTE
Patient Name: Cordell Martin  : 1945    MRN: [id number]                              Today's Date: 10/14/2024       Admit Date: 10/8/2024    Visit Dx:     ICD-10-CM ICD-9-CM   1. Cellulitis of lower extremity, unspecified laterality  L03.119 682.6   2. Lymphedema  I89.0 457.1   3. Pain in both lower extremities  M79.604 729.5    M79.605    4. Chronic edema  R60.9 782.3   5. Parkinson's disease, unspecified whether dyskinesia present, unspecified whether manifestations fluctuate  G20.A1 332.0     Patient Active Problem List   Diagnosis    Anxiety    Arthritis    Depression    Hyperlipidemia    Lumbar stenosis with neurogenic claudication    Parkinson's disease    Lumbar stenosis with neurogenic claudication status post L4-5 decompression    Status post lumbar laminectomy    Memory loss    Chronic edema    Cellulitis of right lower extremity    Exertional dyspnea    Right knee pain    Cellulitis of lower extremity    Hypothyroidism (acquired)    Elevated serum creatinine    Cellulitis of right leg    Acute deep vein thrombosis (DVT) of right lower extremity    Pneumonia    Pulmonary nodule (4mm RML incidental)    Chronic cough    GERD    Remote smoker (Stopped )    Restrictive pattern on PFTs w/o evidence of ILD    Cellulitis    History of DVT (deep vein thrombosis)    Anemia, chronic disease    Elevated TSH     Past Medical History:   Diagnosis Date    Anxiety     Arthritis     Back pain     Bronchitis     Cognitive impairment     Depression     Disease of thyroid gland     Glucose intolerance (impaired glucose tolerance)     Hyperlipidemia     Kidney stone     Obesity     Parkinson disease     Pneumonia     Prostate disorder     Thyroid disease     Wears eyeglasses      Past Surgical History:   Procedure Laterality Date    COLONOSCOPY      5 years ago    EYE SURGERY      HERNIA REPAIR  10/20/2020    KNEE SURGERY      LUMBAR LAMINECTOMY DISCECTOMY DECOMPRESSION N/A 2017    Procedure: LUMBAR  LAMINECTOMY L4-5;  Surgeon: Antonio Trent MD;  Location: [facility];  Service:     SHOULDER ROTATOR CUFF REPAIR Right     x2    TONSILLECTOMY      VEIN SURGERY        General Information       Row Name 10/14/24 1517          Physical Therapy Time and Intention    Document Type therapy note (daily note) (P)   -LE     Mode of Treatment physical therapy (P)   -LE       Row Name 10/14/24 1517          General Information    Patient Profile Reviewed yes (P)   -LE     Existing Precautions/Restrictions fall (P)   Cellulitis in BLE; Parkinson's Disease  -LE     Barriers to Rehab medically complex;previous functional deficit;physical barrier (P)   -LE       Row Name 10/14/24 1517          Cognition    Orientation Status (Cognition) oriented x 4 (P)   -LE       Row Name 10/14/24 1517          Safety Issues/Impairments Affecting Functional Mobility    Safety Issues Affecting Function (Mobility) insight into deficits/self-awareness;safety precaution awareness;safety precautions follow-through/compliance;sequencing abilities (P)   -LE     Impairments Affecting Function (Mobility) balance;endurance/activity tolerance;coordination;pain;sensation/sensory awareness;strength;range of motion (ROM);postural/trunk control;motor control (P)   -LE     Cognitive Impairments, Mobility Safety/Performance awareness, need for assistance;insight into deficits/self-awareness;judgment;problem-solving/reasoning;safety precaution awareness;safety precaution follow-through (P)   -LE               User Key  (r) = Recorded By, (t) = Taken By, (c) = Cosigned By      Initials Name Provider Type    LE River Ring PT Student PT Student                   Mobility       Row Name 10/14/24 1518          Bed Mobility    Comment, (Bed Mobility) Received in chair and returned to chair (P)   -LE       Row Name 10/14/24 1518          Sit-Stand Transfer    Sit-Stand [place] (Transfers) verbal cues;minimum assist (75% patient effort);2 person assist (P)    -LE     Assistive Device (Sit-Stand Transfers) walker, front-wheeled (P)   -LE     Comment, (Sit-Stand Transfer) x1 from chair. Extended time and effort to perform (P)   -LE       Row Name 10/14/24 1518          Gait/Stairs (Locomotion)    [place] Level (Gait) minimum assist (75% patient effort);1 person assist;1 person to manage equipment;verbal cues;nonverbal cues (demo/gesture) (P)   Chair follow  -LE     Assistive Device (Gait) walker, front-wheeled (P)   -LE     Patient was able to Ambulate yes (P)   -LE     Distance in Feet (Gait) 15 (P)   -LE     Deviations/Abnormal Patterns (Gait) bilateral deviations;base of support, wide;amparo decreased;gait speed decreased;stride length decreased;weight shifting decreased (P)   -LE     Bilateral Gait Deviations forward flexed posture;heel strike decreased (P)   -LE     Left Sided Gait Deviations decreased knee extension (P)   -LE     Right Sided Gait Deviations decreased knee extension (P)   -LE     Comment, (Gait/Stairs) Ambulated across room 15 ft with FWW and Clem x1 with a chair follow. Legs externally rotated and flexed knees throughout gait. Very slow gait speed (P)   -LE               User Key  (r) = Recorded By, (t) = Taken By, (c) = Cosigned By      Initials Name Provider Type    River Alexander PT Student PT Student                   Obj/Interventions       Row Name 10/14/24 1521          Balance    Balance Assessment sitting static balance;sitting dynamic balance;standing static balance;standing dynamic balance (P)   -LE     Static Sitting Balance standby assist (P)   -LE     Dynamic Sitting Balance standby assist (P)   -LE     Position, Sitting Balance unsupported;sitting in chair (P)   -LE     Sit to Stand Dynamic Balance minimal assist;2-person assist (P)   -LE     Static Standing Balance contact guard (P)   -LE     Dynamic Standing Balance minimal assist;1-person assist (P)   -LE     Position/Device Used, Standing Balance supported;walker,  front-wheeled (P)   -LE     Balance Interventions sitting;standing;sit to stand;supported;static;dynamic;occupation based/functional task (P)   -LE               User Key  (r) = Recorded By, (t) = Taken By, (c) = Cosigned By      Initials Name Provider Type    River Alexander, PT Student PT Student                   Goals/Plan    No documentation.                  Clinical Impression       Row Name 10/14/24 1523          Pain    Pretreatment Pain Rating 8/10 (P)   -LE     Posttreatment Pain Rating 8/10 (P)   -LE     Pain Location other (see comments) (P)   Leg  -LE     Pain Side/Orientation right (P)   -LE     Response to Pain Interventions activity participation with tolerable pain (P)   -LE     Pain Intervention(s) Repositioned;Ambulation/increased activity;Nursing Notified (P)   -LE       Row Name 10/14/24 1523          Plan of Care Review    Plan of Care Reviewed With patient (P)   -LE     Progress improving (P)   -LE     Outcome Evaluation Patient was able to ambulate today 15 ft across his room with chair follow using FWW and Clem x1. Patient had severely decreased gait speed and had short step length. Patient struggled to fully extend LE when ambulating. Patient was limited by fatigue and SOA at the end of the ambulation. Patient will benefit from further skilled PT in order to improve activity tolerance and ambulation. (P)   -LE       Row Name 10/14/24 1523          Therapy Assessment/Plan (PT)    Patient/Family Therapy Goals Statement (PT) Return to PLOF (P)   -LE     Rehab Potential (PT) fair (P)   -LE     Criteria for Skilled Interventions Met (PT) yes;meets criteria;skilled treatment is necessary (P)   -LE     Therapy Frequency (PT) daily (P)   -LE       Row Name 10/14/24 1523          Vital Signs    Pre Systolic BP Rehab 100 (P)   -LE     Pre Treatment Diastolic BP 55 (P)   -LE     Pretreatment Heart Rate (beats/min) 53 (P)   -LE     Posttreatment Heart Rate (beats/min) 59 (P)   -LE     Pre SpO2 (%)  90 (P)   -LE     O2 Delivery Pre Treatment room air (P)   -LE     Post SpO2 (%) 93 (P)   -LE     O2 Delivery Post Treatment room air (P)   -LE     Pre Patient Position Sitting (P)   -LE     Intra Patient Position Standing (P)   -LE     Post Patient Position Sitting (P)   -LE       Row Name 10/14/24 1523          Positioning and Restraints    Pre-Treatment Position sitting in chair/recliner (P)   -LE     Post Treatment Position chair (P)   -LE     In Chair notified nsg;reclined;call light within reach;encouraged to call for assist;exit alarm on;waffle cushion;legs elevated;heels elevated (P)   -LE               User Key  (r) = Recorded By, (t) = Taken By, (c) = Cosigned By      Initials Name Provider Type    River Alexander, PT Student PT Student                   Outcome Measures       Row Name 10/14/24 1527          How much help from another person do you currently need...    Turning from your back to your side while in flat bed without using bedrails? 3 (P)   -LE     Moving from lying on back to sitting on the side of a flat bed without bedrails? 2 (P)   -LE     Moving to and from a bed to a chair (including a wheelchair)? 3 (P)   -LE     Standing up from a chair using your arms (e.g., wheelchair, bedside chair)? 3 (P)   -LE     Climbing 3-5 steps with a railing? 2 (P)   -LE     To walk in hospital room? 3 (P)   -LE     AM-PAC 6 Clicks Score (PT) 16 (P)   -LE     Highest Level of Mobility Goal 5 --> Static standing (P)   -LE       Row Name 10/14/24 1527          Functional Assessment    Outcome Measure Options AM-PAC 6 Clicks Basic Mobility (PT) (P)   -LE               User Key  (r) = Recorded By, (t) = Taken By, (c) = Cosigned By      Initials Name Provider Type    River Alexander, PT Student PT Student                                 Physical Therapy Education       Title: PT OT SLP Therapies (Done)       Topic: Physical Therapy (Done)       Point: Mobility training (Done)       Learning Progress Summary             Patient Acceptance, E, VU by LE at 10/14/2024 1527    Acceptance, E,TB, VU by BT at 10/13/2024 1557    Acceptance, E, VU by LE at 10/10/2024 1100                      Point: Home exercise program (Done)       Learning Progress Summary            Patient Acceptance, E, VU by LE at 10/14/2024 1527    Acceptance, E,TB, VU by BT at 10/13/2024 1557                      Point: Body mechanics (Done)       Learning Progress Summary            Patient Acceptance, E, VU by LE at 10/14/2024 1527    Acceptance, E,TB, VU by BT at 10/13/2024 1557    Acceptance, E, VU by LE at 10/10/2024 1100                      Point: Precautions (Done)       Learning Progress Summary            Patient Acceptance, E, VU by LE at 10/14/2024 1527    Acceptance, E,TB, VU by BT at 10/13/2024 1557    Acceptance, E, VU by LE at 10/10/2024 1100                                      User Key       Initials Effective Dates Name Provider Type Discipline    ALFA 12/30/20 -  Adriana Heath RN Registered Nurse Nurse    RHETT 07/30/24 -  River Ring, PT Student PT Student PT                  PT Recommendation and Plan  Planned Therapy Interventions (PT): balance training, bed mobility training, gait training, home exercise program, neuromuscular re-education, patient/family education, postural re-education, ROM (range of motion), strengthening, stretching, transfer training  Plan of Care Reviewed With: patient  Progress: (P) improving  Outcome Evaluation: (P) Patient was able to ambulate today 15 ft across his room with chair follow using FWW and Clem x1. Patient had severely decreased gait speed and had short step length. Patient struggled to fully extend LE when ambulating. Patient was limited by fatigue and SOA at the end of the ambulation. Patient will benefit from further skilled PT in order to improve activity tolerance and ambulation.     Time Calculation:   PT Evaluation Complexity  History, PT Evaluation Complexity: 3 or more personal  factors and/or comorbidities  Examination of Body Systems (PT Eval Complexity): total of 3 or more elements  Clinical Presentation (PT Evaluation Complexity): evolving  Clinical Decision Making (PT Evaluation Complexity): moderate complexity  Overall Complexity (PT Evaluation Complexity): moderate complexity     PT Charges       Row Name 10/14/24 1528             Time Calculation    Start Time 1418 (P)   -LE      PT Received On 10/14/24 (P)   -LE      PT Goal Re-Cert Due Date 10/20/24 (P)   -LE         Timed Charges    32727 - PT Therapeutic Activity Minutes 24 (P)   -LE         Total Minutes    Timed Charges Total Minutes 24 (P)   -LE       Total Minutes 24 (P)   -LE                User Key  (r) = Recorded By, (t) = Taken By, (c) = Cosigned By      Initials Name Provider Type    River Alexander, PT Student PT Student                  Therapy Charges for Today       Code Description Service Date Service Provider Modifiers Qty    [id number] HC PT THERAPEUTIC ACT EA 15 MIN 10/14/2024 River Ring, PT Student GP 2            PT G-Codes  Outcome Measure Options: (P) AM-PAC 6 Clicks Basic Mobility (PT)  AM-PAC 6 Clicks Score (PT): (P) 16  AM-PAC 6 Clicks Score (OT): 14  PT Discharge Summary  Anticipated Discharge Disposition (PT): (P) inpatient rehabilitation facility    River Ring PT Student  10/14/2024

## 2024-10-14 NOTE — PLAN OF CARE
Goal Outcome Evaluation:         Pt is alert and oriented. SOHA, KALLI. VSS. IV zosyn currently infusing. BLE dress change completed last night. Dressed with xreform and wrapped with curlex. Bed alarm is active and audible, bed in lowest position, call light within reach, and no other requests at this time. Care on-going.

## 2024-10-14 NOTE — CASE MANAGEMENT/SOCIAL WORK
Discharge Planning Assessment  [facility]     Patient Name: Cordell Martin  MRN: [id number]  Today's Date: 10/14/2024    Admit Date: 10/8/2024    Plan: IPR     Discharge Plan       Row Name 10/14/24 1623       Plan    Plan IPR    Patient/Family in Agreement with Plan yes    Plan Comments I have spoken to Mr. Martin in his room today.  He remains agreeable to [facility] for inpatient rehab.  I have confirmed with April at  that she will review today.  CM will cont to follow plan of care and assist with discharge planning as recommendations become available.    Final Discharge Disposition Code 62 - inpatient rehab facility                  Continued Care and Services - Admitted Since 10/8/2024       Destination       Service Provider Request Status Services Address Phone Fax Patient Preferred    [facility] Considering -- [address] [address] [phone] --    [facility] Pending - No Request Sent -- [address] [facility] [address] [phone] --    [facility] Pending - No Request Sent -- [address] [facility] [address] [phone] --    [facility] Pending - No Request Sent -- [address] [address] [phone] --                  Expected Discharge Date and Time       Expected Discharge Date Expected Discharge Time    Oct 16, 2024           Kenisha Davalos RN

## 2024-10-14 NOTE — NURSING NOTE
Woc reconsulted for bilateral lower extremities.    Not too much different from the wounds from my partners assessment however the anterior aspect of the second toe and the inside aspect of the great toe have opened.    Legs are still swollen red dry flaky skin on the outside.  Nothing that I can tell was open on the left leg however the posterior leg from behind the knee down to right above the ankle had macerated epithelium with dermal erosion.    Per Dopplers on 8 the patient has bilateral DVTs 1 in the right leg and 1 under the left knee which is superficial.    The ABIs were read on the 12th and stated that the ABIs were 1.46 in the right and 1.4 in the left.  This indicates that arteries are calcified.  This is still not enough information for compression we need to toe brachial index.    I cannot recommend compression wraps at this time.    Woc cleanse both legs with Vashe on the right leg I have placed Xeroform and ABD pads over the dermal erosion and Z guard over the dry flakiness Xeroform over the toes wrapped with 2 Kerlix's and secured with Spandage.    The left leg I just covered and Z guard wrapped with 2 Kerlix's and Spandage.  These can be changed every other day.

## 2024-10-14 NOTE — PROGRESS NOTES
[facility] Medicine Services  PROGRESS NOTE    Patient Name: Cordell Martin  : 1945  MRN: [id number]    Date of Admission: 10/8/2024  Primary Care Physician: Ben Holder DO    Subjective   Subjective     CC:  F/u cellulitis    HPI:  Patient sitting up in chair, sleeping, awakes to voice.  Says he feels tired and cannot keep his eyes open.  Denies leg pain.  Denies other concerns.      Objective   Objective     Vital Signs:   Temp:  [96 °F (35.6 °C)-96.7 °F (35.9 °C)] 96.4 °F (35.8 °C)  Heart Rate:  [56-70] 56  Resp:  [16-18] 16  BP: ()/(48-64) 100/48     Physical Exam:  Constitutional: No acute distress, awake, alert  HENT: NCAT, mucous membranes moist  Respiratory: Clear to auscultation bilaterally, respiratory effort normal, room air  Cardiovascular: RRR, no murmurs, rubs, or gallops  Gastrointestinal: Positive bowel sounds, soft, nontender, rounded  Musculoskeletal: BLE wrapped  Psychiatric: Appropriate affect, cooperative  Neurologic: Oriented x 3, moves all extremities, speech clear  Skin: No rashes, scaling BL feet      Results Reviewed:  LAB RESULTS:      Lab 10/14/24  0357 10/12/24  0358 10/11/24  0641 10/10/24  0416 10/09/24  0404 10/08/24  1936   WBC 6.89 4.75 5.79 6.75 8.21 7.37   HEMOGLOBIN 10.8* 10.9* 10.9* 10.5* 10.8* 10.4*   HEMATOCRIT 35.0* 34.8* 34.9* 32.7* 34.7*  31.3* 33.4*   PLATELETS 266 253 254 241 244 250   NEUTROS ABS  --   --   --   --  5.82 4.88   IMMATURE GRANS (ABS)  --   --   --   --  0.03 0.04   LYMPHS ABS  --   --   --   --  1.31 1.45   MONOS ABS  --   --   --   --  0.64 0.66   EOS ABS  --   --   --   --  0.38 0.31   MCV 93.8 93.3 94.3 92.4 94.3 94.1   SED RATE  --   --   --   --   --  92*   CRP  --   --   --   --   --  4.81*   PROCALCITONIN  --   --   --   --   --  0.10   LACTATE  --   --   --   --   --  1.6   D DIMER QUANT  --   --   --   --   --  0.90*         Lab 10/14/24  0357 10/12/24  0358 10/11/24  0641 10/10/24  0416  10/09/24  1529 10/09/24  0404 10/08/24  2226 10/08/24  1936   SODIUM 139 138 138 140  --  142  --  138   POTASSIUM 4.1 4.0 4.4 4.4 4.3 3.4*  --  4.2   CHLORIDE 106 105 104 106  --  107  --  106   CO2 27.0 23.0 27.0 24.0  --  25.0  --  24.0   ANION GAP 6.0 10.0 7.0 10.0  --  10.0  --  8.0   BUN 20 18 18 20  --  21  --  22   CREATININE 1.09 1.26 1.11 1.09  --  1.14  --  1.26   EGFR 69.9 58.7* 68.4 69.9  --  66.2  --  58.7*   GLUCOSE 129* 143* 128* 98  --  120*  --  148*   CALCIUM 8.6 8.6 8.4* 8.3*  --  8.4*  --  8.5*   IONIZED CALCIUM  --   --   --   --   --   --  1.17  --    MAGNESIUM  --   --   --   --   --   --   --  2.2   PHOSPHORUS  --   --   --   --   --   --   --  3.0   HEMOGLOBIN A1C  --   --   --   --   --  6.40*  --   --    TSH  --   --   --   --   --   --   --  15.850*         Lab 10/11/24  0641 10/10/24  0416 10/08/24  1936   TOTAL PROTEIN 6.3 5.4* 6.0   ALBUMIN 2.6* 2.7* 2.7*   GLOBULIN 3.7 2.7 3.3   ALT (SGPT) <5 <5 <5   AST (SGOT) 9 9 12   BILIRUBIN 0.2 0.3 0.3   ALK PHOS 98 105 108   LIPASE  --   --  11*         Lab 10/08/24  1936   PROBNP 98.4             Lab 10/08/24  1936   IRON 21*  21*   IRON SATURATION (TSAT) 10*   TIBC 206*   TRANSFERRIN 138*   FERRITIN 241.90   FOLATE 8.42   VITAMIN B 12 313         Lab 10/08/24  2049   FIO2 21   CARBOXYHEMOGLOBIN (VENOUS) 1.3     Brief Urine Lab Results  (Last result in the past 365 days)        Color   Clarity   Blood   Leuk Est   Nitrite   Protein   CREAT   Urine HCG        10/08/24 2219 Yellow   Clear   Negative   Negative   Negative   Negative                   Microbiology Results Abnormal       Procedure Component Value - Date/Time    Blood Culture - Blood, Hand, Left [[id number]]  (Normal) Collected: 10/08/24 2120    Lab Status: Final result Specimen: Blood from Hand, Left Updated: 10/13/24 2146     Blood Culture No growth at 5 days    Narrative:      Less than seven (7) mL's of blood was collected.  Insufficient quantity may yield false negative  results.    Blood Culture - Blood, Hand, Right [[id number]]  (Normal) Collected: 10/08/24 2050    Lab Status: Final result Specimen: Blood from Hand, Right Updated: 10/13/24 2116     Blood Culture No growth at 5 days    Parasite Identification - Parasite, Foot, Right [[id number]] Collected: 10/09/24 1358    Lab Status: Final result Specimen: Parasite from Foot, Right Updated: 10/09/24 1407     Macroscopic Exam Maggots    Respiratory Panel PCR w/COVID-19(SARS-CoV-2) KENJI/ASH/SHIRLEY/PAD/COR/CORNELIUS In-House, NP Swab in UTM/VTM, 2 HR TAT - Swab, Nasopharynx [[id number]]  (Normal) Collected: 10/09/24 0548    Lab Status: Final result Specimen: Swab from Nasopharynx Updated: 10/09/24 0643     ADENOVIRUS, PCR Not Detected     Coronavirus 229E Not Detected     Coronavirus HKU1 Not Detected     Coronavirus NL63 Not Detected     Coronavirus OC43 Not Detected     COVID19 Not Detected     Human Metapneumovirus Not Detected     Human Rhinovirus/Enterovirus Not Detected     Influenza A PCR Not Detected     Influenza B PCR Not Detected     Parainfluenza Virus 1 Not Detected     Parainfluenza Virus 2 Not Detected     Parainfluenza Virus 3 Not Detected     Parainfluenza Virus 4 Not Detected     RSV, PCR Not Detected     Bordetella pertussis pcr Not Detected     Bordetella parapertussis PCR Not Detected     Chlamydophila pneumoniae PCR Not Detected     Mycoplasma pneumo by PCR Not Detected    Narrative:      In the setting of a positive respiratory panel with a viral infection PLUS a negative procalcitonin without other underlying concern for bacterial infection, consider observing off antibiotics or discontinuation of antibiotics and continue supportive care. If the respiratory panel is positive for atypical bacterial infection (Bordetella pertussis, Chlamydophila pneumoniae, or Mycoplasma pneumoniae), consider antibiotic de-escalation to target atypical bacterial infection.    COVID PRE-OP / PRE-PROCEDURE SCREENING ORDER (NO ISOLATION) -  "Swab, Nasopharynx [[id number]]  (Normal) Collected: 10/08/24 2126    Lab Status: Final result Specimen: Swab from Nasopharynx Updated: 10/08/24 2220    Narrative:      The following orders were created for panel order COVID PRE-OP / PRE-PROCEDURE SCREENING ORDER (NO ISOLATION) - Swab, Nasopharynx.  Procedure                               Abnormality         Status                     ---------                               -----------         ------                     COVID-19, FLU A/B, RSV P...[[id number]]  Normal              Final result                 Please view results for these tests on the individual orders.    COVID-19, FLU A/B, RSV PCR 1 HR TAT - Swab, Nasopharynx [[id number]]  (Normal) Collected: 10/08/24 2126    Lab Status: Final result Specimen: Swab from Nasopharynx Updated: 10/08/24 2220     COVID19 Not Detected     Influenza A PCR Not Detected     Influenza B PCR Not Detected     RSV, PCR Not Detected    Narrative:      Fact sheet for providers: https://www.fda.gov/media/953261/download    Fact sheet for patients: https://www.fda.gov/media/507602/download    Test performed by PCR.            SLP FEES - Fiberoptic Endo Eval Swallow    Result Date: 10/12/2024  This procedure was auto-finalized with no dictation required.     Results for orders placed during the hospital encounter of 01/12/21    Adult Transthoracic Echo Complete W/ Cont if Necessary Per Protocol    Interpretation Summary  · The right ventricle and right atrium are moderately dilated. Other chamber sizes and wall thicknesses are normal.  · Global and segmental LV wall motion is normal. The estimated left ventricular ejection fraction is 51% - 55%.  · The aortic and mitral valves are normal in structure and function.  · The estimated RV systolic pressure is mildly elevated 35 mmHg - 40 mmHg. There is as well noted to be less than 50% inspiratory IVC collapse. The main pulmonary artery is \"borderline dilated\".  · There are no other " important findings on this study.      Current medications:  Scheduled Meds:atorvastatin, 40 mg, Oral, Daily  carbidopa-levodopa, 2 tablet, Oral, 4x Daily  carbidopa-levodopa ER, 1 tablet, Oral, BID  fluticasone, 2 spray, Each Nare, Daily  furosemide, 20 mg, Oral, Daily  gabapentin, 100 mg, Oral, Q12H  guaiFENesin, 600 mg, Oral, Q12H  heparin (porcine), 5,000 Units, Subcutaneous, Q8H  levothyroxine, 88 mcg, Oral, Q AM  pantoprazole, 40 mg, Oral, Daily  piperacillin-tazobactam, 3.375 g, Intravenous, Q8H  pramipexole, 1 mg, Oral, TID  QUEtiapine, 12.5 mg, Oral, Nightly  sodium chloride, 10 mL, Intravenous, Q12H  tamsulosin, 0.4 mg, Oral, Daily      Continuous Infusions:   PRN Meds:.  senna-docusate sodium **AND** polyethylene glycol **AND** bisacodyl **AND** bisacodyl    HYDROmorphone    Magnesium Standard Dose Replacement - Follow Nurse / BPA Driven Protocol    Potassium Replacement - Follow Nurse / BPA Driven Protocol    sodium chloride    sodium chloride    Assessment & Plan   Assessment & Plan     Active Hospital Problems    Diagnosis  POA    **Cellulitis [L03.90]  Yes    History of DVT (deep vein thrombosis) [Z86.718]  Not Applicable    Anemia, chronic disease [D63.8]  Yes    Elevated TSH [R79.89]  Yes    GERD [K21.9]  Yes    Chronic cough [R05.3]  Yes    Hypothyroidism (acquired) [E03.9]  Yes    Chronic edema [R60.9]  Yes    Parkinson's disease [G20.A1]  Yes    Anxiety [F41.9]  Yes    Hyperlipidemia [E78.5]  Yes    Depression [F32.A]  Yes      Resolved Hospital Problems   No resolved problems to display.        Brief Hospital Course to date:  Cordell Martin is a 79 y.o. male with history of Parkinson's, history of DVT, HLD, hypothyroidism, GERD, lymphedema, anxiety/depression, with wheelchair use/walker use at [facility] who presented with cellulitis, BLE wounds. Ortho and ID consulted.     B LE wounds  Edema  Cellulitis  - Maggots noted, R second toe markedly abnormal, wound between first/second toe with  drainage  - Duplex with chronic DVT noted  - MRI foot and tib/fib with no evidence of osteo   - Wound culture with pseudomonas   - Evaluated by ortho -- conservative management and monitoring  - ID consulted - initially on dapto/zosyn -- dapto discontinued 10/12 after culture resulted with pseudomonas   - ABIs unable to be assessed.   - Vascular consult. Known to Dr. Connor. No urgent ocular intervention at this time.  May require outpatient revascularization.  - WOC following     Cough  Possible LLL PNA on CXR, not as impressive on CT  -pulmonary toilet  -respiratory PCR negative  -speech consulted and s/p FEES with regular/thins      Chronic anemia  -monitor     Parkinson's disease  Anxiety/depression  -continue sinemet, seroquel, mirapex     Hypothyroidism  -on synthorid, with elevated TSH and normal T4  -follow up outpatient     HLD  -statin     GERD  -PPI    Expected Discharge Location and Transportation: SNF rehab  Expected Discharge   Expected Discharge Date: 10/16/2024; Expected Discharge Time:      VTE Prophylaxis:  Pharmacologic VTE prophylaxis orders are present.         AM-PAC 6 Clicks Score (PT): 14 (10/13/24 1500)    CODE STATUS:   Code Status and Medical Interventions: No CPR (Do Not Attempt to Resuscitate); Limited Support; No intubation (DNI); DNR/DNI   Ordered at: 10/09/24 0206     Medical Intervention Limits:    No intubation (DNI)     Level Of Support Discussed With:    Patient     Code Status (Patient has no pulse and is not breathing):    No CPR (Do Not Attempt to Resuscitate)     Medical Interventions (Patient has pulse or is breathing):    Limited Support     Comments:    DNR/DNI       Radha Lyons, APRN  10/14/24

## 2024-10-14 NOTE — PROGRESS NOTES
"Cordell Martin  1945  [id number]    Evaluating Physician: Trevor Jorgensen MD    Chief Complaint: right leg red and painful    Reason for Consultation: RLE infection    History of present illness:     Patient is a 79 y.o.  Yr old male prior patient of Dr. Benites, with history of Parkinson's disease and chronically debilitated, uses a wheelchair primarily although apparently has occasionally used a walker.  He has other extensive comorbidities as below.  He had reported trauma to the right second toe having jammed it into a wall while in his wheelchair several weeks prior to admission.  He had developed increasing redness/swelling and discoloration of the right second toe and foot/lower leg over that period of time.  He does have bilateral lower leg discoloration and swelling but the right side is out of proportion of the left side.  He was admitted on October 8 with concern for lower extremity cellulitis; subsequently noted to have maggots at the right second toe    Aside from the trauma in the wheelchair, he denied any other specific exposures.    10/14/24 seen early and sleepy; Cx  PSA, skin derrell / \"mixed\" GNR  ; Right lower leg/foot pain out of proportion to left, constant, nonradiating, worse with palpation, generally better with pain meds and if keeping pressure off it, 3 out of 10 at present. Redness less since admit    No headache photophobia or neck stiffness.  He has chronic intermittent cough and reports this is at baseline, currently on room air with no hemoptysis.  No nausea vomiting diarrhea or abdominal pain.  No dysuria hematuria or pyuria.  No other specific rash    Past Medical History:   Diagnosis Date    Anxiety     Arthritis     Back pain     Bronchitis     Cognitive impairment     Depression     Disease of thyroid gland     Glucose intolerance (impaired glucose tolerance)     Hyperlipidemia     Kidney stone     Obesity     Parkinson disease     Pneumonia     Prostate disorder     " Thyroid disease     Wears eyeglasses        Past Surgical History:   Procedure Laterality Date    COLONOSCOPY      5 years ago    EYE SURGERY      HERNIA REPAIR  10/20/2020    KNEE SURGERY      LUMBAR LAMINECTOMY DISCECTOMY DECOMPRESSION N/A 07/13/2017    Procedure: LUMBAR LAMINECTOMY L4-5;  Surgeon: Antonio Trent MD;  Location: [facility];  Service:     SHOULDER ROTATOR CUFF REPAIR Right     x2    TONSILLECTOMY      VEIN SURGERY         Pediatric History   Patient Parents    Not on file     Other Topics Concern    Not on file   Social History Narrative    Lives alone. Uses walker    Has not smoked since about 1980       family history includes Alzheimer's disease in an other family member; Cancer in his father and another family member; Diabetes in an other family member; Heart disease in an other family member; Stroke in an other family member.    No Known Allergies    Medication:  Current Facility-Administered Medications   Medication Dose Route Frequency Provider Last Rate Last Admin    sennosides-docusate (PERICOLACE) 8.6-50 MG per tablet 2 tablet  2 tablet Oral BID PRN Daya Ospina APRN        And    polyethylene glycol (MIRALAX) packet 17 g  17 g Oral Daily PRN Daya Ospina APRN        And    bisacodyl (DULCOLAX) EC tablet 5 mg  5 mg Oral Daily PRN Daya Ospina APRN        And    bisacodyl (DULCOLAX) suppository 10 mg  10 mg Rectal Daily PRN Daya Ospina APRN        carbidopa-levodopa (SINEMET)  MG per tablet 2 tablet  2 tablet Oral 4x Daily Daya Ospina APRN   2 tablet at 10/13/24 1956    carbidopa-levodopa ER (SINEMET CR)  MG per tablet 1 tablet  1 tablet Oral BID Daya Ospina APRN   1 tablet at 10/13/24 1956    fluticasone (FLONASE) 50 MCG/ACT nasal spray 2 spray  2 spray Each Nare Daily Daya Ospina APRN   2 spray at 10/13/24 0915    furosemide (LASIX) tablet 20 mg  20 mg Oral Daily Daya Ospina APRN   20 mg at 10/13/24 0914    gabapentin (NEURONTIN) capsule 100 mg   100 mg Oral Q12H Fabian Lopez MD   100 mg at 10/13/24 1956    guaiFENesin (MUCINEX) 12 hr tablet 600 mg  600 mg Oral Q12H Fabian Lopez MD   600 mg at 10/13/24 1955    heparin (porcine) 5000 UNIT/ML injection 5,000 Units  5,000 Units Subcutaneous Q8H Fabian Lopez MD   5,000 Units at 10/14/24 0608    HYDROmorphone (DILAUDID) injection 0.5 mg  0.5 mg Intravenous Q2H PRN Fabian Lopez MD   0.5 mg at 10/11/24 2013    levothyroxine (SYNTHROID, LEVOTHROID) tablet 88 mcg  88 mcg Oral Q AM Daya Ospina APRN   88 mcg at 10/14/24 0608    Magnesium Standard Dose Replacement - Follow Nurse / BPA Driven Protocol   Does not apply PRN Daya Ospina APRN        pantoprazole (PROTONIX) EC tablet 40 mg  40 mg Oral Daily Daya Ospina APRN   40 mg at 10/13/24 1229    piperacillin-tazobactam (ZOSYN) 3.375 g IVPB in 100 mL NS MBP (CD)  3.375 g Intravenous Q8H Trevor Jorgensen MD   3.375 g at 10/14/24 0609    Potassium Replacement - Follow Nurse / BPA Driven Protocol   Does not apply PRN Daya Ospina APRN        pramipexole (MIRAPEX) tablet 1 mg  1 mg Oral TID Daya Ospina APRN   1 mg at 10/13/24 1956    QUEtiapine (SEROquel) tablet 12.5 mg  12.5 mg Oral Nightly Daya Ospina APRN   12.5 mg at 10/13/24 1956    sodium chloride 0.9 % flush 10 mL  10 mL Intravenous PRN Yuri Kessler MD        sodium chloride 0.9 % flush 10 mL  10 mL Intravenous Q12H Daya Ospina APRN   10 mL at 10/13/24 1955    sodium chloride 0.9 % flush 10 mL  10 mL Intravenous PRN Daya Ospina APRN        tamsulosin (FLOMAX) 24 hr capsule 0.4 mg  0.4 mg Oral Daily Daya Ospina APRN   0.4 mg at 10/13/24 0914       Antibiotics:  Anti-Infectives (From admission, onward)      Ordered     Dose/Rate Route Frequency Start Stop    10/09/24 0735  piperacillin-tazobactam (ZOSYN) 3.375 g IVPB in 100 mL NS MBP (CD)        Ordering Provider: Trevor Jorgensen MD    3.375 g  over 4 Hours Intravenous Every 8 Hours 10/09/24 5796  10/16/24 0759    10/09/24 0735  piperacillin-tazobactam (ZOSYN) 3.375 g IVPB in 100 mL NS MBP (CD)        Ordering Provider: Fabian Lopez MD    3.375 g  over 30 Minutes Intravenous Once 10/09/24 0830 10/09/24 0840    10/08/24 2008  vancomycin 2250 mg/500 mL 0.9% NS IVPB ([facility])        Ordering Provider: Yuri Kessler MD    20 mg/kg × 118 kg  over 135 Minutes Intravenous Once 10/08/24 2100 10/09/24 0346    10/08/24 2024  ceFAZolin 2000 mg IVPB in 100 mL NS (MBP)        Ordering Provider: Yuri Kessler MD    2,000 mg  over 30 Minutes Intravenous Once 10/08/24 2040 10/08/24 2203    10/08/24 2024  metroNIDAZOLE (FLAGYL) tablet 500 mg        Ordering Provider: Yuri Kessler MD    500 mg Oral Once 10/08/24 2040 10/08/24 2131              Review of Systems    10/14/24 per staff also    Constitutional-- No Fever, chills or sweats.  Appetite good, and no malaise. No fatigue.  Heent-- No new vision, hearing or throat complaints.  No epistaxis or oral sores.  Denies odynophagia or dysphagia.  No flashers, floaters or eye pain. No odynophagia or dysphagia. No headache, photophobia or neck stiffness.  CV-- No chest pain, palpitation or syncope  Resp--see above, no shortness of breath at present  GI- No nausea, vomiting, or diarrhea.  No hematochezia, melena, or hematemesis. Denies jaundice or chronic liver disease.  -- No dysuria, hematuria, or flank pain.  Denies hesitancy, urgency or flank pain.  Lymph- no swollen lymph nodes in neck/axilla or groin.   Heme- No active bruising or bleeding; no Hx of DVT or PE.  MS-- no swelling or pain in the bones or joints of arms/legs.  No new back pain.  Neuro-- No acute focal weakness or numbness in the arms or legs.  No seizures.  Chronic Parkinson's and chronically debilitated    Full 12 point review of systems reviewed and negative otherwise for acute complaints, except for above    Physical Exam:   Vital Signs   /63 (BP Location: Left arm, Patient  "Position: Lying)   Pulse 56   Temp 96.4 °F (35.8 °C) (Oral)   Resp 16   Ht 182.9 cm (72.01\")   Wt 120 kg (264 lb)   SpO2 93%   BMI 35.80 kg/m²     GENERAL: sleepy, in no acute distress.    HEENT: Normocephalic, atraumatic.    No conjunctival injection. No icterus. Oropharynx clear without evidence of thrush or exudate. No evidence of periodontal disease.    NECK: Supple without nuchal rigidity. No mass.   HEART: RRR; No murmur, rubs, gallops.   LUNGS: Diminished at bases but otherwise  clear to auscultation bilaterally without wheezing, rales, rhonchi. Normal respiratory effort. Nonlabored. No dullness.  ABDOMEN: Soft, nontender, nondistended. Positive bowel sounds. No rebound or guarding. NO mass or HSM.  EXT: See below   MSK: FROM without joint effusions noted arms/legs.    SKIN: Warm and dry without cutaneous eruptions on Inspection/palpation.    NEURO: sleepy.       Bilateral lower legs appeared edematous and discolored with vague scale.   right side between knee and foot has warmth/tenderness and erythema   improving/less intense.  Right side second toe is  discolored with vague edema/erythema and tenderness out of proportion to other toes, crusted wound and unable to determine any depth with no definitive probe to bone tendon joint or ligament at present; no discrete mass bulge or fluctuance.  No crepitus or bulla    Laboratory Data    Results from last 7 days   Lab Units 10/14/24  0357 10/12/24  0358 10/11/24  0641   WBC 10*3/mm3 6.89 4.75 5.79   HEMOGLOBIN g/dL 10.8* 10.9* 10.9*   HEMATOCRIT % 35.0* 34.8* 34.9*   PLATELETS 10*3/mm3 266 253 254     Results from last 7 days   Lab Units 10/14/24  0357   SODIUM mmol/L 139   POTASSIUM mmol/L 4.1   CHLORIDE mmol/L 106   CO2 mmol/L 27.0   BUN mg/dL 20   CREATININE mg/dL 1.09   GLUCOSE mg/dL 129*   CALCIUM mg/dL 8.6     Results from last 7 days   Lab Units 10/11/24  0641   ALK PHOS U/L 98   BILIRUBIN mg/dL 0.2   ALT (SGPT) U/L <5   AST (SGOT) U/L 9 "     Results from last 7 days   Lab Units 10/08/24  1936   SED RATE mm/hr 92*     Results from last 7 days   Lab Units 10/08/24  1936   CRP mg/dL 4.81*       Estimated Creatinine Clearance: 75.9 mL/min (by C-G formula based on SCr of 1.09 mg/dL).      Microbiology:      Radiology:  Imaging Results (Last 72 Hours)       Procedure Component Value Units Date/Time    SLP FEES - Fiberoptic Endo Eval Swallow [[id number]] Resulted: 10/12/24 1532     Updated: 10/12/24 1532    Narrative:      This procedure was auto-finalized with no dictation required.              Impression:     --Acute right lower leg/foot and second toe infection/cellulitis out of proportion to his chronic discoloration by his report, trauma to the right second toe several weeks preceding admission as above.  Further complicated by his chronic comorbidity, wound at the second toe with maggots noted earlier in stay, with ongoing risk for further serious morbidity and other serious sequela; high risk for persistent/recurrent or nonhealing wounds, persistent/progressive or recurrent infection and risk for further functional/limb loss, risk for amputation etc.   MRI no osteomyelitis per radiology.     --Chronic/intermittent cough at presentation, CT scan without acute infiltrate per radiology and some atelectasis noted by them.  Encouraged incentive spirometry.  No productive cough at present.  Respiratory PCR negative.  Further pulmonary workup or referral at discretion of medicine team; pneumonia less likely at present but certainly could evolve; monitor for new process    --Hx trauma and maggots    --Parkinson's disease, chronically debilitated and primarily wheelchair-bound by his report.    --Prior history of DVT    PLAN:      -- IV  Zosyn for now.       -- Daptomycin stopped as no MDR GPC in culture so far.  If worse with this change or stronger evidence for MDR GPC as pathogen, then consideration could be given to adding back versus other  adjustments    -- Check/review labs cultures and scans    -- Partial history per nursing staff    -- Discussed with microbiology    -- Highly complex set of issues with high risk for further serious morbidity and other serious sequela       Copied text in this note has been reviewed and is accurate as of 10/14/24.        Trevor Jorgensen MD  10/14/2024

## 2024-10-14 NOTE — PLAN OF CARE
Goal Outcome Evaluation:      Patient NSR to sinus monique on the monitor.  On room air.  Patient is assist times two.  Alert and oriented times four.  PT wound saw patient at bedside, see notes. Patient ambulated with PT/OT.  See MAR and orders for pain management. Patient up to chair.  Vascular consulted, see notes.  Bed in lowest position, phone and call light in reach.

## 2024-10-14 NOTE — PLAN OF CARE
Goal Outcome Evaluation:  Plan of Care Reviewed With: patient  Plan of Care Reviewed With: (P) patient        Progress: (P) improving  Outcome Evaluation: (P) Patient was able to ambulate today 15 ft across his room with chair follow using FWW and Clem x1. Patient had severely decreased gait speed and had short step length. Patient struggled to fully extend LE when ambulating. Patient was limited by fatigue and SOA at the end of the ambulation. Patient will benefit from further skilled PT in order to improve activity tolerance and ambulation.    Anticipated Discharge Disposition (PT): (P) inpatient rehabilitation facility

## 2024-10-14 NOTE — CONSULTS
Vascular Surgery Consult Note    Reason for consultation: PAD  Consult requested by: Angely Mukherjee DO     HPI: This is a 79-year-old male with history of Bowen's disease, hyperlipidemia, hypothyroidism, chronic edema and history of DVT who resides at [facility] and presented to the ED on 10/9/2024 with worsening erythema, edema and discomfort in bilateral lower extremities for several weeks as well as worsening weeping of his leg wounds. He denies any recent fevers or chills. He additionally has complaint of bilateral calf and thigh claudication after walking on 20 feet with his rollator that improves with rest. Arterial studies were obtained with ABIs unable to be assessed.  Right lower extremity arterial duplex demonstrated monophasic flow starting at the level of the distal femoral artery and continuing to the calf vessels.  He is known to our service and has previously undergone bilateral lower extremity venous ablations in 2023 with Dr. Connor. Vascular surgery has been consulted for further evaluation.    Review of Systems:  Review of Systems   Constitutional:  Negative for chills and fever.   HENT: Negative.     Eyes: Negative.    Respiratory:  Negative for shortness of breath.    Cardiovascular:  Positive for leg swelling. Negative for chest pain.   Gastrointestinal:  Negative for diarrhea, nausea and vomiting.   Endocrine: Negative.    Genitourinary: Negative.    Musculoskeletal:  Positive for myalgias (bilateral calf and thigh cramping with ambulating 20 feet).   Skin:  Positive for wound (wounds and weeping of bilateral lower legs).   Allergic/Immunologic: Negative.    Neurological: Negative.    Hematological: Negative.    Psychiatric/Behavioral: Negative.          History  Past Medical History:   Diagnosis Date    Anxiety     Arthritis     Back pain     Bronchitis     Cognitive impairment     Depression     Disease of thyroid gland     Glucose intolerance (impaired glucose tolerance)      Hyperlipidemia     Kidney stone     Obesity     Parkinson disease     Pneumonia     Prostate disorder     Thyroid disease     Wears eyeglasses      Past Surgical History:   Procedure Laterality Date    COLONOSCOPY      5 years ago    EYE SURGERY      HERNIA REPAIR  10/20/2020    KNEE SURGERY      LUMBAR LAMINECTOMY DISCECTOMY DECOMPRESSION N/A 2017    Procedure: LUMBAR LAMINECTOMY L4-5;  Surgeon: Antonio Trent MD;  Location: [facility];  Service:     SHOULDER ROTATOR CUFF REPAIR Right     x2    TONSILLECTOMY      VEIN SURGERY       Family History   Problem Relation Age of Onset    Alzheimer's disease Other     Cancer Other     Diabetes Other     Heart disease Other     Stroke Other     Cancer Father      Social History     Tobacco Use    Smoking status: Former     Current packs/day: 0.00     Average packs/day: 1 pack/day for 30.0 years (30.0 ttl pk-yrs)     Types: Cigarettes     Start date:      Quit date:      Years since quittin.8     Passive exposure: Past    Smokeless tobacco: Never    Tobacco comments:     quit    Vaping Use    Vaping status: Never Used   Substance Use Topics    Alcohol use: Yes     Comment: occasionally    Drug use: No     Medications Prior to Admission   Medication Sig Dispense Refill Last Dose/Taking    ammonium lactate (AmLactin) 12 % lotion Apply  topically to the appropriate area as directed As Needed for Dry Skin. 225 g 1 Past Week    atorvastatin (LIPITOR) 40 MG tablet Take 1 tablet by mouth once daily 90 tablet 0 10/8/2024    Budeson-Glycopyrrol-Formoterol (BREZTRI) 160-9-4.8 MCG/ACT aerosol inhaler Inhale 2 puffs 2 (Two) Times a Day. 10.7 g 5 Past Month    carbidopa-levodopa (SINEMET)  MG per tablet Take 2 tablets by mouth 4 (Four) Times a Day. PATIENT MUST SCHEDULE FOLLOW UP APPOINTMENT FOR ADDITIONAL REFILLS. 720 tablet 0 10/8/2024    carbidopa-levodopa ER (SINEMET CR)  MG per tablet Take 1 tablet by mouth 2 (Two) Times a Day. PATIENT MUST  SCHEDULE FOLLOW UP APPOINTMENT FOR ADDITIONAL REFILLS. 180 tablet 0 10/8/2024    Cholecalciferol (VITAMIN D3) 5000 units capsule capsule Take 1 capsule by mouth Daily.   Past Month    fluorometholone (FML) 0.1 % ophthalmic suspension fluorometholone 0.1 % eye drops,suspension   Past Month    fluticasone (FLONASE) 50 MCG/ACT nasal spray USE 2 SPRAYS IN EACH NOSTRIL ONCE DAILY (Patient taking differently: Administer 2 sprays into the nostril(s) as directed by provider 2 (Two) Times a Day. Indications: Allergic Rhinitis, Stuffy Nose) 48 mL 2 10/8/2024    furosemide (LASIX) 20 MG tablet Take 1 tablet by mouth once daily 90 tablet 0 10/8/2024    levothyroxine (SYNTHROID, LEVOTHROID) 88 MCG tablet Take 1 tablet by mouth once daily 90 tablet 0 10/8/2024    pantoprazole (Protonix) 40 MG EC tablet Take 1 tablet by mouth Daily. 30 tablet 1 Past Month    pramipexole (MIRAPEX) 1 MG tablet Take 1 tablet by mouth 3 (Three) Times a Day. 270 tablet 3 10/9/2024    QUEtiapine (SEROquel) 25 MG tablet Take 0.5 tablets by mouth Every Night. Indications: mood   10/7/2024    tamsulosin (FLOMAX) 0.4 MG capsule 24 hr capsule tamsulosin 0.4 mg capsule   TAKE 1 CAPSULE BY MOUTH EVERY DAY   10/8/2024    acetaminophen (TYLENOL) 325 MG tablet Take 2 tablets by mouth Every 6 (Six) Hours As Needed for Mild Pain . 28 tablet 0     apixaban (ELIQUIS) 5 MG tablet tablet Take 1 tablet by mouth 2 (Two) Times a Day. 60 tablet 5     Diclofenac Sodium (VOLTAREN) 1 % gel gel APPLY 4GM TO AFFECTED AREA UP TO 4 TIMES DAILY       doxycycline (MONODOX) 100 MG capsule Take 1 capsule by mouth 2 (Two) Times a Day. 14 capsule 0     fludrocortisone 0.1 MG tablet Take 1 tablet by mouth Daily. Indications: Blood Pressure Drop Upon Standing       gabapentin (NEURONTIN) 300 MG capsule Take 1 capsule by mouth Every Night. Indications: Fibromyalgia Syndrome   10/7/2024    loratadine (Claritin) 10 MG tablet Take 1 tablet by mouth Daily. 90 tablet 1     mirtazapine  (Remeron) 30 MG tablet Take 1 tablet by mouth Every Night. 30 tablet 6     potassium chloride (K-DUR,KLOR-CON) 20 MEQ CR tablet Take 1 tablet by mouth Daily. 90 tablet 3 More than a month    sulfamethoxazole-trimethoprim (BACTRIM DS,SEPTRA DS) 800-160 MG per tablet        traMADol (ULTRAM) 50 MG tablet Take 1 tablet by mouth Daily.       traZODone (DESYREL) 50 MG tablet        vitamin B-6 (PYRIDOXINE) 25 MG tablet Take 1 tablet by mouth Daily. 30 tablet 11     vitamin C (ASCORBIC ACID) 500 MG tablet Take 1 tablet by mouth Daily.        Allergies:  Patient has no known allergies.    Vital Signs  Temp:  [96 °F (35.6 °C)-97.5 °F (36.4 °C)] 96.4 °F (35.8 °C)  Heart Rate:  [56-70] 56  Resp:  [16-18] 16  BP: ()/(51-64) 123/63    Physical Exam:  Physical Exam  Vitals reviewed.   Constitutional:       General: He is awake. He is not in acute distress.     Appearance: Normal appearance. He is obese. He is ill-appearing.   HENT:      Head: Normocephalic and atraumatic.      Right Ear: External ear normal.      Left Ear: External ear normal.      Nose: Nose normal.      Mouth/Throat:      Pharynx: Oropharynx is clear.   Cardiovascular:      Rate and Rhythm: Normal rate.      Pulses:           Dorsalis pedis pulses are detected w/ Doppler on the right side and detected w/ Doppler on the left side.        Posterior tibial pulses are detected w/ Doppler on the right side and detected w/ Doppler on the left side.   Abdominal:      Palpations: Abdomen is soft.      Tenderness: There is no abdominal tenderness.      Comments: Obese    Musculoskeletal:      Right lower le+ Pitting Edema present.      Left lower le+ Pitting Edema present.   Skin:     General: Skin is warm and dry.      Findings: Wound (ulceration to RIGHT 2nd toe) present.      Comments: Legs wrapped with xeroform and kerlix, c/d/i   Neurological:      General: No focal deficit present.      Mental Status: He is alert and oriented to person, place, and  time.      Sensory: Sensation is intact.      Motor: Motor function is intact.   Psychiatric:         Speech: Speech normal.         Behavior: Behavior normal. Behavior is cooperative.          Results Review:    I reviewed the patient's new clinical results.  RIGHT Lower Extremity Arterial Duplex:   No evidence of focal hemodynamically significant stenosis.  Monophasic flow starting at the level of the distal femoral artery and continuing into the calf vessels.  JOHNNA was not able to be obtained secondary to wound dressing and patient tolerance.     ABIs:   Right Conclusion: The right JOHNNA is unable to be assessed due to vessel incompressibility.  Multiphasic waveforms in the femoral and dorsalis pedis segments.  Monophasic intermediate resistance waveforms in the popliteal and posterior tibial segments.  Left Conclusion: The left JOHNNA is unable to be assessed due to vessel incompressibility.  Multiphasic waveforms in the femoral-popliteal segments.  Monophasic intermediate resistance waveforms in the dorsalis pedis.  The posterior tibial waveform was not assessed due to the presence of wounds.    Venous Duplex:   Chronic right lower extremity deep vein thrombosis noted in the peroneal.  Chronic left lower extremity superficial thrombophlebitis noted in the great saphenous (below knee).  All other veins appeared normal bilaterally.    Assessment and Plan:  79-year-old male presenting with bilateral lower extremity edema and weeping along with wounds to bilateral lower legs and right 2nd toe that have been worsening over several weeks. Wound culture grew pseudomonas. He has history of bilateral venous interventions 2023 with Dr. Connor.  He additionally complains of bilateral calf and thigh claudication after ambulating only 20 feet with his rollator at home, no pain at rest.     - pain control prn  - recommend elevation to BLE at all times  - PT/OT as tolerated  - nursing to continue BLE dressing changes   - IV abx per  ID, pseudomonas on wound cx  - No urgent vascular intervention necessary at this time, we will work to get patient scheduled for outpatient RLE revascularization in the next couple of weeks with Dr. Palomo.   - Please see attending attestation for further recommendations.     Vascular surgery will sign off. Please contact on-call vascular surgeon with any questions or for re-consultation.      I discussed the patients findings and my recommendations with patient. Case reviewed with Dr. Palomo who directed the above plan of care.       Pamella Orellana PA-C  10/14/24  09:54 EDT

## 2024-10-15 PROCEDURE — 99232 SBSQ HOSP IP/OBS MODERATE 35: CPT | Performed by: NURSE PRACTITIONER

## 2024-10-15 PROCEDURE — 97535 SELF CARE MNGMENT TRAINING: CPT

## 2024-10-15 PROCEDURE — 25010000002 PIPERACILLIN SOD-TAZOBACTAM PER 1 G: Performed by: INTERNAL MEDICINE

## 2024-10-15 PROCEDURE — 25010000002 HEPARIN (PORCINE) PER 1000 UNITS: Performed by: HOSPITALIST

## 2024-10-15 RX ORDER — NYSTATIN 100000 [USP'U]/G
POWDER TOPICAL EVERY 12 HOURS SCHEDULED
Status: DISCONTINUED | OUTPATIENT
Start: 2024-10-15 | End: 2024-10-19 | Stop reason: HOSPADM

## 2024-10-15 RX ADMIN — TAMSULOSIN HYDROCHLORIDE 0.4 MG: 0.4 CAPSULE ORAL at 08:41

## 2024-10-15 RX ADMIN — HEPARIN SODIUM 5000 UNITS: 5000 INJECTION INTRAVENOUS; SUBCUTANEOUS at 14:36

## 2024-10-15 RX ADMIN — FUROSEMIDE 20 MG: 20 TABLET ORAL at 08:41

## 2024-10-15 RX ADMIN — Medication 10 ML: at 08:42

## 2024-10-15 RX ADMIN — GUAIFENESIN 600 MG: 600 TABLET, EXTENDED RELEASE ORAL at 08:41

## 2024-10-15 RX ADMIN — CARBIDOPA AND LEVODOPA 1 TABLET: 25; 100 TABLET, EXTENDED RELEASE ORAL at 08:42

## 2024-10-15 RX ADMIN — HEPARIN SODIUM 5000 UNITS: 5000 INJECTION INTRAVENOUS; SUBCUTANEOUS at 22:08

## 2024-10-15 RX ADMIN — PIPERACILLIN AND TAZOBACTAM 3.38 G: 3; .375 INJECTION, POWDER, LYOPHILIZED, FOR SOLUTION INTRAVENOUS at 14:36

## 2024-10-15 RX ADMIN — PRAMIPEXOLE DIHYDROCHLORIDE 1 MG: 1 TABLET ORAL at 17:24

## 2024-10-15 RX ADMIN — SENNOSIDES AND DOCUSATE SODIUM 2 TABLET: 50; 8.6 TABLET ORAL at 22:09

## 2024-10-15 RX ADMIN — HYDROCODONE BITARTRATE AND ACETAMINOPHEN 1 TABLET: 5; 325 TABLET ORAL at 01:57

## 2024-10-15 RX ADMIN — HYDROCODONE BITARTRATE AND ACETAMINOPHEN 1 TABLET: 5; 325 TABLET ORAL at 22:24

## 2024-10-15 RX ADMIN — ATORVASTATIN CALCIUM 40 MG: 40 TABLET, FILM COATED ORAL at 08:41

## 2024-10-15 RX ADMIN — GUAIFENESIN 600 MG: 600 TABLET, EXTENDED RELEASE ORAL at 22:09

## 2024-10-15 RX ADMIN — CARBIDOPA AND LEVODOPA 2 TABLET: 25; 100 TABLET ORAL at 17:24

## 2024-10-15 RX ADMIN — PANTOPRAZOLE SODIUM 40 MG: 40 TABLET, DELAYED RELEASE ORAL at 12:08

## 2024-10-15 RX ADMIN — LEVOTHYROXINE SODIUM 88 MCG: 0.09 TABLET ORAL at 05:00

## 2024-10-15 RX ADMIN — Medication 10 ML: at 22:09

## 2024-10-15 RX ADMIN — HEPARIN SODIUM 5000 UNITS: 5000 INJECTION INTRAVENOUS; SUBCUTANEOUS at 05:00

## 2024-10-15 RX ADMIN — CARBIDOPA AND LEVODOPA 2 TABLET: 25; 100 TABLET ORAL at 22:08

## 2024-10-15 RX ADMIN — PRAMIPEXOLE DIHYDROCHLORIDE 1 MG: 1 TABLET ORAL at 22:08

## 2024-10-15 RX ADMIN — NYSTATIN: 100000 POWDER TOPICAL at 14:36

## 2024-10-15 RX ADMIN — PIPERACILLIN AND TAZOBACTAM 3.38 G: 3; .375 INJECTION, POWDER, LYOPHILIZED, FOR SOLUTION INTRAVENOUS at 22:21

## 2024-10-15 RX ADMIN — GABAPENTIN 100 MG: 100 CAPSULE ORAL at 22:09

## 2024-10-15 RX ADMIN — CARBIDOPA AND LEVODOPA 1 TABLET: 25; 100 TABLET, EXTENDED RELEASE ORAL at 22:08

## 2024-10-15 RX ADMIN — GABAPENTIN 100 MG: 100 CAPSULE ORAL at 08:41

## 2024-10-15 RX ADMIN — CARBIDOPA AND LEVODOPA 2 TABLET: 25; 100 TABLET ORAL at 08:41

## 2024-10-15 RX ADMIN — FLUTICASONE PROPIONATE 2 SPRAY: 50 SPRAY, METERED NASAL at 08:42

## 2024-10-15 RX ADMIN — CARBIDOPA AND LEVODOPA 2 TABLET: 25; 100 TABLET ORAL at 12:07

## 2024-10-15 RX ADMIN — PIPERACILLIN AND TAZOBACTAM 3.38 G: 3; .375 INJECTION, POWDER, LYOPHILIZED, FOR SOLUTION INTRAVENOUS at 06:31

## 2024-10-15 RX ADMIN — NYSTATIN: 100000 POWDER TOPICAL at 22:10

## 2024-10-15 RX ADMIN — QUETIAPINE FUMARATE 12.5 MG: 25 TABLET ORAL at 22:08

## 2024-10-15 RX ADMIN — PRAMIPEXOLE DIHYDROCHLORIDE 1 MG: 1 TABLET ORAL at 08:42

## 2024-10-15 NOTE — THERAPY TREATMENT NOTE
Patient Name: Cordell Martin  : 1945    MRN: [id number]                              Today's Date: 10/15/2024       Admit Date: 10/8/2024    Visit Dx:     ICD-10-CM ICD-9-CM   1. Cellulitis of lower extremity, unspecified laterality  L03.119 682.6   2. Lymphedema  I89.0 457.1   3. Pain in both lower extremities  M79.604 729.5    M79.605    4. Chronic edema  R60.9 782.3   5. Parkinson's disease, unspecified whether dyskinesia present, unspecified whether manifestations fluctuate  G20.A1 332.0     Patient Active Problem List   Diagnosis    Anxiety    Arthritis    Depression    Hyperlipidemia    Lumbar stenosis with neurogenic claudication    Parkinson's disease    Lumbar stenosis with neurogenic claudication status post L4-5 decompression    Status post lumbar laminectomy    Memory loss    Chronic edema    Cellulitis of right lower extremity    Exertional dyspnea    Right knee pain    Cellulitis of lower extremity    Hypothyroidism (acquired)    Elevated serum creatinine    Cellulitis of right leg    Acute deep vein thrombosis (DVT) of right lower extremity    Pneumonia    Pulmonary nodule (4mm RML incidental)    Chronic cough    GERD    Remote smoker (Stopped )    Restrictive pattern on PFTs w/o evidence of ILD    Cellulitis    History of DVT (deep vein thrombosis)    Anemia, chronic disease    Elevated TSH     Past Medical History:   Diagnosis Date    Anxiety     Arthritis     Back pain     Bronchitis     Cognitive impairment     Depression     Disease of thyroid gland     Glucose intolerance (impaired glucose tolerance)     Hyperlipidemia     Kidney stone     Obesity     Parkinson disease     Pneumonia     Prostate disorder     Thyroid disease     Wears eyeglasses      Past Surgical History:   Procedure Laterality Date    COLONOSCOPY      5 years ago    EYE SURGERY      HERNIA REPAIR  10/20/2020    KNEE SURGERY      LUMBAR LAMINECTOMY DISCECTOMY DECOMPRESSION N/A 2017    Procedure: LUMBAR  LAMINECTOMY L4-5;  Surgeon: Antonio Trent MD;  Location: [facility];  Service:     SHOULDER ROTATOR CUFF REPAIR Right     x2    TONSILLECTOMY      VEIN SURGERY        General Information       Row Name 10/15/24 1259          OT Time and Intention    Document Type therapy note (daily note)  -KF     Mode of Treatment occupational therapy;individual therapy  -       Row Name 10/15/24 1256          General Information    Patient Profile Reviewed yes  -KF     Existing Precautions/Restrictions fall;other (see comments)  Cellulitis in BLE; Parkinson's Disease  -KF     Barriers to Rehab medically complex;previous functional deficit;physical barrier  -KF       Row Name 10/15/24 1253          Cognition    Orientation Status (Cognition) oriented x 3;other (see comments)  fatogued/lethargic  -       Row Name 10/15/24 1258          Safety Issues/Impairments Affecting Functional Mobility    Safety Issues Affecting Function (Mobility) insight into deficits/self-awareness;safety precaution awareness;safety precautions follow-through/compliance;sequencing abilities  -KF     Impairments Affecting Function (Mobility) balance;endurance/activity tolerance;coordination;pain;sensation/sensory awareness;strength;range of motion (ROM);postural/trunk control;motor control  -KF               User Key  (r) = Recorded By, (t) = Taken By, (c) = Cosigned By      Initials Name Provider Type    KF Angela Escobar OT Occupational Therapist                     Mobility/ADL's       Row Name 10/15/24 1253          Bed Mobility    Comment, (Bed Mobility) Pt declined EOB/OOB activity due to fatigue today. Session focused on ADLs and fine motor control.  -       Row Name 10/15/24 1257          Activities of Daily Living    BADL Assessment/Intervention feeding;grooming  -       Row Name 10/15/24 1254          Self-Feeding Assessment/Training    [place] Level (Feeding) liquids to mouth;minimum assist (75% patient effort)  -KF     Assistive  "Devices (Feeding) adapted cup  -KF     Position (Feeding) sitting up in bed  -KF     Comment, (Feeding) Pt provided and educated on use of an adapted cup when feeding. Pt verbalized and demonstrated understanding of use. Pt also educated on the use of built up handle utensils, however pt declined this stating, \"I have a draw full.\" Pt continued to declined use of utenstils while IP.  -KF       Row Name 10/15/24 1259          Grooming Assessment/Training    [place] Level (Grooming) oral care regimen;set up  -KF     Position (Grooming) supported sitting  -KF               User Key  (r) = Recorded By, (t) = Taken By, (c) = Cosigned By      Initials Name Provider Type    Angela Charlton OT Occupational Therapist                   Obj/Interventions       Row Name 10/15/24 1301          Motor Skills    Therapeutic Exercise other (see comments)  Pt provided and educated on use of yellow and pink sponges to assist in  strengthening. Pt able to verbalize and demonstrate understanding with BUEs.  -KF               User Key  (r) = Recorded By, (t) = Taken By, (c) = Cosigned By      Initials Name Provider Type    Angela Charlton OT Occupational Therapist                   Goals/Plan    No documentation.                  Clinical Impression       Row Name 10/15/24 1302          Pain Assessment    Additional Documentation Pain Scale: FACES Pre/Post-Treatment (Group)  -KF       Row Name 10/15/24 1302          Pain Scale: FACES Pre/Post-Treatment    Pain: FACES Scale, Pretreatment 0-->no hurt  -KF     Posttreatment Pain Rating 0-->no hurt  -KF       Row Name 10/15/24 1302          Plan of Care Review    Plan of Care Reviewed With patient  -KF     Progress no change  -KF     Outcome Evaluation OT session completed, however pt was limited by increased fatigue and lethargy this date. The pt declined EOB and OOB activity, session focused on bed level ADLs, fine motor control, and  strengthening. The pt was provided " and educated on use of an adapted cup. Pt declined built up utensils. Grooming ADL completed with set up. The pt remains below his functional baseline with generalized weakness, decreased activity tolerance, and balance deficits warranting continued IP OT services. Recommend a d/c to IRF for best outcome.  -KF       Row Name 10/15/24 1302          Therapy Assessment/Plan (OT)    Rehab Potential (OT) good  -KF     Criteria for Skilled Therapeutic Interventions Met (OT) yes;meets criteria;skilled treatment is necessary  -KF     Therapy Frequency (OT) daily  -KF       Row Name 10/15/24 1302          Therapy Plan Review/Discharge Plan (OT)    Anticipated Discharge Disposition (OT) inpatient rehabilitation facility  -KF       Row Name 10/15/24 1302          Vital Signs    Pre Patient Position Supine  -KF     Intra Patient Position Supine  -KF     Post Patient Position Supine  -KF       Row Name 10/15/24 1302          Positioning and Restraints    Pre-Treatment Position in bed  -KF     Post Treatment Position bed  -KF     In Bed notified nsg;supine;call light within reach;encouraged to call for assist;exit alarm on  -KF               User Key  (r) = Recorded By, (t) = Taken By, (c) = Cosigned By      Initials Name Provider Type    KF Angela Escobar, JUNG Occupational Therapist                   Outcome Measures       Row Name 10/15/24 1304          How much help from another is currently needed...    Putting on and taking off regular lower body clothing? 2  -KF     Bathing (including washing, rinsing, and drying) 2  -KF     Toileting (which includes using toilet bed pan or urinal) 2  -KF     Putting on and taking off regular upper body clothing 2  -KF     Taking care of personal grooming (such as brushing teeth) 3  -KF     Eating meals 3  -KF     AM-PAC 6 Clicks Score (OT) 14  -KF       Row Name 10/15/24 130          Functional Assessment    Outcome Measure Options AM-PAC 6 Clicks Daily Activity (OT)  -KF                User Key  (r) = Recorded By, (t) = Taken By, (c) = Cosigned By      Initials Name Provider Type    Angela Charlton OT Occupational Therapist                    Occupational Therapy Education       Title: PT OT SLP Therapies (Done)       Topic: Occupational Therapy (Done)       Point: ADL training (Done)       Description:   Instruct learner(s) on proper safety adaptation and remediation techniques during self care or transfers.   Instruct in proper use of assistive devices.                  Learning Progress Summary            Patient Acceptance, TB,E, VU,DU by KF at 10/15/2024 0956    Acceptance, E,TB, VU by BT at 10/13/2024 1557    Acceptance, E, VU,NR by JONATHAN at 10/10/2024 1116    Comment: reason for consult, evaluation results, posterior lean correction, transfer safety                      Point: Home exercise program (Done)       Description:   Instruct learner(s) on appropriate technique for monitoring, assisting and/or progressing therapeutic exercises/activities.                  Learning Progress Summary            Patient Acceptance, TB,E, VU,DU by KF at 10/15/2024 0956    Acceptance, E,TB, VU by BT at 10/13/2024 1557                      Point: Precautions (Done)       Description:   Instruct learner(s) on prescribed precautions during self-care and functional transfers.                  Learning Progress Summary            Patient Acceptance, E,TB, VU by BT at 10/13/2024 1557    Acceptance, E, VU,NR by JONATHAN at 10/10/2024 1116    Comment: reason for consult, evaluation results, posterior lean correction, transfer safety                      Point: Body mechanics (Done)       Description:   Instruct learner(s) on proper positioning and spine alignment during self-care, functional mobility activities and/or exercises.                  Learning Progress Summary            Patient Acceptance, E,TB, VU by BT at 10/13/2024 1557    Acceptance, E, VU,NR by JONATHAN at 10/10/2024 1116    Comment: reason for consult,  evaluation results, posterior lean correction, transfer safety                                      User Key       Initials Effective Dates Name Provider Type Discipline    JONATHAN 07/11/23 -  Angélica Mera OT Occupational Therapist OT    BT 12/30/20 -  Adriana Heath, RN Registered Nurse Nurse    KF 08/09/23 -  Angela Escobar OT Occupational Therapist OT                  OT Recommendation and Plan  Therapy Frequency (OT): daily  Plan of Care Review  Plan of Care Reviewed With: patient  Progress: no change  Outcome Evaluation: OT session completed, however pt was limited by increased fatigue and lethargy this date. The pt declined EOB and OOB activity, session focused on bed level ADLs, fine motor control, and  strengthening. The pt was provided and educated on use of an adapted cup. Pt declined built up utensils. Grooming ADL completed with set up. The pt remains below his functional baseline with generalized weakness, decreased activity tolerance, and balance deficits warranting continued IP OT services. Recommend a d/c to IRF for best outcome.     Time Calculation:         Time Calculation- OT       Row Name 10/15/24 1305             Time Calculation- OT    OT Start Time 0956  -KF      OT Received On 10/15/24  -KF      OT Goal Re-Cert Due Date 10/20/24  -KF         Timed Charges    18818 - OT Therapeutic Exercise Minutes 7  -KF      10648 - OT Self Care/Mgmt Minutes 9  -KF         Total Minutes    Timed Charges Total Minutes 16  -KF       Total Minutes 16  -KF                User Key  (r) = Recorded By, (t) = Taken By, (c) = Cosigned By      Initials Name Provider Type    KF Angela Escobar OT Occupational Therapist                  Therapy Charges for Today       Code Description Service Date Service Provider Modifiers Qty    [id number]  OT SELF CARE/MGMT/TRAIN EA 15 MIN 10/15/2024 Angela Escobar OT GO 1                 Angela Escobar OT  10/15/2024

## 2024-10-15 NOTE — PLAN OF CARE
Goal Outcome Evaluation:  Plan of Care Reviewed With: patient        Progress: no change  Outcome Evaluation: Pt is A&O with VSS, NSR with first degree AV block on the monitor, and on RA. He complained of burning pain in his groin and right leg that was relieved with PRN pain medication. There are no other complaints at this time.

## 2024-10-15 NOTE — PLAN OF CARE
Goal Outcome Evaluation:  Plan of Care Reviewed With: patient        Progress: no change  Outcome Evaluation: OT session completed, however pt was limited by increased fatigue and lethargy this date. The pt declined EOB and OOB activity, session focused on bed level ADLs, fine motor control, and  strengthening. The pt was provided and educated on use of an adapted cup. Pt declined built up utensils. Grooming ADL completed with set up. The pt remains below his functional baseline with generalized weakness, decreased activity tolerance, and balance deficits warranting continued IP OT services. Recommend a d/c to IRF for best outcome.    Anticipated Discharge Disposition (OT): inpatient rehabilitation facility

## 2024-10-15 NOTE — CASE MANAGEMENT/SOCIAL WORK
Continued Stay Note  [facility]     Patient Name: Cordell Martin  MRN: [id number]  Today's Date: 10/15/2024    Admit Date: 10/8/2024    Plan: discharge plan   Discharge Plan       Row Name 10/15/24 1529       Plan    Plan discharge plan    Plan Comments Per Marie at [facility], pt is more skilled nursing rehab appropriate according to their doctor. [facility] is unable to accept pt at their SRU as they have no private rooms and pt is in contact precautions. I spoke with Mariana(liason for [facility]) [facility] no beds.. Mariana will check [facility] and [facility] availability tomorrow. CM will cont to follow    Final Discharge Disposition Code 03 - skilled nursing facility (SNF)                   Discharge Codes    No documentation.                 Expected Discharge Date and Time       Expected Discharge Date Expected Discharge Time    Oct 16, 2024               Kenisha Tom RN

## 2024-10-15 NOTE — PROGRESS NOTES
[facility] Medicine Services  PROGRESS NOTE    Patient Name: Cordell Martin  : 1945  MRN: [id number]    Date of Admission: 10/8/2024  Primary Care Physician: Ben Holder DO    Subjective   Subjective     CC:  F/u cellulitis    HPI:   Patient resting in bed, trying to manage urinal. Says he is having burning in his groin, so will try Nystatin powder. Says his pain is controlled. Legs wrapped.       Objective   Objective     Vital Signs:   Temp:  [94.2 °F (34.6 °C)-97.4 °F (36.3 °C)] 97.3 °F (36.3 °C)  Heart Rate:  [54-67] 67  Resp:  [16] 16  BP: ()/(48-77) 101/53     Physical Exam:   Constitutional: No acute distress, awake, alert  HENT: NCAT, mucous membranes moist  Respiratory: Clear to auscultation bilaterally, respiratory effort normal, room air  Cardiovascular: RRR, no murmurs, rubs, or gallops  Gastrointestinal: Positive bowel sounds, soft, nontender, rounded  Musculoskeletal: BLE wrapped  Psychiatric: Appropriate affect, cooperative  Neurologic: Oriented x 3, moves all extremities, speech clear  Skin: No rashes, scaling BL feet      Results Reviewed:  LAB RESULTS:      Lab 10/14/24  0357 10/12/24  0358 10/11/24  0641 10/10/24  0416 10/09/24  0404 10/08/24  1936   WBC 6.89 4.75 5.79 6.75 8.21 7.37   HEMOGLOBIN 10.8* 10.9* 10.9* 10.5* 10.8* 10.4*   HEMATOCRIT 35.0* 34.8* 34.9* 32.7* 34.7*  31.3* 33.4*   PLATELETS 266 253 254 241 244 250   NEUTROS ABS  --   --   --   --  5.82 4.88   IMMATURE GRANS (ABS)  --   --   --   --  0.03 0.04   LYMPHS ABS  --   --   --   --  1.31 1.45   MONOS ABS  --   --   --   --  0.64 0.66   EOS ABS  --   --   --   --  0.38 0.31   MCV 93.8 93.3 94.3 92.4 94.3 94.1   SED RATE  --   --   --   --   --  92*   CRP  --   --   --   --   --  4.81*   PROCALCITONIN  --   --   --   --   --  0.10   LACTATE  --   --   --   --   --  1.6   D DIMER QUANT  --   --   --   --   --  0.90*         Lab 10/14/24  0357 10/12/24  0358 10/11/24  0641  10/10/24  0416 10/09/24  1529 10/09/24  0404 10/08/24  2226 10/08/24  1936   SODIUM 139 138 138 140  --  142  --  138   POTASSIUM 4.1 4.0 4.4 4.4 4.3 3.4*  --  4.2   CHLORIDE 106 105 104 106  --  107  --  106   CO2 27.0 23.0 27.0 24.0  --  25.0  --  24.0   ANION GAP 6.0 10.0 7.0 10.0  --  10.0  --  8.0   BUN 20 18 18 20  --  21  --  22   CREATININE 1.09 1.26 1.11 1.09  --  1.14  --  1.26   EGFR 69.9 58.7* 68.4 69.9  --  66.2  --  58.7*   GLUCOSE 129* 143* 128* 98  --  120*  --  148*   CALCIUM 8.6 8.6 8.4* 8.3*  --  8.4*  --  8.5*   IONIZED CALCIUM  --   --   --   --   --   --  1.17  --    MAGNESIUM  --   --   --   --   --   --   --  2.2   PHOSPHORUS  --   --   --   --   --   --   --  3.0   HEMOGLOBIN A1C  --   --   --   --   --  6.40*  --   --    TSH  --   --   --   --   --   --   --  15.850*         Lab 10/11/24  0641 10/10/24  0416 10/08/24  1936   TOTAL PROTEIN 6.3 5.4* 6.0   ALBUMIN 2.6* 2.7* 2.7*   GLOBULIN 3.7 2.7 3.3   ALT (SGPT) <5 <5 <5   AST (SGOT) 9 9 12   BILIRUBIN 0.2 0.3 0.3   ALK PHOS 98 105 108   LIPASE  --   --  11*         Lab 10/08/24  1936   PROBNP 98.4             Lab 10/08/24  1936   IRON 21*  21*   IRON SATURATION (TSAT) 10*   TIBC 206*   TRANSFERRIN 138*   FERRITIN 241.90   FOLATE 8.42   VITAMIN B 12 313         Lab 10/08/24  2049   FIO2 21   CARBOXYHEMOGLOBIN (VENOUS) 1.3     Brief Urine Lab Results  (Last result in the past 365 days)        Color   Clarity   Blood   Leuk Est   Nitrite   Protein   CREAT   Urine HCG        10/08/24 2219 Yellow   Clear   Negative   Negative   Negative   Negative                   Microbiology Results Abnormal       Procedure Component Value - Date/Time    Blood Culture - Blood, Hand, Left [[id number]]  (Normal) Collected: 10/08/24 2120    Lab Status: Final result Specimen: Blood from Hand, Left Updated: 10/13/24 2146     Blood Culture No growth at 5 days    Narrative:      Less than seven (7) mL's of blood was collected.  Insufficient quantity may yield false  negative results.    Blood Culture - Blood, Hand, Right [[id number]]  (Normal) Collected: 10/08/24 2050    Lab Status: Final result Specimen: Blood from Hand, Right Updated: 10/13/24 2116     Blood Culture No growth at 5 days    Parasite Identification - Parasite, Foot, Right [[id number]] Collected: 10/09/24 1358    Lab Status: Final result Specimen: Parasite from Foot, Right Updated: 10/09/24 1407     Macroscopic Exam Maggots    Respiratory Panel PCR w/COVID-19(SARS-CoV-2) KENJI/ASH/SHIRLEY/PAD/COR/CORNELIUS In-House, NP Swab in UTM/VTM, 2 HR TAT - Swab, Nasopharynx [[id number]]  (Normal) Collected: 10/09/24 0548    Lab Status: Final result Specimen: Swab from Nasopharynx Updated: 10/09/24 0643     ADENOVIRUS, PCR Not Detected     Coronavirus 229E Not Detected     Coronavirus HKU1 Not Detected     Coronavirus NL63 Not Detected     Coronavirus OC43 Not Detected     COVID19 Not Detected     Human Metapneumovirus Not Detected     Human Rhinovirus/Enterovirus Not Detected     Influenza A PCR Not Detected     Influenza B PCR Not Detected     Parainfluenza Virus 1 Not Detected     Parainfluenza Virus 2 Not Detected     Parainfluenza Virus 3 Not Detected     Parainfluenza Virus 4 Not Detected     RSV, PCR Not Detected     Bordetella pertussis pcr Not Detected     Bordetella parapertussis PCR Not Detected     Chlamydophila pneumoniae PCR Not Detected     Mycoplasma pneumo by PCR Not Detected    Narrative:      In the setting of a positive respiratory panel with a viral infection PLUS a negative procalcitonin without other underlying concern for bacterial infection, consider observing off antibiotics or discontinuation of antibiotics and continue supportive care. If the respiratory panel is positive for atypical bacterial infection (Bordetella pertussis, Chlamydophila pneumoniae, or Mycoplasma pneumoniae), consider antibiotic de-escalation to target atypical bacterial infection.    COVID PRE-OP / PRE-PROCEDURE SCREENING ORDER (NO  "ISOLATION) - Swab, Nasopharynx [[id number]]  (Normal) Collected: 10/08/24 2126    Lab Status: Final result Specimen: Swab from Nasopharynx Updated: 10/08/24 2220    Narrative:      The following orders were created for panel order COVID PRE-OP / PRE-PROCEDURE SCREENING ORDER (NO ISOLATION) - Swab, Nasopharynx.  Procedure                               Abnormality         Status                     ---------                               -----------         ------                     COVID-19, FLU A/B, RSV P...[[id number]]  Normal              Final result                 Please view results for these tests on the individual orders.    COVID-19, FLU A/B, RSV PCR 1 HR TAT - Swab, Nasopharynx [[id number]]  (Normal) Collected: 10/08/24 2126    Lab Status: Final result Specimen: Swab from Nasopharynx Updated: 10/08/24 2220     COVID19 Not Detected     Influenza A PCR Not Detected     Influenza B PCR Not Detected     RSV, PCR Not Detected    Narrative:      Fact sheet for providers: https://www.fda.gov/media/001739/download    Fact sheet for patients: https://www.fda.gov/media/287511/download    Test performed by PCR.            No radiology results from the last 24 hrs    Results for orders placed during the hospital encounter of 01/12/21    Adult Transthoracic Echo Complete W/ Cont if Necessary Per Protocol    Interpretation Summary  · The right ventricle and right atrium are moderately dilated. Other chamber sizes and wall thicknesses are normal.  · Global and segmental LV wall motion is normal. The estimated left ventricular ejection fraction is 51% - 55%.  · The aortic and mitral valves are normal in structure and function.  · The estimated RV systolic pressure is mildly elevated 35 mmHg - 40 mmHg. There is as well noted to be less than 50% inspiratory IVC collapse. The main pulmonary artery is \"borderline dilated\".  · There are no other important findings on this study.      Current medications:  Scheduled " Meds:atorvastatin, 40 mg, Oral, Daily  carbidopa-levodopa, 2 tablet, Oral, 4x Daily  carbidopa-levodopa ER, 1 tablet, Oral, BID  fluticasone, 2 spray, Each Nare, Daily  furosemide, 20 mg, Oral, Daily  gabapentin, 100 mg, Oral, Q12H  guaiFENesin, 600 mg, Oral, Q12H  heparin (porcine), 5,000 Units, Subcutaneous, Q8H  levothyroxine, 88 mcg, Oral, Q AM  pantoprazole, 40 mg, Oral, Daily  piperacillin-tazobactam, 3.375 g, Intravenous, Q8H  pramipexole, 1 mg, Oral, TID  QUEtiapine, 12.5 mg, Oral, Nightly  sodium chloride, 10 mL, Intravenous, Q12H  tamsulosin, 0.4 mg, Oral, Daily      Continuous Infusions:   PRN Meds:.  senna-docusate sodium **AND** polyethylene glycol **AND** bisacodyl **AND** bisacodyl    HYDROcodone-acetaminophen    HYDROmorphone    Magnesium Standard Dose Replacement - Follow Nurse / BPA Driven Protocol    Potassium Replacement - Follow Nurse / BPA Driven Protocol    sodium chloride    sodium chloride    Assessment & Plan   Assessment & Plan     Active Hospital Problems    Diagnosis  POA    **Cellulitis [L03.90]  Yes    History of DVT (deep vein thrombosis) [Z86.718]  Not Applicable    Anemia, chronic disease [D63.8]  Yes    Elevated TSH [R79.89]  Yes    GERD [K21.9]  Yes    Chronic cough [R05.3]  Yes    Hypothyroidism (acquired) [E03.9]  Yes    Chronic edema [R60.9]  Yes    Parkinson's disease [G20.A1]  Yes    Anxiety [F41.9]  Yes    Hyperlipidemia [E78.5]  Yes    Depression [F32.A]  Yes      Resolved Hospital Problems   No resolved problems to display.        Brief Hospital Course to date:  Cordell Martin is a 79 y.o. male with history of Parkinson's, history of DVT, HLD, hypothyroidism, GERD, lymphedema, anxiety/depression, with wheelchair use/walker use at [facility] who presented with cellulitis, BLE wounds. Ortho and ID consulted.     This patient's problems and plans were partially entered by my partner and updated as appropriate by me 10/15/24.      B LE wounds  Edema  Cellulitis  - Maggots  noted, R second toe markedly abnormal, wound between first/second toe with drainage after jamming toe into wheelchair  - Duplex with chronic DVT noted  - MRI foot and tib/fib with no evidence of osteo   - Wound culture with pseudomonas   - Evaluated by ortho, conservative management and monitoring  - ID consulted. Patient initially on dapto/zosyn. Dapto discontinued 10/12 after culture resulted with pseudomonas. Continue Zosyn, likely 14 days from admission per ID. ID can follow at [facility].   - ABIs unable to be assessed d/t vessel incompressibility   - Vascular consult. Known to Dr. Connor. No urgent vascular intervention at this time.  May require revascularization as outpatient.  - WOC following     Cough  Possible LLL PNA on CXR, not as impressive on CT  -pulmonary toilet  -respiratory PCR negative  -speech consulted and s/p FEES with regular/thins      Chronic anemia  -monitor     Parkinson's disease  Anxiety/depression  -continue sinemet, seroquel, mirapex     Hypothyroidism  -on synthorid, with elevated TSH 15.85 and normal T4  -continue levothyroxine 88 mcg daily and follow up with PCP     HLD  -statin     GERD  -PPI    Expected Discharge Location and Transportation: [place]  Expected Discharge   Expected Discharge Date: 10/16/2024; Expected Discharge Time:      VTE Prophylaxis:  Pharmacologic VTE prophylaxis orders are present.         AM-PAC 6 Clicks Score (PT): 16 (10/14/24 5199)    CODE STATUS:   Code Status and Medical Interventions: No CPR (Do Not Attempt to Resuscitate); Limited Support; No intubation (DNI); DNR/DNI   Ordered at: 10/09/24 0206     Medical Intervention Limits:    No intubation (DNI)     Level Of Support Discussed With:    Patient     Code Status (Patient has no pulse and is not breathing):    No CPR (Do Not Attempt to Resuscitate)     Medical Interventions (Patient has pulse or is breathing):    Limited Support     Comments:    DNR/DNI       Radha Lyons, APRN  10/15/24

## 2024-10-15 NOTE — PLAN OF CARE
Goal Outcome Evaluation:      Patient is NSR on the monitor and on room air.  Alert and oriented times four.  Assist times one.  Patient worked with PT/OT.  Bed in lowest position, phone and call light in reach.

## 2024-10-15 NOTE — PROGRESS NOTES
"Cordell Martin  1945  [id number]    Evaluating Physician: Trevor Jorgensen MD    Chief Complaint: right leg red and painful    Reason for Consultation: RLE infection    History of present illness:     Patient is a 79 y.o.  Yr old male prior patient of Dr. Benites, with history of Parkinson's disease and chronically debilitated, uses a wheelchair primarily although apparently has occasionally used a walker.  He has other extensive comorbidities as below.  He had reported trauma to the right second toe having jammed it into a wall while in his wheelchair several weeks prior to admission.  He had developed increasing redness/swelling and discoloration of the right second toe and foot/lower leg over that period of time.  He does have bilateral lower leg discoloration and swelling but the right side is out of proportion of the left side.  He was admitted on October 8 with concern for lower extremity cellulitis; subsequently noted to have maggots at the right second toe    Aside from the trauma in the wheelchair, he denied any other specific exposures.    10/15/24 seen early and sleepy; Cx  PSA, skin derrell / \"mixed\" GNR  ; Right lower leg/foot pain out of proportion to left, constant, nonradiating, worse with palpation, generally better with pain meds  , 2-3 out of 10 at present. Redness less since admit    No headache photophobia or neck stiffness.  He has chronic intermittent cough and reports this is at baseline, currently on room air with no hemoptysis.  No nausea vomiting diarrhea or abdominal pain.  No dysuria hematuria or pyuria.  No other specific rash    Past Medical History:   Diagnosis Date    Anxiety     Arthritis     Back pain     Bronchitis     Cognitive impairment     Depression     Disease of thyroid gland     Glucose intolerance (impaired glucose tolerance)     Hyperlipidemia     Kidney stone     Obesity     Parkinson disease     Pneumonia     Prostate disorder     Thyroid disease     Wears " eyeglasses        Past Surgical History:   Procedure Laterality Date    COLONOSCOPY      5 years ago    EYE SURGERY      HERNIA REPAIR  10/20/2020    KNEE SURGERY      LUMBAR LAMINECTOMY DISCECTOMY DECOMPRESSION N/A 07/13/2017    Procedure: LUMBAR LAMINECTOMY L4-5;  Surgeon: Antonio Trent MD;  Location: [facility];  Service:     SHOULDER ROTATOR CUFF REPAIR Right     x2    TONSILLECTOMY      VEIN SURGERY         Pediatric History   Patient Parents    Not on file     Other Topics Concern    Not on file   Social History Narrative    Lives alone. Uses walker    Has not smoked since about 1980       family history includes Alzheimer's disease in an other family member; Cancer in his father and another family member; Diabetes in an other family member; Heart disease in an other family member; Stroke in an other family member.    No Known Allergies    Medication:  Current Facility-Administered Medications   Medication Dose Route Frequency Provider Last Rate Last Admin    atorvastatin (LIPITOR) tablet 40 mg  40 mg Oral Daily Angely Mukherjee DO   40 mg at 10/14/24 1153    sennosides-docusate (PERICOLACE) 8.6-50 MG per tablet 2 tablet  2 tablet Oral BID PRN Daya Ospina APRN        And    polyethylene glycol (MIRALAX) packet 17 g  17 g Oral Daily PRN Daya Ospina APRN        And    bisacodyl (DULCOLAX) EC tablet 5 mg  5 mg Oral Daily PRN Daya Ospina APRN        And    bisacodyl (DULCOLAX) suppository 10 mg  10 mg Rectal Daily PRN Daya Ospina APRN        carbidopa-levodopa (SINEMET)  MG per tablet 2 tablet  2 tablet Oral 4x Daily Daya Ospina APRN   2 tablet at 10/14/24 2125    carbidopa-levodopa ER (SINEMET CR)  MG per tablet 1 tablet  1 tablet Oral BID Daya Ospina APRN   1 tablet at 10/14/24 2126    fluticasone (FLONASE) 50 MCG/ACT nasal spray 2 spray  2 spray Each Nare Daily Daya Ospina APRN   2 spray at 10/14/24 0843    furosemide (LASIX) tablet 20 mg  20 mg Oral Daily Bhavesh  ALEXUS Stapleton   20 mg at 10/14/24 0843    gabapentin (NEURONTIN) capsule 100 mg  100 mg Oral Q12H Fabian Lopez MD   100 mg at 10/14/24 2125    guaiFENesin (MUCINEX) 12 hr tablet 600 mg  600 mg Oral Q12H Fabian Lopez MD   600 mg at 10/14/24 2125    heparin (porcine) 5000 UNIT/ML injection 5,000 Units  5,000 Units Subcutaneous Q8H Fabian Lopez MD   5,000 Units at 10/15/24 0500    HYDROcodone-acetaminophen (NORCO) 5-325 MG per tablet 1 tablet  1 tablet Oral Q6H PRN Angely Mukherjee DO   1 tablet at 10/15/24 0157    HYDROmorphone (DILAUDID) injection 0.25 mg  0.25 mg Intravenous Q2H PRN Angely Mukherjee DO        levothyroxine (SYNTHROID, LEVOTHROID) tablet 88 mcg  88 mcg Oral Q AM Daya Ospina APRN   88 mcg at 10/15/24 0500    Magnesium Standard Dose Replacement - Follow Nurse / BPA Driven Protocol   Does not apply PRN Daya Ospina APRN        pantoprazole (PROTONIX) EC tablet 40 mg  40 mg Oral Daily Daya Ospina APRN   40 mg at 10/14/24 1153    piperacillin-tazobactam (ZOSYN) 3.375 g IVPB in 100 mL NS MBP (CD)  3.375 g Intravenous Q8H Trevor Jorgensen MD   3.375 g at 10/15/24 0631    Potassium Replacement - Follow Nurse / BPA Driven Protocol   Does not apply PRN Daya Ospina APRN        pramipexole (MIRAPEX) tablet 1 mg  1 mg Oral TID Daya Ospina APRN   1 mg at 10/14/24 2125    QUEtiapine (SEROquel) tablet 12.5 mg  12.5 mg Oral Nightly Daya Ospina APRN   12.5 mg at 10/14/24 2126    sodium chloride 0.9 % flush 10 mL  10 mL Intravenous PRN Yuri Kessler MD        sodium chloride 0.9 % flush 10 mL  10 mL Intravenous Q12H Daya Ospina APRN   10 mL at 10/14/24 2126    sodium chloride 0.9 % flush 10 mL  10 mL Intravenous PRN Daya Ospina APRN        tamsulosin (FLOMAX) 24 hr capsule 0.4 mg  0.4 mg Oral Daily Daya Ospina APRN   0.4 mg at 10/14/24 0841       Antibiotics:  Anti-Infectives (From admission, onward)      Ordered     Dose/Rate Route Frequency Start Stop     10/09/24 0735  piperacillin-tazobactam (ZOSYN) 3.375 g IVPB in 100 mL NS MBP (CD)        Ordering Provider: Trevor Jorgensen MD    3.375 g  over 4 Hours Intravenous Every 8 Hours 10/09/24 1430 10/19/24 1429    10/09/24 0735  piperacillin-tazobactam (ZOSYN) 3.375 g IVPB in 100 mL NS MBP (CD)        Ordering Provider: Fabian Lopez MD    3.375 g  over 30 Minutes Intravenous Once 10/09/24 0830 10/09/24 0840    10/08/24 2008  vancomycin 2250 mg/500 mL 0.9% NS IVPB ([facility])        Ordering Provider: Yuri Kessler MD    20 mg/kg × 118 kg  over 135 Minutes Intravenous Once 10/08/24 2100 10/09/24 0346    10/08/24 2024  ceFAZolin 2000 mg IVPB in 100 mL NS (MBP)        Ordering Provider: Yuri Kessler MD    2,000 mg  over 30 Minutes Intravenous Once 10/08/24 2040 10/08/24 2203    10/08/24 2024  metroNIDAZOLE (FLAGYL) tablet 500 mg        Ordering Provider: Yuri Kessler MD    500 mg Oral Once 10/08/24 2040 10/08/24 2131              Review of Systems    10/15/24 per staff also    Constitutional-- No Fever, chills or sweats.  Appetite good, and no malaise. No fatigue.  Heent-- No new vision, hearing or throat complaints.  No epistaxis or oral sores.  Denies odynophagia or dysphagia.  No flashers, floaters or eye pain. No odynophagia or dysphagia. No headache, photophobia or neck stiffness.  CV-- No chest pain, palpitation or syncope  Resp--see above, no shortness of breath at present  GI- No nausea, vomiting, or diarrhea.  No hematochezia, melena, or hematemesis. Denies jaundice or chronic liver disease.  -- No dysuria, hematuria, or flank pain.  Denies hesitancy, urgency or flank pain.  Lymph- no swollen lymph nodes in neck/axilla or groin.   Heme- No active bruising or bleeding; no Hx of DVT or PE.  MS-- no swelling or pain in the bones or joints of arms/legs.  No new back pain.  Neuro-- No acute focal weakness or numbness in the arms or legs.  No seizures.  Chronic Parkinson's and chronically  "debilitated    Full 12 point review of systems reviewed and negative otherwise for acute complaints, except for above    Physical Exam:   Vital Signs   /53 (BP Location: Left arm, Patient Position: Lying)   Pulse 67   Temp 97.3 °F (36.3 °C) (Oral)   Resp 16   Ht 182.9 cm (72.01\")   Wt 118 kg (259 lb 6.4 oz)   SpO2 93%   BMI 35.17 kg/m²     GENERAL: sleepy, in no acute distress.    HEENT: Normocephalic, atraumatic.    No conjunctival injection. No icterus. Oropharynx clear without evidence of thrush or exudate. No evidence of periodontal disease.    NECK: Supple without nuchal rigidity. No mass.   HEART: RRR; No murmur, rubs, gallops.   LUNGS: Diminished at bases but otherwise  clear to auscultation bilaterally without wheezing, rales, rhonchi. Normal respiratory effort. Nonlabored. No dullness.  ABDOMEN: Soft, nontender, nondistended. Positive bowel sounds. No rebound or guarding. NO mass or HSM.  EXT: See below   MSK: FROM without joint effusions noted arms/legs.    SKIN: Warm and dry without cutaneous eruptions on Inspection/palpation.    NEURO: sleepy.       Bilateral lower legs appeared edematous and discolored with vague scale.   right side between knee and foot has warmth/tenderness and erythema   improving/less intense.  Right side second toe is  discolored with vague edema/erythema and tenderness out of proportion to other toes, crusted wound and unable to determine any depth with no definitive probe to bone tendon joint or ligament at present; no discrete mass bulge or fluctuance.  No crepitus or bulla    Laboratory Data    Results from last 7 days   Lab Units 10/14/24  0357 10/12/24  0358 10/11/24  0641   WBC 10*3/mm3 6.89 4.75 5.79   HEMOGLOBIN g/dL 10.8* 10.9* 10.9*   HEMATOCRIT % 35.0* 34.8* 34.9*   PLATELETS 10*3/mm3 266 253 254     Results from last 7 days   Lab Units 10/14/24  0357   SODIUM mmol/L 139   POTASSIUM mmol/L 4.1   CHLORIDE mmol/L 106   CO2 mmol/L 27.0   BUN mg/dL 20 "   CREATININE mg/dL 1.09   GLUCOSE mg/dL 129*   CALCIUM mg/dL 8.6     Results from last 7 days   Lab Units 10/11/24  0641   ALK PHOS U/L 98   BILIRUBIN mg/dL 0.2   ALT (SGPT) U/L <5   AST (SGOT) U/L 9     Results from last 7 days   Lab Units 10/08/24  1936   SED RATE mm/hr 92*     Results from last 7 days   Lab Units 10/08/24  1936   CRP mg/dL 4.81*       Estimated Creatinine Clearance: 75.3 mL/min (by C-G formula based on SCr of 1.09 mg/dL).      Microbiology:      Radiology:  Imaging Results (Last 72 Hours)       Procedure Component Value Units Date/Time    SLP FEES - Fiberoptic Endo Eval Swallow [[id number]] Resulted: 10/12/24 1532     Updated: 10/12/24 1532    Narrative:      This procedure was auto-finalized with no dictation required.              Impression:     --Acute right lower leg/foot and second toe infection/cellulitis out of proportion to his chronic discoloration by his report, trauma to the right second toe several weeks preceding admission as above.  Further complicated by his chronic comorbidity, wound at the second toe with maggots noted earlier in stay, with ongoing risk for further serious morbidity and other serious sequela; high risk for persistent/recurrent or nonhealing wounds, persistent/progressive or recurrent infection and risk for further functional/limb loss, risk for amputation etc.   MRI no osteomyelitis per radiology.     --Chronic/intermittent cough at presentation, CT scan without acute infiltrate per radiology and some atelectasis noted by them.  Encouraged incentive spirometry.  No productive cough at present.  Respiratory PCR negative.  Further pulmonary workup or referral at discretion of medicine team; pneumonia less likely at present but certainly could evolve; monitor for new process    --Hx trauma and maggots    --Parkinson's disease, chronically debilitated and primarily wheelchair-bound by his report.    --Prior history of DVT    PLAN:      -- IV  Zosyn for now, likely 14  days from admit. Possible [facility]; we can follow there        -- Daptomycin stopped as no MDR GPC in culture so far.  If worse with this change or stronger evidence for MDR GPC as pathogen, then consideration could be given to adding back versus other adjustments    -- Check/review labs cultures and scans    -- Partial history per nursing staff    -- Discussed with microbiology    -- Highly complex set of issues with high risk for further serious morbidity and other serious sequela       Copied text in this note has been reviewed and is accurate as of 10/15/24.        Trevor Jorgensen MD  10/15/2024

## 2024-10-16 PROCEDURE — 97530 THERAPEUTIC ACTIVITIES: CPT

## 2024-10-16 PROCEDURE — 25010000002 HEPARIN (PORCINE) PER 1000 UNITS: Performed by: HOSPITALIST

## 2024-10-16 PROCEDURE — 25010000002 PIPERACILLIN SOD-TAZOBACTAM PER 1 G: Performed by: INTERNAL MEDICINE

## 2024-10-16 PROCEDURE — 99232 SBSQ HOSP IP/OBS MODERATE 35: CPT | Performed by: NURSE PRACTITIONER

## 2024-10-16 RX ORDER — BISACODYL 5 MG/1
5 TABLET, DELAYED RELEASE ORAL DAILY PRN
Status: DISCONTINUED | OUTPATIENT
Start: 2024-10-16 | End: 2024-10-19 | Stop reason: HOSPADM

## 2024-10-16 RX ORDER — POLYETHYLENE GLYCOL 3350 17 G/17G
17 POWDER, FOR SOLUTION ORAL DAILY PRN
Status: DISCONTINUED | OUTPATIENT
Start: 2024-10-16 | End: 2024-10-19 | Stop reason: HOSPADM

## 2024-10-16 RX ORDER — BISACODYL 10 MG
10 SUPPOSITORY, RECTAL RECTAL DAILY PRN
Status: DISCONTINUED | OUTPATIENT
Start: 2024-10-16 | End: 2024-10-19 | Stop reason: HOSPADM

## 2024-10-16 RX ORDER — AMOXICILLIN 250 MG
2 CAPSULE ORAL 2 TIMES DAILY
Status: DISCONTINUED | OUTPATIENT
Start: 2024-10-16 | End: 2024-10-19 | Stop reason: HOSPADM

## 2024-10-16 RX ADMIN — CARBIDOPA AND LEVODOPA 1 TABLET: 25; 100 TABLET, EXTENDED RELEASE ORAL at 20:17

## 2024-10-16 RX ADMIN — PRAMIPEXOLE DIHYDROCHLORIDE 1 MG: 1 TABLET ORAL at 20:18

## 2024-10-16 RX ADMIN — GABAPENTIN 100 MG: 100 CAPSULE ORAL at 20:18

## 2024-10-16 RX ADMIN — GUAIFENESIN 600 MG: 600 TABLET, EXTENDED RELEASE ORAL at 20:18

## 2024-10-16 RX ADMIN — Medication 10 ML: at 20:15

## 2024-10-16 RX ADMIN — GABAPENTIN 100 MG: 100 CAPSULE ORAL at 08:57

## 2024-10-16 RX ADMIN — PIPERACILLIN AND TAZOBACTAM 3.38 G: 3; .375 INJECTION, POWDER, LYOPHILIZED, FOR SOLUTION INTRAVENOUS at 22:22

## 2024-10-16 RX ADMIN — PANTOPRAZOLE SODIUM 40 MG: 40 TABLET, DELAYED RELEASE ORAL at 12:41

## 2024-10-16 RX ADMIN — PIPERACILLIN AND TAZOBACTAM 3.38 G: 3; .375 INJECTION, POWDER, LYOPHILIZED, FOR SOLUTION INTRAVENOUS at 15:52

## 2024-10-16 RX ADMIN — CARBIDOPA AND LEVODOPA 2 TABLET: 25; 100 TABLET ORAL at 08:57

## 2024-10-16 RX ADMIN — TAMSULOSIN HYDROCHLORIDE 0.4 MG: 0.4 CAPSULE ORAL at 08:57

## 2024-10-16 RX ADMIN — CARBIDOPA AND LEVODOPA 2 TABLET: 25; 100 TABLET ORAL at 18:22

## 2024-10-16 RX ADMIN — Medication 10 ML: at 09:00

## 2024-10-16 RX ADMIN — PIPERACILLIN AND TAZOBACTAM 3.38 G: 3; .375 INJECTION, POWDER, LYOPHILIZED, FOR SOLUTION INTRAVENOUS at 06:20

## 2024-10-16 RX ADMIN — PRAMIPEXOLE DIHYDROCHLORIDE 1 MG: 1 TABLET ORAL at 08:57

## 2024-10-16 RX ADMIN — HEPARIN SODIUM 5000 UNITS: 5000 INJECTION INTRAVENOUS; SUBCUTANEOUS at 15:52

## 2024-10-16 RX ADMIN — CARBIDOPA AND LEVODOPA 2 TABLET: 25; 100 TABLET ORAL at 12:41

## 2024-10-16 RX ADMIN — CARBIDOPA AND LEVODOPA 1 TABLET: 25; 100 TABLET, EXTENDED RELEASE ORAL at 08:57

## 2024-10-16 RX ADMIN — QUETIAPINE FUMARATE 12.5 MG: 25 TABLET ORAL at 20:18

## 2024-10-16 RX ADMIN — GUAIFENESIN 600 MG: 600 TABLET, EXTENDED RELEASE ORAL at 08:57

## 2024-10-16 RX ADMIN — ATORVASTATIN CALCIUM 40 MG: 40 TABLET, FILM COATED ORAL at 08:57

## 2024-10-16 RX ADMIN — NYSTATIN: 100000 POWDER TOPICAL at 20:15

## 2024-10-16 RX ADMIN — POLYETHYLENE GLYCOL 3350 17 G: 17 POWDER, FOR SOLUTION ORAL at 18:22

## 2024-10-16 RX ADMIN — LEVOTHYROXINE SODIUM 88 MCG: 0.09 TABLET ORAL at 06:19

## 2024-10-16 RX ADMIN — HEPARIN SODIUM 5000 UNITS: 5000 INJECTION INTRAVENOUS; SUBCUTANEOUS at 06:20

## 2024-10-16 RX ADMIN — PRAMIPEXOLE DIHYDROCHLORIDE 1 MG: 1 TABLET ORAL at 15:52

## 2024-10-16 RX ADMIN — CARBIDOPA AND LEVODOPA 2 TABLET: 25; 100 TABLET ORAL at 20:17

## 2024-10-16 RX ADMIN — SENNOSIDES AND DOCUSATE SODIUM 2 TABLET: 50; 8.6 TABLET ORAL at 20:17

## 2024-10-16 RX ADMIN — FUROSEMIDE 20 MG: 20 TABLET ORAL at 08:57

## 2024-10-16 RX ADMIN — FLUTICASONE PROPIONATE 2 SPRAY: 50 SPRAY, METERED NASAL at 08:57

## 2024-10-16 RX ADMIN — NYSTATIN: 100000 POWDER TOPICAL at 08:57

## 2024-10-16 RX ADMIN — HEPARIN SODIUM 5000 UNITS: 5000 INJECTION INTRAVENOUS; SUBCUTANEOUS at 20:17

## 2024-10-16 RX ADMIN — SENNOSIDES AND DOCUSATE SODIUM 2 TABLET: 50; 8.6 TABLET ORAL at 12:41

## 2024-10-16 NOTE — PLAN OF CARE
Goal Outcome Evaluation:  Plan of Care Reviewed With: patient  Plan of Care Reviewed With: (P) patient        Progress: (P) no change  Outcome Evaluation: (P) Patient was able to ambulate with Clem x1 around her room from bed to chair. Patient needed some additional assistance with the STS requiring modA x2. Patient gait speed is still severely decreased and he did report some additional pain in his right ankle when ambulating. Patient will continue to benefit from skilled PT in order to improve mobility and strength.    Anticipated Discharge Disposition (PT): (P) inpatient rehabilitation facility

## 2024-10-16 NOTE — PLAN OF CARE
Goal Outcome Evaluation:  Plan of Care Reviewed With: patient        Progress: improving  Outcome Evaluation: Pt is A&O with VSS, NSR on the monitor, and on RA. He complained of pain in his right leg that was relieved with PRN pain medication. He also complained of burning in his groin that he stated is getting better with the nystatin powder. He slept well last night. There are no other complaints at this time.

## 2024-10-16 NOTE — THERAPY TREATMENT NOTE
Patient Name: Cordell Martin  : 1945    MRN: [id number]                              Today's Date: 10/16/2024       Admit Date: 10/8/2024    Visit Dx:     ICD-10-CM ICD-9-CM   1. Cellulitis of lower extremity, unspecified laterality  L03.119 682.6   2. Lymphedema  I89.0 457.1   3. Pain in both lower extremities  M79.604 729.5    M79.605    4. Chronic edema  R60.9 782.3   5. Parkinson's disease, unspecified whether dyskinesia present, unspecified whether manifestations fluctuate  G20.A1 332.0     Patient Active Problem List   Diagnosis    Anxiety    Arthritis    Depression    Hyperlipidemia    Lumbar stenosis with neurogenic claudication    Parkinson's disease    Lumbar stenosis with neurogenic claudication status post L4-5 decompression    Status post lumbar laminectomy    Memory loss    Chronic edema    Cellulitis of right lower extremity    Exertional dyspnea    Right knee pain    Cellulitis of lower extremity    Hypothyroidism (acquired)    Elevated serum creatinine    Cellulitis of right leg    Acute deep vein thrombosis (DVT) of right lower extremity    Pneumonia    Pulmonary nodule (4mm RML incidental)    Chronic cough    GERD    Remote smoker (Stopped )    Restrictive pattern on PFTs w/o evidence of ILD    Cellulitis    History of DVT (deep vein thrombosis)    Anemia, chronic disease    Elevated TSH     Past Medical History:   Diagnosis Date    Anxiety     Arthritis     Back pain     Bronchitis     Cognitive impairment     Depression     Disease of thyroid gland     Glucose intolerance (impaired glucose tolerance)     Hyperlipidemia     Kidney stone     Obesity     Parkinson disease     Pneumonia     Prostate disorder     Thyroid disease     Wears eyeglasses      Past Surgical History:   Procedure Laterality Date    COLONOSCOPY      5 years ago    EYE SURGERY      HERNIA REPAIR  10/20/2020    KNEE SURGERY      LUMBAR LAMINECTOMY DISCECTOMY DECOMPRESSION N/A 2017    Procedure: LUMBAR  LAMINECTOMY L4-5;  Surgeon: Antonio Trent MD;  Location: [facility];  Service:     SHOULDER ROTATOR CUFF REPAIR Right     x2    TONSILLECTOMY      VEIN SURGERY        General Information       Row Name 10/16/24 1505          Physical Therapy Time and Intention    Document Type therapy note (daily note) (P)   -LE     Mode of Treatment physical therapy (P)   -LE       Row Name 10/16/24 1502          General Information    Patient Profile Reviewed yes (P)   -LE     Existing Precautions/Restrictions fall;other (see comments) (P)   Cellulitis in BLE; Parkinson's Disease  -LE     Barriers to Rehab medically complex;previous functional deficit;physical barrier (P)   -LE       Row Name 10/16/24 1500          Cognition    Orientation Status (Cognition) oriented x 3 (P)   -LE       Row Name 10/16/24 1509          Safety Issues/Impairments Affecting Functional Mobility    Safety Issues Affecting Function (Mobility) insight into deficits/self-awareness;safety precaution awareness;safety precautions follow-through/compliance;sequencing abilities (P)   -LE     Impairments Affecting Function (Mobility) balance;endurance/activity tolerance;coordination;pain;sensation/sensory awareness;strength;range of motion (ROM);postural/trunk control;motor control (P)   -LE               User Key  (r) = Recorded By, (t) = Taken By, (c) = Cosigned By      Initials Name Provider Type    Rvier Alexander PT Student PT Student                   Mobility       Row Name 10/16/24 1502          Bed Mobility    Bed Mobility supine-sit (P)   -LE     Supine-Sit [place] (Bed Mobility) moderate assist (50% patient effort);2 person assist;verbal cues;nonverbal cues (demo/gesture) (P)   -LE     Assistive Device (Bed Mobility) bed rails;head of bed elevated;repositioning sheet (P)   -LE     Comment, (Bed Mobility) Needed assistance getting legs off the EOB and and gettting to upright position. Needed assistance scooting to EOB (P)   -RHETT Lucero  Name 10/16/24 1508          Sit-Stand Transfer    Sit-Stand [place] (Transfers) verbal cues;2 person assist;moderate assist (50% patient effort) (P)   -LE     Assistive Device (Sit-Stand Transfers) walker, front-wheeled (P)   -LE     Comment, (Sit-Stand Transfer) x1 from EOB. Extended time and effort (P)   -LE       Row Name 10/16/24 1508          Gait/Stairs (Locomotion)    [place] Level (Gait) verbal cues;nonverbal cues (demo/gesture);minimum assist (75% patient effort);1 person assist (P)   -LE     Assistive Device (Gait) walker, front-wheeled (P)   -LE     Patient was able to Ambulate yes (P)   -LE     Distance in Feet (Gait) 12 (P)   -LE     Deviations/Abnormal Patterns (Gait) bilateral deviations;base of support, wide;[place] decreased;gait speed decreased;stride length decreased;weight shifting decreased (P)   -LE     Bilateral Gait Deviations forward flexed posture;heel strike decreased (P)   -LE     Left Sided Gait Deviations decreased knee extension (P)   -LE     Right Sided Gait Deviations decreased knee extension (P)   -LE     Comment, (Gait/Stairs) Ambulated 12 ft with FWW and Clem x1 from bed to his chair. Pt has severely decreased gait speed and needs extended time to complete the ambulation. (P)   -LE               User Key  (r) = Recorded By, (t) = Taken By, (c) = Cosigned By      Initials Name Provider Type    River Alexander PT Student PT Student                   Obj/Interventions       Row Name 10/16/24 1512          Balance    Balance Assessment sitting static balance;sitting dynamic balance;standing static balance;standing dynamic balance (P)   -LE     Static Sitting Balance standby assist (P)   -LE     Dynamic Sitting Balance standby assist (P)   -LE     Position, Sitting Balance unsupported;sitting edge of bed (P)   -LE     Sit to Stand Dynamic Balance moderate assist;2-person assist (P)   -LE     Static Standing Balance contact guard (P)   -LE     Dynamic Standing Balance  minimal assist;1-person assist (P)   -LE     Position/Device Used, Standing Balance supported;walker, front-wheeled (P)   -LE     Balance Interventions sitting;standing;sit to stand;supported;static;dynamic;occupation based/functional task (P)   -LE               User Key  (r) = Recorded By, (t) = Taken By, (c) = Cosigned By      Initials Name Provider Type    LE River Ring, PT Student PT Student                   Goals/Plan    No documentation.                  Clinical Impression       Row Name 10/16/24 1514          Pain    Pretreatment Pain Rating 6/10 (P)   -LE     Posttreatment Pain Rating 6/10 (P)   -LE     Pain Location calf;ankle (P)   -LE     Pain Side/Orientation right;lower (P)   -LE     Pain Management Interventions exercise or physical activity utilized;positioning techniques utilized (P)   -LE     Response to Pain Interventions activity participation with tolerable pain (P)   -LE     Pain Intervention(s) Repositioned;Ambulation/increased activity;Elevated (P)   -LE       Row Name 10/16/24 1512          Plan of Care Review    Plan of Care Reviewed With patient (P)   -LE     Progress no change (P)   -LE     Outcome Evaluation Patient was able to ambulate with Clem x1 around her room from bed to chair. Patient needed some additional assistance with the STS requiring modA x2. Patient gait speed is still severely decreased and he did report some additional pain in his right ankle when ambulating. Patient will continue to benefit from skilled PT in order to improve mobility and strength. (P)   -LE       Row Name 10/16/24 151          Therapy Assessment/Plan (PT)    Patient/Family Therapy Goals Statement (PT) Return to PLOF (P)   -LE     Rehab Potential (PT) fair (P)   -LE     Criteria for Skilled Interventions Met (PT) yes;meets criteria;skilled treatment is necessary (P)   -LE       Row Name 10/16/24 9584          Vital Signs    Pre Systolic BP Rehab 107 (P)   -LE     Pre Treatment Diastolic BP 55  (P)   -LE     Pretreatment Heart Rate (beats/min) 61 (P)   -LE     Posttreatment Heart Rate (beats/min) 60 (P)   -LE     Pre SpO2 (%) 93 (P)   -LE     O2 Delivery Pre Treatment room air (P)   -LE     Post SpO2 (%) 94 (P)   -LE     O2 Delivery Post Treatment room air (P)   -LE     Pre Patient Position Supine (P)   -LE     Intra Patient Position Standing (P)   -LE     Post Patient Position Sitting (P)   -LE       Row Name 10/16/24 1514          Positioning and Restraints    Pre-Treatment Position in bed (P)   -LE     Post Treatment Position chair (P)   -LE     In Chair notified nsg;reclined;call light within reach;encouraged to call for assist;exit alarm on;waffle cushion;heels elevated;legs elevated (P)   -LE               User Key  (r) = Recorded By, (t) = Taken By, (c) = Cosigned By      Initials Name Provider Type    River Alexander, PT Student PT Student                   Outcome Measures       Row Name 10/16/24 1520 10/16/24 0800       How much help from another person do you currently need...    Turning from your back to your side while in flat bed without using bedrails? 3 (P)   -LE 3  -DA    Moving from lying on back to sitting on the side of a flat bed without bedrails? 2 (P)   -LE 3  -DA    Moving to and from a bed to a chair (including a wheelchair)? 3 (P)   -LE 2  -DA    Standing up from a chair using your arms (e.g., wheelchair, bedside chair)? 3 (P)   -LE 3  -DA    Climbing 3-5 steps with a railing? 2 (P)   -LE 2  -DA    To walk in hospital room? 3 (P)   -LE 2  -DA    AM-PAC 6 Clicks Score (PT) 16 (P)   -LE 15  -DA    Highest Level of Mobility Goal 5 --> Static standing (P)   -LE 4 --> Transfer to chair/commode  -DA              User Key  (r) = Recorded By, (t) = Taken By, (c) = Cosigned By      Initials Name Provider Type    Rogelio Campbell, RN Registered Nurse    River Alexander, PT Student PT Student                                 Physical Therapy Education       Title: PT OT SLP  Therapies (Done)       Topic: Physical Therapy (Done)       Point: Mobility training (Done)       Learning Progress Summary            Patient Acceptance, E, VU by LE at 10/16/2024 1521    Acceptance, E, VU by LE at 10/14/2024 1527    Acceptance, E,TB, VU by BT at 10/13/2024 1557    Acceptance, E, VU by LE at 10/10/2024 1100                      Point: Home exercise program (Done)       Learning Progress Summary            Patient Acceptance, E, VU by LE at 10/16/2024 1521    Acceptance, E, VU by LE at 10/14/2024 1527    Acceptance, E,TB, VU by BT at 10/13/2024 1557                      Point: Body mechanics (Done)       Learning Progress Summary            Patient Acceptance, E, VU by LE at 10/16/2024 1521    Acceptance, E, VU by LE at 10/14/2024 1527    Acceptance, E,TB, VU by BT at 10/13/2024 1557    Acceptance, E, VU by LE at 10/10/2024 1100                      Point: Precautions (Done)       Learning Progress Summary            Patient Acceptance, E, VU by LE at 10/16/2024 1521    Acceptance, E, VU by LE at 10/14/2024 1527    Acceptance, E,TB, VU by BT at 10/13/2024 1557    Acceptance, E, VU by LE at 10/10/2024 1100                                      User Key       Initials Effective Dates Name Provider Type Discipline    ALFA 12/30/20 -  Adriana Heath RN Registered Nurse Nurse    RHETT 07/30/24 -  River Ring, JARAD Student PT Student PT                  PT Recommendation and Plan  Planned Therapy Interventions (PT): balance training, bed mobility training, gait training, home exercise program, neuromuscular re-education, patient/family education, postural re-education, ROM (range of motion), strengthening, stretching, transfer training  Plan of Care Reviewed With: patient  Progress: (P) no change  Outcome Evaluation: (P) Patient was able to ambulate with Clem x1 around her room from bed to chair. Patient needed some additional assistance with the STS requiring modA x2. Patient gait speed is still severely  decreased and he did report some additional pain in his right ankle when ambulating. Patient will continue to benefit from skilled PT in order to improve mobility and strength.     Time Calculation:   PT Evaluation Complexity  History, PT Evaluation Complexity: 3 or more personal factors and/or comorbidities  Examination of Body Systems (PT Eval Complexity): total of 3 or more elements  Clinical Presentation (PT Evaluation Complexity): evolving  Clinical Decision Making (PT Evaluation Complexity): moderate complexity  Overall Complexity (PT Evaluation Complexity): moderate complexity     PT Charges       Row Name 10/16/24 1522             Time Calculation    Start Time 1425 (P)   -LE      PT Received On 10/16/24 (P)   -LE      PT Goal Re-Cert Due Date 10/20/24 (P)   -LE         Timed Charges    40136 - PT Therapeutic Activity Minutes 29 (P)   -LE         Total Minutes    Timed Charges Total Minutes 29 (P)   -LE       Total Minutes 29 (P)   -LE                User Key  (r) = Recorded By, (t) = Taken By, (c) = Cosigned By      Initials Name Provider Type    LE River Ring, PT Student PT Student                  Therapy Charges for Today       Code Description Service Date Service Provider Modifiers Qty    [id number] HC PT THERAPEUTIC ACT EA 15 MIN 10/16/2024 River Ring, PT Student GP 2            PT G-Codes  Outcome Measure Options: AM-PAC 6 Clicks Daily Activity (OT)  AM-PAC 6 Clicks Score (PT): (P) 16  AM-PAC 6 Clicks Score (OT): 14  PT Discharge Summary  Anticipated Discharge Disposition (PT): (P) inpatient rehabilitation facility    River Ring PT Student  10/16/2024

## 2024-10-16 NOTE — CASE MANAGEMENT/SOCIAL WORK
Continued Stay Note  [facility]     Patient Name: Cordell Martin  MRN: [id number]  Today's Date: 10/16/2024    Admit Date: 10/8/2024    Plan: discharge plan   Discharge Plan       Row Name 10/16/24 1409       Plan    Plan discharge plan    Plan Comments I spoke with Marie (liason for [facility]) again and confirmed that [facility] unable to offer pt a bed. [place]  have no beds available in [facility]. I met with pt at bedside regarding no bed offers. and he asked CM to call one of his sisters.    Addendum: I spoke with Lennie(sister) regarding other inpatient rehab options. She reports she will check out other options but asked CM to call other sister, Katie as Lennie no longer lives in [place]. I attempted to call Katie and had to leave a .                   Discharge Codes    No documentation.                 Expected Discharge Date and Time       Expected Discharge Date Expected Discharge Time    Oct 16, 2024               Kenisha Tom RN

## 2024-10-16 NOTE — PROGRESS NOTES
[facility] Medicine Services  PROGRESS NOTE    Patient Name: Cordell Martin  : 1945  MRN: [id number]    Date of Admission: 10/8/2024  Primary Care Physician: Ben Holder DO    Subjective   Subjective     CC:  F/u cellulitis    HPI:   Patient resting in bed.  Says he is feeling better overall.  Discussed plan for possible revascularization after patient has completed antibiotic therapy and gone to rehab.  Patient states he has not had a bowel movement since the day he was admitted; however, stool charted on 10/11.  He otherwise denies concerns.      Objective   Objective     Vital Signs:   Temp:  [97.3 °F (36.3 °C)-97.5 °F (36.4 °C)] 97.5 °F (36.4 °C)  Heart Rate:  [60-73] 60  Resp:  [16] 16  BP: ()/(52-62) 105/52     Physical Exam:   Constitutional: No acute distress, awake, alert  HENT: NCAT, mucous membranes moist  Respiratory: Clear to auscultation bilaterally, respiratory effort normal, room air  Cardiovascular: RRR, no murmurs, rubs, or gallops  Gastrointestinal: Positive bowel sounds, soft, nontender, rounded  Musculoskeletal: BLE wrapped  Psychiatric: Appropriate affect, cooperative  Neurologic: Oriented x 3, moves all extremities, speech clear  Skin: No rashes, erythema bilateral groin, scrotum    Results Reviewed:  LAB RESULTS:      Lab 10/14/24  0357 10/12/24  0358 10/11/24  0641 10/10/24  0416   WBC 6.89 4.75 5.79 6.75   HEMOGLOBIN 10.8* 10.9* 10.9* 10.5*   HEMATOCRIT 35.0* 34.8* 34.9* 32.7*   PLATELETS 266 253 254 241   MCV 93.8 93.3 94.3 92.4         Lab 10/14/24  0357 10/12/24  0358 10/11/24  0641 10/10/24  0416 10/09/24  1529   SODIUM 139 138 138 140  --    POTASSIUM 4.1 4.0 4.4 4.4 4.3   CHLORIDE 106 105 104 106  --    CO2 27.0 23.0 27.0 24.0  --    ANION GAP 6.0 10.0 7.0 10.0  --    BUN 20 18 18 20  --    CREATININE 1.09 1.26 1.11 1.09  --    EGFR 69.9 58.7* 68.4 69.9  --    GLUCOSE 129* 143* 128* 98  --    CALCIUM 8.6 8.6 8.4* 8.3*  --           Lab 10/11/24  0641 10/10/24  0416   TOTAL PROTEIN 6.3 5.4*   ALBUMIN 2.6* 2.7*   GLOBULIN 3.7 2.7   ALT (SGPT) <5 <5   AST (SGOT) 9 9   BILIRUBIN 0.2 0.3   ALK PHOS 98 105                           Brief Urine Lab Results  (Last result in the past 365 days)        Color   Clarity   Blood   Leuk Est   Nitrite   Protein   CREAT   Urine HCG        10/08/24 2219 Yellow   Clear   Negative   Negative   Negative   Negative                   Microbiology Results Abnormal       Procedure Component Value - Date/Time    Blood Culture - Blood, Hand, Left [[id number]]  (Normal) Collected: 10/08/24 2120    Lab Status: Final result Specimen: Blood from Hand, Left Updated: 10/13/24 2146     Blood Culture No growth at 5 days    Narrative:      Less than seven (7) mL's of blood was collected.  Insufficient quantity may yield false negative results.    Blood Culture - Blood, Hand, Right [[id number]]  (Normal) Collected: 10/08/24 2050    Lab Status: Final result Specimen: Blood from Hand, Right Updated: 10/13/24 2116     Blood Culture No growth at 5 days    Parasite Identification - Parasite, Foot, Right [[id number]] Collected: 10/09/24 1358    Lab Status: Final result Specimen: Parasite from Foot, Right Updated: 10/09/24 1407     Macroscopic Exam Maggots    Respiratory Panel PCR w/COVID-19(SARS-CoV-2) KENJI/SAH/SHIRLEY/PAD/COR/CORNELIUS In-House, NP Swab in UTM/VTM, 2 HR TAT - Swab, Nasopharynx [[id number]]  (Normal) Collected: 10/09/24 0548    Lab Status: Final result Specimen: Swab from Nasopharynx Updated: 10/09/24 0643     ADENOVIRUS, PCR Not Detected     Coronavirus 229E Not Detected     Coronavirus HKU1 Not Detected     Coronavirus NL63 Not Detected     Coronavirus OC43 Not Detected     COVID19 Not Detected     Human Metapneumovirus Not Detected     Human Rhinovirus/Enterovirus Not Detected     Influenza A PCR Not Detected     Influenza B PCR Not Detected     Parainfluenza Virus 1 Not Detected     Parainfluenza Virus 2 Not Detected      Parainfluenza Virus 3 Not Detected     Parainfluenza Virus 4 Not Detected     RSV, PCR Not Detected     Bordetella pertussis pcr Not Detected     Bordetella parapertussis PCR Not Detected     Chlamydophila pneumoniae PCR Not Detected     Mycoplasma pneumo by PCR Not Detected    Narrative:      In the setting of a positive respiratory panel with a viral infection PLUS a negative procalcitonin without other underlying concern for bacterial infection, consider observing off antibiotics or discontinuation of antibiotics and continue supportive care. If the respiratory panel is positive for atypical bacterial infection (Bordetella pertussis, Chlamydophila pneumoniae, or Mycoplasma pneumoniae), consider antibiotic de-escalation to target atypical bacterial infection.    COVID PRE-OP / PRE-PROCEDURE SCREENING ORDER (NO ISOLATION) - Swab, Nasopharynx [[id number]]  (Normal) Collected: 10/08/24 2126    Lab Status: Final result Specimen: Swab from Nasopharynx Updated: 10/08/24 2220    Narrative:      The following orders were created for panel order COVID PRE-OP / PRE-PROCEDURE SCREENING ORDER (NO ISOLATION) - Swab, Nasopharynx.  Procedure                               Abnormality         Status                     ---------                               -----------         ------                     COVID-19, FLU A/B, RSV P...[[id number]]  Normal              Final result                 Please view results for these tests on the individual orders.    COVID-19, FLU A/B, RSV PCR 1 HR TAT - Swab, Nasopharynx [[id number]]  (Normal) Collected: 10/08/24 2126    Lab Status: Final result Specimen: Swab from Nasopharynx Updated: 10/08/24 2220     COVID19 Not Detected     Influenza A PCR Not Detected     Influenza B PCR Not Detected     RSV, PCR Not Detected    Narrative:      Fact sheet for providers: https://www.fda.gov/media/649694/download    Fact sheet for patients: https://www.fda.gov/media/511886/download    Test performed by PCR.  "           No radiology results from the last 24 hrs    Results for orders placed during the hospital encounter of 01/12/21    Adult Transthoracic Echo Complete W/ Cont if Necessary Per Protocol    Interpretation Summary  · The right ventricle and right atrium are moderately dilated. Other chamber sizes and wall thicknesses are normal.  · Global and segmental LV wall motion is normal. The estimated left ventricular ejection fraction is 51% - 55%.  · The aortic and mitral valves are normal in structure and function.  · The estimated RV systolic pressure is mildly elevated 35 mmHg - 40 mmHg. There is as well noted to be less than 50% inspiratory IVC collapse. The main pulmonary artery is \"borderline dilated\".  · There are no other important findings on this study.      Current medications:  Scheduled Meds:atorvastatin, 40 mg, Oral, Daily  carbidopa-levodopa, 2 tablet, Oral, 4x Daily  carbidopa-levodopa ER, 1 tablet, Oral, BID  fluticasone, 2 spray, Each Nare, Daily  furosemide, 20 mg, Oral, Daily  gabapentin, 100 mg, Oral, Q12H  guaiFENesin, 600 mg, Oral, Q12H  heparin (porcine), 5,000 Units, Subcutaneous, Q8H  levothyroxine, 88 mcg, Oral, Q AM  nystatin, , Topical, Q12H  pantoprazole, 40 mg, Oral, Daily  piperacillin-tazobactam, 3.375 g, Intravenous, Q8H  pramipexole, 1 mg, Oral, TID  QUEtiapine, 12.5 mg, Oral, Nightly  sodium chloride, 10 mL, Intravenous, Q12H  tamsulosin, 0.4 mg, Oral, Daily      Continuous Infusions:   PRN Meds:.  senna-docusate sodium **AND** polyethylene glycol **AND** bisacodyl **AND** bisacodyl    HYDROcodone-acetaminophen    HYDROmorphone    Magnesium Standard Dose Replacement - Follow Nurse / BPA Driven Protocol    Potassium Replacement - Follow Nurse / BPA Driven Protocol    sodium chloride    sodium chloride    Assessment & Plan   Assessment & Plan     Active Hospital Problems    Diagnosis  POA    **Cellulitis [L03.90]  Yes    History of DVT (deep vein thrombosis) [Z86.718]  Not Applicable "    Anemia, chronic disease [D63.8]  Yes    Elevated TSH [R79.89]  Yes    GERD [K21.9]  Yes    Chronic cough [R05.3]  Yes    Hypothyroidism (acquired) [E03.9]  Yes    Chronic edema [R60.9]  Yes    Parkinson's disease [G20.A1]  Yes    Anxiety [F41.9]  Yes    Hyperlipidemia [E78.5]  Yes    Depression [F32.A]  Yes      Resolved Hospital Problems   No resolved problems to display.        Brief Hospital Course to date:  Cordell Martin is a 79 y.o. male with history of Parkinson's, history of DVT, HLD, hypothyroidism, GERD, lymphedema, anxiety/depression, with wheelchair use/walker use at [facility] who presented with cellulitis, BLE wounds. Ortho and ID consulted.     This patient's problems and plans were partially entered by my partner and updated as appropriate by me 10/16/24.      B LE wounds  Edema  Cellulitis  - Maggots noted, R second toe markedly abnormal, wound between first/second toe with drainage after jamming toe into wheelchair  - Duplex with chronic DVT noted  - MRI foot and tib/fib with no evidence of osteo   - Wound culture with pseudomonas   - Evaluated by ortho, conservative management and monitoring  - ID consulted. Patient initially on dapto/zosyn. Dapto discontinued 10/12 after culture resulted with pseudomonas. Continue Zosyn, likely 14 days from admission per ID. ID can follow at [facility].   - ABIs unable to be assessed d/t vessel incompressibility   - Vascular consult. Known to Dr. Connor. No urgent vascular intervention at this time.  May require revascularization as outpatient.  - WOC following  - AM CBC, BMP     Constipation  -no BM since 10/11, likely d/t pain medication, immobility  -schedule bowel regimen, monitor  -defer KUB as abdomen soft, bowel sounds present    Cough, improved  Possible LLL PNA on CXR, not as impressive on CT  -pulmonary toilet  -respiratory PCR negative  -speech consulted and s/p FEES with regular/thins      Chronic anemia  -monitor  -AM CBC     Parkinson's  disease  Anxiety/depression  -continue sinemet, seroquel, mirapex     Hypothyroidism  -on synthorid, with elevated TSH 15.85 and normal T4  -continue levothyroxine 88 mcg daily and follow up with PCP     HLD  -statin     GERD  -PPI    Expected Discharge Location and Transportation: [place]  Expected Discharge   Expected Discharge Date: 10/16/2024; Expected Discharge Time:      VTE Prophylaxis:  Pharmacologic VTE prophylaxis orders are present.         AM-PAC 6 Clicks Score (PT): 16 (10/15/24 6307)    CODE STATUS:   Code Status and Medical Interventions: No CPR (Do Not Attempt to Resuscitate); Limited Support; No intubation (DNI); DNR/DNI   Ordered at: 10/09/24 0206     Medical Intervention Limits:    No intubation (DNI)     Level Of Support Discussed With:    Patient     Code Status (Patient has no pulse and is not breathing):    No CPR (Do Not Attempt to Resuscitate)     Medical Interventions (Patient has pulse or is breathing):    Limited Support     Comments:    DNR/DNI       Radha Lyons, APRN  10/16/24

## 2024-10-16 NOTE — CASE MANAGEMENT/SOCIAL WORK
Continued Stay Note  [facility]     Patient Name: Cordell Martin  MRN: [id number]  Today's Date: 10/16/2024    Admit Date: 10/8/2024    Plan: discharge plan   Discharge Plan       Row Name 10/16/24 5320       Plan    Plan discharge plan    Plan Comments I received a call from Lennie(sister) requesting a referral made to [facility]. Referral made. CM will cont to follow    Final Discharge Disposition Code 03 - skilled nursing facility (SNF)      Row Name 10/16/24 1403       Plan    Plan discharge plan    Plan Comments I spoke with Marie (liason for [facility]) again and confirmed that [facility] unable to offer pt a bed. [facility]  have no beds available in [facility]. I met with pt at bedside regarding no bed offers. and he asked CM to call one of his sisters.                   Discharge Codes    No documentation.                 Expected Discharge Date and Time       Expected Discharge Date Expected Discharge Time    Oct 16, 2024               Kensiha Tom RN

## 2024-10-16 NOTE — PROGRESS NOTES
"Cordell Martin  1945  [id number]    Evaluating Physician: Trevor Jorgensen MD    Chief Complaint: right leg red and painful    Reason for Consultation: RLE infection    History of present illness:     Patient is a 79 y.o.  Yr old male prior patient of Dr. Benites, with history of Parkinson's disease and chronically debilitated, uses a wheelchair primarily although apparently has occasionally used a walker.  He has other extensive comorbidities as below.  He had reported trauma to the right second toe having jammed it into a wall while in his wheelchair several weeks prior to admission.  He had developed increasing redness/swelling and discoloration of the right second toe and foot/lower leg over that period of time.  He does have bilateral lower leg discoloration and swelling but the right side is out of proportion of the left side.  He was admitted on October 8 with concern for lower extremity cellulitis; subsequently noted to have maggots at the right second toe    Aside from the trauma in the wheelchair, he denied any other specific exposures.    10/16/24 seen early and sleepy; Cx  PSA, skin derrell / \"mixed\" GNR  ; Right lower leg/foot pain out of proportion to left, constant, nonradiating, worse with palpation, generally better with pain meds  , 2-3 out of 10 at present. Redness less since admit    No headache photophobia or neck stiffness.  He has chronic intermittent cough and reports this is at baseline, currently on room air with no hemoptysis.  No nausea vomiting diarrhea or abdominal pain.  No dysuria hematuria or pyuria.  No other specific rash    Past Medical History:   Diagnosis Date    Anxiety     Arthritis     Back pain     Bronchitis     Cognitive impairment     Depression     Disease of thyroid gland     Glucose intolerance (impaired glucose tolerance)     Hyperlipidemia     Kidney stone     Obesity     Parkinson disease     Pneumonia     Prostate disorder     Thyroid disease     Wears " eyeglasses        Past Surgical History:   Procedure Laterality Date    COLONOSCOPY      5 years ago    EYE SURGERY      HERNIA REPAIR  10/20/2020    KNEE SURGERY      LUMBAR LAMINECTOMY DISCECTOMY DECOMPRESSION N/A 07/13/2017    Procedure: LUMBAR LAMINECTOMY L4-5;  Surgeon: Antonio Trent MD;  Location: [facility];  Service:     SHOULDER ROTATOR CUFF REPAIR Right     x2    TONSILLECTOMY      VEIN SURGERY         Pediatric History   Patient Parents    Not on file     Other Topics Concern    Not on file   Social History Narrative    Lives alone. Uses walker    Has not smoked since about 1980       family history includes Alzheimer's disease in an other family member; Cancer in his father and another family member; Diabetes in an other family member; Heart disease in an other family member; Stroke in an other family member.    No Known Allergies    Medication:  Current Facility-Administered Medications   Medication Dose Route Frequency Provider Last Rate Last Admin    atorvastatin (LIPITOR) tablet 40 mg  40 mg Oral Daily Agnely Mukherjee DO   40 mg at 10/16/24 0857    sennosides-docusate (PERICOLACE) 8.6-50 MG per tablet 2 tablet  2 tablet Oral BID PRN Daya Ospina APRN   2 tablet at 10/15/24 2209    And    polyethylene glycol (MIRALAX) packet 17 g  17 g Oral Daily PRN Daya Ospina APRN        And    bisacodyl (DULCOLAX) EC tablet 5 mg  5 mg Oral Daily PRN Daya Ospina APRN        And    bisacodyl (DULCOLAX) suppository 10 mg  10 mg Rectal Daily PRN Daya Ospina APRN        carbidopa-levodopa (SINEMET)  MG per tablet 2 tablet  2 tablet Oral 4x Daily Daya Ospina APRN   2 tablet at 10/16/24 0857    carbidopa-levodopa ER (SINEMET CR)  MG per tablet 1 tablet  1 tablet Oral BID Daya Ospina APRN   1 tablet at 10/16/24 0857    fluticasone (FLONASE) 50 MCG/ACT nasal spray 2 spray  2 spray Each Nare Daily Daya Ospina APRN   2 spray at 10/16/24 0857    furosemide (LASIX) tablet 20 mg  20 mg  Oral Daily Daya Ospina APRN   20 mg at 10/16/24 0857    gabapentin (NEURONTIN) capsule 100 mg  100 mg Oral Q12H Fabian Lopez MD   100 mg at 10/16/24 0857    guaiFENesin (MUCINEX) 12 hr tablet 600 mg  600 mg Oral Q12H Fabian Lopez MD   600 mg at 10/16/24 0857    heparin (porcine) 5000 UNIT/ML injection 5,000 Units  5,000 Units Subcutaneous Q8H Fabian Lopez MD   5,000 Units at 10/16/24 0620    HYDROcodone-acetaminophen (NORCO) 5-325 MG per tablet 1 tablet  1 tablet Oral Q6H PRN Angely Mukherjee DO   1 tablet at 10/15/24 2224    HYDROmorphone (DILAUDID) injection 0.25 mg  0.25 mg Intravenous Q2H PRN Angely Mukherjee DO        levothyroxine (SYNTHROID, LEVOTHROID) tablet 88 mcg  88 mcg Oral Q AM Daya Ospina APRN   88 mcg at 10/16/24 0619    Magnesium Standard Dose Replacement - Follow Nurse / BPA Driven Protocol   Does not apply PRN Daya Ospina APRN        nystatin (MYCOSTATIN) powder   Topical Q12H Radha Lyons APRN   Given at 10/16/24 0857    pantoprazole (PROTONIX) EC tablet 40 mg  40 mg Oral Daily Daya Ospina APRN   40 mg at 10/15/24 1208    piperacillin-tazobactam (ZOSYN) 3.375 g IVPB in 100 mL NS MBP (CD)  3.375 g Intravenous Q8H Trevor Jorgensen MD   3.375 g at 10/16/24 0620    Potassium Replacement - Follow Nurse / BPA Driven Protocol   Does not apply PRN Daya Ospina APRN        pramipexole (MIRAPEX) tablet 1 mg  1 mg Oral TID Daya Ospina APRN   1 mg at 10/16/24 0857    QUEtiapine (SEROquel) tablet 12.5 mg  12.5 mg Oral Nightly Daya Ospina APRN   12.5 mg at 10/15/24 2208    sodium chloride 0.9 % flush 10 mL  10 mL Intravenous PRN Yuri Kessler MD        sodium chloride 0.9 % flush 10 mL  10 mL Intravenous Q12H Daya Ospina APRN   10 mL at 10/16/24 0900    sodium chloride 0.9 % flush 10 mL  10 mL Intravenous PRN Daya Ospina APRN        tamsulosin (FLOMAX) 24 hr capsule 0.4 mg  0.4 mg Oral Daily Daya Ospina APRN   0.4 mg at 10/16/24 0857        Antibiotics:  Anti-Infectives (From admission, onward)      Ordered     Dose/Rate Route Frequency Start Stop    10/09/24 0735  piperacillin-tazobactam (ZOSYN) 3.375 g IVPB in 100 mL NS MBP (CD)        Ordering Provider: Trevor Jorgensen MD    3.375 g  over 4 Hours Intravenous Every 8 Hours 10/09/24 1430 10/19/24 1429    10/09/24 0735  piperacillin-tazobactam (ZOSYN) 3.375 g IVPB in 100 mL NS MBP (CD)        Ordering Provider: Fabian Lopez MD    3.375 g  over 30 Minutes Intravenous Once 10/09/24 0830 10/09/24 0840    10/08/24 2008  vancomycin 2250 mg/500 mL 0.9% NS IVPB ([facility])        Ordering Provider: Yuri Kessler MD    20 mg/kg × 118 kg  over 135 Minutes Intravenous Once 10/08/24 2100 10/09/24 0346    10/08/24 2024  ceFAZolin 2000 mg IVPB in 100 mL NS (MBP)        Ordering Provider: Yuri Kessler MD    2,000 mg  over 30 Minutes Intravenous Once 10/08/24 2040 10/08/24 2203    10/08/24 2024  metroNIDAZOLE (FLAGYL) tablet 500 mg        Ordering Provider: Yuri Kessler MD    500 mg Oral Once 10/08/24 2040 10/08/24 2131              Review of Systems    10/16/24 per staff also    Constitutional-- No Fever, chills or sweats.  Appetite good, and no malaise. No fatigue.  Heent-- No new vision, hearing or throat complaints.  No epistaxis or oral sores.  Denies odynophagia or dysphagia.  No flashers, floaters or eye pain. No odynophagia or dysphagia. No headache, photophobia or neck stiffness.  CV-- No chest pain, palpitation or syncope  Resp--see above, no shortness of breath at present  GI- No nausea, vomiting, or diarrhea.  No hematochezia, melena, or hematemesis. Denies jaundice or chronic liver disease.  -- No dysuria, hematuria, or flank pain.  Denies hesitancy, urgency or flank pain.  Lymph- no swollen lymph nodes in neck/axilla or groin.   Heme- No active bruising or bleeding; no Hx of DVT or PE.  MS-- no swelling or pain in the bones or joints of arms/legs.  No new back  "pain.  Neuro-- No acute focal weakness or numbness in the arms or legs.  No seizures.  Chronic Parkinson's and chronically debilitated    Full 12 point review of systems reviewed and negative otherwise for acute complaints, except for above    Physical Exam:   Vital Signs   /61   Pulse 69   Temp 97 °F (36.1 °C)   Resp 16   Ht 182.9 cm (72.01\")   Wt 119 kg (261 lb 11.2 oz)   SpO2 93%   BMI 35.49 kg/m²     GENERAL: sleepy, in no acute distress.    HEENT: Normocephalic, atraumatic.    No conjunctival injection. No icterus. Oropharynx clear without evidence of thrush or exudate. No evidence of periodontal disease.    NECK: Supple without nuchal rigidity. No mass.   HEART: RRR; No murmur, rubs, gallops.   LUNGS: Diminished at bases but otherwise  clear to auscultation bilaterally without wheezing, rales, rhonchi. Normal respiratory effort. Nonlabored. No dullness.  ABDOMEN: Soft, nontender, nondistended. Positive bowel sounds. No rebound or guarding. NO mass or HSM.  EXT: See below   MSK: FROM without joint effusions noted arms/legs.    SKIN: Warm and dry without cutaneous eruptions on Inspection/palpation.    NEURO: sleepy.       Bilateral lower legs appeared edematous and discolored with vague scale.   right side between knee and foot has warmth/tenderness and erythema   improving/less intense.  Right side second toe is  discolored with vague edema/erythema and tenderness out of proportion to other toes, crusted wound and unable to determine any depth with no definitive probe to bone tendon joint or ligament at present; no discrete mass bulge or fluctuance.  No crepitus or bulla    Laboratory Data    Results from last 7 days   Lab Units 10/14/24  0357 10/12/24  0358 10/11/24  0641   WBC 10*3/mm3 6.89 4.75 5.79   HEMOGLOBIN g/dL 10.8* 10.9* 10.9*   HEMATOCRIT % 35.0* 34.8* 34.9*   PLATELETS 10*3/mm3 266 253 254     Results from last 7 days   Lab Units 10/14/24  0357   SODIUM mmol/L 139   POTASSIUM mmol/L " 4.1   CHLORIDE mmol/L 106   CO2 mmol/L 27.0   BUN mg/dL 20   CREATININE mg/dL 1.09   GLUCOSE mg/dL 129*   CALCIUM mg/dL 8.6     Results from last 7 days   Lab Units 10/11/24  0641   ALK PHOS U/L 98   BILIRUBIN mg/dL 0.2   ALT (SGPT) U/L <5   AST (SGOT) U/L 9                   Estimated Creatinine Clearance: 75.6 mL/min (by C-G formula based on SCr of 1.09 mg/dL).      Microbiology:      Radiology:  Imaging Results (Last 72 Hours)       ** No results found for the last 72 hours. **              Impression:     --Acute right lower leg/foot and second toe infection/cellulitis out of proportion to his chronic discoloration by his report, trauma to the right second toe several weeks preceding admission as above.  Further complicated by his chronic comorbidity, wound at the second toe with maggots noted earlier in stay, with ongoing risk for further serious morbidity and other serious sequela; high risk for persistent/recurrent or nonhealing wounds, persistent/progressive or recurrent infection and risk for further functional/limb loss, risk for amputation etc.   MRI no osteomyelitis per radiology.     --Chronic/intermittent cough at presentation, CT scan without acute infiltrate per radiology and some atelectasis noted by them.  Encouraged incentive spirometry.  No productive cough at present.  Respiratory PCR negative.  Further pulmonary workup or referral at discretion of medicine team; pneumonia less likely at present but certainly could evolve; monitor for new process    --Hx trauma and maggots    --Parkinson's disease, chronically debilitated and primarily wheelchair-bound by his report.    --Prior history of DVT    PLAN:      -- IV  Zosyn for now, likely 14 days from admit. Possible [facility]; we can follow there        -- Daptomycin stopped as no MDR GPC in culture so far.  If worse with this change or stronger evidence for MDR GPC as pathogen, then consideration could be given to adding back versus other  adjustments    -- Check/review labs cultures and scans    -- Partial history per nursing staff    -- Discussed with microbiology    -- Highly complex set of issues with high risk for further serious morbidity and other serious sequela       Copied text in this note has been reviewed and is accurate as of 10/16/24.        Trevor Jorgensen MD  10/16/2024

## 2024-10-16 NOTE — DISCHARGE PLACEMENT REQUEST
"Otilia Martin (79 y.o. Male)     To Stefan Primier  From Amanda(CM at [facility]) [phone]      Date of Birth   1945    Social Security Number       Address   [address] [facility] [address]    Home Phone   [phone]    MRN   [id number]       Yarsanism   Sabianism    Marital Status                               Admission Date   10/8/24    Admission Type   Emergency    Admitting Provider   Angely Mukherjee DO    Attending Provider   Angely Mukherjee DO    Department, Room/Bed   [facility] 6A, N611/1       Discharge Date       Discharge Disposition       Discharge Destination                                 Attending Provider: Angely Mukherjee DO    Allergies: No Known Allergies    Isolation: Contact   Infection: MRSA (01/14/21), Other (10/09/24)   Code Status: No CPR    Ht: 182.9 cm (72.01\")   Wt: 119 kg (261 lb 11.2 oz)    Admission Cmt: None   Principal Problem: Cellulitis [L03.90]                   Active Insurance as of 10/8/2024       Primary Coverage       Payor Plan Insurance Group Employer/Plan Group    MEDICARE MEDICARE A & B        Payor Plan Address Payor Plan Phone Number Payor Plan Fax Number Effective Dates    [address] [phone]  1/1/2012 - None Entered    [facility] [id number]         Subscriber Name Subscriber Birth Date Member ID       OTILIA MARTIN 1945 [id number]               Secondary Coverage       Payor Plan Insurance Group Employer/Plan Group    [facility] SUPP [id number]       Payor Plan Address Payor Plan Phone Number Payor Plan Fax Number Effective Dates    [address]   12/1/2016 - None Entered    [facility] [id number]         Subscriber Name Subscriber Birth Date Member ID       OTILIA MARTIN 1945 [id number]                     Emergency Contacts        (Rel.) Home Phone Work Phone Mobile Phone    GEORGIANA PICKERING (Sister) [phone] -- [phone]    CESAR USLTANA (Sister) [phone] -- " "[phone]                 History & Physical        Daya Ospina APRN at 10/09/24 0122       Attestation signed by Eagle Paniagua DO at 10/09/24 0242    I have reviewed this documentation and agree.                      [facility] Medicine Services  HISTORY AND PHYSICAL    Patient Name: Cordell Martin  : 1945  MRN: [id number]  Primary Care Physician: Ben Holder DO  Date of admission: 10/8/2024    Subjective  Subjective     Chief Complaint:  Leg edema and erythema     HPI:  Cordell Martin is a 79 y.o. male w/ a hx of Parkinson's Disease, hx of DVT, HLD, hypothyroidism, GERD, chronic edema/lymphedema, chronic cough, anxiety, depression who presented to the ED w/ c/o leg edema and erythema.   Pt is a resident at [facility]. Pt uses a wheelchair and ambulates w/ a walker.   Pt c/o progressively worsening erythema, edema and discomfort in his BLE over the past few weeks. Pt reports that the wounds on his legs began draining and weeping a few days ago. Pt denies fever. Pt c/o a dry cough that \"comes and goes\" chronically. He reports that his cough has been worse lately. Denies dyspnea.   Pt evaluated in the ED. Exam concerning for cellulitis of the BLE. Pt admitted to the hospital medicine service for further evaluation.     Review of Systems   Constitutional: Negative.  Negative for chills, fatigue and fever.   HENT: Negative.  Negative for congestion, postnasal drip, rhinorrhea, sinus pain and trouble swallowing.    Eyes: Negative.  Negative for visual disturbance.   Respiratory:  Positive for cough. Negative for chest tightness, shortness of breath and wheezing.    Cardiovascular:  Positive for leg swelling. Negative for chest pain and palpitations.   Gastrointestinal: Negative.  Negative for abdominal distention, abdominal pain, constipation, diarrhea, nausea and vomiting.   Endocrine: Negative.    Genitourinary: Negative.  Negative for difficulty " urinating, dysuria, flank pain, frequency and hematuria.   Musculoskeletal: Negative.  Negative for arthralgias and myalgias.   Skin:  Positive for wound.   Allergic/Immunologic: Negative.  Negative for immunocompromised state.   Neurological: Negative.  Negative for dizziness, syncope, weakness, light-headedness and headaches.   Hematological: Negative.  Does not bruise/bleed easily.   Psychiatric/Behavioral: Negative.  Negative for confusion.    All other systems reviewed and are negative.     Personal History     Past Medical History:   Diagnosis Date    Anxiety     Arthritis     Back pain     Bronchitis     Cognitive impairment     Depression     Disease of thyroid gland     Glucose intolerance (impaired glucose tolerance)     Hyperlipidemia     Kidney stone     Obesity     Parkinson disease     Pneumonia     Prostate disorder     Thyroid disease     Wears eyeglasses      Past Surgical History:   Procedure Laterality Date    COLONOSCOPY      5 years ago    EYE SURGERY      HERNIA REPAIR  10/20/2020    KNEE SURGERY      LUMBAR LAMINECTOMY DISCECTOMY DECOMPRESSION N/A 07/13/2017    Procedure: LUMBAR LAMINECTOMY L4-5;  Surgeon: Anotnio Trent MD;  Location: [facility];  Service:     SHOULDER ROTATOR CUFF REPAIR Right     x2    TONSILLECTOMY      VEIN SURGERY       Family History: family history includes Alzheimer's disease in an other family member; Cancer in his father and another family member; Diabetes in an other family member; Heart disease in an other family member; Stroke in an other family member.     Social History:  reports that he quit smoking about 44 years ago. His smoking use included cigarettes. He started smoking about 74 years ago. He has a 30 pack-year smoking history. He has been exposed to tobacco smoke. He has never used smokeless tobacco. He reports current alcohol use. He reports that he does not use drugs.  Social History     Social History Narrative    Lives alone. Uses walker    Has not  smoked since about 1980     Medications:  Budeson-Glycopyrrol-Formoterol, Diclofenac Sodium, QUEtiapine, acetaminophen, ammonium lactate, apixaban, atorvastatin, carbidopa-levodopa, carbidopa-levodopa ER, doxycycline, fludrocortisone, fluorometholone, fluticasone, furosemide, gabapentin, levothyroxine, loratadine, mirtazapine, pantoprazole, potassium chloride, pramipexole, sulfamethoxazole-trimethoprim, tamsulosin, traMADol, traZODone, vitamin B-6, vitamin C, and vitamin D3    No Known Allergies    Objective  Objective     Vital Signs:   Temp:  [97.5 °F (36.4 °C)] 97.5 °F (36.4 °C)  Heart Rate:  [76-87] 82  Resp:  [14-18] 14  BP: (101-125)/(50-68) 117/63    Physical Exam     Constitutional: Awake, alert; chronically ill appearing   Eyes: PERRLA, sclerae anicteric, no conjunctival injection  HENT: NCAT, mucous membranes moist  Neck: Supple, no thyromegaly, no lymphadenopathy, trachea midline  Respiratory: Clear to auscultation bilaterally, nonlabored respirations   Cardiovascular: RRR, no murmurs, rubs, or gallops, BLE w/ trace edema   Gastrointestinal: Positive bowel sounds, soft, nontender, nondistended  Musculoskeletal: Normal ROM bilaterally   Psychiatric: Appropriate affect, cooperative  Neurologic: Oriented x 3, strength symmetric in all extremities, speech clear  Skin: No rashes; see photo below of BLE- noted erythema extending from toes to above knees (mid thigh bilaterally)         Result Review:  I have personally reviewed the results from the time of this admission to 10/9/2024 02:28 EDT and agree with these findings:  [x]  Laboratory list / accordion  []  Microbiology  [x]  Radiology  [x]  EKG/Telemetry   []  Cardiology/Vascular   []  Pathology  [x]  Old records    LAB RESULTS:      Lab 10/08/24  1936   WBC 7.37   HEMOGLOBIN 10.4*   HEMATOCRIT 33.4*   PLATELETS 250   NEUTROS ABS 4.88   IMMATURE GRANS (ABS) 0.04   LYMPHS ABS 1.45   MONOS ABS 0.66   EOS ABS 0.31   MCV 94.1   CRP 4.81*   PROCALCITONIN 0.10    LACTATE 1.6   D DIMER QUANT 0.90*         Lab 10/08/24  2226 10/08/24  1936   SODIUM  --  138   POTASSIUM  --  4.2   CHLORIDE  --  106   CO2  --  24.0   ANION GAP  --  8.0   BUN  --  22   CREATININE  --  1.26   EGFR  --  58.7*   GLUCOSE  --  148*   CALCIUM  --  8.5*   IONIZED CALCIUM 1.17  --    MAGNESIUM  --  2.2   PHOSPHORUS  --  3.0   TSH  --  15.850*         Lab 10/08/24  1936   TOTAL PROTEIN 6.0   ALBUMIN 2.7*   GLOBULIN 3.3   ALT (SGPT) <5   AST (SGOT) 12   BILIRUBIN 0.3   ALK PHOS 108   LIPASE 11*         Lab 10/08/24  1936   PROBNP 98.4                 Lab 10/08/24  2049   FIO2 21   CARBOXYHEMOGLOBIN (VENOUS) 1.3     Brief Urine Lab Results  (Last result in the past 365 days)        Color   Clarity   Blood   Leuk Est   Nitrite   Protein   CREAT   Urine HCG        10/08/24 2219 Yellow   Clear   Negative   Negative   Negative   Negative                 Microbiology Results (last 10 days)       Procedure Component Value - Date/Time    COVID PRE-OP / PRE-PROCEDURE SCREENING ORDER (NO ISOLATION) - Swab, Nasopharynx [[id number]]  (Normal) Collected: 10/08/24 2126    Lab Status: Final result Specimen: Swab from Nasopharynx Updated: 10/08/24 2220    Narrative:      The following orders were created for panel order COVID PRE-OP / PRE-PROCEDURE SCREENING ORDER (NO ISOLATION) - Swab, Nasopharynx.  Procedure                               Abnormality         Status                     ---------                               -----------         ------                     COVID-19, FLU A/B, RSV P...[[id number]]  Normal              Final result                 Please view results for these tests on the individual orders.    COVID-19, FLU A/B, RSV PCR 1 HR TAT - Swab, Nasopharynx [[id number]]  (Normal) Collected: 10/08/24 2126    Lab Status: Final result Specimen: Swab from Nasopharynx Updated: 10/08/24 2220     COVID19 Not Detected     Influenza A PCR Not Detected     Influenza B PCR Not Detected     RSV, PCR Not Detected  "   Narrative:      Fact sheet for providers: https://www.fda.gov/media/080205/download    Fact sheet for patients: https://www.fda.gov/media/185907/download    Test performed by PCR.    Wound Culture - Drainage, Leg, Right [[id number]] Collected: 10/08/24 2050    Lab Status: Preliminary result Specimen: Drainage from Leg, Right Updated: 10/08/24 2146     Gram Stain Moderate (3+) WBCs seen      Moderate (3+) Gram negative bacilli          XR Chest 1 View    Result Date: 10/8/2024  XR CHEST 1 VW Date of Exam: 10/8/2024 8:14 PM EDT Indication: sepsis, chronic cough, infectious workup Comparison: Chest radiograph 9/15/2024 Findings: Mediastinum: Cardiomediastinal silhouette appears unchanged and enlarged Lungs: Mild left basal consolidative opacity. Pleura: No convincing pleural effusion or pneumothorax Bones and soft tissues: No acute osseous abnormality.     Impression: Impression: Left basal opacity which may represent atelectasis, though aspiration or pneumonia can be considered in the appropriate clinical setting. Electronically Signed: Babak Natarajan  10/8/2024 8:42 PM EDT  Workstation ID: [id number]     Results for orders placed during the hospital encounter of 01/12/21    Adult Transthoracic Echo Complete W/ Cont if Necessary Per Protocol    Interpretation Summary  · The right ventricle and right atrium are moderately dilated. Other chamber sizes and wall thicknesses are normal.  · Global and segmental LV wall motion is normal. The estimated left ventricular ejection fraction is 51% - 55%.  · The aortic and mitral valves are normal in structure and function.  · The estimated RV systolic pressure is mildly elevated 35 mmHg - 40 mmHg. There is as well noted to be less than 50% inspiratory IVC collapse. The main pulmonary artery is \"borderline dilated\".  · There are no other important findings on this study.    Assessment & Plan  Assessment & Plan       Cellulitis    Anxiety    Depression    Hyperlipidemia    " "Parkinson's disease    Chronic edema    Hypothyroidism (acquired)    Chronic cough    GERD    History of DVT (deep vein thrombosis)    Anemia, chronic disease    Elevated TSH    Cordell Martin is a 79 y.o. male w/ a hx of Parkinson's Disease, hx of DVT, HLD, hypothyroidism, GERD, chronic edema/lymphedema, chronic cough, anxiety, depression who presented to the ED w/ c/o leg edema and erythema.     **Cellulitis- BLE   **Chronic LE lymphedema   **Hx of DVT   -venous duplex 9/2023: BLE DVTs; duplex on 9/15/2024- chronic venous thrombus in the right peroneal vein; Chronic superficial venous thrombus is seen in the left lower extremity the greater saphenous vein above and below the knee   -previously on Eliquis but reports he \"has not taken in years\" (noted to last be filled 12/2023)  -D.Dimer 0.9; BLE venous duplex pending   -consider XRAYS vs CT scans of BLE  -blood and wound cx pending   -WBC, lactic acid and procal all WNL   -CRP 4.81, sed rate pending   -s/p Cefazolin, oral Flagyl and Vanc given in ED; continue   -s/p Lasix 80mg IV given in ED; continue routine 20mg/day- pending med rec completion   -symptom mgt  -WOC consult   -pt/ot and case mgt consult     **Cough (Acute/chronic)   -denies dyspnea  -currently 97% on room air   -respiratory panel pcr pending   -proBNP WNL   -CXR- \"Left basal opacity which may represent atelectasis, though aspiration or pneumonia can be considered in the appropriate clinical setting\"  -CTA Chest pending   -symptom mgt     **Chronic anemia   -H/H previously 11.6/37.7 on 9/15 and 12.4/38.6 in 2023  -current H/H 10.4/33.4  -anemia panel pending   -repeat CBC this morning     **Parkinson's disease  **Anxiety, depression   -awaiting med rec completion; plan to continue Sinemet, Mirapex  -continue Seroquel, Remeron, Trazodone   -pt/ot and case mgt consult   -fall precautions     **Hypothyroidism   **Elevated TSH  -TSH 15.850 (previously 4.76 in 2022)  -unclear if dose change recently "   -free T4 pending   -continue synthroid  -will need f/u w/ PCP     **HLD  -continue statin     **GERD  -continue PPI      DVT prophylaxis:  Eliquis     CODE STATUS:    Medical Intervention Limits: No intubation (DNI)  Level Of Support Discussed With: Patient  Code Status (Patient has no pulse and is not breathing): No CPR (Do Not Attempt to Resuscitate)  Medical Interventions (Patient has pulse or is breathing): Limited Support  Comments: DNR/DNI    Expected Discharge  Expected discharge date/ time has not been documented.    Signature: Electronically signed by ALEXUS Sutton, 10/09/24, 2:41 AM EDT.    Time spent: 55 minutes                 Electronically signed by Eagle Paniagua DO at 10/09/24 0242       Current Facility-Administered Medications   Medication Dose Route Frequency Provider Last Rate Last Admin    atorvastatin (LIPITOR) tablet 40 mg  40 mg Oral Daily Angely Mukherjee DO   40 mg at 10/16/24 0857    sennosides-docusate (PERICOLACE) 8.6-50 MG per tablet 2 tablet  2 tablet Oral BID Radha Lyons APRN   2 tablet at 10/16/24 1241    And    polyethylene glycol (MIRALAX) packet 17 g  17 g Oral Daily PRN Radha Lyons APRN        And    bisacodyl (DULCOLAX) EC tablet 5 mg  5 mg Oral Daily PRN Radha Lyons APRN        And    bisacodyl (DULCOLAX) suppository 10 mg  10 mg Rectal Daily PRN Radha Lyons APRN        carbidopa-levodopa (SINEMET)  MG per tablet 2 tablet  2 tablet Oral 4x Daily Daya Osipna APRN   2 tablet at 10/16/24 1241    carbidopa-levodopa ER (SINEMET CR)  MG per tablet 1 tablet  1 tablet Oral BID Daya Ospina APRN   1 tablet at 10/16/24 0857    fluticasone (FLONASE) 50 MCG/ACT nasal spray 2 spray  2 spray Each Nare Daily Daya Ospina APRN   2 spray at 10/16/24 0857    furosemide (LASIX) tablet 20 mg  20 mg Oral Daily Daya Ospina APRN   20 mg at 10/16/24 0857    gabapentin (NEURONTIN) capsule 100 mg  100 mg Oral Q12H Fabian Lopez  MD   100 mg at 10/16/24 0857    guaiFENesin (MUCINEX) 12 hr tablet 600 mg  600 mg Oral Q12H Fabian Lopez MD   600 mg at 10/16/24 0857    heparin (porcine) 5000 UNIT/ML injection 5,000 Units  5,000 Units Subcutaneous Q8H Fabian Lopez MD   5,000 Units at 10/16/24 0620    HYDROcodone-acetaminophen (NORCO) 5-325 MG per tablet 1 tablet  1 tablet Oral Q6H PRN Angely Mukherjee DO   1 tablet at 10/15/24 2224    HYDROmorphone (DILAUDID) injection 0.25 mg  0.25 mg Intravenous Q2H PRN Angely Mukherjee DO        levothyroxine (SYNTHROID, LEVOTHROID) tablet 88 mcg  88 mcg Oral Q AM Daya Ospina APRN   88 mcg at 10/16/24 0619    Magnesium Standard Dose Replacement - Follow Nurse / BPA Driven Protocol   Does not apply PRN Daya Ospina APRN        nystatin (MYCOSTATIN) powder   Topical Q12H Radha Lyons APRN   Given at 10/16/24 0857    pantoprazole (PROTONIX) EC tablet 40 mg  40 mg Oral Daily Daya Ospina APRN   40 mg at 10/16/24 1241    piperacillin-tazobactam (ZOSYN) 3.375 g IVPB in 100 mL NS MBP (CD)  3.375 g Intravenous Q8H Trevor Jorgensen MD   3.375 g at 10/16/24 0620    Potassium Replacement - Follow Nurse / BPA Driven Protocol   Does not apply PRN Daya Ospina APRN        pramipexole (MIRAPEX) tablet 1 mg  1 mg Oral TID Daya Ospina APRN   1 mg at 10/16/24 0857    QUEtiapine (SEROquel) tablet 12.5 mg  12.5 mg Oral Nightly Daya Ospina APRN   12.5 mg at 10/15/24 2208    sodium chloride 0.9 % flush 10 mL  10 mL Intravenous PRN Yuri Kessler MD        sodium chloride 0.9 % flush 10 mL  10 mL Intravenous Q12H Daya Ospina APRN   10 mL at 10/16/24 0900    sodium chloride 0.9 % flush 10 mL  10 mL Intravenous PRN Daya Ospina APRN        tamsulosin (FLOMAX) 24 hr capsule 0.4 mg  0.4 mg Oral Daily Daya Ospina APRN   0.4 mg at 10/16/24 0857        Physician Progress Notes (most recent note)        Trevor Jorgensen MD at 10/16/24 0909          Cordell Ortiz  "Mario  1945  [id number]    Evaluating Physician: Trevor Jorgensen MD    Chief Complaint: right leg red and painful    Reason for Consultation: RLE infection    History of present illness:     Patient is a 79 y.o.  Yr old male prior patient of Dr. Benites, with history of Parkinson's disease and chronically debilitated, uses a wheelchair primarily although apparently has occasionally used a walker.  He has other extensive comorbidities as below.  He had reported trauma to the right second toe having jammed it into a wall while in his wheelchair several weeks prior to admission.  He had developed increasing redness/swelling and discoloration of the right second toe and foot/lower leg over that period of time.  He does have bilateral lower leg discoloration and swelling but the right side is out of proportion of the left side.  He was admitted on October 8 with concern for lower extremity cellulitis; subsequently noted to have maggots at the right second toe    Aside from the trauma in the wheelchair, he denied any other specific exposures.    10/16/24 seen early and sleepy; Cx  PSA, skin derrell / \"mixed\" GNR  ; Right lower leg/foot pain out of proportion to left, constant, nonradiating, worse with palpation, generally better with pain meds  , 2-3 out of 10 at present. Redness less since admit    No headache photophobia or neck stiffness.  He has chronic intermittent cough and reports this is at baseline, currently on room air with no hemoptysis.  No nausea vomiting diarrhea or abdominal pain.  No dysuria hematuria or pyuria.  No other specific rash    Past Medical History:   Diagnosis Date    Anxiety     Arthritis     Back pain     Bronchitis     Cognitive impairment     Depression     Disease of thyroid gland     Glucose intolerance (impaired glucose tolerance)     Hyperlipidemia     Kidney stone     Obesity     Parkinson disease     Pneumonia     Prostate disorder     Thyroid disease     Wears eyeglasses  "       Past Surgical History:   Procedure Laterality Date    COLONOSCOPY      5 years ago    EYE SURGERY      HERNIA REPAIR  10/20/2020    KNEE SURGERY      LUMBAR LAMINECTOMY DISCECTOMY DECOMPRESSION N/A 07/13/2017    Procedure: LUMBAR LAMINECTOMY L4-5;  Surgeon: Antonio Trent MD;  Location: [facility];  Service:     SHOULDER ROTATOR CUFF REPAIR Right     x2    TONSILLECTOMY      VEIN SURGERY         Pediatric History   Patient Parents    Not on file     Other Topics Concern    Not on file   Social History Narrative    Lives alone. Uses walker    Has not smoked since about 1980       family history includes Alzheimer's disease in an other family member; Cancer in his father and another family member; Diabetes in an other family member; Heart disease in an other family member; Stroke in an other family member.    No Known Allergies    Medication:  Current Facility-Administered Medications   Medication Dose Route Frequency Provider Last Rate Last Admin    atorvastatin (LIPITOR) tablet 40 mg  40 mg Oral Daily Angely Mukherjee DO   40 mg at 10/16/24 0857    sennosides-docusate (PERICOLACE) 8.6-50 MG per tablet 2 tablet  2 tablet Oral BID PRN Daya Ospina APRN   2 tablet at 10/15/24 2209    And    polyethylene glycol (MIRALAX) packet 17 g  17 g Oral Daily PRN Daya Ospina APRN        And    bisacodyl (DULCOLAX) EC tablet 5 mg  5 mg Oral Daily PRN Daya Ospina APRN        And    bisacodyl (DULCOLAX) suppository 10 mg  10 mg Rectal Daily PRN Daya Ospina APRN        carbidopa-levodopa (SINEMET)  MG per tablet 2 tablet  2 tablet Oral 4x Daily Daya Ospina APRN   2 tablet at 10/16/24 0857    carbidopa-levodopa ER (SINEMET CR)  MG per tablet 1 tablet  1 tablet Oral BID Daya Ospina APRN   1 tablet at 10/16/24 0857    fluticasone (FLONASE) 50 MCG/ACT nasal spray 2 spray  2 spray Each Nare Daily Daya Ospina APRN   2 spray at 10/16/24 0857    furosemide (LASIX) tablet 20 mg  20 mg Oral Daily  Daya Ospina APRN   20 mg at 10/16/24 0857    gabapentin (NEURONTIN) capsule 100 mg  100 mg Oral Q12H Fabian Lopez MD   100 mg at 10/16/24 0857    guaiFENesin (MUCINEX) 12 hr tablet 600 mg  600 mg Oral Q12H Fabian Lopez MD   600 mg at 10/16/24 0857    heparin (porcine) 5000 UNIT/ML injection 5,000 Units  5,000 Units Subcutaneous Q8H Fabian Lopez MD   5,000 Units at 10/16/24 0620    HYDROcodone-acetaminophen (NORCO) 5-325 MG per tablet 1 tablet  1 tablet Oral Q6H PRN Angely Mukherjee DO   1 tablet at 10/15/24 2224    HYDROmorphone (DILAUDID) injection 0.25 mg  0.25 mg Intravenous Q2H PRN Angely Mukherjee DO        levothyroxine (SYNTHROID, LEVOTHROID) tablet 88 mcg  88 mcg Oral Q AM Daya Ospina APRN   88 mcg at 10/16/24 0619    Magnesium Standard Dose Replacement - Follow Nurse / BPA Driven Protocol   Does not apply PRN Daya Ospina APRN        nystatin (MYCOSTATIN) powder   Topical Q12H Radha Lyons APRN   Given at 10/16/24 0857    pantoprazole (PROTONIX) EC tablet 40 mg  40 mg Oral Daily Daya Ospina APRN   40 mg at 10/15/24 1208    piperacillin-tazobactam (ZOSYN) 3.375 g IVPB in 100 mL NS MBP (CD)  3.375 g Intravenous Q8H Trevor Jorgensen MD   3.375 g at 10/16/24 0620    Potassium Replacement - Follow Nurse / BPA Driven Protocol   Does not apply PRN Daya Ospina APRN        pramipexole (MIRAPEX) tablet 1 mg  1 mg Oral TID Daya Ospina APRN   1 mg at 10/16/24 0857    QUEtiapine (SEROquel) tablet 12.5 mg  12.5 mg Oral Nightly Daya Ospina APRN   12.5 mg at 10/15/24 2208    sodium chloride 0.9 % flush 10 mL  10 mL Intravenous PRN Yuri Kessler MD        sodium chloride 0.9 % flush 10 mL  10 mL Intravenous Q12H Daya Ospina APRN   10 mL at 10/16/24 0900    sodium chloride 0.9 % flush 10 mL  10 mL Intravenous PRN Daya Ospina APRN        tamsulosin (FLOMAX) 24 hr capsule 0.4 mg  0.4 mg Oral Daily Daya Ospina APRN   0.4 mg at 10/16/24 0857        Antibiotics:  Anti-Infectives (From admission, onward)      Ordered     Dose/Rate Route Frequency Start Stop    10/09/24 0735  piperacillin-tazobactam (ZOSYN) 3.375 g IVPB in 100 mL NS MBP (CD)        Ordering Provider: Trevor Jorgensen MD    3.375 g  over 4 Hours Intravenous Every 8 Hours 10/09/24 1430 10/19/24 1429    10/09/24 0735  piperacillin-tazobactam (ZOSYN) 3.375 g IVPB in 100 mL NS MBP (CD)        Ordering Provider: Fabian Lopez MD    3.375 g  over 30 Minutes Intravenous Once 10/09/24 0830 10/09/24 0840    10/08/24 2008  vancomycin 2250 mg/500 mL 0.9% NS IVPB ([facility])        Ordering Provider: Yuri Kessler MD    20 mg/kg × 118 kg  over 135 Minutes Intravenous Once 10/08/24 2100 10/09/24 0346    10/08/24 2024  ceFAZolin 2000 mg IVPB in 100 mL NS (MBP)        Ordering Provider: Yuri Kessler MD    2,000 mg  over 30 Minutes Intravenous Once 10/08/24 2040 10/08/24 2203    10/08/24 2024  metroNIDAZOLE (FLAGYL) tablet 500 mg        Ordering Provider: Yuri Kessler MD    500 mg Oral Once 10/08/24 2040 10/08/24 2131              Review of Systems    10/16/24 per staff also    Constitutional-- No Fever, chills or sweats.  Appetite good, and no malaise. No fatigue.  Heent-- No new vision, hearing or throat complaints.  No epistaxis or oral sores.  Denies odynophagia or dysphagia.  No flashers, floaters or eye pain. No odynophagia or dysphagia. No headache, photophobia or neck stiffness.  CV-- No chest pain, palpitation or syncope  Resp--see above, no shortness of breath at present  GI- No nausea, vomiting, or diarrhea.  No hematochezia, melena, or hematemesis. Denies jaundice or chronic liver disease.  -- No dysuria, hematuria, or flank pain.  Denies hesitancy, urgency or flank pain.  Lymph- no swollen lymph nodes in neck/axilla or groin.   Heme- No active bruising or bleeding; no Hx of DVT or PE.  MS-- no swelling or pain in the bones or joints of arms/legs.  No new back  "pain.  Neuro-- No acute focal weakness or numbness in the arms or legs.  No seizures.  Chronic Parkinson's and chronically debilitated    Full 12 point review of systems reviewed and negative otherwise for acute complaints, except for above    Physical Exam:   Vital Signs   /61   Pulse 69   Temp 97 °F (36.1 °C)   Resp 16   Ht 182.9 cm (72.01\")   Wt 119 kg (261 lb 11.2 oz)   SpO2 93%   BMI 35.49 kg/m²     GENERAL: sleepy, in no acute distress.    HEENT: Normocephalic, atraumatic.    No conjunctival injection. No icterus. Oropharynx clear without evidence of thrush or exudate. No evidence of periodontal disease.    NECK: Supple without nuchal rigidity. No mass.   HEART: RRR; No murmur, rubs, gallops.   LUNGS: Diminished at bases but otherwise  clear to auscultation bilaterally without wheezing, rales, rhonchi. Normal respiratory effort. Nonlabored. No dullness.  ABDOMEN: Soft, nontender, nondistended. Positive bowel sounds. No rebound or guarding. NO mass or HSM.  EXT: See below   MSK: FROM without joint effusions noted arms/legs.    SKIN: Warm and dry without cutaneous eruptions on Inspection/palpation.    NEURO: sleepy.       Bilateral lower legs appeared edematous and discolored with vague scale.   right side between knee and foot has warmth/tenderness and erythema   improving/less intense.  Right side second toe is  discolored with vague edema/erythema and tenderness out of proportion to other toes, crusted wound and unable to determine any depth with no definitive probe to bone tendon joint or ligament at present; no discrete mass bulge or fluctuance.  No crepitus or bulla    Laboratory Data    Results from last 7 days   Lab Units 10/14/24  0357 10/12/24  0358 10/11/24  0641   WBC 10*3/mm3 6.89 4.75 5.79   HEMOGLOBIN g/dL 10.8* 10.9* 10.9*   HEMATOCRIT % 35.0* 34.8* 34.9*   PLATELETS 10*3/mm3 266 253 254     Results from last 7 days   Lab Units 10/14/24  0357   SODIUM mmol/L 139   POTASSIUM mmol/L " 4.1   CHLORIDE mmol/L 106   CO2 mmol/L 27.0   BUN mg/dL 20   CREATININE mg/dL 1.09   GLUCOSE mg/dL 129*   CALCIUM mg/dL 8.6     Results from last 7 days   Lab Units 10/11/24  0641   ALK PHOS U/L 98   BILIRUBIN mg/dL 0.2   ALT (SGPT) U/L <5   AST (SGOT) U/L 9                   Estimated Creatinine Clearance: 75.6 mL/min (by C-G formula based on SCr of 1.09 mg/dL).      Microbiology:      Radiology:  Imaging Results (Last 72 Hours)       ** No results found for the last 72 hours. **              Impression:     --Acute right lower leg/foot and second toe infection/cellulitis out of proportion to his chronic discoloration by his report, trauma to the right second toe several weeks preceding admission as above.  Further complicated by his chronic comorbidity, wound at the second toe with maggots noted earlier in stay, with ongoing risk for further serious morbidity and other serious sequela; high risk for persistent/recurrent or nonhealing wounds, persistent/progressive or recurrent infection and risk for further functional/limb loss, risk for amputation etc.   MRI no osteomyelitis per radiology.     --Chronic/intermittent cough at presentation, CT scan without acute infiltrate per radiology and some atelectasis noted by them.  Encouraged incentive spirometry.  No productive cough at present.  Respiratory PCR negative.  Further pulmonary workup or referral at discretion of medicine team; pneumonia less likely at present but certainly could evolve; monitor for new process    --Hx trauma and maggots    --Parkinson's disease, chronically debilitated and primarily wheelchair-bound by his report.    --Prior history of DVT    PLAN:      -- IV  Zosyn for now, likely 14 days from admit. Possible [facility]; we can follow there        -- Daptomycin stopped as no MDR GPC in culture so far.  If worse with this change or stronger evidence for MDR GPC as pathogen, then consideration could be given to adding back versus other  adjustments    -- Check/review labs cultures and scans    -- Partial history per nursing staff    -- Discussed with microbiology    -- Highly complex set of issues with high risk for further serious morbidity and other serious sequela       Copied text in this note has been reviewed and is accurate as of 10/16/24.        Trevor Jorgensen MD  10/16/2024                Electronically signed by Trevor Jorgensen MD at 10/16/24 0909          Physical Therapy Notes (most recent note)        River Ring, PT Student at 10/16/24 1425  Version 1 of 1         Patient Name: Cordell Martin  : 1945    MRN: [id number]                              Today's Date: 10/16/2024       Admit Date: 10/8/2024    Visit Dx:     ICD-10-CM ICD-9-CM   1. Cellulitis of lower extremity, unspecified laterality  L03.119 682.6   2. Lymphedema  I89.0 457.1   3. Pain in both lower extremities  M79.604 729.5    M79.605    4. Chronic edema  R60.9 782.3   5. Parkinson's disease, unspecified whether dyskinesia present, unspecified whether manifestations fluctuate  G20.A1 332.0     Patient Active Problem List   Diagnosis    Anxiety    Arthritis    Depression    Hyperlipidemia    Lumbar stenosis with neurogenic claudication    Parkinson's disease    Lumbar stenosis with neurogenic claudication status post L4-5 decompression    Status post lumbar laminectomy    Memory loss    Chronic edema    Cellulitis of right lower extremity    Exertional dyspnea    Right knee pain    Cellulitis of lower extremity    Hypothyroidism (acquired)    Elevated serum creatinine    Cellulitis of right leg    Acute deep vein thrombosis (DVT) of right lower extremity    Pneumonia    Pulmonary nodule (4mm RML incidental)    Chronic cough    GERD    Remote smoker (Stopped )    Restrictive pattern on PFTs w/o evidence of ILD    Cellulitis    History of DVT (deep vein thrombosis)    Anemia, chronic disease    Elevated TSH     Past Medical History:   Diagnosis  Date    Anxiety     Arthritis     Back pain     Bronchitis     Cognitive impairment     Depression     Disease of thyroid gland     Glucose intolerance (impaired glucose tolerance)     Hyperlipidemia     Kidney stone     Obesity     Parkinson disease     Pneumonia     Prostate disorder     Thyroid disease     Wears eyeglasses      Past Surgical History:   Procedure Laterality Date    COLONOSCOPY      5 years ago    EYE SURGERY      HERNIA REPAIR  10/20/2020    KNEE SURGERY      LUMBAR LAMINECTOMY DISCECTOMY DECOMPRESSION N/A 07/13/2017    Procedure: LUMBAR LAMINECTOMY L4-5;  Surgeon: Antonio Trent MD;  Location: [facility];  Service:     SHOULDER ROTATOR CUFF REPAIR Right     x2    TONSILLECTOMY      VEIN SURGERY        General Information       Row Name 10/16/24 1507          Physical Therapy Time and Intention    Document Type therapy note (daily note) (P)   -LE     Mode of Treatment physical therapy (P)   -LE       Row Name 10/16/24 1507          General Information    Patient Profile Reviewed yes (P)   -LE     Existing Precautions/Restrictions fall;other (see comments) (P)   Cellulitis in BLE; Parkinson's Disease  -LE     Barriers to Rehab medically complex;previous functional deficit;physical barrier (P)   -LE       Row Name 10/16/24 1507          Cognition    Orientation Status (Cognition) oriented x 3 (P)   -LE       Row Name 10/16/24 1507          Safety Issues/Impairments Affecting Functional Mobility    Safety Issues Affecting Function (Mobility) insight into deficits/self-awareness;safety precaution awareness;safety precautions follow-through/compliance;sequencing abilities (P)   -LE     Impairments Affecting Function (Mobility) balance;endurance/activity tolerance;coordination;pain;sensation/sensory awareness;strength;range of motion (ROM);postural/trunk control;motor control (P)   -LE               User Key  (r) = Recorded By, (t) = Taken By, (c) = Cosigned By      Initials Name Provider Type    LE  River Ring, PT Student PT Student                   Mobility       Row Name 10/16/24 1508          Bed Mobility    Bed Mobility supine-sit (P)   -LE     Supine-Sit [place] (Bed Mobility) moderate assist (50% patient effort);2 person assist;verbal cues;nonverbal cues (demo/gesture) (P)   -LE     Assistive Device (Bed Mobility) bed rails;head of bed elevated;repositioning sheet (P)   -LE     Comment, (Bed Mobility) Needed assistance getting legs off the EOB and and gettting to upright position. Needed assistance scooting to EOB (P)   -LE       Row Name 10/16/24 1508          Sit-Stand Transfer    Sit-Stand [place] (Transfers) verbal cues;2 person assist;moderate assist (50% patient effort) (P)   -LE     Assistive Device (Sit-Stand Transfers) walker, front-wheeled (P)   -LE     Comment, (Sit-Stand Transfer) x1 from EOB. Extended time and effort (P)   -LE       Row Name 10/16/24 1508          Gait/Stairs (Locomotion)    [place] Level (Gait) verbal cues;nonverbal cues (demo/gesture);minimum assist (75% patient effort);1 person assist (P)   -LE     Assistive Device (Gait) walker, front-wheeled (P)   -LE     Patient was able to Ambulate yes (P)   -LE     Distance in Feet (Gait) 12 (P)   -LE     Deviations/Abnormal Patterns (Gait) bilateral deviations;base of support, wide;[place] decreased;gait speed decreased;stride length decreased;weight shifting decreased (P)   -LE     Bilateral Gait Deviations forward flexed posture;heel strike decreased (P)   -LE     Left Sided Gait Deviations decreased knee extension (P)   -LE     Right Sided Gait Deviations decreased knee extension (P)   -LE     Comment, (Gait/Stairs) Ambulated 12 ft with FWW and Clem x1 from bed to his chair. Pt has severely decreased gait speed and needs extended time to complete the ambulation. (P)   -LE               User Key  (r) = Recorded By, (t) = Taken By, (c) = Cosigned By      Initials Name Provider Type    River Alexander, PT  Student PT Student                   Obj/Interventions       Row Name 10/16/24 1512          Balance    Balance Assessment sitting static balance;sitting dynamic balance;standing static balance;standing dynamic balance (P)   -LE     Static Sitting Balance standby assist (P)   -LE     Dynamic Sitting Balance standby assist (P)   -LE     Position, Sitting Balance unsupported;sitting edge of bed (P)   -LE     Sit to Stand Dynamic Balance moderate assist;2-person assist (P)   -LE     Static Standing Balance contact guard (P)   -LE     Dynamic Standing Balance minimal assist;1-person assist (P)   -LE     Position/Device Used, Standing Balance supported;walker, front-wheeled (P)   -LE     Balance Interventions sitting;standing;sit to stand;supported;static;dynamic;occupation based/functional task (P)   -LE               User Key  (r) = Recorded By, (t) = Taken By, (c) = Cosigned By      Initials Name Provider Type    River Alexander, PT Student PT Student                   Goals/Plan    No documentation.                  Clinical Impression       Row Name 10/16/24 1514          Pain    Pretreatment Pain Rating 6/10 (P)   -LE     Posttreatment Pain Rating 6/10 (P)   -LE     Pain Location calf;ankle (P)   -LE     Pain Side/Orientation right;lower (P)   -LE     Pain Management Interventions exercise or physical activity utilized;positioning techniques utilized (P)   -LE     Response to Pain Interventions activity participation with tolerable pain (P)   -LE     Pain Intervention(s) Repositioned;Ambulation/increased activity;Elevated (P)   -LE       Row Name 10/16/24 0695          Plan of Care Review    Plan of Care Reviewed With patient (P)   -LE     Progress no change (P)   -LE     Outcome Evaluation Patient was able to ambulate with Clem x1 around her room from bed to chair. Patient needed some additional assistance with the STS requiring modA x2. Patient gait speed is still severely decreased and he did report some  additional pain in his right ankle when ambulating. Patient will continue to benefit from skilled PT in order to improve mobility and strength. (P)   -LE       Row Name 10/16/24 1514          Therapy Assessment/Plan (PT)    Patient/Family Therapy Goals Statement (PT) Return to PLOF (P)   -LE     Rehab Potential (PT) fair (P)   -LE     Criteria for Skilled Interventions Met (PT) yes;meets criteria;skilled treatment is necessary (P)   -LE       Row Name 10/16/24 1514          Vital Signs    Pre Systolic BP Rehab 107 (P)   -LE     Pre Treatment Diastolic BP 55 (P)   -LE     Pretreatment Heart Rate (beats/min) 61 (P)   -LE     Posttreatment Heart Rate (beats/min) 60 (P)   -LE     Pre SpO2 (%) 93 (P)   -LE     O2 Delivery Pre Treatment room air (P)   -LE     Post SpO2 (%) 94 (P)   -LE     O2 Delivery Post Treatment room air (P)   -LE     Pre Patient Position Supine (P)   -LE     Intra Patient Position Standing (P)   -LE     Post Patient Position Sitting (P)   -LE       Row Name 10/16/24 1514          Positioning and Restraints    Pre-Treatment Position in bed (P)   -LE     Post Treatment Position chair (P)   -LE     In Chair notified nsg;reclined;call light within reach;encouraged to call for assist;exit alarm on;waffle cushion;heels elevated;legs elevated (P)   -LE               User Key  (r) = Recorded By, (t) = Taken By, (c) = Cosigned By      Initials Name Provider Type    iRver Alexander, PT Student PT Student                   Outcome Measures       Row Name 10/16/24 1520 10/16/24 0800       How much help from another person do you currently need...    Turning from your back to your side while in flat bed without using bedrails? 3 (P)   -LE 3  -DA    Moving from lying on back to sitting on the side of a flat bed without bedrails? 2 (P)   -LE 3  -DA    Moving to and from a bed to a chair (including a wheelchair)? 3 (P)   -LE 2  -DA    Standing up from a chair using your arms (e.g., wheelchair, bedside chair)? 3  (P)   -LE 3  -DA    Climbing 3-5 steps with a railing? 2 (P)   -LE 2  -DA    To walk in hospital room? 3 (P)   -LE 2  -DA    AM-PAC 6 Clicks Score (PT) 16 (P)   -LE 15  -DA    Highest Level of Mobility Goal 5 --> Static standing (P)   -LE 4 --> Transfer to chair/commode  -DA              User Key  (r) = Recorded By, (t) = Taken By, (c) = Cosigned By      Initials Name Provider Type    DA Rogelio Cordero, RN Registered Nurse    River Alexander, PT Student PT Student                                 Physical Therapy Education       Title: PT OT SLP Therapies (Done)       Topic: Physical Therapy (Done)       Point: Mobility training (Done)       Learning Progress Summary            Patient Acceptance, E, VU by LE at 10/16/2024 1521    Acceptance, E, VU by LE at 10/14/2024 1527    Acceptance, E,TB, VU by BT at 10/13/2024 1557    Acceptance, E, VU by LE at 10/10/2024 1100                      Point: Home exercise program (Done)       Learning Progress Summary            Patient Acceptance, E, VU by LE at 10/16/2024 1521    Acceptance, E, VU by LE at 10/14/2024 1527    Acceptance, E,TB, VU by BT at 10/13/2024 1557                      Point: Body mechanics (Done)       Learning Progress Summary            Patient Acceptance, E, VU by LE at 10/16/2024 1521    Acceptance, E, VU by LE at 10/14/2024 1527    Acceptance, E,TB, VU by BT at 10/13/2024 1557    Acceptance, E, VU by LE at 10/10/2024 1100                      Point: Precautions (Done)       Learning Progress Summary            Patient Acceptance, E, VU by LE at 10/16/2024 1521    Acceptance, E, VU by LE at 10/14/2024 1527    Acceptance, E,TB, VU by BT at 10/13/2024 1557    Acceptance, E, VU by LE at 10/10/2024 1100                                      User Key       Initials Effective Dates Name Provider Type Discipline    BT 12/30/20 -  Adriana Heath RN Registered Nurse Nurse    RHETT 07/30/24 -  River Ring, PT Student PT Student PT                  PT  Recommendation and Plan  Planned Therapy Interventions (PT): balance training, bed mobility training, gait training, home exercise program, neuromuscular re-education, patient/family education, postural re-education, ROM (range of motion), strengthening, stretching, transfer training  Plan of Care Reviewed With: patient  Progress: (P) no change  Outcome Evaluation: (P) Patient was able to ambulate with Clem x1 around her room from bed to chair. Patient needed some additional assistance with the STS requiring modA x2. Patient gait speed is still severely decreased and he did report some additional pain in his right ankle when ambulating. Patient will continue to benefit from skilled PT in order to improve mobility and strength.     Time Calculation:   PT Evaluation Complexity  History, PT Evaluation Complexity: 3 or more personal factors and/or comorbidities  Examination of Body Systems (PT Eval Complexity): total of 3 or more elements  Clinical Presentation (PT Evaluation Complexity): evolving  Clinical Decision Making (PT Evaluation Complexity): moderate complexity  Overall Complexity (PT Evaluation Complexity): moderate complexity     PT Charges       Row Name 10/16/24 1522             Time Calculation    Start Time 1425 (P)   -LE      PT Received On 10/16/24 (P)   -LE      PT Goal Re-Cert Due Date 10/20/24 (P)   -LE         Timed Charges    31873 - PT Therapeutic Activity Minutes 29 (P)   -LE         Total Minutes    Timed Charges Total Minutes 29 (P)   -LE       Total Minutes 29 (P)   -LE                User Key  (r) = Recorded By, (t) = Taken By, (c) = Cosigned By      Initials Name Provider Type    LE River Ring, PT Student PT Student                  Therapy Charges for Today       Code Description Service Date Service Provider Modifiers Qty    [id number] HC PT THERAPEUTIC ACT EA 15 MIN 10/16/2024 River Ring, PT Student GP 2            PT G-Codes  Outcome Measure Options: AM-PAC 6 Clicks Daily  Activity (OT)  AM-PAC 6 Clicks Score (PT): (P) 16  AM-PAC 6 Clicks Score (OT): 14  PT Discharge Summary  Anticipated Discharge Disposition (PT): (P) inpatient rehabilitation facility    River Ring, PT Student  10/16/2024      Electronically signed by River Ring, PT Student at 10/16/24 6713          Occupational Therapy Notes (most recent note)        Angela Escobar, OT at 10/15/24 0956          Patient Name: Cordell Martin  : 1945    MRN: [id number]                              Today's Date: 10/15/2024       Admit Date: 10/8/2024    Visit Dx:     ICD-10-CM ICD-9-CM   1. Cellulitis of lower extremity, unspecified laterality  L03.119 682.6   2. Lymphedema  I89.0 457.1   3. Pain in both lower extremities  M79.604 729.5    M79.605    4. Chronic edema  R60.9 782.3   5. Parkinson's disease, unspecified whether dyskinesia present, unspecified whether manifestations fluctuate  G20.A1 332.0     Patient Active Problem List   Diagnosis    Anxiety    Arthritis    Depression    Hyperlipidemia    Lumbar stenosis with neurogenic claudication    Parkinson's disease    Lumbar stenosis with neurogenic claudication status post L4-5 decompression    Status post lumbar laminectomy    Memory loss    Chronic edema    Cellulitis of right lower extremity    Exertional dyspnea    Right knee pain    Cellulitis of lower extremity    Hypothyroidism (acquired)    Elevated serum creatinine    Cellulitis of right leg    Acute deep vein thrombosis (DVT) of right lower extremity    Pneumonia    Pulmonary nodule (4mm RML incidental)    Chronic cough    GERD    Remote smoker (Stopped )    Restrictive pattern on PFTs w/o evidence of ILD    Cellulitis    History of DVT (deep vein thrombosis)    Anemia, chronic disease    Elevated TSH     Past Medical History:   Diagnosis Date    Anxiety     Arthritis     Back pain     Bronchitis     Cognitive impairment     Depression     Disease of thyroid gland     Glucose intolerance  (impaired glucose tolerance)     Hyperlipidemia     Kidney stone     Obesity     Parkinson disease     Pneumonia     Prostate disorder     Thyroid disease     Wears eyeglasses      Past Surgical History:   Procedure Laterality Date    COLONOSCOPY      5 years ago    EYE SURGERY      HERNIA REPAIR  10/20/2020    KNEE SURGERY      LUMBAR LAMINECTOMY DISCECTOMY DECOMPRESSION N/A 07/13/2017    Procedure: LUMBAR LAMINECTOMY L4-5;  Surgeon: Antonio Trent MD;  [facility]  Service:     SHOULDER ROTATOR CUFF REPAIR Right     x2    TONSILLECTOMY      VEIN SURGERY        General Information       Row Name 10/15/24 1252          OT Time and Intention    Document Type therapy note (daily note)  -KF     Mode of Treatment occupational therapy;individual therapy  -KF       Row Name 10/15/24 1256          General Information    Patient Profile Reviewed yes  -KF     Existing Precautions/Restrictions fall;other (see comments)  Cellulitis in BLE; Parkinson's Disease  -KF     Barriers to Rehab medically complex;previous functional deficit;physical barrier  -KF       Row Name 10/15/24 1254          Cognition    Orientation Status (Cognition) oriented x 3;other (see comments)  fatogued/lethargic  -KF       Row Name 10/15/24 0750          Safety Issues/Impairments Affecting Functional Mobility    Safety Issues Affecting Function (Mobility) insight into deficits/self-awareness;safety precaution awareness;safety precautions follow-through/compliance;sequencing abilities  -KF     Impairments Affecting Function (Mobility) balance;endurance/activity tolerance;coordination;pain;sensation/sensory awareness;strength;range of motion (ROM);postural/trunk control;motor control  -KF               User Key  (r) = Recorded By, (t) = Taken By, (c) = Cosigned By      Initials Name Provider Type    KF Angela Escobar OT Occupational Therapist                     Mobility/ADL's       Row Name 10/15/24 0733          Bed Mobility    Comment,  "(Bed Mobility) Pt declined EOB/OOB activity due to fatigue today. Session focused on ADLs and fine motor control.  -KF       Row Name 10/15/24 1259          Activities of Daily Living    BADL Assessment/Intervention feeding;grooming  -       Row Name 10/15/24 1259          Self-Feeding Assessment/Training    [place] Level (Feeding) liquids to mouth;minimum assist (75% patient effort)  -KF     Assistive Devices (Feeding) adapted cup  -KF     Position (Feeding) sitting up in bed  -KF     Comment, (Feeding) Pt provided and educated on use of an adapted cup when feeding. Pt verbalized and demonstrated understanding of use. Pt also educated on the use of built up handle utensils, however pt declined this stating, \"I have a draw full.\" Pt continued to declined use of utenstils while IP.  -       Row Name 10/15/24 1259          Grooming Assessment/Training    [place] Level (Grooming) oral care regimen;set up  -KF     Position (Grooming) supported sitting  -KF               User Key  (r) = Recorded By, (t) = Taken By, (c) = Cosigned By      Initials Name Provider Type    Angela Charlton OT Occupational Therapist                   Obj/Interventions       Row Name 10/15/24 1301          Motor Skills    Therapeutic Exercise other (see comments)  Pt provided and educated on use of yellow and pink sponges to assist in  strengthening. Pt able to verbalize and demonstrate understanding with BUEs.  -KF               User Key  (r) = Recorded By, (t) = Taken By, (c) = Cosigned By      Initials Name Provider Type    Angela Charlton OT Occupational Therapist                   Goals/Plan    No documentation.                  Clinical Impression       Row Name 10/15/24 1302          Pain Assessment    Additional Documentation Pain Scale: FACES Pre/Post-Treatment (Group)  -KF       Row Name 10/15/24 1302          Pain Scale: FACES Pre/Post-Treatment    Pain: FACES Scale, Pretreatment 0-->no hurt  -KF     " Posttreatment Pain Rating 0-->no hurt  -KF       Row Name 10/15/24 1302          Plan of Care Review    Plan of Care Reviewed With patient  -KF     Progress no change  -KF     Outcome Evaluation OT session completed, however pt was limited by increased fatigue and lethargy this date. The pt declined EOB and OOB activity, session focused on bed level ADLs, fine motor control, and  strengthening. The pt was provided and educated on use of an adapted cup. Pt declined built up utensils. Grooming ADL completed with set up. The pt remains below his functional baseline with generalized weakness, decreased activity tolerance, and balance deficits warranting continued IP OT services. Recommend a d/c to IRF for best outcome.  -       Row Name 10/15/24 1302          Therapy Assessment/Plan (OT)    Rehab Potential (OT) good  -KF     Criteria for Skilled Therapeutic Interventions Met (OT) yes;meets criteria;skilled treatment is necessary  -KF     Therapy Frequency (OT) daily  -       Row Name 10/15/24 1302          Therapy Plan Review/Discharge Plan (OT)    Anticipated Discharge Disposition (OT) inpatient rehabilitation facility  -       Row Name 10/15/24 1302          Vital Signs    Pre Patient Position Supine  -KF     Intra Patient Position Supine  -KF     Post Patient Position Supine  -KF       Row Name 10/15/24 1302          Positioning and Restraints    Pre-Treatment Position in bed  -KF     Post Treatment Position bed  -KF     In Bed notified nsg;supine;call light within reach;encouraged to call for assist;exit alarm on  -KF               User Key  (r) = Recorded By, (t) = Taken By, (c) = Cosigned By      Initials Name Provider Type    KF Angela Escobar, OT Occupational Therapist                   Outcome Measures       Row Name 10/15/24 1301          How much help from another is currently needed...    Putting on and taking off regular lower body clothing? 2  -KF     Bathing (including washing, rinsing, and  drying) 2  -KF     Toileting (which includes using toilet bed pan or urinal) 2  -KF     Putting on and taking off regular upper body clothing 2  -KF     Taking care of personal grooming (such as brushing teeth) 3  -KF     Eating meals 3  -KF     AM-PAC 6 Clicks Score (OT) 14  -KF       Row Name 10/15/24 1304          Functional Assessment    Outcome Measure Options AM-PAC 6 Clicks Daily Activity (OT)  -KF               User Key  (r) = Recorded By, (t) = Taken By, (c) = Cosigned By      Initials Name Provider Type    Angela Charlton OT Occupational Therapist                    Occupational Therapy Education       Title: PT OT SLP Therapies (Done)       Topic: Occupational Therapy (Done)       Point: ADL training (Done)       Description:   Instruct learner(s) on proper safety adaptation and remediation techniques during self care or transfers.   Instruct in proper use of assistive devices.                  Learning Progress Summary            Patient Acceptance, TB,E, VU,DU by KF at 10/15/2024 0956    Acceptance, E,TB, VU by BT at 10/13/2024 1557    Acceptance, E, VU,NR by JONATHAN at 10/10/2024 1116    Comment: reason for consult, evaluation results, posterior lean correction, transfer safety                      Point: Home exercise program (Done)       Description:   Instruct learner(s) on appropriate technique for monitoring, assisting and/or progressing therapeutic exercises/activities.                  Learning Progress Summary            Patient Acceptance, TB,E, VU,DU by KF at 10/15/2024 0956    Acceptance, E,TB, VU by BT at 10/13/2024 1557                      Point: Precautions (Done)       Description:   Instruct learner(s) on prescribed precautions during self-care and functional transfers.                  Learning Progress Summary            Patient Acceptance, E,TB, VU by BT at 10/13/2024 1557    Acceptance, E, VU,NR by JONATHAN at 10/10/2024 1116    Comment: reason for consult, evaluation results, posterior  lean correction, transfer safety                      Point: Body mechanics (Done)       Description:   Instruct learner(s) on proper positioning and spine alignment during self-care, functional mobility activities and/or exercises.                  Learning Progress Summary            Patient Acceptance, E,TB, VU by BT at 10/13/2024 1557    Acceptance, E, VU,NR by JONATHAN at 10/10/2024 1116    Comment: reason for consult, evaluation results, posterior lean correction, transfer safety                                      User Key       Initials Effective Dates Name Provider Type Discipline    JONATHAN 07/11/23 -  Angélica Mera, OT Occupational Therapist OT     12/30/20 -  Adriana Heath, RN Registered Nurse Nurse     08/09/23 -  Angela Escobar OT Occupational Therapist OT                  OT Recommendation and Plan  Therapy Frequency (OT): daily  Plan of Care Review  Plan of Care Reviewed With: patient  Progress: no change  Outcome Evaluation: OT session completed, however pt was limited by increased fatigue and lethargy this date. The pt declined EOB and OOB activity, session focused on bed level ADLs, fine motor control, and  strengthening. The pt was provided and educated on use of an adapted cup. Pt declined built up utensils. Grooming ADL completed with set up. The pt remains below his functional baseline with generalized weakness, decreased activity tolerance, and balance deficits warranting continued IP OT services. Recommend a d/c to IRF for best outcome.     Time Calculation:         Time Calculation- OT       Row Name 10/15/24 1305             Time Calculation- OT    OT Start Time 0956  -KF      OT Received On 10/15/24  -KF      OT Goal Re-Cert Due Date 10/20/24  -KF         Timed Charges    86556 - OT Therapeutic Exercise Minutes 7  -KF      83882 - OT Self Care/Mgmt Minutes 9  -KF         Total Minutes    Timed Charges Total Minutes 16  -KF       Total Minutes 16  -KF                User Key  (r) =  Recorded By, (t) = Taken By, (c) = Cosigned By      Initials Name Provider Type    KF Angela Escobar OT Occupational Therapist                  Therapy Charges for Today       Code Description Service Date Service Provider Modifiers Qty    80187774331 HC OT SELF CARE/MGMT/TRAIN EA 15 MIN 10/15/2024 Angela Escobar OT GO 1                 Angela Escobar OT  10/15/2024    Electronically signed by Angela Escobar OT at 10/15/24 3427

## 2024-10-17 LAB
ANION GAP SERPL CALCULATED.3IONS-SCNC: 9 MMOL/L (ref 5–15)
BUN SERPL-MCNC: 24 MG/DL (ref 8–23)
BUN/CREAT SERPL: 18.5 (ref 7–25)
CALCIUM SPEC-SCNC: 8.5 MG/DL (ref 8.6–10.5)
CHLORIDE SERPL-SCNC: 106 MMOL/L (ref 98–107)
CO2 SERPL-SCNC: 26 MMOL/L (ref 22–29)
CREAT SERPL-MCNC: 1.3 MG/DL (ref 0.76–1.27)
DEPRECATED RDW RBC AUTO: 53.4 FL (ref 37–54)
EGFRCR SERPLBLD CKD-EPI 2021: 56.6 ML/MIN/1.73
ERYTHROCYTE [DISTWIDTH] IN BLOOD BY AUTOMATED COUNT: 15.9 % (ref 12.3–15.4)
GLUCOSE SERPL-MCNC: 94 MG/DL (ref 65–99)
HCT VFR BLD AUTO: 33.7 % (ref 37.5–51)
HGB BLD-MCNC: 10.7 G/DL (ref 13–17.7)
MCH RBC QN AUTO: 29.2 PG (ref 26.6–33)
MCHC RBC AUTO-ENTMCNC: 31.8 G/DL (ref 31.5–35.7)
MCV RBC AUTO: 92.1 FL (ref 79–97)
PLATELET # BLD AUTO: 214 10*3/MM3 (ref 140–450)
PMV BLD AUTO: 8.4 FL (ref 6–12)
POTASSIUM SERPL-SCNC: 4.2 MMOL/L (ref 3.5–5.2)
RBC # BLD AUTO: 3.66 10*6/MM3 (ref 4.14–5.8)
SODIUM SERPL-SCNC: 141 MMOL/L (ref 136–145)
WBC NRBC COR # BLD AUTO: 8.51 10*3/MM3 (ref 3.4–10.8)

## 2024-10-17 PROCEDURE — 99231 SBSQ HOSP IP/OBS SF/LOW 25: CPT | Performed by: NURSE PRACTITIONER

## 2024-10-17 PROCEDURE — 80048 BASIC METABOLIC PNL TOTAL CA: CPT | Performed by: NURSE PRACTITIONER

## 2024-10-17 PROCEDURE — 25010000002 PIPERACILLIN SOD-TAZOBACTAM PER 1 G: Performed by: INTERNAL MEDICINE

## 2024-10-17 PROCEDURE — 25010000002 HEPARIN (PORCINE) PER 1000 UNITS: Performed by: HOSPITALIST

## 2024-10-17 PROCEDURE — 85027 COMPLETE CBC AUTOMATED: CPT | Performed by: NURSE PRACTITIONER

## 2024-10-17 RX ADMIN — PIPERACILLIN AND TAZOBACTAM 3.38 G: 3; .375 INJECTION, POWDER, LYOPHILIZED, FOR SOLUTION INTRAVENOUS at 05:55

## 2024-10-17 RX ADMIN — GABAPENTIN 100 MG: 100 CAPSULE ORAL at 08:25

## 2024-10-17 RX ADMIN — LEVOTHYROXINE SODIUM 88 MCG: 0.09 TABLET ORAL at 05:55

## 2024-10-17 RX ADMIN — FLUTICASONE PROPIONATE 2 SPRAY: 50 SPRAY, METERED NASAL at 08:26

## 2024-10-17 RX ADMIN — GUAIFENESIN 600 MG: 600 TABLET, EXTENDED RELEASE ORAL at 21:25

## 2024-10-17 RX ADMIN — PIPERACILLIN AND TAZOBACTAM 3.38 G: 3; .375 INJECTION, POWDER, LYOPHILIZED, FOR SOLUTION INTRAVENOUS at 14:23

## 2024-10-17 RX ADMIN — CARBIDOPA AND LEVODOPA 1 TABLET: 25; 100 TABLET, EXTENDED RELEASE ORAL at 21:26

## 2024-10-17 RX ADMIN — GUAIFENESIN 600 MG: 600 TABLET, EXTENDED RELEASE ORAL at 08:25

## 2024-10-17 RX ADMIN — SENNOSIDES AND DOCUSATE SODIUM 2 TABLET: 50; 8.6 TABLET ORAL at 08:25

## 2024-10-17 RX ADMIN — PRAMIPEXOLE DIHYDROCHLORIDE 1 MG: 1 TABLET ORAL at 08:25

## 2024-10-17 RX ADMIN — CARBIDOPA AND LEVODOPA 1 TABLET: 25; 100 TABLET, EXTENDED RELEASE ORAL at 08:26

## 2024-10-17 RX ADMIN — PRAMIPEXOLE DIHYDROCHLORIDE 1 MG: 1 TABLET ORAL at 21:25

## 2024-10-17 RX ADMIN — CARBIDOPA AND LEVODOPA 2 TABLET: 25; 100 TABLET ORAL at 21:25

## 2024-10-17 RX ADMIN — CARBIDOPA AND LEVODOPA 2 TABLET: 25; 100 TABLET ORAL at 08:25

## 2024-10-17 RX ADMIN — QUETIAPINE FUMARATE 12.5 MG: 25 TABLET ORAL at 21:25

## 2024-10-17 RX ADMIN — HEPARIN SODIUM 5000 UNITS: 5000 INJECTION INTRAVENOUS; SUBCUTANEOUS at 21:25

## 2024-10-17 RX ADMIN — TAMSULOSIN HYDROCHLORIDE 0.4 MG: 0.4 CAPSULE ORAL at 08:25

## 2024-10-17 RX ADMIN — HEPARIN SODIUM 5000 UNITS: 5000 INJECTION INTRAVENOUS; SUBCUTANEOUS at 05:54

## 2024-10-17 RX ADMIN — ATORVASTATIN CALCIUM 40 MG: 40 TABLET, FILM COATED ORAL at 08:25

## 2024-10-17 RX ADMIN — Medication 10 ML: at 08:26

## 2024-10-17 RX ADMIN — PRAMIPEXOLE DIHYDROCHLORIDE 1 MG: 1 TABLET ORAL at 18:33

## 2024-10-17 RX ADMIN — GABAPENTIN 100 MG: 100 CAPSULE ORAL at 21:25

## 2024-10-17 RX ADMIN — Medication 10 ML: at 21:26

## 2024-10-17 RX ADMIN — HEPARIN SODIUM 5000 UNITS: 5000 INJECTION INTRAVENOUS; SUBCUTANEOUS at 14:23

## 2024-10-17 RX ADMIN — CARBIDOPA AND LEVODOPA 2 TABLET: 25; 100 TABLET ORAL at 18:33

## 2024-10-17 RX ADMIN — NYSTATIN: 100000 POWDER TOPICAL at 08:26

## 2024-10-17 RX ADMIN — PANTOPRAZOLE SODIUM 40 MG: 40 TABLET, DELAYED RELEASE ORAL at 12:06

## 2024-10-17 RX ADMIN — NYSTATIN: 100000 POWDER TOPICAL at 21:26

## 2024-10-17 RX ADMIN — CARBIDOPA AND LEVODOPA 2 TABLET: 25; 100 TABLET ORAL at 12:06

## 2024-10-17 RX ADMIN — HYDROCODONE BITARTRATE AND ACETAMINOPHEN 1 TABLET: 5; 325 TABLET ORAL at 12:15

## 2024-10-17 RX ADMIN — PIPERACILLIN AND TAZOBACTAM 3.38 G: 3; .375 INJECTION, POWDER, LYOPHILIZED, FOR SOLUTION INTRAVENOUS at 22:01

## 2024-10-17 NOTE — CASE MANAGEMENT/SOCIAL WORK
Continued Stay Note  [facility]     Patient Name: Cordell Martin  MRN: [id number]  Today's Date: 10/17/2024    Admit Date: 10/8/2024    Plan: discharge plan   Discharge Plan       Row Name 10/17/24 1625       Plan    Plan discharge plan    Plan Comments Per Mariana(liason for [facility]) [facility] able to offer pt a bed. Mariana states she has sent referral to clinical team for review. If [facility] unable to offer pt a bed, pt is agreeable to [facility]. I spoke with Ina at [facility], and [facility] can offer a bed possibly Friday or Sat. CM will follow up tomorrow.    Final Discharge Disposition Code 03 - skilled nursing facility (SNF)                   Discharge Codes    No documentation.                 Expected Discharge Date and Time       Expected Discharge Date Expected Discharge Time    Oct 16, 2024               Kenisha Tom RN

## 2024-10-17 NOTE — PROGRESS NOTES
"Cordell Martin  1945  [id number]    Evaluating Physician: Trevor Jorgensen MD    Chief Complaint: right leg red and painful    Reason for Consultation: RLE infection    History of present illness:     Patient is a 79 y.o.  Yr old male prior patient of Dr. Benites, with history of Parkinson's disease and chronically debilitated, uses a wheelchair primarily although apparently has occasionally used a walker.  He has other extensive comorbidities as below.  He had reported trauma to the right second toe having jammed it into a wall while in his wheelchair several weeks prior to admission.  He had developed increasing redness/swelling and discoloration of the right second toe and foot/lower leg over that period of time.  He does have bilateral lower leg discoloration and swelling but the right side is out of proportion of the left side.  He was admitted on October 8 with concern for lower extremity cellulitis; subsequently noted to have maggots at the right second toe    Aside from the trauma in the wheelchair, he denied any other specific exposures.    10/17/24 seen early and sleepy; Cx  PSA, skin derrell / \"mixed\" GNR  ; Right lower leg/foot pain out of proportion to left, constant, nonradiating, worse with palpation, generally better with pain meds  , 2-3 out of 10 at present. Redness less since admit    No headache photophobia or neck stiffness.  He has chronic intermittent cough and reports this is at baseline, currently on room air with no hemoptysis.  No nausea vomiting diarrhea or abdominal pain.  No dysuria hematuria or pyuria.  No other specific rash    Past Medical History:   Diagnosis Date    Anxiety     Arthritis     Back pain     Bronchitis     Cognitive impairment     Depression     Disease of thyroid gland     Glucose intolerance (impaired glucose tolerance)     Hyperlipidemia     Kidney stone     Obesity     Parkinson disease     Pneumonia     Prostate disorder     Thyroid disease     Wears " eyeglasses        Past Surgical History:   Procedure Laterality Date    COLONOSCOPY      5 years ago    EYE SURGERY      HERNIA REPAIR  10/20/2020    KNEE SURGERY      LUMBAR LAMINECTOMY DISCECTOMY DECOMPRESSION N/A 07/13/2017    Procedure: LUMBAR LAMINECTOMY L4-5;  Surgeon: Antonio Trent MD;  [facility];  Service:     SHOULDER ROTATOR CUFF REPAIR Right     x2    TONSILLECTOMY      VEIN SURGERY         Pediatric History   Patient Parents    Not on file     Other Topics Concern    Not on file   Social History Narrative    Lives alone. Uses walker    Has not smoked since about 1980       family history includes Alzheimer's disease in an other family member; Cancer in his father and another family member; Diabetes in an other family member; Heart disease in an other family member; Stroke in an other family member.    No Known Allergies    Medication:  Current Facility-Administered Medications   Medication Dose Route Frequency Provider Last Rate Last Admin    atorvastatin (LIPITOR) tablet 40 mg  40 mg Oral Daily Angely Mukherjee DO   40 mg at 10/17/24 0825    sennosides-docusate (PERICOLACE) 8.6-50 MG per tablet 2 tablet  2 tablet Oral BID Radha Lyons APRN   2 tablet at 10/17/24 0825    And    polyethylene glycol (MIRALAX) packet 17 g  17 g Oral Daily PRN Radha Lyons APRN   17 g at 10/16/24 1822    And    bisacodyl (DULCOLAX) EC tablet 5 mg  5 mg Oral Daily PRN Radha Lyons APRN        And    bisacodyl (DULCOLAX) suppository 10 mg  10 mg Rectal Daily PRN Radha Lyons APRN        carbidopa-levodopa (SINEMET)  MG per tablet 2 tablet  2 tablet Oral 4x Daily Daya Ospina APRN   2 tablet at 10/17/24 0825    carbidopa-levodopa ER (SINEMET CR)  MG per tablet 1 tablet  1 tablet Oral BID Daya Ospina APRN   1 tablet at 10/17/24 0826    fluticasone (FLONASE) 50 MCG/ACT nasal spray 2 spray  2 spray Each Nare Daily Daya Ospina APRN   2 spray at 10/17/24 0826     furosemide (LASIX) tablet 20 mg  20 mg Oral Daily Daya Ospina APRN   20 mg at 10/16/24 0857    gabapentin (NEURONTIN) capsule 100 mg  100 mg Oral Q12H Fabian Lopez MD   100 mg at 10/17/24 0825    guaiFENesin (MUCINEX) 12 hr tablet 600 mg  600 mg Oral Q12H Fabian Lopez MD   600 mg at 10/17/24 0825    heparin (porcine) 5000 UNIT/ML injection 5,000 Units  5,000 Units Subcutaneous Q8H Fabian Lopez MD   5,000 Units at 10/17/24 0554    HYDROcodone-acetaminophen (NORCO) 5-325 MG per tablet 1 tablet  1 tablet Oral Q6H PRN Angely Mukherjee DO   1 tablet at 10/15/24 2224    HYDROmorphone (DILAUDID) injection 0.25 mg  0.25 mg Intravenous Q2H PRN Angely Mukherjee DO        levothyroxine (SYNTHROID, LEVOTHROID) tablet 88 mcg  88 mcg Oral Q AM Daya Ospina APRN   88 mcg at 10/17/24 0555    Magnesium Standard Dose Replacement - Follow Nurse / BPA Driven Protocol   Does not apply PRN Daya Ospina APRN        nystatin (MYCOSTATIN) powder   Topical Q12H Radha Lyons APRN   Given at 10/17/24 0826    pantoprazole (PROTONIX) EC tablet 40 mg  40 mg Oral Daily Daya Ospina APRN   40 mg at 10/16/24 1241    piperacillin-tazobactam (ZOSYN) 3.375 g IVPB in 100 mL NS MBP (CD)  3.375 g Intravenous Q8H Trevor Jorgensen MD   3.375 g at 10/17/24 0555    Potassium Replacement - Follow Nurse / BPA Driven Protocol   Does not apply PRN Daya Ospina APRN        pramipexole (MIRAPEX) tablet 1 mg  1 mg Oral TID Daya Ospina APRN   1 mg at 10/17/24 0825    QUEtiapine (SEROquel) tablet 12.5 mg  12.5 mg Oral Nightly Daya Ospina APRN   12.5 mg at 10/16/24 2018    sodium chloride 0.9 % flush 10 mL  10 mL Intravenous PRN Yuri Kessler MD        sodium chloride 0.9 % flush 10 mL  10 mL Intravenous Q12H Daya Ospina APRN   10 mL at 10/17/24 0826    sodium chloride 0.9 % flush 10 mL  10 mL Intravenous PRN Daya Ospina APRN        tamsulosin (FLOMAX) 24 hr capsule 0.4 mg  0.4 mg Oral Daily Bhavesh  ALEXUS Stapleton   0.4 mg at 10/17/24 0825       Antibiotics:  Anti-Infectives (From admission, onward)      Ordered     Dose/Rate Route Frequency Start Stop    10/09/24 0735  piperacillin-tazobactam (ZOSYN) 3.375 g IVPB in 100 mL NS MBP (CD)        Ordering Provider: Trevor Jorgensen MD    3.375 g  over 4 Hours Intravenous Every 8 Hours 10/09/24 1430 10/19/24 1429    10/09/24 0735  piperacillin-tazobactam (ZOSYN) 3.375 g IVPB in 100 mL NS MBP (CD)        Ordering Provider: Fabian Lopez MD    3.375 g  over 30 Minutes Intravenous Once 10/09/24 0830 10/09/24 0840    10/08/24 2008  vancomycin 2250 mg/500 mL 0.9% NS IVPB ([facility])        Ordering Provider: Yuri Kessler MD    20 mg/kg × 118 kg  over 135 Minutes Intravenous Once 10/08/24 2100 10/09/24 0346    10/08/24 2024  ceFAZolin 2000 mg IVPB in 100 mL NS (MBP)        Ordering Provider: Yuri eKssler MD    2,000 mg  over 30 Minutes Intravenous Once 10/08/24 2040 10/08/24 2203    10/08/24 2024  metroNIDAZOLE (FLAGYL) tablet 500 mg        Ordering Provider: Yuri Kessler MD    500 mg Oral Once 10/08/24 2040 10/08/24 2131              Review of Systems    10/17/24 per staff also    Constitutional-- No Fever, chills or sweats.  Appetite good, and no malaise. No fatigue.  Heent-- No new vision, hearing or throat complaints.  No epistaxis or oral sores.  Denies odynophagia or dysphagia.  No flashers, floaters or eye pain. No odynophagia or dysphagia. No headache, photophobia or neck stiffness.  CV-- No chest pain, palpitation or syncope  Resp--see above, no shortness of breath at present  GI- No nausea, vomiting, or diarrhea.  No hematochezia, melena, or hematemesis. Denies jaundice or chronic liver disease.  -- No dysuria, hematuria, or flank pain.  Denies hesitancy, urgency or flank pain.  Lymph- no swollen lymph nodes in neck/axilla or groin.   Heme- No active bruising or bleeding; no Hx of DVT or PE.  MS-- no swelling or pain in the bones or  "joints of arms/legs.  No new back pain.  Neuro-- No acute focal weakness or numbness in the arms or legs.  No seizures.  Chronic Parkinson's and chronically debilitated    Full 12 point review of systems reviewed and negative otherwise for acute complaints, except for above    Physical Exam:   Vital Signs   /54   Pulse 64   Temp 97.4 °F (36.3 °C) (Oral)   Resp 16   Ht 182.9 cm (72.01\")   Wt 115 kg (254 lb 8 oz)   SpO2 94%   BMI 34.51 kg/m²     GENERAL: sleepy, in no acute distress.    HEENT: Normocephalic, atraumatic.    No conjunctival injection. No icterus. Oropharynx clear without evidence of thrush or exudate. No evidence of periodontal disease.    NECK: Supple without nuchal rigidity. No mass.   HEART: RRR; No murmur, rubs, gallops.   LUNGS: Diminished at bases but otherwise  clear to auscultation bilaterally without wheezing, rales, rhonchi. Normal respiratory effort. Nonlabored. No dullness.  ABDOMEN: Soft, nontender, nondistended. Positive bowel sounds. No rebound or guarding. NO mass or HSM.  EXT: See below   MSK: FROM without joint effusions noted arms/legs.    SKIN: Warm and dry without cutaneous eruptions on Inspection/palpation.    NEURO: sleepy.       Bilateral lower legs appeared edematous and discolored with vague scale.   right side between knee and foot has warmth/tenderness and erythema   improving/less intense.  Right side second toe is  discolored with vague edema/erythema and tenderness out of proportion to other toes, crusted wound and unable to determine any depth with no definitive probe to bone tendon joint or ligament at present; no discrete mass bulge or fluctuance.  No crepitus or bulla    Laboratory Data    Results from last 7 days   Lab Units 10/17/24  0452 10/14/24  0357 10/12/24  0358   WBC 10*3/mm3 8.51 6.89 4.75   HEMOGLOBIN g/dL 10.7* 10.8* 10.9*   HEMATOCRIT % 33.7* 35.0* 34.8*   PLATELETS 10*3/mm3 214 266 253     Results from last 7 days   Lab Units 10/17/24  0452 "   SODIUM mmol/L 141   POTASSIUM mmol/L 4.2   CHLORIDE mmol/L 106   CO2 mmol/L 26.0   BUN mg/dL 24*   CREATININE mg/dL 1.30*   GLUCOSE mg/dL 94   CALCIUM mg/dL 8.5*     Results from last 7 days   Lab Units 10/11/24  0641   ALK PHOS U/L 98   BILIRUBIN mg/dL 0.2   ALT (SGPT) U/L <5   AST (SGOT) U/L 9                   Estimated Creatinine Clearance: 62.3 mL/min (A) (by C-G formula based on SCr of 1.3 mg/dL (H)).      Microbiology:      Radiology:  Imaging Results (Last 72 Hours)       ** No results found for the last 72 hours. **              Impression:     --Acute right lower leg/foot and second toe infection/cellulitis out of proportion to his chronic discoloration by his report, trauma to the right second toe several weeks preceding admission as above.  Further complicated by his chronic comorbidity, wound at the second toe with maggots noted earlier in stay, with ongoing risk for further serious morbidity and other serious sequela; high risk for persistent/recurrent or nonhealing wounds, persistent/progressive or recurrent infection and risk for further functional/limb loss, risk for amputation etc.   MRI no osteomyelitis per radiology.     --Chronic/intermittent cough at presentation, CT scan without acute infiltrate per radiology and some atelectasis noted by them.  Encouraged incentive spirometry.  No productive cough at present.  Respiratory PCR negative.  Further pulmonary workup or referral at discretion of medicine team; pneumonia less likely at present but certainly could evolve; monitor for new process    --Hx trauma and maggots    --Parkinson's disease, chronically debilitated and primarily wheelchair-bound by his report.    --Prior history of DVT    PLAN:      -- IV  Zosyn for now, likely 14 days from admit.  Case management looking into options        -- Daptomycin stopped as no MDR GPC in culture so far.  If worse with this change or stronger evidence for MDR GPC as pathogen, then consideration could  be given to adding back versus other adjustments    -- Check/review labs cultures and scans    -- Partial history per nursing staff    -- Discussed with microbiology    -- Highly complex set of issues with high risk for further serious morbidity and other serious sequela       Copied text in this note has been reviewed and is accurate as of 10/17/24.        Trevor Jorgensen MD  10/17/2024

## 2024-10-17 NOTE — PROGRESS NOTES
[facility] Medicine Services  PROGRESS NOTE    Patient Name: Cordell Martin  : 1945  MRN: [id number]    Date of Admission: 10/8/2024  Primary Care Physician: Ben Holder DO    Subjective   Subjective     CC:  F/u cellulitis    HPI:   Pt sitting up in bed reporting leg dressings are due to be changed today. Pt has not had a BM in several days. Will escalate bowel meds.  Cr to 1.3 today. Encouraged water intake.       Objective   Objective     Vital Signs:   Temp:  [97.3 °F (36.3 °C)-97.5 °F (36.4 °C)] 97.4 °F (36.3 °C)  Heart Rate:  [59-70] 64  Resp:  [16] 16  BP: (107-124)/(53-62) 115/54     Physical Exam:   Constitutional: Awake, alert, NAD  HENT: NCAT, mucous membranes moist  Respiratory: Clear to auscultation bilaterally, nonlabored   Cardiovascular: RRR, no murmurs, rubs, or gallop  Gastrointestinal: Positive bowel sounds, soft, nontender, nondistended  Musculoskeletal: BLE wrapped  Psychiatric: Flat affect, cooperative  Neurologic: Oriented x 3, MARIE, speech clear  Skin: No rashes      Results Reviewed:  LAB RESULTS:      Lab 10/17/24  0452 10/14/24  0357 10/12/24  0358 10/11/24  0641   WBC 8.51 6.89 4.75 5.79   HEMOGLOBIN 10.7* 10.8* 10.9* 10.9*   HEMATOCRIT 33.7* 35.0* 34.8* 34.9*   PLATELETS 214 266 253 254   MCV 92.1 93.8 93.3 94.3         Lab 10/17/24  0452 10/14/24  0357 10/12/24  0358 10/11/24  0641   SODIUM 141 139 138 138   POTASSIUM 4.2 4.1 4.0 4.4   CHLORIDE 106 106 105 104   CO2 26.0 27.0 23.0 27.0   ANION GAP 9.0 6.0 10.0 7.0   BUN 24* 20 18 18   CREATININE 1.30* 1.09 1.26 1.11   EGFR 56.6* 69.9 58.7* 68.4   GLUCOSE 94 129* 143* 128*   CALCIUM 8.5* 8.6 8.6 8.4*         Lab 10/11/24  0641   TOTAL PROTEIN 6.3   ALBUMIN 2.6*   GLOBULIN 3.7   ALT (SGPT) <5   AST (SGOT) 9   BILIRUBIN 0.2   ALK PHOS 98                           Brief Urine Lab Results  (Last result in the past 365 days)        Color   Clarity   Blood   Leuk Est   Nitrite   Protein   CREAT    Urine HCG        10/08/24 2219 Yellow   Clear   Negative   Negative   Negative   Negative                   Microbiology Results Abnormal       Procedure Component Value - Date/Time    Blood Culture - Blood, Hand, Left [[id number]]  (Normal) Collected: 10/08/24 2120    Lab Status: Final result Specimen: Blood from Hand, Left Updated: 10/13/24 2146     Blood Culture No growth at 5 days    Narrative:      Less than seven (7) mL's of blood was collected.  Insufficient quantity may yield false negative results.    Blood Culture - Blood, Hand, Right [[id number]]  (Normal) Collected: 10/08/24 2050    Lab Status: Final result Specimen: Blood from Hand, Right Updated: 10/13/24 2116     Blood Culture No growth at 5 days    Parasite Identification - Parasite, Foot, Right [[id number]] Collected: 10/09/24 1358    Lab Status: Final result Specimen: Parasite from Foot, Right Updated: 10/09/24 1407     Macroscopic Exam Maggots    Respiratory Panel PCR w/COVID-19(SARS-CoV-2) KENJI/ASH/SHIRLEY/PAD/COR/CORNELIUS In-House, NP Swab in UTM/VTM, 2 HR TAT - Swab, Nasopharynx [[id number]]  (Normal) Collected: 10/09/24 0548    Lab Status: Final result Specimen: Swab from Nasopharynx Updated: 10/09/24 0643     ADENOVIRUS, PCR Not Detected     Coronavirus 229E Not Detected     Coronavirus HKU1 Not Detected     Coronavirus NL63 Not Detected     Coronavirus OC43 Not Detected     COVID19 Not Detected     Human Metapneumovirus Not Detected     Human Rhinovirus/Enterovirus Not Detected     Influenza A PCR Not Detected     Influenza B PCR Not Detected     Parainfluenza Virus 1 Not Detected     Parainfluenza Virus 2 Not Detected     Parainfluenza Virus 3 Not Detected     Parainfluenza Virus 4 Not Detected     RSV, PCR Not Detected     Bordetella pertussis pcr Not Detected     Bordetella parapertussis PCR Not Detected     Chlamydophila pneumoniae PCR Not Detected     Mycoplasma pneumo by PCR Not Detected    Narrative:      In the setting of a positive respiratory  panel with a viral infection PLUS a negative procalcitonin without other underlying concern for bacterial infection, consider observing off antibiotics or discontinuation of antibiotics and continue supportive care. If the respiratory panel is positive for atypical bacterial infection (Bordetella pertussis, Chlamydophila pneumoniae, or Mycoplasma pneumoniae), consider antibiotic de-escalation to target atypical bacterial infection.    COVID PRE-OP / PRE-PROCEDURE SCREENING ORDER (NO ISOLATION) - Swab, Nasopharynx [[id number]]  (Normal) Collected: 10/08/24 2126    Lab Status: Final result Specimen: Swab from Nasopharynx Updated: 10/08/24 2220    Narrative:      The following orders were created for panel order COVID PRE-OP / PRE-PROCEDURE SCREENING ORDER (NO ISOLATION) - Swab, Nasopharynx.  Procedure                               Abnormality         Status                     ---------                               -----------         ------                     COVID-19, FLU A/B, RSV P...[[id number]]  Normal              Final result                 Please view results for these tests on the individual orders.    COVID-19, FLU A/B, RSV PCR 1 HR TAT - Swab, Nasopharynx [[id number]]  (Normal) Collected: 10/08/24 2126    Lab Status: Final result Specimen: Swab from Nasopharynx Updated: 10/08/24 2220     COVID19 Not Detected     Influenza A PCR Not Detected     Influenza B PCR Not Detected     RSV, PCR Not Detected    Narrative:      Fact sheet for providers: https://www.fda.gov/media/317716/download    Fact sheet for patients: https://www.fda.gov/media/691807/download    Test performed by PCR.            No radiology results from the last 24 hrs    Results for orders placed during the hospital encounter of 01/12/21    Adult Transthoracic Echo Complete W/ Cont if Necessary Per Protocol    Interpretation Summary  · The right ventricle and right atrium are moderately dilated. Other chamber sizes and wall thicknesses are  "normal.  · Global and segmental LV wall motion is normal. The estimated left ventricular ejection fraction is 51% - 55%.  · The aortic and mitral valves are normal in structure and function.  · The estimated RV systolic pressure is mildly elevated 35 mmHg - 40 mmHg. There is as well noted to be less than 50% inspiratory IVC collapse. The main pulmonary artery is \"borderline dilated\".  · There are no other important findings on this study.      Current medications:  Scheduled Meds:atorvastatin, 40 mg, Oral, Daily  carbidopa-levodopa, 2 tablet, Oral, 4x Daily  carbidopa-levodopa ER, 1 tablet, Oral, BID  fluticasone, 2 spray, Each Nare, Daily  furosemide, 20 mg, Oral, Daily  gabapentin, 100 mg, Oral, Q12H  guaiFENesin, 600 mg, Oral, Q12H  heparin (porcine), 5,000 Units, Subcutaneous, Q8H  levothyroxine, 88 mcg, Oral, Q AM  nystatin, , Topical, Q12H  pantoprazole, 40 mg, Oral, Daily  piperacillin-tazobactam, 3.375 g, Intravenous, Q8H  pramipexole, 1 mg, Oral, TID  QUEtiapine, 12.5 mg, Oral, Nightly  senna-docusate sodium, 2 tablet, Oral, BID  sodium chloride, 10 mL, Intravenous, Q12H  tamsulosin, 0.4 mg, Oral, Daily      Continuous Infusions:   PRN Meds:.  senna-docusate sodium **AND** polyethylene glycol **AND** bisacodyl **AND** bisacodyl    HYDROcodone-acetaminophen    HYDROmorphone    Magnesium Standard Dose Replacement - Follow Nurse / BPA Driven Protocol    Potassium Replacement - Follow Nurse / BPA Driven Protocol    sodium chloride    sodium chloride    Assessment & Plan   Assessment & Plan     Active Hospital Problems    Diagnosis  POA    **Cellulitis [L03.90]  Yes    History of DVT (deep vein thrombosis) [Z86.718]  Not Applicable    Anemia, chronic disease [D63.8]  Yes    Elevated TSH [R79.89]  Yes    GERD [K21.9]  Yes    Chronic cough [R05.3]  Yes    Hypothyroidism (acquired) [E03.9]  Yes    Chronic edema [R60.9]  Yes    Parkinson's disease [G20.A1]  Yes    Anxiety [F41.9]  Yes    Hyperlipidemia [E78.5]  Yes "    Depression [F32.A]  Yes      Resolved Hospital Problems   No resolved problems to display.        Brief Hospital Course to date:  Cordell Martin is a 79 y.o. male with history of Parkinson's, history of DVT, HLD, hypothyroidism, GERD, lymphedema, anxiety/depression, with wheelchair use/walker use at [facility] who presented with cellulitis, BLE wounds. Ortho and ID consulted.     This patient's problems and plans were partially entered by my partner and updated as appropriate by me 10/17/24.    Copied text in this note has been reviewed and is accurate as of 10/17/24.         B LE wounds  Edema  Cellulitis  - Maggots noted, R second toe markedly abnormal, wound between first/second toe with drainage after jamming toe into wheelchair  - Duplex with chronic DVT noted  - MRI foot and tib/fib with no evidence of osteo   - Wound culture with pseudomonas   - Evaluated by ortho, conservative management and monitoring  - ID consulted. Patient initially on dapto/zosyn. Dapto discontinued 10/12 after culture resulted with pseudomonas. Continue Zosyn, likely 14 days from admission per ID. ID can follow at [facility].   - ABIs unable to be assessed d/t vessel incompressibility   - Vascular consult. Known to Dr. Connor. No urgent vascular intervention at this time.  May require revascularization as outpatient.  - WOC following  - AM CBC, BMP     Constipation  -no BM since 10/11, likely d/t pain medication, immobility  -schedule bowel regimen, monitor  -defer KUB as abdomen soft, bowel sounds present    Cough, improved  Possible LLL PNA on CXR, not as impressive on CT  -pulmonary toilet  -respiratory PCR negative  -speech consulted and s/p FEES with regular/thins      Chronic anemia  -monitor  -AM CBC     Parkinson's disease  Anxiety/depression  -continue sinemet, seroquel, mirapex     Hypothyroidism  -on synthorid, with elevated TSH 15.85 and normal T4  -continue levothyroxine 88 mcg daily and follow up with PCP     HLD  -statin      GERD  -PPI    Expected Discharge Location and Transportation: [facility]  Expected Discharge   10/20/2024    VTE Prophylaxis:  Pharmacologic VTE prophylaxis orders are present.         AM-PAC 6 Clicks Score (PT): 14 (10/16/24 2017)    CODE STATUS:   Code Status and Medical Interventions: No CPR (Do Not Attempt to Resuscitate); Limited Support; No intubation (DNI); DNR/DNI   Ordered at: 10/09/24 0206     Medical Intervention Limits:    No intubation (DNI)     Level Of Support Discussed With:    Patient     Code Status (Patient has no pulse and is not breathing):    No CPR (Do Not Attempt to Resuscitate)     Medical Interventions (Patient has pulse or is breathing):    Limited Support     Comments:    DNR/DNI       ALEXUS Nair  10/17/24

## 2024-10-17 NOTE — PLAN OF CARE
Goal Outcome Evaluation:  Plan of Care Reviewed With: patient        Progress: improving  Outcome Evaluation: Pt is A&O with VSS, NSR on the monitor, and on RA. He had a massive BM last night. He slept well. There are no complaints at this time.

## 2024-10-18 LAB
ANION GAP SERPL CALCULATED.3IONS-SCNC: 8 MMOL/L (ref 5–15)
BUN SERPL-MCNC: 21 MG/DL (ref 8–23)
BUN/CREAT SERPL: 14.2 (ref 7–25)
CALCIUM SPEC-SCNC: 8.9 MG/DL (ref 8.6–10.5)
CHLORIDE SERPL-SCNC: 106 MMOL/L (ref 98–107)
CO2 SERPL-SCNC: 28 MMOL/L (ref 22–29)
CREAT SERPL-MCNC: 1.48 MG/DL (ref 0.76–1.27)
EGFRCR SERPLBLD CKD-EPI 2021: 48.4 ML/MIN/1.73
GLUCOSE SERPL-MCNC: 97 MG/DL (ref 65–99)
POTASSIUM SERPL-SCNC: 4.3 MMOL/L (ref 3.5–5.2)
SODIUM SERPL-SCNC: 142 MMOL/L (ref 136–145)

## 2024-10-18 PROCEDURE — 25810000003 SODIUM CHLORIDE 0.9 % SOLUTION: Performed by: NURSE PRACTITIONER

## 2024-10-18 PROCEDURE — 25010000002 PIPERACILLIN SOD-TAZOBACTAM PER 1 G: Performed by: INTERNAL MEDICINE

## 2024-10-18 PROCEDURE — 25010000002 HEPARIN (PORCINE) PER 1000 UNITS: Performed by: HOSPITALIST

## 2024-10-18 PROCEDURE — 80048 BASIC METABOLIC PNL TOTAL CA: CPT | Performed by: NURSE PRACTITIONER

## 2024-10-18 PROCEDURE — 99231 SBSQ HOSP IP/OBS SF/LOW 25: CPT | Performed by: NURSE PRACTITIONER

## 2024-10-18 RX ORDER — SODIUM CHLORIDE 9 MG/ML
50 INJECTION, SOLUTION INTRAVENOUS CONTINUOUS
Status: ACTIVE | OUTPATIENT
Start: 2024-10-18 | End: 2024-10-19

## 2024-10-18 RX ADMIN — FUROSEMIDE 20 MG: 20 TABLET ORAL at 09:04

## 2024-10-18 RX ADMIN — PIPERACILLIN AND TAZOBACTAM 3.38 G: 3; .375 INJECTION, POWDER, LYOPHILIZED, FOR SOLUTION INTRAVENOUS at 16:06

## 2024-10-18 RX ADMIN — HEPARIN SODIUM 5000 UNITS: 5000 INJECTION INTRAVENOUS; SUBCUTANEOUS at 16:06

## 2024-10-18 RX ADMIN — SODIUM CHLORIDE 50 ML/HR: 9 INJECTION, SOLUTION INTRAVENOUS at 12:12

## 2024-10-18 RX ADMIN — TAMSULOSIN HYDROCHLORIDE 0.4 MG: 0.4 CAPSULE ORAL at 09:05

## 2024-10-18 RX ADMIN — PIPERACILLIN AND TAZOBACTAM 3.38 G: 3; .375 INJECTION, POWDER, LYOPHILIZED, FOR SOLUTION INTRAVENOUS at 21:58

## 2024-10-18 RX ADMIN — SENNOSIDES AND DOCUSATE SODIUM 2 TABLET: 50; 8.6 TABLET ORAL at 09:04

## 2024-10-18 RX ADMIN — HEPARIN SODIUM 5000 UNITS: 5000 INJECTION INTRAVENOUS; SUBCUTANEOUS at 05:30

## 2024-10-18 RX ADMIN — PRAMIPEXOLE DIHYDROCHLORIDE 1 MG: 1 TABLET ORAL at 21:59

## 2024-10-18 RX ADMIN — CARBIDOPA AND LEVODOPA 2 TABLET: 25; 100 TABLET ORAL at 12:11

## 2024-10-18 RX ADMIN — CARBIDOPA AND LEVODOPA 2 TABLET: 25; 100 TABLET ORAL at 21:59

## 2024-10-18 RX ADMIN — FLUTICASONE PROPIONATE 2 SPRAY: 50 SPRAY, METERED NASAL at 09:08

## 2024-10-18 RX ADMIN — GUAIFENESIN 600 MG: 600 TABLET, EXTENDED RELEASE ORAL at 21:58

## 2024-10-18 RX ADMIN — HEPARIN SODIUM 5000 UNITS: 5000 INJECTION INTRAVENOUS; SUBCUTANEOUS at 22:06

## 2024-10-18 RX ADMIN — QUETIAPINE FUMARATE 12.5 MG: 25 TABLET ORAL at 21:58

## 2024-10-18 RX ADMIN — LEVOTHYROXINE SODIUM 88 MCG: 0.09 TABLET ORAL at 05:30

## 2024-10-18 RX ADMIN — CARBIDOPA AND LEVODOPA 2 TABLET: 25; 100 TABLET ORAL at 09:04

## 2024-10-18 RX ADMIN — CARBIDOPA AND LEVODOPA 1 TABLET: 25; 100 TABLET, EXTENDED RELEASE ORAL at 21:59

## 2024-10-18 RX ADMIN — GUAIFENESIN 600 MG: 600 TABLET, EXTENDED RELEASE ORAL at 09:04

## 2024-10-18 RX ADMIN — ATORVASTATIN CALCIUM 40 MG: 40 TABLET, FILM COATED ORAL at 09:04

## 2024-10-18 RX ADMIN — GABAPENTIN 100 MG: 100 CAPSULE ORAL at 21:58

## 2024-10-18 RX ADMIN — Medication 10 ML: at 09:07

## 2024-10-18 RX ADMIN — PANTOPRAZOLE SODIUM 40 MG: 40 TABLET, DELAYED RELEASE ORAL at 12:11

## 2024-10-18 RX ADMIN — PRAMIPEXOLE DIHYDROCHLORIDE 1 MG: 1 TABLET ORAL at 09:04

## 2024-10-18 RX ADMIN — CARBIDOPA AND LEVODOPA 2 TABLET: 25; 100 TABLET ORAL at 17:23

## 2024-10-18 RX ADMIN — Medication 10 ML: at 21:59

## 2024-10-18 RX ADMIN — CARBIDOPA AND LEVODOPA 1 TABLET: 25; 100 TABLET, EXTENDED RELEASE ORAL at 09:04

## 2024-10-18 RX ADMIN — PIPERACILLIN AND TAZOBACTAM 3.38 G: 3; .375 INJECTION, POWDER, LYOPHILIZED, FOR SOLUTION INTRAVENOUS at 05:32

## 2024-10-18 RX ADMIN — NYSTATIN: 100000 POWDER TOPICAL at 09:08

## 2024-10-18 RX ADMIN — NYSTATIN: 100000 POWDER TOPICAL at 21:59

## 2024-10-18 RX ADMIN — GABAPENTIN 100 MG: 100 CAPSULE ORAL at 09:04

## 2024-10-18 RX ADMIN — PRAMIPEXOLE DIHYDROCHLORIDE 1 MG: 1 TABLET ORAL at 16:06

## 2024-10-18 NOTE — CASE MANAGEMENT/SOCIAL WORK
Case Management Discharge Note      Final Note: Discharge plan is to [facility](short term rehab) Saturday. CM will fax discharge summary to [facility] at [phone](per Ina request). Pt's nurse can call report to [facility] at [phone] and send a copy of the discharge summary with pt. Any controlled meds will be escribed to the facility's pharmacy.  Transportation arranged with [facility] w/c transport, Sat(10/19/2024) at 1300. [facility] will pick pt up from room and provide w/c for transport. I confirmed with Ina at [facility] that the facility can administer pt's IV antibiotics through peripheral IV. Per facility's request, pt will need to be discharged with IV. Pt is agreeable to discharge plan  IMM letter discussed       Selected Continued Care - Admitted Since 10/8/2024       Destination Coordination complete.      Service Provider Services Address Phone Fax Patient Preferred    [facility] [address], [place] [id number] [phone] --              Durable Medical Equipment    No services have been selected for the patient.                Dialysis/Infusion    No services have been selected for the patient.                Home Medical Care    No services have been selected for the patient.                Therapy    No services have been selected for the patient.                Community Resources    No services have been selected for the patient.                Community & DME    No services have been selected for the patient.                    Transportation Services  Other: Other ([facility])    Final Discharge Disposition Code: 03 - skilled nursing facility (SNF)

## 2024-10-18 NOTE — PROGRESS NOTES
[facility] Medicine Services  PROGRESS NOTE    Patient Name: Cordell Martin  : 1945  MRN: [id number]    Date of Admission: 10/8/2024  Primary Care Physician: Ben Holder DO    Subjective   Subjective     CC:  F/u cellulitis    HPI:   Pt sitting up in bed eating breakfast. Cr continues to trend upward. Will add gentle fluid resuscitation today.  Encourage water intake.  Patient is having bowel movements without any issues.    Copied text in this note has been reviewed and is accurate as of 10/18/24.         Objective   Objective     Vital Signs:   Temp:  [96.4 °F (35.8 °C)-97.6 °F (36.4 °C)] 97.5 °F (36.4 °C)  Heart Rate:  [61-76] 65  Resp:  [16] 16  BP: (102-125)/(52-67) 119/61     Physical Exam:   Constitutional: Awake, alert, NAD  HENT: NCAT, mucous membranes moist  Respiratory: Clear to auscultation bilaterally, nonlabored  Cardiovascular: RRR, no murmurs, rubs, or gallops  Gastrointestinal: Positive bowel sounds, soft, nontender, nondistended  Musculoskeletal: BLE wrapped  Psychiatric: Flat affect, cooperative  Neurologic: Oriented x 3, MARIE, speech clear  Skin: No rashes        Results Reviewed:  LAB RESULTS:      Lab 10/17/24  0452 10/14/24  0357 10/12/24  0358   WBC 8.51 6.89 4.75   HEMOGLOBIN 10.7* 10.8* 10.9*   HEMATOCRIT 33.7* 35.0* 34.8*   PLATELETS 214 266 253   MCV 92.1 93.8 93.3         Lab 10/18/24  0539 10/17/24  0452 10/14/24  0357 10/12/24  0358   SODIUM 142 141 139 138   POTASSIUM 4.3 4.2 4.1 4.0   CHLORIDE 106 106 106 105   CO2 28.0 26.0 27.0 23.0   ANION GAP 8.0 9.0 6.0 10.0   BUN 21 24* 20 18   CREATININE 1.48* 1.30* 1.09 1.26   EGFR 48.4* 56.6* 69.9 58.7*   GLUCOSE 97 94 129* 143*   CALCIUM 8.9 8.5* 8.6 8.6                                 Brief Urine Lab Results  (Last result in the past 365 days)        Color   Clarity   Blood   Leuk Est   Nitrite   Protein   CREAT   Urine HCG        10/08/24 2219 Yellow   Clear   Negative   Negative    Negative   Negative                   Microbiology Results Abnormal       Procedure Component Value - Date/Time    Blood Culture - Blood, Hand, Left [[id number]]  (Normal) Collected: 10/08/24 2120    Lab Status: Final result Specimen: Blood from Hand, Left Updated: 10/13/24 2146     Blood Culture No growth at 5 days    Narrative:      Less than seven (7) mL's of blood was collected.  Insufficient quantity may yield false negative results.    Blood Culture - Blood, Hand, Right [[id number]]  (Normal) Collected: 10/08/24 2050    Lab Status: Final result Specimen: Blood from Hand, Right Updated: 10/13/24 2116     Blood Culture No growth at 5 days    Parasite Identification - Parasite, Foot, Right [[id number]] Collected: 10/09/24 1358    Lab Status: Final result Specimen: Parasite from Foot, Right Updated: 10/09/24 1407     Macroscopic Exam Maggots    Respiratory Panel PCR w/COVID-19(SARS-CoV-2) KENJI/ASH/SHIRLEY/PAD/COR/CORNELIUS In-House, NP Swab in UTM/VTM, 2 HR TAT - Swab, Nasopharynx [[id number]]  (Normal) Collected: 10/09/24 0548    Lab Status: Final result Specimen: Swab from Nasopharynx Updated: 10/09/24 0643     ADENOVIRUS, PCR Not Detected     Coronavirus 229E Not Detected     Coronavirus HKU1 Not Detected     Coronavirus NL63 Not Detected     Coronavirus OC43 Not Detected     COVID19 Not Detected     Human Metapneumovirus Not Detected     Human Rhinovirus/Enterovirus Not Detected     Influenza A PCR Not Detected     Influenza B PCR Not Detected     Parainfluenza Virus 1 Not Detected     Parainfluenza Virus 2 Not Detected     Parainfluenza Virus 3 Not Detected     Parainfluenza Virus 4 Not Detected     RSV, PCR Not Detected     Bordetella pertussis pcr Not Detected     Bordetella parapertussis PCR Not Detected     Chlamydophila pneumoniae PCR Not Detected     Mycoplasma pneumo by PCR Not Detected    Narrative:      In the setting of a positive respiratory panel with a viral infection PLUS a negative procalcitonin without other  underlying concern for bacterial infection, consider observing off antibiotics or discontinuation of antibiotics and continue supportive care. If the respiratory panel is positive for atypical bacterial infection (Bordetella pertussis, Chlamydophila pneumoniae, or Mycoplasma pneumoniae), consider antibiotic de-escalation to target atypical bacterial infection.    COVID PRE-OP / PRE-PROCEDURE SCREENING ORDER (NO ISOLATION) - Swab, Nasopharynx [[id number]]  (Normal) Collected: 10/08/24 2126    Lab Status: Final result Specimen: Swab from Nasopharynx Updated: 10/08/24 2220    Narrative:      The following orders were created for panel order COVID PRE-OP / PRE-PROCEDURE SCREENING ORDER (NO ISOLATION) - Swab, Nasopharynx.  Procedure                               Abnormality         Status                     ---------                               -----------         ------                     COVID-19, FLU A/B, RSV P...[[id number]]  Normal              Final result                 Please view results for these tests on the individual orders.    COVID-19, FLU A/B, RSV PCR 1 HR TAT - Swab, Nasopharynx [[id number]]  (Normal) Collected: 10/08/24 2126    Lab Status: Final result Specimen: Swab from Nasopharynx Updated: 10/08/24 2220     COVID19 Not Detected     Influenza A PCR Not Detected     Influenza B PCR Not Detected     RSV, PCR Not Detected    Narrative:      Fact sheet for providers: https://www.fda.gov/media/730177/download    Fact sheet for patients: https://www.fda.gov/media/484423/download    Test performed by PCR.            No radiology results from the last 24 hrs    Results for orders placed during the hospital encounter of 01/12/21    Adult Transthoracic Echo Complete W/ Cont if Necessary Per Protocol    Interpretation Summary  · The right ventricle and right atrium are moderately dilated. Other chamber sizes and wall thicknesses are normal.  · Global and segmental LV wall motion is normal. The estimated left  "ventricular ejection fraction is 51% - 55%.  · The aortic and mitral valves are normal in structure and function.  · The estimated RV systolic pressure is mildly elevated 35 mmHg - 40 mmHg. There is as well noted to be less than 50% inspiratory IVC collapse. The main pulmonary artery is \"borderline dilated\".  · There are no other important findings on this study.      Current medications:  Scheduled Meds:atorvastatin, 40 mg, Oral, Daily  carbidopa-levodopa, 2 tablet, Oral, 4x Daily  carbidopa-levodopa ER, 1 tablet, Oral, BID  fluticasone, 2 spray, Each Nare, Daily  furosemide, 20 mg, Oral, Daily  gabapentin, 100 mg, Oral, Q12H  guaiFENesin, 600 mg, Oral, Q12H  heparin (porcine), 5,000 Units, Subcutaneous, Q8H  levothyroxine, 88 mcg, Oral, Q AM  nystatin, , Topical, Q12H  pantoprazole, 40 mg, Oral, Daily  piperacillin-tazobactam, 3.375 g, Intravenous, Q8H  pramipexole, 1 mg, Oral, TID  QUEtiapine, 12.5 mg, Oral, Nightly  senna-docusate sodium, 2 tablet, Oral, BID  sodium chloride, 10 mL, Intravenous, Q12H  tamsulosin, 0.4 mg, Oral, Daily      Continuous Infusions:sodium chloride, 50 mL/hr      PRN Meds:.  senna-docusate sodium **AND** polyethylene glycol **AND** bisacodyl **AND** bisacodyl    HYDROcodone-acetaminophen    HYDROmorphone    Magnesium Standard Dose Replacement - Follow Nurse / BPA Driven Protocol    Potassium Replacement - Follow Nurse / BPA Driven Protocol    sodium chloride    sodium chloride    Assessment & Plan   Assessment & Plan     Active Hospital Problems    Diagnosis  POA    **Cellulitis [L03.90]  Yes    History of DVT (deep vein thrombosis) [Z86.718]  Not Applicable    Anemia, chronic disease [D63.8]  Yes    Elevated TSH [R79.89]  Yes    GERD [K21.9]  Yes    Chronic cough [R05.3]  Yes    Hypothyroidism (acquired) [E03.9]  Yes    Chronic edema [R60.9]  Yes    Parkinson's disease [G20.A1]  Yes    Anxiety [F41.9]  Yes    Hyperlipidemia [E78.5]  Yes    Depression [F32.A]  Yes      Resolved Hospital " Problems   No resolved problems to display.        Brief Hospital Course to date:  Cordell Martin is a 79 y.o. male with history of Parkinson's, history of DVT, HLD, hypothyroidism, GERD, lymphedema, anxiety/depression, with wheelchair use/walker use at [facility] who presented with cellulitis, BLE wounds. Ortho and ID consulted.     This patient's problems and plans were partially entered by my partner and updated as appropriate by me 10/18/24.    Copied text in this note has been reviewed and is accurate as of 10/18/24.         B LE wounds  Edema  Cellulitis  - Maggots noted, R second toe markedly abnormal, wound between first/second toe with drainage after jamming toe into wheelchair  - Duplex with chronic DVT noted  - MRI foot and tib/fib with no evidence of osteo   - Wound culture with pseudomonas   - Evaluated by ortho, conservative management and monitoring  - ID consulted. Patient initially on dapto/zosyn. Dapto discontinued 10/12 after culture resulted with pseudomonas. Continue Zosyn, likely 14 days from admission per ID. ID can follow at [facility].   - ABIs unable to be assessed d/t vessel incompressibility   - Vascular consult. Known to Dr. Connor. No urgent vascular intervention at this time.  May require revascularization as outpatient.  - WOC following  - AM CBC, BMP     Constipation-resolved    Cough, improved  Possible LLL PNA on CXR, not as impressive on CT  -pulmonary toilet  -respiratory PCR negative  -speech consulted and s/p FEES with regular/thins      Chronic anemia  -monitor  -AM CBC     Parkinson's disease  Anxiety/depression  -continue sinemet, seroquel, mirapex     Hypothyroidism  -on synthorid, with elevated TSH 15.85 and normal T4  -continue levothyroxine 88 mcg daily and follow up with PCP     HLD  -statin     GERD  -PPI    Expected Discharge Location and Transportation: [facility]  Expected Discharge   10/20/2024    VTE Prophylaxis:  Pharmacologic VTE prophylaxis orders are present.          AM-PAC 6 Clicks Score (PT): 13 (10/17/24 2126)    CODE STATUS:   Code Status and Medical Interventions: No CPR (Do Not Attempt to Resuscitate); Limited Support; No intubation (DNI); DNR/DNI   Ordered at: 10/09/24 0206     Medical Intervention Limits:    No intubation (DNI)     Level Of Support Discussed With:    Patient     Code Status (Patient has no pulse and is not breathing):    No CPR (Do Not Attempt to Resuscitate)     Medical Interventions (Patient has pulse or is breathing):    Limited Support     Comments:    DNR/DNI       ALEXUS Nair  10/18/24

## 2024-10-18 NOTE — PLAN OF CARE
Goal Outcome Evaluation:  Plan of Care Reviewed With: patient        Progress: improving  Outcome Evaluation: Pt is A&O with VSS, NSR on the monitor, and on RA. His lowest temp was 96.4 last night. He slept well last night. There are no complaints at this time.

## 2024-10-18 NOTE — PROGRESS NOTES
"Cordell Martin  1945  [id number]    Evaluating Physician: Trevor Jorgensen MD    Chief Complaint: right leg red and painful    Reason for Consultation: RLE infection    History of present illness:     Patient is a 79 y.o.  Yr old male prior patient of Dr. Benites, with history of Parkinson's disease and chronically debilitated, uses a wheelchair primarily although apparently has occasionally used a walker.  He has other extensive comorbidities as below.  He had reported trauma to the right second toe having jammed it into a wall while in his wheelchair several weeks prior to admission.  He had developed increasing redness/swelling and discoloration of the right second toe and foot/lower leg over that period of time.  He does have bilateral lower leg discoloration and swelling but the right side is out of proportion of the left side.  He was admitted on October 8 with concern for lower extremity cellulitis; subsequently noted to have maggots at the right second toe    Aside from the trauma in the wheelchair, he denied any other specific exposures.    10/18/24 seen early and sleepy; room air; Cx  PSA, skin derrell / \"mixed\" GNR  ; Right lower leg/foot pain out of proportion to left, constant, nonradiating, worse with palpation, generally better with pain meds  , 2-3 out of 10 at present. Redness less since admit    No headache photophobia or neck stiffness.  He has chronic intermittent cough and reports this is at baseline, currently on room air with no hemoptysis.  No nausea vomiting diarrhea or abdominal pain.  No dysuria hematuria or pyuria.  No other specific rash    Past Medical History:   Diagnosis Date    Anxiety     Arthritis     Back pain     Bronchitis     Cognitive impairment     Depression     Disease of thyroid gland     Glucose intolerance (impaired glucose tolerance)     Hyperlipidemia     Kidney stone     Obesity     Parkinson disease     Pneumonia     Prostate disorder     Thyroid disease     " Wears eyeglasses        Past Surgical History:   Procedure Laterality Date    COLONOSCOPY      5 years ago    EYE SURGERY      HERNIA REPAIR  10/20/2020    KNEE SURGERY      LUMBAR LAMINECTOMY DISCECTOMY DECOMPRESSION N/A 07/13/2017    Procedure: LUMBAR LAMINECTOMY L4-5;  Surgeon: Antonio Trent MD;  Location: [facility];  Service:     SHOULDER ROTATOR CUFF REPAIR Right     x2    TONSILLECTOMY      VEIN SURGERY         Pediatric History   Patient Parents    Not on file     Other Topics Concern    Not on file   Social History Narrative    Lives alone. Uses walker    Has not smoked since about 1980       family history includes Alzheimer's disease in an other family member; Cancer in his father and another family member; Diabetes in an other family member; Heart disease in an other family member; Stroke in an other family member.    No Known Allergies    Medication:  Current Facility-Administered Medications   Medication Dose Route Frequency Provider Last Rate Last Admin    atorvastatin (LIPITOR) tablet 40 mg  40 mg Oral Daily Angely Mukherjee DO   40 mg at 10/17/24 0825    sennosides-docusate (PERICOLACE) 8.6-50 MG per tablet 2 tablet  2 tablet Oral BID Radha Lyons APRN   2 tablet at 10/17/24 0825    And    polyethylene glycol (MIRALAX) packet 17 g  17 g Oral Daily PRN Radha Lyons APRN   17 g at 10/16/24 1822    And    bisacodyl (DULCOLAX) EC tablet 5 mg  5 mg Oral Daily PRN Radha Lyons APRN        And    bisacodyl (DULCOLAX) suppository 10 mg  10 mg Rectal Daily PRN Radha Lyons APRN        carbidopa-levodopa (SINEMET)  MG per tablet 2 tablet  2 tablet Oral 4x Daily Daya Ospina APRN   2 tablet at 10/17/24 2125    carbidopa-levodopa ER (SINEMET CR)  MG per tablet 1 tablet  1 tablet Oral BID Daya Ospina APRN   1 tablet at 10/17/24 2126    fluticasone (FLONASE) 50 MCG/ACT nasal spray 2 spray  2 spray Each Nare Daily Daya Ospina APRN   2 spray at 10/17/24 0826     furosemide (LASIX) tablet 20 mg  20 mg Oral Daily Daya Ospina APRN   20 mg at 10/16/24 0857    gabapentin (NEURONTIN) capsule 100 mg  100 mg Oral Q12H Fabian Lopez MD   100 mg at 10/17/24 2125    guaiFENesin (MUCINEX) 12 hr tablet 600 mg  600 mg Oral Q12H Fabian Lopez MD   600 mg at 10/17/24 2125    heparin (porcine) 5000 UNIT/ML injection 5,000 Units  5,000 Units Subcutaneous Q8H Fabian Lopez MD   5,000 Units at 10/18/24 0530    HYDROcodone-acetaminophen (NORCO) 5-325 MG per tablet 1 tablet  1 tablet Oral Q6H PRN Angely Mukherjee DO   1 tablet at 10/17/24 1215    HYDROmorphone (DILAUDID) injection 0.25 mg  0.25 mg Intravenous Q2H PRN Angely Mukherjee DO        levothyroxine (SYNTHROID, LEVOTHROID) tablet 88 mcg  88 mcg Oral Q AM Daya Ospina APRN   88 mcg at 10/18/24 0530    Magnesium Standard Dose Replacement - Follow Nurse / BPA Driven Protocol   Does not apply PRN Daya Ospina APRN        nystatin (MYCOSTATIN) powder   Topical Q12H Radha Lyons APRN   Given at 10/17/24 2126    pantoprazole (PROTONIX) EC tablet 40 mg  40 mg Oral Daily Daya Ospina APRN   40 mg at 10/17/24 1206    piperacillin-tazobactam (ZOSYN) 3.375 g IVPB in 100 mL NS MBP (CD)  3.375 g Intravenous Q8H Trevor Jorgensen MD   3.375 g at 10/18/24 0532    Potassium Replacement - Follow Nurse / BPA Driven Protocol   Does not apply PRN Daya Ospina APRN        pramipexole (MIRAPEX) tablet 1 mg  1 mg Oral TID Daya Ospina APRN   1 mg at 10/17/24 2125    QUEtiapine (SEROquel) tablet 12.5 mg  12.5 mg Oral Nightly Daya Ospina APRN   12.5 mg at 10/17/24 2125    sodium chloride 0.9 % flush 10 mL  10 mL Intravenous PRN Yuri Kessler MD        sodium chloride 0.9 % flush 10 mL  10 mL Intravenous Q12H Daya Ospina APRN   10 mL at 10/17/24 2126    sodium chloride 0.9 % flush 10 mL  10 mL Intravenous PRN Daya Ospina APRN        tamsulosin (FLOMAX) 24 hr capsule 0.4 mg  0.4 mg Oral Daily Bhavesh  ALEXUS Stapleton   0.4 mg at 10/17/24 0825       Antibiotics:  Anti-Infectives (From admission, onward)      Ordered     Dose/Rate Route Frequency Start Stop    10/09/24 0735  piperacillin-tazobactam (ZOSYN) 3.375 g IVPB in 100 mL NS MBP (CD)        Ordering Provider: Trevor Jorgensen MD    3.375 g  over 4 Hours Intravenous Every 8 Hours 10/09/24 1430 10/19/24 1429    10/09/24 0735  piperacillin-tazobactam (ZOSYN) 3.375 g IVPB in 100 mL NS MBP (CD)        Ordering Provider: Fabian Lopez MD    3.375 g  over 30 Minutes Intravenous Once 10/09/24 0830 10/09/24 0840    10/08/24 2008  vancomycin 2250 mg/500 mL 0.9% NS IVPB ([facility])        Ordering Provider: Yuri Kessler MD    20 mg/kg × 118 kg  over 135 Minutes Intravenous Once 10/08/24 2100 10/09/24 0346    10/08/24 2024  ceFAZolin 2000 mg IVPB in 100 mL NS (MBP)        Ordering Provider: Yuri Kessler MD    2,000 mg  over 30 Minutes Intravenous Once 10/08/24 2040 10/08/24 2203    10/08/24 2024  metroNIDAZOLE (FLAGYL) tablet 500 mg        Ordering Provider: Yuri Kessler MD    500 mg Oral Once 10/08/24 2040 10/08/24 2131              Review of Systems    10/18/24 per staff also    Constitutional-- No Fever, chills or sweats.  Appetite good, and no malaise. No fatigue.  Heent-- No new vision, hearing or throat complaints.  No epistaxis or oral sores.  Denies odynophagia or dysphagia.  No flashers, floaters or eye pain. No odynophagia or dysphagia. No headache, photophobia or neck stiffness.  CV-- No chest pain, palpitation or syncope  Resp--see above, no shortness of breath at present  GI- No nausea, vomiting, or diarrhea.  No hematochezia, melena, or hematemesis. Denies jaundice or chronic liver disease.  -- No dysuria, hematuria, or flank pain.  Denies hesitancy, urgency or flank pain.  Lymph- no swollen lymph nodes in neck/axilla or groin.   Heme- No active bruising or bleeding; no Hx of DVT or PE.  MS-- no swelling or pain in the bones or  "joints of arms/legs.  No new back pain.  Neuro-- No acute focal weakness or numbness in the arms or legs.  No seizures.  Chronic Parkinson's and chronically debilitated    Full 12 point review of systems reviewed and negative otherwise for acute complaints, except for above    Physical Exam:   Vital Signs   /58 (BP Location: Left arm, Patient Position: Lying)   Pulse 65   Temp 97.4 °F (36.3 °C) (Oral)   Resp 16   Ht 182.9 cm (72.01\")   Wt 117 kg (257 lb 11.2 oz)   SpO2 95%   BMI 34.94 kg/m²     GENERAL: sleepy, in no acute distress.    HEENT: Normocephalic, atraumatic.    No conjunctival injection. No icterus. Oropharynx clear without evidence of thrush or exudate. No evidence of periodontal disease.    NECK: Supple without nuchal rigidity. No mass.   HEART: RRR; No murmur, rubs, gallops.   LUNGS: Diminished at bases but otherwise  clear to auscultation bilaterally without wheezing, rales, rhonchi. Normal respiratory effort. Nonlabored. No dullness.  ABDOMEN: Soft, nontender, nondistended. Positive bowel sounds. No rebound or guarding. NO mass or HSM.  EXT: See below   MSK: FROM without joint effusions noted arms/legs.    SKIN: Warm and dry without cutaneous eruptions on Inspection/palpation.    NEURO: sleepy.       Bilateral lower legs appeared edematous and discolored with vague scale.   right side between knee and foot has warmth/tenderness and erythema   improving/less intense.  Right side second toe is  discolored with vague edema/erythema and tenderness out of proportion to other toes, crusted wound and unable to determine any depth with no definitive probe to bone tendon joint or ligament at present; no discrete mass bulge or fluctuance.  No crepitus or bulla    Laboratory Data    Results from last 7 days   Lab Units 10/17/24  0452 10/14/24  0357 10/12/24  0358   WBC 10*3/mm3 8.51 6.89 4.75   HEMOGLOBIN g/dL 10.7* 10.8* 10.9*   HEMATOCRIT % 33.7* 35.0* 34.8*   PLATELETS 10*3/mm3 214 266 253 "     Results from last 7 days   Lab Units 10/17/24  0452   SODIUM mmol/L 141   POTASSIUM mmol/L 4.2   CHLORIDE mmol/L 106   CO2 mmol/L 26.0   BUN mg/dL 24*   CREATININE mg/dL 1.30*   GLUCOSE mg/dL 94   CALCIUM mg/dL 8.5*                         Estimated Creatinine Clearance: 62.9 mL/min (A) (by C-G formula based on SCr of 1.3 mg/dL (H)).      Microbiology:      Radiology:  Imaging Results (Last 72 Hours)       ** No results found for the last 72 hours. **              Impression:     --Acute right lower leg/foot and second toe infection/cellulitis out of proportion to his chronic discoloration by his report, trauma to the right second toe several weeks preceding admission as above.  Further complicated by his chronic comorbidity, wound at the second toe with maggots noted earlier in stay, with ongoing risk for further serious morbidity and other serious sequela; high risk for persistent/recurrent or nonhealing wounds, persistent/progressive or recurrent infection and risk for further functional/limb loss, risk for amputation etc.   MRI no osteomyelitis per radiology.     --Chronic/intermittent cough at presentation, CT scan without acute infiltrate per radiology and some atelectasis noted by them.  Encouraged incentive spirometry.  No productive cough at present.  Respiratory PCR negative.  Further pulmonary workup or referral at discretion of medicine team; pneumonia less likely at present but certainly could evolve; monitor for new process    --Hx trauma and maggots    --Parkinson's disease, chronically debilitated and primarily wheelchair-bound by his report.    --Prior history of DVT    PLAN:      -- IV  Zosyn x 5 more days; .  Case management looking into options        -- Daptomycin stopped as no MDR GPC in culture so far.  If worse with this change or stronger evidence for MDR GPC as pathogen, then consideration could be given to adding back versus other adjustments    -- Check/review labs cultures and  scans    -- Partial history per nursing staff    -- Discussed with microbiology    -- Highly complex set of issues with high risk for further serious morbidity and other serious sequela    **Zosyn 3.375 g IV every 6 hours at his facility for 5 days, follow-up with me in 1 week, labs on Monday with CBC/CMP     I am out of town until Sunday.  Call my partners if needed sooner    Copied text in this note has been reviewed and is accurate as of 10/18/24.        Trevor Jorgensen MD  10/18/2024

## 2024-10-19 VITALS
SYSTOLIC BLOOD PRESSURE: 115 MMHG | WEIGHT: 239.8 LBS | BODY MASS INDEX: 32.48 KG/M2 | HEIGHT: 72 IN | OXYGEN SATURATION: 93 % | DIASTOLIC BLOOD PRESSURE: 49 MMHG | HEART RATE: 60 BPM | RESPIRATION RATE: 16 BRPM | TEMPERATURE: 97.3 F

## 2024-10-19 LAB
ANION GAP SERPL CALCULATED.3IONS-SCNC: 7 MMOL/L (ref 5–15)
BUN SERPL-MCNC: 21 MG/DL (ref 8–23)
BUN/CREAT SERPL: 17.6 (ref 7–25)
CALCIUM SPEC-SCNC: 8.9 MG/DL (ref 8.6–10.5)
CHLORIDE SERPL-SCNC: 106 MMOL/L (ref 98–107)
CO2 SERPL-SCNC: 28 MMOL/L (ref 22–29)
CREAT SERPL-MCNC: 1.19 MG/DL (ref 0.76–1.27)
EGFRCR SERPLBLD CKD-EPI 2021: 62.9 ML/MIN/1.73
GLUCOSE SERPL-MCNC: 110 MG/DL (ref 65–99)
POTASSIUM SERPL-SCNC: 4.4 MMOL/L (ref 3.5–5.2)
SODIUM SERPL-SCNC: 141 MMOL/L (ref 136–145)

## 2024-10-19 PROCEDURE — 99239 HOSP IP/OBS DSCHRG MGMT >30: CPT | Performed by: NURSE PRACTITIONER

## 2024-10-19 PROCEDURE — 80048 BASIC METABOLIC PNL TOTAL CA: CPT | Performed by: NURSE PRACTITIONER

## 2024-10-19 PROCEDURE — 25010000002 HEPARIN (PORCINE) PER 1000 UNITS: Performed by: HOSPITALIST

## 2024-10-19 PROCEDURE — 25010000002 PIPERACILLIN SOD-TAZOBACTAM PER 1 G: Performed by: INTERNAL MEDICINE

## 2024-10-19 RX ORDER — NYSTATIN 100000 [USP'U]/G
POWDER TOPICAL EVERY 12 HOURS SCHEDULED
Qty: 60 G | Refills: 0 | Status: SHIPPED | OUTPATIENT
Start: 2024-10-19

## 2024-10-19 RX ORDER — GUAIFENESIN 600 MG/1
600 TABLET, EXTENDED RELEASE ORAL EVERY 12 HOURS SCHEDULED
Qty: 30 TABLET | Refills: 0 | Status: SHIPPED | OUTPATIENT
Start: 2024-10-19

## 2024-10-19 RX ORDER — GABAPENTIN 100 MG/1
100 CAPSULE ORAL EVERY 12 HOURS SCHEDULED
Qty: 6 CAPSULE | Refills: 0 | Status: SHIPPED | OUTPATIENT
Start: 2024-10-19

## 2024-10-19 RX ADMIN — TAMSULOSIN HYDROCHLORIDE 0.4 MG: 0.4 CAPSULE ORAL at 09:01

## 2024-10-19 RX ADMIN — LEVOTHYROXINE SODIUM 88 MCG: 0.09 TABLET ORAL at 06:20

## 2024-10-19 RX ADMIN — CARBIDOPA AND LEVODOPA 2 TABLET: 25; 100 TABLET ORAL at 12:12

## 2024-10-19 RX ADMIN — HYDROCODONE BITARTRATE AND ACETAMINOPHEN 1 TABLET: 5; 325 TABLET ORAL at 09:34

## 2024-10-19 RX ADMIN — HEPARIN SODIUM 5000 UNITS: 5000 INJECTION INTRAVENOUS; SUBCUTANEOUS at 06:21

## 2024-10-19 RX ADMIN — CARBIDOPA AND LEVODOPA 1 TABLET: 25; 100 TABLET, EXTENDED RELEASE ORAL at 09:01

## 2024-10-19 RX ADMIN — PIPERACILLIN AND TAZOBACTAM 3.38 G: 3; .375 INJECTION, POWDER, LYOPHILIZED, FOR SOLUTION INTRAVENOUS at 06:21

## 2024-10-19 RX ADMIN — PANTOPRAZOLE SODIUM 40 MG: 40 TABLET, DELAYED RELEASE ORAL at 12:12

## 2024-10-19 RX ADMIN — GABAPENTIN 100 MG: 100 CAPSULE ORAL at 09:01

## 2024-10-19 RX ADMIN — PRAMIPEXOLE DIHYDROCHLORIDE 1 MG: 1 TABLET ORAL at 09:02

## 2024-10-19 RX ADMIN — NYSTATIN: 100000 POWDER TOPICAL at 09:02

## 2024-10-19 RX ADMIN — CARBIDOPA AND LEVODOPA 2 TABLET: 25; 100 TABLET ORAL at 09:01

## 2024-10-19 RX ADMIN — ATORVASTATIN CALCIUM 40 MG: 40 TABLET, FILM COATED ORAL at 09:01

## 2024-10-19 RX ADMIN — FUROSEMIDE 20 MG: 20 TABLET ORAL at 09:00

## 2024-10-19 RX ADMIN — FLUTICASONE PROPIONATE 2 SPRAY: 50 SPRAY, METERED NASAL at 09:02

## 2024-10-19 RX ADMIN — GUAIFENESIN 600 MG: 600 TABLET, EXTENDED RELEASE ORAL at 09:01

## 2024-10-19 NOTE — DISCHARGE PLACEMENT REQUEST
"Otilia Martin (79 y.o. Male) Discharge summary...Call Imelda Webster RN  at [phone] with questions       Date of Birth   1945    Social Security Number       Address   [address] [place] [id number]    Home Phone   [phone]    MRN   [id number]       Christian   Adventism    Marital Status                               Admission Date   10/8/24    Admission Type   Emergency    Admitting Provider   Harpreet Asencio MD    Attending Provider   Harpreet Asencio MD    Department, Room/Bed   [facility] 6A, N611/1       Discharge Date       Discharge Disposition   Rehab Facility or Unit (DC - External)    Discharge Destination                                 Attending Provider: Harpreet Asencio MD    Allergies: No Known Allergies    Isolation: Contact   Infection: MRSA (01/14/21), Other (10/09/24)   Code Status: No CPR    Ht: 182.9 cm (72.01\")   Wt: 109 kg (239 lb 12.8 oz)    Admission Cmt: None   Principal Problem: Cellulitis [L03.90]                   Active Insurance as of 10/8/2024       Primary Coverage       Payor Plan Insurance Group Employer/Plan Group    MEDICARE MEDICARE A & B        Payor Plan Address Payor Plan Phone Number Payor Plan Fax Number Effective Dates    [address] [phone]  1/1/2012 - None Entered    [facility] [id number]         Subscriber Name Subscriber Birth Date Member ID       OTILIA MARTIN 1945 [id number]               Secondary Coverage       Payor Plan Insurance Group Employer/Plan Group    [facility] SUPP [id number]       Payor Plan Address Payor Plan Phone Number Payor Plan Fax Number Effective Dates    [address]   12/1/2016 - None Entered    [facility] [id number]         Subscriber Name Subscriber Birth Date Member ID       OTILIA MARTIN 1945 [id number]                     Emergency Contacts        (Rel.) Home Phone Work Phone Mobile Phone    GEORGIANA PICKERING (Sister) [phone] -- " [phone]    CESAR SULTANA (Sister) [phone] -- [phone]                 Discharge Summary        Kasey Rodriguez APRN at 10/19/24 0937              [facility] Medicine Services  DISCHARGE SUMMARY    Patient Name: Cordell Martin  : 1945  MRN: [id number]    Date of Admission: 10/8/2024  Date of Discharge:  10/19/2024  Primary Care Physician: Ben Holder DO    Consults       Date and Time Order Name Status Description    10/14/2024  9:02 AM Inpatient Vascular Surgery Consult Completed     10/9/2024  9:19 AM Inpatient Orthopedic Surgery Consult Completed     10/9/2024  9:19 AM Inpatient Infectious Diseases Consult Completed             Hospital Course     Presenting Problem:   Cellulitis [L03.90]    Active Hospital Problems    Diagnosis  POA    **Cellulitis [L03.90]  Yes    History of DVT (deep vein thrombosis) [Z86.718]  Not Applicable    Anemia, chronic disease [D63.8]  Yes    Elevated TSH [R79.89]  Yes    GERD [K21.9]  Yes    Chronic cough [R05.3]  Yes    Hypothyroidism (acquired) [E03.9]  Yes    Chronic edema [R60.9]  Yes    Parkinson's disease [G20.A1]  Yes    Anxiety [F41.9]  Yes    Hyperlipidemia [E78.5]  Yes    Depression [F32.A]  Yes      Resolved Hospital Problems   No resolved problems to display.          Hospital Course:  Cordell Martin is a 79 y.o. male PMH Parkinson's, history of DVT, HLD, hypothyroidism, GERD, lymphedema, anxiety, depression, wheelchair use/walker use at baseline who presented with cellulitis bilateral lower extremities and wounds.  Ortho, ID, vascular surgery consulted.  Patient noted with a wound between the first and second toe on the right foot the patient reports he jammed into the wheelchair.  Also duplex showed chronic DVT.  MRI right foot, tip/fib with no evidence of osteomyelitis.  Cultures positive for Pseudomonas.  Ortho recommended conservative management and monitoring.  ID recommended Dapto/Zosyn initially but  daptomycin was discontinued on 10/12 after culture positive for Pseudomonas.  Zosyn continued until 10/24/2024.  Vascular surgery consulted recommending outpatient RLE revascularization.    Assessment/Plan  BLE wounds  Edema  Cellulitis  - Maggots noted, right second toe marked abnormal, wound between the first and second toe with drainage after he jammed the toe into his wheelchair  - Duplex with chronic DVT noted  - MRI foot and tib-fib with no evidence of osteo-  -Wound culture with Pseudomonas  - Evaluated by Ortho recommend conservative management and monitoring  - ID consulted patient initially started on Dapto/Zosyn with Dapto discontinued after culture positive for Pseudomonas.  Zosyn continued likely 14 days from admission per ID to end approximately 10/24/2024.    Constipation-resolved    Cough-improved  Possible LLL PNA on CXR not as impressive on CT  - Pulmonary toileting  - Respiratory PCR negative  - Speech consulted, s/p F EES with regular/thins    Chronic anemia  - Hemoglobin stable    Parkinson's disease  Anxiety and depression  - Sinemet Seroquel Mirapex    Hypothyroidism  - Synthroid with elevated TSH 15.85 with normal free T4  - Continue levothyroxine 88 mcg daily follow-up with PCP for repeat TSH in 4 to 6 weeks    HLD  - Statin    GERD  - PPI      Discharge Follow Up Recommendations for labs/diagnostics:  Follow-up with PCP in 1 week after discharge from rehab-TSH elevated at 15.85 with normal free T4.  Repeat TSH in 4 to 6 weeks  Follow-up with Dr. Jorgensen in 1 week-draw a CBC, CMP and fax to [facility] on Monday, 10/21/2024  Follow-up with  for RLE revascularization in 1 month    Day of Discharge     HPI:   Patient sitting up in bed reports mild RLE pain.  No other issues today.        Vital Signs:   Temp:  [97.3 °F (36.3 °C)-97.7 °F (36.5 °C)] 97.4 °F (36.3 °C)  Heart Rate:  [59-65] 62  Resp:  [16] 16  BP: ()/(58-72) 124/61     Physical Exam:  Constitutional: Awake, alert,  "NAD  HENT: NCAT, mucous membranes moist  Respiratory: Clear to auscultation bilaterally, nonlabored  Cardiovascular: RRR, no murmurs, rubs, or gallops  Gastrointestinal: Positive bowel sounds, soft, nontender, nondistended  Musculoskeletal: BLE wrapped  Psychiatric: Flat affect, cooperative  Neurologic: Oriented x 3, MARIE,  speech clear  Skin: No rashes        Pertinent  and/or Most Recent Results     Results from last 7 days   Lab Units 10/18/24  0539 10/17/24  0452 10/14/24  0357   WBC 10*3/mm3  --  8.51 6.89   HEMOGLOBIN g/dL  --  10.7* 10.8*   HEMATOCRIT %  --  33.7* 35.0*   PLATELETS 10*3/mm3  --  214 266   SODIUM mmol/L 142 141 139   POTASSIUM mmol/L 4.3 4.2 4.1   CHLORIDE mmol/L 106 106 106   CO2 mmol/L 28.0 26.0 27.0   BUN mg/dL 21 24* 20   CREATININE mg/dL 1.48* 1.30* 1.09   GLUCOSE mg/dL 97 94 129*   CALCIUM mg/dL 8.9 8.5* 8.6           Invalid input(s): \"PROT\", \"LABALBU\"        Invalid input(s): \"TG\", \"LDLCALC\", \"LDLREALC\"        Brief Urine Lab Results  (Last result in the past 365 days)        Color   Clarity   Blood   Leuk Est   Nitrite   Protein   CREAT   Urine HCG        10/08/24 2219 Yellow   Clear   Negative   Negative   Negative   Negative                   Microbiology Results Abnormal       Procedure Component Value - Date/Time    Blood Culture - Blood, Hand, Left [[id number]]  (Normal) Collected: 10/08/24 2120    Lab Status: Final result Specimen: Blood from Hand, Left Updated: 10/13/24 2146     Blood Culture No growth at 5 days    Narrative:      Less than seven (7) mL's of blood was collected.  Insufficient quantity may yield false negative results.    Blood Culture - Blood, Hand, Right [[id number]]  (Normal) Collected: 10/08/24 2050    Lab Status: Final result Specimen: Blood from Hand, Right Updated: 10/13/24 2116     Blood Culture No growth at 5 days    Parasite Identification - Parasite, Foot, Right [[id number]] Collected: 10/09/24 1358    Lab Status: Final result Specimen: Parasite from " Foot, Right Updated: 10/09/24 1407     Macroscopic Exam Maggots    Respiratory Panel PCR w/COVID-19(SARS-CoV-2) KENJI/ASH/SHIRLEY/PAD/COR/CORNELIUS In-House, NP Swab in UTM/VTM, 2 HR TAT - Swab, Nasopharynx [[id number]]  (Normal) Collected: 10/09/24 0548    Lab Status: Final result Specimen: Swab from Nasopharynx Updated: 10/09/24 0643     ADENOVIRUS, PCR Not Detected     Coronavirus 229E Not Detected     Coronavirus HKU1 Not Detected     Coronavirus NL63 Not Detected     Coronavirus OC43 Not Detected     COVID19 Not Detected     Human Metapneumovirus Not Detected     Human Rhinovirus/Enterovirus Not Detected     Influenza A PCR Not Detected     Influenza B PCR Not Detected     Parainfluenza Virus 1 Not Detected     Parainfluenza Virus 2 Not Detected     Parainfluenza Virus 3 Not Detected     Parainfluenza Virus 4 Not Detected     RSV, PCR Not Detected     Bordetella pertussis pcr Not Detected     Bordetella parapertussis PCR Not Detected     Chlamydophila pneumoniae PCR Not Detected     Mycoplasma pneumo by PCR Not Detected    Narrative:      In the setting of a positive respiratory panel with a viral infection PLUS a negative procalcitonin without other underlying concern for bacterial infection, consider observing off antibiotics or discontinuation of antibiotics and continue supportive care. If the respiratory panel is positive for atypical bacterial infection (Bordetella pertussis, Chlamydophila pneumoniae, or Mycoplasma pneumoniae), consider antibiotic de-escalation to target atypical bacterial infection.    COVID PRE-OP / PRE-PROCEDURE SCREENING ORDER (NO ISOLATION) - Swab, Nasopharynx [[id number]]  (Normal) Collected: 10/08/24 2126    Lab Status: Final result Specimen: Swab from Nasopharynx Updated: 10/08/24 2220    Narrative:      The following orders were created for panel order COVID PRE-OP / PRE-PROCEDURE SCREENING ORDER (NO ISOLATION) - Swab, Nasopharynx.  Procedure                               Abnormality          Status                     ---------                               -----------         ------                     COVID-19, FLU A/B, RSV P...[[id number]]  Normal              Final result                 Please view results for these tests on the individual orders.    COVID-19, FLU A/B, RSV PCR 1 HR TAT - Swab, Nasopharynx [[id number]]  (Normal) Collected: 10/08/24 2126    Lab Status: Final result Specimen: Swab from Nasopharynx Updated: 10/08/24 2220     COVID19 Not Detected     Influenza A PCR Not Detected     Influenza B PCR Not Detected     RSV, PCR Not Detected    Narrative:      Fact sheet for providers: https://www.fda.gov/media/324546/download    Fact sheet for patients: https://www.fda.gov/media/841685/download    Test performed by PCR.            Imaging Results (All)       Procedure Component Value Units Date/Time    SLP FEES - Fiberoptic Endo Eval Swallow [[id number]] Resulted: 10/12/24 1532     Updated: 10/12/24 1532    Narrative:      This procedure was auto-finalized with no dictation required.    MRI Tibia Fibula Right Without Contrast [[id number]] Collected: 10/11/24 0451     Updated: 10/11/24 0456    Narrative:      MRI TIBIA FIBULA RIGHT WO CONTRAST    Date of Exam: 10/11/2024 4:39 AM EDT    Indication: Right lower extremity ulcer.     Comparison: 9/15/2024.    Technique:  Routine multiplanar/multisequence images of the right tibia and fibula were obtained without contrast administration.        Findings:  Osseous structures appear intact. No suspicious osseous edema identified. No evidence of fracture. No evidence of periostitis. Musculature appears unremarkable. No evidence of myofascial defect identified. No drainable collection identified. Limited   evaluation due to lack of intravenous contrast. Edema is seen tracking along the subcutaneous tissues as well as the superficial fascia distally which may be related to third spacing of fluid. No joint effusion identified. Vascular flow voids appear    grossly unremarkable.      Impression:      Impression:  No evidence of osteomyelitis. No drainable collection identified. No joint effusion.        Electronically Signed: Norma Ahn MD    10/11/2024 4:53 AM EDT    Workstation ID: [id number]    MRI Foot Right With & Without Contrast [[id number]] Collected: 10/09/24 2300     Updated: 10/09/24 2307    Narrative:        MRI FOOT RIGHT W WO CONTRAST    Date of Exam: 10/9/2024 5:10 PM EDT    Indication: osteomyelitis.     Comparison: 9/15/2024.    Technique:  Routine multiplanar/multisequence sequence images of the right foot were obtained before and after the uneventful administration of 20 Multihance.        Findings:  Soft tissue swelling is seen along the dorsum of the foot with mild bullous changes present. No suspicious osseous edema identified. No abnormal enhancement present. Diffuse soft tissue swelling is present within the second digit with diffuse skin   thickening. No suspicious osseous edema identified. No destructive changes identified. No evidence of significant tenosynovitis. Mild muscular edema is present likely related to denervation. No significant joint effusion identified. Mild to moderate   osteoarthritic changes are present within the interphalangeal joints of the and the first MTP joint. No erosions identified. No evidence of periostitis. Enhancement is seen within the subcutaneous tissues of the second digit. No suspicious osseous   enhancement identified. No drainable collections identified. No significant enhancement identified within the dorsum of the foot.      Impression:      Impression:  Cellulitis of the second digit with no evidence of osteomyelitis. No drainable collections identified. No suspicious osseous edema or enhancement identified. Soft tissue swelling is seen along the dorsum of the foot with no significant enhancement like   related to third spacing of fluid.        Electronically Signed: Norma Ahn MD    10/9/2024 11:04  PM EDT    Workstation ID: [id number]    CT Angiogram Chest [[id number]] Collected: 10/09/24 0759     Updated: 10/09/24 0810    Narrative:      CT ANGIOGRAM CHEST    Date of Exam: 10/9/2024 4:37 AM EDT    Indication: cough, + D.Dimer; hx of DVTs.    Comparison: None available.    Technique: CTA of the chest was performed after the uneventful intravenous administration of 85 mL of Isovue-370. Reconstructed coronal and sagittal images were also obtained. In addition, a 3-D volume rendered image was created for interpretation.   Automated exposure control and iterative reconstruction methods were used.    FINDINGS:    Thoracic inlet: Unremarkable.    Pulmonary arteries: No filling defects are identified within the pulmonary arteries to suggest acute pulmonary embolism.    Great vessels: Mild vascular calcifications are present. Otherwise, the thoracic aorta and proximal arch vessels appear unremarkable.    Mediastinum/Mariia: No pathologically enlarged mediastinal lymph nodes are seen. The esophagus is unremarkable.    Lung parenchyma: Scattered bibasilar and lingular atelectasis. Calcified granuloma within the right lung. No acute infiltrate is seen. Unchanged 4 mm subpleural nodule within the right lung (series 5, image 51), too small to warrant imaging follow-up by   Fleischner Society guidelines.    Trachea and airways: The trachea and central airways appear unremarkable.    Pleural space: No significant pleural effusion or pneumothorax.    Heart and pericardium: Coronary artery calcifications. Otherwise, the heart and pericardium appear unremarkable.    Chest wall: No acute osseous lesion is identified. Bones are demineralized. There are degenerative changes within the spine. Contiguous anterior osteophytes noted within the thoracic spine. There is also bony fusion across multiple spinous processes.   These findings may be seen in diffuse idiopathic skeletal hyperostosis. Superficial soft tissues demonstrate no acute  abnormality. Tendon anchors are seen within the right proximal humerus.    Upper abdomen: No acute abnormality is identified within the visualized upper abdomen.      Impression:      1.No acute abnormality is identified within the thorax. Specifically, there is no evidence of acute pulmonary embolism.  2.Bilateral lower lobe and lingular atelectasis.  3.Unchanged 4 mm subpleural nodule within the right lung (series 5, image 51), too small to warrant imaging follow-up by Fleischner Society guidelines.  4.Additional findings as detailed above.      Electronically Signed: Henok Castanon MD    10/9/2024 8:07 AM EDT    Workstation ID: [id number]    XR Chest 1 View [[id number]] Collected: 10/08/24 2040     Updated: 10/08/24 2045    Narrative:      XR CHEST 1 VW    Date of Exam: 10/8/2024 8:14 PM EDT    Indication: sepsis, chronic cough, infectious workup    Comparison: Chest radiograph 9/15/2024    Findings:      Mediastinum: Cardiomediastinal silhouette appears unchanged and enlarged    Lungs: Mild left basal consolidative opacity.    Pleura: No convincing pleural effusion or pneumothorax    Bones and soft tissues: No acute osseous abnormality.        Impression:      Impression:  Left basal opacity which may represent atelectasis, though aspiration or pneumonia can be considered in the appropriate clinical setting.      Electronically Signed: Babak Natarajan    10/8/2024 8:42 PM EDT    Workstation ID: [id number]            Results for orders placed during the hospital encounter of 10/08/24    Doppler Arterial Multi Level Lower Extremity - Bilateral CAR    Interpretation Summary    Right Conclusion: The right JOHNNA is unable to be assessed due to vessel incompressibility.  Multiphasic waveforms in the femoral and dorsalis pedis segments.  Monophasic intermediate resistance waveforms in the popliteal and posterior tibial segments.    Left Conclusion: The left JOHNNA is unable to be assessed due to vessel incompressibility.   "Multiphasic waveforms in the femoral-popliteal segments.  Monophasic intermediate resistance waveforms in the dorsalis pedis.  The posterior tibial waveform was not assessed due to the presence of wounds.      Results for orders placed during the hospital encounter of 10/08/24    Doppler Arterial Multi Level Lower Extremity - Bilateral CAR    Interpretation Summary    Right Conclusion: The right JOHNNA is unable to be assessed due to vessel incompressibility.  Multiphasic waveforms in the femoral and dorsalis pedis segments.  Monophasic intermediate resistance waveforms in the popliteal and posterior tibial segments.    Left Conclusion: The left JOHNNA is unable to be assessed due to vessel incompressibility.  Multiphasic waveforms in the femoral-popliteal segments.  Monophasic intermediate resistance waveforms in the dorsalis pedis.  The posterior tibial waveform was not assessed due to the presence of wounds.      Results for orders placed during the hospital encounter of 01/12/21    Adult Transthoracic Echo Complete W/ Cont if Necessary Per Protocol    Interpretation Summary  · The right ventricle and right atrium are moderately dilated. Other chamber sizes and wall thicknesses are normal.  · Global and segmental LV wall motion is normal. The estimated left ventricular ejection fraction is 51% - 55%.  · The aortic and mitral valves are normal in structure and function.  · The estimated RV systolic pressure is mildly elevated 35 mmHg - 40 mmHg. There is as well noted to be less than 50% inspiratory IVC collapse. The main pulmonary artery is \"borderline dilated\".  · There are no other important findings on this study.        Discharge Details        Discharge Medications        New Medications        Instructions Start Date   guaiFENesin 600 MG 12 hr tablet  Commonly known as: MUCINEX   600 mg, Oral, Every 12 Hours Scheduled      nystatin 995833 UNIT/GM powder  Commonly known as: MYCOSTATIN   Topical, Every 12 Hours " Scheduled      piperacillin-tazobactam 4-0.5 GM/100ML solution IVPB  Commonly known as: ZOSYN   4.5 g, Intravenous, Every 8 Hours Scheduled, Infuse over 30 minutes             Changes to Medications        Instructions Start Date   fluticasone 50 MCG/ACT nasal spray  Commonly known as: FLONASE  What changed:   how to take this  when to take this   USE 2 SPRAYS IN EACH NOSTRIL ONCE DAILY      gabapentin 100 MG capsule  Commonly known as: NEURONTIN  What changed:   medication strength  See the new instructions.   100 mg, Oral, Every 12 Hours Scheduled             Continue These Medications        Instructions Start Date   ammonium lactate 12 % lotion  Commonly known as: AmLactin   Topical, As Needed      atorvastatin 40 MG tablet  Commonly known as: LIPITOR   40 mg, Oral, Daily      Budeson-Glycopyrrol-Formoterol 160-9-4.8 MCG/ACT aerosol inhaler  Commonly known as: BREZTRI   2 puffs, Inhalation, 2 Times Daily      carbidopa-levodopa ER  MG per tablet  Commonly known as: SINEMET CR   1 tablet, Oral, 2 Times Daily, PATIENT MUST SCHEDULE FOLLOW UP APPOINTMENT FOR ADDITIONAL REFILLS.      carbidopa-levodopa  MG per tablet  Commonly known as: SINEMET   2 tablets, Oral, 4 Times Daily, PATIENT MUST SCHEDULE FOLLOW UP APPOINTMENT FOR ADDITIONAL REFILLS.      fluorometholone 0.1 % ophthalmic suspension  Commonly known as: FML   fluorometholone 0.1 % eye drops,suspension      furosemide 20 MG tablet  Commonly known as: LASIX   20 mg, Oral, Daily      levothyroxine 88 MCG tablet  Commonly known as: SYNTHROID, LEVOTHROID   88 mcg, Oral, Daily      Non-Aspirin Pain Reliever 325 MG tablet  Generic drug: acetaminophen   650 mg, Oral, Every 6 Hours PRN      pantoprazole 40 MG EC tablet  Commonly known as: Protonix   40 mg, Oral, Daily      pramipexole 1 MG tablet  Commonly known as: MIRAPEX   1 mg, Oral, 3 Times Daily      QUEtiapine 25 MG tablet  Commonly known as: SEROquel   0.5 tablets, Oral, Nightly      tamsulosin  0.4 MG capsule 24 hr capsule  Commonly known as: FLOMAX   tamsulosin 0.4 mg capsule   TAKE 1 CAPSULE BY MOUTH EVERY DAY      vitamin D3 125 MCG (5000 UT) capsule capsule   5,000 Units, Oral, Daily             Stop These Medications      apixaban 5 MG tablet tablet  Commonly known as: ELIQUIS     Diclofenac Sodium 1 % gel gel  Commonly known as: VOLTAREN     doxycycline 100 MG capsule  Commonly known as: MONODOX     fludrocortisone 0.1 MG tablet     loratadine 10 MG tablet  Commonly known as: Claritin     mirtazapine 30 MG tablet  Commonly known as: Remeron     potassium chloride 20 MEQ CR tablet  Commonly known as: KLOR-CON M20     sulfamethoxazole-trimethoprim 800-160 MG per tablet  Commonly known as: BACTRIM DS,SEPTRA DS     traMADol 50 MG tablet  Commonly known as: ULTRAM     traZODone 50 MG tablet  Commonly known as: DESYREL     vitamin B-6 25 MG tablet  Commonly known as: PYRIDOXINE     vitamin C 500 MG tablet  Commonly known as: ASCORBIC ACID              No Known Allergies      Discharge Disposition:  Rehab Facility or Unit (DC - External)    Discharge Diet:  Diet Order   Procedures    Diet: Cardiac; Healthy Heart (2-3 Na+); Texture: Regular (IDDSI 7); Fluid Consistency: Thin (IDDSI 0)         Discharge Activity:   Activity Instructions       Activity as Tolerated      Up WIth Assist                CODE STATUS:    Code Status and Medical Interventions: No CPR (Do Not Attempt to Resuscitate); Limited Support; No intubation (DNI); DNR/DNI   Ordered at: 10/09/24 0206     Medical Intervention Limits:    No intubation (DNI)     Level Of Support Discussed With:    Patient     Code Status (Patient has no pulse and is not breathing):    No CPR (Do Not Attempt to Resuscitate)     Medical Interventions (Patient has pulse or is breathing):    Limited Support     Comments:    DNR/DNI         No future appointments.    Additional Instructions for the Follow-ups that You Need to Schedule       Discharge Follow-up with PCP    As directed       Currently Documented PCP:    Ben Holder DO    PCP Phone Number:    [phone]     Follow Up Details: Follow-up in 1 week after discharge from rehab        Discharge Follow-up with Specified Provider: Dr Palomo; 1 Month   As directed      To: Dr Palomo   Follow Up: 1 Month   Follow Up Details: Outpatient RLE revascularization        Discharge Follow-up with Specified Provider: Dr Jorgensen; 1 Week   As directed      To: Dr Jorgensen   Follow Up: 1 Week   Follow Up Details: CBC, CMP and fax to [facility] on Monday, 10/21/2024                Time Spent on Discharge:  51 minutes    Electronically signed by ALEXUS Nair, 10/19/24, 9:48 AM EDT.       Electronically signed by Kasey Rodriguez APRN at 10/19/24 3026

## 2024-10-19 NOTE — DISCHARGE SUMMARY
[facility] Medicine Services  DISCHARGE SUMMARY    Patient Name: Cordell Martin  : 1945  MRN: [id number]    Date of Admission: 10/8/2024  Date of Discharge:  10/19/2024  Primary Care Physician: Ben Holder DO    Consults       Date and Time Order Name Status Description    10/14/2024  9:02 AM Inpatient Vascular Surgery Consult Completed     10/9/2024  9:19 AM Inpatient Orthopedic Surgery Consult Completed     10/9/2024  9:19 AM Inpatient Infectious Diseases Consult Completed             Hospital Course     Presenting Problem:   Cellulitis [L03.90]    Active Hospital Problems    Diagnosis  POA    **Cellulitis [L03.90]  Yes    History of DVT (deep vein thrombosis) [Z86.718]  Not Applicable    Anemia, chronic disease [D63.8]  Yes    Elevated TSH [R79.89]  Yes    GERD [K21.9]  Yes    Chronic cough [R05.3]  Yes    Hypothyroidism (acquired) [E03.9]  Yes    Chronic edema [R60.9]  Yes    Parkinson's disease [G20.A1]  Yes    Anxiety [F41.9]  Yes    Hyperlipidemia [E78.5]  Yes    Depression [F32.A]  Yes      Resolved Hospital Problems   No resolved problems to display.          Hospital Course:  Cordell Martin is a 79 y.o. male PMH Parkinson's, history of DVT, HLD, hypothyroidism, GERD, lymphedema, anxiety, depression, wheelchair use/walker use at baseline who presented with cellulitis bilateral lower extremities and wounds.  Ortho, ID, vascular surgery consulted.  Patient noted with a wound between the first and second toe on the right foot the patient reports he jammed into the wheelchair.  Also duplex showed chronic DVT.  MRI right foot, tip/fib with no evidence of osteomyelitis.  Cultures positive for Pseudomonas.  Ortho recommended conservative management and monitoring.  ID recommended Dapto/Zosyn initially but daptomycin was discontinued on 10/12 after culture positive for Pseudomonas.  Zosyn continued until 10/24/2024.  Vascular surgery consulted recommending  outpatient RLE revascularization.    Assessment/Plan  BLE wounds  Edema  Cellulitis  - Maggots noted, right second toe marked abnormal, wound between the first and second toe with drainage after he jammed the toe into his wheelchair  - Duplex with chronic DVT noted  - MRI foot and tib-fib with no evidence of osteo-  -Wound culture with Pseudomonas  - Evaluated by Ortho recommend conservative management and monitoring  - ID consulted patient initially started on Dapto/Zosyn with Dapto discontinued after culture positive for Pseudomonas.  Zosyn continued likely 14 days from admission per ID to end approximately 10/24/2024.    Constipation-resolved    Cough-improved  Possible LLL PNA on CXR not as impressive on CT  - Pulmonary toileting  - Respiratory PCR negative  - Speech consulted, s/p F EES with regular/thins    Chronic anemia  - Hemoglobin stable    Parkinson's disease  Anxiety and depression  - Sinemet Seroquel Mirapex    Hypothyroidism  - Synthroid with elevated TSH 15.85 with normal free T4  - Continue levothyroxine 88 mcg daily follow-up with PCP for repeat TSH in 4 to 6 weeks    HLD  - Statin    GERD  - PPI      Discharge Follow Up Recommendations for labs/diagnostics:  Follow-up with PCP in 1 week after discharge from rehab-TSH elevated at 15.85 with normal free T4.  Repeat TSH in 4 to 6 weeks  Follow-up with Dr. Jorgensen in 1 week-draw a CBC, CMP and fax to [facility] on Monday, 10/21/2024  Follow-up with  for RLE revascularization in 1 month    Day of Discharge     HPI:   Patient sitting up in bed reports mild RLE pain.  No other issues today.        Vital Signs:   Temp:  [97.3 °F (36.3 °C)-97.7 °F (36.5 °C)] 97.4 °F (36.3 °C)  Heart Rate:  [59-65] 62  Resp:  [16] 16  BP: ()/(58-72) 124/61     Physical Exam:  Constitutional: Awake, alert, NAD  HENT: NCAT, mucous membranes moist  Respiratory: Clear to auscultation bilaterally, nonlabored  Cardiovascular: RRR, no murmurs, rubs, or  "gallops  Gastrointestinal: Positive bowel sounds, soft, nontender, nondistended  Musculoskeletal: BLE wrapped  Psychiatric: Flat affect, cooperative  Neurologic: Oriented x 3, MARIE,  speech clear  Skin: No rashes        Pertinent  and/or Most Recent Results     Results from last 7 days   Lab Units 10/18/24  0539 10/17/24  0452 10/14/24  0357   WBC 10*3/mm3  --  8.51 6.89   HEMOGLOBIN g/dL  --  10.7* 10.8*   HEMATOCRIT %  --  33.7* 35.0*   PLATELETS 10*3/mm3  --  214 266   SODIUM mmol/L 142 141 139   POTASSIUM mmol/L 4.3 4.2 4.1   CHLORIDE mmol/L 106 106 106   CO2 mmol/L 28.0 26.0 27.0   BUN mg/dL 21 24* 20   CREATININE mg/dL 1.48* 1.30* 1.09   GLUCOSE mg/dL 97 94 129*   CALCIUM mg/dL 8.9 8.5* 8.6           Invalid input(s): \"PROT\", \"LABALBU\"        Invalid input(s): \"TG\", \"LDLCALC\", \"LDLREALC\"        Brief Urine Lab Results  (Last result in the past 365 days)        Color   Clarity   Blood   Leuk Est   Nitrite   Protein   CREAT   Urine HCG        10/08/24 2219 Yellow   Clear   Negative   Negative   Negative   Negative                   Microbiology Results Abnormal       Procedure Component Value - Date/Time    Blood Culture - Blood, Hand, Left [[id number]]  (Normal) Collected: 10/08/24 2120    Lab Status: Final result Specimen: Blood from Hand, Left Updated: 10/13/24 2146     Blood Culture No growth at 5 days    Narrative:      Less than seven (7) mL's of blood was collected.  Insufficient quantity may yield false negative results.    Blood Culture - Blood, Hand, Right [[id number]]  (Normal) Collected: 10/08/24 2050    Lab Status: Final result Specimen: Blood from Hand, Right Updated: 10/13/24 2116     Blood Culture No growth at 5 days    Parasite Identification - Parasite, Foot, Right [[id number]] Collected: 10/09/24 1358    Lab Status: Final result Specimen: Parasite from Foot, Right Updated: 10/09/24 1407     Macroscopic Exam Maggots    Respiratory Panel PCR w/COVID-19(SARS-CoV-2) KENJI/ASH/SHIRLEY/PAD/COR/CORNELIUS In-House, " NP Swab in UTM/VTM, 2 HR TAT - Swab, Nasopharynx [[id number]]  (Normal) Collected: 10/09/24 0548    Lab Status: Final result Specimen: Swab from Nasopharynx Updated: 10/09/24 0643     ADENOVIRUS, PCR Not Detected     Coronavirus 229E Not Detected     Coronavirus HKU1 Not Detected     Coronavirus NL63 Not Detected     Coronavirus OC43 Not Detected     COVID19 Not Detected     Human Metapneumovirus Not Detected     Human Rhinovirus/Enterovirus Not Detected     Influenza A PCR Not Detected     Influenza B PCR Not Detected     Parainfluenza Virus 1 Not Detected     Parainfluenza Virus 2 Not Detected     Parainfluenza Virus 3 Not Detected     Parainfluenza Virus 4 Not Detected     RSV, PCR Not Detected     Bordetella pertussis pcr Not Detected     Bordetella parapertussis PCR Not Detected     Chlamydophila pneumoniae PCR Not Detected     Mycoplasma pneumo by PCR Not Detected    Narrative:      In the setting of a positive respiratory panel with a viral infection PLUS a negative procalcitonin without other underlying concern for bacterial infection, consider observing off antibiotics or discontinuation of antibiotics and continue supportive care. If the respiratory panel is positive for atypical bacterial infection (Bordetella pertussis, Chlamydophila pneumoniae, or Mycoplasma pneumoniae), consider antibiotic de-escalation to target atypical bacterial infection.    COVID PRE-OP / PRE-PROCEDURE SCREENING ORDER (NO ISOLATION) - Swab, Nasopharynx [[id number]]  (Normal) Collected: 10/08/24 2126    Lab Status: Final result Specimen: Swab from Nasopharynx Updated: 10/08/24 2220    Narrative:      The following orders were created for panel order COVID PRE-OP / PRE-PROCEDURE SCREENING ORDER (NO ISOLATION) - Swab, Nasopharynx.  Procedure                               Abnormality         Status                     ---------                               -----------         ------                     COVID-19, FLU A/B, RSV  P...[[id number]]  Normal              Final result                 Please view results for these tests on the individual orders.    COVID-19, FLU A/B, RSV PCR 1 HR TAT - Swab, Nasopharynx [[id number]]  (Normal) Collected: 10/08/24 2126    Lab Status: Final result Specimen: Swab from Nasopharynx Updated: 10/08/24 2220     COVID19 Not Detected     Influenza A PCR Not Detected     Influenza B PCR Not Detected     RSV, PCR Not Detected    Narrative:      Fact sheet for providers: https://www.fda.gov/media/769504/download    Fact sheet for patients: https://www.fda.gov/media/490511/download    Test performed by PCR.            Imaging Results (All)       Procedure Component Value Units Date/Time    SLP FEES - Fiberoptic Endo Eval Swallow [[id number]] Resulted: 10/12/24 1532     Updated: 10/12/24 1532    Narrative:      This procedure was auto-finalized with no dictation required.    MRI Tibia Fibula Right Without Contrast [[id number]] Collected: 10/11/24 0451     Updated: 10/11/24 0456    Narrative:      MRI TIBIA FIBULA RIGHT WO CONTRAST    Date of Exam: 10/11/2024 4:39 AM EDT    Indication: Right lower extremity ulcer.     Comparison: 9/15/2024.    Technique:  Routine multiplanar/multisequence images of the right tibia and fibula were obtained without contrast administration.        Findings:  Osseous structures appear intact. No suspicious osseous edema identified. No evidence of fracture. No evidence of periostitis. Musculature appears unremarkable. No evidence of myofascial defect identified. No drainable collection identified. Limited   evaluation due to lack of intravenous contrast. Edema is seen tracking along the subcutaneous tissues as well as the superficial fascia distally which may be related to third spacing of fluid. No joint effusion identified. Vascular flow voids appear   grossly unremarkable.      Impression:      Impression:  No evidence of osteomyelitis. No drainable collection identified. No joint  effusion.        Electronically Signed: Norma Ahn MD    10/11/2024 4:53 AM EDT    Workstation ID: [id number]    MRI Foot Right With & Without Contrast [[id number]] Collected: 10/09/24 2300     Updated: 10/09/24 2307    Narrative:        MRI FOOT RIGHT W WO CONTRAST    Date of Exam: 10/9/2024 5:10 PM EDT    Indication: osteomyelitis.     Comparison: 9/15/2024.    Technique:  Routine multiplanar/multisequence sequence images of the right foot were obtained before and after the uneventful administration of 20 Multihance.        Findings:  Soft tissue swelling is seen along the dorsum of the foot with mild bullous changes present. No suspicious osseous edema identified. No abnormal enhancement present. Diffuse soft tissue swelling is present within the second digit with diffuse skin   thickening. No suspicious osseous edema identified. No destructive changes identified. No evidence of significant tenosynovitis. Mild muscular edema is present likely related to denervation. No significant joint effusion identified. Mild to moderate   osteoarthritic changes are present within the interphalangeal joints of the and the first MTP joint. No erosions identified. No evidence of periostitis. Enhancement is seen within the subcutaneous tissues of the second digit. No suspicious osseous   enhancement identified. No drainable collections identified. No significant enhancement identified within the dorsum of the foot.      Impression:      Impression:  Cellulitis of the second digit with no evidence of osteomyelitis. No drainable collections identified. No suspicious osseous edema or enhancement identified. Soft tissue swelling is seen along the dorsum of the foot with no significant enhancement like   related to third spacing of fluid.        Electronically Signed: Norma Ahn MD    10/9/2024 11:04 PM EDT    Workstation ID: [id number]    CT Angiogram Chest [[id number]] Collected: 10/09/24 0759     Updated: 10/09/24 0810    Narrative:       CT ANGIOGRAM CHEST    Date of Exam: 10/9/2024 4:37 AM EDT    Indication: cough, + D.Dimer; hx of DVTs.    Comparison: None available.    Technique: CTA of the chest was performed after the uneventful intravenous administration of 85 mL of Isovue-370. Reconstructed coronal and sagittal images were also obtained. In addition, a 3-D volume rendered image was created for interpretation.   Automated exposure control and iterative reconstruction methods were used.    FINDINGS:    Thoracic inlet: Unremarkable.    Pulmonary arteries: No filling defects are identified within the pulmonary arteries to suggest acute pulmonary embolism.    Great vessels: Mild vascular calcifications are present. Otherwise, the thoracic aorta and proximal arch vessels appear unremarkable.    Mediastinum/Mariia: No pathologically enlarged mediastinal lymph nodes are seen. The esophagus is unremarkable.    Lung parenchyma: Scattered bibasilar and lingular atelectasis. Calcified granuloma within the right lung. No acute infiltrate is seen. Unchanged 4 mm subpleural nodule within the right lung (series 5, image 51), too small to warrant imaging follow-up by   Fleischner Society guidelines.    Trachea and airways: The trachea and central airways appear unremarkable.    Pleural space: No significant pleural effusion or pneumothorax.    Heart and pericardium: Coronary artery calcifications. Otherwise, the heart and pericardium appear unremarkable.    Chest wall: No acute osseous lesion is identified. Bones are demineralized. There are degenerative changes within the spine. Contiguous anterior osteophytes noted within the thoracic spine. There is also bony fusion across multiple spinous processes.   These findings may be seen in diffuse idiopathic skeletal hyperostosis. Superficial soft tissues demonstrate no acute abnormality. Tendon anchors are seen within the right proximal humerus.    Upper abdomen: No acute abnormality is identified within the  visualized upper abdomen.      Impression:      1.No acute abnormality is identified within the thorax. Specifically, there is no evidence of acute pulmonary embolism.  2.Bilateral lower lobe and lingular atelectasis.  3.Unchanged 4 mm subpleural nodule within the right lung (series 5, image 51), too small to warrant imaging follow-up by Fleischner Society guidelines.  4.Additional findings as detailed above.      Electronically Signed: Henok Castanon MD    10/9/2024 8:07 AM EDT    Workstation ID: [id number]    XR Chest 1 View [[id number]] Collected: 10/08/24 2040     Updated: 10/08/24 2045    Narrative:      XR CHEST 1 VW    Date of Exam: 10/8/2024 8:14 PM EDT    Indication: sepsis, chronic cough, infectious workup    Comparison: Chest radiograph 9/15/2024    Findings:      Mediastinum: Cardiomediastinal silhouette appears unchanged and enlarged    Lungs: Mild left basal consolidative opacity.    Pleura: No convincing pleural effusion or pneumothorax    Bones and soft tissues: No acute osseous abnormality.        Impression:      Impression:  Left basal opacity which may represent atelectasis, though aspiration or pneumonia can be considered in the appropriate clinical setting.      Electronically Signed: Babak Natarajan    10/8/2024 8:42 PM EDT    Workstation ID: [id number]            Results for orders placed during the hospital encounter of 10/08/24    Doppler Arterial Multi Level Lower Extremity - Bilateral CAR    Interpretation Summary    Right Conclusion: The right JOHNNA is unable to be assessed due to vessel incompressibility.  Multiphasic waveforms in the femoral and dorsalis pedis segments.  Monophasic intermediate resistance waveforms in the popliteal and posterior tibial segments.    Left Conclusion: The left JOHNNA is unable to be assessed due to vessel incompressibility.  Multiphasic waveforms in the femoral-popliteal segments.  Monophasic intermediate resistance waveforms in the dorsalis pedis.  The posterior  "tibial waveform was not assessed due to the presence of wounds.      Results for orders placed during the hospital encounter of 10/08/24    Doppler Arterial Multi Level Lower Extremity - Bilateral CAR    Interpretation Summary    Right Conclusion: The right JOHNNA is unable to be assessed due to vessel incompressibility.  Multiphasic waveforms in the femoral and dorsalis pedis segments.  Monophasic intermediate resistance waveforms in the popliteal and posterior tibial segments.    Left Conclusion: The left JOHNNA is unable to be assessed due to vessel incompressibility.  Multiphasic waveforms in the femoral-popliteal segments.  Monophasic intermediate resistance waveforms in the dorsalis pedis.  The posterior tibial waveform was not assessed due to the presence of wounds.      Results for orders placed during the hospital encounter of 01/12/21    Adult Transthoracic Echo Complete W/ Cont if Necessary Per Protocol    Interpretation Summary  · The right ventricle and right atrium are moderately dilated. Other chamber sizes and wall thicknesses are normal.  · Global and segmental LV wall motion is normal. The estimated left ventricular ejection fraction is 51% - 55%.  · The aortic and mitral valves are normal in structure and function.  · The estimated RV systolic pressure is mildly elevated 35 mmHg - 40 mmHg. There is as well noted to be less than 50% inspiratory IVC collapse. The main pulmonary artery is \"borderline dilated\".  · There are no other important findings on this study.        Discharge Details        Discharge Medications        New Medications        Instructions Start Date   guaiFENesin 600 MG 12 hr tablet  Commonly known as: MUCINEX   600 mg, Oral, Every 12 Hours Scheduled      nystatin [id number] UNIT/GM powder  Commonly known as: MYCOSTATIN   Topical, Every 12 Hours Scheduled      piperacillin-tazobactam 4-0.5 GM/100ML solution IVPB  Commonly known as: ZOSYN   4.5 g, Intravenous, Every 8 Hours Scheduled, " Infuse over 30 minutes             Changes to Medications        Instructions Start Date   fluticasone 50 MCG/ACT nasal spray  Commonly known as: FLONASE  What changed:   how to take this  when to take this   USE 2 SPRAYS IN EACH NOSTRIL ONCE DAILY      gabapentin 100 MG capsule  Commonly known as: NEURONTIN  What changed:   medication strength  See the new instructions.   100 mg, Oral, Every 12 Hours Scheduled             Continue These Medications        Instructions Start Date   ammonium lactate 12 % lotion  Commonly known as: AmLactin   Topical, As Needed      atorvastatin 40 MG tablet  Commonly known as: LIPITOR   40 mg, Oral, Daily      Budeson-Glycopyrrol-Formoterol 160-9-4.8 MCG/ACT aerosol inhaler  Commonly known as: BREZTRI   2 puffs, Inhalation, 2 Times Daily      carbidopa-levodopa ER  MG per tablet  Commonly known as: SINEMET CR   1 tablet, Oral, 2 Times Daily, PATIENT MUST SCHEDULE FOLLOW UP APPOINTMENT FOR ADDITIONAL REFILLS.      carbidopa-levodopa  MG per tablet  Commonly known as: SINEMET   2 tablets, Oral, 4 Times Daily, PATIENT MUST SCHEDULE FOLLOW UP APPOINTMENT FOR ADDITIONAL REFILLS.      fluorometholone 0.1 % ophthalmic suspension  Commonly known as: FML   fluorometholone 0.1 % eye drops,suspension      furosemide 20 MG tablet  Commonly known as: LASIX   20 mg, Oral, Daily      levothyroxine 88 MCG tablet  Commonly known as: SYNTHROID, LEVOTHROID   88 mcg, Oral, Daily      Non-Aspirin Pain Reliever 325 MG tablet  Generic drug: acetaminophen   650 mg, Oral, Every 6 Hours PRN      pantoprazole 40 MG EC tablet  Commonly known as: Protonix   40 mg, Oral, Daily      pramipexole 1 MG tablet  Commonly known as: MIRAPEX   1 mg, Oral, 3 Times Daily      QUEtiapine 25 MG tablet  Commonly known as: SEROquel   0.5 tablets, Oral, Nightly      tamsulosin 0.4 MG capsule 24 hr capsule  Commonly known as: FLOMAX   tamsulosin 0.4 mg capsule   TAKE 1 CAPSULE BY MOUTH EVERY DAY      vitamin D3 125  MCG (5000 UT) capsule capsule   5,000 Units, Oral, Daily             Stop These Medications      apixaban 5 MG tablet tablet  Commonly known as: ELIQUIS     Diclofenac Sodium 1 % gel gel  Commonly known as: VOLTAREN     doxycycline 100 MG capsule  Commonly known as: MONODOX     fludrocortisone 0.1 MG tablet     loratadine 10 MG tablet  Commonly known as: Claritin     mirtazapine 30 MG tablet  Commonly known as: Remeron     potassium chloride 20 MEQ CR tablet  Commonly known as: KLOR-CON M20     sulfamethoxazole-trimethoprim 800-160 MG per tablet  Commonly known as: BACTRIM DS,SEPTRA DS     traMADol 50 MG tablet  Commonly known as: ULTRAM     traZODone 50 MG tablet  Commonly known as: DESYREL     vitamin B-6 25 MG tablet  Commonly known as: PYRIDOXINE     vitamin C 500 MG tablet  Commonly known as: ASCORBIC ACID              No Known Allergies      Discharge Disposition:  Rehab Facility or Unit (DC - External)    Discharge Diet:  Diet Order   Procedures    Diet: Cardiac; Healthy Heart (2-3 Na+); Texture: Regular (IDDSI 7); Fluid Consistency: Thin (IDDSI 0)         Discharge Activity:   Activity Instructions       Activity as Tolerated      Up WIth Assist                CODE STATUS:    Code Status and Medical Interventions: No CPR (Do Not Attempt to Resuscitate); Limited Support; No intubation (DNI); DNR/DNI   Ordered at: 10/09/24 0206     Medical Intervention Limits:    No intubation (DNI)     Level Of Support Discussed With:    Patient     Code Status (Patient has no pulse and is not breathing):    No CPR (Do Not Attempt to Resuscitate)     Medical Interventions (Patient has pulse or is breathing):    Limited Support     Comments:    DNR/DNI         No future appointments.    Additional Instructions for the Follow-ups that You Need to Schedule       Discharge Follow-up with PCP   As directed       Currently Documented PCP:    Ben Holder DO    PCP Phone Number:    [phone]     Follow Up Details:  Follow-up in 1 week after discharge from rehab        Discharge Follow-up with Specified Provider: Dr Palomo; 1 Month   As directed      To: Dr Palomo   Follow Up: 1 Month   Follow Up Details: Outpatient RLE revascularization        Discharge Follow-up with Specified Provider: Dr Jorgensen; 1 Week   As directed      To: Dr Jorgensen   Follow Up: 1 Week   Follow Up Details: CBC, CMP and fax to [facility] on Monday, 10/21/2024                Time Spent on Discharge:  51 minutes    Electronically signed by ALEXUS Nair, 10/19/24, 9:48 AM EDT.

## 2024-10-28 NOTE — PROGRESS NOTES
Enter Query Response Below      Query Response: bacterial pneumonia unspecified  Electronically signed by Harpreet Asencio MD, 10/28/24, 3:49 PM EDT.               If applicable, please update the problem list.

## 2025-01-14 ENCOUNTER — OFFICE VISIT (OUTPATIENT)
Dept: FAMILY MEDICINE CLINIC | Facility: CLINIC | Age: 80
End: 2025-01-14
Payer: MEDICARE

## 2025-01-14 ENCOUNTER — TRANSCRIBE ORDERS (OUTPATIENT)
Dept: HOME HEALTH SERVICES | Facility: HOME HEALTHCARE | Age: 80
End: 2025-01-14
Payer: MEDICARE

## 2025-01-14 ENCOUNTER — HOME HEALTH ADMISSION (OUTPATIENT)
Dept: HOME HEALTH SERVICES | Facility: HOME HEALTHCARE | Age: 80
End: 2025-01-14
Payer: MEDICARE

## 2025-01-14 ENCOUNTER — LAB (OUTPATIENT)
Dept: LAB | Facility: HOSPITAL | Age: 80
End: 2025-01-14
Payer: MEDICARE

## 2025-01-14 VITALS
TEMPERATURE: 97.9 F | HEIGHT: 72 IN | HEART RATE: 110 BPM | WEIGHT: 239.8 LBS | BODY MASS INDEX: 32.48 KG/M2 | SYSTOLIC BLOOD PRESSURE: 124 MMHG | DIASTOLIC BLOOD PRESSURE: 50 MMHG

## 2025-01-14 DIAGNOSIS — M79.89 LEG SWELLING: Primary | ICD-10-CM

## 2025-01-14 DIAGNOSIS — G20.A1 PARKINSON'S DISEASE, UNSPECIFIED WHETHER DYSKINESIA PRESENT, UNSPECIFIED WHETHER MANIFESTATIONS FLUCTUATE: ICD-10-CM

## 2025-01-14 DIAGNOSIS — M79.89 LEG SWELLING: ICD-10-CM

## 2025-01-14 DIAGNOSIS — I82.4Y1 ACUTE DEEP VEIN THROMBOSIS (DVT) OF PROXIMAL VEIN OF RIGHT LOWER EXTREMITY: Primary | ICD-10-CM

## 2025-01-14 DIAGNOSIS — R73.03 PREDIABETES: ICD-10-CM

## 2025-01-14 DIAGNOSIS — I89.0 LYMPHEDEMA: ICD-10-CM

## 2025-01-14 DIAGNOSIS — I82.409 RECURRENT DEEP VEIN THROMBOSIS (DVT): ICD-10-CM

## 2025-01-14 DIAGNOSIS — E06.3 HYPOTHYROIDISM DUE TO HASHIMOTO'S THYROIDITIS: ICD-10-CM

## 2025-01-14 DIAGNOSIS — Z23 IMMUNIZATION DUE: ICD-10-CM

## 2025-01-14 LAB
ALBUMIN SERPL-MCNC: 3.5 G/DL (ref 3.5–5.2)
ALBUMIN/GLOB SERPL: 0.9 G/DL
ALP SERPL-CCNC: 131 U/L (ref 39–117)
ALT SERPL W P-5'-P-CCNC: 9 U/L (ref 1–41)
ANION GAP SERPL CALCULATED.3IONS-SCNC: 11.1 MMOL/L (ref 5–15)
AST SERPL-CCNC: 20 U/L (ref 1–40)
BASOPHILS # BLD AUTO: 0.03 10*3/MM3 (ref 0–0.2)
BASOPHILS NFR BLD AUTO: 0.4 % (ref 0–1.5)
BILIRUB SERPL-MCNC: 0.3 MG/DL (ref 0–1.2)
BUN SERPL-MCNC: 21 MG/DL (ref 8–23)
BUN/CREAT SERPL: 15.3 (ref 7–25)
CALCIUM SPEC-SCNC: 9.2 MG/DL (ref 8.6–10.5)
CHLORIDE SERPL-SCNC: 106 MMOL/L (ref 98–107)
CO2 SERPL-SCNC: 25.9 MMOL/L (ref 22–29)
CREAT SERPL-MCNC: 1.37 MG/DL (ref 0.76–1.27)
DEPRECATED RDW RBC AUTO: 49.5 FL (ref 37–54)
EGFRCR SERPLBLD CKD-EPI 2021: 53.1 ML/MIN/1.73
EOSINOPHIL # BLD AUTO: 0.24 10*3/MM3 (ref 0–0.4)
EOSINOPHIL NFR BLD AUTO: 3.6 % (ref 0.3–6.2)
ERYTHROCYTE [DISTWIDTH] IN BLOOD BY AUTOMATED COUNT: 14.9 % (ref 12.3–15.4)
GLOBULIN UR ELPH-MCNC: 3.7 GM/DL
GLUCOSE SERPL-MCNC: 90 MG/DL (ref 65–99)
HBA1C MFR BLD: 6.6 % (ref 4.8–5.6)
HCT VFR BLD AUTO: 37.3 % (ref 37.5–51)
HGB BLD-MCNC: 11.9 G/DL (ref 13–17.7)
IMM GRANULOCYTES # BLD AUTO: 0.03 10*3/MM3 (ref 0–0.05)
IMM GRANULOCYTES NFR BLD AUTO: 0.4 % (ref 0–0.5)
LYMPHOCYTES # BLD AUTO: 1.52 10*3/MM3 (ref 0.7–3.1)
LYMPHOCYTES NFR BLD AUTO: 22.6 % (ref 19.6–45.3)
MCH RBC QN AUTO: 29 PG (ref 26.6–33)
MCHC RBC AUTO-ENTMCNC: 31.9 G/DL (ref 31.5–35.7)
MCV RBC AUTO: 90.8 FL (ref 79–97)
MONOCYTES # BLD AUTO: 0.48 10*3/MM3 (ref 0.1–0.9)
MONOCYTES NFR BLD AUTO: 7.1 % (ref 5–12)
NEUTROPHILS NFR BLD AUTO: 4.43 10*3/MM3 (ref 1.7–7)
NEUTROPHILS NFR BLD AUTO: 65.9 % (ref 42.7–76)
NRBC BLD AUTO-RTO: 0 /100 WBC (ref 0–0.2)
PLATELET # BLD AUTO: 239 10*3/MM3 (ref 140–450)
PMV BLD AUTO: 9.3 FL (ref 6–12)
POTASSIUM SERPL-SCNC: 4 MMOL/L (ref 3.5–5.2)
PROT SERPL-MCNC: 7.2 G/DL (ref 6–8.5)
RBC # BLD AUTO: 4.11 10*6/MM3 (ref 4.14–5.8)
SODIUM SERPL-SCNC: 143 MMOL/L (ref 136–145)
T4 FREE SERPL-MCNC: 0.98 NG/DL (ref 0.92–1.68)
TSH SERPL DL<=0.05 MIU/L-ACNC: 13.6 UIU/ML (ref 0.27–4.2)
WBC NRBC COR # BLD AUTO: 6.73 10*3/MM3 (ref 3.4–10.8)

## 2025-01-14 PROCEDURE — 99214 OFFICE O/P EST MOD 30 MIN: CPT | Performed by: INTERNAL MEDICINE

## 2025-01-14 PROCEDURE — 1159F MED LIST DOCD IN RCRD: CPT | Performed by: INTERNAL MEDICINE

## 2025-01-14 PROCEDURE — 1125F AMNT PAIN NOTED PAIN PRSNT: CPT | Performed by: INTERNAL MEDICINE

## 2025-01-14 PROCEDURE — 84443 ASSAY THYROID STIM HORMONE: CPT

## 2025-01-14 PROCEDURE — 1111F DSCHRG MED/CURRENT MED MERGE: CPT | Performed by: INTERNAL MEDICINE

## 2025-01-14 PROCEDURE — 84439 ASSAY OF FREE THYROXINE: CPT

## 2025-01-14 PROCEDURE — 85025 COMPLETE CBC W/AUTO DIFF WBC: CPT

## 2025-01-14 PROCEDURE — 80053 COMPREHEN METABOLIC PANEL: CPT

## 2025-01-14 PROCEDURE — 83036 HEMOGLOBIN GLYCOSYLATED A1C: CPT

## 2025-01-14 PROCEDURE — 1160F RVW MEDS BY RX/DR IN RCRD: CPT | Performed by: INTERNAL MEDICINE

## 2025-01-14 RX ORDER — BUDESONIDE AND FORMOTEROL FUMARATE DIHYDRATE 160; 4.5 UG/1; UG/1
AEROSOL RESPIRATORY (INHALATION)
COMMUNITY
Start: 2024-12-19

## 2025-01-14 RX ORDER — AMANTADINE HYDROCHLORIDE 100 MG/1
100 TABLET ORAL
COMMUNITY

## 2025-01-14 NOTE — PROGRESS NOTES
Chief Complaint   Patient presents with    Hospital Follow Up Visit     He is doing pretty good, very swollen and a little pain. calf   Patient 10/8/2024  Date of discharge 10/19/2024    HPI:  Cordell Martin is a 79 y.o. male who presents today for follow-up cellulitis.  Has been at rehab for several weeks afterwards.  He is requesting home health.  Has been home for the last week.    Currently at [facility]    ROS:  Constitutional: no fevers, night sweats or unexplained weight loss  Eyes: no vision changes  ENT: no runny nose, ear pain, sore throat  Cardio: no chest pain, palpitations  Pulm: no shortness of breath, wheezing, or cough  GI: no abdominal pain or changes in bowel movements  : no difficulty urinating  MSK: no difficulty ambulating, no joint pain  Neuro: no weakness, dizziness or headache  Psych: no trouble sleeping  Endo: no change in appetite      Past Medical History:   Diagnosis Date    Anxiety     Arthritis     Back pain     Bronchitis     Cognitive impairment     Depression     Disease of thyroid gland     Glucose intolerance (impaired glucose tolerance)     Hyperlipidemia     Kidney stone     Obesity     Parkinson disease     Pneumonia     Prostate disorder     Thyroid disease     Wears eyeglasses       Family History   Problem Relation Age of Onset    Alzheimer's disease Other     Cancer Other     Diabetes Other     Heart disease Other     Stroke Other     Cancer Father       Social History     Socioeconomic History    Marital status:    Tobacco Use    Smoking status: Former     Current packs/day: 0.00     Average packs/day: 1 pack/day for 30.0 years (30.0 ttl pk-yrs)     Types: Cigarettes     Start date:      Quit date:      Years since quittin.0     Passive exposure: Past    Smokeless tobacco: Never    Tobacco comments:     quit    Vaping Use    Vaping status: Never Used   Substance and Sexual Activity    Alcohol use: Yes     Comment:  occasionally    Drug use: No    Sexual activity: Defer     Partners: Female     Birth control/protection: None      No Known Allergies   Immunization History   Administered Date(s) Administered    COVID-19 (MODERNA) 1st,2nd,3rd Dose Monovalent 03/10/2021, 04/07/2021, 10/28/2021    Flu Vaccine Quad PF >36MO 06/16/2022    Fluad Quad 65+ 08/31/2020    Fluzone  >6mos 12/13/2010    Fluzone (or Fluarix & Flulaval for VFC) >6mos 06/16/2022    Fluzone High-Dose 65+YRS 02/23/2018, 09/12/2018, 09/02/2019, 10/13/2020    Fluzone High-Dose 65+yrs 10/05/2023    Influenza, Unspecified 01/01/2022    Pneumococcal Conjugate 13-Valent (PCV13) 04/30/2015    Pneumococcal Polysaccharide (PPSV23) 01/29/2013, 10/10/2021    Tdap 12/04/2009, 04/18/2023    Zostavax 01/26/2011        PE:  Vitals:    01/14/25 1338   BP: 124/50   Pulse: 110   Temp: 97.9 °F (36.6 °C)      Body mass index is 32.51 kg/m².    Gen Appearance: NAD  HEENT: Normocephalic, PERRLA, no thyromegaly, trache midline  Heart: RRR, normal S1 and S2, no murmur  Lungs: CTA b/l, no wheezing, no crackles  Abdomen: Soft, non-tender, non-distended, no guarding and BSx4  MSK: Moves all extremities well, normal gait, no peripheral edema  Pulses: Palpable and equal b/l  Lymph nodes: No palpable lymphadenopathy   Neuro: No focal deficits      Current Outpatient Medications   Medication Sig Dispense Refill    acetaminophen (TYLENOL) 325 MG tablet Take 2 tablets by mouth Every 6 (Six) Hours As Needed for Mild Pain . 28 tablet 0    ammonium lactate (AmLactin) 12 % lotion Apply  topically to the appropriate area as directed As Needed for Dry Skin. 225 g 1    atorvastatin (LIPITOR) 40 MG tablet Take 1 tablet by mouth once daily 90 tablet 0    Budeson-Glycopyrrol-Formoterol (BREZTRI) 160-9-4.8 MCG/ACT aerosol inhaler Inhale 2 puffs 2 (Two) Times a Day. 10.7 g 5    budesonide-formoterol (SYMBICORT) 160-4.5 MCG/ACT inhaler INHALE 2 PUFFS BY MOUTH TWICE DAILY FOR CHRONIC BRONCHITIS. RINSE MOUTH  AFTER USE      carbidopa-levodopa (SINEMET)  MG per tablet Take 2 tablets by mouth 4 (Four) Times a Day. PATIENT MUST SCHEDULE FOLLOW UP APPOINTMENT FOR ADDITIONAL REFILLS. 720 tablet 0    carbidopa-levodopa ER (SINEMET CR)  MG per tablet Take 1 tablet by mouth 2 (Two) Times a Day. PATIENT MUST SCHEDULE FOLLOW UP APPOINTMENT FOR ADDITIONAL REFILLS. 180 tablet 0    Cholecalciferol (VITAMIN D3) 5000 units capsule capsule Take 1 capsule by mouth Daily.      fluorometholone (FML) 0.1 % ophthalmic suspension fluorometholone 0.1 % eye drops,suspension      fluticasone (FLONASE) 50 MCG/ACT nasal spray USE 2 SPRAYS IN EACH NOSTRIL ONCE DAILY (Patient taking differently: Administer 2 sprays into the nostril(s) as directed by provider 2 (Two) Times a Day. Indications: Allergic Rhinitis, Stuffy Nose) 48 mL 2    furosemide (LASIX) 20 MG tablet Take 1 tablet by mouth once daily 90 tablet 0    gabapentin (NEURONTIN) 100 MG capsule Take 1 capsule by mouth Every 12 (Twelve) Hours. 6 capsule 0    guaiFENesin (MUCINEX) 600 MG 12 hr tablet Take 1 tablet by mouth Every 12 (Twelve) Hours. 30 tablet 0    levothyroxine (SYNTHROID, LEVOTHROID) 88 MCG tablet Take 1 tablet by mouth once daily 90 tablet 0    nystatin (MYCOSTATIN) 374837 UNIT/GM powder Apply  topically to the appropriate area as directed Every 12 (Twelve) Hours. 60 g 0    pantoprazole (Protonix) 40 MG EC tablet Take 1 tablet by mouth Daily. 30 tablet 1    piperacillin-tazobactam (ZOSYN) 4-0.5 GM/100ML solution IVPB Infuse 100 mL into a venous catheter Every 8 (Eight) Hours. Infuse over 30 minutes      pramipexole (MIRAPEX) 1 MG tablet Take 1 tablet by mouth 3 (Three) Times a Day. 270 tablet 3    QUEtiapine (SEROquel) 25 MG tablet Take 0.5 tablets by mouth Every Night. Indications: mood      tamsulosin (FLOMAX) 0.4 MG capsule 24 hr capsule tamsulosin 0.4 mg capsule   TAKE 1 CAPSULE BY MOUTH EVERY DAY      amantadine (SYMMETREL) 100 MG tablet Take 1 tablet by mouth.        No current facility-administered medications for this visit.      Needs letter to patient aids for wheelchair maintenance.    Flu shot today.  Recommend home health.  No change to prescription medication.  Rechecking labs today.    Current outpatient and discharge medications have been reconciled for the patient.  Reviewed by: Ben Holder DO    Diagnoses and all orders for this visit:    1. Leg swelling (Primary)  -     Ambulatory Referral to Home Health  -     CBC & Differential; Future  -     Comprehensive Metabolic Panel; Future  -     TSH+Free T4; Future    2. Immunization due  -     Cancel: Fluzone High-Dose 65+yrs    3. Recurrent deep vein thrombosis (DVT)  -     Ambulatory Referral to Home Health  -     CBC & Differential; Future  -     Comprehensive Metabolic Panel; Future  -     TSH+Free T4; Future    4. Parkinson's disease, unspecified whether dyskinesia present, unspecified whether manifestations fluctuate  -     Ambulatory Referral to Home Health  -     CBC & Differential; Future  -     Comprehensive Metabolic Panel; Future  -     TSH+Free T4; Future    5. Hypothyroidism due to Hashimoto's thyroiditis  -     Ambulatory Referral to Home Health  -     CBC & Differential; Future  -     Comprehensive Metabolic Panel; Future  -     TSH+Free T4; Future    6. Lymphedema  -     Ambulatory Referral to Home Health  -     CBC & Differential; Future  -     Comprehensive Metabolic Panel; Future  -     TSH+Free T4; Future    7. Prediabetes  -     Hemoglobin A1c; Future         Return in about 3 months (around 4/14/2025) for Medicare Wellness.     Dictated Utilizing Dragon Dictation    Please note that portions of this note were completed with a voice recognition program.    Part of this note may be an electronic transcription/translation of spoken language to printed text using the Dragon Dictation System.

## 2025-01-15 ENCOUNTER — HOME CARE VISIT (OUTPATIENT)
Dept: HOME HEALTH SERVICES | Facility: HOME HEALTHCARE | Age: 80
End: 2025-01-15
Payer: MEDICARE

## 2025-01-16 ENCOUNTER — TELEPHONE (OUTPATIENT)
Dept: FAMILY MEDICINE CLINIC | Facility: CLINIC | Age: 80
End: 2025-01-16
Payer: MEDICARE

## 2025-01-16 ENCOUNTER — HOME CARE VISIT (OUTPATIENT)
Dept: HOME HEALTH SERVICES | Facility: HOME HEALTHCARE | Age: 80
End: 2025-01-16
Payer: MEDICARE

## 2025-01-16 VITALS
RESPIRATION RATE: 16 BRPM | OXYGEN SATURATION: 95 % | SYSTOLIC BLOOD PRESSURE: 120 MMHG | TEMPERATURE: 97.8 F | HEART RATE: 74 BPM | DIASTOLIC BLOOD PRESSURE: 60 MMHG

## 2025-01-16 PROCEDURE — G0299 HHS/HOSPICE OF RN EA 15 MIN: HCPCS

## 2025-01-16 NOTE — TELEPHONE ENCOUNTER
Caller: [facility] access hospital    Relationship:     Best call back number: [phone]    What is the medical concern/diagnosis: REHAB STAY,WEAK LEGS,   BASIC ADLS     What specialty or service is being requested: PHYSICAL THERAPY   OCCUPATIONAL THERAPY   NURSING   What is the provider, practice or medical service name: Critical access hospital         Any additional details: [facility] IS NEEDING WRITTEN ORDERS FOR THIS. FAX # [phone]     PLEASE FAX LAST OFFICE NOTE AND FACE SHEET.

## 2025-01-17 ENCOUNTER — HOME CARE VISIT (OUTPATIENT)
Dept: HOME HEALTH SERVICES | Facility: HOME HEALTHCARE | Age: 80
End: 2025-01-17
Payer: MEDICARE

## 2025-01-17 DIAGNOSIS — E03.9 HYPOTHYROIDISM, UNSPECIFIED TYPE: Primary | ICD-10-CM

## 2025-01-17 RX ORDER — LEVOTHYROXINE SODIUM 100 UG/1
100 TABLET ORAL
Qty: 90 TABLET | Refills: 3 | Status: SHIPPED | OUTPATIENT
Start: 2025-01-17

## 2025-01-17 NOTE — HOME HEALTH
"SOC Note:     Home Health ordered for: disciplines sn, pt    Reason for Hosp/Primary Dx/Co-morbidities: Patient is a 79yr old  male. Pt was admitted to [facility] in OCT and went to [place] rehab till approx 1 week ago. Admitted in Oct after he jammed several toes into w/c (wound had maggots), had edema and cellulitis. PCP referral from Dr Breen to eval/treat lymphedema. Pt saw DR BREEN on 1/14/25. Pt has had several different HH in the recent past. D/c'd from all at this soc today. PMH:  DVT, Parkinson’s, anemia, hypothyroidism, anxiety and depression, gerd, chronic edema, H/O cellulitis.     Focus of Care: disease process Lymphedema, Medication management, falls prevention- unna boots to ble 2x weekly    Patient's goal(s):\"legs to heal\"    Current Functional status/mobility/DME: PWC    HB status/Living Arrangements: Lives at [facility]    Skin Integrity/wound status: weeping noted from BLE- Unna boots applied at SOC ( pt has all supplies needed at home- no supplies needed to order at this time)    Code Status: CPR    Fall Risk/Safety concerns: HIGH RISK    Educated on Emergency Plan, steps to take prior to going to the ER and when to Call Home Health First: YES    Medication issues/Concerns: NA    Additional Problems/Concerns: NA    SDOH Barriers (i.e. caregiver concerns, social isolation, transportation, food insecurity, environment, income etc.)/Need for MSW: NA"

## 2025-01-18 NOTE — CASE COMMUNICATION
Patient missed an sn visit from [facility] on 1/17/25.     Reason: Patient initially needed an sn visit today to have unna boots applied to bilateral lower extremities because the clinician could not locate them in the patient's home on SOC. The clinician did find the unna boots and applied them on soc.      For your records only.   Per CMS Guidance, MD must be notified of missed/cancelled visits; therefore the prescribed frequen cy was not met.

## 2025-01-20 ENCOUNTER — HOME CARE VISIT (OUTPATIENT)
Dept: HOME HEALTH SERVICES | Facility: HOME HEALTHCARE | Age: 80
End: 2025-01-20
Payer: MEDICARE

## 2025-01-20 VITALS
HEART RATE: 83 BPM | TEMPERATURE: 97.7 F | SYSTOLIC BLOOD PRESSURE: 100 MMHG | DIASTOLIC BLOOD PRESSURE: 60 MMHG | OXYGEN SATURATION: 96 % | RESPIRATION RATE: 16 BRPM

## 2025-01-20 PROCEDURE — G0151 HHCP-SERV OF PT,EA 15 MIN: HCPCS

## 2025-01-20 NOTE — HOME HEALTH
Physical Therapy Initial Evaluation: 1/20/25    Pt was seen today for an PT evaluation. Pt demonstrates: decreased I with functional mobility/balance/transfers/ambulation, decreased LE AROM/strength, decreased I with ADL/IADLs. [facility] in-house therapy services. In the past he has elected to work with them. For this reason, no further  Physical therapy is planned.    PT Interventions provided to pt today: therapeutic activities,therapeutic exercise, transfer training, pt/CG education (HEP, symptom management, fall risk reduction), DME instruction for safe mobility.    POC shared with:  care team and Ben Holder, DO

## 2025-01-20 NOTE — Clinical Note
Physical Therapy Initial Evaluation: 1/20/25    Pt was seen today for an PT evaluation. Pt demonstrates: decreased I with functional mobility/balance/transfers/ambulation, decreased LE AROM/strength, decreased I with ADL/IADLs. Pt resides in an assisted which has in-house therapy services. In the past he has elected to work with them. For this reason, no further  Physical therapy is planned.    PT Interventions provided to pt today: therapeutic activities,therapeutic exercise, transfer training, pt/CG education (HEP, symptom management, fall risk reduction), DME instruction for safe mobility.    POC shared with:  care team and Ben Holder, DO

## 2025-01-21 ENCOUNTER — HOME CARE VISIT (OUTPATIENT)
Dept: HOME HEALTH SERVICES | Facility: HOME HEALTHCARE | Age: 80
End: 2025-01-21
Payer: MEDICARE

## 2025-01-21 VITALS
OXYGEN SATURATION: 94 % | HEART RATE: 83 BPM | TEMPERATURE: 98 F | RESPIRATION RATE: 18 BRPM | DIASTOLIC BLOOD PRESSURE: 66 MMHG | SYSTOLIC BLOOD PRESSURE: 136 MMHG

## 2025-01-21 PROCEDURE — G0299 HHS/HOSPICE OF RN EA 15 MIN: HCPCS

## 2025-01-23 NOTE — HOME HEALTH
Patient requested to be discharged and to have services with [facility].

## 2025-01-28 DIAGNOSIS — R60.0 LOWER EXTREMITY EDEMA: ICD-10-CM

## 2025-01-28 DIAGNOSIS — E06.3 HYPOTHYROIDISM DUE TO HASHIMOTO'S THYROIDITIS: ICD-10-CM

## 2025-01-28 RX ORDER — LEVOTHYROXINE SODIUM 88 UG/1
88 TABLET ORAL DAILY
Qty: 90 TABLET | Refills: 0 | OUTPATIENT
Start: 2025-01-28

## 2025-01-28 RX ORDER — CARBIDOPA AND LEVODOPA 25; 100 MG/1; MG/1
TABLET, EXTENDED RELEASE ORAL
Qty: 180 TABLET | Refills: 0 | Status: SHIPPED | OUTPATIENT
Start: 2025-01-28 | End: 2025-02-03

## 2025-01-28 RX ORDER — PANTOPRAZOLE SODIUM 40 MG/1
40 TABLET, DELAYED RELEASE ORAL DAILY
Qty: 30 TABLET | Refills: 0 | Status: SHIPPED | OUTPATIENT
Start: 2025-01-28 | End: 2025-02-03

## 2025-01-28 RX ORDER — FUROSEMIDE 20 MG/1
20 TABLET ORAL DAILY
Qty: 90 TABLET | Refills: 0 | Status: ON HOLD | OUTPATIENT
Start: 2025-01-28

## 2025-01-28 RX ORDER — CARBIDOPA AND LEVODOPA 25; 100 MG/1; MG/1
TABLET ORAL
Qty: 720 TABLET | Refills: 0 | Status: ON HOLD | OUTPATIENT
Start: 2025-01-28

## 2025-01-28 RX ORDER — ATORVASTATIN CALCIUM 40 MG/1
40 TABLET, FILM COATED ORAL DAILY
Qty: 90 TABLET | Refills: 0 | Status: ON HOLD | OUTPATIENT
Start: 2025-01-28

## 2025-01-28 NOTE — TELEPHONE ENCOUNTER
Rx Refill Note  Requested Prescriptions     Pending Prescriptions Disp Refills    pantoprazole (PROTONIX) 40 MG EC tablet [Pharmacy Med Name: Pantoprazole Sodium 40 MG Oral Tablet Delayed Release] 30 tablet 0     Sig: Take 1 tablet by mouth once daily      Last office visit with prescribing clinician: Visit date not found   Last telemedicine visit with prescribing clinician: Visit date not found   Next office visit with prescribing clinician: Visit date not found                         Would you like a call back once the refill request has been completed: [] Yes [] No    If the office needs to give you a call back, can they leave a voicemail: [] Yes [] No    Kelly Sahu MA  01/28/25, 09:35 EST

## 2025-01-28 NOTE — TELEPHONE ENCOUNTER
Rx Refill Note  Requested Prescriptions     Pending Prescriptions Disp Refills    carbidopa-levodopa (SINEMET)  MG per tablet [Pharmacy Med Name: Carbidopa-Levodopa  MG Oral Tablet] 720 tablet 0     Sig: TAKE 2 TABLETS BY MOUTH 4 TIMES DAILY *MUST MAKE APPT FOR REFILLS*    carbidopa-levodopa ER (SINEMET CR)  MG per tablet [Pharmacy Med Name: Carbidopa-Levodopa ER  MG Oral Tablet Extended Release] 180 tablet 0     Sig: TAKE 1 TABLET BY MOUTH TWICE DAILY *MUST  SCHEDULE  APPT  FOR  REFILLS*      Last filled:07/29/24 on  both  Last office visit with prescribing clinician: 10/23/2023      Next office visit with prescribing clinician: Visit date not found     Cleo Rose MA  01/28/25, 15:59 EST

## 2025-01-31 ENCOUNTER — TELEPHONE (OUTPATIENT)
Dept: FAMILY MEDICINE CLINIC | Facility: CLINIC | Age: 80
End: 2025-01-31
Payer: MEDICARE

## 2025-01-31 NOTE — TELEPHONE ENCOUNTER
Caller: [facility]     Relationship: SELF    Best call back number:[phone]    What is your medical concern? REQUESTING VERBAL ORDERS TO START HOME HEALTH NEXT WEEK

## 2025-01-31 NOTE — TELEPHONE ENCOUNTER
Caller: Cordell Martin    Relationship: Self    Best call back number: [phone]     What specialty or service is being requested: HOME HEALTH    Any additional details: PATIENT IS CALLING TO CHECK THE STATUS OF HOME HEALTH REFERRAL

## 2025-02-03 ENCOUNTER — APPOINTMENT (OUTPATIENT)
Dept: CARDIOLOGY | Facility: HOSPITAL | Age: 80
DRG: 602 | End: 2025-02-03
Payer: MEDICARE

## 2025-02-03 ENCOUNTER — APPOINTMENT (OUTPATIENT)
Dept: GENERAL RADIOLOGY | Facility: HOSPITAL | Age: 80
DRG: 602 | End: 2025-02-03
Payer: MEDICARE

## 2025-02-03 ENCOUNTER — HOSPITAL ENCOUNTER (INPATIENT)
Facility: HOSPITAL | Age: 80
LOS: 8 days | Discharge: NURSING, REHAB OR LONG-TERM CARE | DRG: 602 | End: 2025-02-11
Attending: EMERGENCY MEDICINE | Admitting: INTERNAL MEDICINE
Payer: MEDICARE

## 2025-02-03 DIAGNOSIS — E03.9 HYPOTHYROIDISM, UNSPECIFIED TYPE: ICD-10-CM

## 2025-02-03 DIAGNOSIS — R60.0 BILATERAL LOWER EXTREMITY EDEMA: Primary | ICD-10-CM

## 2025-02-03 DIAGNOSIS — D89.831 CYTOKINE RELEASE SYNDROME, GRADE 1: ICD-10-CM

## 2025-02-03 DIAGNOSIS — Z78.9 FAILURE OF OUTPATIENT TREATMENT: ICD-10-CM

## 2025-02-03 DIAGNOSIS — L03.119 CELLULITIS OF LOWER EXTREMITY, UNSPECIFIED LATERALITY: ICD-10-CM

## 2025-02-03 DIAGNOSIS — I89.0 LYMPHEDEMA: ICD-10-CM

## 2025-02-03 DIAGNOSIS — R91.1 INCIDENTAL PULMONARY NODULE: ICD-10-CM

## 2025-02-03 PROBLEM — I82.401 ACUTE DEEP VEIN THROMBOSIS (DVT) OF RIGHT LOWER EXTREMITY: Status: RESOLVED | Noted: 2023-09-11 | Resolved: 2025-02-03

## 2025-02-03 PROBLEM — L03.115 CELLULITIS OF RIGHT LEG: Status: RESOLVED | Noted: 2021-07-19 | Resolved: 2025-02-03

## 2025-02-03 PROBLEM — L03.116 BILATERAL LOWER LEG CELLULITIS: Status: ACTIVE | Noted: 2025-02-03

## 2025-02-03 PROBLEM — L03.115 BILATERAL LOWER LEG CELLULITIS: Status: ACTIVE | Noted: 2025-02-03

## 2025-02-03 PROBLEM — M25.561 RIGHT KNEE PAIN: Status: RESOLVED | Noted: 2021-07-16 | Resolved: 2025-02-03

## 2025-02-03 PROBLEM — L03.115 CELLULITIS OF RIGHT LOWER EXTREMITY: Status: RESOLVED | Noted: 2021-01-12 | Resolved: 2025-02-03

## 2025-02-03 PROBLEM — R79.89 ELEVATED SERUM CREATININE: Status: RESOLVED | Noted: 2021-07-17 | Resolved: 2025-02-03

## 2025-02-03 PROBLEM — R60.9 CHRONIC EDEMA: Status: RESOLVED | Noted: 2021-01-12 | Resolved: 2025-02-03

## 2025-02-03 PROBLEM — J18.9 PNEUMONIA: Status: RESOLVED | Noted: 2023-09-11 | Resolved: 2025-02-03

## 2025-02-03 PROBLEM — R79.89 ELEVATED TSH: Status: RESOLVED | Noted: 2024-10-09 | Resolved: 2025-02-03

## 2025-02-03 PROBLEM — R05.3 CHRONIC COUGH: Status: RESOLVED | Noted: 2023-10-25 | Resolved: 2025-02-03

## 2025-02-03 PROBLEM — R06.09 EXERTIONAL DYSPNEA: Status: RESOLVED | Noted: 2021-01-12 | Resolved: 2025-02-03

## 2025-02-03 LAB
ALBUMIN SERPL-MCNC: 3.4 G/DL (ref 3.5–5.2)
ALBUMIN/GLOB SERPL: 0.9 G/DL
ALP SERPL-CCNC: 162 U/L (ref 39–117)
ALT SERPL W P-5'-P-CCNC: 16 U/L (ref 1–41)
ANION GAP SERPL CALCULATED.3IONS-SCNC: 10 MMOL/L (ref 5–15)
AST SERPL-CCNC: 20 U/L (ref 1–40)
BASOPHILS # BLD AUTO: 0.02 10*3/MM3 (ref 0–0.2)
BASOPHILS NFR BLD AUTO: 0.4 % (ref 0–1.5)
BH CV LOWER VASCULAR LEFT COMMON FEMORAL AUGMENT: NORMAL
BH CV LOWER VASCULAR LEFT COMMON FEMORAL COMPRESS: NORMAL
BH CV LOWER VASCULAR LEFT COMMON FEMORAL PHASIC: NORMAL
BH CV LOWER VASCULAR LEFT COMMON FEMORAL SPONT: NORMAL
BH CV LOWER VASCULAR LEFT DISTAL FEMORAL AUGMENT: NORMAL
BH CV LOWER VASCULAR LEFT DISTAL FEMORAL COMPRESS: NORMAL
BH CV LOWER VASCULAR LEFT DISTAL FEMORAL PHASIC: NORMAL
BH CV LOWER VASCULAR LEFT DISTAL FEMORAL SPONT: NORMAL
BH CV LOWER VASCULAR LEFT GASTRONEMIUS COMPRESS: NORMAL
BH CV LOWER VASCULAR LEFT GREATER SAPH AK COMPRESS: NORMAL
BH CV LOWER VASCULAR LEFT GREATER SAPH BK COMPRESS: NORMAL
BH CV LOWER VASCULAR LEFT LESSER SAPH COMPRESS: NORMAL
BH CV LOWER VASCULAR LEFT MID FEMORAL AUGMENT: NORMAL
BH CV LOWER VASCULAR LEFT MID FEMORAL COMPRESS: NORMAL
BH CV LOWER VASCULAR LEFT MID FEMORAL PHASIC: NORMAL
BH CV LOWER VASCULAR LEFT MID FEMORAL SPONT: NORMAL
BH CV LOWER VASCULAR LEFT PERONEAL COMPRESS: NORMAL
BH CV LOWER VASCULAR LEFT POPLITEAL AUGMENT: NORMAL
BH CV LOWER VASCULAR LEFT POPLITEAL COMPRESS: NORMAL
BH CV LOWER VASCULAR LEFT POPLITEAL PHASIC: NORMAL
BH CV LOWER VASCULAR LEFT POPLITEAL SPONT: NORMAL
BH CV LOWER VASCULAR LEFT POSTERIOR TIBIAL COMPRESS: NORMAL
BH CV LOWER VASCULAR LEFT PROFUNDA FEMORAL PHASIC: NORMAL
BH CV LOWER VASCULAR LEFT PROFUNDA FEMORAL SPONT: NORMAL
BH CV LOWER VASCULAR LEFT PROXIMAL FEMORAL AUGMENT: NORMAL
BH CV LOWER VASCULAR LEFT PROXIMAL FEMORAL COMPRESS: NORMAL
BH CV LOWER VASCULAR LEFT PROXIMAL FEMORAL PHASIC: NORMAL
BH CV LOWER VASCULAR LEFT PROXIMAL FEMORAL SPONT: NORMAL
BH CV LOWER VASCULAR LEFT SAPHENOFEMORAL JUNCTION AUGMENT: NORMAL
BH CV LOWER VASCULAR LEFT SAPHENOFEMORAL JUNCTION COMPRESS: NORMAL
BH CV LOWER VASCULAR LEFT SAPHENOFEMORAL JUNCTION PHASIC: NORMAL
BH CV LOWER VASCULAR LEFT SAPHENOFEMORAL JUNCTION SPONT: NORMAL
BH CV LOWER VASCULAR RIGHT COMMON FEMORAL AUGMENT: NORMAL
BH CV LOWER VASCULAR RIGHT COMMON FEMORAL COMPRESS: NORMAL
BH CV LOWER VASCULAR RIGHT COMMON FEMORAL PHASIC: NORMAL
BH CV LOWER VASCULAR RIGHT COMMON FEMORAL SPONT: NORMAL
BH CV LOWER VASCULAR RIGHT DISTAL FEMORAL AUGMENT: NORMAL
BH CV LOWER VASCULAR RIGHT DISTAL FEMORAL COMPRESS: NORMAL
BH CV LOWER VASCULAR RIGHT DISTAL FEMORAL PHASIC: NORMAL
BH CV LOWER VASCULAR RIGHT DISTAL FEMORAL SPONT: NORMAL
BH CV LOWER VASCULAR RIGHT GASTRONEMIUS COMPRESS: NORMAL
BH CV LOWER VASCULAR RIGHT GREATER SAPH BK COMPRESS: NORMAL
BH CV LOWER VASCULAR RIGHT LESSER SAPH COMPRESS: NORMAL
BH CV LOWER VASCULAR RIGHT MID FEMORAL AUGMENT: NORMAL
BH CV LOWER VASCULAR RIGHT MID FEMORAL COMPRESS: NORMAL
BH CV LOWER VASCULAR RIGHT MID FEMORAL PHASIC: NORMAL
BH CV LOWER VASCULAR RIGHT MID FEMORAL SPONT: NORMAL
BH CV LOWER VASCULAR RIGHT PERONEAL COMPRESS: NORMAL
BH CV LOWER VASCULAR RIGHT POPLITEAL AUGMENT: NORMAL
BH CV LOWER VASCULAR RIGHT POPLITEAL COMPRESS: NORMAL
BH CV LOWER VASCULAR RIGHT POPLITEAL PHASIC: NORMAL
BH CV LOWER VASCULAR RIGHT POPLITEAL SPONT: NORMAL
BH CV LOWER VASCULAR RIGHT POSTERIOR TIBIAL COMPRESS: NORMAL
BH CV LOWER VASCULAR RIGHT PROFUNDA FEMORAL PHASIC: NORMAL
BH CV LOWER VASCULAR RIGHT PROFUNDA FEMORAL SPONT: NORMAL
BH CV LOWER VASCULAR RIGHT PROXIMAL FEMORAL AUGMENT: NORMAL
BH CV LOWER VASCULAR RIGHT PROXIMAL FEMORAL COMPRESS: NORMAL
BH CV LOWER VASCULAR RIGHT PROXIMAL FEMORAL PHASIC: NORMAL
BH CV LOWER VASCULAR RIGHT PROXIMAL FEMORAL SPONT: NORMAL
BH CV LOWER VASCULAR RIGHT SAPHENOFEMORAL JUNCTION AUGMENT: NORMAL
BH CV LOWER VASCULAR RIGHT SAPHENOFEMORAL JUNCTION COMPRESS: NORMAL
BH CV LOWER VASCULAR RIGHT SAPHENOFEMORAL JUNCTION PHASIC: NORMAL
BH CV LOWER VASCULAR RIGHT SAPHENOFEMORAL JUNCTION SPONT: NORMAL
BH CV VAS PRELIMINARY FINDINGS SCRIPTING: 1
BILIRUB SERPL-MCNC: 0.4 MG/DL (ref 0–1.2)
BUN SERPL-MCNC: 29 MG/DL (ref 8–23)
BUN/CREAT SERPL: 24.4 (ref 7–25)
CALCIUM SPEC-SCNC: 9 MG/DL (ref 8.6–10.5)
CHLORIDE SERPL-SCNC: 105 MMOL/L (ref 98–107)
CK SERPL-CCNC: 76 U/L (ref 20–200)
CO2 SERPL-SCNC: 27 MMOL/L (ref 22–29)
CREAT SERPL-MCNC: 1.19 MG/DL (ref 0.76–1.27)
D-LACTATE SERPL-SCNC: 1.2 MMOL/L (ref 0.5–2)
DEPRECATED RDW RBC AUTO: 59.7 FL (ref 37–54)
EGFRCR SERPLBLD CKD-EPI 2021: 62.5 ML/MIN/1.73
EOSINOPHIL # BLD AUTO: 0.17 10*3/MM3 (ref 0–0.4)
EOSINOPHIL NFR BLD AUTO: 3.1 % (ref 0.3–6.2)
ERYTHROCYTE [DISTWIDTH] IN BLOOD BY AUTOMATED COUNT: 17.2 % (ref 12.3–15.4)
GEN 5 1HR TROPONIN T REFLEX: 19 NG/L
GLOBULIN UR ELPH-MCNC: 3.6 GM/DL
GLUCOSE SERPL-MCNC: 118 MG/DL (ref 65–99)
HCT VFR BLD AUTO: 40.2 % (ref 37.5–51)
HGB BLD-MCNC: 12.3 G/DL (ref 13–17.7)
HOLD SPECIMEN: NORMAL
IMM GRANULOCYTES # BLD AUTO: 0.02 10*3/MM3 (ref 0–0.05)
IMM GRANULOCYTES NFR BLD AUTO: 0.4 % (ref 0–0.5)
INR PPP: 1.03 (ref 0.89–1.12)
LYMPHOCYTES # BLD AUTO: 0.92 10*3/MM3 (ref 0.7–3.1)
LYMPHOCYTES NFR BLD AUTO: 17 % (ref 19.6–45.3)
MCH RBC QN AUTO: 28.9 PG (ref 26.6–33)
MCHC RBC AUTO-ENTMCNC: 30.6 G/DL (ref 31.5–35.7)
MCV RBC AUTO: 94.6 FL (ref 79–97)
MONOCYTES # BLD AUTO: 0.54 10*3/MM3 (ref 0.1–0.9)
MONOCYTES NFR BLD AUTO: 10 % (ref 5–12)
MRSA DNA SPEC QL NAA+PROBE: POSITIVE
NEUTROPHILS NFR BLD AUTO: 3.73 10*3/MM3 (ref 1.7–7)
NEUTROPHILS NFR BLD AUTO: 69.1 % (ref 42.7–76)
NRBC BLD AUTO-RTO: 0 /100 WBC (ref 0–0.2)
NT-PROBNP SERPL-MCNC: 68 PG/ML (ref 0–1800)
PLATELET # BLD AUTO: 243 10*3/MM3 (ref 140–450)
PMV BLD AUTO: 9.2 FL (ref 6–12)
POTASSIUM SERPL-SCNC: 3.8 MMOL/L (ref 3.5–5.2)
PROT SERPL-MCNC: 7 G/DL (ref 6–8.5)
PROTHROMBIN TIME: 13.6 SECONDS (ref 12.2–14.5)
RBC # BLD AUTO: 4.25 10*6/MM3 (ref 4.14–5.8)
SODIUM SERPL-SCNC: 142 MMOL/L (ref 136–145)
TROPONIN T NUMERIC DELTA: 1 NG/L
TROPONIN T SERPL HS-MCNC: 18 NG/L
WBC NRBC COR # BLD AUTO: 5.4 10*3/MM3 (ref 3.4–10.8)
WHOLE BLOOD HOLD COAG: NORMAL
WHOLE BLOOD HOLD COAG: NORMAL
WHOLE BLOOD HOLD SPECIMEN: NORMAL
WHOLE BLOOD HOLD SPECIMEN: NORMAL

## 2025-02-03 PROCEDURE — 99222 1ST HOSP IP/OBS MODERATE 55: CPT | Performed by: INTERNAL MEDICINE

## 2025-02-03 PROCEDURE — 25810000003 SODIUM CHLORIDE 0.9 % SOLUTION: Performed by: INTERNAL MEDICINE

## 2025-02-03 PROCEDURE — 87147 CULTURE TYPE IMMUNOLOGIC: CPT | Performed by: INTERNAL MEDICINE

## 2025-02-03 PROCEDURE — 87070 CULTURE OTHR SPECIMN AEROBIC: CPT | Performed by: INTERNAL MEDICINE

## 2025-02-03 PROCEDURE — 87205 SMEAR GRAM STAIN: CPT | Performed by: INTERNAL MEDICINE

## 2025-02-03 PROCEDURE — 85610 PROTHROMBIN TIME: CPT | Performed by: EMERGENCY MEDICINE

## 2025-02-03 PROCEDURE — 87070 CULTURE OTHR SPECIMN AEROBIC: CPT | Performed by: EMERGENCY MEDICINE

## 2025-02-03 PROCEDURE — 87040 BLOOD CULTURE FOR BACTERIA: CPT | Performed by: EMERGENCY MEDICINE

## 2025-02-03 PROCEDURE — 87077 CULTURE AEROBIC IDENTIFY: CPT | Performed by: EMERGENCY MEDICINE

## 2025-02-03 PROCEDURE — 83880 ASSAY OF NATRIURETIC PEPTIDE: CPT | Performed by: EMERGENCY MEDICINE

## 2025-02-03 PROCEDURE — 25010000002 PIPERACILLIN SOD-TAZOBACTAM PER 1 G: Performed by: EMERGENCY MEDICINE

## 2025-02-03 PROCEDURE — 83605 ASSAY OF LACTIC ACID: CPT | Performed by: EMERGENCY MEDICINE

## 2025-02-03 PROCEDURE — 82550 ASSAY OF CK (CPK): CPT | Performed by: EMERGENCY MEDICINE

## 2025-02-03 PROCEDURE — 99285 EMERGENCY DEPT VISIT HI MDM: CPT

## 2025-02-03 PROCEDURE — 25010000002 VANCOMYCIN 10 G RECONSTITUTED SOLUTION: Performed by: INTERNAL MEDICINE

## 2025-02-03 PROCEDURE — 84484 ASSAY OF TROPONIN QUANT: CPT | Performed by: EMERGENCY MEDICINE

## 2025-02-03 PROCEDURE — 85025 COMPLETE CBC W/AUTO DIFF WBC: CPT | Performed by: EMERGENCY MEDICINE

## 2025-02-03 PROCEDURE — 71045 X-RAY EXAM CHEST 1 VIEW: CPT

## 2025-02-03 PROCEDURE — 80053 COMPREHEN METABOLIC PANEL: CPT | Performed by: EMERGENCY MEDICINE

## 2025-02-03 PROCEDURE — 87147 CULTURE TYPE IMMUNOLOGIC: CPT | Performed by: EMERGENCY MEDICINE

## 2025-02-03 PROCEDURE — 93970 EXTREMITY STUDY: CPT | Performed by: INTERNAL MEDICINE

## 2025-02-03 PROCEDURE — 87641 MR-STAPH DNA AMP PROBE: CPT | Performed by: INTERNAL MEDICINE

## 2025-02-03 PROCEDURE — 87186 SC STD MICRODIL/AGAR DIL: CPT | Performed by: EMERGENCY MEDICINE

## 2025-02-03 PROCEDURE — 87077 CULTURE AEROBIC IDENTIFY: CPT | Performed by: INTERNAL MEDICINE

## 2025-02-03 PROCEDURE — 93970 EXTREMITY STUDY: CPT

## 2025-02-03 PROCEDURE — 87205 SMEAR GRAM STAIN: CPT | Performed by: EMERGENCY MEDICINE

## 2025-02-03 PROCEDURE — 94640 AIRWAY INHALATION TREATMENT: CPT

## 2025-02-03 PROCEDURE — 87186 SC STD MICRODIL/AGAR DIL: CPT | Performed by: INTERNAL MEDICINE

## 2025-02-03 PROCEDURE — 36415 COLL VENOUS BLD VENIPUNCTURE: CPT

## 2025-02-03 RX ORDER — SODIUM CHLORIDE 0.9 % (FLUSH) 0.9 %
10 SYRINGE (ML) INJECTION AS NEEDED
Status: DISCONTINUED | OUTPATIENT
Start: 2025-02-03 | End: 2025-02-11 | Stop reason: HOSPADM

## 2025-02-03 RX ORDER — CARBIDOPA AND LEVODOPA 25; 100 MG/1; MG/1
2 TABLET ORAL 4 TIMES DAILY
Status: DISCONTINUED | OUTPATIENT
Start: 2025-02-03 | End: 2025-02-11 | Stop reason: HOSPADM

## 2025-02-03 RX ORDER — POLYETHYLENE GLYCOL 3350 17 G/17G
17 POWDER, FOR SOLUTION ORAL DAILY PRN
Status: DISCONTINUED | OUTPATIENT
Start: 2025-02-03 | End: 2025-02-07

## 2025-02-03 RX ORDER — PRAMIPEXOLE DIHYDROCHLORIDE 1 MG/1
1 TABLET ORAL 3 TIMES DAILY
Status: DISCONTINUED | OUTPATIENT
Start: 2025-02-03 | End: 2025-02-11 | Stop reason: HOSPADM

## 2025-02-03 RX ORDER — MORPHINE SULFATE 2 MG/ML
2 INJECTION, SOLUTION INTRAMUSCULAR; INTRAVENOUS
Status: ACTIVE | OUTPATIENT
Start: 2025-02-03 | End: 2025-02-10

## 2025-02-03 RX ORDER — AMOXICILLIN 250 MG
2 CAPSULE ORAL 2 TIMES DAILY
Status: DISCONTINUED | OUTPATIENT
Start: 2025-02-03 | End: 2025-02-07

## 2025-02-03 RX ORDER — TAMSULOSIN HYDROCHLORIDE 0.4 MG/1
0.4 CAPSULE ORAL DAILY
Status: DISCONTINUED | OUTPATIENT
Start: 2025-02-04 | End: 2025-02-11 | Stop reason: HOSPADM

## 2025-02-03 RX ORDER — BISACODYL 5 MG/1
5 TABLET, DELAYED RELEASE ORAL DAILY PRN
Status: DISCONTINUED | OUTPATIENT
Start: 2025-02-03 | End: 2025-02-07

## 2025-02-03 RX ORDER — LEVOTHYROXINE SODIUM 88 UG/1
88 TABLET ORAL
Status: DISCONTINUED | OUTPATIENT
Start: 2025-02-04 | End: 2025-02-11 | Stop reason: HOSPADM

## 2025-02-03 RX ORDER — BUDESONIDE AND FORMOTEROL FUMARATE DIHYDRATE 160; 4.5 UG/1; UG/1
2 AEROSOL RESPIRATORY (INHALATION)
Status: DISCONTINUED | OUTPATIENT
Start: 2025-02-03 | End: 2025-02-11 | Stop reason: HOSPADM

## 2025-02-03 RX ORDER — HYDROCODONE BITARTRATE AND ACETAMINOPHEN 5; 325 MG/1; MG/1
1 TABLET ORAL EVERY 4 HOURS PRN
Status: DISCONTINUED | OUTPATIENT
Start: 2025-02-03 | End: 2025-02-11 | Stop reason: HOSPADM

## 2025-02-03 RX ORDER — BISACODYL 10 MG
10 SUPPOSITORY, RECTAL RECTAL DAILY PRN
Status: DISCONTINUED | OUTPATIENT
Start: 2025-02-03 | End: 2025-02-07

## 2025-02-03 RX ORDER — ACETAMINOPHEN 325 MG/1
650 TABLET ORAL EVERY 4 HOURS PRN
Status: DISCONTINUED | OUTPATIENT
Start: 2025-02-03 | End: 2025-02-11 | Stop reason: HOSPADM

## 2025-02-03 RX ORDER — HEPARIN SODIUM 5000 [USP'U]/ML
5000 INJECTION, SOLUTION INTRAVENOUS; SUBCUTANEOUS EVERY 12 HOURS SCHEDULED
Status: DISCONTINUED | OUTPATIENT
Start: 2025-02-03 | End: 2025-02-11 | Stop reason: HOSPADM

## 2025-02-03 RX ADMIN — BUDESONIDE AND FORMOTEROL FUMARATE DIHYDRATE 2 PUFF: 160; 4.5 AEROSOL RESPIRATORY (INHALATION) at 23:54

## 2025-02-03 RX ADMIN — PIPERACILLIN AND TAZOBACTAM 3.38 G: 3; .375 INJECTION, POWDER, LYOPHILIZED, FOR SOLUTION INTRAVENOUS at 16:02

## 2025-02-03 RX ADMIN — CARBIDOPA AND LEVODOPA 2 TABLET: 25; 100 TABLET ORAL at 19:10

## 2025-02-03 RX ADMIN — VANCOMYCIN HYDROCHLORIDE 2500 MG: 10 INJECTION, POWDER, LYOPHILIZED, FOR SOLUTION INTRAVENOUS at 17:30

## 2025-02-03 NOTE — Clinical Note
Level of Care: Telemetry [5]   Diagnosis: Cellulitis [760837]   Admitting Physician: JULISSA GREENE [[id number]]

## 2025-02-03 NOTE — LETTER
EMS Transport Request  For use at [facility], [place], [place], and [place] only   Patient Name: Cordell Martin : 1945   Weight:120 kg (263 lb 14.4 oz) Pick-up Location: [facility] BLS/ALS: BLS/ALS: BLS   Insurance: MEDICARE Auth End Date:    Pre-Cert #: D/C Summary complete:    Destination: Other [facility],  [facility], [place], [place]    Contact Precautions: contact and airborne - COVID   Equipment (O2, Fluids, etc.) 3L NC   Arrive By Date/Time: 25 by 1500 Stretcher/WC: Stretcher   CM Requesting: Priti Alamo RN Ext: [phone]   Notes/Medical Necessity: impaired balance/coordination, poor postural/trunk control, pain, SOA, infected BLE wounds, COVID, Parkinsons     ______________________________________________________________________    *Only 2 patient bags OR 1 carry-on size bag are permitted.  Wheelchairs and walkers CANNOT transported with the patient. Acknowledge: Yes

## 2025-02-03 NOTE — ED NOTES
Cordell Martin    Nursing Report ED to Floor:  Mental status: ao4  Ambulatory status: x2  Oxygen Therapy:  ra  Cardiac Rhythm: nsr  Admitted from: [facility]  Safety Concerns:  fall risk  Precautions: none  Social Issues: none  ED Room #:  28    ED Nurse Phone Extension - [phone] or may call [phone].      HPI:   Chief Complaint   Patient presents with    Leg Swelling    Edema       Past Medical History:  Past Medical History:   Diagnosis Date    Anxiety     Arthritis     Back pain     Bronchitis     Cognitive impairment     Depression     Disease of thyroid gland     Glucose intolerance (impaired glucose tolerance)     Hyperlipidemia     Kidney stone     Obesity     Parkinson disease     Pneumonia     Prostate disorder     Thyroid disease     Wears eyeglasses         Past Surgical History:  Past Surgical History:   Procedure Laterality Date    COLONOSCOPY      5 years ago    EYE SURGERY      HERNIA REPAIR  10/20/2020    KNEE SURGERY      LUMBAR LAMINECTOMY DISCECTOMY DECOMPRESSION N/A 07/13/2017    Procedure: LUMBAR LAMINECTOMY L4-5;  Surgeon: Antonio Trent MD;  Location: [facility];  Service:     SHOULDER ROTATOR CUFF REPAIR Right     x2    TONSILLECTOMY      VEIN SURGERY          Admitting Doctor:   Serenity Drummond MD    Consulting Provider(s):  Consults       No orders found from 1/5/2025 to 2/4/2025.             Admitting Diagnosis:   The primary encounter diagnosis was Bilateral lower extremity edema. A diagnosis of Lymphedema was also pertinent to this visit.    Most Recent Vitals:   Vitals:    02/03/25 1500 02/03/25 1524 02/03/25 1530 02/03/25 1600   BP: 124/67  122/63 135/63   BP Location:       Patient Position:       Pulse: 89 102  90   Resp:       Temp:       TempSrc:       SpO2: 97% 91%  95%   Weight:       Height:           Active LDAs/IV Access:   Lines, Drains & Airways       Active LDAs       Name Placement date Placement time Site Days    Peripheral IV 02/03/25 1114  Right Antecubital 02/03/25  1114  Antecubital  less than 1    Peripheral IV 02/03/25 1533 Anterior;Left Forearm 02/03/25  1533  Forearm  less than 1                    Labs (abnormal labs have a star):   Labs Reviewed   COMPREHENSIVE METABOLIC PANEL - Abnormal; Notable for the following components:       Result Value    Glucose 118 (*)     BUN 29 (*)     Albumin 3.4 (*)     Alkaline Phosphatase 162 (*)     All other components within normal limits    Narrative:     GFR Categories in Chronic Kidney Disease (CKD)      GFR Category          GFR (mL/min/1.73)    Interpretation  G1                     90 or greater         Normal or high (1)  G2                      60-89                Mild decrease (1)  G3a                   45-59                Mild to moderate decrease  G3b                   30-44                Moderate to severe decrease  G4                    15-29                Severe decrease  G5                    14 or less           Kidney failure          (1)In the absence of evidence of kidney disease, neither GFR category G1 or G2 fulfill the criteria for CKD.    eGFR calculation 2021 CKD-EPI creatinine equation, which does not include race as a factor   CBC WITH AUTO DIFFERENTIAL - Abnormal; Notable for the following components:    Hemoglobin 12.3 (*)     MCHC 30.6 (*)     RDW 17.2 (*)     RDW-SD 59.7 (*)     Lymphocyte % 17.0 (*)     All other components within normal limits   BNP (IN-HOUSE) - Normal    Narrative:     This assay is used as an aid in the diagnosis of individuals suspected of having heart failure. It can be used as an aid in the diagnosis of acute decompensated heart failure (ADHF) in patients presenting with signs and symptoms of ADHF to the emergency department (ED). In addition, NT-proBNP of <300 pg/mL indicates ADHF is not likely.    Age Range Result Interpretation  NT-proBNP Concentration (pg/mL:      <50             Positive            >450                   Dupree                  300-450                    Negative             <300    50-75           Positive            >900                  Gray                300-900                  Negative            <300      >75             Positive            >1800                  Gray                300-1800                  Negative            <300   TROPONIN - Normal    Narrative:     High Sensitive Troponin T Reference Range:  <14.0 ng/L- Negative Female for AMI  <22.0 ng/L- Negative Male for AMI  >=14 - Abnormal Female indicating possible myocardial injury.  >=22 - Abnormal Male indicating possible myocardial injury.   Clinicians would have to utilize clinical acumen, EKG, Troponin, and serial changes to determine if it is an Acute Myocardial Infarction or myocardial injury due to an underlying chronic condition.        PROTIME-INR - Normal   HIGH SENSITIVITIY TROPONIN T 1HR - Normal    Narrative:     High Sensitive Troponin T Reference Range:  <14.0 ng/L- Negative Female for AMI  <22.0 ng/L- Negative Male for AMI  >=14 - Abnormal Female indicating possible myocardial injury.  >=22 - Abnormal Male indicating possible myocardial injury.   Clinicians would have to utilize clinical acumen, EKG, Troponin, and serial changes to determine if it is an Acute Myocardial Infarction or myocardial injury due to an underlying chronic condition.        LACTIC ACID, PLASMA - Normal   CK - Normal   WOUND CULTURE   BLOOD CULTURE   BLOOD CULTURE   WOUND CULTURE   MRSA SCREEN, PCR   RAINBOW DRAW    Narrative:     The following orders were created for panel order [place] Draw.  Procedure                               Abnormality         Status                     ---------                               -----------         ------                     Green Top (Gel)[[id number]]                                  Final result               Lavender Top[[id number]]                                     Final result               Gold Top - SST[[id number]]                                    Final result               Dupree Top[[id number]]                                         Final result               Light Blue Top[[id number]]                                   Final result                 Please view results for these tests on the individual orders.   RAINBOW DRAW    Narrative:     The following orders were created for panel order [place] Draw.  Procedure                               Abnormality         Status                     ---------                               -----------         ------                     Green Top (Gel)[[id number]]                                  Final result               Lavender Top[[id number]]                                     Final result               Gold Top - SST[[id number]]                                   Final result               Dupree Top[[id number]]                                         Final result               Light Blue Top[[id number]]                                   Final result                 Please view results for these tests on the individual orders.   GREEN TOP   LAVENDER TOP   GOLD TOP - SST   GRAY TOP   LIGHT BLUE TOP   CBC AND DIFFERENTIAL    Narrative:     The following orders were created for panel order CBC & Differential.  Procedure                               Abnormality         Status                     ---------                               -----------         ------                     CBC Auto Differential[[id number]]        Abnormal            Final result                 Please view results for these tests on the individual orders.   GREEN TOP   LAVENDER TOP   GOLD TOP - SST   GRAY TOP   LIGHT BLUE TOP       Meds Given in ED:   Medications   sodium chloride 0.9 % flush 10 mL (has no administration in time range)   sodium chloride 0.9 % flush 10 mL (has no administration in time range)   carbidopa-levodopa (SINEMET)  MG per tablet 2 tablet (has no administration in time range)   HYDROcodone-acetaminophen  (NORCO) 5-325 MG per tablet 1 tablet (has no administration in time range)   morphine injection 2 mg (has no administration in time range)   vancomycin 2500 mg/500 mL 0.9% NS IVPB ([facility]) (2,500 mg Intravenous New Bag 2/3/25 1730)   piperacillin-tazobactam (ZOSYN) 3.375 g IVPB in 100 mL NS MBP (CD) (0 g Intravenous Stopped 2/3/25 1657)           Last NIH score:                                                          Dysphagia screening results:  Patient Factors Component (Dysphagia:Stroke or Rule-out)  Best Eye Response: 4-->(E4) spontaneous (02/03/25 1513)  Best Motor Response: 6-->(M6) obeys commands (02/03/25 1513)  Best Verbal Response: 5-->(V5) oriented (02/03/25 1513)  [place] Coma Scale Score: 15 (02/03/25 1513)     Estephania Coma Scale:  No data recorded     CIWA:        Restraint Type:            Isolation Status:  No active isolations

## 2025-02-03 NOTE — TELEPHONE ENCOUNTER
This referral has been sent to [facility] - pt is aware, but HUB MAY RELAY anyway

## 2025-02-03 NOTE — H&P
[facility] Medicine Services  HISTORY AND PHYSICAL    Patient Name: Cordell Martin  : 1945  MRN: [id number]  Primary Care Physician: Ben Holder DO    Subjective   Subjective     Chief Complaint:leg pain    HPI:  Cordell Martin is a 80 y.o. male who  has a past medical history of Anxiety, Arthritis, Back pain, Bronchitis, Cognitive impairment, Depression, Disease of thyroid gland, Hyperlipidemia, Kidney stone, Obesity, Parkinson disease, Pneumonia, Prostate disorder, Thyroid disease who presented to the ED with worsening leg wounds and pain. Patient has been unable to get to his appointments for wound care due to transportation.  He was last admitted in 10/24 with pseudomonal cellulitis and treated with zosyn  Otherwise he is hungry and complaining of leg pain.    Personal History     PMH: He  has a past medical history of Anxiety, Arthritis, Back pain, Bronchitis, Cognitive impairment, Depression, Disease of thyroid gland, Glucose intolerance (impaired glucose tolerance), Hyperlipidemia, Kidney stone, Obesity, Parkinson disease, Pneumonia, Prostate disorder, Thyroid disease, and Wears eyeglasses.   PSxH: He  has a past surgical history that includes Shoulder open rotator cuff repair (Right); Tonsillectomy; Colonoscopy; lumbar laminectomy discectomy decompression (N/A, 2017); Eye surgery; Hernia repair (10/20/2020); Knee surgery; and Vein Surgery.         FH: His family history includes Alzheimer's disease in an other family member; Cancer in his father and another family member; Diabetes in an other family member; Heart disease in an other family member; Stroke in an other family member.   SH: He  reports that he quit smoking about 45 years ago. His smoking use included cigarettes. He started smoking about 75 years ago. He has a 30 pack-year smoking history. He has been exposed to tobacco smoke. He has never used smokeless tobacco. He reports current alcohol  use. He reports that he does not use drugs.   Retired [employer]  Medications:  Budeson-Glycopyrrol-Formoterol, QUEtiapine, acetaminophen, amantadine, ammonium lactate, atorvastatin, budesonide-formoterol, carbidopa-levodopa, carbidopa-levodopa ER, fluorometholone, fluticasone, furosemide, gabapentin, guaiFENesin, levothyroxine, nystatin, pantoprazole, piperacillin-tazobactam, pramipexole, tamsulosin, and vitamin D3    No Known Allergies    Objective   Objective     Vital Signs:   Temp:  [97.5 °F (36.4 °C)] 97.5 °F (36.4 °C)  Heart Rate:  [] 89  Resp:  [16] 16  BP: (113-124)/(67) 124/67    Constitutional: Awake, alert, interactive and pleasant, resting tremor in right more than left hand, ill but not systemically toxic  Eyes: clear sclerae, no conjunctival injection  HENT: NCAT, mucous membranes moist  Neck: no masses or lymphadenopathy, trachea midline  Respiratory: good breath sounds bilaterally, respirations unlabored  Cardiovascular: RRR, no murmurs appreciated, palpable peripheral pulses  Abdomen:  soft, no HSM or masses palpable, not tender or distended  Musculoskeletal: No peripheral edema, clubbing or cyanosis  Neurologic: Oriented x 3, generally weak                      Strength symmetric in all extremities                     Cranial Nerves grossly intact, speech clear  Skin: both legs beyond his knees are purulent, indurated, warm and draining with blisters and ulcers  Psychiatric: Appropriate mood, insight      Result Review:  I have personally reviewed the results from the time of this admission   to 2/3/2025 15:39 EST and agree with these findings:  [x]  Laboratory  [x]  Microbiology  [x]  Radiology  [x]  EKG/Telemetry   [x]  Cardiology/Vascular   []  Pathology  [x]  Old records  []  Other:  Most notable findings include: normal labs,       LAB RESULTS:      Lab 02/03/25  1136   WBC 5.40   HEMOGLOBIN 12.3*   HEMATOCRIT 40.2   PLATELETS 243   NEUTROS ABS 3.73   IMMATURE GRANS (ABS) 0.02    LYMPHS ABS 0.92   MONOS ABS 0.54   EOS ABS 0.17   MCV 94.6   LACTATE 1.2   PROTIME 13.6         Lab 02/03/25  1136   SODIUM 142   POTASSIUM 3.8   CHLORIDE 105   CO2 27.0   ANION GAP 10.0   BUN 29*   CREATININE 1.19   EGFR 62.5   GLUCOSE 118*   CALCIUM 9.0         Lab 02/03/25  1136   TOTAL PROTEIN 7.0   ALBUMIN 3.4*   GLOBULIN 3.6   ALT (SGPT) 16   AST (SGOT) 20   BILIRUBIN 0.4   ALK PHOS 162*         Lab 02/03/25  1136   PROBNP 68.0   HSTROP T 18   PROTIME 13.6   INR 1.03                 Brief Urine Lab Results  (Last result in the past 365 days)        Color   Clarity   Blood   Leuk Est   Nitrite   Protein   CREAT   Urine HCG        10/08/24 2219 Yellow   Clear   Negative   Negative   Negative   Negative                 COVID19   Date Value Ref Range Status   10/09/2024 Not Detected Not Detected - Ref. Range Final       Duplex Venous Lower Extremity - Bilateral CAR    Result Date: 2/3/2025    Normal bilateral lower extremity venous duplex scan.     XR Chest 1 View    Result Date: 2/3/2025  XR CHEST 1 VW Date of Exam: 2/3/2025 12:34 PM EST Indication: CHF/COPD Protocol Comparison: 10/8/2024 Findings: Metallic zipper artifact is superimposed over the neck. There is previous right shoulder surgery. Heart appears mildly enlarged. Vasculature appears upper limits of normal. No pulmonary edema is seen. Lung volumes are relatively low. There is mild coarsening of the pulmonary interstitial markings but similar to prior exams. Basilar interstitial markings appear to correlate with mild interstitial disease seen in the lower lungs on 10/9/2024 chest CT scan.     Impression: Impression: 1. Mild cardiomegaly and pulmonary venous hypertension. 2. Relatively low lung volumes and mild chronic appearing reticular lung changes similar to prior studies. Electronically Signed: Fabian Dye MD  2/3/2025 1:11 PM EST  Workstation ID: [id number]     Results for orders placed during the hospital encounter of 01/12/21    Adult  "Transthoracic Echo Complete W/ Cont if Necessary Per Protocol    Interpretation Summary  · The right ventricle and right atrium are moderately dilated. Other chamber sizes and wall thicknesses are normal.  · Global and segmental LV wall motion is normal. The estimated left ventricular ejection fraction is 51% - 55%.  · The aortic and mitral valves are normal in structure and function.  · The estimated RV systolic pressure is mildly elevated 35 mmHg - 40 mmHg. There is as well noted to be less than 50% inspiratory IVC collapse. The main pulmonary artery is \"borderline dilated\".  · There are no other important findings on this study.          Assessment & Plan   Assessment / Plan       Parkinson's disease    Memory loss    Restrictive pattern on PFTs w/o evidence of ILD    Cellulitis    History of DVT (deep vein thrombosis)    Anemia, chronic disease      Assessment & Plan:  79 yo with HO parkinsons, lymphedema, and DVT who presents with acite on chronic lower extremities celllitis    Bilateral lower leg cellulitis  --cont local wound care--  Zosyn and one dose of vancomycin until seen byu ID tomorrow to consider daptomycin  -check MRSA swab  -lorab and morphine for pain    Parkinsons  -cont sinemet and mirapex    HO DVT  Lymphedema  -woc care      VTE Prophylaxis: HEP SQ           CODE STATUS:FULL CODE     Expected Discharge  Expected Discharge Date: 2/7/2025; Expected Discharge Time:     Electronically signed by Serenity Drummond MD 02/03/25 15:39 EST          "

## 2025-02-04 LAB
ALBUMIN SERPL-MCNC: 2.8 G/DL (ref 3.5–5.2)
ALBUMIN/GLOB SERPL: 1.1 G/DL
ALP SERPL-CCNC: 128 U/L (ref 39–117)
ALT SERPL W P-5'-P-CCNC: <5 U/L (ref 1–41)
ANION GAP SERPL CALCULATED.3IONS-SCNC: 9 MMOL/L (ref 5–15)
AST SERPL-CCNC: 16 U/L (ref 1–40)
BASOPHILS # BLD AUTO: 0.02 10*3/MM3 (ref 0–0.2)
BASOPHILS NFR BLD AUTO: 0.4 % (ref 0–1.5)
BILIRUB SERPL-MCNC: 0.5 MG/DL (ref 0–1.2)
BUN SERPL-MCNC: 26 MG/DL (ref 8–23)
BUN/CREAT SERPL: 24.5 (ref 7–25)
CALCIUM SPEC-SCNC: 8.4 MG/DL (ref 8.6–10.5)
CHLORIDE SERPL-SCNC: 106 MMOL/L (ref 98–107)
CK SERPL-CCNC: 73 U/L (ref 20–200)
CO2 SERPL-SCNC: 24 MMOL/L (ref 22–29)
CREAT SERPL-MCNC: 1.06 MG/DL (ref 0.76–1.27)
DEPRECATED RDW RBC AUTO: 59.4 FL (ref 37–54)
EGFRCR SERPLBLD CKD-EPI 2021: 71.8 ML/MIN/1.73
EOSINOPHIL # BLD AUTO: 0.11 10*3/MM3 (ref 0–0.4)
EOSINOPHIL NFR BLD AUTO: 2 % (ref 0.3–6.2)
ERYTHROCYTE [DISTWIDTH] IN BLOOD BY AUTOMATED COUNT: 17.3 % (ref 12.3–15.4)
GLOBULIN UR ELPH-MCNC: 2.5 GM/DL
GLUCOSE SERPL-MCNC: 102 MG/DL (ref 65–99)
HCT VFR BLD AUTO: 32.6 % (ref 37.5–51)
HGB BLD-MCNC: 10.1 G/DL (ref 13–17.7)
IMM GRANULOCYTES # BLD AUTO: 0.02 10*3/MM3 (ref 0–0.05)
IMM GRANULOCYTES NFR BLD AUTO: 0.4 % (ref 0–0.5)
LYMPHOCYTES # BLD AUTO: 1 10*3/MM3 (ref 0.7–3.1)
LYMPHOCYTES NFR BLD AUTO: 17.9 % (ref 19.6–45.3)
MCH RBC QN AUTO: 28.9 PG (ref 26.6–33)
MCHC RBC AUTO-ENTMCNC: 31 G/DL (ref 31.5–35.7)
MCV RBC AUTO: 93.1 FL (ref 79–97)
MONOCYTES # BLD AUTO: 0.54 10*3/MM3 (ref 0.1–0.9)
MONOCYTES NFR BLD AUTO: 9.7 % (ref 5–12)
NEUTROPHILS NFR BLD AUTO: 3.89 10*3/MM3 (ref 1.7–7)
NEUTROPHILS NFR BLD AUTO: 69.6 % (ref 42.7–76)
NRBC BLD AUTO-RTO: 0 /100 WBC (ref 0–0.2)
PLATELET # BLD AUTO: 203 10*3/MM3 (ref 140–450)
PMV BLD AUTO: 9 FL (ref 6–12)
POTASSIUM SERPL-SCNC: 3.6 MMOL/L (ref 3.5–5.2)
POTASSIUM SERPL-SCNC: 4 MMOL/L (ref 3.5–5.2)
PROT SERPL-MCNC: 5.3 G/DL (ref 6–8.5)
RBC # BLD AUTO: 3.5 10*6/MM3 (ref 4.14–5.8)
SODIUM SERPL-SCNC: 139 MMOL/L (ref 136–145)
WBC NRBC COR # BLD AUTO: 5.58 10*3/MM3 (ref 3.4–10.8)

## 2025-02-04 PROCEDURE — 97162 PT EVAL MOD COMPLEX 30 MIN: CPT

## 2025-02-04 PROCEDURE — 97164 PT RE-EVAL EST PLAN CARE: CPT

## 2025-02-04 PROCEDURE — 29580 STRAPPING UNNA BOOT: CPT

## 2025-02-04 PROCEDURE — 94799 UNLISTED PULMONARY SVC/PX: CPT

## 2025-02-04 PROCEDURE — 82550 ASSAY OF CK (CPK): CPT | Performed by: INTERNAL MEDICINE

## 2025-02-04 PROCEDURE — 97530 THERAPEUTIC ACTIVITIES: CPT

## 2025-02-04 PROCEDURE — 85025 COMPLETE CBC W/AUTO DIFF WBC: CPT | Performed by: INTERNAL MEDICINE

## 2025-02-04 PROCEDURE — 99232 SBSQ HOSP IP/OBS MODERATE 35: CPT | Performed by: INTERNAL MEDICINE

## 2025-02-04 PROCEDURE — 80053 COMPREHEN METABOLIC PANEL: CPT | Performed by: INTERNAL MEDICINE

## 2025-02-04 PROCEDURE — 25010000002 DAPTOMYCIN PER 1 MG: Performed by: INTERNAL MEDICINE

## 2025-02-04 PROCEDURE — 25010000002 HEPARIN (PORCINE) PER 1000 UNITS: Performed by: INTERNAL MEDICINE

## 2025-02-04 PROCEDURE — 25010000002 PIPERACILLIN SOD-TAZOBACTAM PER 1 G: Performed by: INTERNAL MEDICINE

## 2025-02-04 PROCEDURE — 97597 DBRDMT OPN WND 1ST 20 CM/<: CPT

## 2025-02-04 PROCEDURE — 97598 DBRDMT OPN WND ADDL 20CM/<: CPT

## 2025-02-04 PROCEDURE — 94664 DEMO&/EVAL PT USE INHALER: CPT

## 2025-02-04 PROCEDURE — 84132 ASSAY OF SERUM POTASSIUM: CPT | Performed by: INTERNAL MEDICINE

## 2025-02-04 RX ORDER — LEVOTHYROXINE SODIUM 88 UG/1
88 TABLET ORAL
COMMUNITY

## 2025-02-04 RX ORDER — POTASSIUM CHLORIDE 1500 MG/1
40 TABLET, EXTENDED RELEASE ORAL EVERY 4 HOURS
Status: COMPLETED | OUTPATIENT
Start: 2025-02-04 | End: 2025-02-04

## 2025-02-04 RX ADMIN — HYDROCODONE BITARTRATE AND ACETAMINOPHEN 1 TABLET: 5; 325 TABLET ORAL at 05:26

## 2025-02-04 RX ADMIN — LEVOTHYROXINE SODIUM 88 MCG: 0.09 TABLET ORAL at 05:23

## 2025-02-04 RX ADMIN — DAPTOMYCIN 400 MG: 500 INJECTION, POWDER, LYOPHILIZED, FOR SOLUTION INTRAVENOUS at 09:34

## 2025-02-04 RX ADMIN — BUDESONIDE AND FORMOTEROL FUMARATE DIHYDRATE 2 PUFF: 160; 4.5 AEROSOL RESPIRATORY (INHALATION) at 08:56

## 2025-02-04 RX ADMIN — PRAMIPEXOLE DIHYDROCHLORIDE 1 MG: 1 TABLET ORAL at 08:50

## 2025-02-04 RX ADMIN — HEPARIN SODIUM 5000 UNITS: 5000 INJECTION INTRAVENOUS; SUBCUTANEOUS at 00:22

## 2025-02-04 RX ADMIN — TAMSULOSIN HYDROCHLORIDE 0.4 MG: 0.4 CAPSULE ORAL at 08:51

## 2025-02-04 RX ADMIN — HYDROCODONE BITARTRATE AND ACETAMINOPHEN 1 TABLET: 5; 325 TABLET ORAL at 14:46

## 2025-02-04 RX ADMIN — PRAMIPEXOLE DIHYDROCHLORIDE 1 MG: 1 TABLET ORAL at 18:21

## 2025-02-04 RX ADMIN — PIPERACILLIN AND TAZOBACTAM 3.38 G: 3; .375 INJECTION, POWDER, LYOPHILIZED, FOR SOLUTION INTRAVENOUS at 08:50

## 2025-02-04 RX ADMIN — HYDROCODONE BITARTRATE AND ACETAMINOPHEN 1 TABLET: 5; 325 TABLET ORAL at 21:05

## 2025-02-04 RX ADMIN — PIPERACILLIN AND TAZOBACTAM 3.38 G: 3; .375 INJECTION, POWDER, LYOPHILIZED, FOR SOLUTION INTRAVENOUS at 18:21

## 2025-02-04 RX ADMIN — SENNOSIDES AND DOCUSATE SODIUM 2 TABLET: 50; 8.6 TABLET ORAL at 21:05

## 2025-02-04 RX ADMIN — CARBIDOPA AND LEVODOPA 2 TABLET: 25; 100 TABLET ORAL at 21:05

## 2025-02-04 RX ADMIN — PRAMIPEXOLE DIHYDROCHLORIDE 1 MG: 1 TABLET ORAL at 00:22

## 2025-02-04 RX ADMIN — CARBIDOPA AND LEVODOPA 2 TABLET: 25; 100 TABLET ORAL at 18:21

## 2025-02-04 RX ADMIN — HYDROCODONE BITARTRATE AND ACETAMINOPHEN 1 TABLET: 5; 325 TABLET ORAL at 09:38

## 2025-02-04 RX ADMIN — SENNOSIDES AND DOCUSATE SODIUM 2 TABLET: 50; 8.6 TABLET ORAL at 08:51

## 2025-02-04 RX ADMIN — POTASSIUM CHLORIDE 40 MEQ: 1500 TABLET, EXTENDED RELEASE ORAL at 08:50

## 2025-02-04 RX ADMIN — PIPERACILLIN AND TAZOBACTAM 3.38 G: 3; .375 INJECTION, POWDER, LYOPHILIZED, FOR SOLUTION INTRAVENOUS at 00:21

## 2025-02-04 RX ADMIN — Medication 5000 UNITS: at 08:51

## 2025-02-04 RX ADMIN — CARBIDOPA AND LEVODOPA 2 TABLET: 25; 100 TABLET ORAL at 13:02

## 2025-02-04 RX ADMIN — BUDESONIDE AND FORMOTEROL FUMARATE DIHYDRATE 2 PUFF: 160; 4.5 AEROSOL RESPIRATORY (INHALATION) at 19:41

## 2025-02-04 RX ADMIN — POTASSIUM CHLORIDE 40 MEQ: 1500 TABLET, EXTENDED RELEASE ORAL at 13:02

## 2025-02-04 RX ADMIN — HEPARIN SODIUM 5000 UNITS: 5000 INJECTION INTRAVENOUS; SUBCUTANEOUS at 21:05

## 2025-02-04 RX ADMIN — PRAMIPEXOLE DIHYDROCHLORIDE 1 MG: 1 TABLET ORAL at 21:05

## 2025-02-04 RX ADMIN — CARBIDOPA AND LEVODOPA 2 TABLET: 25; 100 TABLET ORAL at 08:50

## 2025-02-04 RX ADMIN — HEPARIN SODIUM 5000 UNITS: 5000 INJECTION INTRAVENOUS; SUBCUTANEOUS at 08:51

## 2025-02-04 NOTE — PLAN OF CARE
Goal Outcome Evaluation:  Plan of Care Reviewed With: patient        Progress: improving  Outcome Evaluation: PT wound eval complete. Patient with moderate edema in B LE with dry crusts/ flakes and large open areas. PT able to debride flakes/crusts and nonviable tissue from B LE. Patient would benefit from unna to B LE improve venous return and manage BLE edema/ girth. PT will check back in a couple of days to monitor skin and wraps

## 2025-02-04 NOTE — PROGRESS NOTES
[facility] Medicine Services  PROGRESS NOTE    Patient Name: Cordell Martin  : 1945  MRN: [id number]    Date of Admission: 2/3/2025  Primary Care Physician: Ben Holder DO    Subjective   Subjective     CC:  Lower extremity wounds    HPI:  Patient states that he is uncomfortable in bed today due to his position but otherwise has no other complaints.  He confirms that he lives in a facility for the last 6 months, states that he was able to follow-up with wound care but they were not able to see him recently.  He denies fever, chills, chest pain, dyspnea.       Objective   Objective     Vital Signs:   Temp:  [97.5 °F (36.4 °C)-98.3 °F (36.8 °C)] 98.3 °F (36.8 °C)  Heart Rate:  [] 83  Resp:  [16-18] 16  BP: ()/(54-78) 114/60     Physical Exam:  Physical Exam   Constitutional: He is oriented to person, place, and time. He does not appear ill. No distress.   HENT:   Head: Normocephalic and atraumatic.   Cardiovascular: Normal rate. Exam reveals no gallop.   No murmur heard.  Pulmonary/Chest: Effort normal and breath sounds normal. No respiratory distress. He has no wheezes. He has no rales.   Abdominal: Soft. Normal appearance. He exhibits no distension. There is no abdominal tenderness.   Musculoskeletal:      Right lower leg: Edema present.      Left lower leg: Edema present.   Neurological: He is alert and oriented to person, place, and time.   Skin: No bruising noted. There is erythema (BLE). No jaundice.        Results Reviewed:  LAB RESULTS:      Lab 25  0647 25  1430 25  1136   WBC 5.58  --  5.40   HEMOGLOBIN 10.1*  --  12.3*   HEMATOCRIT 32.6*  --  40.2   PLATELETS 203  --  243   NEUTROS ABS 3.89  --  3.73   IMMATURE GRANS (ABS) 0.02  --  0.02   LYMPHS ABS 1.00  --  0.92   MONOS ABS 0.54  --  0.54   EOS ABS 0.11  --  0.17   MCV 93.1  --  94.6   LACTATE  --   --  1.2   PROTIME  --   --  13.6   HSTROP T  --  19 18         Lab  02/04/25  0647 02/03/25  1136   SODIUM 139 142   POTASSIUM 3.6 3.8   CHLORIDE 106 105   CO2 24.0 27.0   ANION GAP 9.0 10.0   BUN 26* 29*   CREATININE 1.06 1.19   EGFR 71.8 62.5   GLUCOSE 102* 118*   CALCIUM 8.4* 9.0         Lab 02/04/25  0647 02/03/25  1136   TOTAL PROTEIN 5.3* 7.0   ALBUMIN 2.8* 3.4*   GLOBULIN 2.5 3.6   ALT (SGPT) <5 16   AST (SGOT) 16 20   BILIRUBIN 0.5 0.4   ALK PHOS 128* 162*         Lab 02/03/25  1430 02/03/25  1136   PROBNP  --  68.0   HSTROP T 19 18   PROTIME  --  13.6   INR  --  1.03                 Brief Urine Lab Results  (Last result in the past 365 days)        Color   Clarity   Blood   Leuk Est   Nitrite   Protein   CREAT   Urine HCG        10/08/24 2219 Yellow   Clear   Negative   Negative   Negative   Negative                   Microbiology Results Abnormal       Procedure Component Value - Date/Time    Wound Culture - Swab, Leg, Left [[id number]]  (Abnormal) Collected: 02/03/25 1528    Lab Status: Preliminary result Specimen: Swab from Leg, Left Updated: 02/04/25 0918     Wound Culture Heavy growth (4+) Staphylococcus aureus, MRSA     Comment:   Methicillin resistant Staphylococcus aureus, Patient may be an isolation risk.         Scant growth (1+) Gram Negative Bacilli     Gram Stain Many (4+) Gram positive cocci in pairs and clusters      Rare (1+) WBCs seen      Rare (1+) Gram negative bacilli    MRSA Screen, PCR (Inpatient) - Swab, Nares [[id number]]  (Abnormal) Collected: 02/03/25 1903    Lab Status: Final result Specimen: Swab from Nares Updated: 02/03/25 2102     MRSA PCR Positive    Narrative:      The negative predictive value of this diagnostic test is high and should only be used to consider de-escalating anti-MRSA therapy. A positive result may indicate colonization with MRSA and must be correlated clinically.            Duplex Venous Lower Extremity - Bilateral CAR    Result Date: 2/3/2025    Normal bilateral lower extremity venous duplex scan.     XR Chest 1  "View    Result Date: 2/3/2025  XR CHEST 1 VW Date of Exam: 2/3/2025 12:34 PM EST Indication: CHF/COPD Protocol Comparison: 10/8/2024 Findings: Metallic zipper artifact is superimposed over the neck. There is previous right shoulder surgery. Heart appears mildly enlarged. Vasculature appears upper limits of normal. No pulmonary edema is seen. Lung volumes are relatively low. There is mild coarsening of the pulmonary interstitial markings but similar to prior exams. Basilar interstitial markings appear to correlate with mild interstitial disease seen in the lower lungs on 10/9/2024 chest CT scan.     Impression: Impression: 1. Mild cardiomegaly and pulmonary venous hypertension. 2. Relatively low lung volumes and mild chronic appearing reticular lung changes similar to prior studies. Electronically Signed: Fabian Dye MD  2/3/2025 1:11 PM EST  Workstation ID: [id number]     Results for orders placed during the hospital encounter of 01/12/21    Adult Transthoracic Echo Complete W/ Cont if Necessary Per Protocol    Interpretation Summary  · The right ventricle and right atrium are moderately dilated. Other chamber sizes and wall thicknesses are normal.  · Global and segmental LV wall motion is normal. The estimated left ventricular ejection fraction is 51% - 55%.  · The aortic and mitral valves are normal in structure and function.  · The estimated RV systolic pressure is mildly elevated 35 mmHg - 40 mmHg. There is as well noted to be less than 50% inspiratory IVC collapse. The main pulmonary artery is \"borderline dilated\".  · There are no other important findings on this study.      Current medications:  Scheduled Meds:budesonide-formoterol, 2 puff, Inhalation, BID - RT  carbidopa-levodopa, 2 tablet, Oral, 4x Daily  DAPTOmycin, 4 mg/kg (Adjusted), Intravenous, Q24H  heparin (porcine), 5,000 Units, Subcutaneous, Q12H  levothyroxine, 88 mcg, Oral, Q AM  piperacillin-tazobactam, 3.375 g, Intravenous, Q8H  potassium chloride " ER, 40 mEq, Oral, Q4H  pramipexole, 1 mg, Oral, TID  senna-docusate sodium, 2 tablet, Oral, BID  tamsulosin, 0.4 mg, Oral, Daily  vitamin D3, 5,000 Units, Oral, Daily      Continuous Infusions:   PRN Meds:.  acetaminophen    senna-docusate sodium **AND** polyethylene glycol **AND** bisacodyl **AND** bisacodyl    Calcium Replacement - Follow Nurse / BPA Driven Protocol    HYDROcodone-acetaminophen    influenza vaccine    Magnesium Standard Dose Replacement - Follow Nurse / BPA Driven Protocol    Morphine    Phosphorus Replacement - Follow Nurse / BPA Driven Protocol    Potassium Replacement - Follow Nurse / BPA Driven Protocol    sodium chloride    sodium chloride    Assessment & Plan   Assessment & Plan     Active Hospital Problems    Diagnosis  POA    **Bilateral lower leg cellulitis [L03.116, L03.115]  Yes    Cellulitis [L03.90]  Yes    Anemia, chronic disease [D63.8]  Yes    History of DVT (deep vein thrombosis) [Z86.718]  Not Applicable    Restrictive pattern on PFTs w/o evidence of ILD [R94.2]  Yes    Memory loss [R41.3]  Yes    Parkinson's disease [G20.A1]  Yes      Resolved Hospital Problems   No resolved problems to display.        Brief Hospital Course to date:  Cordell Martin is a 80 y.o. male with PMH of PMH Parkinson's, history of DVT, HLD, hypothyroidism, GERD, lymphedema, anxiety, depression, wheelchair use/walker use at baseline presented to the hospital with worsening bilateral lower extremity wounds.    BLE wounds   Cellulitis, suspected   -Presents with bilateral lower extremity wounds, has had hospitalization for similar complaint in the past  -Venous duplex: Poor quality  -Hemodynamically stable, labs reviewed and WBC WNL  - Wound culture left leg with MRSA/gram-negative marzena so far   PLAN:   - ID consulted, recommending IV daptomycin and Zosyn  -Wound care consulted  - Blood culture pending  - AM Labs     Chronic Medical Conditions:   Normocytic anemia  - chronic, Hemoglobin stable      Parkinson's disease  Anxiety and depression  - Continue home Sinemet, Mirapex     Hypothyroidism  - Synthroid with elevated TSH 15.85 with normal free T4  - Continue levothyroxine 88 mcg daily follow-up with PCP for repeat TSH in 4 to 6 weeks     HLD  - Statin     GERD  - PPI    AM-PAC 6 Clicks Score (PT): 13 (02/03/25 2016)    CODE STATUS:   Code Status and Medical Interventions: CPR (Attempt to Resuscitate); Full Support   Ordered at: 02/03/25 1628     Code Status (Patient has no pulse and is not breathing):    CPR (Attempt to Resuscitate)     Medical Interventions (Patient has pulse or is breathing):    Full Support       Leobardo Hayden MD  02/04/25

## 2025-02-04 NOTE — THERAPY WOUND CARE TREATMENT
Acute Care - Wound/Debridement Initial Evaluation  [facility]     Patient Name: Cordell Martin  : 1945  MRN: [id number]  Today's Date: 2025                Admit Date: 2/3/2025    Visit Dx:    ICD-10-CM ICD-9-CM   1. Bilateral lower extremity edema  R60.0 782.3   2. Lymphedema  I89.0 457.1       Patient Active Problem List   Diagnosis    Anxiety    Arthritis    Depression    Hyperlipidemia    Lumbar stenosis with neurogenic claudication    Parkinson's disease    Lumbar stenosis with neurogenic claudication status post L4-5 decompression    Status post lumbar laminectomy    Memory loss    Hypothyroidism (acquired)    Pulmonary nodule (4mm RML incidental)    GERD    Remote smoker (Stopped )    Restrictive pattern on PFTs w/o evidence of ILD    Cellulitis    History of DVT (deep vein thrombosis)    Anemia, chronic disease    Bilateral lower leg cellulitis        Past Medical History:   Diagnosis Date    Anxiety     Arthritis     Back pain     Bronchitis     Cognitive impairment     Depression     Disease of thyroid gland     Glucose intolerance (impaired glucose tolerance)     Hyperlipidemia     Kidney stone     Obesity     Parkinson disease     Pneumonia     Prostate disorder     Thyroid disease     Wears eyeglasses         Past Surgical History:   Procedure Laterality Date    COLONOSCOPY      5 years ago    EYE SURGERY      HERNIA REPAIR  10/20/2020    KNEE SURGERY      LUMBAR LAMINECTOMY DISCECTOMY DECOMPRESSION N/A 2017    Procedure: LUMBAR LAMINECTOMY L4-5;  Surgeon: Antonio Trent MD;  Location: [facility];  Service:     SHOULDER ROTATOR CUFF REPAIR Right     x2    TONSILLECTOMY      VEIN SURGERY             Wound 25 Right circumferential leg cellulitis (Active)   Dressing Appearance open to air 25   Closure Open to air 25   Base red;pink;yellow 25 1507   Periwound blistered;edematous;redness 25   Periwound Temperature warm 25    Periwound Skin Turgor firm 02/03/25 2015   Drainage Characteristics/Odor serosanguineous 02/04/25 1507   Drainage Amount moderate 02/04/25 1507   Care, Wound cleansed with;wound cleanser;debrided;kimberly boot 02/04/25 1507   Dressing Care dressing applied;petroleum-based 02/03/25 2200   Periwound Care dry periwound area maintained 02/03/25 2015       Wound 02/03/25 2015 Left circumferential leg cellulitis (Active)   Dressing Appearance open to air 02/03/25 2015   Closure Open to air 02/03/25 2015   Base red;pink;yellow 02/04/25 1507   Periwound blistered;redness;edematous 02/03/25 2015   Periwound Temperature warm 02/03/25 2015   Periwound Skin Turgor firm 02/03/25 2015   Drainage Characteristics/Odor serosanguineous 02/04/25 1507   Drainage Amount moderate 02/04/25 1507   Care, Wound cleansed with;wound cleanser;debrided;kimberly boot 02/04/25 1507   Dressing Care dressing applied;petroleum-based 02/03/25 2200   Periwound Care dry periwound area maintained 02/03/25 2015      Lymphedema       Row Name 02/04/25 1507             Lymphedema Edema Assessment    Ptting Edema Category By severity  -KW      Pitting Edema Moderate  -KW         Skin Changes/Observations    Location/Assessment Lower Extremity  -KW      Lower Extremity Conditions bilateral:;crust;inflamed;weeping  -KW      Lower Extremity Color/Pigment bilateral:;erythema  -KW         Lymphedema Pulses/Capillary Refill    Lymphedema Pulses/Capillary Refill capillary refill  -KW      Capillary Refill lower extremity capillary refill  -KW      Lower Extremity Capillary Refill right:;left:;less than 3 seconds  -KW         Compression/Skin Care    Compression/Skin Care skin care;wrapping location  -KW      Skin Care washed/dried;lotion applied  -KW      Wrapping Location lower extremity  -KW      Wrapping Location LE bilateral:;foot to knee  -KW      Wrapping Comments unna boot in clamshell pattern followed by kerlix,  coban secured with spandage  -KW                User  Key  (r) = Recorded By, (t) = Taken By, (c) = Cosigned By      Initials Name Provider Type    KW Yolanda Callaway, PT Physical Therapist                    WOUND DEBRIDEMENT  Total area of Debridement: 143cm2  Debridement Site 1  Location- Site 1: BLE  Selective Debridement- Site 1: Wound Surface >20cmsq  Instruments- Site 1: tweezers, scissors  Excised Tissue Description- Site 1: moderate, slough, other (comment) (nonviable tissue, crusts)  Bleeding- Site 1: none               PT Assessment (Last 12 Hours)       PT Evaluation and Treatment       Row Name 02/04/25 1507          Physical Therapy Time and Intention    Subjective Information no complaints  -KW     Document Type wound care;evaluation  -KW     Mode of Treatment physical therapy  -KW       Row Name 02/04/25 1507          General Information    Patient Profile Reviewed yes  -KW       Row Name 02/04/25 1507          Wound 02/03/25 2015 Right circumferential leg cellulitis    Wound - Properties Group Placement Date: 02/03/25  - Placement Time: 2015 -JP Side: Right  - Orientation: circumferential  - Location: leg  -NELY Primary Wound Type: Blisters  -NELY Type: cellulitis  -NELY Present on Original Admission: Y  -NELY    Base red;pink;yellow  -KW     Drainage Characteristics/Odor serosanguineous  -KW     Drainage Amount moderate  -KW     Care, Wound cleansed with;wound cleanser;debrided;kimberly boot  -KW     Retired Wound - Properties Group Placement Date: 02/03/25  - Placement Time: 2015 -JP Present on Original Admission: Y  -NELY Side: Right  - Orientation: circumferential  - Location: leg  -NELY Primary Wound Type: Blisters  -NELY Type: cellulitis  -NELY    Retired Wound - Properties Group Placement Date: 02/03/25  - Placement Time: 2015 -JP Present on Original Admission: Y  -NELY Side: Right  - Orientation: circumferential  - Location: leg  -NELY Primary Wound Type: Blisters  -NELY Type: cellulitis  -NELY    Retired Wound - Properties Group Date first  assessed: 02/03/25  - Time first assessed: 2015 -JP Present on Original Admission: Y  -NELY Side: Right  -NELY Location: leg  -NELY Primary Wound Type: Blisters  -NELY Type: cellulitis  -NELY      Row Name 02/04/25 1507          Wound 02/03/25 2015 Left circumferential leg cellulitis    Wound - Properties Group Placement Date: 02/03/25  -NELY Placement Time: 2015 -JP Side: Left  -NELY Orientation: circumferential  -NELY Location: leg  -NELY Primary Wound Type: Blisters  -NELY Type: cellulitis  -NELY Present on Original Admission: Y  -NELY    Base red;pink;yellow  -KW     Drainage Characteristics/Odor serosanguineous  -KW     Drainage Amount moderate  -KW     Care, Wound cleansed with;wound cleanser;debrided;kimberly boot  -KW     Retired Wound - Properties Group Placement Date: 02/03/25  -NELY Placement Time: 2015 -JP Present on Original Admission: Y  -NELY Side: Left  -NELY Orientation: circumferential  -NELY Location: leg  -NELY Primary Wound Type: Blisters  -NELY Type: cellulitis  -NELY    Retired Wound - Properties Group Placement Date: 02/03/25  -NELY Placement Time: 2015 -JP Present on Original Admission: Y  -NELY Side: Left  -NELY Orientation: circumferential  -NELY Location: leg  -NELY Primary Wound Type: Blisters  -NELY Type: cellulitis  -NELY    Retired Wound - Properties Group Date first assessed: 02/03/25  -NELY Time first assessed: 2015 -JP Present on Original Admission: Y  -NELY Side: Left  -NELY Location: leg  -NELY Primary Wound Type: Blisters  -NELY Type: cellulitis  -NELY      Row Name 02/04/25 1507          Plan of Care Review    Plan of Care Reviewed With patient  -KW     Progress improving  -KW     Outcome Evaluation PT wound eval complete. Patient with moderate edema in B LE with dry crusts/ flakes and large open areas. PT able to debride flakes/crusts and nonviable tissue from B LE. Patient would benefit from unna to B LE improve venous return and manage BLE edema/ girth. PT will check back in a couple of days to monitor skin and wraps  -KW       Row  Name 02/04/25 1507          Positioning and Restraints    Pre-Treatment Position in bed  -KW     Post Treatment Position bed  -KW     In Bed supine;encouraged to call for assist;call light within reach  -KW       Row Name 02/04/25 1507          Physical Therapy Goals    Wound Management Goal Selection (PT) wound management, PT goal 1  -KW       Row Name 02/04/25 1507          Wound Management Goal 1 (PT)    Wound Management Goal (Wound Goal 1, PT) Patient's B LE without any trace of edema  -KW     Time Frame (Wound Goal 1, PT) 2 weeks  -KW               User Key  (r) = Recorded By, (t) = Taken By, (c) = Cosigned By      Initials Name Provider Type    Yolanda Howard, PT Physical Therapist    Meg Rock, RN Registered Nurse                  Physical Therapy Education       Title: PT OT SLP Therapies (In Progress)       Topic: Physical Therapy (Done)       Point: Mobility training (Done)       Learning Progress Summary            Patient Acceptance, E, VU,NR by CD at 2/4/2025 1254    Comment: BENEFITS OF OOB ACTIVITY, SAFETY WITH MOBILITY, PROGRESSION OF POC, D/C PLANNING,                      Point: Home exercise program (Done)       Learning Progress Summary            Patient Acceptance, E, VU,NR by CD at 2/4/2025 1254    Comment: BENEFITS OF OOB ACTIVITY, SAFETY WITH MOBILITY, PROGRESSION OF POC, D/C PLANNING,                      Point: Body mechanics (Done)       Learning Progress Summary            Patient Acceptance, E, VU,NR by CD at 2/4/2025 1254    Comment: BENEFITS OF OOB ACTIVITY, SAFETY WITH MOBILITY, PROGRESSION OF POC, D/C PLANNING,                      Point: Precautions (Done)       Learning Progress Summary            Patient Acceptance, E, VU,NR by CD at 2/4/2025 1254    Comment: BENEFITS OF OOB ACTIVITY, SAFETY WITH MOBILITY, PROGRESSION OF POC, D/C PLANNING,                                      User Key       Initials Effective Dates Name Provider Type Discipline    CD 02/03/23 -   Lindsay Harrington, PT Physical Therapist PT                    Recommendation and Plan  Anticipated Discharge Disposition (PT): inpatient rehabilitation facility  Planned Therapy Interventions (PT): balance training, bed mobility training, gait training, motor coordination training, neuromuscular re-education, transfer training, postural re-education, home exercise program, patient/family education, strengthening, stretching  Therapy Frequency (PT): daily  Plan of Care Reviewed With: patient   Progress: improving       Progress: improving  Outcome Evaluation: PT wound eval complete. Patient with moderate edema in B LE with dry crusts/ flakes and large open areas. PT able to debride flakes/crusts and nonviable tissue from B LE. Patient would benefit from unna to B LE improve venous return and manage BLE edema/ girth. PT will check back in a couple of days to monitor skin and wraps  Plan of Care Reviewed With: patient            Time Calculation   PT Charges       Row Name 02/04/25 1507 02/04/25 1256          Time Calculation    Start Time 1507  -KW 1044  -CD     PT Received On -- 02/04/25  -CD     PT Goal Re-Cert Due Date -- 02/14/25  -CD        Time Calculation- PT    Total Timed Code Minutes- PT -- 34 minute(s)  -CD        Timed Charges    93426 - PT Therapeutic Activity Minutes -- 34  -CD        Untimed Charges    PT Eval/Re-eval Minutes 31  -KW 60  -CD     Wound Care 36984 Selective debridement;51875 Add't debridement ea 20 cm;32640 Unna boot  -KW --     29580-Unna Boot 27  -KW --     55673-Zchufqavq debridement 13  -KW --     97598-Add't debridement ea 20 cm 30  -KW --        Total Minutes    Timed Charges Total Minutes -- 34  -CD     Untimed Charges Total Minutes 101  -KW 60  -CD      Total Minutes 101  -KW 94  -CD               User Key  (r) = Recorded By, (t) = Taken By, (c) = Cosigned By      Initials Name Provider Type    Lindsay Culp, PT Physical Therapist    Yolanda Howard, JARAD Physical  Therapist                      Therapy Charges for Today       Code Description Service Date Service Provider Modifiers Qty    23804648189 HC PT RE-EVAL ESTABLISHED PLAN 2 2/4/2025 Yolanda Callaway, PT GP 1    22681396817 HC PT STAPPING UNNA BOOT 2/4/2025 Yolanda Callaway, PT GP 1    57029133365 HC MIAH DEBRIDE OPEN WOUND UP TO 20CM 2/4/2025 Yolanda Callaway, PT GP 1    [id number] HC PT MIAH DEBRIDE OPEN WOUND EA ADD 20CM 2/4/2025 Yolanda Callaway, PT GP 6              PT G-Codes  Outcome Measure Options: AM-PAC 6 Clicks Basic Mobility (PT)  AM-PAC 6 Clicks Score (PT): 9       Yolanda Callaway PT  2/4/2025

## 2025-02-04 NOTE — PLAN OF CARE
Goal Outcome Evaluation:  Plan of Care Reviewed With: patient           Outcome Evaluation: PT PRESENTS WITH EVOLVING SYMPTOMS TO INCLUDE B LE CELLULITIS/PAIN, IMPAIRED BALANCE, GENERALIZED WEAKNESS, DECREASED ROM AND DECLINE IN FUNCTIONAL MOBILITY. PT HAS PARKINSON'S. PT WAS AMBULATING WITH WX AT D/C FROM RECENT REHAB STAY BUT HAS DECLINED OVER THE LAST 3 WEEKS AS PT WAS UNABLE TO GET F/U WOUND CARE FOR LE'S. CURRENTLY PT REQUIRES MAX ASSIST FOR BED MOBILITY AND TRANSFERS. PT IS A&O AND GIVES GOOD EFFORT. RECOMMEND IRF AT D/C FOR BEST OUTCOME. PT WILL LIKELY NEED TRANSITION TO [facility] IF ABLE.    Anticipated Discharge Disposition (PT): inpatient rehabilitation facility

## 2025-02-04 NOTE — PLAN OF CARE
Problem: Adult Inpatient Plan of Care  Goal: Plan of Care Review  Outcome: Progressing  Flowsheets (Taken 2/4/2025 0447)  Progress: no change  Outcome Evaluation: Pt A&Ox4. VSS on RA. NSR with 1st deg avb per monitor. Bilateral lower extremity circumferential wounds dressed with xeroform and gauze, pt tolerated well. IV abx per MAR. Plan of care continues.  Plan of Care Reviewed With: patient  Goal: Patient-Specific Goal (Individualized)  Outcome: Progressing  Goal: Absence of Hospital-Acquired Illness or Injury  Outcome: Progressing  Intervention: Identify and Manage Fall Risk  Recent Flowsheet Documentation  Taken 2/4/2025 0400 by Meg Mcallister, LEADIA  Safety Promotion/Fall Prevention:   activity supervised   assistive device/personal items within reach   clutter free environment maintained   fall prevention program maintained   nonskid shoes/slippers when out of bed   room organization consistent   safety round/check completed  Taken 2/4/2025 0000 by Meg Mcallister, RN  Safety Promotion/Fall Prevention:   activity supervised   assistive device/personal items within reach   clutter free environment maintained   fall prevention program maintained   nonskid shoes/slippers when out of bed   room organization consistent   safety round/check completed  Taken 2/3/2025 2200 by Meg Mcallister, RN  Safety Promotion/Fall Prevention:   activity supervised   assistive device/personal items within reach   clutter free environment maintained   fall prevention program maintained   nonskid shoes/slippers when out of bed   room organization consistent   safety round/check completed  Taken 2/3/2025 2015 by Meg Mcallister, RN  Safety Promotion/Fall Prevention:   activity supervised   assistive device/personal items within reach   clutter free environment maintained   fall prevention program maintained   nonskid shoes/slippers when out of bed   room organization consistent   safety round/check completed  Intervention: Prevent Skin Injury  Recent  Flowsheet Documentation  Taken 2/4/2025 0400 by Meg Mcallister RN  Skin Protection:   incontinence pads utilized   silicone foam dressing in place   skin sealant/moisture barrier applied   transparent dressing maintained  Taken 2/4/2025 0200 by Meg Mcallister RN  Skin Protection:   incontinence pads utilized   silicone foam dressing in place   skin sealant/moisture barrier applied   transparent dressing maintained  Taken 2/4/2025 0000 by Meg Mcallister RN  Body Position:   turned   supine  Skin Protection:   incontinence pads utilized   silicone foam dressing in place   skin sealant/moisture barrier applied   transparent dressing maintained  Taken 2/3/2025 2200 by Meg Mcallister RN  Body Position:   right   turned  Skin Protection:   incontinence pads utilized   silicone foam dressing in place   skin sealant/moisture barrier applied   transparent dressing maintained  Taken 2/3/2025 2015 by Meg Mcallister RN  Body Position:   turned   supine  Skin Protection:   incontinence pads utilized   silicone foam dressing in place   skin sealant/moisture barrier applied   transparent dressing maintained  Intervention: Prevent Infection  Recent Flowsheet Documentation  Taken 2/4/2025 0400 by Meg Mcallister RN  Infection Prevention:   cohorting utilized   environmental surveillance performed   equipment surfaces disinfected   hand hygiene promoted   personal protective equipment utilized   rest/sleep promoted   single patient room provided  Taken 2/4/2025 0000 by Meg Mcallister RN  Infection Prevention:   cohorting utilized   environmental surveillance performed   equipment surfaces disinfected   hand hygiene promoted   personal protective equipment utilized   rest/sleep promoted   single patient room provided  Taken 2/3/2025 2200 by Meg Mcallister RN  Infection Prevention:   cohorting utilized   environmental surveillance performed   equipment surfaces disinfected   hand hygiene promoted   personal protective equipment utilized   rest/sleep promoted    single patient room provided  Taken 2/3/2025 2015 by Meg Mcallister RN  Infection Prevention:   cohorting utilized   environmental surveillance performed   equipment surfaces disinfected   hand hygiene promoted   personal protective equipment utilized   rest/sleep promoted   single patient room provided  Goal: Optimal Comfort and Wellbeing  Outcome: Progressing  Intervention: Provide Person-Centered Care  Recent Flowsheet Documentation  Taken 2/3/2025 2015 by Meg Mcallister RN  Trust Relationship/Rapport:   care explained   choices provided   emotional support provided   empathic listening provided   questions answered   questions encouraged   reassurance provided   thoughts/feelings acknowledged  Goal: Readiness for Transition of Care  Outcome: Progressing  Intervention: Mutually Develop Transition Plan  Recent Flowsheet Documentation  Taken 2/3/2025 2020 by Meg Mcallister RN  Equipment Currently Used at Home: walker, standard  Taken 2/3/2025 2018 by Meg Mcallister RN  Transportation Anticipated: (Pt does not have ride) other (see comments)  Patient/Family Anticipated Services at Transition: none  Patient/Family Anticipates Transition to: home     Problem: Skin Injury Risk Increased  Goal: Skin Health and Integrity  Outcome: Progressing  Intervention: Optimize Skin Protection  Recent Flowsheet Documentation  Taken 2/4/2025 0400 by Meg Mcallister RN  Activity Management: activity encouraged  Pressure Reduction Techniques:   frequent weight shift encouraged   heels elevated off bed   pressure points protected   weight shift assistance provided  Head of Bed (HOB) Positioning: HOB elevated  Pressure Reduction Devices:   pressure-redistributing mattress utilized   heel offloading device utilized   foam padding utilized  Skin Protection:   incontinence pads utilized   silicone foam dressing in place   skin sealant/moisture barrier applied   transparent dressing maintained  Taken 2/4/2025 0200 by Meg Mcallister RN  Pressure Reduction  Techniques:   frequent weight shift encouraged   heels elevated off bed   pressure points protected   weight shift assistance provided  Pressure Reduction Devices:   pressure-redistributing mattress utilized   heel offloading device utilized   foam padding utilized  Skin Protection:   incontinence pads utilized   silicone foam dressing in place   skin sealant/moisture barrier applied   transparent dressing maintained  Taken 2/4/2025 0000 by Meg Mcallister RN  Activity Management: activity encouraged  Pressure Reduction Techniques:   frequent weight shift encouraged   heels elevated off bed   pressure points protected   weight shift assistance provided  Head of Bed (HOB) Positioning: HOB elevated  Pressure Reduction Devices:   pressure-redistributing mattress utilized   heel offloading device utilized   foam padding utilized  Skin Protection:   incontinence pads utilized   silicone foam dressing in place   skin sealant/moisture barrier applied   transparent dressing maintained  Taken 2/3/2025 2200 by Meg Mcallister RN  Activity Management: activity encouraged  Pressure Reduction Techniques:   frequent weight shift encouraged   heels elevated off bed   pressure points protected   weight shift assistance provided  Head of Bed (\[facility]\"") Positioning: HOB elevated  Pressure Reduction Devices:   pressure-redistributing mattress utilized   heel offloading device utilized   foam padding utilized  Skin Protection:   incontinence pads utilized   silicone foam dressing in place   skin sealant/moisture barrier applied   transparent dressing maintained  Taken 2/3/2025 2015 by Meg Mcallister RN  Activity Management: activity encouraged  Pressure Reduction Techniques:   frequent weight shift encouraged   heels elevated off bed   pressure points protected   weight shift assistance provided  Head of Bed (HOB) Positioning: HOB elevated  Pressure Reduction Devices:   pressure-redistributing mattress utilized   heel offloading device utilized   foam  padding utilized  Skin Protection:   incontinence pads utilized   silicone foam dressing in place   skin sealant/moisture barrier applied   transparent dressing maintained   Goal Outcome Evaluation:  Plan of Care Reviewed With: patient        Progress: no change  Outcome Evaluation: Pt A&Ox4. VSS on RA. NSR with 1st deg avb per monitor. Bilateral lower extremity circumferential wounds dressed with xeroform and gauze, pt tolerated well. IV abx per MAR. Plan of care continues.

## 2025-02-04 NOTE — THERAPY EVALUATION
Patient Name: Cordell Martin  : 1945    MRN: [id number]                              Today's Date: 2025       Admit Date: 2/3/2025    Visit Dx:     ICD-10-CM ICD-9-CM   1. Bilateral lower extremity edema  R60.0 782.3   2. Lymphedema  I89.0 457.1     Patient Active Problem List   Diagnosis    Anxiety    Arthritis    Depression    Hyperlipidemia    Lumbar stenosis with neurogenic claudication    Parkinson's disease    Lumbar stenosis with neurogenic claudication status post L4-5 decompression    Status post lumbar laminectomy    Memory loss    Hypothyroidism (acquired)    Pulmonary nodule (4mm RML incidental)    GERD    Remote smoker (Stopped )    Restrictive pattern on PFTs w/o evidence of ILD    Cellulitis    History of DVT (deep vein thrombosis)    Anemia, chronic disease    Bilateral lower leg cellulitis     Past Medical History:   Diagnosis Date    Anxiety     Arthritis     Back pain     Bronchitis     Cognitive impairment     Depression     Disease of thyroid gland     Glucose intolerance (impaired glucose tolerance)     Hyperlipidemia     Kidney stone     Obesity     Parkinson disease     Pneumonia     Prostate disorder     Thyroid disease     Wears eyeglasses      Past Surgical History:   Procedure Laterality Date    COLONOSCOPY      5 years ago    EYE SURGERY      HERNIA REPAIR  10/20/2020    KNEE SURGERY      LUMBAR LAMINECTOMY DISCECTOMY DECOMPRESSION N/A 2017    Procedure: LUMBAR LAMINECTOMY L4-5;  Surgeon: Antonio Trent MD;  Location: [facility];  Service:     SHOULDER ROTATOR CUFF REPAIR Right     x2    TONSILLECTOMY      VEIN SURGERY        General Information       Row Name 25 1200          Physical Therapy Time and Intention    Document Type evaluation  -CD     Mode of Treatment physical therapy  -CD       Row Name 25 1200          General Information    Patient Profile Reviewed yes  -CD     Prior Level of Function independent:;transfer;bed mobility;ADL's;w/c or  scooter  HOME FROM SKILLED REHAB APPROX 3 WEEKS WITH STEADY DECLINE. PRIOR TO D/C FROM REHAB  AMBULATING WITH R WALKER.  PT DID NOT RECEIVE F/U WOUND CARE FOR LE'S  AND HAS DECLINED DUE TO  B LE PAIN AND DECREASED MOBILITY. HAS SCOOTER, LIFT CHAIR, SHOWER SEAT.  -CD     Existing Precautions/Restrictions fall  SIGNIFICANT CELLULITIS B LE'S, PARKINSON'S DISEASE, CHRONIC BACK PAIN AND R KNEE PAIN,  -CD     Barriers to Rehab medically complex;previous functional deficit  -CD       Row Name 02/04/25 1200          Living Environment    People in Home alone   PT REPORTS TO LIVE IN ILF. NEEDS HIGHER LEVEL OF CARE BUT REPORTS HE CAN NOT AFFORD- NO FAMILY OR FRIENDS TO HELP. CONTACTED CM TO ASSIST.  -CD       Row Name 02/04/25 1200          Home Main Entrance    Number of Stairs, Main Entrance none  -CD       Row Name 02/04/25 1200          Stairs Within Home, Primary    Number of Stairs, Within Home, Primary none  -CD       Row Name 02/04/25 1200          Cognition    Orientation Status (Cognition) oriented x 3  -CD       Row Name 02/04/25 1200          Safety Issues/Impairments Affecting Functional Mobility    Safety Issues Affecting Function (Mobility) insight into deficits/self-awareness;safety precaution awareness;safety precautions follow-through/compliance;sequencing abilities  -CD     Impairments Affecting Function (Mobility) balance;coordination;endurance/activity tolerance;strength;motor control;motor planning;pain;postural/trunk control  -CD     Comment, Safety Issues/Impairments (Mobility) MOVEMENT REQUIRES INCREASED TIME AND EFFORT. BODY HABITUS, PD AND PAINFUL LE'S IMPACT MOBILITY.  -CD               User Key  (r) = Recorded By, (t) = Taken By, (c) = Cosigned By      Initials Name Provider Type    CD Lindsay Harrington, PT Physical Therapist                   Mobility       Row Name 02/04/25 1226          Bed Mobility    Bed Mobility supine-sit;sit-supine  -CD     Supine-Sit [place] (Bed Mobility) maximum  assist (25% patient effort);2 person assist  -CD     Sit-Supine [place] (Bed Mobility) dependent (less than 25% patient effort);2 person assist  -CD     Assistive Device (Bed Mobility) bed rails;repositioning sheet  -CD     Comment, (Bed Mobility) MANUAL ASSIST TO UPRIGHT TRUNK AND MOVE LE'S OFF EOB. INCREASED B LE PAIN. ( B LE WRAPPED IN KERLIX AND WEEPING THROUGH) NSG NOTIFIED PT WILL NEED ROOM WITH MECHANICAL LIFT SYSTEM.   -CD       Row Name 02/04/25 1226          Transfers    Comment, (Transfers) CUES FOR HAND PLACEMENT. STS FROM EOB  VIA B UE SUPPORT WITH MAX ASSIST OF 2. TOLERATED STANDING BRIEFLY X APPROX 15 SECONDS AND UNABLE TO COME TO FULL UPRIGHT POSTURE. COMPLETED PARTIAL SIT TO STAND X 5 REPS TO SCOOT HIPS TOWARDS HOB WITH MAX ASSIST OF 2.  -CD       Row Name 02/04/25 1226          Bed-Chair Transfer    Bed-Chair [place] (Transfers) unable to assess  -CD       Row Name 02/04/25 1226          Sit-Stand Transfer    Sit-Stand [place] (Transfers) maximum assist (25% patient effort);2 person assist  -CD     Assistive Device (Sit-Stand Transfers) --  B UE SUPPORT.  -CD       Row Name 02/04/25 1226          Gait/Stairs (Locomotion)    Comment, (Gait/Stairs) PT HAS DECLINED SINCE D/C FROM REHAB FACILITY AND UNABLE TO AMBULATE DUE TO PAINFUL LE'S  -CD               User Key  (r) = Recorded By, (t) = Taken By, (c) = Cosigned By      Initials Name Provider Type    CD Lindsay Harrington, PT Physical Therapist                   Obj/Interventions       Row Name 02/04/25 1240          Range of Motion Comprehensive    General Range of Motion lower extremity range of motion deficits identified  -CD     Comment, General Range of Motion B LE LIMITED APPRX 25%. PAINFUL AND WITH KERLIX WRAPS B.  -CD       Row Name 02/04/25 1240          Strength Comprehensive (MMT)    General Manual Muscle Testing (MMT) Assessment lower extremity strength deficits identified  -CD     Comment, General Manual Muscle Testing (MMT)  Assessment GROSSLY 3 TO 3+/5 B LE. LIMITED BY PAIN AND WEAKNESS.  -CD       Row Name 02/04/25 1240          Motor Skills    Motor Skills coordination;functional endurance  -CD     Coordination gross motor deficit;bilateral;lower extremity;moderate impairment  PT HAS PARKINSON'S AND IS TREMULOUS, ESPECIALLY R UE.  -CD     Functional Endurance O2 SATS STABLE ON RA. PT IS FLUSHED. NSG NOTIFIED.  -CD       Row Name 02/04/25 1240          Balance    Balance Assessment sitting static balance;sitting dynamic balance;standing dynamic balance;standing static balance  -CD     Static Sitting Balance contact guard;verbal cues  -CD     Dynamic Sitting Balance moderate assist  -CD     Position, Sitting Balance unsupported;sitting edge of bed  -CD     Static Standing Balance maximum assist;2-person assist  -CD     Dynamic Standing Balance maximum assist;2-person assist  -CD     Position/Device Used, Standing Balance supported  B UE SUPPORT.  -CD     Balance Interventions sitting;standing;sit to stand;supported;static;dynamic;weight shifting activity;trunk training exercise  -CD     Comment, Balance PT HAS POSTERIOR LEAN IN SITTING AND STANDING.  -CD               User Key  (r) = Recorded By, (t) = Taken By, (c) = Cosigned By      Initials Name Provider Type    CD Lindsay Harrington, PT Physical Therapist                   Goals/Plan       Row Name 02/04/25 1252          Bed Mobility Goal 1 (PT)    Activity/Assistive Device (Bed Mobility Goal 1, PT) sit to supine/supine to sit  -CD     [place] Level/Cues Needed (Bed Mobility Goal 1, PT) contact guard required  -CD     Time Frame (Bed Mobility Goal 1, PT) short term goal (STG);1 week  -CD       Row Name 02/04/25 1252          Transfer Goal 1 (PT)    Activity/Assistive Device (Transfer Goal 1, PT) bed-to-chair/chair-to-bed;sit-to-stand/stand-to-sit  -CD     [place] Level/Cues Needed (Transfer Goal 1, PT) minimum assist (75% or more patient effort)  -CD     Time Frame (Transfer  Goal 1, PT) long term goal (LTG);2 weeks  -CD       Row Name 02/04/25 1252          Gait Training Goal 1 (PT)    Activity/Assistive Device (Gait Training Goal 1, PT) gait (walking locomotion);walker, rolling  -CD     [place] Level (Gait Training Goal 1, PT) moderate assist (50-74% patient effort)  -CD     Distance (Gait Training Goal 1, PT) 100  -CD     Time Frame (Gait Training Goal 1, PT) long term goal (LTG);2 weeks  -CD       Row Name 02/04/25 1252          Therapy Assessment/Plan (PT)    Planned Therapy Interventions (PT) balance training;bed mobility training;gait training;motor coordination training;neuromuscular re-education;transfer training;postural re-education;home exercise program;patient/family education;strengthening;stretching  -CD               User Key  (r) = Recorded By, (t) = Taken By, (c) = Cosigned By      Initials Name Provider Type    CD Lindsay Harrington, PT Physical Therapist                   Clinical Impression       Row Name 02/04/25 1243          Pain    Pretreatment Pain Rating 7/10  -CD     Posttreatment Pain Rating 9/10  -CD     Pain Location extremity;back  -CD     Pain Side/Orientation bilateral;lower  -CD     Pain Management Interventions positioning techniques utilized  -CD     Response to Pain Interventions activity participation with increased pain  -CD     Pre/Posttreatment Pain Comment B LE CELLULITIS. LE'S WEEPING THROUGH KERLIX WRAPS. CHRONIC LBP, R KNEE PAIN.  -CD       Row Name 02/04/25 1242          Plan of Care Review    Plan of Care Reviewed With patient  -CD     Outcome Evaluation PT PRESENTS WITH EVOLVING SYMPTOMS TO INCLUDE B LE CELLULITIS/PAIN, IMPAIRED BALANCE, GENERALIZED WEAKNESS, DECREASED ROM AND DECLINE IN FUNCTIONAL MOBILITY. PT HAS PARKINSON'S. PT WAS AMBULATING WITH WX AT D/C FROM RECENT REHAB STAY BUT HAS DECLINED OVER THE LAST 3 WEEKS AS PT WAS UNABLE TO GET F/U WOUND CARE FOR LE'S. CURRENTLY PT REQUIRES MAX ASSIST FOR BED MOBILITY AND TRANSFERS. PT IS  A&O AND GIVES GOOD EFFORT. RECOMMEND IRF AT D/C FOR BEST OUTCOME. PT WILL LIKELY NEED TRANSITION TO [facility] IF ABLE.  -CD       Row Name 02/04/25 1243          Therapy Assessment/Plan (PT)    Patient/Family Therapy Goals Statement (PT) TO RETURN TO PLOF.  -CD     Rehab Potential (PT) good  -CD     Criteria for Skilled Interventions Met (PT) yes;meets criteria;skilled treatment is necessary  -CD     Therapy Frequency (PT) daily  -CD     Predicted Duration of Therapy Intervention (PT) 2 WEEKS  -CD       Row Name 02/04/25 1243          Vital Signs    Pre Systolic BP Rehab 99  -CD     Pre Treatment Diastolic BP 51  -CD     Post Systolic BP Rehab 126  -CD     Post Treatment Diastolic   -CD     Posttreatment Heart Rate (beats/min) 81  -CD     Pre SpO2 (%) 94  -CD     O2 Delivery Pre Treatment room air  -CD     O2 Delivery Intra Treatment room air  -CD     Post SpO2 (%) 91  -CD     O2 Delivery Post Treatment room air  -CD     Pre Patient Position Supine  -CD     Intra Patient Position Standing  -CD     Post Patient Position Supine  -CD       Row Name 02/04/25 1243          Positioning and Restraints    Pre-Treatment Position in bed  -CD     Post Treatment Position bed  -CD     In Bed fowlers;notified nsg;encouraged to call for assist;exit alarm on;RUE elevated;LUE elevated;legs elevated  -CD               User Key  (r) = Recorded By, (t) = Taken By, (c) = Cosigned By      Initials Name Provider Type    CD Lindsay Harrington, PT Physical Therapist                   Outcome Measures       Row Name 02/04/25 1253          How much help from another person do you currently need...    Turning from your back to your side while in flat bed without using bedrails? 2  -CD     Moving from lying on back to sitting on the side of a flat bed without bedrails? 2  -CD     Moving to and from a bed to a chair (including a wheelchair)? 1  -CD     Standing up from a chair using your arms (e.g., wheelchair, bedside chair)? 2  -CD      Climbing 3-5 steps with a railing? 1  -CD     To walk in hospital room? 1  -CD     AM-PAC 6 Clicks Score (PT) 9  -CD     Highest Level of Mobility Goal 3 --> Sit at edge of bed  -CD       Row Name 02/04/25 1253          Functional Assessment    Outcome Measure Options AM-PAC 6 Clicks Basic Mobility (PT)  -CD               User Key  (r) = Recorded By, (t) = Taken By, (c) = Cosigned By      Initials Name Provider Type    CD Lindsay Harrington PT Physical Therapist                                 Physical Therapy Education       Title: PT OT SLP Therapies (In Progress)       Topic: Physical Therapy (Done)       Point: Mobility training (Done)       Learning Progress Summary            Patient Acceptance, E, VU,NR by CD at 2/4/2025 1254    Comment: BENEFITS OF OOB ACTIVITY, SAFETY WITH MOBILITY, PROGRESSION OF POC, D/C PLANNING,                      Point: Home exercise program (Done)       Learning Progress Summary            Patient Acceptance, E, VU,NR by CD at 2/4/2025 1254    Comment: BENEFITS OF OOB ACTIVITY, SAFETY WITH MOBILITY, PROGRESSION OF POC, D/C PLANNING,                      Point: Body mechanics (Done)       Learning Progress Summary            Patient Acceptance, E, VU,NR by CD at 2/4/2025 1254    Comment: BENEFITS OF OOB ACTIVITY, SAFETY WITH MOBILITY, PROGRESSION OF POC, D/C PLANNING,                      Point: Precautions (Done)       Learning Progress Summary            Patient Acceptance, E, VU,NR by CD at 2/4/2025 1254    Comment: BENEFITS OF OOB ACTIVITY, SAFETY WITH MOBILITY, PROGRESSION OF POC, D/C PLANNING,                                      User Key       Initials Effective Dates Name Provider Type Discipline    CD 02/03/23 -  Lindsay Harrington PT Physical Therapist PT                  PT Recommendation and Plan  Planned Therapy Interventions (PT): balance training, bed mobility training, gait training, motor coordination training, neuromuscular re-education, transfer training, postural  re-education, home exercise program, patient/family education, strengthening, stretching  Outcome Evaluation: PT PRESENTS WITH EVOLVING SYMPTOMS TO INCLUDE B LE CELLULITIS/PAIN, IMPAIRED BALANCE, GENERALIZED WEAKNESS, DECREASED ROM AND DECLINE IN FUNCTIONAL MOBILITY. PT HAS PARKINSON'S. PT WAS AMBULATING WITH WX AT D/C FROM RECENT REHAB STAY BUT HAS DECLINED OVER THE LAST 3 WEEKS AS PT WAS UNABLE TO GET F/U WOUND CARE FOR LE'S. CURRENTLY PT REQUIRES MAX ASSIST FOR BED MOBILITY AND TRANSFERS. PT IS A&O AND GIVES GOOD EFFORT. RECOMMEND IRF AT D/C FOR BEST OUTCOME. PT WILL LIKELY NEED TRANSITION TO [facility] IF ABLE.     Time Calculation:   PT Evaluation Complexity  History, PT Evaluation Complexity: 3 or more personal factors and/or comorbidities  Examination of Body Systems (PT Eval Complexity): total of 4 or more elements  Clinical Presentation (PT Evaluation Complexity): evolving  Clinical Decision Making (PT Evaluation Complexity): moderate complexity  Overall Complexity (PT Evaluation Complexity): moderate complexity     PT Charges       Row Name 02/04/25 1256             Time Calculation    Start Time 1044  -CD      PT Received On 02/04/25  -CD      PT Goal Re-Cert Due Date 02/14/25  -CD         Time Calculation- PT    Total Timed Code Minutes- PT 34 minute(s)  -CD         Timed Charges    98275 - PT Therapeutic Activity Minutes 34  -CD         Untimed Charges    PT Eval/Re-eval Minutes 60  -CD         Total Minutes    Timed Charges Total Minutes 34  -CD      Untimed Charges Total Minutes 60  -CD       Total Minutes 94  -CD                User Key  (r) = Recorded By, (t) = Taken By, (c) = Cosigned By      Initials Name Provider Type    CD Lindsay Harrington, PT Physical Therapist                  Therapy Charges for Today       Code Description Service Date Service Provider Modifiers Qty    65365647494 HC PT THERAPEUTIC ACT EA 15 MIN 2/4/2025 Lindsay Harrington, PT GP 2    86394471402 HC PT THER SUPP EA 15 MIN 2/4/2025  Lindsay Harrington, PT GP 3    88807251079 HC PT EVAL MOD COMPLEXITY 4 2/4/2025 Lindsay Harrington, PT GP 1            PT G-Codes  Outcome Measure Options: AM-PAC 6 Clicks Basic Mobility (PT)  AM-PAC 6 Clicks Score (PT): 9  PT Discharge Summary  Anticipated Discharge Disposition (PT): inpatient rehabilitation facility    Lindsay Harrington, PT  2/4/2025

## 2025-02-04 NOTE — CASE MANAGEMENT/SOCIAL WORK
Discharge Planning Assessment  [facility]     Patient Name: Cordell Martin  MRN: [id number]  Today's Date: 2/4/2025    Admit Date: 2/3/2025    Plan: Home   Discharge Needs Assessment       Row Name 02/04/25 1430       Living Environment    People in Home alone    Current Living Arrangements independent living facility;other (see comments)  Lives at [facility]    Primary Care Provided by self    Provides Primary Care For no one, unable/limited ability to care for self    Family Caregiver if Needed none    Quality of Family Relationships other (see comments)  His sister Lennie Guillen lives in [place]    Able to Return to Prior Arrangements yes       Transition Planning    Patient/Family Anticipates Transition to home    Transportation Anticipated other (see comments)  Will need transportation at discharge       Discharge Needs Assessment    Equipment Currently Used at Home wheelchair, motorized;other (see comments)  electric scooter ([facility])                   Discharge Plan       Row Name 02/04/25 1500       Plan    Plan Home    Patient/Family in Agreement with Plan yes    Plan Comments Met with Mr. Martin in his room, to initiate discharge planning. He lives in [facility] at [facility]. He verified he has Medicare and [place] [employer] insurance. He has prescription coverage and uses [facility], his brother-in-law is a Pharmacist there. His PCP is Ben Holder MD. Prior to admission, he is independent with most ADL's. The facility provides all his meals and housekeeping weekly. He has an electric scooter that he got from [facility] and he bought a motorized wheelchair from an individual at the facility. He was about to get started with [facility] for SN/PT/OT but, he ended up  here at the hospital. KAITLIN called and verified with [place] Liaison with [facility] and agreed that he was going to be their patient. KAITLIN  put in a new order for home health for SN/PT/OT for when he's discharged. CM spoke with his sister Lennie via telephone, to clarify the name of the facility he resides at.She said she had him at [facility] in June 2024, then he moved himself out of there and went to the facility he is at now.  She has concerns him living alone and being able to take care of  himself. She thinks he needs to go to an assisted living or somewhere where he can get help all the time, since he has Parkinson's and is having a lot of trouble with his legs.  His plan is to go back to [facility], when he is medically ready.He said he can't afford anywhere else.  Mr. Martin said he would need transportation, when he's discharged. Await therapy recommendations to determine proper discharge placement or plan. CM will continue to follow for medical readiness and will assist with any discharge needs as indicated.    Final Discharge Disposition Code 01 - home or self-care                  Continued Care and Services - Admitted Since 2/3/2025       Home Medical Care Coordination complete.      Service Provider Request Status Services Address Phone Fax Patient Preferred    [facility] [address] [place] [address] [address] [address] [phone] -- --                  Expected Discharge Date and Time       Expected Discharge Date Expected Discharge Time    Feb 7, 2025            Demographic Summary       Row Name 02/04/25 1428       General Information    Admission Type inpatient    Arrived From emergency department    Referral Source admission list    Reason for Consult discharge planning    Preferred Language English       Contact Information    Permission Granted to Share Info With     Contact Information Obtained for                    Functional Status       Row Name 02/04/25 1428       Functional Status    Usual Activity Tolerance fair     Current Activity Tolerance fair       Functional Status, IADL    Medications independent    Meal Preparation other (see comments)  [facility] provides meals    Housekeeping other (see comments)  [facility] provides cleaning room weekly.    Laundry independent    Shopping independent                   Psychosocial    No documentation.                  Abuse/Neglect    No documentation.                  Legal    No documentation.                  Substance Abuse    No documentation.                  Patient Forms    No documentation.                     Regina Asif RN

## 2025-02-04 NOTE — CONSULTS
Cordell Martin  1945  [id number]    Date of Consult: 2/4/2025    Admit Date:  2/3/2025      Requesting Provider: Dr Drummond  Evaluating Physician: Trevor Jorgensen MD    Chief Complaint: leg pain    Reason for Consultation: leg infections    History of present illness:     Patient is a 80 y.o.  Yr old male Previously patient of Dr. Benites, with history of Parkinson's disease and chronically debilitated, uses a wheelchair primarily by his report but apparently had occasionally been able to use a walker.  Admitted October 2024 with right greater than left lower leg and foot cellulitis, MRI no osteomyelitis and prior history trauma/maggots; culture with skin derrell and pseudomonas aeruginosa, finished IV Zosyn in the hospital.Admission notes indicate cognitive impairment and other comorbidity as outlined below.    Admitted February 3, 2025 with worsening redness/pain at both lower legs with open wounds and unable to make appointments with wound care, apparently difficulty with transportation.  In addition he reports that the Unna boot wraps had been left on for weeks without change.  He did not know about any other specific trauma but admits to bumping the legs periodically and could have had superficial trauma     Otherwise, no other specific blunt force or penetrating trauma, no animal insect arthropod bite.  No fresh/Brackish/salt water exposure. Does not know about any prior history MRSA VRE C. Difficile or ESBL/KPC/CRE organisms    No headache photophobia or neck stiffness.  No shortness of breath cough or hemoptysis.  No nausea vomiting diarrhea or abdominal pain.  No dysuria hematuria or pyuria.  No rash        Past Medical History:   Diagnosis Date    Anxiety     Arthritis     Back pain     Bronchitis     Cognitive impairment     Depression     Disease of thyroid gland     Glucose intolerance (impaired glucose tolerance)     Hyperlipidemia     Kidney stone     Obesity     Parkinson disease      Pneumonia     Prostate disorder     Thyroid disease     Wears eyeglasses        Past Surgical History:   Procedure Laterality Date    COLONOSCOPY      5 years ago    EYE SURGERY      HERNIA REPAIR  10/20/2020    KNEE SURGERY      LUMBAR LAMINECTOMY DISCECTOMY DECOMPRESSION N/A 07/13/2017    Procedure: LUMBAR LAMINECTOMY L4-5;  Surgeon: Antonio Trent MD;  [facility];  Service:     SHOULDER ROTATOR CUFF REPAIR Right     x2    TONSILLECTOMY      VEIN SURGERY         Pediatric History   Patient Parents    Not on file     Other Topics Concern    Not on file   Social History Narrative    Lives alone. Uses walker    Has not smoked since about 1980       family history includes Alzheimer's disease in an other family member; Cancer in his father and another family member; Diabetes in an other family member; Heart disease in an other family member; Stroke in an other family member.    No Known Allergies    Medication:  Current Facility-Administered Medications   Medication Dose Route Frequency Provider Last Rate Last Admin    acetaminophen (TYLENOL) tablet 650 mg  650 mg Oral Q4H PRN Serenity Drummond MD        sennosides-docusate (PERICOLACE) 8.6-50 MG per tablet 2 tablet  2 tablet Oral BID Serenity Drummond MD        And    polyethylene glycol (MIRALAX) packet 17 g  17 g Oral Daily PRN Serenity Drummond MD        And    bisacodyl (DULCOLAX) EC tablet 5 mg  5 mg Oral Daily PRN Serenity Drummond MD        And    bisacodyl (DULCOLAX) suppository 10 mg  10 mg Rectal Daily PRN Serenity Drummond MD        budesonide-formoterol (SYMBICORT) 160-4.5 MCG/ACT inhaler 2 puff  2 puff Inhalation BID - RT Serenity Drummond MD   2 puff at 02/03/25 2352    Calcium Replacement - Follow Nurse / BPA Driven Protocol   Not Applicable PRN Serenity Drummond MD        carbidopa-levodopa (SINEMET)  MG per tablet 2 tablet  2 tablet Oral 4x Daily Serenity Drummond MD   2 tablet at 02/03/25 1910    heparin (porcine) 5000 UNIT/ML  injection 5,000 Units  5,000 Units Subcutaneous Q12H Serenity Drummond MD   5,000 Units at 02/04/25 0022    HYDROcodone-acetaminophen (NORCO) 5-325 MG per tablet 1 tablet  1 tablet Oral Q4H PRN Serenity Drummond MD   1 tablet at 02/04/25 0526    influenza vac split high-dose (FLUZONE HIGH DOSE) injection 0.5 mL  0.5 mL Intramuscular During Hospitalization Serenity Drummond MD        levothyroxine (SYNTHROID, LEVOTHROID) tablet 88 mcg  88 mcg Oral Q AM Serenity Drummond MD   88 mcg at 02/04/25 0523    Magnesium Standard Dose Replacement - Follow Nurse / BPA Driven Protocol   Not Applicable PRN Serenity Drummond MD        morphine injection 2 mg  2 mg Intravenous Q2H PRN Serenity Drummond MD        Phosphorus Replacement - Follow Nurse / BPA Driven Protocol   Not Applicable PRN Serenity Drummond MD        piperacillin-tazobactam (ZOSYN) 3.375 g IVPB in 100 mL NS MBP (CD)  3.375 g Intravenous Q8H Serenity Drummond MD   3.375 g at 02/04/25 0021    Potassium Replacement - Follow Nurse / BPA Driven Protocol   Not Applicable PRN Serenity Drummond MD        pramipexole (MIRAPEX) tablet 1 mg  1 mg Oral TID Serenity Drummond MD   1 mg at 02/04/25 0022    sodium chloride 0.9 % flush 10 mL  10 mL Intravenous PRN [place], Tu, DO        sodium chloride 0.9 % flush 10 mL  10 mL Intravenous PRN [place], Tu, DO        tamsulosin (FLOMAX) 24 hr capsule 0.4 mg  0.4 mg Oral Daily Serenity Drummond MD        vitamin D3 capsule 5,000 Units  5,000 Units Oral Daily Serenity Drummond MD           Antibiotics:  Anti-Infectives (From admission, onward)      Ordered     Dose/Rate Route Frequency Start Stop    02/03/25 2208  piperacillin-tazobactam (ZOSYN) 3.375 g IVPB in 100 mL NS MBP (CD)        Ordering Provider: Serenity Drummond MD    3.375 g  over 30 Minutes Intravenous Every 8 Hours Scheduled 02/03/25 2230 02/10/25 6332    02/03/25 1650  vancomycin 2500 mg/500 mL 0.9% NS IVPB ([facility])        Ordering Provider: Serenity Drummond MD    22  "mg/kg × 109 kg  over 150 Minutes Intravenous Once 02/03/25 1706 02/03/25 2000 02/03/25 1448  piperacillin-tazobactam (ZOSYN) 3.375 g IVPB in 100 mL NS MBP (CD)        Ordering Provider: Tu Pruitt DO    3.375 g  over 30 Minutes Intravenous Once 02/03/25 1504 02/03/25 1657              Review of Systems    Constitutional-- No Fever, chills or sweats.  Appetite good, and no malaise. No fatigue.  Heent-- No new vision, hearing or throat complaints.  No epistaxis or oral sores.  Denies odynophagia or dysphagia.  No flashers, floaters or eye pain. No odynophagia or dysphagia. No headache, photophobia or neck stiffness.  CV-- No chest pain, palpitation or syncope  Resp-- No SOB/cough/Hemoptysis  GI- No nausea, vomiting, or diarrhea.  No hematochezia, melena, or hematemesis. Denies jaundice or chronic liver disease.  -- No dysuria, hematuria, or flank pain.  Denies hesitancy, urgency or flank pain.  Lymph- no swollen lymph nodes in neck/axilla or groin.   Heme- No active bruising or bleeding  MS-- no swelling or pain in the bones or joints of arms/legs aside from above.  No new back pain.  Neuro-- No acute focal weakness or numbness in the arms or legs.  No seizures.    Full 12 point review of systems reviewed and negative otherwise for acute complaints, except for of    Physical Exam:   Vital Signs   /61 (BP Location: Left arm, Patient Position: Lying)   Pulse 83   Temp 97.7 °F (36.5 °C) (Oral)   Resp 18   Ht 182.9 cm (72\")   Wt 120 kg (263 lb 14.4 oz)   SpO2 93%   BMI 35.79 kg/m²     GENERAL: Awake and chronically ill-appearing, in no acute distress. Has a tremor associated with his Parkinson's disease  HEENT: Normocephalic, atraumatic.    No conjunctival injection. No icterus. Oropharynx clear without evidence of thrush or exudate. No evidence of periodontal disease.    NECK: Supple without nuchal rigidity. No mass.  LYMPH: No cervical, axillary or inguinal lymphadenopathy.  HEART: RRR; No murmur, " rubs, gallops.   LUNGS: Clear to auscultation bilaterally without wheezing, rales, rhonchi. Normal respiratory effort. Nonlabored. No dullness.  ABDOMEN: Soft, nontender, nondistended. Positive bowel sounds. No rebound or guarding. NO mass or HSM.  EXT:  see below  : Genitalia generally unremarkable.  Without Bird catheter.  MSK: FROM without joint effusions noted arms/legs.    SKIN: Warm and dry without cutaneous eruptions on Inspection/palpation.    NEURO: Oriented to PPT.  He does not cooperate well with a detailed motor exam given his chronic debility.    Bilateral lower extremities with chronic appearing edema/discoloration with probable stasis change, acute erythema/warmth and tenderness bilateral with multifocal ulceration at lower between knees and toes.  No discrete mass bulge or fluctuance.  No crepitus or bulla.  Some crust/slough and unable to determine depth of these wounds but no probe to bone tendon joint or ligament    Laboratory Data    Results from last 7 days   Lab Units 02/03/25  1136   WBC 10*3/mm3 5.40   HEMOGLOBIN g/dL 12.3*   HEMATOCRIT % 40.2   PLATELETS 10*3/mm3 243     Results from last 7 days   Lab Units 02/03/25  1136   SODIUM mmol/L 142   POTASSIUM mmol/L 3.8   CHLORIDE mmol/L 105   CO2 mmol/L 27.0   BUN mg/dL 29*   CREATININE mg/dL 1.19   GLUCOSE mg/dL 118*   CALCIUM mg/dL 9.0     Results from last 7 days   Lab Units 02/03/25  1136   ALK PHOS U/L 162*   BILIRUBIN mg/dL 0.4   ALT (SGPT) U/L 16   AST (SGOT) U/L 20               Estimated Creatinine Clearance: 68.5 mL/min (by C-G formula based on SCr of 1.19 mg/dL).      Microbiology:      Radiology:  Imaging Results (Last 72 Hours)       Procedure Component Value Units Date/Time    XR Chest 1 View [[id number]] Collected: 02/03/25 1308     Updated: 02/03/25 1314    Narrative:      XR CHEST 1 VW    Date of Exam: 2/3/2025 12:34 PM EST    Indication: CHF/COPD Protocol    Comparison: 10/8/2024    Findings:  Metallic zipper artifact is  superimposed over the neck. There is previous right shoulder surgery. Heart appears mildly enlarged. Vasculature appears upper limits of normal. No pulmonary edema is seen. Lung volumes are relatively low. There is mild   coarsening of the pulmonary interstitial markings but similar to prior exams. Basilar interstitial markings appear to correlate with mild interstitial disease seen in the lower lungs on 10/9/2024 chest CT scan.      Impression:      Impression:    1. Mild cardiomegaly and pulmonary venous hypertension.    2. Relatively low lung volumes and mild chronic appearing reticular lung changes similar to prior studies.      Electronically Signed: Fabian Dye MD    2/3/2025 1:11 PM EST    Workstation ID: [id number]              Impression:     --acute bilateral lower leg and foot cellulitis/infections with multifocal ulcerations, chronic discoloration/chronic edema and potential intermittent superficial trauma although no specific inciting event.  Apparently had not made it to appointments with wound care and Unna boots had not been changed in weeks.  Unclear how much of this is general self-neglect versus recent weather related challenge with combination. high risk for persistent/recurrent or nonhealing wounds, persistent/progressive or recurrent infection and risk for further functional/limb loss, risk for amputation etc. Empiric antibiotics ongoing    --Parkinson's disease/memory loss per admission notes, chronically debilitated and primarily wheelchair bound/bedbound by his report    --chronic edema and possible lymphedema per patient notes    --Prior history DVT per past notes    PLAN: Thank you for asking us to see Cordell Martin, I recommend the following:    --IV daptomycin/Zosyn    **Wound culture left leg with MRSA/gram-negative marzena so far    --Check/review labs cultures and scans    --Partial history Per nursing staff    --Discussed with microbiology    --Discussed with multidisciplinary  team/nursing regarding complex set of issues, discussed with microbiology lab and discussed importance of antimicrobial stewardship    This visit included the following complex service elements:  Complex medical decision-making associated with antimicrobial prescribing.  Counseled patients, family members, and/or caregivers regarding antimicrobial stewardship and antibiotic resistance.  Communicated with the clinical microbiology lab.       Trevor Jorgensen MD  2/4/2025

## 2025-02-05 LAB
ANION GAP SERPL CALCULATED.3IONS-SCNC: 9 MMOL/L (ref 5–15)
BACTERIA SPEC AEROBE CULT: ABNORMAL
BACTERIA SPEC AEROBE CULT: ABNORMAL
BUN SERPL-MCNC: 25 MG/DL (ref 8–23)
BUN/CREAT SERPL: 19.2 (ref 7–25)
CALCIUM SPEC-SCNC: 8.2 MG/DL (ref 8.6–10.5)
CHLORIDE SERPL-SCNC: 105 MMOL/L (ref 98–107)
CO2 SERPL-SCNC: 24 MMOL/L (ref 22–29)
CREAT SERPL-MCNC: 1.3 MG/DL (ref 0.76–1.27)
DEPRECATED RDW RBC AUTO: 60.4 FL (ref 37–54)
EGFRCR SERPLBLD CKD-EPI 2021: 56.2 ML/MIN/1.73
ERYTHROCYTE [DISTWIDTH] IN BLOOD BY AUTOMATED COUNT: 17.4 % (ref 12.3–15.4)
GLUCOSE SERPL-MCNC: 134 MG/DL (ref 65–99)
GRAM STN SPEC: ABNORMAL
HCT VFR BLD AUTO: 32 % (ref 37.5–51)
HGB BLD-MCNC: 9.9 G/DL (ref 13–17.7)
MCH RBC QN AUTO: 29.1 PG (ref 26.6–33)
MCHC RBC AUTO-ENTMCNC: 30.9 G/DL (ref 31.5–35.7)
MCV RBC AUTO: 94.1 FL (ref 79–97)
PLATELET # BLD AUTO: 195 10*3/MM3 (ref 140–450)
PMV BLD AUTO: 8.5 FL (ref 6–12)
POTASSIUM SERPL-SCNC: 3.9 MMOL/L (ref 3.5–5.2)
RBC # BLD AUTO: 3.4 10*6/MM3 (ref 4.14–5.8)
SODIUM SERPL-SCNC: 138 MMOL/L (ref 136–145)
WBC NRBC COR # BLD AUTO: 5.57 10*3/MM3 (ref 3.4–10.8)

## 2025-02-05 PROCEDURE — 94799 UNLISTED PULMONARY SVC/PX: CPT

## 2025-02-05 PROCEDURE — 99232 SBSQ HOSP IP/OBS MODERATE 35: CPT | Performed by: INTERNAL MEDICINE

## 2025-02-05 PROCEDURE — 85027 COMPLETE CBC AUTOMATED: CPT | Performed by: INTERNAL MEDICINE

## 2025-02-05 PROCEDURE — 25010000002 HEPARIN (PORCINE) PER 1000 UNITS: Performed by: INTERNAL MEDICINE

## 2025-02-05 PROCEDURE — 25010000002 DAPTOMYCIN PER 1 MG: Performed by: INTERNAL MEDICINE

## 2025-02-05 PROCEDURE — 94664 DEMO&/EVAL PT USE INHALER: CPT

## 2025-02-05 PROCEDURE — 25010000002 PIPERACILLIN SOD-TAZOBACTAM PER 1 G: Performed by: INTERNAL MEDICINE

## 2025-02-05 PROCEDURE — 80048 BASIC METABOLIC PNL TOTAL CA: CPT | Performed by: INTERNAL MEDICINE

## 2025-02-05 RX ORDER — FUROSEMIDE 20 MG/1
20 TABLET ORAL DAILY
Status: DISCONTINUED | OUTPATIENT
Start: 2025-02-05 | End: 2025-02-11 | Stop reason: HOSPADM

## 2025-02-05 RX ORDER — ATORVASTATIN CALCIUM 40 MG/1
40 TABLET, FILM COATED ORAL DAILY
Status: DISCONTINUED | OUTPATIENT
Start: 2025-02-05 | End: 2025-02-11 | Stop reason: HOSPADM

## 2025-02-05 RX ORDER — FLUTICASONE PROPIONATE 50 MCG
2 SPRAY, SUSPENSION (ML) NASAL DAILY
Status: DISCONTINUED | OUTPATIENT
Start: 2025-02-05 | End: 2025-02-11 | Stop reason: HOSPADM

## 2025-02-05 RX ADMIN — PRAMIPEXOLE DIHYDROCHLORIDE 1 MG: 1 TABLET ORAL at 15:12

## 2025-02-05 RX ADMIN — ATORVASTATIN CALCIUM 40 MG: 40 TABLET, FILM COATED ORAL at 08:57

## 2025-02-05 RX ADMIN — PRAMIPEXOLE DIHYDROCHLORIDE 1 MG: 1 TABLET ORAL at 21:51

## 2025-02-05 RX ADMIN — HYDROCODONE BITARTRATE AND ACETAMINOPHEN 1 TABLET: 5; 325 TABLET ORAL at 21:51

## 2025-02-05 RX ADMIN — HEPARIN SODIUM 5000 UNITS: 5000 INJECTION INTRAVENOUS; SUBCUTANEOUS at 21:51

## 2025-02-05 RX ADMIN — SENNOSIDES AND DOCUSATE SODIUM 2 TABLET: 50; 8.6 TABLET ORAL at 08:56

## 2025-02-05 RX ADMIN — FUROSEMIDE 20 MG: 20 TABLET ORAL at 08:56

## 2025-02-05 RX ADMIN — LEVOTHYROXINE SODIUM 88 MCG: 0.09 TABLET ORAL at 05:10

## 2025-02-05 RX ADMIN — PIPERACILLIN AND TAZOBACTAM 3.38 G: 3; .375 INJECTION, POWDER, LYOPHILIZED, FOR SOLUTION INTRAVENOUS at 00:23

## 2025-02-05 RX ADMIN — CARBIDOPA AND LEVODOPA 2 TABLET: 25; 100 TABLET ORAL at 18:06

## 2025-02-05 RX ADMIN — HEPARIN SODIUM 5000 UNITS: 5000 INJECTION INTRAVENOUS; SUBCUTANEOUS at 08:56

## 2025-02-05 RX ADMIN — BUDESONIDE AND FORMOTEROL FUMARATE DIHYDRATE 2 PUFF: 160; 4.5 AEROSOL RESPIRATORY (INHALATION) at 09:07

## 2025-02-05 RX ADMIN — SENNOSIDES AND DOCUSATE SODIUM 2 TABLET: 50; 8.6 TABLET ORAL at 21:51

## 2025-02-05 RX ADMIN — PIPERACILLIN AND TAZOBACTAM 3.38 G: 3; .375 INJECTION, POWDER, LYOPHILIZED, FOR SOLUTION INTRAVENOUS at 08:57

## 2025-02-05 RX ADMIN — CARBIDOPA AND LEVODOPA 2 TABLET: 25; 100 TABLET ORAL at 08:56

## 2025-02-05 RX ADMIN — CARBIDOPA AND LEVODOPA 2 TABLET: 25; 100 TABLET ORAL at 12:03

## 2025-02-05 RX ADMIN — PIPERACILLIN AND TAZOBACTAM 3.38 G: 3; .375 INJECTION, POWDER, LYOPHILIZED, FOR SOLUTION INTRAVENOUS at 23:48

## 2025-02-05 RX ADMIN — HYDROCODONE BITARTRATE AND ACETAMINOPHEN 1 TABLET: 5; 325 TABLET ORAL at 08:56

## 2025-02-05 RX ADMIN — Medication 5000 UNITS: at 08:57

## 2025-02-05 RX ADMIN — CARBIDOPA AND LEVODOPA 2 TABLET: 25; 100 TABLET ORAL at 21:51

## 2025-02-05 RX ADMIN — DAPTOMYCIN 400 MG: 500 INJECTION, POWDER, LYOPHILIZED, FOR SOLUTION INTRAVENOUS at 12:03

## 2025-02-05 RX ADMIN — TAMSULOSIN HYDROCHLORIDE 0.4 MG: 0.4 CAPSULE ORAL at 08:57

## 2025-02-05 RX ADMIN — PIPERACILLIN AND TAZOBACTAM 3.38 G: 3; .375 INJECTION, POWDER, LYOPHILIZED, FOR SOLUTION INTRAVENOUS at 18:07

## 2025-02-05 RX ADMIN — FLUTICASONE PROPIONATE 2 SPRAY: 50 SPRAY, METERED NASAL at 10:51

## 2025-02-05 RX ADMIN — PRAMIPEXOLE DIHYDROCHLORIDE 1 MG: 1 TABLET ORAL at 08:56

## 2025-02-05 RX ADMIN — BUDESONIDE AND FORMOTEROL FUMARATE DIHYDRATE 2 PUFF: 160; 4.5 AEROSOL RESPIRATORY (INHALATION) at 21:56

## 2025-02-05 RX ADMIN — HYDROCODONE BITARTRATE AND ACETAMINOPHEN 1 TABLET: 5; 325 TABLET ORAL at 15:05

## 2025-02-05 NOTE — PROGRESS NOTES
Cordell Martin  1945  [id number]      Evaluating Physician: Trevor Jorgensen MD    Chief Complaint: leg pain    Reason for Consultation: leg infections    History of present illness:     Patient is a 80 y.o.  Yr old male Previously patient of Dr. Benites, with history of Parkinson's disease and chronically debilitated, uses a wheelchair primarily by his report but apparently had occasionally been able to use a walker.  Admitted October 2024 with right greater than left lower leg and foot cellulitis, MRI no osteomyelitis and prior history trauma/maggots; culture with skin derrell and pseudomonas aeruginosa, finished IV Zosyn in the hospital.Admission notes indicate cognitive impairment and other comorbidity as outlined below.    Admitted February 3, 2025 with worsening redness/pain at both lower legs with open wounds and unable to make appointments with wound care, apparently difficulty with transportation.  In addition he reports that the Unna boot wraps had been left on for weeks without change.  He did not know about any other specific trauma but admits to bumping the legs periodically and could have had superficial trauma     2/5/25 Bilateral leg pain is dull at present, intermittent, nonradiating, worse with pressure, better with pain meds and 2-3 out of 10 in severity, not progressive.  Redness and swelling not progressive.    No headache photophobia or neck stiffness.  No shortness of breath cough or hemoptysis.  No nausea vomiting diarrhea or abdominal pain.  No dysuria hematuria or pyuria.  No rash        Past Medical History:   Diagnosis Date    Anxiety     Arthritis     Back pain     Bronchitis     Cognitive impairment     Depression     Disease of thyroid gland     Glucose intolerance (impaired glucose tolerance)     Hyperlipidemia     Kidney stone     Obesity     Parkinson disease     Pneumonia     Prostate disorder     Thyroid disease     Wears eyeglasses        Past Surgical History:    Procedure Laterality Date    COLONOSCOPY      5 years ago    EYE SURGERY      HERNIA REPAIR  10/20/2020    KNEE SURGERY      LUMBAR LAMINECTOMY DISCECTOMY DECOMPRESSION N/A 07/13/2017    Procedure: LUMBAR LAMINECTOMY L4-5;  Surgeon: nAtonio Trent MD;  Location: [facility] OR;  Service:     SHOULDER ROTATOR CUFF REPAIR Right     x2    TONSILLECTOMY      VEIN SURGERY         Pediatric History   Patient Parents    Not on file     Other Topics Concern    Not on file   Social History Narrative    Lives alone. Uses walker    Has not smoked since about 1980       family history includes Alzheimer's disease in an other family member; Cancer in his father and another family member; Diabetes in an other family member; Heart disease in an other family member; Stroke in an other family member.    No Known Allergies    Medication:  Current Facility-Administered Medications   Medication Dose Route Frequency Provider Last Rate Last Admin    acetaminophen (TYLENOL) tablet 650 mg  650 mg Oral Q4H PRN Serenity Drummond MD        sennosides-docusate (PERICOLACE) 8.6-50 MG per tablet 2 tablet  2 tablet Oral BID Serenity Drummond MD   2 tablet at 02/04/25 2105    And    polyethylene glycol (MIRALAX) packet 17 g  17 g Oral Daily PRN Serenity Drummond MD        And    bisacodyl (DULCOLAX) EC tablet 5 mg  5 mg Oral Daily PRN Serenity Drummond MD        And    bisacodyl (DULCOLAX) suppository 10 mg  10 mg Rectal Daily PRN Serenity Drummond MD        budesonide-formoterol (SYMBICORT) 160-4.5 MCG/ACT inhaler 2 puff  2 puff Inhalation BID - RT Serenity Drummond MD   2 puff at 02/04/25 1941    Calcium Replacement - Follow Nurse / BPA Driven Protocol   Not Applicable PRN Serenity Drummond MD        carbidopa-levodopa (SINEMET)  MG per tablet 2 tablet  2 tablet Oral 4x Daily Serenity Drummond MD   2 tablet at 02/04/25 2105    DAPTOmycin (CUBICIN) 400 mg in sodium chloride 0.9 % 50 mL IVPB  4 mg/kg (Adjusted) Intravenous Q24H Jameel  Trevor JORDAN  mL/hr at 02/04/25 0934 400 mg at 02/04/25 0934    heparin (porcine) 5000 UNIT/ML injection 5,000 Units  5,000 Units Subcutaneous Q12H Serenity Drummond MD   5,000 Units at 02/04/25 2105    HYDROcodone-acetaminophen (NORCO) 5-325 MG per tablet 1 tablet  1 tablet Oral Q4H PRN Serenity Drummond MD   1 tablet at 02/04/25 2105    influenza vac split high-dose (FLUZONE HIGH DOSE) injection 0.5 mL  0.5 mL Intramuscular During Hospitalization Serenity Drummond MD        levothyroxine (SYNTHROID, LEVOTHROID) tablet 88 mcg  88 mcg Oral Q AM Serenity Drummond MD   88 mcg at 02/05/25 0510    Magnesium Standard Dose Replacement - Follow Nurse / BPA Driven Protocol   Not Applicable PRN Serenity Drummond MD        morphine injection 2 mg  2 mg Intravenous Q2H PRN Serenity Drummond MD        Phosphorus Replacement - Follow Nurse / BPA Driven Protocol   Not Applicable PRN Serenity Drummond MD        piperacillin-tazobactam (ZOSYN) 3.375 g IVPB in 100 mL NS MBP (CD)  3.375 g Intravenous Q8H Serenity Drummond MD   3.375 g at 02/05/25 0023    Potassium Replacement - Follow Nurse / BPA Driven Protocol   Not Applicable PRSerenity Reyez MD        pramipexole (MIRAPEX) tablet 1 mg  1 mg Oral TID Serenity Drummond MD   1 mg at 02/04/25 2105    sodium chloride 0.9 % flush 10 mL  10 mL Intravenous PRN Darby Tu, DO        sodium chloride 0.9 % flush 10 mL  10 mL Intravenous PRN Darby Tu, DO        tamsulosin (FLOMAX) 24 hr capsule 0.4 mg  0.4 mg Oral Daily Serenity Drummond MD   0.4 mg at 02/04/25 0851    vitamin D3 capsule 5,000 Units  5,000 Units Oral Daily Serenity Drummond MD   5,000 Units at 02/04/25 0851       Antibiotics:  Anti-Infectives (From admission, onward)      Ordered     Dose/Rate Route Frequency Start Stop    02/04/25 0756  DAPTOmycin (CUBICIN) 400 mg in sodium chloride 0.9 % 50 mL IVPB        Ordering Provider: Trevor Jorgensen MD    4 mg/kg × 94.6 kg (Adjusted)  100 mL/hr over 30 Minutes  "Intravenous Every 24 Hours 02/04/25 0930 02/11/25 0929    02/03/25 2208  piperacillin-tazobactam (ZOSYN) 3.375 g IVPB in 100 mL NS MBP (CD)        Ordering Provider: Serenity Drummond MD    3.375 g  over 30 Minutes Intravenous Every 8 Hours Scheduled 02/03/25 2230 02/10/25 2359    02/03/25 1650  vancomycin 2500 mg/500 mL 0.9% NS IVPB ([facility])        Ordering Provider: Serenity Drummond MD    22 mg/kg × 109 kg  over 150 Minutes Intravenous Once 02/03/25 1706 02/03/25 2000    02/03/25 1448  piperacillin-tazobactam (ZOSYN) 3.375 g IVPB in 100 mL NS MBP (CD)        Ordering Provider: Tu Pruitt DO    3.375 g  over 30 Minutes Intravenous Once 02/03/25 1504 02/03/25 1657              Review of Systems    2/5/25     Constitutional-- No Fever, chills or sweats.  Appetite good, and no malaise. No fatigue.  Heent-- No new vision, hearing or throat complaints.  No epistaxis or oral sores.  Denies odynophagia or dysphagia.  No flashers, floaters or eye pain. No odynophagia or dysphagia. No headache, photophobia or neck stiffness.  CV-- No chest pain, palpitation or syncope  Resp-- No SOB/cough/Hemoptysis  GI- No nausea, vomiting, or diarrhea.  No hematochezia, melena, or hematemesis. Denies jaundice or chronic liver disease.  -- No dysuria, hematuria, or flank pain.  Denies hesitancy, urgency or flank pain.  Lymph- no swollen lymph nodes in neck/axilla or groin.   Heme- No active bruising or bleeding  MS-- no swelling or pain in the bones or joints of arms/legs aside from above.  No new back pain.  Neuro-- No acute focal weakness or numbness in the arms or legs.  No seizures.    Full 12 point review of systems reviewed and negative otherwise for acute complaints, except for of    Physical Exam:   Vital Signs   /57 (BP Location: Right arm, Patient Position: Lying)   Pulse 79   Temp 98.7 °F (37.1 °C) (Oral)   Resp 18   Ht 182.9 cm (72\")   Wt 120 kg (263 lb 14.4 oz)   SpO2 94%   BMI 35.79 kg/m²     GENERAL: Awake and " chronically ill-appearing, in no acute distress. Has a tremor associated with his Parkinson's disease  HEENT: Normocephalic, atraumatic.    No conjunctival injection. No icterus. Oropharynx clear without evidence of thrush or exudate. No evidence of periodontal disease.    NECK: Supple without nuchal rigidity. No mass.  LYMPH: No cervical, axillary or inguinal lymphadenopathy.  HEART: RRR; No murmur, rubs, gallops.   LUNGS: Clear to auscultation bilaterally without wheezing, rales, rhonchi. Normal respiratory effort. Nonlabored. No dullness.  ABDOMEN: Soft, nontender, nondistended. Positive bowel sounds. No rebound or guarding. NO mass or HSM.  EXT:  see below   MSK: FROM without joint effusions noted arms/legs.    SKIN: Warm and dry without cutaneous eruptions on Inspection/palpation.    NEURO: Oriented to PPT.  He does not cooperate well with a detailed motor exam given his chronic debility.    Bilateral lower extremities with chronic appearing edema/discoloration with probable stasis change, acute erythema/warmth and tenderness bilateral with multifocal ulceration at lower between knees and toes.  No discrete mass bulge or fluctuance.  No crepitus or bulla.  Some crust/slough and unable to determine depth of these wounds but no probe to bone tendon joint or ligament    Laboratory Data    Results from last 7 days   Lab Units 02/05/25  0639 02/04/25  0647 02/03/25  1136   WBC 10*3/mm3 5.57 5.58 5.40   HEMOGLOBIN g/dL 9.9* 10.1* 12.3*   HEMATOCRIT % 32.0* 32.6* 40.2   PLATELETS 10*3/mm3 195 203 243     Results from last 7 days   Lab Units 02/05/25  0639   SODIUM mmol/L 138   POTASSIUM mmol/L 3.9   CHLORIDE mmol/L 105   CO2 mmol/L 24.0   BUN mg/dL 25*   CREATININE mg/dL 1.30*   GLUCOSE mg/dL 134*   CALCIUM mg/dL 8.2*     Results from last 7 days   Lab Units 02/04/25  0647   ALK PHOS U/L 128*   BILIRUBIN mg/dL 0.5   ALT (SGPT) U/L <5   AST (SGOT) U/L 16               Estimated Creatinine Clearance: 62.7 mL/min (A) (by  C-G formula based on SCr of 1.3 mg/dL (H)).      Microbiology:      Radiology:  Imaging Results (Last 72 Hours)       Procedure Component Value Units Date/Time    XR Chest 1 View [[id number]] Collected: 02/03/25 1308     Updated: 02/03/25 1314    Narrative:      XR CHEST 1 VW    Date of Exam: 2/3/2025 12:34 PM EST    Indication: CHF/COPD Protocol    Comparison: 10/8/2024    Findings:  Metallic zipper artifact is superimposed over the neck. There is previous right shoulder surgery. Heart appears mildly enlarged. Vasculature appears upper limits of normal. No pulmonary edema is seen. Lung volumes are relatively low. There is mild   coarsening of the pulmonary interstitial markings but similar to prior exams. Basilar interstitial markings appear to correlate with mild interstitial disease seen in the lower lungs on 10/9/2024 chest CT scan.      Impression:      Impression:    1. Mild cardiomegaly and pulmonary venous hypertension.    2. Relatively low lung volumes and mild chronic appearing reticular lung changes similar to prior studies.      Electronically Signed: Fabian Dye MD    2/3/2025 1:11 PM EST    Workstation ID: [id number]              Impression:     --acute bilateral lower leg and foot cellulitis/infections with multifocal ulcerations, chronic discoloration/chronic edema and potential intermittent superficial trauma although no specific inciting event.  Apparently had not made it to appointments with wound care and Unna boots had not been changed in weeks.  Unclear how much of this is general self-neglect versus recent weather related challenge with combination. high risk for persistent/recurrent or nonhealing wounds, persistent/progressive or recurrent infection and risk for further functional/limb loss, risk for amputation etc.  Culture below.  Arterial/venous/vascular workup at discretion of medicine team    --Parkinson's disease/memory loss per admission notes, chronically debilitated and primarily  wheelchair bound/bedbound by his report    --chronic edema and possible lymphedema per patient notes    --Prior history DVT per past notes    PLAN:     --IV daptomycin/Zosyn 7-10 days    **culture right leg and left leg with MRSA/pseudomonas aeruginosa from February 3, 2025    --Check/review labs cultures and scans    --Partial history Per nursing staff    --Discussed with microbiology    --Discussed with multidisciplinary team/nursing regarding complex set of issues, discussed with microbiology lab and discussed importance of antimicrobial stewardship    This visit included the following complex service elements:  Complex medical decision-making associated with antimicrobial prescribing.  Counseled patients, family members, and/or caregivers regarding antimicrobial stewardship and antibiotic resistance.  Communicated with the clinical microbiology lab.     Copied text in this note has been reviewed and is accurate as of 02/05/25.     Trevor Jorgensen MD  2/5/2025

## 2025-02-05 NOTE — PROGRESS NOTES
[facility] Medicine Services  PROGRESS NOTE    Patient Name: Cordell Martin  : 1945  MRN: [id number]    Date of Admission: 2/3/2025  Primary Care Physician: Ben Holder DO    Subjective   Subjective     CC:  Lower extremity wounds    HPI:  Patient resting comfortably on my evaluation.  Has been on 2 L nasal cannula intermittently especially when he is sleeping.  He states that he is having pain in his legs but otherwise denies fever, chills, dyspnea, chest pain.  Last had bowel movement 2 days ago.      Objective   Objective     Vital Signs:   Temp:  [97.4 °F (36.3 °C)-98.9 °F (37.2 °C)] 98.7 °F (37.1 °C)  Heart Rate:  [71-89] 79  Resp:  [16-18] 18  BP: ()/(49-60) 112/57  Flow (L/min) (Oxygen Therapy):  [2] 2     Physical Exam:  Physical Exam   Constitutional: He is oriented to person, place, and time. He does not appear ill. No distress.   HENT:   Head: Normocephalic and atraumatic.   Cardiovascular: Normal rate. Exam reveals no gallop.   No murmur heard.  Pulmonary/Chest: Effort normal and breath sounds normal. No respiratory distress. He has no wheezes. He has no rales.   Abdominal: Soft. Normal appearance. He exhibits no distension. There is no abdominal tenderness.   Neurological: He is alert and oriented to person, place, and time.   Skin: No bruising noted. No jaundice.   BLE wrapped        Results Reviewed:  LAB RESULTS:      Lab 25  0639 25  0647 25  1430 25  1136   WBC 5.57 5.58  --  5.40   HEMOGLOBIN 9.9* 10.1*  --  12.3*   HEMATOCRIT 32.0* 32.6*  --  40.2   PLATELETS 195 203  --  243   NEUTROS ABS  --  3.89  --  3.73   IMMATURE GRANS (ABS)  --  0.02  --  0.02   LYMPHS ABS  --  1.00  --  0.92   MONOS ABS  --  0.54  --  0.54   EOS ABS  --  0.11  --  0.17   MCV 94.1 93.1  --  94.6   LACTATE  --   --   --  1.2   PROTIME  --   --   --  13.6   HSTROP T  --   --  19 18         Lab 25  0639 25  1735 25  0647  02/03/25  1136   SODIUM 138  --  139 142   POTASSIUM 3.9 4.0 3.6 3.8   CHLORIDE 105  --  106 105   CO2 24.0  --  24.0 27.0   ANION GAP 9.0  --  9.0 10.0   BUN 25*  --  26* 29*   CREATININE 1.30*  --  1.06 1.19   EGFR 56.2*  --  71.8 62.5   GLUCOSE 134*  --  102* 118*   CALCIUM 8.2*  --  8.4* 9.0         Lab 02/04/25  0647 02/03/25  1136   TOTAL PROTEIN 5.3* 7.0   ALBUMIN 2.8* 3.4*   GLOBULIN 2.5 3.6   ALT (SGPT) <5 16   AST (SGOT) 16 20   BILIRUBIN 0.5 0.4   ALK PHOS 128* 162*         Lab 02/03/25  1430 02/03/25  1136   PROBNP  --  68.0   HSTROP T 19 18   PROTIME  --  13.6   INR  --  1.03                 Brief Urine Lab Results  (Last result in the past 365 days)        Color   Clarity   Blood   Leuk Est   Nitrite   Protein   CREAT   Urine HCG        10/08/24 2219 Yellow   Clear   Negative   Negative   Negative   Negative                   Microbiology Results Abnormal       Procedure Component Value - Date/Time    Wound Culture - Swab, Leg, Left [[id number]]  (Abnormal)  (Susceptibility) Collected: 02/03/25 1528    Lab Status: Edited Result - FINAL Specimen: Swab from Leg, Left Updated: 02/05/25 0754     Wound Culture Heavy growth (4+) Staphylococcus aureus, MRSA     Comment:   Methicillin resistant Staphylococcus aureus, Patient may be an isolation risk.         Scant growth (1+) Pseudomonas aeruginosa     Gram Stain Many (4+) Gram positive cocci in pairs and clusters      Rare (1+) WBCs seen      Rare (1+) Gram negative bacilli    Susceptibility        Staphylococcus aureus, MRSA      DRE      Clindamycin Susceptible      Daptomycin Susceptible (C)  [1]       Erythromycin Resistant      Oxacillin Resistant      Rifampin Susceptible      Tetracycline Resistant      Trimethoprim + Sulfamethoxazole Susceptible      Vancomycin Susceptible                   [1]  Appended report. These results have been appended to a previously final verified report.                Susceptibility        Pseudomonas aeruginosa      DRE       Cefepime Susceptible      Ceftazidime Susceptible      Ciprofloxacin Susceptible      Levofloxacin Intermediate      Piperacillin + Tazobactam Susceptible      Tobramycin Susceptible                       Susceptibility Comments       Staphylococcus aureus, MRSA    Daptomycin requested by Dr King 2/5      Pseudomonas aeruginosa    With the exception of urinary-sourced infections, aminoglycosides should not be used as monotherapy.               Wound Culture - Wound, Leg, Right [[id number]]  (Abnormal) Collected: 02/03/25 1903    Lab Status: Preliminary result Specimen: Wound from Leg, Right Updated: 02/05/25 0726     Wound Culture Light growth (2+) Staphylococcus aureus, MRSA     Comment: Refer to previous wound culture collected on 2/3/2025 1528 for MICs    Methicillin resistant Staphylococcus aureus, Patient may be an isolation risk.         Scant growth (1+) Pseudomonas aeruginosa     Comment: Refer to previous wound culture collected on 2/3/2025 1528 for MICs          Gram Stain Rare (1+) WBCs seen      Rare (1+) Gram positive cocci in pairs    Narrative:      Organism under investigation.      MRSA Screen, PCR (Inpatient) - Swab, Nares [[id number]]  (Abnormal) Collected: 02/03/25 1903    Lab Status: Final result Specimen: Swab from Nares Updated: 02/03/25 2102     MRSA PCR Positive    Narrative:      The negative predictive value of this diagnostic test is high and should only be used to consider de-escalating anti-MRSA therapy. A positive result may indicate colonization with MRSA and must be correlated clinically.            Duplex Venous Lower Extremity - Bilateral CAR    Result Date: 2/3/2025    Normal bilateral lower extremity venous duplex scan.     XR Chest 1 View    Result Date: 2/3/2025  XR CHEST 1 VW Date of Exam: 2/3/2025 12:34 PM EST Indication: CHF/COPD Protocol Comparison: 10/8/2024 Findings: Metallic zipper artifact is superimposed over the neck. There is previous right shoulder surgery.  "Heart appears mildly enlarged. Vasculature appears upper limits of normal. No pulmonary edema is seen. Lung volumes are relatively low. There is mild coarsening of the pulmonary interstitial markings but similar to prior exams. Basilar interstitial markings appear to correlate with mild interstitial disease seen in the lower lungs on 10/9/2024 chest CT scan.     Impression: Impression: 1. Mild cardiomegaly and pulmonary venous hypertension. 2. Relatively low lung volumes and mild chronic appearing reticular lung changes similar to prior studies. Electronically Signed: Fabian Dye MD  2/3/2025 1:11 PM EST  Workstation ID: [id number]     Results for orders placed during the hospital encounter of 01/12/21    Adult Transthoracic Echo Complete W/ Cont if Necessary Per Protocol    Interpretation Summary  · The right ventricle and right atrium are moderately dilated. Other chamber sizes and wall thicknesses are normal.  · Global and segmental LV wall motion is normal. The estimated left ventricular ejection fraction is 51% - 55%.  · The aortic and mitral valves are normal in structure and function.  · The estimated RV systolic pressure is mildly elevated 35 mmHg - 40 mmHg. There is as well noted to be less than 50% inspiratory IVC collapse. The main pulmonary artery is \"borderline dilated\".  · There are no other important findings on this study.      Current medications:  Scheduled Meds:budesonide-formoterol, 2 puff, Inhalation, BID - RT  carbidopa-levodopa, 2 tablet, Oral, 4x Daily  DAPTOmycin, 4 mg/kg (Adjusted), Intravenous, Q24H  heparin (porcine), 5,000 Units, Subcutaneous, Q12H  levothyroxine, 88 mcg, Oral, Q AM  piperacillin-tazobactam, 3.375 g, Intravenous, Q8H  pramipexole, 1 mg, Oral, TID  senna-docusate sodium, 2 tablet, Oral, BID  tamsulosin, 0.4 mg, Oral, Daily  vitamin D3, 5,000 Units, Oral, Daily      Continuous Infusions:   PRN Meds:.  acetaminophen    senna-docusate sodium **AND** polyethylene glycol **AND** " bisacodyl **AND** bisacodyl    Calcium Replacement - Follow Nurse / BPA Driven Protocol    HYDROcodone-acetaminophen    influenza vaccine    Magnesium Standard Dose Replacement - Follow Nurse / BPA Driven Protocol    Morphine    Phosphorus Replacement - Follow Nurse / BPA Driven Protocol    Potassium Replacement - Follow Nurse / BPA Driven Protocol    sodium chloride    sodium chloride    Assessment & Plan   Assessment & Plan     Active Hospital Problems    Diagnosis  POA    **Bilateral lower leg cellulitis [L03.116, L03.115]  Yes    Cellulitis [L03.90]  Yes    Anemia, chronic disease [D63.8]  Yes    History of DVT (deep vein thrombosis) [Z86.718]  Not Applicable    Restrictive pattern on PFTs w/o evidence of ILD [R94.2]  Yes    Memory loss [R41.3]  Yes    Parkinson's disease [G20.A1]  Yes      Resolved Hospital Problems   No resolved problems to display.        Brief Hospital Course to date:  Cordell Martin is a 80 y.o. male with PMH of PMH Parkinson's, history of DVT, HLD, hypothyroidism, GERD, lymphedema, anxiety, depression, wheelchair use/walker use at baseline presented to the hospital with worsening bilateral lower extremity wounds.    BLE wounds   Cellulitis, suspected   -Presents with bilateral lower extremity wounds, has had hospitalization for similar complaint in the past  -Venous duplex: Poor quality  -Hemodynamically stable, labs reviewed and WBC WNL  -Blood culture: NGTD  - Wound culture left leg with MRSA/Pseudomonas  PLAN:   - ID consulted, recommending IV daptomycin and Zosyn, and tentative plan to continue antibiotics for total of 7 days  -Wound care consulted  - Pain control: Tylenol prn   -Continue home Lasix 20 mg p.o.    Intermittent hypoxia  -Patient was requiring 2 L nasal cannula overnight and this morning on my evaluation  -Likely from undiagnosed MICHAEL  -Denies dyspnea, chest pain, fever, chills, cough  PLAN:  -Continue to monitor respiratory status    Chronic Medical Conditions:    Normocytic anemia  - chronic, Hemoglobin stable     Parkinson's disease  Anxiety and depression  - Continue home Sinemet, Mirapex     Hypothyroidism  - Synthroid with elevated TSH 13.6 normal free T4 on 1/14/25  - Continue levothyroxine 88 mcg daily follow-up with PCP for repeat TSH in 4 to 6 weeks     HLD  - Continue home atorvastatin     GERD  - PPI    Allergic rhinitis  - Continue home Flonase    AM-PAC 6 Clicks Score (PT): 9 (02/04/25 2000)    CODE STATUS:   Code Status and Medical Interventions: CPR (Attempt to Resuscitate); Full Support   Ordered at: 02/03/25 1628     Code Status (Patient has no pulse and is not breathing):    CPR (Attempt to Resuscitate)     Medical Interventions (Patient has pulse or is breathing):    Full Support       Leobardo Hayden MD  02/05/25

## 2025-02-05 NOTE — PLAN OF CARE
Goal Outcome Evaluation:  Plan of Care Reviewed With: patient        Progress: improving    Problem: Adult Inpatient Plan of Care  Goal: Plan of Care Review  Outcome: Progressing  Flowsheets  Taken 2/5/2025 0419 by Meg Mcallister RN  Progress: improving  Taken 2/4/2025 1243 by Lindsay Harrington PT  Plan of Care Reviewed With: patient  Goal: Patient-Specific Goal (Individualized)  Outcome: Progressing  Goal: Absence of Hospital-Acquired Illness or Injury  Outcome: Progressing  Intervention: Identify and Manage Fall Risk  Recent Flowsheet Documentation  Taken 2/5/2025 0200 by Meg Mcallister RN  Safety Promotion/Fall Prevention:   activity supervised   assistive device/personal items within reach   clutter free environment maintained   fall prevention program maintained   nonskid shoes/slippers when out of bed   room organization consistent   safety round/check completed  Taken 2/5/2025 0000 by Meg Mcallister RN  Safety Promotion/Fall Prevention:   activity supervised   assistive device/personal items within reach   clutter free environment maintained   fall prevention program maintained   nonskid shoes/slippers when out of bed   room organization consistent   safety round/check completed  Taken 2/4/2025 2200 by Meg Mcallister RN  Safety Promotion/Fall Prevention:   activity supervised   assistive device/personal items within reach   clutter free environment maintained   fall prevention program maintained   nonskid shoes/slippers when out of bed   room organization consistent   safety round/check completed  Taken 2/4/2025 2000 by Meg Mcallister RN  Safety Promotion/Fall Prevention:   activity supervised   assistive device/personal items within reach   clutter free environment maintained   fall prevention program maintained   nonskid shoes/slippers when out of bed   room organization consistent   safety round/check completed  Intervention: Prevent Skin Injury  Recent Flowsheet Documentation  Taken 2/5/2025 0200 by Meg Mcallister RN  Body  Position:   left   turned  Skin Protection:   incontinence pads utilized   silicone foam dressing in place   skin sealant/moisture barrier applied   transparent dressing maintained  Taken 2/5/2025 0000 by Meg Mcallister RN  Body Position:   right   turned  Skin Protection:   incontinence pads utilized   silicone foam dressing in place   skin sealant/moisture barrier applied   transparent dressing maintained  Taken 2/4/2025 2200 by Meg Mcallister RN  Skin Protection:   incontinence pads utilized   silicone foam dressing in place   skin sealant/moisture barrier applied   transparent dressing maintained  Taken 2/4/2025 2000 by Meg Mcallister RN  Skin Protection:   incontinence pads utilized   silicone foam dressing in place   skin sealant/moisture barrier applied   transparent dressing maintained  Intervention: Prevent and Manage VTE (Venous Thromboembolism) Risk  Recent Flowsheet Documentation  Taken 2/4/2025 2000 by Meg Mcallister RN  VTE Prevention/Management: (see mar) other (see comments)  Intervention: Prevent Infection  Recent Flowsheet Documentation  Taken 2/5/2025 0200 by Meg Mcallister RN  Infection Prevention:   cohorting utilized   environmental surveillance performed   equipment surfaces disinfected   hand hygiene promoted   personal protective equipment utilized   rest/sleep promoted   single patient room provided  Taken 2/5/2025 0000 by Meg Mcallister RN  Infection Prevention:   cohorting utilized   environmental surveillance performed   equipment surfaces disinfected   hand hygiene promoted   personal protective equipment utilized   rest/sleep promoted   single patient room provided  Taken 2/4/2025 2200 by Meg Mcallister RN  Infection Prevention:   cohorting utilized   environmental surveillance performed   equipment surfaces disinfected   hand hygiene promoted   personal protective equipment utilized   rest/sleep promoted   single patient room provided  Taken 2/4/2025 2000 by Meg Mcallister RN  Infection Prevention:    cohorting utilized   environmental surveillance performed   equipment surfaces disinfected   hand hygiene promoted   personal protective equipment utilized   rest/sleep promoted   single patient room provided  Goal: Optimal Comfort and Wellbeing  Outcome: Progressing  Intervention: Provide Person-Centered Care  Recent Flowsheet Documentation  Taken 2/4/2025 2000 by Meg Mcallister RN  Trust Relationship/Rapport:   care explained   choices provided   emotional support provided   empathic listening provided   questions answered   questions encouraged   reassurance provided   thoughts/feelings acknowledged  Goal: Readiness for Transition of Care  Outcome: Progressing     Problem: Skin Injury Risk Increased  Goal: Skin Health and Integrity  Outcome: Progressing  Intervention: Optimize Skin Protection  Recent Flowsheet Documentation  Taken 2/5/2025 0200 by Meg Mcallister, RN  Activity Management: activity encouraged  Pressure Reduction Techniques:   frequent weight shift encouraged   heels elevated off bed   pressure points protected   weight shift assistance provided  Head of Bed (HOB) Positioning: HOB elevated  Pressure Reduction Devices:   pressure-redistributing mattress utilized   heel offloading device utilized   foam padding utilized  Skin Protection:   incontinence pads utilized   silicone foam dressing in place   skin sealant/moisture barrier applied   transparent dressing maintained  Taken 2/5/2025 0000 by Meg Mcallister RN  Activity Management: activity encouraged  Pressure Reduction Techniques:   frequent weight shift encouraged   heels elevated off bed   pressure points protected   weight shift assistance provided  Head of Bed (HOB) Positioning: HOB elevated  Pressure Reduction Devices:   pressure-redistributing mattress utilized   heel offloading device utilized   foam padding utilized  Skin Protection:   incontinence pads utilized   silicone foam dressing in place   skin sealant/moisture barrier applied   transparent  dressing maintained  Taken 2/4/2025 2200 by Meg Mcallister RN  Activity Management: activity encouraged  Pressure Reduction Techniques:   frequent weight shift encouraged   heels elevated off bed   pressure points protected   weight shift assistance provided  Head of Bed (HOB) Positioning: HOB elevated  Pressure Reduction Devices:   pressure-redistributing mattress utilized   heel offloading device utilized   foam padding utilized  Skin Protection:   incontinence pads utilized   silicone foam dressing in place   skin sealant/moisture barrier applied   transparent dressing maintained  Taken 2/4/2025 2000 by Meg Mcallister RN  Activity Management: activity encouraged  Pressure Reduction Techniques:   frequent weight shift encouraged   heels elevated off bed   pressure points protected   weight shift assistance provided  Pressure Reduction Devices:   pressure-redistributing mattress utilized   heel offloading device utilized   foam padding utilized  Skin Protection:   incontinence pads utilized   silicone foam dressing in place   skin sealant/moisture barrier applied   transparent dressing maintained     Problem: Fall Injury Risk  Goal: Absence of Fall and Fall-Related Injury  Outcome: Progressing  Intervention: Identify and Manage Contributors  Recent Flowsheet Documentation  Taken 2/4/2025 2000 by Meg Mcallister RN  Medication Review/Management: medications reviewed  Self-Care Promotion:   independence encouraged   BADL personal objects within reach   BADL personal routines maintained  Intervention: Promote Injury-Free Environment  Recent Flowsheet Documentation  Taken 2/5/2025 0200 by Meg Mcallister RN  Safety Promotion/Fall Prevention:   activity supervised   assistive device/personal items within reach   clutter free environment maintained   fall prevention program maintained   nonskid shoes/slippers when out of bed   room organization consistent   safety round/check completed  Taken 2/5/2025 0000 by Meg Mcallister, ELADIA  Safety  Promotion/Fall Prevention:   activity supervised   assistive device/personal items within reach   clutter free environment maintained   fall prevention program maintained   nonskid shoes/slippers when out of bed   room organization consistent   safety round/check completed  Taken 2/4/2025 2200 by Meg Mcallister, RN  Safety Promotion/Fall Prevention:   activity supervised   assistive device/personal items within reach   clutter free environment maintained   fall prevention program maintained   nonskid shoes/slippers when out of bed   room organization consistent   safety round/check completed  Taken 2/4/2025 2000 by Meg Mcallister, RN  Safety Promotion/Fall Prevention:   activity supervised   assistive device/personal items within reach   clutter free environment maintained   fall prevention program maintained   nonskid shoes/slippers when out of bed   room organization consistent   safety round/check completed

## 2025-02-05 NOTE — CASE MANAGEMENT/SOCIAL WORK
"Continued Stay Note  [facility]     Patient Name: Cordell Martin  MRN: [id number]  Today's Date: 2/5/2025    Admit Date: 2/3/2025    Plan: TBD   Discharge Plan       Row Name 02/05/25 1604       Plan    Plan TBD    Plan Comments Discussed in MDR. Mr. Martin lives in an independent living. CM spoke with him at bedside, to discuss possible transitioning to LTC, due to his overall health. He said, he would have to think about it because \"I can't afford it and I use my motorized scooter and not sure if they will let me have my scooter to go outside.\" CM explained the importance of him going to a place where he will receive 24/7 care. I told him I spoke with his sister Lennie yesterday and she thought he should go to a LTC facility as well, so he will get the care and supervision he needs. Again, he wants time to think about it. CM will continue to follow for medical readiness and assist with any discharge needs as indicated.                   Discharge Codes    No documentation.                 Expected Discharge Date and Time       Expected Discharge Date Expected Discharge Time    Feb 7, 2025               Regina Asif RN    "

## 2025-02-06 ENCOUNTER — APPOINTMENT (OUTPATIENT)
Dept: GENERAL RADIOLOGY | Facility: HOSPITAL | Age: 80
DRG: 602 | End: 2025-02-06
Payer: MEDICARE

## 2025-02-06 LAB
ANION GAP SERPL CALCULATED.3IONS-SCNC: 11 MMOL/L (ref 5–15)
BUN SERPL-MCNC: 23 MG/DL (ref 8–23)
BUN/CREAT SERPL: 17 (ref 7–25)
CALCIUM SPEC-SCNC: 8.4 MG/DL (ref 8.6–10.5)
CHLORIDE SERPL-SCNC: 103 MMOL/L (ref 98–107)
CO2 SERPL-SCNC: 24 MMOL/L (ref 22–29)
CREAT SERPL-MCNC: 1.35 MG/DL (ref 0.76–1.27)
DEPRECATED RDW RBC AUTO: 59.3 FL (ref 37–54)
EGFRCR SERPLBLD CKD-EPI 2021: 53.7 ML/MIN/1.73
ERYTHROCYTE [DISTWIDTH] IN BLOOD BY AUTOMATED COUNT: 17.2 % (ref 12.3–15.4)
GLUCOSE BLDC GLUCOMTR-MCNC: 142 MG/DL (ref 70–130)
GLUCOSE SERPL-MCNC: 93 MG/DL (ref 65–99)
HCT VFR BLD AUTO: 32.6 % (ref 37.5–51)
HGB BLD-MCNC: 10.1 G/DL (ref 13–17.7)
MCH RBC QN AUTO: 28.8 PG (ref 26.6–33)
MCHC RBC AUTO-ENTMCNC: 31 G/DL (ref 31.5–35.7)
MCV RBC AUTO: 92.9 FL (ref 79–97)
PLATELET # BLD AUTO: 206 10*3/MM3 (ref 140–450)
PMV BLD AUTO: 8.9 FL (ref 6–12)
POTASSIUM SERPL-SCNC: 4.1 MMOL/L (ref 3.5–5.2)
RBC # BLD AUTO: 3.51 10*6/MM3 (ref 4.14–5.8)
SODIUM SERPL-SCNC: 138 MMOL/L (ref 136–145)
WBC NRBC COR # BLD AUTO: 5.97 10*3/MM3 (ref 3.4–10.8)

## 2025-02-06 PROCEDURE — 25010000002 PIPERACILLIN SOD-TAZOBACTAM PER 1 G: Performed by: INTERNAL MEDICINE

## 2025-02-06 PROCEDURE — 25010000002 DAPTOMYCIN PER 1 MG: Performed by: INTERNAL MEDICINE

## 2025-02-06 PROCEDURE — 94799 UNLISTED PULMONARY SVC/PX: CPT

## 2025-02-06 PROCEDURE — 80048 BASIC METABOLIC PNL TOTAL CA: CPT | Performed by: STUDENT IN AN ORGANIZED HEALTH CARE EDUCATION/TRAINING PROGRAM

## 2025-02-06 PROCEDURE — 94664 DEMO&/EVAL PT USE INHALER: CPT

## 2025-02-06 PROCEDURE — 99232 SBSQ HOSP IP/OBS MODERATE 35: CPT | Performed by: INTERNAL MEDICINE

## 2025-02-06 PROCEDURE — 85027 COMPLETE CBC AUTOMATED: CPT | Performed by: STUDENT IN AN ORGANIZED HEALTH CARE EDUCATION/TRAINING PROGRAM

## 2025-02-06 PROCEDURE — 25010000002 HEPARIN (PORCINE) PER 1000 UNITS: Performed by: INTERNAL MEDICINE

## 2025-02-06 PROCEDURE — 71045 X-RAY EXAM CHEST 1 VIEW: CPT

## 2025-02-06 PROCEDURE — 25010000002 FUROSEMIDE PER 20 MG: Performed by: STUDENT IN AN ORGANIZED HEALTH CARE EDUCATION/TRAINING PROGRAM

## 2025-02-06 PROCEDURE — 82948 REAGENT STRIP/BLOOD GLUCOSE: CPT

## 2025-02-06 RX ORDER — FUROSEMIDE 10 MG/ML
40 INJECTION INTRAMUSCULAR; INTRAVENOUS ONCE
Status: COMPLETED | OUTPATIENT
Start: 2025-02-06 | End: 2025-02-06

## 2025-02-06 RX ADMIN — HYDROCODONE BITARTRATE AND ACETAMINOPHEN 1 TABLET: 5; 325 TABLET ORAL at 10:38

## 2025-02-06 RX ADMIN — FUROSEMIDE 20 MG: 20 TABLET ORAL at 10:42

## 2025-02-06 RX ADMIN — PIPERACILLIN AND TAZOBACTAM 3.38 G: 3; .375 INJECTION, POWDER, LYOPHILIZED, FOR SOLUTION INTRAVENOUS at 10:42

## 2025-02-06 RX ADMIN — ATORVASTATIN CALCIUM 40 MG: 40 TABLET, FILM COATED ORAL at 10:42

## 2025-02-06 RX ADMIN — CARBIDOPA AND LEVODOPA 2 TABLET: 25; 100 TABLET ORAL at 21:47

## 2025-02-06 RX ADMIN — CARBIDOPA AND LEVODOPA 2 TABLET: 25; 100 TABLET ORAL at 10:42

## 2025-02-06 RX ADMIN — CARBIDOPA AND LEVODOPA 2 TABLET: 25; 100 TABLET ORAL at 17:52

## 2025-02-06 RX ADMIN — SENNOSIDES AND DOCUSATE SODIUM 2 TABLET: 50; 8.6 TABLET ORAL at 10:42

## 2025-02-06 RX ADMIN — PRAMIPEXOLE DIHYDROCHLORIDE 1 MG: 1 TABLET ORAL at 21:46

## 2025-02-06 RX ADMIN — PRAMIPEXOLE DIHYDROCHLORIDE 1 MG: 1 TABLET ORAL at 17:05

## 2025-02-06 RX ADMIN — LEVOTHYROXINE SODIUM 88 MCG: 0.09 TABLET ORAL at 05:43

## 2025-02-06 RX ADMIN — DAPTOMYCIN 400 MG: 500 INJECTION, POWDER, LYOPHILIZED, FOR SOLUTION INTRAVENOUS at 12:52

## 2025-02-06 RX ADMIN — BUDESONIDE AND FORMOTEROL FUMARATE DIHYDRATE 2 PUFF: 160; 4.5 AEROSOL RESPIRATORY (INHALATION) at 20:02

## 2025-02-06 RX ADMIN — TAMSULOSIN HYDROCHLORIDE 0.4 MG: 0.4 CAPSULE ORAL at 10:43

## 2025-02-06 RX ADMIN — FUROSEMIDE 40 MG: 10 INJECTION, SOLUTION INTRAMUSCULAR; INTRAVENOUS at 12:49

## 2025-02-06 RX ADMIN — HEPARIN SODIUM 5000 UNITS: 5000 INJECTION INTRAVENOUS; SUBCUTANEOUS at 10:43

## 2025-02-06 RX ADMIN — Medication 5000 UNITS: at 10:42

## 2025-02-06 RX ADMIN — BUDESONIDE AND FORMOTEROL FUMARATE DIHYDRATE 2 PUFF: 160; 4.5 AEROSOL RESPIRATORY (INHALATION) at 08:14

## 2025-02-06 RX ADMIN — PRAMIPEXOLE DIHYDROCHLORIDE 1 MG: 1 TABLET ORAL at 10:42

## 2025-02-06 RX ADMIN — PIPERACILLIN AND TAZOBACTAM 3.38 G: 3; .375 INJECTION, POWDER, LYOPHILIZED, FOR SOLUTION INTRAVENOUS at 17:07

## 2025-02-06 RX ADMIN — SENNOSIDES AND DOCUSATE SODIUM 2 TABLET: 50; 8.6 TABLET ORAL at 21:46

## 2025-02-06 RX ADMIN — HEPARIN SODIUM 5000 UNITS: 5000 INJECTION INTRAVENOUS; SUBCUTANEOUS at 21:47

## 2025-02-06 RX ADMIN — CARBIDOPA AND LEVODOPA 2 TABLET: 25; 100 TABLET ORAL at 12:48

## 2025-02-06 RX ADMIN — FLUTICASONE PROPIONATE 2 SPRAY: 50 SPRAY, METERED NASAL at 10:43

## 2025-02-06 RX ADMIN — HYDROCODONE BITARTRATE AND ACETAMINOPHEN 1 TABLET: 5; 325 TABLET ORAL at 17:13

## 2025-02-06 NOTE — PLAN OF CARE
Goal Outcome Evaluation:  Plan of Care Reviewed With: patient        Progress: improving    Problem: Adult Inpatient Plan of Care  Goal: Plan of Care Review  Outcome: Progressing  Flowsheets (Taken 2/6/2025 0533)  Progress: improving  Plan of Care Reviewed With: patient  Goal: Patient-Specific Goal (Individualized)  Outcome: Progressing  Goal: Absence of Hospital-Acquired Illness or Injury  Outcome: Progressing  Intervention: Identify and Manage Fall Risk  Recent Flowsheet Documentation  Taken 2/6/2025 0400 by Meg Mcallister, ELADIA  Safety Promotion/Fall Prevention:   activity supervised   assistive device/personal items within reach   clutter free environment maintained   fall prevention program maintained   nonskid shoes/slippers when out of bed   room organization consistent   safety round/check completed  Taken 2/6/2025 0200 by Meg Mcallister, ELADIA  Safety Promotion/Fall Prevention:   activity supervised   assistive device/personal items within reach   clutter free environment maintained   fall prevention program maintained   nonskid shoes/slippers when out of bed   room organization consistent   safety round/check completed  Taken 2/6/2025 0000 by Meg Mcallister, RN  Safety Promotion/Fall Prevention:   activity supervised   assistive device/personal items within reach   clutter free environment maintained   fall prevention program maintained   nonskid shoes/slippers when out of bed   room organization consistent   safety round/check completed  Taken 2/5/2025 2200 by Meg Mcallister, RN  Safety Promotion/Fall Prevention:   activity supervised   assistive device/personal items within reach   clutter free environment maintained   fall prevention program maintained   nonskid shoes/slippers when out of bed   room organization consistent   safety round/check completed  Taken 2/5/2025 2000 by Meg Mcallister, RN  Safety Promotion/Fall Prevention:   activity supervised   assistive device/personal items within reach   clutter free environment  maintained   fall prevention program maintained   nonskid shoes/slippers when out of bed   room organization consistent   safety round/check completed  Intervention: Prevent Skin Injury  Recent Flowsheet Documentation  Taken 2/6/2025 0400 by Meg Mcallister RN  Skin Protection:   incontinence pads utilized   silicone foam dressing in place   skin sealant/moisture barrier applied   transparent dressing maintained  Taken 2/6/2025 0200 by Meg Mcallister RN  Skin Protection:   incontinence pads utilized   silicone foam dressing in place   skin sealant/moisture barrier applied   transparent dressing maintained  Taken 2/6/2025 0000 by Meg Mcallister RN  Body Position:   supine   turned  Skin Protection:   incontinence pads utilized   silicone foam dressing in place   skin sealant/moisture barrier applied   transparent dressing maintained  Taken 2/5/2025 2200 by Meg Mcallister RN  Skin Protection:   incontinence pads utilized   silicone foam dressing in place   skin sealant/moisture barrier applied   transparent dressing maintained  Taken 2/5/2025 2000 by Meg Mcallister RN  Body Position: position maintained  Skin Protection:   incontinence pads utilized   silicone foam dressing in place   skin sealant/moisture barrier applied   transparent dressing maintained  Intervention: Prevent and Manage VTE (Venous Thromboembolism) Risk  Recent Flowsheet Documentation  Taken 2/5/2025 2000 by Meg Mcallister RN  VTE Prevention/Management: (see mar) other (see comments)  Intervention: Prevent Infection  Recent Flowsheet Documentation  Taken 2/6/2025 0400 by Meg Mcallister RN  Infection Prevention:   cohorting utilized   environmental surveillance performed   equipment surfaces disinfected   hand hygiene promoted   personal protective equipment utilized   rest/sleep promoted   single patient room provided  Taken 2/6/2025 0200 by Meg Mcallister RN  Infection Prevention:   cohorting utilized   environmental surveillance performed   equipment surfaces  disinfected   hand hygiene promoted   personal protective equipment utilized   rest/sleep promoted   single patient room provided  Taken 2/6/2025 0000 by Meg Mcallister RN  Infection Prevention:   cohorting utilized   environmental surveillance performed   equipment surfaces disinfected   hand hygiene promoted   personal protective equipment utilized   rest/sleep promoted   single patient room provided  Taken 2/5/2025 2200 by Meg Mcallister RN  Infection Prevention:   cohorting utilized   environmental surveillance performed   equipment surfaces disinfected   hand hygiene promoted   personal protective equipment utilized   rest/sleep promoted   single patient room provided  Taken 2/5/2025 2000 by Meg Mcallister RN  Infection Prevention:   cohorting utilized   environmental surveillance performed   equipment surfaces disinfected   hand hygiene promoted   personal protective equipment utilized   rest/sleep promoted   single patient room provided  Goal: Optimal Comfort and Wellbeing  Outcome: Progressing  Intervention: Monitor Pain and Promote Comfort  Recent Flowsheet Documentation  Taken 2/5/2025 2000 by Meg Mcallister RN  Pain Management Interventions:   position adjusted   pillow support provided  Intervention: Provide Person-Centered Care  Recent Flowsheet Documentation  Taken 2/5/2025 2000 by Meg Mcallister RN  Trust Relationship/Rapport:   care explained   choices provided   emotional support provided   empathic listening provided   questions answered   questions encouraged   reassurance provided   thoughts/feelings acknowledged  Goal: Readiness for Transition of Care  Outcome: Progressing     Problem: Skin Injury Risk Increased  Goal: Skin Health and Integrity  Outcome: Progressing  Intervention: Optimize Skin Protection  Recent Flowsheet Documentation  Taken 2/6/2025 0400 by Meg Mcallister RN  Activity Management: activity encouraged  Pressure Reduction Techniques:   frequent weight shift encouraged   heels elevated off bed    pressure points protected   weight shift assistance provided  Head of Bed ([facility]) Positioning: [facility] elevated  Pressure Reduction Devices:   pressure-redistributing mattress utilized   heel offloading device utilized   foam padding utilized  Skin Protection:   incontinence pads utilized   silicone foam dressing in place   skin sealant/moisture barrier applied   transparent dressing maintained  Taken 2/6/2025 0200 by Meg Mcallister RN  Activity Management: activity encouraged  Pressure Reduction Techniques:   frequent weight shift encouraged   heels elevated off bed   pressure points protected   weight shift assistance provided  Head of Bed ([facility]) Positioning: [facility] elevated  Pressure Reduction Devices:   pressure-redistributing mattress utilized   heel offloading device utilized   foam padding utilized  Skin Protection:   incontinence pads utilized   silicone foam dressing in place   skin sealant/moisture barrier applied   transparent dressing maintained  Taken 2/6/2025 0000 by Meg Mcallister RN  Activity Management: activity encouraged  Pressure Reduction Techniques:   frequent weight shift encouraged   heels elevated off bed   pressure points protected   weight shift assistance provided  Head of Bed ([facility]) Positioning: [facility] elevated  Pressure Reduction Devices:   pressure-redistributing mattress utilized   heel offloading device utilized   foam padding utilized  Skin Protection:   incontinence pads utilized   silicone foam dressing in place   skin sealant/moisture barrier applied   transparent dressing maintained  Taken 2/5/2025 2200 by Meg Mcallister RN  Activity Management: activity encouraged  Pressure Reduction Techniques:   frequent weight shift encouraged   heels elevated off bed   pressure points protected   weight shift assistance provided  Pressure Reduction Devices:   pressure-redistributing mattress utilized   heel offloading device utilized   foam padding utilized  Skin Protection:   incontinence pads utilized    silicone foam dressing in place   skin sealant/moisture barrier applied   transparent dressing maintained  Taken 2/5/2025 2000 by Meg Mcallister RN  Activity Management: activity encouraged  Pressure Reduction Techniques:   frequent weight shift encouraged   heels elevated off bed   pressure points protected   weight shift assistance provided  Head of Bed (HOB) Positioning: HOB elevated  Pressure Reduction Devices:   pressure-redistributing mattress utilized   heel offloading device utilized   foam padding utilized  Skin Protection:   incontinence pads utilized   silicone foam dressing in place   skin sealant/moisture barrier applied   transparent dressing maintained     Problem: Fall Injury Risk  Goal: Absence of Fall and Fall-Related Injury  Outcome: Progressing  Intervention: Identify and Manage Contributors  Recent Flowsheet Documentation  Taken 2/5/2025 2000 by Meg Mcallister RN  Medication Review/Management: medications reviewed  Self-Care Promotion:   independence encouraged   BADL personal objects within reach   BADL personal routines maintained  Intervention: Promote Injury-Free Environment  Recent Flowsheet Documentation  Taken 2/6/2025 0400 by Meg Mcallister RN  Safety Promotion/Fall Prevention:   activity supervised   assistive device/personal items within reach   clutter free environment maintained   fall prevention program maintained   nonskid shoes/slippers when out of bed   room organization consistent   safety round/check completed  Taken 2/6/2025 0200 by Meg Mcallister RN  Safety Promotion/Fall Prevention:   activity supervised   assistive device/personal items within reach   clutter free environment maintained   fall prevention program maintained   nonskid shoes/slippers when out of bed   room organization consistent   safety round/check completed  Taken 2/6/2025 0000 by Meg Mcallister, RN  Safety Promotion/Fall Prevention:   activity supervised   assistive device/personal items within reach   clutter free  environment maintained   fall prevention program maintained   nonskid shoes/slippers when out of bed   room organization consistent   safety round/check completed  Taken 2/5/2025 2200 by Meg Mcallister, RN  Safety Promotion/Fall Prevention:   activity supervised   assistive device/personal items within reach   clutter free environment maintained   fall prevention program maintained   nonskid shoes/slippers when out of bed   room organization consistent   safety round/check completed  Taken 2/5/2025 2000 by Meg Mcallister, RN  Safety Promotion/Fall Prevention:   activity supervised   assistive device/personal items within reach   clutter free environment maintained   fall prevention program maintained   nonskid shoes/slippers when out of bed   room organization consistent   safety round/check completed

## 2025-02-06 NOTE — PROGRESS NOTES
Cordell Martin  1945  [id number]      Evaluating Physician: Trevor Jorgensen MD    Chief Complaint: leg pain    Reason for Consultation: leg infections    History of present illness:     Patient is a 80 y.o.  Yr old male Previously patient of Dr. Benites, with history of Parkinson's disease and chronically debilitated, uses a wheelchair primarily by his report but apparently had occasionally been able to use a walker.  Admitted October 2024 with right greater than left lower leg and foot cellulitis, MRI no osteomyelitis and prior history trauma/maggots; culture with skin derrell and pseudomonas aeruginosa, finished IV Zosyn in the hospital.Admission notes indicate cognitive impairment and other comorbidity as outlined below.    Admitted February 3, 2025 with worsening redness/pain at both lower legs with open wounds and unable to make appointments with wound care, apparently difficulty with transportation.  In addition he reports that the Unna boot wraps had been left on for weeks without change.  He did not know about any other specific trauma but admits to bumping the legs periodically and could have had superficial trauma     2/6/25 cultures pending; Bilateral leg pain is dull at present, intermittent, nonradiating, worse with pressure, better with pain meds and 2-3 out of 10 in severity, not progressive.  Redness and swelling not progressive.    No headache photophobia or neck stiffness.  No shortness of breath cough or hemoptysis.  No nausea vomiting diarrhea or abdominal pain.  No dysuria hematuria or pyuria.  No rash        Past Medical History:   Diagnosis Date    Anxiety     Arthritis     Back pain     Bronchitis     Cognitive impairment     Depression     Disease of thyroid gland     Glucose intolerance (impaired glucose tolerance)     Hyperlipidemia     Kidney stone     Obesity     Parkinson disease     Pneumonia     Prostate disorder     Thyroid disease     Wears eyeglasses        Past Surgical  History:   Procedure Laterality Date    COLONOSCOPY      5 years ago    EYE SURGERY      HERNIA REPAIR  10/20/2020    KNEE SURGERY      LUMBAR LAMINECTOMY DISCECTOMY DECOMPRESSION N/A 07/13/2017    Procedure: LUMBAR LAMINECTOMY L4-5;  Surgeon: Antonio Trent MD;  Location: [facility];  Service:     SHOULDER ROTATOR CUFF REPAIR Right     x2    TONSILLECTOMY      VEIN SURGERY         Pediatric History   Patient Parents    Not on file     Other Topics Concern    Not on file   Social History Narrative    Lives alone. Uses walker    Has not smoked since about 1980       family history includes Alzheimer's disease in an other family member; Cancer in his father and another family member; Diabetes in an other family member; Heart disease in an other family member; Stroke in an other family member.    No Known Allergies    Medication:  Current Facility-Administered Medications   Medication Dose Route Frequency Provider Last Rate Last Admin    acetaminophen (TYLENOL) tablet 650 mg  650 mg Oral Q4H PRN Serenity Drummond MD        atorvastatin (LIPITOR) tablet 40 mg  40 mg Oral Daily Leobardo Hayden MD   40 mg at 02/05/25 0857    sennosides-docusate (PERICOLACE) 8.6-50 MG per tablet 2 tablet  2 tablet Oral BID Serenity Drummond MD   2 tablet at 02/05/25 2151    And    polyethylene glycol (MIRALAX) packet 17 g  17 g Oral Daily PRN Serenity Drummond MD        And    bisacodyl (DULCOLAX) EC tablet 5 mg  5 mg Oral Daily PRN Serenity Drummond MD        And    bisacodyl (DULCOLAX) suppository 10 mg  10 mg Rectal Daily PRN Serenity Drummond MD        budesonide-formoterol (SYMBICORT) 160-4.5 MCG/ACT inhaler 2 puff  2 puff Inhalation BID - RT Serenity Drummond MD   2 puff at 02/05/25 2156    Calcium Replacement - Follow Nurse / BPA Driven Protocol   Not Applicable PRN Serenity Drummond MD        carbidopa-levodopa (SINEMET)  MG per tablet 2 tablet  2 tablet Oral 4x Daily Serenity Drummond MD   2 tablet at 02/05/25 2151     DAPTOmycin (CUBICIN) 400 mg in sodium chloride 0.9 % 50 mL IVPB  4 mg/kg (Adjusted) Intravenous Q24H Trevor Jorgensen  mL/hr at 02/05/25 1203 400 mg at 02/05/25 1203    fluticasone (FLONASE) 50 MCG/ACT nasal spray 2 spray  2 spray Each Nare Daily Leobardo Hayden MD   2 spray at 02/05/25 1051    furosemide (LASIX) tablet 20 mg  20 mg Oral Daily Leobardo Hayden MD   20 mg at 02/05/25 0856    heparin (porcine) 5000 UNIT/ML injection 5,000 Units  5,000 Units Subcutaneous Q12H Serenity Drummond MD   5,000 Units at 02/05/25 2151    HYDROcodone-acetaminophen (NORCO) 5-325 MG per tablet 1 tablet  1 tablet Oral Q4H PRN Serenity Drummond MD   1 tablet at 02/05/25 2151    influenza vac split high-dose (FLUZONE HIGH DOSE) injection 0.5 mL  0.5 mL Intramuscular During Hospitalization Serenity Drummond MD        levothyroxine (SYNTHROID, LEVOTHROID) tablet 88 mcg  88 mcg Oral Q AM Serenity Drummond MD   88 mcg at 02/06/25 0543    Magnesium Standard Dose Replacement - Follow Nurse / BPA Driven Protocol   Not Applicable PRSerenity Reyez MD        morphine injection 2 mg  2 mg Intravenous Q2H PRN Serenity Drummond MD        Phosphorus Replacement - Follow Nurse / BPA Driven Protocol   Not Applicable PRN Serenity Drummond MD        piperacillin-tazobactam (ZOSYN) 3.375 g IVPB in 100 mL NS MBP (CD)  3.375 g Intravenous Q8H Serenity Drummond MD   3.375 g at 02/05/25 2348    Potassium Replacement - Follow Nurse / BPA Driven Protocol   Not Applicable PRSerenity Reyez MD        pramipexole (MIRAPEX) tablet 1 mg  1 mg Oral TID Serenity Drummond MD   1 mg at 02/05/25 2151    sodium chloride 0.9 % flush 10 mL  10 mL Intravenous PRN Tu Pruitt DO        sodium chloride 0.9 % flush 10 mL  10 mL Intravenous PRN [place]        tamsulosin (FLOMAX) 24 hr capsule 0.4 mg  0.4 mg Oral Daily Serenity Drummond MD   0.4 mg at 02/05/25 0857    vitamin D3 capsule 5,000 Units  5,000 Units Oral Daily Serenity Drummond MD   5,000  Units at 02/05/25 0857       Antibiotics:  Anti-Infectives (From admission, onward)      Ordered     Dose/Rate Route Frequency Start Stop    02/04/25 0756  DAPTOmycin (CUBICIN) 400 mg in sodium chloride 0.9 % 50 mL IVPB        Ordering Provider: Trevor Jorgensen MD    4 mg/kg × 94.6 kg (Adjusted)  100 mL/hr over 30 Minutes Intravenous Every 24 Hours 02/04/25 0930 02/11/25 0929    02/03/25 2208  piperacillin-tazobactam (ZOSYN) 3.375 g IVPB in 100 mL NS MBP (CD)        Ordering Provider: Serenity Drummond MD    3.375 g  over 30 Minutes Intravenous Every 8 Hours Scheduled 02/03/25 2230 02/10/25 2359    02/03/25 1650  vancomycin 2500 mg/500 mL 0.9% NS IVPB ([facility])        Ordering Provider: Serenity Drummond MD    22 mg/kg × 109 kg  over 150 Minutes Intravenous Once 02/03/25 1706 02/03/25 2000    02/03/25 1448  piperacillin-tazobactam (ZOSYN) 3.375 g IVPB in 100 mL NS MBP (CD)        Ordering Provider: Tu Pruitt DO    3.375 g  over 30 Minutes Intravenous Once 02/03/25 1504 02/03/25 1657              Review of Systems    2/6/25     Constitutional-- No Fever, chills or sweats.  Appetite good, and no malaise. No fatigue.  Heent-- No new vision, hearing or throat complaints.  No epistaxis or oral sores.  Denies odynophagia or dysphagia.  No flashers, floaters or eye pain. No odynophagia or dysphagia. No headache, photophobia or neck stiffness.  CV-- No chest pain, palpitation or syncope  Resp-- No SOB/cough/Hemoptysis  GI- No nausea, vomiting, or diarrhea.  No hematochezia, melena, or hematemesis. Denies jaundice or chronic liver disease.  -- No dysuria, hematuria, or flank pain.  Denies hesitancy, urgency or flank pain.  Lymph- no swollen lymph nodes in neck/axilla or groin.   Heme- No active bruising or bleeding  MS-- no swelling or pain in the bones or joints of arms/legs aside from above.  No new back pain.  Neuro-- No acute focal weakness or numbness in the arms or legs.  No seizures.    Full 12 point review of  "systems reviewed and negative otherwise for acute complaints, except for of    Physical Exam:   Vital Signs   /59 (BP Location: Left arm, Patient Position: Lying)   Pulse 73   Temp 99.8 °F (37.7 °C) (Axillary)   Resp 18   Ht 182.9 cm (72\")   Wt 120 kg (263 lb 14.4 oz)   SpO2 95%   BMI 35.79 kg/m²     GENERAL: Awake and chronically ill-appearing, in no acute distress. Has a tremor associated with his Parkinson's disease  HEENT: Normocephalic, atraumatic.    No conjunctival injection. No icterus. Oropharynx clear without evidence of thrush or exudate. No evidence of periodontal disease.    NECK: Supple without nuchal rigidity. No mass.   HEART: RRR; No murmur, rubs, gallops.   LUNGS: Clear to auscultation bilaterally without wheezing, rales, rhonchi. Normal respiratory effort. Nonlabored. No dullness.  ABDOMEN: Soft, nontender, nondistended. Positive bowel sounds. No rebound or guarding. NO mass or HSM.  EXT:  see below   MSK: FROM without joint effusions noted arms/legs.    SKIN: Warm and dry without cutaneous eruptions on Inspection/palpation.    NEURO: Oriented to PPT.  chronic debility.    Bilateral lower extremities with chronic appearing edema/discoloration with probable stasis change, acute erythema/warmth and tenderness bilateral with multifocal ulceration at lower between knees and toes.  No discrete mass bulge or fluctuance.  No crepitus or bulla.  Some crust/slough and unable to determine depth of these wounds but no probe to bone tendon joint or ligament    Laboratory Data    Results from last 7 days   Lab Units 02/06/25  0646 02/05/25  0639 02/04/25  0647   WBC 10*3/mm3 5.97 5.57 5.58   HEMOGLOBIN g/dL 10.1* 9.9* 10.1*   HEMATOCRIT % 32.6* 32.0* 32.6*   PLATELETS 10*3/mm3 206 195 203     Results from last 7 days   Lab Units 02/05/25  0639   SODIUM mmol/L 138   POTASSIUM mmol/L 3.9   CHLORIDE mmol/L 105   CO2 mmol/L 24.0   BUN mg/dL 25*   CREATININE mg/dL 1.30*   GLUCOSE mg/dL 134*   CALCIUM " mg/dL 8.2*     Results from last 7 days   Lab Units 02/04/25  0647   ALK PHOS U/L 128*   BILIRUBIN mg/dL 0.5   ALT (SGPT) U/L <5   AST (SGOT) U/L 16               Estimated Creatinine Clearance: 62.7 mL/min (A) (by C-G formula based on SCr of 1.3 mg/dL (H)).      Microbiology:      Radiology:  Imaging Results (Last 72 Hours)       Procedure Component Value Units Date/Time    XR Chest 1 View [[id number]] Collected: 02/03/25 1308     Updated: 02/03/25 1314    Narrative:      XR CHEST 1 VW    Date of Exam: 2/3/2025 12:34 PM EST    Indication: CHF/COPD Protocol    Comparison: 10/8/2024    Findings:  Metallic zipper artifact is superimposed over the neck. There is previous right shoulder surgery. Heart appears mildly enlarged. Vasculature appears upper limits of normal. No pulmonary edema is seen. Lung volumes are relatively low. There is mild   coarsening of the pulmonary interstitial markings but similar to prior exams. Basilar interstitial markings appear to correlate with mild interstitial disease seen in the lower lungs on 10/9/2024 chest CT scan.      Impression:      Impression:    1. Mild cardiomegaly and pulmonary venous hypertension.    2. Relatively low lung volumes and mild chronic appearing reticular lung changes similar to prior studies.      Electronically Signed: Fabian Dye MD    2/3/2025 1:11 PM EST    Workstation ID: [id number]              Impression:     --acute bilateral lower leg and foot cellulitis/infections with multifocal ulcerations, chronic discoloration/chronic edema and potential intermittent superficial trauma although no specific inciting event.  Apparently had not made it to appointments with wound care and Unna boots had not been changed in weeks.  Unclear how much of this is general self-neglect versus recent weather related challenge with combination. high risk for persistent/recurrent or nonhealing wounds, persistent/progressive or recurrent infection and risk for further functional/limb  loss, risk for amputation etc.  Culture below.  Arterial/venous/vascular workup at discretion of medicine team    --Parkinson's disease/memory loss per admission notes, chronically debilitated and primarily wheelchair bound/bedbound by his report    --chronic edema and possible lymphedema per patient notes    --Prior history DVT per past notes    PLAN:     --IV daptomycin/Zosyn 7-10 days    **culture right leg and left leg with MRSA/pseudomonas aeruginosa /GNR pending ID/DRE data from February 3, 2025    --Check/review labs cultures and scans    --Partial history Per nursing staff    --Discussed with microbiology    --Discussed with multidisciplinary team/nursing regarding complex set of issues, discussed with microbiology lab and discussed importance of antimicrobial stewardship    This visit included the following complex service elements:  Complex medical decision-making associated with antimicrobial prescribing.  Counseled patients, family members, and/or caregivers regarding antimicrobial stewardship and antibiotic resistance.  Communicated with the clinical microbiology lab.     Copied text in this note has been reviewed and is accurate as of 02/06/25.     Trevor Jorgensen MD  2/6/2025

## 2025-02-06 NOTE — PROGRESS NOTES
[facility] Medicine Services  PROGRESS NOTE    Patient Name: Cordell Martin  : 1945  MRN: [id number]    Date of Admission: 2/3/2025  Primary Care Physician: Ben Holder DO    Subjective   Subjective     CC:  Lower extremity wounds    HPI:  No acute events overnight, patient is doing well this morning.  He remains on 2 to 3 L nasal cannula since yesterday, denies dyspnea, chest pain, orthopnea, cough, fever or chills.  He is wanting to fix his position so he can eat breakfast this morning but otherwise has no other acute complaints.      Objective   Objective     Vital Signs:   Temp:  [98 °F (36.7 °C)-99.8 °F (37.7 °C)] 99.8 °F (37.7 °C)  Heart Rate:  [71-77] 73  Resp:  [17-18] 17  BP: (110-123)/(57-66) 110/59  Flow (L/min) (Oxygen Therapy):  [2-3] 2     Physical Exam:  Constitutional: No acute distress, awake, alert  Respiratory: Clear to auscultation bilaterally, respiratory effort normal   Cardiovascular: RRR, no murmurs, rubs, or gallops  Gastrointestinal: soft, nontender, nondistended  Musculoskeletal: Bilateral lower extremities wrapped and boots in place  Psychiatric: Appropriate affect, cooperative  Neurologic: Oriented x 3, strength symmetric in all extremities    Results Reviewed:  LAB RESULTS:      Lab 25  0646 25  0639 25  0647 25  1430 25  1136   WBC 5.97 5.57 5.58  --  5.40   HEMOGLOBIN 10.1* 9.9* 10.1*  --  12.3*   HEMATOCRIT 32.6* 32.0* 32.6*  --  40.2   PLATELETS 206 195 203  --  243   NEUTROS ABS  --   --  3.89  --  3.73   IMMATURE GRANS (ABS)  --   --  0.02  --  0.02   LYMPHS ABS  --   --  1.00  --  0.92   MONOS ABS  --   --  0.54  --  0.54   EOS ABS  --   --  0.11  --  0.17   MCV 92.9 94.1 93.1  --  94.6   LACTATE  --   --   --   --  1.2   PROTIME  --   --   --   --  13.6   HSTROP T  --   --   --  19 18         Lab 25  0646 25  0639 25  1735 25  0647 25  1136   SODIUM 138 138  --  139 142    POTASSIUM 4.1 3.9 4.0 3.6 3.8   CHLORIDE 103 105  --  106 105   CO2 24.0 24.0  --  24.0 27.0   ANION GAP 11.0 9.0  --  9.0 10.0   BUN 23 25*  --  26* 29*   CREATININE 1.35* 1.30*  --  1.06 1.19   EGFR 53.7* 56.2*  --  71.8 62.5   GLUCOSE 93 134*  --  102* 118*   CALCIUM 8.4* 8.2*  --  8.4* 9.0         Lab 02/04/25  0647 02/03/25  1136   TOTAL PROTEIN 5.3* 7.0   ALBUMIN 2.8* 3.4*   GLOBULIN 2.5 3.6   ALT (SGPT) <5 16   AST (SGOT) 16 20   BILIRUBIN 0.5 0.4   ALK PHOS 128* 162*         Lab 02/03/25  1430 02/03/25  1136   PROBNP  --  68.0   HSTROP T 19 18   PROTIME  --  13.6   INR  --  1.03                 Brief Urine Lab Results  (Last result in the past 365 days)        Color   Clarity   Blood   Leuk Est   Nitrite   Protein   CREAT   Urine HCG        10/08/24 2219 Yellow   Clear   Negative   Negative   Negative   Negative                   Microbiology Results Abnormal       Procedure Component Value - Date/Time    Wound Culture - Wound, Leg, Right [[id number]]  (Abnormal) Collected: 02/03/25 1903    Lab Status: Preliminary result Specimen: Wound from Leg, Right Updated: 02/06/25 0659     Wound Culture Light growth (2+) Staphylococcus aureus, MRSA     Comment: Refer to previous wound culture collected on 2/3/2025 1528 for MICs    Methicillin resistant Staphylococcus aureus, Patient may be an isolation risk.         Scant growth (1+) Pseudomonas aeruginosa     Comment: Refer to previous wound culture collected on 2/3/2025 1528 for MICs           Rare growth Gram Negative Bacilli     Gram Stain Rare (1+) WBCs seen      Rare (1+) Gram positive cocci in pairs    Narrative:            Wound Culture - Swab, Leg, Left [[id number]]  (Abnormal)  (Susceptibility) Collected: 02/03/25 1528    Lab Status: Edited Result - FINAL Specimen: Swab from Leg, Left Updated: 02/05/25 0754     Wound Culture Heavy growth (4+) Staphylococcus aureus, MRSA     Comment:   Methicillin resistant Staphylococcus aureus, Patient may be an isolation  risk.         Scant growth (1+) Pseudomonas aeruginosa     Gram Stain Many (4+) Gram positive cocci in pairs and clusters      Rare (1+) WBCs seen      Rare (1+) Gram negative bacilli    Susceptibility        Staphylococcus aureus, MRSA      DRE      Clindamycin Susceptible      Daptomycin Susceptible (C)  [1]       Erythromycin Resistant      Oxacillin Resistant      Rifampin Susceptible      Tetracycline Resistant      Trimethoprim + Sulfamethoxazole Susceptible      Vancomycin Susceptible                   [1]  Appended report. These results have been appended to a previously final verified report.                Susceptibility        Pseudomonas aeruginosa      DRE      Cefepime Susceptible      Ceftazidime Susceptible      Ciprofloxacin Susceptible      Levofloxacin Intermediate      Piperacillin + Tazobactam Susceptible      Tobramycin Susceptible                       Susceptibility Comments       Staphylococcus aureus, MRSA    Daptomycin requested by Dr King 2/5      Pseudomonas aeruginosa    With the exception of urinary-sourced infections, aminoglycosides should not be used as monotherapy.               MRSA Screen, PCR (Inpatient) - Swab, Nares [[id number]]  (Abnormal) Collected: 02/03/25 1903    Lab Status: Final result Specimen: Swab from Nares Updated: 02/03/25 2102     MRSA PCR Positive    Narrative:      The negative predictive value of this diagnostic test is high and should only be used to consider de-escalating anti-MRSA therapy. A positive result may indicate colonization with MRSA and must be correlated clinically.            No radiology results from the last 24 hrs    Results for orders placed during the hospital encounter of 01/12/21    Adult Transthoracic Echo Complete W/ Cont if Necessary Per Protocol    Interpretation Summary  · The right ventricle and right atrium are moderately dilated. Other chamber sizes and wall thicknesses are normal.  · Global and segmental LV wall motion is  "normal. The estimated left ventricular ejection fraction is 51% - 55%.  · The aortic and mitral valves are normal in structure and function.  · The estimated RV systolic pressure is mildly elevated 35 mmHg - 40 mmHg. There is as well noted to be less than 50% inspiratory IVC collapse. The main pulmonary artery is \"borderline dilated\".  · There are no other important findings on this study.      Current medications:  Scheduled Meds:atorvastatin, 40 mg, Oral, Daily  budesonide-formoterol, 2 puff, Inhalation, BID - RT  carbidopa-levodopa, 2 tablet, Oral, 4x Daily  DAPTOmycin, 4 mg/kg (Adjusted), Intravenous, Q24H  fluticasone, 2 spray, Each Nare, Daily  furosemide, 20 mg, Oral, Daily  heparin (porcine), 5,000 Units, Subcutaneous, Q12H  levothyroxine, 88 mcg, Oral, Q AM  piperacillin-tazobactam, 3.375 g, Intravenous, Q8H  pramipexole, 1 mg, Oral, TID  senna-docusate sodium, 2 tablet, Oral, BID  tamsulosin, 0.4 mg, Oral, Daily  vitamin D3, 5,000 Units, Oral, Daily      Continuous Infusions:   PRN Meds:.  acetaminophen    senna-docusate sodium **AND** polyethylene glycol **AND** bisacodyl **AND** bisacodyl    Calcium Replacement - Follow Nurse / BPA Driven Protocol    HYDROcodone-acetaminophen    influenza vaccine    Magnesium Standard Dose Replacement - Follow Nurse / BPA Driven Protocol    Morphine    Phosphorus Replacement - Follow Nurse / BPA Driven Protocol    Potassium Replacement - Follow Nurse / BPA Driven Protocol    sodium chloride    sodium chloride    Assessment & Plan   Assessment & Plan     Active Hospital Problems    Diagnosis  POA    **Bilateral lower leg cellulitis [L03.116, L03.115]  Yes    Cellulitis [L03.90]  Yes    Anemia, chronic disease [D63.8]  Yes    History of DVT (deep vein thrombosis) [Z86.718]  Not Applicable    Restrictive pattern on PFTs w/o evidence of ILD [R94.2]  Yes    Memory loss [R41.3]  Yes    Parkinson's disease [G20.A1]  Yes      Resolved Hospital Problems   No resolved problems to " display.        Brief Hospital Course to date:  Cordell Martin is a 80 y.o. male with PMH of PMH Parkinson's, history of DVT, HLD, hypothyroidism, GERD, lymphedema, anxiety, depression, wheelchair use/walker use at baseline presented to the hospital with worsening bilateral lower extremity wounds.    BLE wounds   Cellulitis, suspected   -Presents with bilateral lower extremity wounds, has had hospitalization for similar complaint in the past  -Venous duplex: Poor quality  -Hemodynamically stable, labs reviewed and WBC WNL  -Blood culture: NGTD  - Wound culture left leg with MRSA/Pseudomonas  PLAN:   - ID consulted, recommending IV daptomycin and Zosyn, and tentative plan to continue antibiotics for total of 7-10 days  - Wound care consulted  - Pain control: Tylenol prn   - Continue home Lasix 20 mg p.o.    AHRF  -Patient has required 2 to 3 L nasal cannula since 2/5  -Likely from undiagnosed MICHAEL  -Denies dyspnea, chest pain, fever, chills, cough  PLAN:  -Obtain chest x-ray today  -Continue home Lasix, will consider IV Lasix if concern for fluid overload on chest x-ray    Chronic debility  -Patient was in independent living prior to admission, patient is unable to change his position in bed and is requiring assistance with essentially most daily activities  -Discussions with patient regarding likely needing long-term care  -Case management assisting with placement    Chronic Medical Conditions:   Normocytic anemia  - chronic, Hemoglobin stable     Parkinson's disease  Anxiety and depression  - Continue home Sinemet, Mirapex     Hypothyroidism  - Synthroid with elevated TSH 13.6 normal free T4 on 1/14/25  - Continue levothyroxine 88 mcg daily follow-up with PCP for repeat TSH in 6 weeks     HLD  - Continue home atorvastatin     GERD  - PPI    Allergic rhinitis  - Continue home Flonase    AM-PAC 6 Clicks Score (PT): 9 (02/05/25 2000)    CODE STATUS:   Code Status and Medical Interventions: CPR (Attempt to  Resuscitate); Full Support   Ordered at: 02/03/25 1628     Code Status (Patient has no pulse and is not breathing):    CPR (Attempt to Resuscitate)     Medical Interventions (Patient has pulse or is breathing):    Full Support       Leobardo Hayden MD  02/06/25

## 2025-02-07 LAB
ANION GAP SERPL CALCULATED.3IONS-SCNC: 10 MMOL/L (ref 5–15)
BACTERIA SPEC AEROBE CULT: ABNORMAL
BUN SERPL-MCNC: 23 MG/DL (ref 8–23)
BUN/CREAT SERPL: 15.8 (ref 7–25)
CALCIUM SPEC-SCNC: 8.8 MG/DL (ref 8.6–10.5)
CHLORIDE SERPL-SCNC: 101 MMOL/L (ref 98–107)
CO2 SERPL-SCNC: 26 MMOL/L (ref 22–29)
CREAT SERPL-MCNC: 1.46 MG/DL (ref 0.76–1.27)
EGFRCR SERPLBLD CKD-EPI 2021: 48.9 ML/MIN/1.73
GLUCOSE SERPL-MCNC: 145 MG/DL (ref 65–99)
GRAM STN SPEC: ABNORMAL
GRAM STN SPEC: ABNORMAL
MAGNESIUM SERPL-MCNC: 2.2 MG/DL (ref 1.6–2.4)
PHOSPHATE SERPL-MCNC: 3.1 MG/DL (ref 2.5–4.5)
POTASSIUM SERPL-SCNC: 4.2 MMOL/L (ref 3.5–5.2)
SODIUM SERPL-SCNC: 137 MMOL/L (ref 136–145)

## 2025-02-07 PROCEDURE — 94799 UNLISTED PULMONARY SVC/PX: CPT

## 2025-02-07 PROCEDURE — 97116 GAIT TRAINING THERAPY: CPT

## 2025-02-07 PROCEDURE — 25010000002 PIPERACILLIN SOD-TAZOBACTAM PER 1 G: Performed by: INTERNAL MEDICINE

## 2025-02-07 PROCEDURE — 97535 SELF CARE MNGMENT TRAINING: CPT

## 2025-02-07 PROCEDURE — 97110 THERAPEUTIC EXERCISES: CPT

## 2025-02-07 PROCEDURE — 25010000002 DAPTOMYCIN PER 1 MG: Performed by: INTERNAL MEDICINE

## 2025-02-07 PROCEDURE — 25010000002 HEPARIN (PORCINE) PER 1000 UNITS: Performed by: INTERNAL MEDICINE

## 2025-02-07 PROCEDURE — 84100 ASSAY OF PHOSPHORUS: CPT | Performed by: STUDENT IN AN ORGANIZED HEALTH CARE EDUCATION/TRAINING PROGRAM

## 2025-02-07 PROCEDURE — 99231 SBSQ HOSP IP/OBS SF/LOW 25: CPT | Performed by: INTERNAL MEDICINE

## 2025-02-07 PROCEDURE — 80048 BASIC METABOLIC PNL TOTAL CA: CPT | Performed by: STUDENT IN AN ORGANIZED HEALTH CARE EDUCATION/TRAINING PROGRAM

## 2025-02-07 PROCEDURE — 83735 ASSAY OF MAGNESIUM: CPT | Performed by: STUDENT IN AN ORGANIZED HEALTH CARE EDUCATION/TRAINING PROGRAM

## 2025-02-07 PROCEDURE — 29580 STRAPPING UNNA BOOT: CPT

## 2025-02-07 PROCEDURE — 97166 OT EVAL MOD COMPLEX 45 MIN: CPT

## 2025-02-07 RX ORDER — GUAIFENESIN 200 MG/10ML
200 LIQUID ORAL EVERY 4 HOURS PRN
Status: DISCONTINUED | OUTPATIENT
Start: 2025-02-07 | End: 2025-02-11 | Stop reason: HOSPADM

## 2025-02-07 RX ORDER — POLYETHYLENE GLYCOL 3350 17 G/17G
17 POWDER, FOR SOLUTION ORAL 2 TIMES DAILY
Status: DISCONTINUED | OUTPATIENT
Start: 2025-02-07 | End: 2025-02-11 | Stop reason: HOSPADM

## 2025-02-07 RX ORDER — SENNOSIDES A AND B 8.6 MG/1
2 TABLET, FILM COATED ORAL 2 TIMES DAILY
Status: DISCONTINUED | OUTPATIENT
Start: 2025-02-07 | End: 2025-02-11 | Stop reason: HOSPADM

## 2025-02-07 RX ORDER — POLYETHYLENE GLYCOL 3350 17 G/17G
17 POWDER, FOR SOLUTION ORAL 2 TIMES DAILY
Status: DISCONTINUED | OUTPATIENT
Start: 2025-02-07 | End: 2025-02-07

## 2025-02-07 RX ORDER — BISACODYL 10 MG
10 SUPPOSITORY, RECTAL RECTAL DAILY PRN
Status: DISCONTINUED | OUTPATIENT
Start: 2025-02-07 | End: 2025-02-11 | Stop reason: HOSPADM

## 2025-02-07 RX ORDER — SENNOSIDES A AND B 8.6 MG/1
2 TABLET, FILM COATED ORAL 2 TIMES DAILY
Status: DISCONTINUED | OUTPATIENT
Start: 2025-02-07 | End: 2025-02-07

## 2025-02-07 RX ADMIN — SENNOSIDES 2 TABLET: 8.6 TABLET, FILM COATED ORAL at 19:47

## 2025-02-07 RX ADMIN — PIPERACILLIN AND TAZOBACTAM 3.38 G: 3; .375 INJECTION, POWDER, LYOPHILIZED, FOR SOLUTION INTRAVENOUS at 09:40

## 2025-02-07 RX ADMIN — Medication 10 ML: at 19:46

## 2025-02-07 RX ADMIN — PRAMIPEXOLE DIHYDROCHLORIDE 1 MG: 1 TABLET ORAL at 19:47

## 2025-02-07 RX ADMIN — PIPERACILLIN AND TAZOBACTAM 3.38 G: 3; .375 INJECTION, POWDER, LYOPHILIZED, FOR SOLUTION INTRAVENOUS at 00:11

## 2025-02-07 RX ADMIN — PIPERACILLIN AND TAZOBACTAM 3.38 G: 3; .375 INJECTION, POWDER, LYOPHILIZED, FOR SOLUTION INTRAVENOUS at 23:31

## 2025-02-07 RX ADMIN — BUDESONIDE AND FORMOTEROL FUMARATE DIHYDRATE 2 PUFF: 160; 4.5 AEROSOL RESPIRATORY (INHALATION) at 19:21

## 2025-02-07 RX ADMIN — POLYETHYLENE GLYCOL 3350 17 G: 17 POWDER, FOR SOLUTION ORAL at 09:39

## 2025-02-07 RX ADMIN — PRAMIPEXOLE DIHYDROCHLORIDE 1 MG: 1 TABLET ORAL at 17:48

## 2025-02-07 RX ADMIN — CARBIDOPA AND LEVODOPA 2 TABLET: 25; 100 TABLET ORAL at 17:48

## 2025-02-07 RX ADMIN — FLUTICASONE PROPIONATE 2 SPRAY: 50 SPRAY, METERED NASAL at 09:39

## 2025-02-07 RX ADMIN — BUDESONIDE AND FORMOTEROL FUMARATE DIHYDRATE 2 PUFF: 160; 4.5 AEROSOL RESPIRATORY (INHALATION) at 07:04

## 2025-02-07 RX ADMIN — CARBIDOPA AND LEVODOPA 2 TABLET: 25; 100 TABLET ORAL at 19:47

## 2025-02-07 RX ADMIN — ATORVASTATIN CALCIUM 40 MG: 40 TABLET, FILM COATED ORAL at 09:38

## 2025-02-07 RX ADMIN — HEPARIN SODIUM 5000 UNITS: 5000 INJECTION INTRAVENOUS; SUBCUTANEOUS at 09:38

## 2025-02-07 RX ADMIN — SENNOSIDES 2 TABLET: 8.6 TABLET, FILM COATED ORAL at 09:38

## 2025-02-07 RX ADMIN — PIPERACILLIN AND TAZOBACTAM 3.38 G: 3; .375 INJECTION, POWDER, LYOPHILIZED, FOR SOLUTION INTRAVENOUS at 17:48

## 2025-02-07 RX ADMIN — CARBIDOPA AND LEVODOPA 2 TABLET: 25; 100 TABLET ORAL at 09:48

## 2025-02-07 RX ADMIN — PRAMIPEXOLE DIHYDROCHLORIDE 1 MG: 1 TABLET ORAL at 09:38

## 2025-02-07 RX ADMIN — GUAIFENESIN 200 MG: 200 SOLUTION ORAL at 23:31

## 2025-02-07 RX ADMIN — LEVOTHYROXINE SODIUM 88 MCG: 0.09 TABLET ORAL at 06:16

## 2025-02-07 RX ADMIN — FUROSEMIDE 20 MG: 20 TABLET ORAL at 09:39

## 2025-02-07 RX ADMIN — HEPARIN SODIUM 5000 UNITS: 5000 INJECTION INTRAVENOUS; SUBCUTANEOUS at 19:47

## 2025-02-07 RX ADMIN — POLYETHYLENE GLYCOL 3350 17 G: 17 POWDER, FOR SOLUTION ORAL at 19:46

## 2025-02-07 RX ADMIN — DAPTOMYCIN 400 MG: 500 INJECTION, POWDER, LYOPHILIZED, FOR SOLUTION INTRAVENOUS at 09:40

## 2025-02-07 RX ADMIN — HYDROCODONE BITARTRATE AND ACETAMINOPHEN 1 TABLET: 5; 325 TABLET ORAL at 11:59

## 2025-02-07 RX ADMIN — Medication 5000 UNITS: at 09:38

## 2025-02-07 RX ADMIN — CARBIDOPA AND LEVODOPA 2 TABLET: 25; 100 TABLET ORAL at 11:59

## 2025-02-07 RX ADMIN — TAMSULOSIN HYDROCHLORIDE 0.4 MG: 0.4 CAPSULE ORAL at 09:39

## 2025-02-07 NOTE — PROGRESS NOTES
[facility] Medicine Services  PROGRESS NOTE    Patient Name: Cordell Martin  : 1945  MRN: [id number]    Date of Admission: 2/3/2025  Primary Care Physician: Ben Holder DO    Subjective   Subjective     CC:  Lower extremity wounds    HPI:  NAEO.  Patient is resting comfortably this morning and easily awakens on exam.  He states that he is doing well, denies dyspnea, chest pain, abdominal pain.  Last bowel movement was about 3 to 4 days ago.  Monitor patient on room air while talking to them and satting 90 to 92%.      Objective   Objective     Vital Signs:   Temp:  [98.3 °F (36.8 °C)-98.9 °F (37.2 °C)] 98.4 °F (36.9 °C)  Heart Rate:  [68-78] 68  Resp:  [16-19] 19  BP: (107-135)/(54-65) 126/58  Flow (L/min) (Oxygen Therapy):  [2] 2     Physical Exam:  Constitutional: No acute distress, awake, alert  Respiratory: Clear to auscultation bilaterally, respiratory effort normal   Cardiovascular: RRR, no murmurs, rubs, or gallops  Gastrointestinal: soft, nontender, slightly distended  Musculoskeletal: Bilateral lower extremities wrapped and boots in place  Psychiatric: Appropriate affect, cooperative      Results Reviewed:  LAB RESULTS:      Lab 25  0646 25  0639 25  0647 25  1430 25  1136   WBC 5.97 5.57 5.58  --  5.40   HEMOGLOBIN 10.1* 9.9* 10.1*  --  12.3*   HEMATOCRIT 32.6* 32.0* 32.6*  --  40.2   PLATELETS 206 195 203  --  243   NEUTROS ABS  --   --  3.89  --  3.73   IMMATURE GRANS (ABS)  --   --  0.02  --  0.02   LYMPHS ABS  --   --  1.00  --  0.92   MONOS ABS  --   --  0.54  --  0.54   EOS ABS  --   --  0.11  --  0.17   MCV 92.9 94.1 93.1  --  94.6   LACTATE  --   --   --   --  1.2   PROTIME  --   --   --   --  13.6   HSTROP T  --   --   --  19 18         Lab 25  0646 25  0639 25  1735 25  0647 25  1136   SODIUM 138 138  --  139 142   POTASSIUM 4.1 3.9 4.0 3.6 3.8   CHLORIDE 103 105  --  106 105   CO2 24.0  24.0  --  24.0 27.0   ANION GAP 11.0 9.0  --  9.0 10.0   BUN 23 25*  --  26* 29*   CREATININE 1.35* 1.30*  --  1.06 1.19   EGFR 53.7* 56.2*  --  71.8 62.5   GLUCOSE 93 134*  --  102* 118*   CALCIUM 8.4* 8.2*  --  8.4* 9.0         Lab 02/04/25  0647 02/03/25  1136   TOTAL PROTEIN 5.3* 7.0   ALBUMIN 2.8* 3.4*   GLOBULIN 2.5 3.6   ALT (SGPT) <5 16   AST (SGOT) 16 20   BILIRUBIN 0.5 0.4   ALK PHOS 128* 162*         Lab 02/03/25  1430 02/03/25  1136   PROBNP  --  68.0   HSTROP T 19 18   PROTIME  --  13.6   INR  --  1.03                 Brief Urine Lab Results  (Last result in the past 365 days)        Color   Clarity   Blood   Leuk Est   Nitrite   Protein   CREAT   Urine HCG        10/08/24 2219 Yellow   Clear   Negative   Negative   Negative   Negative                   Microbiology Results Abnormal       Procedure Component Value - Date/Time    Wound Culture - Wound, Leg, Right [[id number]]  (Abnormal)  (Susceptibility) Collected: 02/03/25 1903    Lab Status: Final result Specimen: Wound from Leg, Right Updated: 02/07/25 0721     Wound Culture Light growth (2+) Staphylococcus aureus, MRSA     Comment: Refer to previous wound culture collected on 2/3/2025 1528 for MICs    Methicillin resistant Staphylococcus aureus, Patient may be an isolation risk.         Scant growth (1+) Pseudomonas aeruginosa     Comment: Refer to previous wound culture collected on 2/3/2025 1528 for MICs           Rare growth Escherichia coli     Gram Stain Rare (1+) WBCs seen      Rare (1+) Gram positive cocci in pairs    Narrative:            Susceptibility        Escherichia coli      DRE      Amoxicillin + Clavulanate Susceptible      Ampicillin Susceptible      Ampicillin + Sulbactam Susceptible      Cefazolin (Non Urine) Susceptible      Cefepime Susceptible      Ceftazidime Susceptible      Ceftriaxone Susceptible      Gentamicin Susceptible      Levofloxacin Susceptible      Piperacillin + Tazobactam Susceptible      Tetracycline Resistant       Trimethoprim + Sulfamethoxazole Resistant                       Susceptibility Comments       Escherichia coli    With the exception of urinary-sourced infections, aminoglycosides should not be used as monotherapy.               Wound Culture - Swab, Leg, Left [[id number]]  (Abnormal)  (Susceptibility) Collected: 02/03/25 1528    Lab Status: Edited Result - FINAL Specimen: Swab from Leg, Left Updated: 02/05/25 0754     Wound Culture Heavy growth (4+) Staphylococcus aureus, MRSA     Comment:   Methicillin resistant Staphylococcus aureus, Patient may be an isolation risk.         Scant growth (1+) Pseudomonas aeruginosa     Gram Stain Many (4+) Gram positive cocci in pairs and clusters      Rare (1+) WBCs seen      Rare (1+) Gram negative bacilli    Susceptibility        Staphylococcus aureus, MRSA      DRE      Clindamycin Susceptible      Daptomycin Susceptible (C)  [1]       Erythromycin Resistant      Oxacillin Resistant      Rifampin Susceptible      Tetracycline Resistant      Trimethoprim + Sulfamethoxazole Susceptible      Vancomycin Susceptible                   [1]  Appended report. These results have been appended to a previously final verified report.                Susceptibility        Pseudomonas aeruginosa      DRE      Cefepime Susceptible      Ceftazidime Susceptible      Ciprofloxacin Susceptible      Levofloxacin Intermediate      Piperacillin + Tazobactam Susceptible      Tobramycin Susceptible                       Susceptibility Comments       Staphylococcus aureus, MRSA    Daptomycin requested by Dr King 2/5      Pseudomonas aeruginosa    With the exception of urinary-sourced infections, aminoglycosides should not be used as monotherapy.               MRSA Screen, PCR (Inpatient) - Swab, Nares [[id number]]  (Abnormal) Collected: 02/03/25 1903    Lab Status: Final result Specimen: Swab from Nares Updated: 02/03/25 2102     MRSA PCR Positive    Narrative:      The negative predictive value  "of this diagnostic test is high and should only be used to consider de-escalating anti-MRSA therapy. A positive result may indicate colonization with MRSA and must be correlated clinically.            XR Chest 1 View    Result Date: 2/6/2025  XR CHEST 1 VW Date of Exam: 2/6/2025 9:33 AM EST Indication: Hypoxia Comparison: 2/3/2025 Findings: Heart size is enlarged. There is mild pulmonary vascular congestion and interstitial edema. No focal consolidation is identified. There is no significant pleural fluid.     Impression: Impression: Cardiomegaly with mild pulmonary vascular congestion and interstitial edema. Electronically Signed: Gregor Herrera MD  2/6/2025 10:21 AM EST  Workstation ID: [id number]     Results for orders placed during the hospital encounter of 01/12/21    Adult Transthoracic Echo Complete W/ Cont if Necessary Per Protocol    Interpretation Summary  · The right ventricle and right atrium are moderately dilated. Other chamber sizes and wall thicknesses are normal.  · Global and segmental LV wall motion is normal. The estimated left ventricular ejection fraction is 51% - 55%.  · The aortic and mitral valves are normal in structure and function.  · The estimated RV systolic pressure is mildly elevated 35 mmHg - 40 mmHg. There is as well noted to be less than 50% inspiratory IVC collapse. The main pulmonary artery is \"borderline dilated\".  · There are no other important findings on this study.      Current medications:  Scheduled Meds:atorvastatin, 40 mg, Oral, Daily  budesonide-formoterol, 2 puff, Inhalation, BID - RT  carbidopa-levodopa, 2 tablet, Oral, 4x Daily  DAPTOmycin, 4 mg/kg (Adjusted), Intravenous, Q24H  fluticasone, 2 spray, Each Nare, Daily  furosemide, 20 mg, Oral, Daily  heparin (porcine), 5,000 Units, Subcutaneous, Q12H  levothyroxine, 88 mcg, Oral, Q AM  piperacillin-tazobactam, 3.375 g, Intravenous, Q8H  polyethylene glycol, 17 g, Oral, BID   And  senna, 2 tablet, Oral, BID  pramipexole, " 1 mg, Oral, TID  tamsulosin, 0.4 mg, Oral, Daily  vitamin D3, 5,000 Units, Oral, Daily      Continuous Infusions:   PRN Meds:.  acetaminophen    polyethylene glycol **AND** senna **AND** bisacodyl    Calcium Replacement - Follow Nurse / BPA Driven Protocol    HYDROcodone-acetaminophen    influenza vaccine    Magnesium Standard Dose Replacement - Follow Nurse / BPA Driven Protocol    Morphine    Phosphorus Replacement - Follow Nurse / BPA Driven Protocol    Potassium Replacement - Follow Nurse / BPA Driven Protocol    sodium chloride    sodium chloride    Assessment & Plan   Assessment & Plan     Active Hospital Problems    Diagnosis  POA    **Bilateral lower leg cellulitis [L03.116, L03.115]  Yes    Cellulitis [L03.90]  Yes    Anemia, chronic disease [D63.8]  Yes    History of DVT (deep vein thrombosis) [Z86.718]  Not Applicable    Restrictive pattern on PFTs w/o evidence of ILD [R94.2]  Yes    Memory loss [R41.3]  Yes    Parkinson's disease [G20.A1]  Yes      Resolved Hospital Problems   No resolved problems to display.        Brief Hospital Course to date:  Cordell Martin is a 80 y.o. male with PMH of PMH Parkinson's, history of DVT, HLD, hypothyroidism, GERD, lymphedema, anxiety, depression, wheelchair use/walker use at baseline presented to the hospital with worsening bilateral lower extremity wounds.    BLE wounds   Cellulitis, suspected   -Presents with bilateral lower extremity wounds, has had hospitalization for similar complaint in the past  -Blood culture: NGTD, MRSA nares positive  - Wound culture left leg with MRSA/Pseudomonas/E. coli  PLAN:   - ID consulted, recommending IV daptomycin and Zosyn, and tentative plan to continue antibiotics for total of 7-10 days  - Wound care consulted    AHRF, improving  -Patient has required 2 to 3 L nasal cannula since 2/5, patient observed on room air this morning and satting 90 to 92%  -Chest x-ray with pulmonary vascular congestion, s/p IV Lasix 40mg on  2/6  PLAN:  -Continue home Lasix  -Encouraging incentive spirometer use    Chronic debility  -Patient was in independent living prior to admission, patient is unable to change his position in bed and is requiring assistance with essentially most daily activities  -Discussions with patient regarding likely needing long-term care, wanting to go to rehab  -Case management assisting with placement    Constipation  - Last bowel movement on 2/4 per patient  - Scheduled MiraLAX and senna twice daily, Dulcolax suppository as needed daily    Chronic Medical Conditions:   Normocytic anemia  - chronic, Hemoglobin stable     Parkinson's disease  Anxiety and depression  - Continue home Sinemet, Mirapex     Hypothyroidism  - Synthroid with elevated TSH 13.6 normal free T4 on 1/14/25  - Continue levothyroxine 88 mcg daily follow-up with PCP for repeat TSH in 6 weeks     HLD  - Continue home atorvastatin     GERD  - PPI    Allergic rhinitis  - Continue home Flonase    AM-PAC 6 Clicks Score (PT): 11 (02/07/25 0902)    CODE STATUS:   Code Status and Medical Interventions: CPR (Attempt to Resuscitate); Full Support   Ordered at: 02/03/25 1628     Code Status (Patient has no pulse and is not breathing):    CPR (Attempt to Resuscitate)     Medical Interventions (Patient has pulse or is breathing):    Full Support       Leobardo Hayden MD  02/07/25

## 2025-02-07 NOTE — PLAN OF CARE
Problem: Adult Inpatient Plan of Care  Goal: Plan of Care Review  Outcome: Progressing  Goal: Patient-Specific Goal (Individualized)  Outcome: Progressing  Goal: Absence of Hospital-Acquired Illness or Injury  Outcome: Progressing  Intervention: Identify and Manage Fall Risk  Recent Flowsheet Documentation  Taken 2/7/2025 0400 by Marisa Keyes RN  Safety Promotion/Fall Prevention:   clutter free environment maintained   mobility aid in reach   nonskid shoes/slippers when out of bed   room organization consistent   safety round/check completed  Taken 2/7/2025 0200 by Marisa Keyes RN  Safety Promotion/Fall Prevention:   clutter free environment maintained   mobility aid in reach   nonskid shoes/slippers when out of bed   room organization consistent   safety round/check completed  Taken 2/7/2025 0000 by Marisa Keyes RN  Safety Promotion/Fall Prevention:   clutter free environment maintained   mobility aid in reach   nonskid shoes/slippers when out of bed   room organization consistent   safety round/check completed  Taken 2/6/2025 2200 by Marisa Keyes RN  Safety Promotion/Fall Prevention:   clutter free environment maintained   mobility aid in reach   nonskid shoes/slippers when out of bed   room organization consistent   safety round/check completed  Taken 2/6/2025 2000 by Marisa Keyes RN  Safety Promotion/Fall Prevention:   clutter free environment maintained   mobility aid in reach   nonskid shoes/slippers when out of bed   room organization consistent   safety round/check completed  Intervention: Prevent Skin Injury  Recent Flowsheet Documentation  Taken 2/7/2025 0400 by Marisa Keyes RN  Skin Protection:   hydrocolloids used   incontinence pads utilized   pulse oximeter probe site changed   silicone foam dressing in place   skin sealant/moisture barrier applied  Taken 2/7/2025 0200 by Marisa Keyes RN  Skin Protection:   hydrocolloids used   incontinence pads utilized   silicone foam dressing in place    skin sealant/moisture barrier applied  Taken 2/7/2025 0000 by Marisa Keyes RN  Skin Protection:   hydrocolloids used   incontinence pads utilized   silicone foam dressing in place   skin sealant/moisture barrier applied  Taken 2/6/2025 2200 by Marisa Keyes RN  Skin Protection:   hydrocolloids used   incontinence pads utilized   silicone foam dressing in place   skin sealant/moisture barrier applied  Taken 2/6/2025 2000 by Marisa Keyes RN  Body Position:   supine   lower extremity elevated  Skin Protection:   hydrocolloids used   incontinence pads utilized   silicone foam dressing in place   skin sealant/moisture barrier applied  Intervention: Prevent Infection  Recent Flowsheet Documentation  Taken 2/7/2025 0400 by Marisa Keyes RN  Infection Prevention:   hand hygiene promoted   single patient room provided  Taken 2/7/2025 0200 by Marisa Keyes RN  Infection Prevention:   hand hygiene promoted   single patient room provided  Taken 2/7/2025 0000 by Marisa Keyes RN  Infection Prevention:   hand hygiene promoted   single patient room provided  Taken 2/6/2025 2200 by Marisa Keyes RN  Infection Prevention:   hand hygiene promoted   single patient room provided  Taken 2/6/2025 2000 by Marisa Keyes RN  Infection Prevention:   hand hygiene promoted   single patient room provided  Goal: Optimal Comfort and Wellbeing  Outcome: Progressing  Intervention: Provide Person-Centered Care  Recent Flowsheet Documentation  Taken 2/7/2025 0400 by Marisa Keyes RN  Trust Relationship/Rapport: care explained  Taken 2/7/2025 0200 by Marisa Keyes RN  Trust Relationship/Rapport: care explained  Taken 2/7/2025 0000 by Marisa Keyes RN  Trust Relationship/Rapport: care explained  Taken 2/6/2025 2200 by Marisa Keyes RN  Trust Relationship/Rapport: care explained  Taken 2/6/2025 2000 by Marisa Keyes RN  Trust Relationship/Rapport: care explained  Goal: Readiness for Transition of Care  Outcome: Progressing     Problem: Skin Injury  Risk Increased  Goal: Skin Health and Integrity  Outcome: Progressing  Intervention: Optimize Skin Protection  Recent Flowsheet Documentation  Taken 2/7/2025 0400 by Marisa Keyes RN  Activity Management: activity minimized  Pressure Reduction Techniques:   heels elevated off bed   pressure points protected   weight shift assistance provided  Pressure Reduction Devices:   heel offloading device utilized   foam padding utilized   positioning supports utilized   pressure-redistributing mattress utilized  Skin Protection:   hydrocolloids used   incontinence pads utilized   pulse oximeter probe site changed   silicone foam dressing in place   skin sealant/moisture barrier applied  Taken 2/7/2025 0200 by Marisa Keyes RN  Activity Management: activity minimized  Pressure Reduction Techniques:   heels elevated off bed   pressure points protected   weight shift assistance provided  Pressure Reduction Devices:   heel offloading device utilized   foam padding utilized   positioning supports utilized   pressure-redistributing mattress utilized  Skin Protection:   hydrocolloids used   incontinence pads utilized   silicone foam dressing in place   skin sealant/moisture barrier applied  Taken 2/7/2025 0000 by Marisa Keyes RN  Activity Management: activity minimized  Pressure Reduction Techniques:   heels elevated off bed   pressure points protected   weight shift assistance provided  Pressure Reduction Devices:   heel offloading device utilized   foam padding utilized   positioning supports utilized   pressure-redistributing mattress utilized  Skin Protection:   hydrocolloids used   incontinence pads utilized   silicone foam dressing in place   skin sealant/moisture barrier applied  Taken 2/6/2025 2200 by Marisa Keyes RN  Activity Management: activity minimized  Pressure Reduction Techniques:   heels elevated off bed   pressure points protected   weight shift assistance provided  Head of Bed (HOB) Positioning: HOB  elevated  Pressure Reduction Devices:   heel offloading device utilized   foam padding utilized   positioning supports utilized   pressure-redistributing mattress utilized  Skin Protection:   hydrocolloids used   incontinence pads utilized   silicone foam dressing in place   skin sealant/moisture barrier applied  Taken 2/6/2025 2000 by Marisa Keyes RN  Activity Management: activity encouraged  Pressure Reduction Techniques:   heels elevated off bed   pressure points protected   weight shift assistance provided  Head of Bed (HOB) Positioning: HOB elevated  Pressure Reduction Devices:   heel offloading device utilized   foam padding utilized   positioning supports utilized   pressure-redistributing mattress utilized  Skin Protection:   hydrocolloids used   incontinence pads utilized   silicone foam dressing in place   skin sealant/moisture barrier applied     Problem: Fall Injury Risk  Goal: Absence of Fall and Fall-Related Injury  Outcome: Progressing  Intervention: Identify and Manage Contributors  Recent Flowsheet Documentation  Taken 2/6/2025 2000 by Marisa Keyes RN  Medication Review/Management: medications reviewed  Intervention: Promote Injury-Free Environment  Recent Flowsheet Documentation  Taken 2/7/2025 0400 by Marisa Keyes RN  Safety Promotion/Fall Prevention:   clutter free environment maintained   mobility aid in reach   nonskid shoes/slippers when out of bed   room organization consistent   safety round/check completed  Taken 2/7/2025 0200 by Marisa Keyes RN  Safety Promotion/Fall Prevention:   clutter free environment maintained   mobility aid in reach   nonskid shoes/slippers when out of bed   room organization consistent   safety round/check completed  Taken 2/7/2025 0000 by Marisa Keyes RN  Safety Promotion/Fall Prevention:   clutter free environment maintained   mobility aid in reach   nonskid shoes/slippers when out of bed   room organization consistent   safety round/check completed  Taken  2/6/2025 2200 by Marisa Keyes, RN  Safety Promotion/Fall Prevention:   clutter free environment maintained   mobility aid in reach   nonskid shoes/slippers when out of bed   room organization consistent   safety round/check completed  Taken 2/6/2025 2000 by Marisa Keyes, RN  Safety Promotion/Fall Prevention:   clutter free environment maintained   mobility aid in reach   nonskid shoes/slippers when out of bed   room organization consistent   safety round/check completed   Goal Outcome Evaluation:

## 2025-02-07 NOTE — THERAPY EVALUATION
Patient Name: Cordell Martin  : 1945    MRN: [id number]                              Today's Date: 2025       Admit Date: 2/3/2025    Visit Dx:     ICD-10-CM ICD-9-CM   1. Bilateral lower extremity edema  R60.0 782.3   2. Lymphedema  I89.0 457.1     Patient Active Problem List   Diagnosis    Anxiety    Arthritis    Depression    Hyperlipidemia    Lumbar stenosis with neurogenic claudication    Parkinson's disease    Lumbar stenosis with neurogenic claudication status post L4-5 decompression    Status post lumbar laminectomy    Memory loss    Hypothyroidism (acquired)    Pulmonary nodule (4mm RML incidental)    GERD    Remote smoker (Stopped )    Restrictive pattern on PFTs w/o evidence of ILD    Cellulitis    History of DVT (deep vein thrombosis)    Anemia, chronic disease    Bilateral lower leg cellulitis     Past Medical History:   Diagnosis Date    Anxiety     Arthritis     Back pain     Bronchitis     Cognitive impairment     Depression     Disease of thyroid gland     Glucose intolerance (impaired glucose tolerance)     Hyperlipidemia     Kidney stone     Obesity     Parkinson disease     Pneumonia     Prostate disorder     Thyroid disease     Wears eyeglasses      Past Surgical History:   Procedure Laterality Date    COLONOSCOPY      5 years ago    EYE SURGERY      HERNIA REPAIR  10/20/2020    KNEE SURGERY      LUMBAR LAMINECTOMY DISCECTOMY DECOMPRESSION N/A 2017    Procedure: LUMBAR LAMINECTOMY L4-5;  Surgeon: Antonio Trent MD;  Location: [facility] OR;  Service:     SHOULDER ROTATOR CUFF REPAIR Right     x2    TONSILLECTOMY      VEIN SURGERY        General Information       Row Name 25 0853          OT Time and Intention    Document Type evaluation  -JONATHAN     Mode of Treatment occupational therapy  -JONATHAN       Row Name 25 0853          General Information    Patient Profile Reviewed yes  -JONATHAN     Prior Level of Function independent:;ADL's;bed mobility;transfer;w/c or  scooter;driving  -JONATHAN     Existing Precautions/Restrictions fall;oxygen therapy device and L/min;other (see comments)  hx Parkinson's; BLE cellulitis with unna boots, chronic back and knee pain; BUE tremors  -       Row Name 02/07/25 0853          Occupational Profile    Environmental Supports and Barriers (Occupational Profile) Lives alone at [facility]; uses motorized scooter (able to access bathroom), has recliner lift chair, walk-in shower with shower stool; elevated toilet seat (pt reports this is ill-fitting)  -       Row Name 02/07/25 0853          Living Environment    People in Home alone  -       Row Name 02/07/25 0853          Home Main Entrance    Number of Stairs, Main Entrance none  -JONATHAN       Row Name 02/07/25 0853          Stairs Within Home, Primary    Number of Stairs, Within Home, Primary none  -JONATHAN       Row Name 02/07/25 0853          Cognition    Orientation Status (Cognition) oriented x 3  -       Row Name 02/07/25 0853          Safety Issues/Impairments Affecting Functional Mobility    Safety Issues Affecting Function (Mobility) awareness of need for assistance;friction/shear risk;insight into deficits/self-awareness;judgment;positioning of assistive device;problem-solving;safety precaution awareness;safety precautions follow-through/compliance;sequencing abilities  -JONATHAN     Impairments Affecting Function (Mobility) balance;coordination;endurance/activity tolerance;motor control;pain;postural/trunk control;range of motion (ROM);shortness of breath;strength  -     Comment, Safety Issues/Impairments (Mobility) increased time needed for all transitions; decreased ROM LLE impacts mobility  -               User Key  (r) = Recorded By, (t) = Taken By, (c) = Cosigned By      Initials Name Provider Type    Priti Kaur OT Occupational Therapist                   Lymphedema       Row Name 02/04/25 9675             Lymphedema Edema Assessment    Ptting Edema Category By severity   -KW      Pitting Edema Moderate  -KW      Recorded by [KW] Yolanda Callaway, PT              Skin Changes/Observations    Location/Assessment Lower Extremity  -KW      Lower Extremity Conditions bilateral:;crust;inflamed;weeping  -KW      Lower Extremity Color/Pigment bilateral:;erythema  -KW      Recorded by [KW] Yolanda Callaway, PT              Lymphedema Pulses/Capillary Refill    Lymphedema Pulses/Capillary Refill capillary refill  -KW      Capillary Refill lower extremity capillary refill  -KW      Lower Extremity Capillary Refill right:;left:;less than 3 seconds  -KW      Recorded by [KW] Yolanda Callaway, PT              Compression/Skin Care    Compression/Skin Care skin care;wrapping location  -KW      Skin Care washed/dried;lotion applied  -KW      Wrapping Location lower extremity  -KW      Wrapping Location LE bilateral:;foot to knee  -KW      Wrapping Comments unna boot in clamshell pattern followed by kerlix,  coban secured with spandage  -KW      Recorded by [KW] Yolanda Callaway, PT                User Key  (r) = Recorded By, (t) = Taken By, (c) = Cosigned By      Initials Name Effective Dates    KW Yolanda Callaway, PT 01/27/22 -                    Mobility/ADL's       Row Name 02/07/25 0859          Bed Mobility    Bed Mobility supine-sit  -JONATHAN     Supine-Sit [place] (Bed Mobility) maximum assist (25% patient effort);2 person assist;verbal cues;nonverbal cues (demo/gesture)  -JONATHAN     Assistive Device (Bed Mobility) head of bed elevated;repositioning sheet  -JONATHAN     Comment, (Bed Mobility) increased time needed to initiate, v/t cues, assist for trunk control using repositioning sheet  -JONATHAN       Row Name 02/07/25 0863          Transfers    Transfers sit-stand transfer;bed-chair transfer  -JONATHAN     Comment, (Transfers) cues for HP and sequening; assist to steady walker d/t hand tremors; B feet blocked  -JONATHAN       Row Name 02/07/25 0859          Bed-Chair Transfer    Bed-Chair  Lane (Transfers) maximum assist (25% patient effort);2 person assist;verbal cues;nonverbal cues (demo/gesture)  -     Assistive Device (Bed-Chair Transfers) walker, front-wheeled  -JONATHAN     Comment, (Bed-Chair Transfer) increased time/cues needed for sequencing weight shift  -[facility] Name 02/07/25 0859          Sit-Stand Transfer    Sit-Stand Lane (Transfers) maximum assist (25% patient effort);2 person assist;verbal cues;nonverbal cues (demo/gesture)  -     Assistive Device (Sit-Stand Transfers) walker, front-wheeled  -JONATHAN     Comment, (Sit-Stand Transfer) cues for pre-positioning, for standing fully upright  -[facility] Name 02/07/25 0859          Functional Mobility    Functional Mobility- Ind. Level not tested  -[facility] Name 02/07/25 0859          Activities of Daily Living    BADL Assessment/Intervention lower body dressing;grooming;feeding;toileting  -JONATHAN       Row Name 02/07/25 0859          Lower Body Dressing Assessment/Training    Lane Level (Lower Body Dressing) don;socks;dependent (less than 25% patient effort)  -     Position (Lower Body Dressing) sitting up in bed  -[facility] Name 02/07/25 0859          Grooming Assessment/Training    [place] Level (Grooming) oral care regimen;wash face, hands;set up  -JONATHAN     Position (Grooming) supported sitting  -JONATHAN     Comment, (Grooming) increased time needed for oral care; typically uses electric toothbrush at home  -[facility] Name 02/07/25 0859          Self-Feeding Assessment/Training    [place] Level (Feeding) prepare tray/open items;set up  -JONATHAN     Position (Feeding) supported sitting  -JONATHAN     Comment, (Feeding) assist to manage containers on meal tray and prepare food for eating  -[facility] Name 02/07/25 0859          Toileting Assessment/Training    [place] Level (Toileting) adjust/manage clothing;perform perineal hygiene;dependent (less than 25% patient effort)  -     Assistive Devices  (Toileting) urinal  -JONATHAN     Position (Toileting) sitting up in bed  -JONATHAN     Comment, (Toileting) Assist for positioning and hygiene following  -JONATHAN               User Key  (r) = Recorded By, (t) = Taken By, (c) = Cosigned By      Initials Name Provider Type    Priti Kaur OT Occupational Therapist                   Obj/Interventions       Row Name 02/07/25 1016          Sensory Assessment (Somatosensory)    Sensory Assessment (Somatosensory) UE sensation intact  -       Row Name 02/07/25 1016          Vision Assessment/Intervention    Visual Impairment/Limitations WFL;corrective lenses for reading  -       Row Name 02/07/25 1016          Range of Motion Comprehensive    General Range of Motion bilateral upper extremity ROM WFL  -JONATHAN     Comment, General Range of Motion R shoulder limited to 80 degrees elevation  -       Row Name 02/07/25 1016          Strength Comprehensive (MMT)    Comment, General Manual Muscle Testing (MMT) Assessment BUE's grossly 3+/5  -       Row Name 02/07/25 1016          Balance    Balance Assessment sitting static balance;sitting dynamic balance;standing static balance;standing dynamic balance  -     Static Sitting Balance supervision  -     Dynamic Sitting Balance moderate assist  -JONATHAN     Position, Sitting Balance unsupported;sitting edge of bed  -     Static Standing Balance maximum assist;2-person assist  -     Dynamic Standing Balance maximum assist;2-person assist;verbal cues;non-verbal cues (demo/gesture)  -JONATHAN     Position/Device Used, Standing Balance supported;walker, front-wheeled  -JONATHAN     Balance Interventions standing;dynamic;occupation based/functional task;weight shifting activity  -     Comment, Balance MaxAx2 for standing weight shift using FWW  -JONATHAN               User Key  (r) = Recorded By, (t) = Taken By, (c) = Cosigned By      Initials Name Provider Type    Priti Kaur OT Occupational Therapist                   Goals/Plan       Row Name  02/07/25 1022          Transfer Goal 1 (OT)    Activity/Assistive Device (Transfer Goal 1, OT) sit-to-stand/stand-to-sit;bed-to-chair/chair-to-bed;toilet;walker, rolling  -JONATHAN     [place] Level/Cues Needed (Transfer Goal 1, OT) moderate assist (50-74% patient effort)  -JONATHAN     Time Frame (Transfer Goal 1, OT) long term goal (LTG);10 days  -JONATHAN     Progress/Outcome (Transfer Goal 1, OT) new goal  -JONATHAN       Row Name 02/07/25 1022          Toileting Goal 1 (OT)    Activity/Device (Toileting Goal 1, OT) adjust/manage clothing;perform perineal hygiene  -JONATHAN     [place] Level/Cues Needed (Toileting Goal 1, OT) moderate assist (50-74% patient effort)  -JONATHAN     Time Frame (Toileting Goal 1, OT) short term goal (STG);1 week  -JONATHAN     Progress/Outcome (Toileting Goal 1, OT) new goal  -JONATHAN       Row Name 02/07/25 1022          Grooming Goal 1 (OT)    Activity/Device (Grooming Goal 1, OT) hair care;oral care;wash face, hands  -JONATHAN     [place] (Grooming Goal 1, OT) set-up required  -JONATHAN     Time Frame (Grooming Goal 1, OT) short term goal (STG);1 week  -JONATHAN     Strategies/Barriers (Grooming Goal 1, OT) seated EOB  -JONATHAN     Progress/Outcome (Grooming Goal 1, OT) new goal  -JONATHAN       Row Name 02/07/25 1022          Therapy Assessment/Plan (OT)    Planned Therapy Interventions (OT) activity tolerance training;BADL retraining;functional balance retraining;occupation/activity based interventions;patient/caregiver education/training;ROM/therapeutic exercise;strengthening exercise;transfer/mobility retraining  -JONATHAN               User Key  (r) = Recorded By, (t) = Taken By, (c) = Cosigned By      Initials Name Provider Type    Priti Kaur OT Occupational Therapist                   Clinical Impression       Row Name 02/07/25 1018          Pain Assessment    Pretreatment Pain Rating 6/10  -JONATHAN     Posttreatment Pain Rating 6/10  -JONATHAN     Pain Location knee  -JONATHAN     Pain Side/Orientation bilateral  -JONATHAN     Pain Management  Interventions exercise or physical activity utilized;positioning techniques utilized  -JONATHAN     Response to Pain Interventions activity participation with tolerable pain  -JONATHAN     Pre/Posttreatment Pain Comment RN notified  -       Row Name 02/07/25 1018          Plan of Care Review    Plan of Care Reviewed With patient  -JONATHAN     Outcome Evaluation OT eval completed. Pt presents below baseline for ADL performance, limited by significant weakness, decreased trunk control, and standing balance deficits. Pt Dep for toileting at bed level, MaxAx2 for SPT to chair using FWW, MOSLEY for grooming while seated. IP OT services warranted. Recommend IPR at discharge.  -       Row Name 02/07/25 1018          Therapy Assessment/Plan (OT)    Patient/Family Therapy Goal Statement (OT) To return to living independently  -JONATHAN     Rehab Potential (OT) good  -JONATHAN     Criteria for Skilled Therapeutic Interventions Met (OT) yes;meets criteria;skilled treatment is necessary  -JONATHAN     Therapy Frequency (OT) daily  -JONATHAN     Predicted Duration of Therapy Intervention (OT) 10 days  -       Row Name 02/07/25 1018          Therapy Plan Review/Discharge Plan (OT)    Anticipated Discharge Disposition (OT) inpatient rehabilitation facility  -       Row Name 02/07/25 1018          Vital Signs    O2 Delivery Pre Treatment nasal cannula  -JONATHAN     O2 Delivery Intra Treatment nasal cannula  -JONATHAN     O2 Delivery Post Treatment nasal cannula  -JONATHAN     Pre Patient Position Supine  -JONATHAN     Intra Patient Position Standing  -JONATHAN     Post Patient Position Sitting  -       Row Name 02/07/25 1018          Positioning and Restraints    Pre-Treatment Position in bed  -JONATHAN     Post Treatment Position chair  -JONATHAN     In Chair notified nsg;reclined;call light within reach;encouraged to call for assist;exit alarm on;waffle cushion;on mechanical lift sling;legs elevated;heels elevated  -               User Key  (r) = Recorded By, (t) = Taken By, (c) = Cosigned By       Initials Name Provider Type    Priti Kaur OT Occupational Therapist                   Outcome Measures       Row Name 02/07/25 1024          How much help from another is currently needed...    Putting on and taking off regular lower body clothing? 1  -JONATHAN     Bathing (including washing, rinsing, and drying) 2  -JONATHAN     Toileting (which includes using toilet bed pan or urinal) 1  -JONATHAN     Putting on and taking off regular upper body clothing 3  -JONATHAN     Taking care of personal grooming (such as brushing teeth) 3  -JONATHAN     Eating meals 3  -JONAHTAN     AM-PAC 6 Clicks Score (OT) 13  -JONATHAN       Row Name 02/07/25 0902          How much help from another person do you currently need...    Turning from your back to your side while in flat bed without using bedrails? 2  -AS     Moving from lying on back to sitting on the side of a flat bed without bedrails? 2  -AS     Moving to and from a bed to a chair (including a wheelchair)? 2  -AS     Standing up from a chair using your arms (e.g., wheelchair, bedside chair)? 2  -AS     Climbing 3-5 steps with a railing? 1  -AS     To walk in hospital room? 2  -AS     AM-PAC 6 Clicks Score (PT) 11  -AS     Highest Level of Mobility Goal 4 --> Transfer to chair/commode  -AS       Row Name 02/07/25 1024 02/07/25 0902       Functional Assessment    Outcome Measure Options AM-PAC 6 Clicks Daily Activity (OT)  -JONATHAN AM-PAC 6 Clicks Basic Mobility (PT)  -AS              User Key  (r) = Recorded By, (t) = Taken By, (c) = Cosigned By      Initials Name Provider Type    AS [place] PTA Physical Therapist Assistant    Priti Kaur OT Occupational Therapist                    Occupational Therapy Education       Title: PT OT SLP Therapies (In Progress)       Topic: Occupational Therapy (In Progress)       Point: ADL training (Done)       Description:   Instruct learner(s) on proper safety adaptation and remediation techniques during self care or transfers.   Instruct in proper use of  assistive devices.                  Learning Progress Summary            Patient Acceptance, E, DU,NR by JONATHAN at 2/7/2025 1024    Comment: OT POC; transfer training; BADL's                      Point: Home exercise program (Not Started)       Description:   Instruct learner(s) on appropriate technique for monitoring, assisting and/or progressing therapeutic exercises/activities.                  Learner Progress:  Not documented in this visit.              Point: Precautions (Done)       Description:   Instruct learner(s) on prescribed precautions during self-care and functional transfers.                  Learning Progress Summary            Patient Acceptance, E, DU,NR by JONATHAN at 2/7/2025 1024    Comment: OT POC; transfer training; BADL's                      Point: Body mechanics (Done)       Description:   Instruct learner(s) on proper positioning and spine alignment during self-care, functional mobility activities and/or exercises.                  Learning Progress Summary            Patient Acceptance, E, DU,NR by JONATHAN at 2/7/2025 1024    Comment: OT POC; transfer training; BADL's                                      User Key       Initials Effective Dates Name Provider Type Discipline     06/16/21 -  Priti Humphrey OT Occupational Therapist OT                  OT Recommendation and Plan  Planned Therapy Interventions (OT): activity tolerance training, BADL retraining, functional balance retraining, occupation/activity based interventions, patient/caregiver education/training, ROM/therapeutic exercise, strengthening exercise, transfer/mobility retraining  Therapy Frequency (OT): daily  Plan of Care Review  Plan of Care Reviewed With: patient  Outcome Evaluation: OT eval completed. Pt presents below baseline for ADL performance, limited by significant weakness, decreased trunk control, and standing balance deficits. Pt Dep for toileting at bed level, MaxAx2 for SPT to chair using FWW, [place] for grooming while seated.  IP OT services warranted. Recommend IPR at discharge.     Time Calculation:   Evaluation Complexity (OT)  Review Occupational Profile/Medical/Therapy History Complexity: expanded/moderate complexity  Assessment, Occupational Performance/Identification of Deficit Complexity: 3-5 performance deficits  Clinical Decision Making Complexity (OT): detailed assessment/moderate complexity  Overall Complexity of Evaluation (OT): moderate complexity     Time Calculation- OT       Row Name 02/07/25 0902 02/07/25 0820          Time Calculation- OT    OT Start Time -- 0820  -JONATHAN     OT Received On -- 02/07/25  -JONATHAN     OT Goal Re-Cert Due Date -- 02/17/25  -JONATHAN        Timed Charges    27650 - Gait Training Minutes  10  -AS --     07181 - OT Self Care/Mgmt Minutes -- 12  -JONATHAN        Untimed Charges    OT Eval/Re-eval Minutes -- 48  -JONATHAN        Total Minutes    Timed Charges Total Minutes 10  -AS 12  -JONATHAN     Untimed Charges Total Minutes -- 48  -JONATHAN      Total Minutes 10  -AS 60  -JONATHAN               User Key  (r) = Recorded By, (t) = Taken By, (c) = Cosigned By      Initials Name Provider Type    AS [place], PTA Physical Therapist Assistant    Priti Kaur OT Occupational Therapist                  Therapy Charges for Today       Code Description Service Date Service Provider Modifiers Qty    07373751192 HC OT SELF CARE/MGMT/TRAIN EA 15 MIN 2/7/2025 Priti Humphrey OT GO 1    45766996575 HC OT EVAL MOD COMPLEXITY 4 2/7/2025 Priti Humphrey OT GO 1                 Priti Humphrey OT  2/7/2025

## 2025-02-07 NOTE — PLAN OF CARE
Goal Outcome Evaluation:  Plan of Care Reviewed With: patient        Progress: improving  Outcome Evaluation: Patient completed supine>sit with max assist x2, patient took 8 steps bed>Recliner with max assist x2, cues for anterior weight shifting to correct posterior lean, cues for upright posture and assist with walker d/t B UE tremors, increased time and effort to complete transfer to recliner. Further mobility limited by weakness, fatigue and decreased activity tolerance. Completed B LE exercises in seated position with cues for technique. Recommend IRF at D/C for best functional outcome.

## 2025-02-07 NOTE — THERAPY TREATMENT NOTE
Patient Name: Cordell Martin  : 1945    MRN: [id number]                              Today's Date: 2025       Admit Date: 2/3/2025    Visit Dx:     ICD-10-CM ICD-9-CM   1. Bilateral lower extremity edema  R60.0 782.3   2. Lymphedema  I89.0 457.1     Patient Active Problem List   Diagnosis    Anxiety    Arthritis    Depression    Hyperlipidemia    Lumbar stenosis with neurogenic claudication    Parkinson's disease    Lumbar stenosis with neurogenic claudication status post L4-5 decompression    Status post lumbar laminectomy    Memory loss    Hypothyroidism (acquired)    Pulmonary nodule (4mm RML incidental)    GERD    Remote smoker (Stopped )    Restrictive pattern on PFTs w/o evidence of ILD    Cellulitis    History of DVT (deep vein thrombosis)    Anemia, chronic disease    Bilateral lower leg cellulitis     Past Medical History:   Diagnosis Date    Anxiety     Arthritis     Back pain     Bronchitis     Cognitive impairment     Depression     Disease of thyroid gland     Glucose intolerance (impaired glucose tolerance)     Hyperlipidemia     Kidney stone     Obesity     Parkinson disease     Pneumonia     Prostate disorder     Thyroid disease     Wears eyeglasses      Past Surgical History:   Procedure Laterality Date    COLONOSCOPY      5 years ago    EYE SURGERY      HERNIA REPAIR  10/20/2020    KNEE SURGERY      LUMBAR LAMINECTOMY DISCECTOMY DECOMPRESSION N/A 2017    Procedure: LUMBAR LAMINECTOMY L4-5;  Surgeon: Antonio Trent MD;  Location: [facility];  Service:     SHOULDER ROTATOR CUFF REPAIR Right     x2    TONSILLECTOMY      VEIN SURGERY        General Information       Row Name 25 0852          Physical Therapy Time and Intention    Document Type therapy note (daily note)  -AS     Mode of Treatment physical therapy  -AS       Row Name 25 0852          General Information    Patient Profile Reviewed yes  -AS     Existing Precautions/Restrictions fall  cellulitis B LE's  w/wraps, Parkinson's, chronic back and knee pain(R knee pain>L), BUE tremors  -AS     Barriers to Rehab medically complex;previous functional deficit  -AS       Row Name 02/07/25 0852          Cognition    Orientation Status (Cognition) oriented x 3  -AS       Row Name 02/07/25 0852          Safety Issues/Impairments Affecting Functional Mobility    Safety Issues Affecting Function (Mobility) awareness of need for assistance;insight into deficits/self-awareness;safety precautions follow-through/compliance;sequencing abilities;positioning of assistive device  -AS     Impairments Affecting Function (Mobility) balance;coordination;endurance/activity tolerance;strength;motor control;motor planning;pain;postural/trunk control  -AS     Comment, Safety Issues/Impairments (Mobility) increased time and effort to complete tasks, B UE tremors  -AS               User Key  (r) = Recorded By, (t) = Taken By, (c) = Cosigned By      Initials Name Provider Type    AS Flower Holland, REBEL Physical Therapist Assistant                   Mobility       Row Name 02/07/25 0853          Bed Mobility    Supine-Sit [place] (Bed Mobility) verbal cues;maximum assist (25% patient effort);2 person assist  -AS     Assistive Device (Bed Mobility) bed rails;head of bed elevated;repositioning sheet  -AS     Comment, (Bed Mobility) patient intiated movement with B LE's to EOB, needed asisst at trunk to reach EOB, increased time and effort to complete, once sitting assist needed to correct posterior lean and place feet safely  -AS       Row Name 02/07/25 0853          Transfers    Comment, (Transfers) max cues for hand and feet placement, assist to block feet form sliding, limited R knee flexion (h/x of TKR)  -AS       Row Name 02/07/25 0853          Bed-Chair Transfer    Bed-Chair [place] (Transfers) verbal cues;maximum assist (25% patient effort);2 person assist  -AS     Assistive Device (Bed-Chair Transfers) walker, front-wheeled  -AS        Row Name 02/07/25 0853          Sit-Stand Transfer    Sit-Stand [place] (Transfers) verbal cues;maximum assist (25% patient effort);2 person assist  -AS     Assistive Device (Sit-Stand Transfers) walker, front-wheeled  -AS     Comment, (Sit-Stand Transfer) max cues for hand and feet placement, unable to stand fully upright d/t chronic back pain, B UE tremors with assist to hold down walker at times  -AS       Row Name 02/07/25 0853          Gait/Stairs (Locomotion)    [place] Level (Gait) verbal cues;maximum assist (25% patient effort);2 person assist  -AS     Assistive Device (Gait) walker, front-wheeled  -AS     Distance in Feet (Gait) 8  -AS     Deviations/Abnormal Patterns (Gait) bilateral deviations;base of support, wide;[place] decreased;gait speed decreased;weight shifting decreased  -AS     Bilateral Gait Deviations forward flexed posture;heel strike decreased;weight shift ability decreased  -AS     Comment, (Gait/Stairs) patient took 8 steps bed>Recliner with max assist x2, cues for anterior weight shifting to correct posterior lean, cues for upright posture and assist with walker d/t B UE tremors, increased time and effort to complete transfer to recliner. Further mobility limited by weakness, fatigue and decreased activity tolerance.  -AS               User Key  (r) = Recorded By, (t) = Taken By, (c) = Cosigned By      Initials Name Provider Type    AS Flower Holland PTA Physical Therapist Assistant                   Obj/Interventions       Row Name 02/07/25 0859          Motor Skills    Therapeutic Exercise knee;ankle  -AS       Row Name 02/07/25 0859          Knee (Therapeutic Exercise)    Knee (Therapeutic Exercise) strengthening exercise  -AS     Knee Strengthening (Therapeutic Exercise) bilateral;marching while seated;LAQ (long arc quad);sitting;10 repetitions  -AS       Row Name 02/07/25 0859          Ankle (Therapeutic Exercise)    Ankle (Therapeutic Exercise) AROM (active  range of motion)  -AS     Ankle AROM (Therapeutic Exercise) bilateral;dorsiflexion;sitting;10 repetitions;plantarflexion  -AS       Row Name 02/07/25 0859          Balance    Dynamic Standing Balance verbal cues;maximum assist;2-person assist  -AS     Position/Device Used, Standing Balance supported;walker, front-wheeled  -AS     Comment, Balance max assist x2 for safety, posterior lean in sitting and standing positions increasing assist needed  -AS               User Key  (r) = Recorded By, (t) = Taken By, (c) = Cosigned By      Initials Name Provider Type    AS Flower Holland, REBEL Physical Therapist Assistant                   Goals/Plan    No documentation.                  Clinical Impression       Row Name 02/07/25 0900          Pain    Pretreatment Pain Rating 6/10  -AS     Posttreatment Pain Rating 6/10  -AS     Pain Location knee  -AS     Pain Side/Orientation bilateral  -AS     Pain Management Interventions activity modification encouraged;exercise or physical activity utilized  -AS     Response to Pain Interventions activity participation with tolerable pain  -AS       Row Name 02/07/25 0900          Plan of Care Review    Plan of Care Reviewed With patient  -AS     Progress improving  -AS     Outcome Evaluation Patient completed supine>sit with max assist x2, patient took 8 steps bed>Recliner with max assist x2, cues for anterior weight shifting to correct posterior lean, cues for upright posture and assist with walker d/t B UE tremors, increased time and effort to complete transfer to recliner. Further mobility limited by weakness, fatigue and decreased activity tolerance. Completed B LE exercises in seated position with cues for technique. Recommend IRF at D/C for best functional outcome.  -AS       Row Name 02/07/25 0900          Vital Signs    Pre SpO2 (%) 91  -AS     O2 Delivery Pre Treatment supplemental O2  -AS     Post SpO2 (%) 92  -AS     O2 Delivery Post Treatment supplemental O2  -AS      Pre Patient Position Supine  -AS     Post Patient Position Sitting  -AS       Row Name 02/07/25 0900          Positioning and Restraints    Pre-Treatment Position in bed  -AS     Post Treatment Position chair  -AS     In Chair reclined;call light within reach;encouraged to call for assist;exit alarm on;waffle cushion;on mechanical lift sling;legs elevated  -AS               User Key  (r) = Recorded By, (t) = Taken By, (c) = Cosigned By      Initials Name Provider Type    AS Flower Holland PTA Physical Therapist Assistant                   Outcome Measures       Row Name 02/07/25 0902          How much help from another person do you currently need...    Turning from your back to your side while in flat bed without using bedrails? 2  -AS     Moving from lying on back to sitting on the side of a flat bed without bedrails? 2  -AS     Moving to and from a bed to a chair (including a wheelchair)? 2  -AS     Standing up from a chair using your arms (e.g., wheelchair, bedside chair)? 2  -AS     Climbing 3-5 steps with a railing? 1  -AS     To walk in hospital room? 2  -AS     AM-PAC 6 Clicks Score (PT) 11  -AS     Highest Level of Mobility Goal 4 --> Transfer to chair/commode  -AS       Row Name 02/07/25 0902          Functional Assessment    Outcome Measure Options AM-PAC 6 Clicks Basic Mobility (PT)  -AS               User Key  (r) = Recorded By, (t) = Taken By, (c) = Cosigned By      Initials Name Provider Type    AS Flower Holland PTA Physical Therapist Assistant                                 Physical Therapy Education       Title: PT OT SLP Therapies (In Progress)       Topic: Physical Therapy (In Progress)       Point: Mobility training (In Progress)       Learning Progress Summary            Patient Acceptance, E, NR by AS at 2/7/2025 0902    Acceptance, E, VU,NR by CD at 2/4/2025 1254    Comment: BENEFITS OF OOB ACTIVITY, SAFETY WITH MOBILITY, PROGRESSION OF POC, D/C PLANNING,                       Point: Home exercise program (In Progress)       Learning Progress Summary            Patient Acceptance, E, NR by AS at 2/7/2025 0902    Acceptance, E, VU,NR by CD at 2/4/2025 1254    Comment: BENEFITS OF OOB ACTIVITY, SAFETY WITH MOBILITY, PROGRESSION OF POC, D/C PLANNING,                      Point: Body mechanics (In Progress)       Learning Progress Summary            Patient Acceptance, E, NR by AS at 2/7/2025 0902    Acceptance, E, VU,NR by CD at 2/4/2025 1254    Comment: BENEFITS OF OOB ACTIVITY, SAFETY WITH MOBILITY, PROGRESSION OF POC, D/C PLANNING,                      Point: Precautions (In Progress)       Learning Progress Summary            Patient Acceptance, E, NR by AS at 2/7/2025 0902    Acceptance, E, VU,NR by CD at 2/4/2025 1254    Comment: BENEFITS OF OOB ACTIVITY, SAFETY WITH MOBILITY, PROGRESSION OF POC, D/C PLANNING,                                      User Key       Initials Effective Dates Name Provider Type Discipline    CD 02/03/23 -  Lindsay Harrington, PT Physical Therapist PT    AS 12/13/24 -  Flower Holland, PTA Physical Therapist Assistant PT                  PT Recommendation and Plan     Progress: improving  Outcome Evaluation: Patient completed supine>sit with max assist x2, patient took 8 steps bed>Recliner with max assist x2, cues for anterior weight shifting to correct posterior lean, cues for upright posture and assist with walker d/t B UE tremors, increased time and effort to complete transfer to recliner. Further mobility limited by weakness, fatigue and decreased activity tolerance. Completed B LE exercises in seated position with cues for technique. Recommend IRF at D/C for best functional outcome.     Time Calculation:         PT Charges       Row Name 02/07/25 0902             Time Calculation    Start Time 0817  -AS      PT Received On 02/07/25  -AS      PT Goal Re-Cert Due Date 02/14/25  -AS         Timed Charges    51428 - PT Therapeutic Exercise Minutes 13  -AS       27071 - Gait Training Minutes  10  -AS         Total Minutes    Timed Charges Total Minutes 23  -AS       Total Minutes 23  -AS                User Key  (r) = Recorded By, (t) = Taken By, (c) = Cosigned By      Initials Name Provider Type    AS Flower Holland PTA Physical Therapist Assistant                  Therapy Charges for Today       Code Description Service Date Service Provider Modifiers Qty    27403725196 HC PT THER PROC EA 15 MIN 2/7/2025 Flower Holland, REBEL GP 1    71686017444 HC GAIT TRAINING EA 15 MIN 2/7/2025 Flower Holland PTA GP 1            PT G-Codes  Outcome Measure Options: AM-PAC 6 Clicks Basic Mobility (PT)  AM-PAC 6 Clicks Score (PT): 11       Flower Holland PTA  2/7/2025

## 2025-02-07 NOTE — THERAPY WOUND CARE TREATMENT
Acute Care - Wound/Debridement Treatment Note  [facility]     Patient Name: Cordell Martin  : 1945  MRN: [id number]  Today's Date: 2025                Admit Date: 2/3/2025    Visit Dx:    ICD-10-CM ICD-9-CM   1. Bilateral lower extremity edema  R60.0 782.3   2. Lymphedema  I89.0 457.1       Patient Active Problem List   Diagnosis    Anxiety    Arthritis    Depression    Hyperlipidemia    Lumbar stenosis with neurogenic claudication    Parkinson's disease    Lumbar stenosis with neurogenic claudication status post L4-5 decompression    Status post lumbar laminectomy    Memory loss    Hypothyroidism (acquired)    Pulmonary nodule (4mm RML incidental)    GERD    Remote smoker (Stopped )    Restrictive pattern on PFTs w/o evidence of ILD    Cellulitis    History of DVT (deep vein thrombosis)    Anemia, chronic disease    Bilateral lower leg cellulitis        Past Medical History:   Diagnosis Date    Anxiety     Arthritis     Back pain     Bronchitis     Cognitive impairment     Depression     Disease of thyroid gland     Glucose intolerance (impaired glucose tolerance)     Hyperlipidemia     Kidney stone     Obesity     Parkinson disease     Pneumonia     Prostate disorder     Thyroid disease     Wears eyeglasses         Past Surgical History:   Procedure Laterality Date    COLONOSCOPY      5 years ago    EYE SURGERY      HERNIA REPAIR  10/20/2020    KNEE SURGERY      LUMBAR LAMINECTOMY DISCECTOMY DECOMPRESSION N/A 2017    Procedure: LUMBAR LAMINECTOMY L4-5;  Surgeon: Antonio Trent MD;  Location: [facility];  Service:     SHOULDER ROTATOR CUFF REPAIR Right     x2    TONSILLECTOMY      VEIN SURGERY             Wound 25 Right circumferential leg cellulitis (Active)   Dressing Appearance intact;moist drainage 25 1130   Closure Unable to assess 25 0600   Base moist;pink;red 25 1130   Periwound intact;dry 25 1130   Periwound Temperature warm 25 1130    Periwound Skin Turgor soft 02/07/25 1130   Edges irregular 02/07/25 1130   Drainage Characteristics/Odor sanguineous;serosanguineous 02/07/25 1130   Drainage Amount moderate 02/07/25 1130   Care, Wound cleansed with;wound cleanser 02/07/25 1130   Dressing Care foam;low-adherent;silver impregnated 02/07/25 1130   Periwound Care cleansed with pH balanced cleanser 02/07/25 1130       Wound 02/03/25 2015 Left circumferential leg cellulitis (Active)   Dressing Appearance intact;moist drainage 02/07/25 1130   Closure Unable to assess 02/07/25 0600   Base moist;pink;red 02/07/25 1130   Periwound blistered;redness;edematous 02/07/25 1130   Periwound Temperature warm 02/07/25 1130   Periwound Skin Turgor firm 02/07/25 1130   Drainage Characteristics/Odor sanguineous;serosanguineous 02/07/25 1130   Drainage Amount moderate 02/07/25 1130   Care, Wound cleansed with;soap and water 02/07/25 1130   Dressing Care foam;low-adherent;silver impregnated 02/07/25 1130   Periwound Care dry periwound area maintained 02/06/25 1600      Lymphedema       Row Name 02/07/25 1130             Lymphedema Edema Assessment    Ptting Edema Category By severity  -MF      Pitting Edema Moderate  -MF         Compression/Skin Care    Compression/Skin Care skin care;wrapping location  -MF      Skin Care washed/dried;lotion applied  -MF      Wrapping Location lower extremity  -MF      Wrapping Location LE bilateral:;foot to knee  -      Wrapping Comments mepilex Ag and tam to wounds with unna boot in clamshell pattern followed by optilock and cast padding, coban secured with spandage  -                User Key  (r) = Recorded By, (t) = Taken By, (c) = Cosigned By      Initials Name Provider Type    Trevor Haddad, PT Physical Therapist                    WOUND DEBRIDEMENT                     PT Assessment (Last 12 Hours)       PT Evaluation and Treatment       Row Name 02/07/25 1130          Physical Therapy Time and Intention     Subjective Information complains of;weakness;pain;fatigue  -     Document Type therapy note (daily note);wound care  -     Mode of Treatment individual therapy;physical therapy  -       Row Name 02/07/25 1130          Pain    Pretreatment Pain Rating 5/10  -     Posttreatment Pain Rating 5/10  -     Pain Location extremity  -MF     Pain Side/Orientation bilateral;lower  -MF     Pain Management Interventions positioning techniques utilized  -       Row Name 02/07/25 1130          Wound 02/03/25 2015 Right circumferential leg cellulitis    Wound - Properties Group Placement Date: 02/03/25 -JP Placement Time: 2015 -JP Side: Right  - Orientation: circumferential  - Location: leg  -NELY Primary Wound Type: Blisters  -NELY Type: cellulitis  -NELY Present on Original Admission: Y  -NELY    Dressing Appearance intact;moist drainage  -MF     Base moist;pink;red  -MF     Periwound intact;dry  -MF     Periwound Temperature warm  -MF     Periwound Skin Turgor soft  -MF     Edges irregular  -MF     Drainage Characteristics/Odor sanguineous;serosanguineous  -MF     Drainage Amount moderate  -MF     Care, Wound cleansed with;wound cleanser  -     Dressing Care foam;low-adherent;silver impregnated  -MF     Periwound Care cleansed with pH balanced cleanser  -     Retired Wound - Properties Group Placement Date: 02/03/25  -NELY Placement Time: 2015 -JP Present on Original Admission: Y  -NELY Side: Right  - Orientation: circumferential  - Location: leg  -NELY Primary Wound Type: Blisters  -NELY Type: cellulitis  -NELY    Retired Wound - Properties Group Placement Date: 02/03/25  -NELY Placement Time: 2015 -JP Present on Original Admission: Y  -NELY Side: Right  - Orientation: circumferential  - Location: leg  -NELY Primary Wound Type: Blisters  -NELY Type: cellulitis  -NELY    Retired Wound - Properties Group Date first assessed: 02/03/25  -NELY Time first assessed: 2015 -JP Present on Original Admission: Y  -NELY Side: Right  -  Location: leg  -NELY Primary Wound Type: Blisters  -NELY Type: cellulitis  -NELY      Row Name 02/07/25 1130          Wound 02/03/25 2015 Left circumferential leg cellulitis    Wound - Properties Group Placement Date: 02/03/25  -NELY Placement Time: 2015 -JP Side: Left  - Orientation: circumferential  - Location: leg  -NELY Primary Wound Type: Blisters  -NELY Type: cellulitis  -NELY Present on Original Admission: Y  -NELY    Dressing Appearance intact;moist drainage  -     Base moist;pink;red  -MF     Periwound blistered;redness;edematous  -     Periwound Temperature warm  -     Periwound Skin Turgor firm  -     Drainage Characteristics/Odor sanguineous;serosanguineous  -     Drainage Amount moderate  -     Care, Wound cleansed with;soap and water  -     Dressing Care foam;low-adherent;silver impregnated  -     Retired Wound - Properties Group Placement Date: 02/03/25  -NELY Placement Time: 2015 -JP Present on Original Admission: Y  - Side: Left  - Orientation: [facility]  - Location: leg  -NELY Primary Wound Type: Blisters  -NELY Type: cellulitis  -NELY    Retired Wound - Properties Group Placement Date: 02/03/25  - Placement Time: 2015 -JP Present on Original Admission: Y  -NELY Side: Left  - Orientation: [facility]  - Location: leg  -NELY Primary Wound Type: Blisters  -NELY Type: cellulitis  -NELY    Retired Wound - Properties Group Date first assessed: 02/03/25  - Time first assessed: 2015 -JP Present on Original Admission: Y  - Side: Left  - Location: leg  -NELY Primary Wound Type: Blisters  -NELY Type: cellulitis  -NELY      Row Name 02/07/25 1130          Coping    Observed Emotional State cooperative;calm  -     Verbalized Emotional State acceptance  -     Trust Relationship/Rapport care explained  -       Row Name 02/07/25 1130          Plan of Care Review    Plan of Care Reviewed With patient  -MF     Outcome Evaluation BLE unna boots changed with moderate improvement in skin integrity  noted. Moderate reepithelialization noted and no significant slough build up. PT will cont with unna boot changes every 2-3 days.  -       Row Name 02/07/25 1130          Positioning and Restraints    Pre-Treatment Position sitting in chair/recliner  -MF     Post Treatment Position chair  -MF     In Chair reclined;call light within reach  -MF               User Key  (r) = Recorded By, (t) = Taken By, (c) = Cosigned By      Initials Name Provider Type    Trevor Haddad PT Physical Therapist    Meg Rock RN Registered Nurse                  Physical Therapy Education       Title: PT OT SLP Therapies (In Progress)       Topic: Physical Therapy (In Progress)       Point: Mobility training (In Progress)       Learning Progress Summary            Patient Acceptance, E, NR by AS at 2/7/2025 0902    Acceptance, E, VU,NR by CD at 2/4/2025 1254    Comment: BENEFITS OF OOB ACTIVITY, SAFETY WITH MOBILITY, PROGRESSION OF POC, D/C PLANNING,                      Point: Home exercise program (In Progress)       Learning Progress Summary            Patient Acceptance, E, NR by AS at 2/7/2025 0902    Acceptance, E, VU,NR by CD at 2/4/2025 1254    Comment: BENEFITS OF OOB ACTIVITY, SAFETY WITH MOBILITY, PROGRESSION OF POC, D/C PLANNING,                      Point: Body mechanics (In Progress)       Learning Progress Summary            Patient Acceptance, E, NR by AS at 2/7/2025 0902    Acceptance, E, VU,NR by CD at 2/4/2025 1254    Comment: BENEFITS OF OOB ACTIVITY, SAFETY WITH MOBILITY, PROGRESSION OF POC, D/C PLANNING,                      Point: Precautions (In Progress)       Learning Progress Summary            Patient Acceptance, E, NR by AS at 2/7/2025 0902    Acceptance, E, VU,NR by CD at 2/4/2025 1254    Comment: BENEFITS OF OOB ACTIVITY, SAFETY WITH MOBILITY, PROGRESSION OF POC, D/C PLANNING,                                      User Key       Initials Effective Dates Name Provider Type Discipline    CD  02/03/23 -  Lindsay Harrington, PT Physical Therapist PT    AS 12/13/24 -  Flower Holland, REBEL Physical Therapist Assistant PT                    Recommendation and Plan  Anticipated Discharge Disposition (PT): inpatient rehabilitation facility  Planned Therapy Interventions (PT): balance training, bed mobility training, gait training, motor coordination training, neuromuscular re-education, transfer training, postural re-education, home exercise program, patient/family education, strengthening, stretching  Therapy Frequency (PT): daily  Plan of Care Reviewed With: patient           Outcome Evaluation: BLE unna boots changed with moderate improvement in skin integrity noted. Moderate reepithelialization noted and no significant slough build up. PT will cont with unna boot changes every 2-3 days.  Plan of Care Reviewed With: patient            Time Calculation   PT Charges       Row Name 02/07/25 1130 02/07/25 0902          Time Calculation    Start Time 1130  -MF 0817  -AS     PT Received On -- 02/07/25  -AS     PT Goal Re-Cert Due Date 02/14/25  -MF 02/14/25  -AS        Timed Charges    38355 - PT Therapeutic Exercise Minutes -- 13  -AS     70258 - Gait Training Minutes  -- 10  -AS        Untimed Charges    29580-Unna Boot 25  -MF --        Total Minutes    Timed Charges Total Minutes -- 23  -AS     Untimed Charges Total Minutes 25  -MF --      Total Minutes 25  -MF 23  -AS               User Key  (r) = Recorded By, (t) = Taken By, (c) = Cosigned By      Initials Name Provider Type    Trevor Haddad, PT Physical Therapist    AS Flower [place], REBEL Physical Therapist Assistant                            PT G-Codes  Outcome Measure Options: AM-PAC 6 Clicks Daily Activity (OT)  AM-PAC 6 Clicks Score (PT): 11  AM-PAC 6 Clicks Score (OT): 13       Trevor Wnag, PT  2/7/2025

## 2025-02-07 NOTE — PLAN OF CARE
Goal Outcome Evaluation:  Plan of Care Reviewed With: patient           Outcome Evaluation: BLE unna boots changed with moderate improvement in skin integrity noted. Moderate reepithelialization noted and no significant slough build up. PT will cont with unna boot changes every 2-3 days.

## 2025-02-07 NOTE — CASE MANAGEMENT/SOCIAL WORK
Continued Stay Note  Psychiatric     Patient Name: Cordell Martin  MRN: [id number]  Today's Date: 2/7/2025    Admit Date: 2/3/2025    Plan: IRF   Discharge Plan       Row Name 02/07/25 1600       Plan    Plan IRF    Patient/Family in Agreement with Plan yes    Plan Comments Discussed in MDR. Mr. Martin is not being discharged today. A referral was called to Lindy, Liaison with [facility]. Mr. Martin is agreeable to rehab, in order to be able to go back to his independent living. He hasn't thought no more about long term care at this time. CM will continue to follow for medical readiness and will assist with any discharge needs as indicated.    Final Discharge Disposition Code 62 - inpatient rehab facility                   Discharge Codes    No documentation.                 Expected Discharge Date and Time       Expected Discharge Date Expected Discharge Time    Feb 11, 2025               Regina Asif RN

## 2025-02-07 NOTE — PLAN OF CARE
Goal Outcome Evaluation:  Plan of Care Reviewed With: patient           Outcome Evaluation: OT eval completed. Pt presents below baseline for ADL performance, limited by significant weakness, decreased trunk control, and standing balance deficits. Pt Dep for toileting at bed level, MaxAx2 for SPT to chair using FWW, MOSLEY for grooming while seated. IP OT services warranted. Recommend IPR at discharge.    Anticipated Discharge Disposition (OT): inpatient rehabilitation facility

## 2025-02-07 NOTE — PROGRESS NOTES
Cordell Martin  1945  [id number]      Evaluating Physician: Trevor Jorgensen MD    Chief Complaint: leg pain    Reason for Consultation: leg infections    History of present illness:     Patient is a 80 y.o.  Yr old male Previously patient of Dr. Benites, with history of Parkinson's disease and chronically debilitated, uses a wheelchair primarily by his report but apparently had occasionally been able to use a walker.  Admitted October 2024 with right greater than left lower leg and foot cellulitis, MRI no osteomyelitis and prior history trauma/maggots; culture with skin derrell and pseudomonas aeruginosa, finished IV Zosyn in the hospital.Admission notes indicate cognitive impairment and other comorbidity as outlined below.    Admitted February 3, 2025 with worsening redness/pain at both lower legs with open wounds and unable to make appointments with wound care, apparently difficulty with transportation.  In addition he reports that the Unna boot wraps had been left on for weeks without change.  He did not know about any other specific trauma but admits to bumping the legs periodically and could have had superficial trauma     2/7/25 cultures noted; Bilateral leg pain is dull at present, intermittent, nonradiating, worse with pressure, better with pain meds and 2-3 out of 10 in severity, not progressive.  Redness and swelling not progressive.    No headache photophobia or neck stiffness.  No shortness of breath cough or hemoptysis.  No nausea vomiting diarrhea or abdominal pain.  No dysuria hematuria or pyuria.  No rash        Past Medical History:   Diagnosis Date    Anxiety     Arthritis     Back pain     Bronchitis     Cognitive impairment     Depression     Disease of thyroid gland     Glucose intolerance (impaired glucose tolerance)     Hyperlipidemia     Kidney stone     Obesity     Parkinson disease     Pneumonia     Prostate disorder     Thyroid disease     Wears eyeglasses        Past Surgical  History:   Procedure Laterality Date    COLONOSCOPY      5 years ago    EYE SURGERY      HERNIA REPAIR  10/20/2020    KNEE SURGERY      LUMBAR LAMINECTOMY DISCECTOMY DECOMPRESSION N/A 07/13/2017    Procedure: LUMBAR LAMINECTOMY L4-5;  Surgeon: Antonio Trent MD;  Location: [facility];  Service:     SHOULDER ROTATOR CUFF REPAIR Right     x2    TONSILLECTOMY      VEIN SURGERY         Pediatric History   Patient Parents    Not on file     Other Topics Concern    Not on file   Social History Narrative    Lives alone. Uses walker    Has not smoked since about 1980       family history includes Alzheimer's disease in an other family member; Cancer in his father and another family member; Diabetes in an other family member; Heart disease in an other family member; Stroke in an other family member.    No Known Allergies    Medication:  Current Facility-Administered Medications   Medication Dose Route Frequency Provider Last Rate Last Admin    acetaminophen (TYLENOL) tablet 650 mg  650 mg Oral Q4H PRN Serenity Drummond MD        atorvastatin (LIPITOR) tablet 40 mg  40 mg Oral Daily Leobardo Hayden MD   40 mg at 02/06/25 1042    sennosides-docusate (PERICOLACE) 8.6-50 MG per tablet 2 tablet  2 tablet Oral BID Serenity Drummond MD   2 tablet at 02/06/25 2142    And    polyethylene glycol (MIRALAX) packet 17 g  17 g Oral Daily PRN Serenity Drummond MD        And    bisacodyl (DULCOLAX) EC tablet 5 mg  5 mg Oral Daily PRN Serenity Drummond MD        And    bisacodyl (DULCOLAX) suppository 10 mg  10 mg Rectal Daily PRN Serenity Drummond MD        budesonide-formoterol (SYMBICORT) 160-4.5 MCG/ACT inhaler 2 puff  2 puff Inhalation BID - RT Serenity Drummond MD   2 puff at 02/07/25 0704    Calcium Replacement - Follow Nurse / BPA Driven Protocol   Not Applicable PRN Serenity Drummond MD        carbidopa-levodopa (SINEMET)  MG per tablet 2 tablet  2 tablet Oral 4x Daily Serenity Drummond MD   2 tablet at 02/06/25 2148     DAPTOmycin (CUBICIN) 400 mg in sodium chloride 0.9 % 50 mL IVPB  4 mg/kg (Adjusted) Intravenous Q24H Trevor Jorgensen  mL/hr at 02/06/25 1252 400 mg at 02/06/25 1252    fluticasone (FLONASE) 50 MCG/ACT nasal spray 2 spray  2 spray Each Nare Daily Leobardo Hayden MD   2 spray at 02/06/25 1043    furosemide (LASIX) tablet 20 mg  20 mg Oral Daily Leobardo Hayden MD   20 mg at 02/06/25 1042    heparin (porcine) 5000 UNIT/ML injection 5,000 Units  5,000 Units Subcutaneous Q12H Serenity Drummond MD   5,000 Units at 02/06/25 2147    HYDROcodone-acetaminophen (NORCO) 5-325 MG per tablet 1 tablet  1 tablet Oral Q4H PRN Serenity Drummond MD   1 tablet at 02/06/25 1713    influenza vac split high-dose (FLUZONE HIGH DOSE) injection 0.5 mL  0.5 mL Intramuscular During Hospitalization Serenity Drummond MD        levothyroxine (SYNTHROID, LEVOTHROID) tablet 88 mcg  88 mcg Oral Q AM Serenity Drummond MD   88 mcg at 02/07/25 0616    Magnesium Standard Dose Replacement - Follow Nurse / BPA Driven Protocol   Not Applicable PRSerenity Reyez MD        morphine injection 2 mg  2 mg Intravenous Q2H PRN Serenity Drummond MD        Phosphorus Replacement - Follow Nurse / BPA Driven Protocol   Not Applicable PRSerenity Reyez MD        piperacillin-tazobactam (ZOSYN) 3.375 g IVPB in 100 mL NS MBP (CD)  3.375 g Intravenous Q8H Serenity Drummond MD   3.375 g at 02/07/25 0011    Potassium Replacement - Follow Nurse / BPA Driven Protocol   Not Applicable PRSerenity Reyez MD        pramipexole (MIRAPEX) tablet 1 mg  1 mg Oral TID Serenity Drummond MD   1 mg at 02/06/25 2146    sodium chloride 0.9 % flush 10 mL  10 mL Intravenous PRN Tu Pruitt DO        sodium chloride 0.9 % flush 10 mL  10 mL Intravenous PRN [place] hyl[place], DO        tamsulosin (FLOMAX) 24 hr capsule 0.4 mg  0.4 mg Oral Daily Serenity Drummond MD   0.4 mg at 02/06/25 1043    vitamin D3 capsule 5,000 Units  5,000 Units Oral Daily Serenity Drummond MD   5,000  Units at 02/06/25 1042       Antibiotics:  Anti-Infectives (From admission, onward)      Ordered     Dose/Rate Route Frequency Start Stop    02/04/25 0756  DAPTOmycin (CUBICIN) 400 mg in sodium chloride 0.9 % 50 mL IVPB        Ordering Provider: Trevor Jorgensen MD    4 mg/kg × 94.6 kg (Adjusted)  100 mL/hr over 30 Minutes Intravenous Every 24 Hours 02/04/25 0930 02/11/25 0929    02/03/25 2208  piperacillin-tazobactam (ZOSYN) 3.375 g IVPB in 100 mL NS MBP (CD)        Ordering Provider: Serenity Drummond MD    3.375 g  over 30 Minutes Intravenous Every 8 Hours Scheduled 02/03/25 2230 02/10/25 2359    02/03/25 1650  vancomycin 2500 mg/500 mL 0.9% NS IVPB ([facility])        Ordering Provider: Serenity Drummond MD    22 mg/kg × 109 kg  over 150 Minutes Intravenous Once 02/03/25 1706 02/03/25 2000    02/03/25 1448  piperacillin-tazobactam (ZOSYN) 3.375 g IVPB in 100 mL NS MBP (CD)        Ordering Provider: Tu Pruitt DO    3.375 g  over 30 Minutes Intravenous Once 02/03/25 1504 02/03/25 1657              Review of Systems    2/7/25     Constitutional-- No Fever, chills or sweats.  Appetite good, and no malaise. No fatigue.  Heent-- No new vision, hearing or throat complaints.  No epistaxis or oral sores.  Denies odynophagia or dysphagia.  No flashers, floaters or eye pain. No odynophagia or dysphagia. No headache, photophobia or neck stiffness.  CV-- No chest pain, palpitation or syncope  Resp-- No SOB/cough/Hemoptysis  GI- No nausea, vomiting, or diarrhea.  No hematochezia, melena, or hematemesis. Denies jaundice or chronic liver disease.  -- No dysuria, hematuria, or flank pain.  Denies hesitancy, urgency or flank pain.  Lymph- no swollen lymph nodes in neck/axilla or groin.   Heme- No active bruising or bleeding  MS-- no swelling or pain in the bones or joints of arms/legs aside from above.  No new back pain.  Neuro-- No acute focal weakness or numbness in the arms or legs.  No seizures.    Full 12 point review of  "systems reviewed and negative otherwise for acute complaints, except for of    Physical Exam:   Vital Signs   /58 (Patient Position: Lying)   Pulse 68   Temp 98.4 °F (36.9 °C) (Oral)   Resp 19   Ht 182.9 cm (72\")   Wt 120 kg (263 lb 14.4 oz)   SpO2 90%   BMI 35.79 kg/m²     GENERAL: Awake and chronically ill-appearing, in no acute distress. Has a tremor associated with his Parkinson's disease  HEENT: Normocephalic, atraumatic.    No conjunctival injection. No icterus. Oropharynx clear without evidence of thrush or exudate. No evidence of periodontal disease.    NECK: Supple without nuchal rigidity. No mass.   HEART: RRR; No murmur, rubs, gallops.   LUNGS: Clear to auscultation bilaterally without wheezing, rales, rhonchi. Normal respiratory effort. Nonlabored. No dullness.  ABDOMEN: Soft, nontender, nondistended. Positive bowel sounds. No rebound or guarding. NO mass or HSM.  EXT:  see below   MSK: FROM without joint effusions noted arms/legs.    SKIN: Warm and dry without cutaneous eruptions on Inspection/palpation.    NEURO: Oriented to PPT.  chronic debility.    Bilateral lower extremities with chronic appearing edema/discoloration with probable stasis change, acute erythema/warmth and tenderness bilateral with multifocal ulceration  between knees and toes.  No discrete mass bulge or fluctuance.  No crepitus or bulla.   unable to determine depth of these wounds but no probe to bone tendon joint or ligament; no new visible redness/purulence    Laboratory Data    Results from last 7 days   Lab Units 02/06/25  0646 02/05/25  0639 02/04/25  0647   WBC 10*3/mm3 5.97 5.57 5.58   HEMOGLOBIN g/dL 10.1* 9.9* 10.1*   HEMATOCRIT % 32.6* 32.0* 32.6*   PLATELETS 10*3/mm3 206 195 203     Results from last 7 days   Lab Units 02/06/25  0646   SODIUM mmol/L 138   POTASSIUM mmol/L 4.1   CHLORIDE mmol/L 103   CO2 mmol/L 24.0   BUN mg/dL 23   CREATININE mg/dL 1.35*   GLUCOSE mg/dL 93   CALCIUM mg/dL 8.4*     Results " from last 7 days   Lab Units 02/04/25  0647   ALK PHOS U/L 128*   BILIRUBIN mg/dL 0.5   ALT (SGPT) U/L <5   AST (SGOT) U/L 16               Estimated Creatinine Clearance: 60.3 mL/min (A) (by C-G formula based on SCr of 1.35 mg/dL (H)).      Microbiology:      Radiology:  Imaging Results (Last 72 Hours)       Procedure Component Value Units Date/Time    XR Chest 1 View [[id number]] Collected: 02/06/25 1021     Updated: 02/06/25 1024    Narrative:      XR CHEST 1 VW    Date of Exam: 2/6/2025 9:33 AM EST    Indication: Hypoxia    Comparison: 2/3/2025    Findings:  Heart size is enlarged. There is mild pulmonary vascular congestion and interstitial edema. No focal consolidation is identified. There is no significant pleural fluid.      Impression:      Impression:  Cardiomegaly with mild pulmonary vascular congestion and interstitial edema.        Electronically Signed: Gregor Herrera MD    2/6/2025 10:21 AM EST    Workstation ID: [id number]              Impression:     --acute bilateral lower leg and foot cellulitis/infections with multifocal ulcerations, chronic discoloration/chronic edema and potential intermittent superficial trauma although no specific inciting event.  Apparently had not made it to appointments with wound care and Unna boots had not been changed in weeks.  Unclear how much of this is general self-neglect versus recent weather related challenge with combination. high risk for persistent/recurrent or nonhealing wounds, persistent/progressive or recurrent infection and risk for further functional/limb loss, risk for amputation etc.  Culture below.  Arterial/venous/vascular workup at discretion of medicine team    --Parkinson's disease/memory loss per admission notes, chronically debilitated and primarily wheelchair bound/bedbound by his report    --chronic edema and possible lymphedema per patient notes    --Prior history DVT per past notes    PLAN:     --IV daptomycin/Zosyn 7-10 days    **culture right  leg and left leg with MRSA/pseudomonas aeruginosa/E Coli   from February 3, 2025    --Check/review labs cultures and scans    --Partial history Per nursing staff    --Discussed with microbiology    --Discussed with multidisciplinary team/nursing regarding complex set of issues, discussed with microbiology lab and discussed importance of antimicrobial stewardship    This visit included the following complex service elements:  Complex medical decision-making associated with antimicrobial prescribing.  Counseled patients, family members, and/or caregivers regarding antimicrobial stewardship and antibiotic resistance.  Communicated with the clinical microbiology lab.     Copied text in this note has been reviewed and is accurate as of 02/07/25.     Trevor Jorgensen MD  2/7/2025

## 2025-02-08 ENCOUNTER — APPOINTMENT (OUTPATIENT)
Dept: CT IMAGING | Facility: HOSPITAL | Age: 80
DRG: 602 | End: 2025-02-08
Payer: MEDICARE

## 2025-02-08 LAB
ANION GAP SERPL CALCULATED.3IONS-SCNC: 13 MMOL/L (ref 5–15)
BACTERIA SPEC AEROBE CULT: NORMAL
BACTERIA SPEC AEROBE CULT: NORMAL
BASOPHILS # BLD AUTO: 0.01 10*3/MM3 (ref 0–0.2)
BASOPHILS NFR BLD AUTO: 0.2 % (ref 0–1.5)
BUN SERPL-MCNC: 23 MG/DL (ref 8–23)
BUN/CREAT SERPL: 18.3 (ref 7–25)
CALCIUM SPEC-SCNC: 8.8 MG/DL (ref 8.6–10.5)
CHLORIDE SERPL-SCNC: 101 MMOL/L (ref 98–107)
CO2 SERPL-SCNC: 23 MMOL/L (ref 22–29)
CREAT SERPL-MCNC: 1.26 MG/DL (ref 0.76–1.27)
DEPRECATED RDW RBC AUTO: 56.3 FL (ref 37–54)
EGFRCR SERPLBLD CKD-EPI 2021: 58.4 ML/MIN/1.73
EOSINOPHIL # BLD AUTO: 0.31 10*3/MM3 (ref 0–0.4)
EOSINOPHIL NFR BLD AUTO: 5.1 % (ref 0.3–6.2)
ERYTHROCYTE [DISTWIDTH] IN BLOOD BY AUTOMATED COUNT: 16.8 % (ref 12.3–15.4)
FERRITIN SERPL-MCNC: 355.4 NG/ML (ref 30–400)
FLUAV RNA RESP QL NAA+PROBE: NOT DETECTED
FLUBV RNA RESP QL NAA+PROBE: NOT DETECTED
GLUCOSE SERPL-MCNC: 128 MG/DL (ref 65–99)
HCT VFR BLD AUTO: 33 % (ref 37.5–51)
HGB BLD-MCNC: 10.5 G/DL (ref 13–17.7)
IMM GRANULOCYTES # BLD AUTO: 0.05 10*3/MM3 (ref 0–0.05)
IMM GRANULOCYTES NFR BLD AUTO: 0.8 % (ref 0–0.5)
LYMPHOCYTES # BLD AUTO: 1.36 10*3/MM3 (ref 0.7–3.1)
LYMPHOCYTES NFR BLD AUTO: 22.2 % (ref 19.6–45.3)
MCH RBC QN AUTO: 29.2 PG (ref 26.6–33)
MCHC RBC AUTO-ENTMCNC: 31.8 G/DL (ref 31.5–35.7)
MCV RBC AUTO: 91.7 FL (ref 79–97)
MONOCYTES # BLD AUTO: 0.57 10*3/MM3 (ref 0.1–0.9)
MONOCYTES NFR BLD AUTO: 9.3 % (ref 5–12)
NEUTROPHILS NFR BLD AUTO: 3.83 10*3/MM3 (ref 1.7–7)
NEUTROPHILS NFR BLD AUTO: 62.4 % (ref 42.7–76)
NRBC BLD AUTO-RTO: 0 /100 WBC (ref 0–0.2)
PLATELET # BLD AUTO: 203 10*3/MM3 (ref 140–450)
PMV BLD AUTO: 8.4 FL (ref 6–12)
POTASSIUM SERPL-SCNC: 4.2 MMOL/L (ref 3.5–5.2)
RBC # BLD AUTO: 3.6 10*6/MM3 (ref 4.14–5.8)
RSV RNA RESP QL NAA+PROBE: NOT DETECTED
SARS-COV-2 RNA RESP QL NAA+PROBE: DETECTED
SODIUM SERPL-SCNC: 137 MMOL/L (ref 136–145)
WBC NRBC COR # BLD AUTO: 6.13 10*3/MM3 (ref 3.4–10.8)

## 2025-02-08 PROCEDURE — 3E0DX3Z INTRODUCTION OF ANTI-INFLAMMATORY INTO MOUTH AND PHARYNX, EXTERNAL APPROACH: ICD-10-PCS | Performed by: INTERNAL MEDICINE

## 2025-02-08 PROCEDURE — 99232 SBSQ HOSP IP/OBS MODERATE 35: CPT | Performed by: INTERNAL MEDICINE

## 2025-02-08 PROCEDURE — 25010000002 REMDESIVIR 100 MG/20ML SOLUTION 1 EACH VIAL: Performed by: INTERNAL MEDICINE

## 2025-02-08 PROCEDURE — 94664 DEMO&/EVAL PT USE INHALER: CPT

## 2025-02-08 PROCEDURE — XW033E5 INTRODUCTION OF REMDESIVIR ANTI-INFECTIVE INTO PERIPHERAL VEIN, PERCUTANEOUS APPROACH, NEW TECHNOLOGY GROUP 5: ICD-10-PCS | Performed by: INTERNAL MEDICINE

## 2025-02-08 PROCEDURE — 25010000002 DAPTOMYCIN PER 1 MG: Performed by: INTERNAL MEDICINE

## 2025-02-08 PROCEDURE — 25010000002 PIPERACILLIN SOD-TAZOBACTAM PER 1 G: Performed by: INTERNAL MEDICINE

## 2025-02-08 PROCEDURE — 25810000003 SODIUM CHLORIDE 0.9 % SOLUTION 250 ML FLEX CONT: Performed by: INTERNAL MEDICINE

## 2025-02-08 PROCEDURE — 25010000002 HEPARIN (PORCINE) PER 1000 UNITS: Performed by: INTERNAL MEDICINE

## 2025-02-08 PROCEDURE — 94799 UNLISTED PULMONARY SVC/PX: CPT

## 2025-02-08 PROCEDURE — 87637 SARSCOV2&INF A&B&RSV AMP PRB: CPT | Performed by: NURSE PRACTITIONER

## 2025-02-08 PROCEDURE — 71250 CT THORAX DX C-: CPT

## 2025-02-08 PROCEDURE — 85025 COMPLETE CBC W/AUTO DIFF WBC: CPT | Performed by: INTERNAL MEDICINE

## 2025-02-08 PROCEDURE — 80048 BASIC METABOLIC PNL TOTAL CA: CPT | Performed by: INTERNAL MEDICINE

## 2025-02-08 PROCEDURE — 82728 ASSAY OF FERRITIN: CPT | Performed by: INTERNAL MEDICINE

## 2025-02-08 PROCEDURE — 63710000001 DEXAMETHASONE PER 0.25 MG: Performed by: INTERNAL MEDICINE

## 2025-02-08 RX ORDER — FAMOTIDINE 20 MG/1
20 TABLET, FILM COATED ORAL
Status: DISCONTINUED | OUTPATIENT
Start: 2025-02-08 | End: 2025-02-11 | Stop reason: HOSPADM

## 2025-02-08 RX ADMIN — DAPTOMYCIN 400 MG: 500 INJECTION, POWDER, LYOPHILIZED, FOR SOLUTION INTRAVENOUS at 10:48

## 2025-02-08 RX ADMIN — Medication 5000 UNITS: at 09:39

## 2025-02-08 RX ADMIN — CARBIDOPA AND LEVODOPA 2 TABLET: 25; 100 TABLET ORAL at 17:08

## 2025-02-08 RX ADMIN — POLYETHYLENE GLYCOL 3350 17 G: 17 POWDER, FOR SOLUTION ORAL at 09:39

## 2025-02-08 RX ADMIN — HEPARIN SODIUM 5000 UNITS: 5000 INJECTION INTRAVENOUS; SUBCUTANEOUS at 21:07

## 2025-02-08 RX ADMIN — PRAMIPEXOLE DIHYDROCHLORIDE 1 MG: 1 TABLET ORAL at 21:14

## 2025-02-08 RX ADMIN — PIPERACILLIN AND TAZOBACTAM 3.38 G: 3; .375 INJECTION, POWDER, LYOPHILIZED, FOR SOLUTION INTRAVENOUS at 08:22

## 2025-02-08 RX ADMIN — CARBIDOPA AND LEVODOPA 2 TABLET: 25; 100 TABLET ORAL at 21:07

## 2025-02-08 RX ADMIN — PIPERACILLIN AND TAZOBACTAM 3.38 G: 3; .375 INJECTION, POWDER, LYOPHILIZED, FOR SOLUTION INTRAVENOUS at 17:08

## 2025-02-08 RX ADMIN — BUDESONIDE AND FORMOTEROL FUMARATE DIHYDRATE 2 PUFF: 160; 4.5 AEROSOL RESPIRATORY (INHALATION) at 10:25

## 2025-02-08 RX ADMIN — BUDESONIDE AND FORMOTEROL FUMARATE DIHYDRATE 2 PUFF: 160; 4.5 AEROSOL RESPIRATORY (INHALATION) at 20:20

## 2025-02-08 RX ADMIN — GUAIFENESIN 200 MG: 200 SOLUTION ORAL at 12:23

## 2025-02-08 RX ADMIN — TAMSULOSIN HYDROCHLORIDE 0.4 MG: 0.4 CAPSULE ORAL at 09:38

## 2025-02-08 RX ADMIN — CARBIDOPA AND LEVODOPA 2 TABLET: 25; 100 TABLET ORAL at 08:22

## 2025-02-08 RX ADMIN — HEPARIN SODIUM 5000 UNITS: 5000 INJECTION INTRAVENOUS; SUBCUTANEOUS at 09:38

## 2025-02-08 RX ADMIN — ATORVASTATIN CALCIUM 40 MG: 40 TABLET, FILM COATED ORAL at 09:38

## 2025-02-08 RX ADMIN — FUROSEMIDE 20 MG: 20 TABLET ORAL at 09:38

## 2025-02-08 RX ADMIN — PRAMIPEXOLE DIHYDROCHLORIDE 1 MG: 1 TABLET ORAL at 09:39

## 2025-02-08 RX ADMIN — FLUTICASONE PROPIONATE 2 SPRAY: 50 SPRAY, METERED NASAL at 09:39

## 2025-02-08 RX ADMIN — CARBIDOPA AND LEVODOPA 2 TABLET: 25; 100 TABLET ORAL at 12:23

## 2025-02-08 RX ADMIN — SENNOSIDES 2 TABLET: 8.6 TABLET, FILM COATED ORAL at 09:39

## 2025-02-08 RX ADMIN — FAMOTIDINE 20 MG: 20 TABLET, FILM COATED ORAL at 17:17

## 2025-02-08 RX ADMIN — PRAMIPEXOLE DIHYDROCHLORIDE 1 MG: 1 TABLET ORAL at 17:08

## 2025-02-08 RX ADMIN — LEVOTHYROXINE SODIUM 88 MCG: 0.09 TABLET ORAL at 05:52

## 2025-02-08 RX ADMIN — DEXAMETHASONE 6 MG: 4 TABLET ORAL at 12:23

## 2025-02-08 RX ADMIN — REMDESIVIR 200 MG: 100 INJECTION, POWDER, LYOPHILIZED, FOR SOLUTION INTRAVENOUS at 14:02

## 2025-02-08 NOTE — SIGNIFICANT NOTE
Pt c/o new cough overnight, robitussin PRN added, COVID/FLU pcr ordered; notified this am of positive COVID. Attending MD notified and isolation status changed.

## 2025-02-08 NOTE — PROGRESS NOTES
Cordell Martin  1945  [id number]      Evaluating Physician: Trevor Jorgensen MD    Chief Complaint: leg pain    Reason for Consultation: leg infections    History of present illness:     Patient is a 80 y.o.  Yr old male Previously patient of Dr. Benties, with history of Parkinson's disease and chronically debilitated, uses a wheelchair primarily by his report but apparently had occasionally been able to use a walker.  Admitted October 2024 with right greater than left lower leg and foot cellulitis, MRI no osteomyelitis and prior history trauma/maggots; culture with skin derrell and pseudomonas aeruginosa, finished IV Zosyn in the hospital.Admission notes indicate cognitive impairment and other comorbidity as outlined below.    Admitted February 3, 2025 with worsening redness/pain at both lower legs with open wounds and unable to make appointments with wound care, apparently difficulty with transportation.  In addition he reports that the Unna boot wraps had been left on for weeks without change.  He did not know about any other specific trauma but admits to bumping the legs periodically and could have had superficial trauma     2/8/25 had reported cough, oxygen saturations lower regarding need for ongoing supplemental oxygen and subsequent COVID positivity.  Remdesivir/decadron added, moved to appropriate isolation on 6 N.   chest x-ray with vague interstitial changes ;cultures noted; Bilateral leg pain is dull at present, intermittent, nonradiating, worse with pressure, better with pain meds and 2-3 out of 10 in severity, not progressive.  Redness and swelling not progressive.    No headache photophobia or neck stiffness.     No nausea vomiting diarrhea or abdominal pain.  No dysuria hematuria or pyuria.  No rash        Past Medical History:   Diagnosis Date    Anxiety     Arthritis     Back pain     Bronchitis     Cognitive impairment     Depression     Disease of thyroid gland     Glucose intolerance  (impaired glucose tolerance)     Hyperlipidemia     Kidney stone     Obesity     Parkinson disease     Pneumonia     Prostate disorder     Thyroid disease     Wears eyeglasses        Past Surgical History:   Procedure Laterality Date    COLONOSCOPY      5 years ago    EYE SURGERY      HERNIA REPAIR  10/20/2020    KNEE SURGERY      LUMBAR LAMINECTOMY DISCECTOMY DECOMPRESSION N/A 07/13/2017    Procedure: LUMBAR LAMINECTOMY L4-5;  Surgeon: Antonio Trent MD;  Location: [facility];  Service:     SHOULDER ROTATOR CUFF REPAIR Right     x2    TONSILLECTOMY      VEIN SURGERY         Pediatric History   Patient Parents    Not on file     Other Topics Concern    Not on file   Social History Narrative    Lives alone. Uses walker    Has not smoked since about 1980       family history includes Alzheimer's disease in an other family member; Cancer in his father and another family member; Diabetes in an other family member; Heart disease in an other family member; Stroke in an other family member.    No Known Allergies    Medication:  Current Facility-Administered Medications   Medication Dose Route Frequency Provider Last Rate Last Admin    acetaminophen (TYLENOL) tablet 650 mg  650 mg Oral Q4H PRN Serenity Drummond MD        atorvastatin (LIPITOR) tablet 40 mg  40 mg Oral Daily Leobardo Hayden MD   40 mg at 02/08/25 0938    polyethylene glycol (MIRALAX) packet 17 g  17 g Oral BID Leobardo Hayden MD   17 g at 02/08/25 0939    And    senna (SENOKOT) tablet 2 tablet  2 tablet Oral BID Leobardo Hayden MD   2 tablet at 02/08/25 0939    And    bisacodyl (DULCOLAX) suppository 10 mg  10 mg Rectal Daily PRN Leobardo Hayden MD        budesonide-formoterol (SYMBICORT) 160-4.5 MCG/ACT inhaler 2 puff  2 puff Inhalation BID - RT Serenity Drummond MD   2 puff at 02/08/25 1025    Calcium Replacement - Follow Nurse / BPA Driven Protocol   Not Applicable PRN Serenity Drummond MD        carbidopa-levodopa (SINEMET)  MG per tablet 2 tablet   2 tablet Oral 4x Daily Serenity Drummond MD   2 tablet at 02/08/25 0822    DAPTOmycin (CUBICIN) 400 mg in sodium chloride 0.9 % 50 mL IVPB  4 mg/kg (Adjusted) Intravenous Q24H Trevor Jorgensen  mL/hr at 02/08/25 1048 400 mg at 02/08/25 1048    dexAMETHasone (DECADRON) tablet 6 mg  6 mg Oral Daily With Breakfast Gilbert Canales DO        fluticasone (FLONASE) 50 MCG/ACT nasal spray 2 spray  2 spray Each Nare Daily Leobardo Hayden MD   2 spray at 02/08/25 0939    furosemide (LASIX) tablet 20 mg  20 mg Oral Daily Leobardo Hayden MD   20 mg at 02/08/25 0938    guaifenesin (ROBITUSSIN) 100 MG/5ML liquid 200 mg  200 mg Oral Q4H PRN Ayla Nelson, APRN   200 mg at 02/07/25 2331    heparin (porcine) 5000 UNIT/ML injection 5,000 Units  5,000 Units Subcutaneous Q12H Serenity Drummond MD   5,000 Units at 02/08/25 0938    HYDROcodone-acetaminophen (NORCO) 5-325 MG per tablet 1 tablet  1 tablet Oral Q4H PRN Serenity Drummond MD   1 tablet at 02/07/25 1159    influenza vac split high-dose (FLUZONE HIGH DOSE) injection 0.5 mL  0.5 mL Intramuscular During Hospitalization Serenity Drummond MD        levothyroxine (SYNTHROID, LEVOTHROID) tablet 88 mcg  88 mcg Oral Q AM Serenity Drummond MD   88 mcg at 02/08/25 0552    Magnesium Standard Dose Replacement - Follow Nurse / BPA Driven Protocol   Not Applicable PRN Serenity Drummond MD        morphine injection 2 mg  2 mg Intravenous Q2H PRN Serenity Drummond MD        Pharmacy Consult - Remdesivir for Severe COVID-19 (Within 7 days of symptom onset)   Not Applicable Continuous PRN Gilbert Canales DO        Phosphorus Replacement - Follow Nurse / BPA Driven Protocol   Not Applicable PRN Serenity Drummond MD        piperacillin-tazobactam (ZOSYN) 3.375 g IVPB in 100 mL NS MBP (CD)  3.375 g Intravenous Q8H Serenity Drummond MD   3.375 g at 02/08/25 0822    Potassium Replacement - Follow Nurse / BPA Driven Protocol   Not Applicable PRSerenity Reyez MD         "pramipexole (MIRAPEX) tablet 1 mg  1 mg Oral TID Serenity Drummond MD   1 mg at 02/08/25 0939    remdesivir 200 mg in sodium chloride 0.9 % 290 mL IVPB (powder vial)  200 mg Intravenous Q24H Gilbert Canales DO        Followed by    [START ON 2/9/2025] remdesivir 100 mg in sodium chloride 0.9 % 250 mL IVPB (powder vial)  100 mg Intravenous Q24H Gilbert Canales DO        sodium chloride 0.9 % flush 10 mL  10 mL Intravenous PRN Bantry, Tu,         sodium chloride 0.9 % flush 10 mL  10 mL Intravenous PRN Bantry, DO Tu   10 mL at 02/07/25 1946    tamsulosin (FLOMAX) 24 hr capsule 0.4 mg  0.4 mg Oral Daily Serenity Drummond MD   0.4 mg at 02/08/25 0938    vitamin D3 capsule 5,000 Units  5,000 Units Oral Daily Serenity Drummond MD   5,000 Units at 02/08/25 0939       Antibiotics:  Anti-Infectives (From admission, onward)      Ordered     Dose/Rate Route Frequency Start Stop    02/08/25 1049  remdesivir 100 mg in sodium chloride 0.9 % 250 mL IVPB (powder vial)        Ordering Provider: Gilbert Canales DO   Placed in \"Followed by\" Linked Group    100 mg  over 60 Minutes Intravenous Every 24 Hours 02/09/25 1300 02/13/25 1259    02/08/25 1049  remdesivir 200 mg in sodium chloride 0.9 % 290 mL IVPB (powder vial)        Ordering Provider: Gilbert Canales DO   Placed in \"Followed by\" Linked Group    200 mg  over 60 Minutes Intravenous Every 24 Hours 02/08/25 1300 02/09/25 1259    02/04/25 0756  DAPTOmycin (CUBICIN) 400 mg in sodium chloride 0.9 % 50 mL IVPB        Ordering Provider: Trevor Jorgensen MD    4 mg/kg × 94.6 kg (Adjusted)  100 mL/hr over 30 Minutes Intravenous Every 24 Hours 02/04/25 0930 02/15/25 1138    02/03/25 2208  piperacillin-tazobactam (ZOSYN) 3.375 g IVPB in 100 mL NS MBP (CD)        Ordering Provider: Serenity Drummond MD    3.375 g  over 30 Minutes Intravenous Every 8 Hours Scheduled 02/03/25 2230 02/10/25 1556    02/03/25 1650  vancomycin 2500 mg/500 mL 0.9% NS IVPB ([facility])    " "    Ordering Provider: Serenity Drummond MD    22 mg/kg × 109 kg  over 150 Minutes Intravenous Once 02/03/25 1706 02/03/25 2000 02/03/25 1448  piperacillin-tazobactam (ZOSYN) 3.375 g IVPB in 100 mL NS MBP (CD)        Ordering Provider: Tu Pruitt DO    3.375 g  over 30 Minutes Intravenous Once 02/03/25 1504 02/03/25 1657              Review of Systems    2/7/25     Constitutional-- No Fever, chills or sweats.  Appetite good, and no malaise. No fatigue.  Heent-- No new vision, hearing or throat complaints.  No epistaxis or oral sores.  Denies odynophagia or dysphagia.  No flashers, floaters or eye pain. No odynophagia or dysphagia. No headache, photophobia or neck stiffness.  CV-- No chest pain, palpitation or syncope  Resp-- No  Hemoptysis; see above  GI- No nausea, vomiting, or diarrhea.  No hematochezia, melena, or hematemesis. Denies jaundice or chronic liver disease.  -- No dysuria, hematuria, or flank pain.  Denies hesitancy, urgency or flank pain.  Lymph- no swollen lymph nodes in neck/axilla or groin.   Heme- No active bruising or bleeding  MS-- no swelling or pain in the bones or joints of arms/legs aside from above.  No new back pain.  Neuro-- No acute focal weakness or numbness in the arms or legs.  No seizures.    Full 12 point review of systems reviewed and negative otherwise for acute complaints, except for above    Physical Exam:   Vital Signs   /61 (BP Location: Right arm, Patient Position: Lying)   Pulse 67   Temp 98.3 °F (36.8 °C) (Oral)   Resp 18   Ht 182.9 cm (72\")   Wt 120 kg (263 lb 14.4 oz)   SpO2 90%   BMI 35.79 kg/m²     GENERAL: Awake and chronically ill-appearing, in no acute distress. Has a tremor associated with his Parkinson's disease  HEENT: Normocephalic, atraumatic.    No conjunctival injection. No icterus. Oropharynx clear without evidence of thrush or exudate. No evidence of periodontal disease.    NECK: Supple without nuchal rigidity. No mass.   HEART: RRR; No " murmur, rubs, gallops.   LUNGS: Diminished at bases, vague crackles bilateral, no rhonchi/wheeze or stridor. Nonlabored. No dullness.  ABDOMEN: Soft, nontender, nondistended. Positive bowel sounds. No rebound or guarding. NO mass or HSM.  EXT:  see below   MSK: FROM without joint effusions noted arms/legs.    SKIN: Warm and dry without cutaneous eruptions on Inspection/palpation.    NEURO: Oriented to [facility].  chronic debility.    Bilateral lower extremities with chronic appearing edema/discoloration with probable stasis change, acute erythema/warmth and tenderness bilateral with multifocal ulceration  between knees and toes.  No discrete mass bulge or fluctuance.  No crepitus or bulla.   unable to determine depth of these wounds but no probe to bone tendon joint or ligament; no new visible redness/purulence    Laboratory Data    Results from last 7 days   Lab Units 02/08/25  1024 02/06/25  0646 02/05/25  0639   WBC 10*3/mm3 6.13 5.97 5.57   HEMOGLOBIN g/dL 10.5* 10.1* 9.9*   HEMATOCRIT % 33.0* 32.6* 32.0*   PLATELETS 10*3/mm3 203 206 195     Results from last 7 days   Lab Units 02/08/25  1024   SODIUM mmol/L 137   POTASSIUM mmol/L 4.2   CHLORIDE mmol/L 101   CO2 mmol/L 23.0   BUN mg/dL 23   CREATININE mg/dL 1.26   GLUCOSE mg/dL 128*   CALCIUM mg/dL 8.8     Results from last 7 days   Lab Units 02/04/25  0647   ALK PHOS U/L 128*   BILIRUBIN mg/dL 0.5   ALT (SGPT) U/L <5   AST (SGOT) U/L 16               Estimated Creatinine Clearance: 64.7 mL/min (by C-G formula based on SCr of 1.26 mg/dL).      Microbiology:      Radiology:  Imaging Results (Last 72 Hours)       Procedure Component Value Units Date/Time    XR Chest 1 View [[id number]] Collected: 02/06/25 1021     Updated: 02/06/25 1024    Narrative:      XR CHEST 1 VW    Date of Exam: 2/6/2025 9:33 AM EST    Indication: Hypoxia    Comparison: 2/3/2025    Findings:  Heart size is enlarged. There is mild pulmonary vascular congestion and interstitial edema. No focal  consolidation is identified. There is no significant pleural fluid.      Impression:      Impression:  Cardiomegaly with mild pulmonary vascular congestion and interstitial edema.        Electronically Signed: Gregor Herrera MD    2/6/2025 10:21 AM EST    Workstation ID: [id number]              Impression:     --acute bilateral lower leg and foot cellulitis/infections with multifocal ulcerations, chronic discoloration/chronic edema and potential intermittent superficial trauma although no specific inciting event.  Apparently had not made it to appointments with wound care and Unna boots had not been changed in weeks.  Unclear how much of this is general self-neglect versus recent weather related challenge with combination. high risk for persistent/recurrent or nonhealing wounds, persistent/progressive or recurrent infection and risk for further functional/limb loss, risk for amputation etc.  Culture below.  Arterial/venous/vascular workup at discretion of medicine team    --Acute cough, COVID-positive with treatment as pneumonia with symptomatology/oxygen requirements and abnormal chest x-ray.  Isolation precautions adjusted, ongoing remdesivir/decadron; if rapidly progressive hypoxia despite current therapy you good give consideration to additional therapy; patient prefers current therapy.    --Parkinson's disease/memory loss per admission notes, chronically debilitated and primarily wheelchair bound/bedbound by his report    --chronic edema and possible lymphedema per patient notes    --Prior history DVT per past notes    PLAN:     --IV daptomycin/Zosyn 7-10 days    --remdesivir/decadron    **culture right leg and left leg with MRSA/pseudomonas aeruginosa/E Coli   from February 3, 2025    --Check/review labs cultures and scans    --Partial history Per nursing staff    --Discussed with microbiology    --Discussed with multidisciplinary team/nursing regarding complex set of issues, discussed with microbiology lab and  discussed importance of antimicrobial stewardship, manage infection control protocols etc.    This visit included the following complex service elements:  Complex medical decision-making associated with antimicrobial prescribing.  Counseled patients, family members, and/or caregivers regarding antimicrobial stewardship and antibiotic resistance.  Managed infection control protocol.  Communicated with the clinical microbiology lab.     Copied text in this note has been reviewed and is accurate as of 02/08/25.     Trevor Jorgensen MD  2/8/2025

## 2025-02-08 NOTE — ED PROVIDER NOTES
Subjective   History of Present Illness  Pt is a pleasant 79 yo male with a history of lymphedema, Parkinsons, HTN, hyperlipidemia, cognitive impairment.  Presents to the ED with worsening wound and edema to bilateral lower extremities.  Pt states that he is managed by a wound care nurse and has kimberly boots regularly placed on bilateral lower extremities.  Today when his lower extremity wraps were removed he notes erythema, blistering, and fluid oozing from his lower extremities, prompting his visit to the ED. patient admits that he does not do a good job of elevating his legs throughout the day.  Denies definitive fever.  Denies chest pain, shortness of breath, abdominal pain, nausea, vomiting, diarrhea, or other acute complaint.        Review of Systems   All other systems reviewed and are negative.      Past Medical History:   Diagnosis Date    Anxiety     Arthritis     Back pain     Bronchitis     Cognitive impairment     Depression     Disease of thyroid gland     Glucose intolerance (impaired glucose tolerance)     Hyperlipidemia     Kidney stone     Obesity     Parkinson disease     Pneumonia     Prostate disorder     Thyroid disease     Wears eyeglasses        No Known Allergies    Past Surgical History:   Procedure Laterality Date    COLONOSCOPY      5 years ago    EYE SURGERY      HERNIA REPAIR  10/20/2020    KNEE SURGERY      LUMBAR LAMINECTOMY DISCECTOMY DECOMPRESSION N/A 07/13/2017    Procedure: LUMBAR LAMINECTOMY L4-5;  Surgeon: Antonio Trent MD;  Location: [facility];  Service:     SHOULDER ROTATOR CUFF REPAIR Right     x2    TONSILLECTOMY      VEIN SURGERY         Family History   Problem Relation Age of Onset    Alzheimer's disease Other     Cancer Other     Diabetes Other     Heart disease Other     Stroke Other     Cancer Father        Social History     Socioeconomic History    Marital status:    Tobacco Use    Smoking status: Former     Current packs/day: 0.00     Average packs/day: 1  pack/day for 30.0 years (30.0 ttl pk-yrs)     Types: Cigarettes     Start date:      Quit date:      Years since quittin.1     Passive exposure: Past    Smokeless tobacco: Never    Tobacco comments:     quit    Vaping Use    Vaping status: Never Used   Substance and Sexual Activity    Alcohol use: Yes     Comment: occasionally    Drug use: No    Sexual activity: Defer     Partners: Female     Birth control/protection: None           Objective   Physical Exam  Vitals and nursing note reviewed.   Constitutional:       Appearance: Normal appearance. He is normal weight.   HENT:      Head: Normocephalic and atraumatic.      Mouth/Throat:      Mouth: Mucous membranes are moist.   Eyes:      Extraocular Movements: Extraocular movements intact.      Pupils: Pupils are equal, round, and reactive to light.   Musculoskeletal:         General: Swelling and tenderness present.      Right lower leg: Edema present.      Left lower leg: Edema present.   Skin:     General: Skin is warm and dry.      Capillary Refill: Capillary refill takes less than 2 seconds.      Findings: Erythema present.      Comments: Swelling, erythema, tenderness, some blistering along with serous discharge from the blisters and distal bilateral lower extremities.   Neurological:      General: No focal deficit present.      Mental Status: He is alert and oriented to person, place, and time.   Psychiatric:         Behavior: Behavior normal.         Thought Content: Thought content normal.         Procedures           ED Course  ED Course as of 25 1435   Mon 2025   1507 80-year-old lives in a senior living facility.  He is in wheelchair most of the day with his legs hanging down.  Does not do a good job with elevating his extremities.  Presented with bilateral lower extremity erythema weeping of wounds and discharge from the wounds.  I think most of his symptoms are likely secondary to his lymphedema and poor elevation of his  legs.  I spoke with vascular surgery, Dr. Connor, and the patient is trying to have venous ablation performed but has not been able to make it to appointments due to his poor mobility.  Presented with fairly similar presentation October 2024.  Cultured positive for Pseudomonas at that time and was followed by infectious disease.  I spoke with Dr. Grewal, infectious disease.  He recommended Zosyn, plus or minus Dapto and infectious disease will consult on the patient.  In all, I think this is due to his lymphedema and poor elevation in care of his wounds, but I cannot exclude a cellulitic component given his recent history.   [CP]      ED Course User Index  [CP] Tu Pruitt DO       Latest Reference Range & Units 02/03/25 11:36 02/03/25 14:30   Creatine Kinase 20 - 200 U/L  76   HS Troponin T <22 ng/L 18 19   Troponin T Numeric Delta Abnormal if >/=3 ng/L  1   proBNP 0.0 - 1,800.0 pg/mL 68.0    Sodium 136 - 145 mmol/L 142    Potassium 3.5 - 5.2 mmol/L 3.8    Chloride 98 - 107 mmol/L 105    CO2 22.0 - 29.0 mmol/L 27.0    Anion Gap 5.0 - 15.0 mmol/L 10.0    BUN 8 - 23 mg/dL 29 (H)    Creatinine 0.76 - 1.27 mg/dL 1.19    BUN/Creatinine Ratio 7.0 - 25.0  24.4    eGFR >60.0 mL/min/1.73 62.5    Glucose 65 - 99 mg/dL 118 (H)    Calcium 8.6 - 10.5 mg/dL 9.0    Alkaline Phosphatase 39 - 117 U/L 162 (H)    Total Protein 6.0 - 8.5 g/dL 7.0    Albumin 3.5 - 5.2 g/dL 3.4 (L)    Globulin gm/dL 3.6    A/G Ratio g/dL 0.9    AST (SGOT) 1 - 40 U/L 20    ALT (SGPT) 1 - 41 U/L 16    Total Bilirubin 0.0 - 1.2 mg/dL 0.4    Lactate 0.5 - 2.0 mmol/L 1.2    Protime 12.2 - 14.5 Seconds 13.6    INR 0.89 - 1.12  1.03    WBC 3.40 - 10.80 10*3/mm3 5.40    RBC 4.14 - 5.80 10*6/mm3 4.25    Hemoglobin 13.0 - 17.7 g/dL 12.3 (L)    Hematocrit 37.5 - 51.0 % 40.2    Platelets 140 - 450 10*3/mm3 243    RDW 12.3 - 15.4 % 17.2 (H)    MCV 79.0 - 97.0 fL 94.6    MCH 26.6 - 33.0 pg 28.9    MCHC 31.5 - 35.7 g/dL 30.6 (L)    MPV 6.0 - 12.0 fL 9.2    RDW-SD  37.0 - 54.0 fl 59.7 (H)    Neutrophil Rel % 42.7 - 76.0 % 69.1    Lymphocyte Rel % 19.6 - 45.3 % 17.0 (L)    Monocyte Rel % 5.0 - 12.0 % 10.0    Eosinophil Rel % 0.3 - 6.2 % 3.1    Basophil Rel % 0.0 - 1.5 % 0.4    Immature Granulocyte Rel % 0.0 - 0.5 % 0.4    Neutrophils Absolute 1.70 - 7.00 10*3/mm3 3.73    Lymphocytes Absolute 0.70 - 3.10 10*3/mm3 0.92    Monocytes Absolute 0.10 - 0.90 10*3/mm3 0.54    Eosinophils Absolute 0.00 - 0.40 10*3/mm3 0.17    Basophils Absolute 0.00 - 0.20 10*3/mm3 0.02    Immature Grans, Absolute 0.00 - 0.05 10*3/mm3 0.02    nRBC 0.0 - 0.2 /100 WBC 0.0    (H): Data is abnormally high  (L): Data is abnormally low    Note: In addition to lab results from this visit, the labs listed above may include labs taken at another facility or during a different encounter within the last 24 hours. Please correlate lab times with ED admission and discharge times for further clarification of the services performed during this visit.    XR Chest 1 View   Final Result   Impression:   Cardiomegaly with mild pulmonary vascular congestion and interstitial edema.            Electronically Signed: Gregor Herrera MD     2/6/2025 10:21 AM EST     Workstation ID: [id number]      XR Chest 1 View   Final Result   Impression:      1. Mild cardiomegaly and pulmonary venous hypertension.      2. Relatively low lung volumes and mild chronic appearing reticular lung changes similar to prior studies.         Electronically Signed: Fabian Dye MD     2/3/2025 1:11 PM EST     Workstation ID: [id number]      CT Chest Without Contrast Diagnostic    (Results Pending)     Vitals:    02/08/25 0452 02/08/25 0845 02/08/25 1025 02/08/25 1153   BP: 112/56 118/61  116/62   BP Location: Left arm Right arm  Left arm   Patient Position: Lying Lying  Lying   Pulse: 67  67 68   Resp: 18 18 18 18   Temp: 98.4 °F (36.9 °C) 98.3 °F (36.8 °C)  97.8 °F (36.6 °C)   TempSrc: Axillary Oral  Oral   SpO2: 91%  90% 93%   Weight:       Height:          Medications   sodium chloride 0.9 % flush 10 mL (has no administration in time range)   sodium chloride 0.9 % flush 10 mL (10 mL Intravenous Given 2/7/25 1946)   carbidopa-levodopa (SINEMET)  MG per tablet 2 tablet (2 tablets Oral Given 2/8/25 1223)   HYDROcodone-acetaminophen (NORCO) 5-325 MG per tablet 1 tablet (1 tablet Oral Given 2/7/25 1159)   morphine injection 2 mg (has no administration in time range)   influenza vac split high-dose (FLUZONE HIGH DOSE) injection 0.5 mL (has no administration in time range)   piperacillin-tazobactam (ZOSYN) 3.375 g IVPB in 100 mL NS MBP (CD) (3.375 g Intravenous New Bag 2/8/25 0822)   budesonide-formoterol (SYMBICORT) 160-4.5 MCG/ACT inhaler 2 puff (2 puffs Inhalation Given 2/8/25 1025)   vitamin D3 capsule 5,000 Units (5,000 Units Oral Given 2/8/25 0939)   levothyroxine (SYNTHROID, LEVOTHROID) tablet 88 mcg (88 mcg Oral Given 2/8/25 0552)   pramipexole (MIRAPEX) tablet 1 mg (1 mg Oral Given 2/8/25 0939)   tamsulosin (FLOMAX) 24 hr capsule 0.4 mg (0.4 mg Oral Given 2/8/25 0938)   heparin (porcine) 5000 UNIT/ML injection 5,000 Units (5,000 Units Subcutaneous Given 2/8/25 0938)   Potassium Replacement - Follow Nurse / BPA Driven Protocol (has no administration in time range)   Magnesium Standard Dose Replacement - Follow Nurse / BPA Driven Protocol (has no administration in time range)   Phosphorus Replacement - Follow Nurse / BPA Driven Protocol (has no administration in time range)   Calcium Replacement - Follow Nurse / BPA Driven Protocol (has no administration in time range)   acetaminophen (TYLENOL) tablet 650 mg (has no administration in time range)   DAPTOmycin (CUBICIN) 400 mg in sodium chloride 0.9 % 50 mL IVPB (400 mg Intravenous New Bag 2/8/25 1048)   atorvastatin (LIPITOR) tablet 40 mg (40 mg Oral Given 2/8/25 0938)   furosemide (LASIX) tablet 20 mg (20 mg Oral Given 2/8/25 5586)   fluticasone (FLONASE) 50 MCG/ACT nasal spray 2 spray (2 sprays Each Nare  Given 2/8/25 0939)   polyethylene glycol (MIRALAX) packet 17 g (17 g Oral Given 2/8/25 0939)     And   senna (SENOKOT) tablet 2 tablet (2 tablets Oral Given 2/8/25 0939)     And   bisacodyl (DULCOLAX) suppository 10 mg (has no administration in time range)   guaifenesin (ROBITUSSIN) 100 MG/5ML liquid 200 mg (200 mg Oral Given 2/8/25 1223)   dexAMETHasone (DECADRON) tablet 6 mg (6 mg Oral Given 2/8/25 1223)   Pharmacy Consult - Remdesivir for Severe COVID-19 (Within 7 days of symptom onset) (has no administration in time range)   remdesivir 200 mg in sodium chloride 0.9 % 290 mL IVPB (powder vial) (200 mg Intravenous New Bag 2/8/25 1402)     Followed by   remdesivir 100 mg in sodium chloride 0.9 % 250 mL IVPB (powder vial) (has no administration in time range)   piperacillin-tazobactam (ZOSYN) 3.375 g IVPB in 100 mL NS MBP (CD) (0 g Intravenous Stopped 2/3/25 1657)   vancomycin 2500 mg/500 mL 0.9% NS IVPB ([facility]) (2,500 mg Intravenous New Bag 2/3/25 1730)   potassium chloride (KLOR-CON M20) CR tablet 40 mEq (40 mEq Oral Given 2/4/25 1302)   furosemide (LASIX) injection 40 mg (40 mg Intravenous Given 2/6/25 1249)     ECG/EMG Results (last 24 hours)       ** No results found for the last 24 hours. **          No orders to display                                                        Medical Decision Making  80-year-old lives in a senior living facility.  He is in wheelchair most of the day with his legs hanging down.  Does not do a good job with elevating his extremities.  Presented with bilateral lower extremity erythema weeping of wounds and discharge from the wounds.  I think most of his symptoms are likely secondary to his lymphedema and poor elevation of his legs.  I spoke with vascular surgery, Dr. Connor, and the patient is trying to have venous ablation performed but has not been able to make it to appointments due to his poor mobility.  Presented with fairly similar presentation October 2024.  Cultured positive  for Pseudomonas at that time and was followed by infectious disease.  I spoke with Dr. Grewal, infectious disease.  He recommended Zosyn, plus or minus Dapto and infectious disease will consult on the patient.  In all, I think this is due to his lymphedema and poor elevation in care of his wounds, but I cannot exclude a cellulitic component given his recent history.    Problems Addressed:  Bilateral lower extremity edema: complicated acute illness or injury  Lymphedema: complicated acute illness or injury    Amount and/or Complexity of Data Reviewed  External Data Reviewed: notes.  Labs: ordered. Decision-making details documented in ED Course.  Radiology: ordered and independent interpretation performed. Decision-making details documented in ED Course.    Risk  Prescription drug management.  Decision regarding hospitalization.        Final diagnoses:   Bilateral lower extremity edema   Lymphedema   Cellulitis of lower extremity, unspecified laterality   Failure of outpatient treatment       ED Disposition  ED Disposition       ED Disposition   Decision to Admit    Condition   --    Comment   Level of Care: Telemetry [5]   Diagnosis: Cellulitis [168509]   Admitting Physician: JULISSA GREENE [[id number]]                    Tu Pruitt DO  02/08/25 1450       Tu Pruitt DO  02/08/25 1451

## 2025-02-08 NOTE — PROGRESS NOTES
[facility] Medicine Services  PROGRESS NOTE    Patient Name: Cordell Martin  : 1945  MRN: [id number]    Date of Admission: 2/3/2025  Primary Care Physician: Ben Holder DO    Subjective   Subjective     CC:  F/u multiple issues    HPI:  Patient reports cough for several years, last night asked for something to help with cough.  Subsequently had swab obtained and returned positive for COVID-19.  He does reiterate that cough has been chronic for years, however he has had a sore throat for about 4 days.      Objective   Objective     Vital Signs:   Temp:  [97.7 °F (36.5 °C)-98.8 °F (37.1 °C)] 98.8 °F (37.1 °C)  Heart Rate:  [66-72] 66  Resp:  [16-18] 18  BP: (112-121)/(56-62) 121/56  Flow (L/min) (Oxygen Therapy):  [2-3] 2     Physical Exam:  Constitutional: Awake, alert, elderly male laying in bed in NAD  Respiratory: Clear to auscultation bilaterally, respiratory effort normal, intermittent cough  Cardiovascular: RRR, palpable radial pulse  Gastrointestinal: Positive bowel sounds, soft, nontender, nondistended  Musculoskeletal: Bilateral lower extremity edema with compression wraps  Psychiatric: Appropriate affect, cooperative  Neurologic: Speech slow but clear and fluent    Results Reviewed:  LAB RESULTS:      Lab 25  1024 25  0646 25  0639 25  0647 25  1430 25  1136   WBC 6.13 5.97 5.57 5.58  --  5.40   HEMOGLOBIN 10.5* 10.1* 9.9* 10.1*  --  12.3*   HEMATOCRIT 33.0* 32.6* 32.0* 32.6*  --  40.2   PLATELETS 203 206 195 203  --  243   NEUTROS ABS 3.83  --   --  3.89  --  3.73   IMMATURE GRANS (ABS) 0.05  --   --  0.02  --  0.02   LYMPHS ABS 1.36  --   --  1.00  --  0.92   MONOS ABS 0.57  --   --  0.54  --  0.54   EOS ABS 0.31  --   --  0.11  --  0.17   MCV 91.7 92.9 94.1 93.1  --  94.6   LACTATE  --   --   --   --   --  1.2   PROTIME  --   --   --   --   --  13.6   HSTROP T  --   --   --   --  19 18         Lab 25  1024  02/07/25  1411 02/06/25  0646 02/05/25  0639 02/04/25  1735 02/04/25  0647   SODIUM 137 137 138 138  --  139   POTASSIUM 4.2 4.2 4.1 3.9 4.0 3.6   CHLORIDE 101 101 103 105  --  106   CO2 23.0 26.0 24.0 24.0  --  24.0   ANION GAP 13.0 10.0 11.0 9.0  --  9.0   BUN 23 23 23 25*  --  26*   CREATININE 1.26 1.46* 1.35* 1.30*  --  1.06   EGFR 58.4* 48.9* 53.7* 56.2*  --  71.8   GLUCOSE 128* 145* 93 134*  --  102*   CALCIUM 8.8 8.8 8.4* 8.2*  --  8.4*   MAGNESIUM  --  2.2  --   --   --   --    PHOSPHORUS  --  3.1  --   --   --   --          Lab 02/04/25  0647 02/03/25  1136   TOTAL PROTEIN 5.3* 7.0   ALBUMIN 2.8* 3.4*   GLOBULIN 2.5 3.6   ALT (SGPT) <5 16   AST (SGOT) 16 20   BILIRUBIN 0.5 0.4   ALK PHOS 128* 162*         Lab 02/03/25  1430 02/03/25  1136   PROBNP  --  68.0   HSTROP T 19 18   PROTIME  --  13.6   INR  --  1.03             Lab 02/08/25  1024   FERRITIN 355.40         Brief Urine Lab Results  (Last result in the past 365 days)        Color   Clarity   Blood   Leuk Est   Nitrite   Protein   CREAT   Urine HCG        10/08/24 2219 Yellow   Clear   Negative   Negative   Negative   Negative                   Microbiology Results Abnormal       Procedure Component Value - Date/Time    COVID-19, FLU A/B, RSV PCR 1 HR TAT - Swab, Nasopharynx [[id number]]  (Abnormal) Collected: 02/08/25 0142    Lab Status: Final result Specimen: Swab from Nasopharynx Updated: 02/08/25 0631     COVID19 Detected     Influenza A PCR Not Detected     Influenza B PCR Not Detected     RSV, PCR Not Detected    Narrative:      Fact sheet for providers: https://www.fda.gov/media/463393/download    Fact sheet for patients: https://www.fda.gov/media/490476/download    Test performed by PCR.    Wound Culture - Wound, Leg, Right [[id number]]  (Abnormal)  (Susceptibility) Collected: 02/03/25 1903    Lab Status: Final result Specimen: Wound from Leg, Right Updated: 02/07/25 0721     Wound Culture Light growth (2+) Staphylococcus aureus, MRSA      Comment: Refer to previous wound culture collected on 2/3/2025 1528 for MICs    Methicillin resistant Staphylococcus aureus, Patient may be an isolation risk.         Scant growth (1+) Pseudomonas aeruginosa     Comment: Refer to previous wound culture collected on 2/3/2025 1528 for MICs           Rare growth Escherichia coli     Gram Stain Rare (1+) WBCs seen      Rare (1+) Gram positive cocci in pairs    Narrative:            Susceptibility        Escherichia coli      DRE      Amoxicillin + Clavulanate Susceptible      Ampicillin Susceptible      Ampicillin + Sulbactam Susceptible      Cefazolin (Non Urine) Susceptible      Cefepime Susceptible      Ceftazidime Susceptible      Ceftriaxone Susceptible      Gentamicin Susceptible      Levofloxacin Susceptible      Piperacillin + Tazobactam Susceptible      Tetracycline Resistant      Trimethoprim + Sulfamethoxazole Resistant                       Susceptibility Comments       Escherichia coli    With the exception of urinary-sourced infections, aminoglycosides should not be used as monotherapy.               Wound Culture - Swab, Leg, Left [[id number]]  (Abnormal)  (Susceptibility) Collected: 02/03/25 1528    Lab Status: Edited Result - FINAL Specimen: Swab from Leg, Left Updated: 02/05/25 0754     Wound Culture Heavy growth (4+) Staphylococcus aureus, MRSA     Comment:   Methicillin resistant Staphylococcus aureus, Patient may be an isolation risk.         Scant growth (1+) Pseudomonas aeruginosa     Gram Stain Many (4+) Gram positive cocci in pairs and clusters      Rare (1+) WBCs seen      Rare (1+) Gram negative bacilli    Susceptibility        Staphylococcus aureus, MRSA      DRE      Clindamycin Susceptible      Daptomycin Susceptible (C)  [1]       Erythromycin Resistant      Oxacillin Resistant      Rifampin Susceptible      Tetracycline Resistant      Trimethoprim + Sulfamethoxazole Susceptible      Vancomycin Susceptible                   [1]  Appended  report. These results have been appended to a previously final verified report.                Susceptibility        Pseudomonas aeruginosa      DRE      Cefepime Susceptible      Ceftazidime Susceptible      Ciprofloxacin Susceptible      Levofloxacin Intermediate      Piperacillin + Tazobactam Susceptible      Tobramycin Susceptible                       Susceptibility Comments       Staphylococcus aureus, MRSA    Daptomycin requested by Dr King 2/5      Pseudomonas aeruginosa    With the exception of urinary-sourced infections, aminoglycosides should not be used as monotherapy.               MRSA Screen, PCR (Inpatient) - Swab, Nares [[id number]]  (Abnormal) Collected: 02/03/25 1903    Lab Status: Final result Specimen: Swab from Nares Updated: 02/03/25 2102     MRSA PCR Positive    Narrative:      The negative predictive value of this diagnostic test is high and should only be used to consider de-escalating anti-MRSA therapy. A positive result may indicate colonization with MRSA and must be correlated clinically.            CT Chest Without Contrast Diagnostic    Result Date: 2/8/2025  CT CHEST WO CONTRAST DIAGNOSTIC Date of Exam: 2/8/2025 2:35 PM EST Indication: Covid 19 positive, eval lung parenchyma. Comparison: 10/9/2024 Technique: Axial CT images were obtained of the chest without contrast administration.  Reconstructed coronal and sagittal images were also obtained. Automated exposure control and iterative construction methods were used. Findings: MEDIASTINUM: Unremarkable. Aortic and heart size are normal. No mass nor pericardial effusion. CORONARY ARTERIES: There is calcified atherosclerotic disease. LUNGS: There is mild reticular interstitial prominence. There are new nodular airspace opacities within each lung base with some areas of irregular groundglass attenuation suggestive of superimposed infectious process including sequela of COVID-19. No dense lobar consolidation. There is a 5 mm subpleural  right middle lobe nodule (image 57, series 4), previously 4 mm. PLEURAL SPACE: There are small bilateral pleural effusions. LYMPH NODES: There are no pathologically enlarged lymph nodes. UPPER ABDOMEN: Unremarkable OSSEOUS STRUCTURES: Appropriate for age with no acute process identified.     Impression: Impression: 1.Faint bilateral lower lung nodular and groundglass opacities suggesting superimposed pneumonia including COVID-19. 2.Small bilateral pleural effusions. 3.Slightly larger 5 mm subpleural right middle lobe nodule. See below recommendations. The Fleischner society pulmonary nodule recommendations are for the follow-up and management of pulmonary nodules smaller than 8 mm detected incidentally in patients >35 years on non-screening CT. The initial guidelines for the management of solid nodules were released in 2005 1, and guidelines for the management of subsolid nodules were released in 2013 2. New revised 2017 recommendations for both solid and subsolid have since been released 4. 2017 guidelines Solid nodules Solitary nodule size: <6 mm *low risk patients: no follow-up needed *high risk patients: optional CT at 12 months Solitary nodule size: 6-8 mm *low risk patients: follow-up at 6-12 months, then consider further follow-up at 18-24 months *high risk patients: initial follow-up CT at 6-12 months and then at 18-24 months if no change Solitary nodule size: >8 mm *either low or high risk patients *consider follow-up CT at 3 months, and/or CT-PET, and/or biopsy Multiple nodules size: <6 mm *low risk patients: no routine follow-up *high risk patients: optional CT at 12 months Multiple nodules size: 6-8 mm *low risk patients: follow-up at 3-6 months, then consider further follow-up at 18-24 months *high risk patients: follow-up at 3-6 months, then at 18-24 months if no change Multiple nodules size: >8 mm *low risk patients: follow-up at 3-6 months, then consider further follow-up at 18-24 months *high risk  "patients: follow-up at 3-6 months, then at 18-24 months if no change *  Note: newly detected indeterminate nodule in persons 35 years of age or older. *low risk patients: minimal or absent history of smoking and or other known risk factors *high risk patients: history of smoking or of other known risk factors (e.g. first degree relative with lung cancer, or exposure to asbestos, radon, uranium) *if a nodule up to 8 mm is partly solid or is ground glass further follow-up is required after 24 months to exclude possible slow growing adenocarcinoma (IKE) Subsolid nodules Solitary pure ground-glass nodule *nodule size <6mm *no CT follow-up required *nodule size > or equal to 6mm *follow up CT at 6-12 months, then every 2 years until 5 years Solitary part-solid nodule *nodule size <6mm *no CT follow-up required *nodule size > or equal to 6mm *follow-up CT at 3-6 months *if unchanged, and solid component remains <6mm, then annual follow-up for 5 years Multiple subsolid nodules *nodule size <6mm *follow-up CT at 3-6 months *consider further follow-up at 2 and 4 years if stable *nodule size > or equal to 6mm *follow-up CT at 3-6 months *subsequent management based on the most suspicious nodule(s) Electronically Signed: Jonas Evans MD  2/8/2025 4:13 PM EST  Workstation ID: [id number]     Results for orders placed during the hospital encounter of 01/12/21    Adult Transthoracic Echo Complete W/ Cont if Necessary Per Protocol    Interpretation Summary  · The right ventricle and right atrium are moderately dilated. Other chamber sizes and wall thicknesses are normal.  · Global and segmental LV wall motion is normal. The estimated left ventricular ejection fraction is 51% - 55%.  · The aortic and mitral valves are normal in structure and function.  · The estimated RV systolic pressure is mildly elevated 35 mmHg - 40 mmHg. There is as well noted to be less than 50% inspiratory IVC collapse. The main pulmonary artery is \"borderline " "dilated\".  · There are no other important findings on this study.      Current medications:  Scheduled Meds:atorvastatin, 40 mg, Oral, Daily  budesonide-formoterol, 2 puff, Inhalation, BID - RT  carbidopa-levodopa, 2 tablet, Oral, 4x Daily  DAPTOmycin, 4 mg/kg (Adjusted), Intravenous, Q24H  dexAMETHasone, 6 mg, Oral, Daily With Breakfast  fluticasone, 2 spray, Each Nare, Daily  furosemide, 20 mg, Oral, Daily  heparin (porcine), 5,000 Units, Subcutaneous, Q12H  levothyroxine, 88 mcg, Oral, Q AM  piperacillin-tazobactam, 3.375 g, Intravenous, Q8H  polyethylene glycol, 17 g, Oral, BID   And  senna, 2 tablet, Oral, BID  pramipexole, 1 mg, Oral, TID  [START ON 2/9/2025] remdesivir, 100 mg, Intravenous, Q24H  tamsulosin, 0.4 mg, Oral, Daily  vitamin D3, 5,000 Units, Oral, Daily      Continuous Infusions:Pharmacy Consult - Remdesivir,       PRN Meds:.  acetaminophen    polyethylene glycol **AND** senna **AND** bisacodyl    Calcium Replacement - Follow Nurse / BPA Driven Protocol    guaifenesin    HYDROcodone-acetaminophen    influenza vaccine    Magnesium Standard Dose Replacement - Follow Nurse / BPA Driven Protocol    Morphine    Pharmacy Consult - Remdesivir    Phosphorus Replacement - Follow Nurse / BPA Driven Protocol    Potassium Replacement - Follow Nurse / BPA Driven Protocol    sodium chloride    sodium chloride    Assessment & Plan   Assessment & Plan     Active Hospital Problems    Diagnosis  POA    **Bilateral lower leg cellulitis [L03.116, L03.115]  Yes    Cellulitis [L03.90]  Yes    Anemia, chronic disease [D63.8]  Yes    History of DVT (deep vein thrombosis) [Z86.718]  Not Applicable    Restrictive pattern on PFTs w/o evidence of ILD [R94.2]  Yes    Memory loss [R41.3]  Yes    Parkinson's disease [G20.A1]  Yes      Resolved Hospital Problems   No resolved problems to display.        Brief Hospital Course to date:  Cordell Martin is a 80 y.o. male w/ Parkinson disease, chronic poor mobility, lymphedema, " DVT, who presented for eval of leg pain and worsening LE wounds; he was admitted for concern for LE cellulitis, superficial wound Cx was positive for MRSA, Pseudomonas, E. Coli; ID follows for Abx recs, wound care follows for compression wraps; early AM 2/8/25 he complained of cough and tested positive for Covid 19    Patient transferring off the unit today due to new isolation requirements    Assessment/Plan    Infected BL LE wounds  Lymphedema  Hx DVT  -wound care follows for compression wraps  -Polymicrobial superficial wound swab  -ID follows, daptomycin/Zosyn 7-10 days (started 2/4/25)    Covid 19 infection  Hypoxia  -reports sore throat ~4 days (would be 2/4/25), swab + 2/8/25  -d/w ID via phone, requesting dexamethasone and remdesivir, orders placed  -prev CXR concerning for edema, ordered CT chest to better eval for poss covid 19 changes    Parkinson disease  Cognitive impairment  Chronic poor mobility  -cont sinemet, mirapex  -eventual plan for rehab with intent to return to independent living    Constipation - bowel regimen  Anxiety/Depression  Hypothyroid - levothyroxine  HLD - statin  GERD - add pepcid while on steroids  Hx DVT - heparin subq    Expected Discharge Location and Transportation: rehab  Expected Discharge   Expected Discharge Date: 2/12/2025; Expected Discharge Time:      VTE Prophylaxis:  Pharmacologic VTE prophylaxis orders are present.         AM-PAC 6 Clicks Score (PT): 12 (02/07/25 1955)    CODE STATUS:   Code Status and Medical Interventions: CPR (Attempt to Resuscitate); Full Support   Ordered at: 02/03/25 1629     Code Status (Patient has no pulse and is not breathing):    CPR (Attempt to Resuscitate)     Medical Interventions (Patient has pulse or is breathing):    Full Support       Gilbert Canales, DO  02/08/25

## 2025-02-08 NOTE — PLAN OF CARE
Problem: Adult Inpatient Plan of Care  Goal: Plan of Care Review  Outcome: Progressing  Flowsheets (Taken 2/8/2025 0507)  Progress: improving  Plan of Care Reviewed With: patient  Goal: Patient-Specific Goal (Individualized)  Outcome: Progressing  Goal: Absence of Hospital-Acquired Illness or Injury  Outcome: Progressing  Intervention: Identify and Manage Fall Risk  Recent Flowsheet Documentation  Taken 2/8/2025 0402 by Rosaura Lea RN  Safety Promotion/Fall Prevention:   activity supervised   assistive device/personal items within reach   safety round/check completed  Taken 2/8/2025 0243 by Rosaura Lea RN  Safety Promotion/Fall Prevention:   activity supervised   assistive device/personal items within reach   safety round/check completed  Taken 2/8/2025 0005 by Rosaura Lea RN  Safety Promotion/Fall Prevention:   activity supervised   assistive device/personal items within reach   safety round/check completed  Taken 2/7/2025 2240 by Rosaura Lea RN  Safety Promotion/Fall Prevention:   activity supervised   assistive device/personal items within reach   safety round/check completed  Taken 2/7/2025 1955 by Rosaura Lea RN  Safety Promotion/Fall Prevention:   activity supervised   assistive device/personal items within reach   clutter free environment maintained   fall prevention program maintained   lighting adjusted   muscle strengthening facilitated   nonskid shoes/slippers when out of bed   room organization consistent   safety round/check completed  Intervention: Prevent Skin Injury  Recent Flowsheet Documentation  Taken 2/8/2025 0402 by Rosaura Lea RN  Body Position: tilted  Taken 2/8/2025 0243 by Rosaura Lea RN  Body Position:   weight shifting   tilted  Taken 2/8/2025 0005 by Rosaura Lea RN  Body Position:   weight shifting   supine  Taken 2/7/2025 2240 by Rosaura Lea RN  Body Position:   weight shifting   tilted  Taken 2/7/2025 2100 by Rosaura Lea RN  Body  Position:   weight shifting   supine  Taken 2/7/2025 1955 by Rosaura Lea RN  Body Position:   tilted   right  Skin Protection:   drying agents applied   incontinence pads utilized  Intervention: Prevent and Manage VTE (Venous Thromboembolism) Risk  Recent Flowsheet Documentation  Taken 2/7/2025 1955 by Rosaura Lea RN  VTE Prevention/Management: (see MAR) other (see comments)  Intervention: Prevent Infection  Recent Flowsheet Documentation  Taken 2/8/2025 0402 by Rosaura Lea RN  Infection Prevention:   hand hygiene promoted   rest/sleep promoted  Taken 2/8/2025 0243 by Rosaura Lea RN  Infection Prevention:   hand hygiene promoted   rest/sleep promoted  Taken 2/8/2025 0005 by Rosaura Lea RN  Infection Prevention:   hand hygiene promoted   rest/sleep promoted  Taken 2/7/2025 2240 by Rosaura Lea RN  Infection Prevention:   hand hygiene promoted   single patient room provided  Taken 2/7/2025 1955 by Rosaura Lea RN  Infection Prevention:   hand hygiene promoted   rest/sleep promoted  Goal: Optimal Comfort and Wellbeing  Outcome: Progressing  Intervention: Provide Person-Centered Care  Recent Flowsheet Documentation  Taken 2/8/2025 0402 by Rosaura Lea RN  Trust Relationship/Rapport: care explained  Taken 2/8/2025 0243 by Rosaura Lea RN  Trust Relationship/Rapport: care explained  Taken 2/8/2025 0005 by Rosaura Lea RN  Trust Relationship/Rapport: care explained  Taken 2/7/2025 2240 by Rosaura Lea RN  Trust Relationship/Rapport: care explained  Taken 2/7/2025 1955 by Rosaura Lea RN  Trust Relationship/Rapport:   care explained   choices provided   questions answered   questions encouraged   reassurance provided  Goal: Readiness for Transition of Care  Outcome: Progressing     Problem: Skin Injury Risk Increased  Goal: Skin Health and Integrity  Outcome: Progressing  Intervention: Optimize Skin Protection  Recent Flowsheet Documentation  Taken 2/8/2025 0402 by Venu  Rosaura ESTRELLA RN  Activity Management: activity encouraged  Head of Bed (HOB) Positioning: HOB elevated  Taken 2/8/2025 0243 by Rosaura Lea RN  Activity Management: activity encouraged  Head of Bed (HOB) Positioning: HOB elevated  Taken 2/8/2025 0005 by Rosaura Lea RN  Activity Management: activity encouraged  Head of Bed (HOB) Positioning: HOB elevated  Taken 2/7/2025 2240 by Rosaura Lea RN  Activity Management: activity encouraged  Head of Bed (HOB) Positioning: HOB elevated  Taken 2/7/2025 1955 by Rosaura Lea RN  Activity Management: activity encouraged  Pressure Reduction Techniques:   frequent weight shift encouraged   weight shift assistance provided  Head of Bed (HOB) Positioning: HOB elevated  Pressure Reduction Devices:   heel offloading device utilized   foam padding utilized   pressure-redistributing mattress utilized  Skin Protection:   drying agents applied   incontinence pads utilized     Problem: Fall Injury Risk  Goal: Absence of Fall and Fall-Related Injury  Outcome: Progressing  Intervention: Identify and Manage Contributors  Recent Flowsheet Documentation  Taken 2/8/2025 0402 by Rosaura Lea RN  Self-Care Promotion: independence encouraged  Taken 2/8/2025 0243 by Rosaura Lea RN  Self-Care Promotion: independence encouraged  Taken 2/8/2025 0005 by Rosaura Lea RN  Self-Care Promotion: independence encouraged  Taken 2/7/2025 2240 by Rosaura Lea RN  Self-Care Promotion: independence encouraged  Taken 2/7/2025 1955 by Rosaura Lea RN  Medication Review/Management: medications reviewed  Self-Care Promotion: independence encouraged  Intervention: Promote Injury-Free Environment  Recent Flowsheet Documentation  Taken 2/8/2025 0402 by Rosaura Lea RN  Safety Promotion/Fall Prevention:   activity supervised   assistive device/personal items within reach   safety round/check completed  Taken 2/8/2025 0243 by Rosaura Lea RN  Safety Promotion/Fall Prevention:    activity supervised   assistive device/personal items within reach   safety round/check completed  Taken 2/8/2025 0005 by Rosaura Lea RN  Safety Promotion/Fall Prevention:   activity supervised   assistive device/personal items within reach   safety round/check completed  Taken 2/7/2025 2240 by Rosaura Lea RN  Safety Promotion/Fall Prevention:   activity supervised   assistive device/personal items within reach   safety round/check completed  Taken 2/7/2025 1955 by Rosarua Lea RN  Safety Promotion/Fall Prevention:   activity supervised   assistive device/personal items within reach   clutter free environment maintained   fall prevention program maintained   lighting adjusted   muscle strengthening facilitated   nonskid shoes/slippers when out of bed   room organization consistent   safety round/check completed   Goal Outcome Evaluation:  Plan of Care Reviewed With: patient      Pt currently in bed resting quietly. No complaints of pain or discomfort at this time. Complaints of cough this shift alleviated with MAR. UNNA boots present to BLE. Bowel management encouraged. VSS. Neuro checks WNL. Turned while in bed. No other observations at this time, call bell in reach.  Progress: improving

## 2025-02-08 NOTE — PLAN OF CARE
Goal Outcome Evaluation:              Outcome Evaluation: VSS on 2L. alert and oriented X 4. NSR. No complains of pain and discomfort. skin intervention in place. turned frquently. Given prn cough meds X 1. Suction in place.

## 2025-02-09 LAB
ALBUMIN SERPL-MCNC: 3.2 G/DL (ref 3.5–5.2)
ALBUMIN/GLOB SERPL: 0.8 G/DL
ALP SERPL-CCNC: 146 U/L (ref 39–117)
ALT SERPL W P-5'-P-CCNC: <5 U/L (ref 1–41)
ANION GAP SERPL CALCULATED.3IONS-SCNC: 11 MMOL/L (ref 5–15)
AST SERPL-CCNC: 20 U/L (ref 1–40)
BASOPHILS # BLD AUTO: 0.01 10*3/MM3 (ref 0–0.2)
BASOPHILS NFR BLD AUTO: 0.2 % (ref 0–1.5)
BILIRUB SERPL-MCNC: 0.3 MG/DL (ref 0–1.2)
BUN SERPL-MCNC: 24 MG/DL (ref 8–23)
BUN/CREAT SERPL: 20.5 (ref 7–25)
CALCIUM SPEC-SCNC: 9.2 MG/DL (ref 8.6–10.5)
CHLORIDE SERPL-SCNC: 106 MMOL/L (ref 98–107)
CO2 SERPL-SCNC: 23 MMOL/L (ref 22–29)
CREAT SERPL-MCNC: 1.17 MG/DL (ref 0.76–1.27)
DEPRECATED RDW RBC AUTO: 56.8 FL (ref 37–54)
EGFRCR SERPLBLD CKD-EPI 2021: 63.8 ML/MIN/1.73
EOSINOPHIL # BLD AUTO: 0 10*3/MM3 (ref 0–0.4)
EOSINOPHIL NFR BLD AUTO: 0 % (ref 0.3–6.2)
ERYTHROCYTE [DISTWIDTH] IN BLOOD BY AUTOMATED COUNT: 16.8 % (ref 12.3–15.4)
GLOBULIN UR ELPH-MCNC: 4.1 GM/DL
GLUCOSE SERPL-MCNC: 151 MG/DL (ref 65–99)
HCT VFR BLD AUTO: 35.9 % (ref 37.5–51)
HGB BLD-MCNC: 11.2 G/DL (ref 13–17.7)
IMM GRANULOCYTES # BLD AUTO: 0.05 10*3/MM3 (ref 0–0.05)
IMM GRANULOCYTES NFR BLD AUTO: 1.1 % (ref 0–0.5)
LYMPHOCYTES # BLD AUTO: 0.9 10*3/MM3 (ref 0.7–3.1)
LYMPHOCYTES NFR BLD AUTO: 19.1 % (ref 19.6–45.3)
MCH RBC QN AUTO: 28.6 PG (ref 26.6–33)
MCHC RBC AUTO-ENTMCNC: 31.2 G/DL (ref 31.5–35.7)
MCV RBC AUTO: 91.8 FL (ref 79–97)
MONOCYTES # BLD AUTO: 0.21 10*3/MM3 (ref 0.1–0.9)
MONOCYTES NFR BLD AUTO: 4.4 % (ref 5–12)
NEUTROPHILS NFR BLD AUTO: 3.55 10*3/MM3 (ref 1.7–7)
NEUTROPHILS NFR BLD AUTO: 75.2 % (ref 42.7–76)
NRBC BLD AUTO-RTO: 0 /100 WBC (ref 0–0.2)
PLATELET # BLD AUTO: 254 10*3/MM3 (ref 140–450)
PMV BLD AUTO: 8.7 FL (ref 6–12)
POTASSIUM SERPL-SCNC: 4.3 MMOL/L (ref 3.5–5.2)
PROT SERPL-MCNC: 7.3 G/DL (ref 6–8.5)
RBC # BLD AUTO: 3.91 10*6/MM3 (ref 4.14–5.8)
SODIUM SERPL-SCNC: 140 MMOL/L (ref 136–145)
WBC NRBC COR # BLD AUTO: 4.72 10*3/MM3 (ref 3.4–10.8)

## 2025-02-09 PROCEDURE — 25810000003 SODIUM CHLORIDE 0.9 % SOLUTION 250 ML FLEX CONT: Performed by: INTERNAL MEDICINE

## 2025-02-09 PROCEDURE — 25010000002 PIPERACILLIN SOD-TAZOBACTAM PER 1 G: Performed by: INTERNAL MEDICINE

## 2025-02-09 PROCEDURE — 63710000001 DEXAMETHASONE PER 0.25 MG: Performed by: INTERNAL MEDICINE

## 2025-02-09 PROCEDURE — 25010000002 DAPTOMYCIN PER 1 MG: Performed by: INTERNAL MEDICINE

## 2025-02-09 PROCEDURE — 94799 UNLISTED PULMONARY SVC/PX: CPT

## 2025-02-09 PROCEDURE — 99232 SBSQ HOSP IP/OBS MODERATE 35: CPT | Performed by: INTERNAL MEDICINE

## 2025-02-09 PROCEDURE — 94664 DEMO&/EVAL PT USE INHALER: CPT

## 2025-02-09 PROCEDURE — 25010000002 REMDESIVIR 100 MG/20ML SOLUTION 1 EACH VIAL: Performed by: INTERNAL MEDICINE

## 2025-02-09 PROCEDURE — 85025 COMPLETE CBC W/AUTO DIFF WBC: CPT | Performed by: INTERNAL MEDICINE

## 2025-02-09 PROCEDURE — 25010000002 HEPARIN (PORCINE) PER 1000 UNITS: Performed by: INTERNAL MEDICINE

## 2025-02-09 PROCEDURE — 80053 COMPREHEN METABOLIC PANEL: CPT | Performed by: INTERNAL MEDICINE

## 2025-02-09 RX ADMIN — PRAMIPEXOLE DIHYDROCHLORIDE 1 MG: 1 TABLET ORAL at 17:38

## 2025-02-09 RX ADMIN — GUAIFENESIN 200 MG: 200 SOLUTION ORAL at 02:56

## 2025-02-09 RX ADMIN — HEPARIN SODIUM 5000 UNITS: 5000 INJECTION INTRAVENOUS; SUBCUTANEOUS at 22:13

## 2025-02-09 RX ADMIN — CARBIDOPA AND LEVODOPA 2 TABLET: 25; 100 TABLET ORAL at 12:21

## 2025-02-09 RX ADMIN — Medication 5000 UNITS: at 09:40

## 2025-02-09 RX ADMIN — PIPERACILLIN AND TAZOBACTAM 3.38 G: 3; .375 INJECTION, POWDER, LYOPHILIZED, FOR SOLUTION INTRAVENOUS at 09:39

## 2025-02-09 RX ADMIN — PRAMIPEXOLE DIHYDROCHLORIDE 1 MG: 1 TABLET ORAL at 09:40

## 2025-02-09 RX ADMIN — PIPERACILLIN AND TAZOBACTAM 3.38 G: 3; .375 INJECTION, POWDER, LYOPHILIZED, FOR SOLUTION INTRAVENOUS at 00:33

## 2025-02-09 RX ADMIN — FAMOTIDINE 20 MG: 20 TABLET, FILM COATED ORAL at 17:22

## 2025-02-09 RX ADMIN — PRAMIPEXOLE DIHYDROCHLORIDE 1 MG: 1 TABLET ORAL at 22:13

## 2025-02-09 RX ADMIN — PIPERACILLIN AND TAZOBACTAM 3.38 G: 3; .375 INJECTION, POWDER, LYOPHILIZED, FOR SOLUTION INTRAVENOUS at 17:21

## 2025-02-09 RX ADMIN — POLYETHYLENE GLYCOL 3350 17 G: 17 POWDER, FOR SOLUTION ORAL at 09:39

## 2025-02-09 RX ADMIN — CARBIDOPA AND LEVODOPA 2 TABLET: 25; 100 TABLET ORAL at 22:13

## 2025-02-09 RX ADMIN — BUDESONIDE AND FORMOTEROL FUMARATE DIHYDRATE 2 PUFF: 160; 4.5 AEROSOL RESPIRATORY (INHALATION) at 10:10

## 2025-02-09 RX ADMIN — FLUTICASONE PROPIONATE 2 SPRAY: 50 SPRAY, METERED NASAL at 09:41

## 2025-02-09 RX ADMIN — LEVOTHYROXINE SODIUM 88 MCG: 0.09 TABLET ORAL at 06:53

## 2025-02-09 RX ADMIN — HEPARIN SODIUM 5000 UNITS: 5000 INJECTION INTRAVENOUS; SUBCUTANEOUS at 09:41

## 2025-02-09 RX ADMIN — REMDESIVIR 100 MG: 100 INJECTION, POWDER, LYOPHILIZED, FOR SOLUTION INTRAVENOUS at 12:21

## 2025-02-09 RX ADMIN — GUAIFENESIN 200 MG: 200 SOLUTION ORAL at 23:32

## 2025-02-09 RX ADMIN — CARBIDOPA AND LEVODOPA 2 TABLET: 25; 100 TABLET ORAL at 17:21

## 2025-02-09 RX ADMIN — FAMOTIDINE 20 MG: 20 TABLET, FILM COATED ORAL at 06:53

## 2025-02-09 RX ADMIN — CARBIDOPA AND LEVODOPA 2 TABLET: 25; 100 TABLET ORAL at 09:40

## 2025-02-09 RX ADMIN — ATORVASTATIN CALCIUM 40 MG: 40 TABLET, FILM COATED ORAL at 09:40

## 2025-02-09 RX ADMIN — FUROSEMIDE 20 MG: 20 TABLET ORAL at 09:40

## 2025-02-09 RX ADMIN — TAMSULOSIN HYDROCHLORIDE 0.4 MG: 0.4 CAPSULE ORAL at 09:40

## 2025-02-09 RX ADMIN — SENNOSIDES 2 TABLET: 8.6 TABLET, FILM COATED ORAL at 09:40

## 2025-02-09 RX ADMIN — DEXAMETHASONE 6 MG: 4 TABLET ORAL at 09:40

## 2025-02-09 RX ADMIN — DAPTOMYCIN 400 MG: 500 INJECTION, POWDER, LYOPHILIZED, FOR SOLUTION INTRAVENOUS at 09:39

## 2025-02-09 NOTE — PROGRESS NOTES
Cordell Martin  1945  [id number]      Evaluating Physician: Trevor Jorgensen MD    Chief Complaint: leg pain    Reason for Consultation: leg infections    History of present illness:     Patient is a 80 y.o.  Yr old male Previously patient of Dr. Benites, with history of Parkinson's disease and chronically debilitated, uses a wheelchair primarily by his report but apparently had occasionally been able to use a walker.  Admitted October 2024 with right greater than left lower leg and foot cellulitis, MRI no osteomyelitis and prior history trauma/maggots; culture with skin derrell and pseudomonas aeruginosa, finished IV Zosyn in the hospital.Admission notes indicate cognitive impairment and other comorbidity as outlined below.    Admitted February 3, 2025 with worsening redness/pain at both lower legs with open wounds and unable to make appointments with wound care, apparently difficulty with transportation.  In addition he reports that the Unna boot wraps had been left on for weeks without change.  He did not know about any other specific trauma but admits to bumping the legs periodically and could have had superficial trauma     2/8/25 had reported cough, oxygen saturations lower regarding need for ongoing supplemental oxygen and subsequent COVID positivity.  Remdesivir/decadron added, moved to appropriate isolation on 6 N.   chest x-ray with vague interstitial changes ; CT chest with faint bilateral changes suggesting superimposed pneumonia including COVID, small bilateral pleural effusions      2/9/25 3L NC O2; cultures noted; Bilateral leg pain is dull at present, intermittent, nonradiating, worse with pressure, better with pain meds and 2-3 out of 10 in severity, not progressive.  Redness and swelling not progressive.    No headache photophobia or neck stiffness.     No nausea vomiting diarrhea or abdominal pain.  No dysuria hematuria or pyuria.  No rash        Past Medical History:   Diagnosis Date     Anxiety     Arthritis     Back pain     Bronchitis     Cognitive impairment     Depression     Disease of thyroid gland     Glucose intolerance (impaired glucose tolerance)     Hyperlipidemia     Kidney stone     Obesity     Parkinson disease     Pneumonia     Prostate disorder     Thyroid disease     Wears eyeglasses        Past Surgical History:   Procedure Laterality Date    COLONOSCOPY      5 years ago    EYE SURGERY      HERNIA REPAIR  10/20/2020    KNEE SURGERY      LUMBAR LAMINECTOMY DISCECTOMY DECOMPRESSION N/A 07/13/2017    Procedure: LUMBAR LAMINECTOMY L4-5;  Surgeon: Antonio Trent MD;  Location: [facility];  Service:     SHOULDER ROTATOR CUFF REPAIR Right     x2    TONSILLECTOMY      VEIN SURGERY         Pediatric History   Patient Parents    Not on file     Other Topics Concern    Not on file   Social History Narrative    Lives alone. Uses walker    Has not smoked since about 1980       family history includes Alzheimer's disease in an other family member; Cancer in his father and another family member; Diabetes in an other family member; Heart disease in an other family member; Stroke in an other family member.    No Known Allergies    Medication:  Current Facility-Administered Medications   Medication Dose Route Frequency Provider Last Rate Last Admin    acetaminophen (TYLENOL) tablet 650 mg  650 mg Oral Q4H PRN Serenity Drummond MD        atorvastatin (LIPITOR) tablet 40 mg  40 mg Oral Daily Leobardo Hayden MD   40 mg at 02/08/25 0938    polyethylene glycol (MIRALAX) packet 17 g  17 g Oral BID Leobardo Hayden MD   17 g at 02/08/25 0939    And    senna (SENOKOT) tablet 2 tablet  2 tablet Oral BID Leobardo Hayden MD   2 tablet at 02/08/25 0939    And    bisacodyl (DULCOLAX) suppository 10 mg  10 mg Rectal Daily PRN Leobardo Hayden MD        budesonide-formoterol (SYMBICORT) 160-4.5 MCG/ACT inhaler 2 puff  2 puff Inhalation BID - RT Serenity Drummond MD   2 puff at 02/08/25 2020    Calcium Replacement  - Follow Nurse / BPA Driven Protocol   Not Applicable PRN Serenity Drummond MD        carbidopa-levodopa (SINEMET)  MG per tablet 2 tablet  2 tablet Oral 4x Daily Serenity Drummond MD   2 tablet at 02/08/25 2107    DAPTOmycin (CUBICIN) 400 mg in sodium chloride 0.9 % 50 mL IVPB  4 mg/kg (Adjusted) Intravenous Q24H Trevor Jorgensen  mL/hr at 02/08/25 1048 400 mg at 02/08/25 1048    dexAMETHasone (DECADRON) tablet 6 mg  6 mg Oral Daily With Breakfast Gilbert Canales DO   6 mg at 02/08/25 1223    famotidine (PEPCID) tablet 20 mg  20 mg Oral BID AC Gilbert Canales DO   20 mg at 02/09/25 0653    fluticasone (FLONASE) 50 MCG/ACT nasal spray 2 spray  2 spray Each Nare Daily Leobardo Hayden MD   2 spray at 02/08/25 0939    furosemide (LASIX) tablet 20 mg  20 mg Oral Daily Leobardo Hayden MD   20 mg at 02/08/25 0938    guaifenesin (ROBITUSSIN) 100 MG/5ML liquid 200 mg  200 mg Oral Q4H PRN Ayla Nelson APRN   200 mg at 02/09/25 0256    heparin (porcine) 5000 UNIT/ML injection 5,000 Units  5,000 Units Subcutaneous Q12H Serenity Drummond MD   5,000 Units at 02/08/25 2107    HYDROcodone-acetaminophen (NORCO) 5-325 MG per tablet 1 tablet  1 tablet Oral Q4H PRN Serenity Drummond MD   1 tablet at 02/07/25 1159    influenza vac split high-dose (FLUZONE HIGH DOSE) injection 0.5 mL  0.5 mL Intramuscular During Hospitalization Serenity Drummond MD        levothyroxine (SYNTHROID, LEVOTHROID) tablet 88 mcg  88 mcg Oral Q AM Serenity Drummond MD   88 mcg at 02/09/25 0653    Magnesium Standard Dose Replacement - Follow Nurse / BPA Driven Protocol   Not Applicable PRSerenity Reyez MD        morphine injection 2 mg  2 mg Intravenous Q2H PRN Serenity Drummond MD        Pharmacy Consult - Remdesivir for Severe COVID-19 (Within 7 days of symptom onset)   Not Applicable Continuous PRN Gilbert Canales,         Phosphorus Replacement - Follow Nurse / BPA Driven Protocol   Not Applicable Serenity Howell  "MD DELORES        piperacillin-tazobactam (ZOSYN) 3.375 g IVPB in 100 mL NS MBP (CD)  3.375 g Intravenous Q8H Trevor Jorgensen MD   3.375 g at 02/09/25 0033    Potassium Replacement - Follow Nurse / BPA Driven Protocol   Not Applicable PRN Serenity Drummond MD        pramipexole (MIRAPEX) tablet 1 mg  1 mg Oral TID Serenity Drummond MD   1 mg at 02/08/25 2114    remdesivir 100 mg in sodium chloride 0.9 % 250 mL IVPB (powder vial)  100 mg Intravenous Q24H Gilbert Canales DO        sodium chloride 0.9 % flush 10 mL  10 mL Intravenous PRN MaramecTu DO        sodium chloride 0.9 % flush 10 mL  10 mL Intravenous PRN MaramecTu DO   10 mL at 02/07/25 1946    tamsulosin (FLOMAX) 24 hr capsule 0.4 mg  0.4 mg Oral Daily Serenity Drummond MD   0.4 mg at 02/08/25 0938    vitamin D3 capsule 5,000 Units  5,000 Units Oral Daily Serenity Drummond MD   5,000 Units at 02/08/25 0939       Antibiotics:  Anti-Infectives (From admission, onward)      Ordered     Dose/Rate Route Frequency Start Stop    02/08/25 1049  remdesivir 100 mg in sodium chloride 0.9 % 250 mL IVPB (powder vial)        Ordering Provider: Gilbert Canales DO   Placed in \"Followed by\" Linked Group    100 mg  over 60 Minutes Intravenous Every 24 Hours 02/09/25 1300 02/13/25 1259    02/08/25 1049  remdesivir 200 mg in sodium chloride 0.9 % 290 mL IVPB (powder vial)        Ordering Provider: Gilbert Canales DO   Placed in \"Followed by\" Linked Group    200 mg  over 60 Minutes Intravenous Every 24 Hours 02/08/25 1300 02/08/25 1502    02/04/25 0756  DAPTOmycin (CUBICIN) 400 mg in sodium chloride 0.9 % 50 mL IVPB        Ordering Provider: Trevor Jorgensen MD    4 mg/kg × 94.6 kg (Adjusted)  100 mL/hr over 30 Minutes Intravenous Every 24 Hours 02/04/25 0930 02/15/25 1138    02/03/25 2208  piperacillin-tazobactam (ZOSYN) 3.375 g IVPB in 100 mL NS MBP (CD)        Ordering Provider: Trevor Jorgensen MD    3.375 g  over 30 Minutes Intravenous Every 8 " "Hours Scheduled 02/03/25 2230 02/16/25 0746    02/03/25 1650  vancomycin 2500 mg/500 mL 0.9% NS IVPB ([facility])        Ordering Provider: Serenity Drummnod MD    22 mg/kg × 109 kg  over 150 Minutes Intravenous Once 02/03/25 1706 02/03/25 2000 02/03/25 1448  piperacillin-tazobactam (ZOSYN) 3.375 g IVPB in 100 mL NS MBP (CD)        Ordering Provider: Tu Pruitt DO    3.375 g  over 30 Minutes Intravenous Once 02/03/25 1504 02/03/25 1657              Review of Systems    2/9/25     Constitutional-- No Fever, chills or sweats.  Appetite good, and no malaise. No fatigue.  Heent-- No new vision, hearing or throat complaints.  No epistaxis or oral sores.  Denies odynophagia or dysphagia.  No flashers, floaters or eye pain. No odynophagia or dysphagia. No headache, photophobia or neck stiffness.  CV-- No chest pain, palpitation or syncope  Resp-- No  Hemoptysis; see above  GI- No nausea, vomiting, or diarrhea.  No hematochezia, melena, or hematemesis. Denies jaundice or chronic liver disease.  -- No dysuria, hematuria, or flank pain.  Denies hesitancy, urgency or flank pain.  Lymph- no swollen lymph nodes in neck/axilla or groin.   Heme- No active bruising or bleeding  MS-- no swelling or pain in the bones or joints of arms/legs aside from above.  No new back pain.  Neuro-- No acute focal weakness or numbness in the arms or legs.  No seizures.    Full 12 point review of systems reviewed and negative otherwise for acute complaints, except for above    Physical Exam:   Vital Signs   /59 (BP Location: Left arm, Patient Position: Lying)   Pulse 58   Temp 98 °F (36.7 °C) (Oral)   Resp 18   Ht 182.9 cm (72\")   Wt 120 kg (263 lb 14.4 oz)   SpO2 95%   BMI 35.79 kg/m²     GENERAL: sleepy, in no acute distress.    HEENT: Normocephalic, atraumatic.    No conjunctival injection. No icterus. Oropharynx clear without evidence of thrush or exudate. No evidence of periodontal disease.    NECK: Supple without nuchal rigidity. " No mass.   HEART: RRR; No murmur, rubs, gallops.   LUNGS: Diminished at bases, vague crackles bilateral, no rhonchi/wheeze or stridor. Nonlabored. No dullness.  ABDOMEN: Soft, nontender, nondistended. Positive bowel sounds. No rebound or guarding. NO mass or HSM.  EXT:  see below   MSK: FROM without joint effusions noted arms/legs.    SKIN: Warm and dry without cutaneous eruptions on Inspection/palpation.    NEURO: Oriented to PPT.  chronic debility.    Bilateral lower extremities with chronic appearing edema/discoloration with probable stasis change, acute erythema/warmth and tenderness bilateral with multifocal ulceration  between knees and toes.  No discrete mass bulge or fluctuance.  No crepitus or bulla.   unable to determine depth of these wounds but no probe to bone tendon joint or ligament; no new visible redness/purulence    Laboratory Data    Results from last 7 days   Lab Units 02/09/25  0456 02/08/25  1024 02/06/25  0646   WBC 10*3/mm3 4.72 6.13 5.97   HEMOGLOBIN g/dL 11.2* 10.5* 10.1*   HEMATOCRIT % 35.9* 33.0* 32.6*   PLATELETS 10*3/mm3 254 203 206     Results from last 7 days   Lab Units 02/09/25  0456   SODIUM mmol/L 140   POTASSIUM mmol/L 4.3   CHLORIDE mmol/L 106   CO2 mmol/L 23.0   BUN mg/dL 24*   CREATININE mg/dL 1.17   GLUCOSE mg/dL 151*   CALCIUM mg/dL 9.2     Results from last 7 days   Lab Units 02/09/25  0456   ALK PHOS U/L 146*   BILIRUBIN mg/dL 0.3   ALT (SGPT) U/L <5   AST (SGOT) U/L 20               Estimated Creatinine Clearance: 69.6 mL/min (by C-G formula based on SCr of 1.17 mg/dL).      Microbiology:      Radiology:  Imaging Results (Last 72 Hours)       Procedure Component Value Units Date/Time    CT Chest Without Contrast Diagnostic [[id number]] Collected: 02/08/25 1607     Updated: 02/08/25 1616    Narrative:      CT CHEST WO CONTRAST DIAGNOSTIC    Date of Exam: 2/8/2025 2:35 PM EST    Indication: Covid 19 positive, eval lung parenchyma.    Comparison: 10/9/2024    Technique: Axial  CT images were obtained of the chest without contrast administration.  Reconstructed coronal and sagittal images were also obtained. Automated exposure control and iterative construction methods were used.      Findings:  MEDIASTINUM: Unremarkable. Aortic and heart size are normal. No mass nor pericardial effusion.  CORONARY ARTERIES: There is calcified atherosclerotic disease.  LUNGS: There is mild reticular interstitial prominence. There are new nodular airspace opacities within each lung base with some areas of irregular groundglass attenuation suggestive of superimposed infectious process including sequela of COVID-19. No   dense lobar consolidation. There is a 5 mm subpleural right middle lobe nodule (image 57, series 4), previously 4 mm.  PLEURAL SPACE: There are small bilateral pleural effusions.  LYMPH NODES: There are no pathologically enlarged lymph nodes.    UPPER ABDOMEN: Unremarkable    OSSEOUS STRUCTURES: Appropriate for age with no acute process identified.          Impression:      Impression:  1.Faint bilateral lower lung nodular and groundglass opacities suggesting superimposed pneumonia including COVID-19.  2.Small bilateral pleural effusions.  3.Slightly larger 5 mm subpleural right middle lobe nodule. See below recommendations.    The Fleischner society pulmonary nodule recommendations are for the follow-up and management of pulmonary nodules smaller than 8 mm detected incidentally in patients >35 years on non-screening CT. The initial guidelines for the management of solid   nodules were released in 2005 1, and guidelines for the management of subsolid nodules were released in 2013 2. New revised 2017 recommendations for both solid and subsolid have since been released 4.    2017 guidelines  Solid nodules  Solitary nodule size: <6 mm  *low risk patients: no follow-up needed  *high risk patients: optional CT at 12 months  Solitary nodule size: 6-8 mm  *low risk patients: follow-up at 6-12  months, then consider further follow-up at 18-24 months  *high risk patients: initial follow-up CT at 6-12 months and then at 18-24 months if no change  Solitary nodule size: >8 mm  *either low or high risk patients  *consider follow-up CT at 3 months, and/or CT-PET, and/or biopsy  Multiple nodules size: <6 mm  *low risk patients: no routine follow-up  *high risk patients: optional CT at 12 months  Multiple nodules size: 6-8 mm  *low risk patients: follow-up at 3-6 months, then consider further follow-up at 18-24 months  *high risk patients: follow-up at 3-6 months, then at 18-24 months if no change  Multiple nodules size: >8 mm  *low risk patients: follow-up at 3-6 months, then consider further follow-up at 18-24 months  *high risk patients: follow-up at 3-6 months, then at 18-24 months if no change  *    Note: newly detected indeterminate nodule in persons 35 years of age or older.  *low risk patients: minimal or absent history of smoking and or other known risk factors  *high risk patients: history of smoking or of other known risk factors (e.g. first degree relative with lung cancer, or exposure to asbestos, radon, uranium)  *if a nodule up to 8 mm is partly solid or is ground glass further follow-up is required after 24 months to exclude possible slow growing adenocarcinoma (IKE)    Subsolid nodules  Solitary pure ground-glass nodule  *nodule size <6mm  *no CT follow-up required  *nodule size > or equal to 6mm  *follow up CT at 6-12 months, then every 2 years until 5 years  Solitary part-solid nodule  *nodule size <6mm  *no CT follow-up required  *nodule size > or equal to 6mm  *follow-up CT at 3-6 months  *if unchanged, and solid component remains <6mm, then annual follow-up for 5 years  Multiple subsolid nodules  *nodule size <6mm  *follow-up CT at 3-6 months  *consider further follow-up at 2 and 4 years if stable  *nodule size > or equal to 6mm  *follow-up CT at 3-6 months  *subsequent management based on  the most suspicious nodule(s)        Electronically Signed: Jonas Evans MD    2/8/2025 4:13 PM EST    Workstation ID: [id number]    XR Chest 1 View [[id number]] Collected: 02/06/25 1021     Updated: 02/06/25 1024    Narrative:      XR CHEST 1 VW    Date of Exam: 2/6/2025 9:33 AM EST    Indication: Hypoxia    Comparison: 2/3/2025    Findings:  Heart size is enlarged. There is mild pulmonary vascular congestion and interstitial edema. No focal consolidation is identified. There is no significant pleural fluid.      Impression:      Impression:  Cardiomegaly with mild pulmonary vascular congestion and interstitial edema.        Electronically Signed: Gregor Herrera MD    2/6/2025 10:21 AM EST    Workstation ID: [id number]              Impression:     --acute bilateral lower leg and foot cellulitis/infections with multifocal ulcerations, chronic discoloration/chronic edema and potential intermittent superficial trauma although no specific inciting event.  Apparently had not made it to appointments with wound care and Unna boots had not been changed in weeks.  Unclear how much of this is general self-neglect versus recent weather related challenge with combination. high risk for persistent/recurrent or nonhealing wounds, persistent/progressive or recurrent infection and risk for further functional/limb loss, risk for amputation etc.  Culture below.  Arterial/venous/vascular workup at discretion of medicine team    --Acute cough, COVID-positive with treatment as pneumonia with symptomatology/oxygen requirements and abnormal chest x-ray.  Isolation precautions adjusted, ongoing remdesivir/decadron; if rapidly progressive hypoxia despite current therapy you good give consideration to additional therapy; patient prefers current therapy.    --Parkinson's disease/memory loss per admission notes, chronically debilitated and primarily wheelchair bound/bedbound by his report    --chronic edema and possible lymphedema per patient  notes    --Prior history DVT per past notes    --CT scan with 5 mm subpleural right middle lobe nodule, further radiographic follow-up versus referral to pulmonary for monitoring and other evaluation at discretion of medicine team; patient aware    PLAN:     --IV daptomycin/Zosyn      --remdesivir/decadron    **culture right leg and left leg with MRSA/pseudomonas aeruginosa/E Coli   from February 3, 2025    --Check/review labs cultures and scans    --Partial history Per nursing staff    --Discussed with microbiology    --Discussed with Dr Canales/multidisciplinary team/nursing regarding complex set of issues, discussed with microbiology lab and discussed importance of antimicrobial stewardship, management of infection control protocols etc.     This visit included the following complex service elements:  Complex medical decision-making associated with antimicrobial prescribing.  Communicated with the clinical microbiology lab.     Copied text in this note has been reviewed and is accurate as of 02/09/25.     Trevor Jorgensen MD  2/9/2025

## 2025-02-09 NOTE — PROGRESS NOTES
[facility] Medicine Services  PROGRESS NOTE    Patient Name: Cordell Martin  : 1945  MRN: [id number]    Date of Admission: 2/3/2025  Primary Care Physician: Ben Holder DO    Subjective   Subjective     CC:  F/u multiple issues    HPI:  No new issues overnight, feels good today.       Objective   Objective     Vital Signs:   Temp:  [97.7 °F (36.5 °C)-98.8 °F (37.1 °C)] 98 °F (36.7 °C)  Heart Rate:  [58-69] 58  Resp:  [17-18] 17  BP: (119-140)/(56-73) 125/59  Flow (L/min) (Oxygen Therapy):  [2-3] 3     Physical Exam:  Constitutional: Awake, alert, elderly male in bed in NAD  Respiratory: Clear to auscultation bilaterally, respiratory effort normal, not on supplemental O2 at the time of my evaluation   Cardiovascular: RRR, palpable radial pulse  Gastrointestinal: Positive bowel sounds, soft, nontender, nondistended  Musculoskeletal: Bilateral lower extremity edema with compression wraps  Psychiatric: Appropriate affect, cooperative  Neurologic: Speech slow but clear and fluent    Results Reviewed:  LAB RESULTS:      Lab 25  0456 25  1024 25  0646 25  0639 25  0647 25  1430 25  1136   WBC 4.72 6.13 5.97 5.57 5.58  --  5.40   HEMOGLOBIN 11.2* 10.5* 10.1* 9.9* 10.1*  --  12.3*   HEMATOCRIT 35.9* 33.0* 32.6* 32.0* 32.6*  --  40.2   PLATELETS 254 203 206 195 203  --  243   NEUTROS ABS 3.55 3.83  --   --  3.89  --  3.73   IMMATURE GRANS (ABS) 0.05 0.05  --   --  0.02  --  0.02   LYMPHS ABS 0.90 1.36  --   --  1.00  --  0.92   MONOS ABS 0.21 0.57  --   --  0.54  --  0.54   EOS ABS 0.00 0.31  --   --  0.11  --  0.17   MCV 91.8 91.7 92.9 94.1 93.1  --  94.6   LACTATE  --   --   --   --   --   --  1.2   PROTIME  --   --   --   --   --   --  13.6   HSTROP T  --   --   --   --   --  19 18         Lab 25  0456 25  1024 25  1411 25  0646 25  0639   SODIUM 140 137 137 138 138   POTASSIUM 4.3 4.2 4.2 4.1 3.9    CHLORIDE 106 101 101 103 105   CO2 23.0 23.0 26.0 24.0 24.0   ANION GAP 11.0 13.0 10.0 11.0 9.0   BUN 24* 23 23 23 25*   CREATININE 1.17 1.26 1.46* 1.35* 1.30*   EGFR 63.8 58.4* 48.9* 53.7* 56.2*   GLUCOSE 151* 128* 145* 93 134*   CALCIUM 9.2 8.8 8.8 8.4* 8.2*   MAGNESIUM  --   --  2.2  --   --    PHOSPHORUS  --   --  3.1  --   --          Lab 02/09/25  0456 02/04/25  0647 02/03/25  1136   TOTAL PROTEIN 7.3 5.3* 7.0   ALBUMIN 3.2* 2.8* 3.4*   GLOBULIN 4.1 2.5 3.6   ALT (SGPT) <5 <5 16   AST (SGOT) 20 16 20   BILIRUBIN 0.3 0.5 0.4   ALK PHOS 146* 128* 162*         Lab 02/03/25  1430 02/03/25  1136   PROBNP  --  68.0   HSTROP T 19 18   PROTIME  --  13.6   INR  --  1.03             Lab 02/08/25  1024   FERRITIN 355.40         Brief Urine Lab Results  (Last result in the past 365 days)        Color   Clarity   Blood   Leuk Est   Nitrite   Protein   CREAT   Urine HCG        10/08/24 2219 Yellow   Clear   Negative   Negative   Negative   Negative                   Microbiology Results Abnormal       Procedure Component Value - Date/Time    COVID-19, FLU A/B, RSV PCR 1 HR TAT - Swab, Nasopharynx [[id number]]  (Abnormal) Collected: 02/08/25 0142    Lab Status: Final result Specimen: Swab from Nasopharynx Updated: 02/08/25 0631     COVID19 Detected     Influenza A PCR Not Detected     Influenza B PCR Not Detected     RSV, PCR Not Detected    Narrative:      Fact sheet for providers: https://www.fda.gov/media/297007/download    Fact sheet for patients: https://www.fda.gov/media/275533/download    Test performed by PCR.    Wound Culture - Wound, Leg, Right [[id number]]  (Abnormal)  (Susceptibility) Collected: 02/03/25 1903    Lab Status: Final result Specimen: Wound from Leg, Right Updated: 02/07/25 0721     Wound Culture Light growth (2+) Staphylococcus aureus, MRSA     Comment: Refer to previous wound culture collected on 2/3/2025 1528 for MICs    Methicillin resistant Staphylococcus aureus, Patient may be an isolation risk.          Scant growth (1+) Pseudomonas aeruginosa     Comment: Refer to previous wound culture collected on 2/3/2025 1528 for MICs           Rare growth Escherichia coli     Gram Stain Rare (1+) WBCs seen      Rare (1+) Gram positive cocci in pairs    Narrative:            Susceptibility        Escherichia coli      DRE      Amoxicillin + Clavulanate Susceptible      Ampicillin Susceptible      Ampicillin + Sulbactam Susceptible      Cefazolin (Non Urine) Susceptible      Cefepime Susceptible      Ceftazidime Susceptible      Ceftriaxone Susceptible      Gentamicin Susceptible      Levofloxacin Susceptible      Piperacillin + Tazobactam Susceptible      Tetracycline Resistant      Trimethoprim + Sulfamethoxazole Resistant                       Susceptibility Comments       Escherichia coli    With the exception of urinary-sourced infections, aminoglycosides should not be used as monotherapy.               Wound Culture - Swab, Leg, Left [[id number]]  (Abnormal)  (Susceptibility) Collected: 02/03/25 1528    Lab Status: Edited Result - FINAL Specimen: Swab from Leg, Left Updated: 02/05/25 0759     Wound Culture Heavy growth (4+) Staphylococcus aureus, MRSA     Comment:   Methicillin resistant Staphylococcus aureus, Patient may be an isolation risk.         Scant growth (1+) Pseudomonas aeruginosa     Gram Stain Many (4+) Gram positive cocci in pairs and clusters      Rare (1+) WBCs seen      Rare (1+) Gram negative bacilli    Susceptibility        Staphylococcus aureus, MRSA      DRE      Clindamycin Susceptible      Daptomycin Susceptible (C)  [1]       Erythromycin Resistant      Oxacillin Resistant      Rifampin Susceptible      Tetracycline Resistant      Trimethoprim + Sulfamethoxazole Susceptible      Vancomycin Susceptible                   [1]  Appended report. These results have been appended to a previously final verified report.                Susceptibility        Pseudomonas aeruginosa      DRE       Cefepime Susceptible      Ceftazidime Susceptible      Ciprofloxacin Susceptible      Levofloxacin Intermediate      Piperacillin + Tazobactam Susceptible      Tobramycin Susceptible                       Susceptibility Comments       Staphylococcus aureus, MRSA    Daptomycin requested by Dr King 2/5      Pseudomonas aeruginosa    With the exception of urinary-sourced infections, aminoglycosides should not be used as monotherapy.               MRSA Screen, PCR (Inpatient) - Swab, Nares [[id number]]  (Abnormal) Collected: 02/03/25 1903    Lab Status: Final result Specimen: Swab from Nares Updated: 02/03/25 2102     MRSA PCR Positive    Narrative:      The negative predictive value of this diagnostic test is high and should only be used to consider de-escalating anti-MRSA therapy. A positive result may indicate colonization with MRSA and must be correlated clinically.            CT Chest Without Contrast Diagnostic    Result Date: 2/8/2025  CT CHEST WO CONTRAST DIAGNOSTIC Date of Exam: 2/8/2025 2:35 PM EST Indication: Covid 19 positive, eval lung parenchyma. Comparison: 10/9/2024 Technique: Axial CT images were obtained of the chest without contrast administration.  Reconstructed coronal and sagittal images were also obtained. Automated exposure control and iterative construction methods were used. Findings: MEDIASTINUM: Unremarkable. Aortic and heart size are normal. No mass nor pericardial effusion. CORONARY ARTERIES: There is calcified atherosclerotic disease. LUNGS: There is mild reticular interstitial prominence. There are new nodular airspace opacities within each lung base with some areas of irregular groundglass attenuation suggestive of superimposed infectious process including sequela of COVID-19. No dense lobar consolidation. There is a 5 mm subpleural right middle lobe nodule (image 57, series 4), previously 4 mm. PLEURAL SPACE: There are small bilateral pleural effusions. LYMPH NODES: There are no  pathologically enlarged lymph nodes. UPPER ABDOMEN: Unremarkable OSSEOUS STRUCTURES: Appropriate for age with no acute process identified.     Impression: Impression: 1.Faint bilateral lower lung nodular and groundglass opacities suggesting superimposed pneumonia including COVID-19. 2.Small bilateral pleural effusions. 3.Slightly larger 5 mm subpleural right middle lobe nodule. See below recommendations. The Fleischner society pulmonary nodule recommendations are for the follow-up and management of pulmonary nodules smaller than 8 mm detected incidentally in patients >35 years on non-screening CT. The initial guidelines for the management of solid nodules were released in 2005 1, and guidelines for the management of subsolid nodules were released in 2013 2. New revised 2017 recommendations for both solid and subsolid have since been released 4. 2017 guidelines Solid nodules Solitary nodule size: <6 mm *low risk patients: no follow-up needed *high risk patients: optional CT at 12 months Solitary nodule size: 6-8 mm *low risk patients: follow-up at 6-12 months, then consider further follow-up at 18-24 months *high risk patients: initial follow-up CT at 6-12 months and then at 18-24 months if no change Solitary nodule size: >8 mm *either low or high risk patients *consider follow-up CT at 3 months, and/or CT-PET, and/or biopsy Multiple nodules size: <6 mm *low risk patients: no routine follow-up *high risk patients: optional CT at 12 months Multiple nodules size: 6-8 mm *low risk patients: follow-up at 3-6 months, then consider further follow-up at 18-24 months *high risk patients: follow-up at 3-6 months, then at 18-24 months if no change Multiple nodules size: >8 mm *low risk patients: follow-up at 3-6 months, then consider further follow-up at 18-24 months *high risk patients: follow-up at 3-6 months, then at 18-24 months if no change *  Note: newly detected indeterminate nodule in persons 35 years of age or older.  "*low risk patients: minimal or absent history of smoking and or other known risk factors *high risk patients: history of smoking or of other known risk factors (e.g. first degree relative with lung cancer, or exposure to asbestos, radon, uranium) *if a nodule up to 8 mm is partly solid or is ground glass further follow-up is required after 24 months to exclude possible slow growing adenocarcinoma (IKE) Subsolid nodules Solitary pure ground-glass nodule *nodule size <6mm *no CT follow-up required *nodule size > or equal to 6mm *follow up CT at 6-12 months, then every 2 years until 5 years Solitary part-solid nodule *nodule size <6mm *no CT follow-up required *nodule size > or equal to 6mm *follow-up CT at 3-6 months *if unchanged, and solid component remains <6mm, then annual follow-up for 5 years Multiple subsolid nodules *nodule size <6mm *follow-up CT at 3-6 months *consider further follow-up at 2 and 4 years if stable *nodule size > or equal to 6mm *follow-up CT at 3-6 months *subsequent management based on the most suspicious nodule(s) Electronically Signed: Jonas Evans MD  2/8/2025 4:13 PM EST  Workstation ID: [id number]     Results for orders placed during the hospital encounter of 01/12/21    Adult Transthoracic Echo Complete W/ Cont if Necessary Per Protocol    Interpretation Summary  · The right ventricle and right atrium are moderately dilated. Other chamber sizes and wall thicknesses are normal.  · Global and segmental LV wall motion is normal. The estimated left ventricular ejection fraction is 51% - 55%.  · The aortic and mitral valves are normal in structure and function.  · The estimated RV systolic pressure is mildly elevated 35 mmHg - 40 mmHg. There is as well noted to be less than 50% inspiratory IVC collapse. The main pulmonary artery is \"borderline dilated\".  · There are no other important findings on this study.      Current medications:  Scheduled Meds:atorvastatin, 40 mg, Oral, " Daily  budesonide-formoterol, 2 puff, Inhalation, BID - RT  carbidopa-levodopa, 2 tablet, Oral, 4x Daily  DAPTOmycin, 4 mg/kg (Adjusted), Intravenous, Q24H  dexAMETHasone, 6 mg, Oral, Daily With Breakfast  famotidine, 20 mg, Oral, BID AC  fluticasone, 2 spray, Each Nare, Daily  furosemide, 20 mg, Oral, Daily  heparin (porcine), 5,000 Units, Subcutaneous, Q12H  levothyroxine, 88 mcg, Oral, Q AM  piperacillin-tazobactam, 3.375 g, Intravenous, Q8H  polyethylene glycol, 17 g, Oral, BID   And  senna, 2 tablet, Oral, BID  pramipexole, 1 mg, Oral, TID  remdesivir, 100 mg, Intravenous, Q24H  tamsulosin, 0.4 mg, Oral, Daily  vitamin D3, 5,000 Units, Oral, Daily      Continuous Infusions:Pharmacy Consult - Remdesivir,       PRN Meds:.  acetaminophen    polyethylene glycol **AND** senna **AND** bisacodyl    Calcium Replacement - Follow Nurse / BPA Driven Protocol    guaifenesin    HYDROcodone-acetaminophen    influenza vaccine    Magnesium Standard Dose Replacement - Follow Nurse / BPA Driven Protocol    Morphine    Pharmacy Consult - Remdesivir    Phosphorus Replacement - Follow Nurse / BPA Driven Protocol    Potassium Replacement - Follow Nurse / BPA Driven Protocol    sodium chloride    sodium chloride    Assessment & Plan   Assessment & Plan     Active Hospital Problems    Diagnosis  POA    **Bilateral lower leg cellulitis [L03.116, L03.115]  Yes    Cellulitis [L03.90]  Yes    Anemia, chronic disease [D63.8]  Yes    History of DVT (deep vein thrombosis) [Z86.718]  Not Applicable    Restrictive pattern on PFTs w/o evidence of ILD [R94.2]  Yes    Memory loss [R41.3]  Yes    Parkinson's disease [G20.A1]  Yes      Resolved Hospital Problems   No resolved problems to display.        Brief Hospital Course to date:  Cordell Martin is a 80 y.o. male w/ Parkinson disease, chronic poor mobility, lymphedema, DVT, who presented for eval of leg pain and worsening LE wounds; he was admitted for concern for LE cellulitis, superficial  wound Cx was positive for MRSA, Pseudomonas, E. Coli; ID follows for Abx recs, wound care follows for compression wraps; early AM 2/8/25 he complained of cough and tested positive for Covid 19    This patient's problems and plans were partially entered by my partner and updated as appropriate by me 02/09/25.      Assessment/Plan    Infected BL LE wounds  Lymphedema  Hx DVT  -wound care follows for compression wraps  -Polymicrobial superficial wound swab  -ID follows, daptomycin/Zosyn 7-10 days (started 2/4/25)    Covid 19 infection  Hypoxia  -reports sore throat ~4 days (would be 2/4/25), swab + 2/8/25  -dexamethasone and remdesivir D2  -prev CXR concerning for edema, ordered CT chest to better eval for poss covid 19 changes- bilateral GGO evident. Also with new pulmonary nodule. Will need outpatient follow up.     Parkinson disease  Cognitive impairment  Chronic poor mobility  -cont sinemet, mirapex  -eventual plan for rehab with intent to return to independent living    Constipation   - bowel regimen    Anxiety/Depression    Hypothyroid   - levothyroxine    HLD   - statin    GERD   - continue pepcid while on steroids    Hx DVT   - heparin subq    Expected Discharge Location and Transportation: rehab  Expected Discharge   Expected Discharge Date: 2/12/2025; Expected Discharge Time:      VTE Prophylaxis:  Pharmacologic VTE prophylaxis orders are present.         AM-PAC 6 Clicks Score (PT): 10 (02/08/25 2050)    CODE STATUS:   Code Status and Medical Interventions: CPR (Attempt to Resuscitate); Full Support   Ordered at: 02/03/25 4424     Code Status (Patient has no pulse and is not breathing):    CPR (Attempt to Resuscitate)     Medical Interventions (Patient has pulse or is breathing):    Full Support       Reba Cortes MD  02/09/25

## 2025-02-09 NOTE — PLAN OF CARE
Goal Outcome Evaluation:  Plan of Care Reviewed With: patient        Progress: no change  Outcome Evaluation: No complaints of pain. 2-3 L NC. patient needs met overnight.

## 2025-02-09 NOTE — PLAN OF CARE
Goal Outcome Evaluation:              Outcome Evaluation: VSS on 3L. non tele. turned frequently. skin intervention in place. No complains of pain/discomfort.

## 2025-02-10 LAB
ALBUMIN SERPL-MCNC: 2.9 G/DL (ref 3.5–5.2)
ALBUMIN/GLOB SERPL: 0.7 G/DL
ALP SERPL-CCNC: 130 U/L (ref 39–117)
ALT SERPL W P-5'-P-CCNC: 7 U/L (ref 1–41)
ANION GAP SERPL CALCULATED.3IONS-SCNC: 11 MMOL/L (ref 5–15)
AST SERPL-CCNC: 25 U/L (ref 1–40)
BASOPHILS # BLD AUTO: 0.01 10*3/MM3 (ref 0–0.2)
BASOPHILS NFR BLD AUTO: 0.1 % (ref 0–1.5)
BILIRUB SERPL-MCNC: 0.2 MG/DL (ref 0–1.2)
BUN SERPL-MCNC: 33 MG/DL (ref 8–23)
BUN/CREAT SERPL: 26.2 (ref 7–25)
CALCIUM SPEC-SCNC: 8.8 MG/DL (ref 8.6–10.5)
CHLORIDE SERPL-SCNC: 107 MMOL/L (ref 98–107)
CO2 SERPL-SCNC: 25 MMOL/L (ref 22–29)
CREAT SERPL-MCNC: 1.26 MG/DL (ref 0.76–1.27)
CRP SERPL-MCNC: 7.62 MG/DL (ref 0–0.5)
DEPRECATED RDW RBC AUTO: 56.3 FL (ref 37–54)
EGFRCR SERPLBLD CKD-EPI 2021: 58.4 ML/MIN/1.73
EOSINOPHIL # BLD AUTO: 0 10*3/MM3 (ref 0–0.4)
EOSINOPHIL NFR BLD AUTO: 0 % (ref 0.3–6.2)
ERYTHROCYTE [DISTWIDTH] IN BLOOD BY AUTOMATED COUNT: 16.7 % (ref 12.3–15.4)
GLOBULIN UR ELPH-MCNC: 4.1 GM/DL
GLUCOSE SERPL-MCNC: 137 MG/DL (ref 65–99)
HCT VFR BLD AUTO: 36.2 % (ref 37.5–51)
HGB BLD-MCNC: 11.3 G/DL (ref 13–17.7)
IMM GRANULOCYTES # BLD AUTO: 0.08 10*3/MM3 (ref 0–0.05)
IMM GRANULOCYTES NFR BLD AUTO: 1.1 % (ref 0–0.5)
LYMPHOCYTES # BLD AUTO: 1.46 10*3/MM3 (ref 0.7–3.1)
LYMPHOCYTES NFR BLD AUTO: 20.2 % (ref 19.6–45.3)
MCH RBC QN AUTO: 28.7 PG (ref 26.6–33)
MCHC RBC AUTO-ENTMCNC: 31.2 G/DL (ref 31.5–35.7)
MCV RBC AUTO: 91.9 FL (ref 79–97)
MONOCYTES # BLD AUTO: 0.58 10*3/MM3 (ref 0.1–0.9)
MONOCYTES NFR BLD AUTO: 8 % (ref 5–12)
NEUTROPHILS NFR BLD AUTO: 5.08 10*3/MM3 (ref 1.7–7)
NEUTROPHILS NFR BLD AUTO: 70.6 % (ref 42.7–76)
NRBC BLD AUTO-RTO: 0 /100 WBC (ref 0–0.2)
PLATELET # BLD AUTO: 275 10*3/MM3 (ref 140–450)
PMV BLD AUTO: 8.4 FL (ref 6–12)
POTASSIUM SERPL-SCNC: 4.4 MMOL/L (ref 3.5–5.2)
PROT SERPL-MCNC: 7 G/DL (ref 6–8.5)
RBC # BLD AUTO: 3.94 10*6/MM3 (ref 4.14–5.8)
SODIUM SERPL-SCNC: 143 MMOL/L (ref 136–145)
WBC NRBC COR # BLD AUTO: 7.21 10*3/MM3 (ref 3.4–10.8)

## 2025-02-10 PROCEDURE — 94799 UNLISTED PULMONARY SVC/PX: CPT

## 2025-02-10 PROCEDURE — 25010000002 REMDESIVIR 100 MG/20ML SOLUTION 1 EACH VIAL: Performed by: INTERNAL MEDICINE

## 2025-02-10 PROCEDURE — 63710000001 DEXAMETHASONE PER 0.25 MG: Performed by: INTERNAL MEDICINE

## 2025-02-10 PROCEDURE — 29580 STRAPPING UNNA BOOT: CPT

## 2025-02-10 PROCEDURE — 86140 C-REACTIVE PROTEIN: CPT | Performed by: INTERNAL MEDICINE

## 2025-02-10 PROCEDURE — 94664 DEMO&/EVAL PT USE INHALER: CPT

## 2025-02-10 PROCEDURE — 85025 COMPLETE CBC W/AUTO DIFF WBC: CPT | Performed by: INTERNAL MEDICINE

## 2025-02-10 PROCEDURE — 25010000002 PIPERACILLIN SOD-TAZOBACTAM PER 1 G: Performed by: INTERNAL MEDICINE

## 2025-02-10 PROCEDURE — 99232 SBSQ HOSP IP/OBS MODERATE 35: CPT | Performed by: INTERNAL MEDICINE

## 2025-02-10 PROCEDURE — 97598 DBRDMT OPN WND ADDL 20CM/<: CPT

## 2025-02-10 PROCEDURE — 97110 THERAPEUTIC EXERCISES: CPT

## 2025-02-10 PROCEDURE — 25010000002 HEPARIN (PORCINE) PER 1000 UNITS: Performed by: INTERNAL MEDICINE

## 2025-02-10 PROCEDURE — 97530 THERAPEUTIC ACTIVITIES: CPT

## 2025-02-10 PROCEDURE — 25810000003 SODIUM CHLORIDE 0.9 % SOLUTION 250 ML FLEX CONT: Performed by: INTERNAL MEDICINE

## 2025-02-10 PROCEDURE — 80053 COMPREHEN METABOLIC PANEL: CPT | Performed by: INTERNAL MEDICINE

## 2025-02-10 PROCEDURE — 25010000002 DAPTOMYCIN PER 1 MG: Performed by: INTERNAL MEDICINE

## 2025-02-10 PROCEDURE — 97597 DBRDMT OPN WND 1ST 20 CM/<: CPT

## 2025-02-10 RX ADMIN — HYDROCODONE BITARTRATE AND ACETAMINOPHEN 1 TABLET: 5; 325 TABLET ORAL at 15:41

## 2025-02-10 RX ADMIN — PIPERACILLIN AND TAZOBACTAM 3.38 G: 3; .375 INJECTION, POWDER, LYOPHILIZED, FOR SOLUTION INTRAVENOUS at 09:35

## 2025-02-10 RX ADMIN — CARBIDOPA AND LEVODOPA 2 TABLET: 25; 100 TABLET ORAL at 17:31

## 2025-02-10 RX ADMIN — TAMSULOSIN HYDROCHLORIDE 0.4 MG: 0.4 CAPSULE ORAL at 09:35

## 2025-02-10 RX ADMIN — BUDESONIDE AND FORMOTEROL FUMARATE DIHYDRATE 2 PUFF: 160; 4.5 AEROSOL RESPIRATORY (INHALATION) at 21:05

## 2025-02-10 RX ADMIN — LEVOTHYROXINE SODIUM 88 MCG: 0.09 TABLET ORAL at 06:24

## 2025-02-10 RX ADMIN — SENNOSIDES 2 TABLET: 8.6 TABLET, FILM COATED ORAL at 09:35

## 2025-02-10 RX ADMIN — DEXAMETHASONE 6 MG: 4 TABLET ORAL at 09:35

## 2025-02-10 RX ADMIN — POLYETHYLENE GLYCOL 3350 17 G: 17 POWDER, FOR SOLUTION ORAL at 09:34

## 2025-02-10 RX ADMIN — PIPERACILLIN AND TAZOBACTAM 3.38 G: 3; .375 INJECTION, POWDER, LYOPHILIZED, FOR SOLUTION INTRAVENOUS at 17:30

## 2025-02-10 RX ADMIN — CARBIDOPA AND LEVODOPA 2 TABLET: 25; 100 TABLET ORAL at 09:35

## 2025-02-10 RX ADMIN — CARBIDOPA AND LEVODOPA 2 TABLET: 25; 100 TABLET ORAL at 12:28

## 2025-02-10 RX ADMIN — PRAMIPEXOLE DIHYDROCHLORIDE 1 MG: 1 TABLET ORAL at 09:35

## 2025-02-10 RX ADMIN — Medication 5000 UNITS: at 09:35

## 2025-02-10 RX ADMIN — CARBIDOPA AND LEVODOPA 2 TABLET: 25; 100 TABLET ORAL at 20:50

## 2025-02-10 RX ADMIN — ATORVASTATIN CALCIUM 40 MG: 40 TABLET, FILM COATED ORAL at 09:35

## 2025-02-10 RX ADMIN — PRAMIPEXOLE DIHYDROCHLORIDE 1 MG: 1 TABLET ORAL at 17:31

## 2025-02-10 RX ADMIN — HEPARIN SODIUM 5000 UNITS: 5000 INJECTION INTRAVENOUS; SUBCUTANEOUS at 20:50

## 2025-02-10 RX ADMIN — PRAMIPEXOLE DIHYDROCHLORIDE 1 MG: 1 TABLET ORAL at 20:50

## 2025-02-10 RX ADMIN — PIPERACILLIN AND TAZOBACTAM 3.38 G: 3; .375 INJECTION, POWDER, LYOPHILIZED, FOR SOLUTION INTRAVENOUS at 01:00

## 2025-02-10 RX ADMIN — HEPARIN SODIUM 5000 UNITS: 5000 INJECTION INTRAVENOUS; SUBCUTANEOUS at 09:35

## 2025-02-10 RX ADMIN — FAMOTIDINE 20 MG: 20 TABLET, FILM COATED ORAL at 17:31

## 2025-02-10 RX ADMIN — FAMOTIDINE 20 MG: 20 TABLET, FILM COATED ORAL at 06:24

## 2025-02-10 RX ADMIN — GUAIFENESIN 200 MG: 200 SOLUTION ORAL at 20:50

## 2025-02-10 RX ADMIN — FLUTICASONE PROPIONATE 2 SPRAY: 50 SPRAY, METERED NASAL at 09:36

## 2025-02-10 RX ADMIN — REMDESIVIR 100 MG: 100 INJECTION, POWDER, LYOPHILIZED, FOR SOLUTION INTRAVENOUS at 12:28

## 2025-02-10 RX ADMIN — FUROSEMIDE 20 MG: 20 TABLET ORAL at 09:37

## 2025-02-10 RX ADMIN — DAPTOMYCIN 400 MG: 500 INJECTION, POWDER, LYOPHILIZED, FOR SOLUTION INTRAVENOUS at 09:35

## 2025-02-10 RX ADMIN — BUDESONIDE AND FORMOTEROL FUMARATE DIHYDRATE 2 PUFF: 160; 4.5 AEROSOL RESPIRATORY (INHALATION) at 10:44

## 2025-02-10 NOTE — PLAN OF CARE
Goal Outcome Evaluation:              Outcome Evaluation: VSS on 3L. complains of pain, given prn meds. plan of discharge to [facility] at 1115 tomorrow via EMS.

## 2025-02-10 NOTE — PLAN OF CARE
Goal Outcome Evaluation:  Plan of Care Reviewed With: patient        Progress: improving  Outcome Evaluation: Pt demonstrating good improvements in BLE edema and skin integrity in response to compression wraps. PT able to debride slough from wound bed and nonviable flakes/crusts along B LE to promote healing and improved granulation. PT encouraged BLE elevation and ankle pumps for edema management. Open areas now demonstrating increased granulation. Pt would continue to benefit from unna boots changes every 2-3 days to further promote wound healing, venous return, reduce limb girth, and improve skin integrity

## 2025-02-10 NOTE — THERAPY TREATMENT NOTE
Patient Name: Cordell Martin  : 1945    MRN: [id number]                              Today's Date: 2/10/2025       Admit Date: 2/3/2025    Visit Dx:     ICD-10-CM ICD-9-CM   1. Bilateral lower extremity edema  R60.0 782.3   2. Lymphedema  I89.0 457.1   3. Cellulitis of lower extremity, unspecified laterality  L03.119 682.6   4. Failure of outpatient treatment  Z78.9 V49.89     Patient Active Problem List   Diagnosis    Anxiety    Arthritis    Depression    Hyperlipidemia    Lumbar stenosis with neurogenic claudication    Parkinson's disease    Lumbar stenosis with neurogenic claudication status post L4-5 decompression    Status post lumbar laminectomy    Memory loss    Hypothyroidism (acquired)    Pulmonary nodule (4mm RML incidental)    GERD    Remote smoker (Stopped )    Restrictive pattern on PFTs w/o evidence of ILD    Cellulitis    History of DVT (deep vein thrombosis)    Anemia, chronic disease    Bilateral lower leg cellulitis     Past Medical History:   Diagnosis Date    Anxiety     Arthritis     Back pain     Bronchitis     Cognitive impairment     Depression     Disease of thyroid gland     Glucose intolerance (impaired glucose tolerance)     Hyperlipidemia     Kidney stone     Obesity     Parkinson disease     Pneumonia     Prostate disorder     Thyroid disease     Wears eyeglasses      Past Surgical History:   Procedure Laterality Date    COLONOSCOPY      5 years ago    EYE SURGERY      HERNIA REPAIR  10/20/2020    KNEE SURGERY      LUMBAR LAMINECTOMY DISCECTOMY DECOMPRESSION N/A 2017    Procedure: LUMBAR LAMINECTOMY L4-5;  Surgeon: Antonio Trent MD;  Location: [facility];  Service:     SHOULDER ROTATOR CUFF REPAIR Right     x2    TONSILLECTOMY      VEIN SURGERY        General Information       Row Name 02/10/25 1151          Physical Therapy Time and Intention    Document Type therapy note (daily note)  -ER     Mode of Treatment individual therapy;physical therapy  -ER       Row  Name 02/10/25 1151          General Information    Patient Profile Reviewed yes  -ER     Existing Precautions/Restrictions fall;oxygen therapy device and L/min;other (see comments)  hx Parkinson's; BLE cellulitis with unna boots, chronic back and knee pain; BUE tremors  -ER       Row Name 02/10/25 1151          Cognition    Orientation Status (Cognition) oriented x 3  -ER       Row Name 02/10/25 1151          Safety Issues/Impairments Affecting Functional Mobility    Impairments Affecting Function (Mobility) balance;coordination;endurance/activity tolerance;motor control;pain;postural/trunk control;range of motion (ROM);shortness of breath;strength  -ER               User Key  (r) = Recorded By, (t) = Taken By, (c) = Cosigned By      Initials Name Provider Type    ER Mariza Lucero, PT Physical Therapist                   Mobility       Row Name 02/10/25 1151          Bed Mobility    Comment, (Bed Mobility) uic  -ER       Row Name 02/10/25 1151          Bed-Chair Transfer    Comment, (Bed-Chair Transfer) uic  -ER       Row Name 02/10/25 1151          Sit-Stand Transfer    Sit-Stand [place] (Transfers) verbal cues;nonverbal cues (demo/gesture);moderate assist (50% patient effort);1 person assist  -ER     Assistive Device (Sit-Stand Transfers) walker, front-wheeled  -ER     Comment, (Sit-Stand Transfer) extensive cues and time needed to get to eob, hand placement, weight shifting, and foot placement x2  -ER       Row Name 02/10/25 1151          Gait/Stairs (Locomotion)    Comment, (Gait/Stairs) unable to perform today due to weakness, fatigue, and tremors. pt demonstrated extensive er of ld feet in standing in almost a plie position detering from his safety ability to take a few steps today.  -ER               User Key  (r) = Recorded By, (t) = Taken By, (c) = Cosigned By      Initials Name Provider Type    ER Mariza Lucero, PT Physical Therapist                   Obj/Interventions       Row Name 02/10/25 1153           Motor Skills    Therapeutic Exercise shoulder;knee  -ER       Row Name 02/10/25 1153          Shoulder (Therapeutic Exercise)    Shoulder (Therapeutic Exercise) --  practicing in chair crunches 10x, shoulder flexion w eye gaze tracking 10x, scapular retraction 10x  -ER       Row Name 02/10/25 1153          Knee (Therapeutic Exercise)    Knee (Therapeutic Exercise) strengthening exercise  -ER     Knee Strengthening (Therapeutic Exercise) LAQ (long arc quad);10 repetitions;sitting  -ER       Row Name 02/10/25 1153          Balance    Balance Interventions sitting;standing;sit to stand;supported;static;dynamic;minimal challenge  -ER     Comment, Balance sitting balance practice  -ER               User Key  (r) = Recorded By, (t) = Taken By, (c) = Cosigned By      Initials Name Provider Type    ER Mariza Lucero, PT Physical Therapist                   Goals/Plan    No documentation.                  Clinical Impression       Row Name 02/10/25 1156          Pain    Pretreatment Pain Rating 8/10  -ER     Posttreatment Pain Rating 8/10  -ER     Pain Location extremity  -ER     Pain Side/Orientation bilateral;lower  -ER     Pain Management Interventions exercise or physical activity utilized  -ER     Response to Pain Interventions activity participation with tolerable pain  -ER     Pre/Posttreatment Pain Comment rn notified  -ER       Row Name 02/10/25 1156          Plan of Care Review    Plan of Care Reviewed With patient  -ER     Progress improving  -ER     Outcome Evaluation pt able to stand with less assistance today w mod a x1 and fww. unable to take a few steps today due to extreme er of dl feet in standing and poor balance upon standing. pt able to participate w all other planned te today. continue to progress as tolerated. continue to recommend irf upon d/c.  -ER       Row Name 02/10/25 1156          Vital Signs    Pre SpO2 (%) 90  -ER     O2 Delivery Pre Treatment nasal cannula  -ER     O2 Delivery Intra  Treatment nasal cannula  -ER     Post SpO2 (%) 90  -ER     O2 Delivery Post Treatment nasal cannula  -ER     Pre Patient Position Sitting  -ER     Intra Patient Position Standing  -ER     Post Patient Position Sitting  -ER       Row Name 02/10/25 1156          Positioning and Restraints    Pre-Treatment Position sitting in chair/recliner  -ER     Post Treatment Position chair  -ER     In Chair notified nsg;reclined;call light within reach;encouraged to call for assist;exit alarm on;waffle cushion;on mechanical lift sling  -ER               User Key  (r) = Recorded By, (t) = Taken By, (c) = Cosigned By      Initials Name Provider Type    ER Mariza Lucero, PT Physical Therapist                   Outcome Measures       Row Name 02/10/25 1158          How much help from another person do you currently need...    Turning from your back to your side while in flat bed without using bedrails? 2  -ER     Moving from lying on back to sitting on the side of a flat bed without bedrails? 2  -ER     Moving to and from a bed to a chair (including a wheelchair)? 1  -ER     Standing up from a chair using your arms (e.g., wheelchair, bedside chair)? 2  -ER     Climbing 3-5 steps with a railing? 1  -ER     To walk in hospital room? 1  -ER     AM-PAC 6 Clicks Score (PT) 9  -ER     Highest Level of Mobility Goal 3 --> Sit at edge of bed  -ER       Row Name 02/10/25 1158          Functional Assessment    Outcome Measure Options AM-PAC 6 Clicks Basic Mobility (PT)  -ER               User Key  (r) = Recorded By, (t) = Taken By, (c) = Cosigned By      Initials Name Provider Type    ER Mariza Lucero, PT Physical Therapist                                 Physical Therapy Education       Title: PT OT SLP Therapies (In Progress)       Topic: Physical Therapy (Done)       Point: Mobility training (Done)       Learning Progress Summary            Patient Acceptance, E VU by ER at 2/10/2025 1158    Comment: purpose of PT, need for aid, standing  training    Acceptance, E, NR by AS at 2/7/2025 0902    Acceptance, E, VU,NR by CD at 2/4/2025 1254    Comment: BENEFITS OF OOB ACTIVITY, SAFETY WITH MOBILITY, PROGRESSION OF POC, D/C PLANNING,                      Point: Home exercise program (Done)       Learning Progress Summary            Patient Acceptance, E, VU by ER at 2/10/2025 1158    Comment: purpose of PT, need for aid, standing training    Acceptance, E, NR by AS at 2/7/2025 0902    Acceptance, E, VU,NR by CD at 2/4/2025 1254    Comment: BENEFITS OF OOB ACTIVITY, SAFETY WITH MOBILITY, PROGRESSION OF POC, D/C PLANNING,                      Point: Body mechanics (Done)       Learning Progress Summary            Patient Acceptance, E, VU by ER at 2/10/2025 1158    Comment: purpose of PT, need for aid, standing training    Acceptance, E, NR by AS at 2/7/2025 0902    Acceptance, E, VU,NR by CD at 2/4/2025 1254    Comment: BENEFITS OF OOB ACTIVITY, SAFETY WITH MOBILITY, PROGRESSION OF POC, D/C PLANNING,                      Point: Precautions (Done)       Learning Progress Summary            Patient Acceptance, E, VU by ER at 2/10/2025 1158    Comment: purpose of PT, need for aid, standing training    Acceptance, E, NR by AS at 2/7/2025 0902    Acceptance, E, VU,NR by CD at 2/4/2025 1254    Comment: BENEFITS OF OOB ACTIVITY, SAFETY WITH MOBILITY, PROGRESSION OF POC, D/C PLANNING,                                      User Key       Initials Effective Dates Name Provider Type Discipline    CD 02/03/23 -  Lindsay Harrington, PT Physical Therapist PT    AS 12/13/24 -  Flower Holland, PTA Physical Therapist Assistant PT    ER 12/13/24 -  Mariza Lucero, PT Physical Therapist PT                  PT Recommendation and Plan     Progress: improving  Outcome Evaluation: pt able to stand with less assistance today w mod a x1 and fww. unable to take a few steps today due to extreme er of dl feet in standing and poor balance upon standing. pt able to participate w all  other planned te today. continue to progress as tolerated. continue to recommend irf upon d/c.     Time Calculation:         PT Charges       Row Name 02/10/25 1159             Time Calculation    Start Time 1107  -ER      PT Received On 02/10/25  -ER      PT Goal Re-Cert Due Date 02/14/25  -ER         Timed Charges    25917 - PT Therapeutic Exercise Minutes 12  -ER      50687 - PT Therapeutic Activity Minutes 31  -ER         Total Minutes    Timed Charges Total Minutes 43  -ER       Total Minutes 43  -ER                User Key  (r) = Recorded By, (t) = Taken By, (c) = Cosigned By      Initials Name Provider Type    ER Mariza Lucero, PT Physical Therapist                  Therapy Charges for Today       Code Description Service Date Service Provider Modifiers Qty    66323914940 HC PT THER PROC EA 15 MIN 2/10/2025 Mariza Lucero, PT GP 1    94759996950 HC PT THERAPEUTIC ACT EA 15 MIN 2/10/2025 Mariza Lucero, PT GP 2            PT G-Codes  Outcome Measure Options: AM-PAC 6 Clicks Basic Mobility (PT)  AM-PAC 6 Clicks Score (PT): 9  AM-PAC 6 Clicks Score (OT): 13  PT Discharge Summary  Anticipated Discharge Disposition (PT): inpatient rehabilitation facility    Mariza Lucero PT  2/10/2025

## 2025-02-10 NOTE — PROGRESS NOTES
Cordell Martin  1945  [id number]      Evaluating Physician: Trevor Jorgensen MD    Chief Complaint: leg pain    Reason for Consultation: leg infections    History of present illness:     Patient is a 80 y.o.  Yr old male Previously patient of Dr. Benites, with history of Parkinson's disease and chronically debilitated, uses a wheelchair primarily by his report but apparently had occasionally been able to use a walker.  Admitted October 2024 with right greater than left lower leg and foot cellulitis, MRI no osteomyelitis and prior history trauma/maggots; culture with skin derrell and pseudomonas aeruginosa, finished IV Zosyn in the hospital.Admission notes indicate cognitive impairment and other comorbidity as outlined below.    Admitted February 3, 2025 with worsening redness/pain at both lower legs with open wounds and unable to make appointments with wound care, apparently difficulty with transportation.  In addition he reports that the Unna boot wraps had been left on for weeks without change.  He did not know about any other specific trauma but admits to bumping the legs periodically and could have had superficial trauma     2/8/25 had reported cough, oxygen saturations lower regarding need for ongoing supplemental oxygen and subsequent COVID positivity.  Remdesivir/decadron added, moved to appropriate isolation on 6 N.   chest x-ray with vague interstitial changes ; CT chest with faint bilateral changes suggesting superimposed pneumonia including COVID, small bilateral pleural effusions      2/10/25 stable oxygen requirements Per nursing, 3L NC O2; cultures noted; Bilateral leg pain is dull at present, intermittent, nonradiating, worse with pressure, better with pain meds and 2  out of 10 in severity, not progressive.  Redness and swelling not progressive.    No headache photophobia or neck stiffness.     No nausea vomiting diarrhea or abdominal pain.  No dysuria hematuria or pyuria.  No  rash        Past Medical History:   Diagnosis Date    Anxiety     Arthritis     Back pain     Bronchitis     Cognitive impairment     Depression     Disease of thyroid gland     Glucose intolerance (impaired glucose tolerance)     Hyperlipidemia     Kidney stone     Obesity     Parkinson disease     Pneumonia     Prostate disorder     Thyroid disease     Wears eyeglasses        Past Surgical History:   Procedure Laterality Date    COLONOSCOPY      5 years ago    EYE SURGERY      HERNIA REPAIR  10/20/2020    KNEE SURGERY      LUMBAR LAMINECTOMY DISCECTOMY DECOMPRESSION N/A 07/13/2017    Procedure: LUMBAR LAMINECTOMY L4-5;  Surgeon: Antonio Trent MD;  Location: [facility];  Service:     SHOULDER ROTATOR CUFF REPAIR Right     x2    TONSILLECTOMY      VEIN SURGERY         Pediatric History   Patient Parents    Not on file     Other Topics Concern    Not on file   Social History Narrative    Lives alone. Uses walker    Has not smoked since about 1980       family history includes Alzheimer's disease in an other family member; Cancer in his father and another family member; Diabetes in an other family member; Heart disease in an other family member; Stroke in an other family member.    No Known Allergies    Medication:  Current Facility-Administered Medications   Medication Dose Route Frequency Provider Last Rate Last Admin    acetaminophen (TYLENOL) tablet 650 mg  650 mg Oral Q4H PRN Serenity Drummond MD        atorvastatin (LIPITOR) tablet 40 mg  40 mg Oral Daily Leobardo Hayden MD   40 mg at 02/09/25 0940    polyethylene glycol (MIRALAX) packet 17 g  17 g Oral BID Leobardo Hayden MD   17 g at 02/09/25 0939    And    senna (SENOKOT) tablet 2 tablet  2 tablet Oral BID Leobardo Hayden MD   2 tablet at 02/09/25 0940    And    bisacodyl (DULCOLAX) suppository 10 mg  10 mg Rectal Daily PRN Leobardo Hayden MD        budesonide-formoterol (SYMBICORT) 160-4.5 MCG/ACT inhaler 2 puff  2 puff Inhalation BID - RT Serenity Drummond  MD DELORES   2 puff at 02/09/25 1010    Calcium Replacement - Follow Nurse / BPA Driven Protocol   Not Applicable PRN Serenity Drummond MD        carbidopa-levodopa (SINEMET)  MG per tablet 2 tablet  2 tablet Oral 4x Daily Serenity Drummond MD   2 tablet at 02/09/25 2213    DAPTOmycin (CUBICIN) 400 mg in sodium chloride 0.9 % 50 mL IVPB  4 mg/kg (Adjusted) Intravenous Q24H Trevor Jorgensen  mL/hr at 02/09/25 0939 400 mg at 02/09/25 0939    dexAMETHasone (DECADRON) tablet 6 mg  6 mg Oral Daily With Breakfast Gilbert Canales DO   6 mg at 02/09/25 0940    famotidine (PEPCID) tablet 20 mg  20 mg Oral BID AC Gilbert Canales DO   20 mg at 02/10/25 0624    fluticasone (FLONASE) 50 MCG/ACT nasal spray 2 spray  2 spray Each Nare Daily Leobardo Hayden MD   2 spray at 02/09/25 0941    furosemide (LASIX) tablet 20 mg  20 mg Oral Daily Leobardo Hayden MD   20 mg at 02/09/25 0940    guaifenesin (ROBITUSSIN) 100 MG/5ML liquid 200 mg  200 mg Oral Q4H PRN Ayla Nelson APRN   200 mg at 02/09/25 2332    heparin (porcine) 5000 UNIT/ML injection 5,000 Units  5,000 Units Subcutaneous Q12H Serenity Drummond MD   5,000 Units at 02/09/25 2213    HYDROcodone-acetaminophen (NORCO) 5-325 MG per tablet 1 tablet  1 tablet Oral Q4H PRN Serenity Drummond MD   1 tablet at 02/07/25 1159    influenza vac split high-dose (FLUZONE HIGH DOSE) injection 0.5 mL  0.5 mL Intramuscular During Hospitalization Serenity Drummond MD        levothyroxine (SYNTHROID, LEVOTHROID) tablet 88 mcg  88 mcg Oral Q AM Serenity Drummond MD   88 mcg at 02/10/25 0624    Magnesium Standard Dose Replacement - Follow Nurse / BPA Driven Protocol   Not Applicable PRN Serenity Drummond MD        morphine injection 2 mg  2 mg Intravenous Q2H PRN Serenity Drummond MD        Pharmacy Consult - Remdesivir for Severe COVID-19 (Within 7 days of symptom onset)   Not Applicable Continuous PRN Gilbert Canales,         Phosphorus Replacement - Follow Nurse /  "BPA Driven Protocol   Not Applicable PRN Serenity Drummond MD        piperacillin-tazobactam (ZOSYN) 3.375 g IVPB in 100 mL NS MBP (CD)  3.375 g Intravenous Q8H Trevor Jorgensen MD   3.375 g at 02/10/25 0100    Potassium Replacement - Follow Nurse / BPA Driven Protocol   Not Applicable PRSerenity Reyez MD        pramipexole (MIRAPEX) tablet 1 mg  1 mg Oral TID Serenity Drummond MD   1 mg at 02/09/25 2213    remdesivir 100 mg in sodium chloride 0.9 % 250 mL IVPB (powder vial)  100 mg Intravenous Q24H Gilbert Canales DO   100 mg at 02/09/25 1221    sodium chloride 0.9 % flush 10 mL  10 mL Intravenous PRN Fishers, Tu, DO        sodium chloride 0.9 % flush 10 mL  10 mL Intravenous PRN Fishers, Tu, DO   10 mL at 02/07/25 1946    tamsulosin (FLOMAX) 24 hr capsule 0.4 mg  0.4 mg Oral Daily Serenity Drummond MD   0.4 mg at 02/09/25 0940    vitamin D3 capsule 5,000 Units  5,000 Units Oral Daily Serenity Drummond MD   5,000 Units at 02/09/25 0940       Antibiotics:  Anti-Infectives (From admission, onward)      Ordered     Dose/Rate Route Frequency Start Stop    02/08/25 1049  remdesivir 100 mg in sodium chloride 0.9 % 250 mL IVPB (powder vial)        Ordering Provider: Gilbert Canales DO   Placed in \"Followed by\" Linked Group    100 mg  over 60 Minutes Intravenous Every 24 Hours 02/09/25 1300 02/13/25 1259    02/08/25 1049  remdesivir 200 mg in sodium chloride 0.9 % 290 mL IVPB (powder vial)        Ordering Provider: Gilbert Canales DO   Placed in \"Followed by\" Linked Group    200 mg  over 60 Minutes Intravenous Every 24 Hours 02/08/25 1300 02/08/25 1502    02/04/25 0756  DAPTOmycin (CUBICIN) 400 mg in sodium chloride 0.9 % 50 mL IVPB        Ordering Provider: Trevor Jorgensen MD    4 mg/kg × 94.6 kg (Adjusted)  100 mL/hr over 30 Minutes Intravenous Every 24 Hours 02/04/25 0930 02/15/25 1138    02/03/25 2208  piperacillin-tazobactam (ZOSYN) 3.375 g IVPB in 100 mL NS MBP (CD)        Ordering " "Provider: Trevor Jorgensen MD    3.375 g  over 30 Minutes Intravenous Every 8 Hours Scheduled 02/03/25 2230 02/16/25 0746    02/03/25 1650  vancomycin 2500 mg/500 mL 0.9% NS IVPB ([facility])        Ordering Provider: Serenity Drummond MD    22 mg/kg × 109 kg  over 150 Minutes Intravenous Once 02/03/25 1706 02/03/25 2000 02/03/25 1448  piperacillin-tazobactam (ZOSYN) 3.375 g IVPB in 100 mL NS MBP (CD)        Ordering Provider: Tu Pruitt DO    3.375 g  over 30 Minutes Intravenous Once 02/03/25 1504 02/03/25 1657              Review of Systems    2/10/25     Constitutional-- No Fever, chills or sweats.  Appetite good, and no malaise. No fatigue.  Heent-- No new vision, hearing or throat complaints.  No epistaxis or oral sores.  Denies odynophagia or dysphagia.  No flashers, floaters or eye pain. No odynophagia or dysphagia. No headache, photophobia or neck stiffness.  CV-- No chest pain, palpitation or syncope  Resp-- No  Hemoptysis; see above  GI- No nausea, vomiting, or diarrhea.  No hematochezia, melena, or hematemesis. Denies jaundice or chronic liver disease.  -- No dysuria, hematuria, or flank pain.  Denies hesitancy, urgency or flank pain.  Lymph- no swollen lymph nodes in neck/axilla or groin.   Heme- No active bruising or bleeding  MS-- no swelling or pain in the bones or joints of arms/legs aside from above.  No new back pain.  Neuro-- No acute focal weakness or numbness in the arms or legs.  No seizures.    Full 12 point review of systems reviewed and negative otherwise for acute complaints, except for above    Physical Exam:   Vital Signs   /65 (BP Location: Left arm, Patient Position: Lying)   Pulse 59   Temp 97.6 °F (36.4 °C) (Oral)   Resp 18   Ht 182.9 cm (72\")   Wt 120 kg (263 lb 14.4 oz)   SpO2 95%   BMI 35.79 kg/m²     GENERAL: sleepy, in no acute distress.    HEENT: Normocephalic, atraumatic.    No conjunctival injection. No icterus. Oropharynx clear without evidence of thrush or " exudate. No evidence of periodontal disease.    NECK: Supple without nuchal rigidity. No mass.   HEART: RRR; No murmur, rubs, gallops.   LUNGS: Diminished at bases, vague crackles bilateral, no rhonchi/wheeze or stridor. Nonlabored. No dullness.  ABDOMEN: Soft, nontender, nondistended. Positive bowel sounds. No rebound or guarding. NO mass or HSM.  EXT:  see below   MSK: FROM without joint effusions noted arms/legs.    SKIN: Warm and dry without cutaneous eruptions on Inspection/palpation.    NEURO: Oriented to PPT.  chronic debility.    Bilateral lower extremities with chronic appearing edema/discoloration with probable stasis change, acute erythema/warmth and tenderness bilateral with multifocal ulceration  between knees and toes.  No discrete mass bulge or fluctuance.  No crepitus or bulla.   unable to determine depth of these wounds but no probe to bone tendon joint or ligament; no new visible redness/purulence    Laboratory Data    Results from last 7 days   Lab Units 02/10/25  0501 02/09/25  0456 02/08/25  1024   WBC 10*3/mm3 7.21 4.72 6.13   HEMOGLOBIN g/dL 11.3* 11.2* 10.5*   HEMATOCRIT % 36.2* 35.9* 33.0*   PLATELETS 10*3/mm3 275 254 203     Results from last 7 days   Lab Units 02/10/25  0501   SODIUM mmol/L 143   POTASSIUM mmol/L 4.4   CHLORIDE mmol/L 107   CO2 mmol/L 25.0   BUN mg/dL 33*   CREATININE mg/dL 1.26   GLUCOSE mg/dL 137*   CALCIUM mg/dL 8.8     Results from last 7 days   Lab Units 02/10/25  0501   ALK PHOS U/L 130*   BILIRUBIN mg/dL 0.2   ALT (SGPT) U/L 7   AST (SGOT) U/L 25         Results from last 7 days   Lab Units 02/10/25  0501   CRP mg/dL 7.62*       Estimated Creatinine Clearance: 64.7 mL/min (by C-G formula based on SCr of 1.26 mg/dL).      Microbiology:      Radiology:  Imaging Results (Last 72 Hours)       Procedure Component Value Units Date/Time    CT Chest Without Contrast Diagnostic [[id number]] Collected: 02/08/25 1607     Updated: 02/08/25 1616    Narrative:      CT CHEST WO  CONTRAST DIAGNOSTIC    Date of Exam: 2/8/2025 2:35 PM EST    Indication: Covid 19 positive, eval lung parenchyma.    Comparison: 10/9/2024    Technique: Axial CT images were obtained of the chest without contrast administration.  Reconstructed coronal and sagittal images were also obtained. Automated exposure control and iterative construction methods were used.      Findings:  MEDIASTINUM: Unremarkable. Aortic and heart size are normal. No mass nor pericardial effusion.  CORONARY ARTERIES: There is calcified atherosclerotic disease.  LUNGS: There is mild reticular interstitial prominence. There are new nodular airspace opacities within each lung base with some areas of irregular groundglass attenuation suggestive of superimposed infectious process including sequela of COVID-19. No   dense lobar consolidation. There is a 5 mm subpleural right middle lobe nodule (image 57, series 4), previously 4 mm.  PLEURAL SPACE: There are small bilateral pleural effusions.  LYMPH NODES: There are no pathologically enlarged lymph nodes.    UPPER ABDOMEN: Unremarkable    OSSEOUS STRUCTURES: Appropriate for age with no acute process identified.          Impression:      Impression:  1.Faint bilateral lower lung nodular and groundglass opacities suggesting superimposed pneumonia including COVID-19.  2.Small bilateral pleural effusions.  3.Slightly larger 5 mm subpleural right middle lobe nodule. See below recommendations.    The Fleischner society pulmonary nodule recommendations are for the follow-up and management of pulmonary nodules smaller than 8 mm detected incidentally in patients >35 years on non-screening CT. The initial guidelines for the management of solid   nodules were released in 2005 1, and guidelines for the management of subsolid nodules were released in 2013 2. New revised 2017 recommendations for both solid and subsolid have since been released 4.    2017 guidelines  Solid nodules  Solitary nodule size: <6  mm  *low risk patients: no follow-up needed  *high risk patients: optional CT at 12 months  Solitary nodule size: 6-8 mm  *low risk patients: follow-up at 6-12 months, then consider further follow-up at 18-24 months  *high risk patients: initial follow-up CT at 6-12 months and then at 18-24 months if no change  Solitary nodule size: >8 mm  *either low or high risk patients  *consider follow-up CT at 3 months, and/or CT-PET, and/or biopsy  Multiple nodules size: <6 mm  *low risk patients: no routine follow-up  *high risk patients: optional CT at 12 months  Multiple nodules size: 6-8 mm  *low risk patients: follow-up at 3-6 months, then consider further follow-up at 18-24 months  *high risk patients: follow-up at 3-6 months, then at 18-24 months if no change  Multiple nodules size: >8 mm  *low risk patients: follow-up at 3-6 months, then consider further follow-up at 18-24 months  *high risk patients: follow-up at 3-6 months, then at 18-24 months if no change  *    Note: newly detected indeterminate nodule in persons 35 years of age or older.  *low risk patients: minimal or absent history of smoking and or other known risk factors  *high risk patients: history of smoking or of other known risk factors (e.g. first degree relative with lung cancer, or exposure to asbestos, radon, uranium)  *if a nodule up to 8 mm is partly solid or is ground glass further follow-up is required after 24 months to exclude possible slow growing adenocarcinoma (IKE)    Subsolid nodules  Solitary pure ground-glass nodule  *nodule size <6mm  *no CT follow-up required  *nodule size > or equal to 6mm  *follow up CT at 6-12 months, then every 2 years until 5 years  Solitary part-solid nodule  *nodule size <6mm  *no CT follow-up required  *nodule size > or equal to 6mm  *follow-up CT at 3-6 months  *if unchanged, and solid component remains <6mm, then annual follow-up for 5 years  Multiple subsolid nodules  *nodule size <6mm  *follow-up CT at 3-6  months  *consider further follow-up at 2 and 4 years if stable  *nodule size > or equal to 6mm  *follow-up CT at 3-6 months  *subsequent management based on the most suspicious nodule(s)        Electronically Signed: Jonas Evans MD    2/8/2025 4:13 PM EST    Workstation ID: [id number]              Impression:     --acute bilateral lower leg and foot cellulitis/infections with multifocal ulcerations, chronic discoloration/chronic edema and potential intermittent superficial trauma although no specific inciting event.  Apparently had not made it to appointments with wound care and Unna boots had not been changed in weeks.  Unclear how much of this is general self-neglect versus recent weather related challenge with combination. high risk for persistent/recurrent or nonhealing wounds, persistent/progressive or recurrent infection and risk for further functional/limb loss, risk for amputation etc.  Culture below.  Arterial/venous/vascular workup at discretion of medicine team    --Acute cough, COVID-positive with treatment as pneumonia with symptomatology/oxygen requirements and abnormal chest x-ray.  Isolation precautions adjusted, ongoing remdesivir/decadron; if rapidly progressive hypoxia despite current therapy you good give consideration to additional therapy; patient prefers current therapy.    --Parkinson's disease/memory loss per admission notes, chronically debilitated and primarily wheelchair bound/bedbound by his report    --chronic edema and possible lymphedema per patient notes    --Prior history DVT per past notes    --CT scan with 5 mm subpleural right middle lobe nodule, further radiographic follow-up versus referral to pulmonary for monitoring and other evaluation at discretion of medicine team; patient aware    PLAN:     --IV daptomycin/Zosyn      --remdesivir/decadron    **culture right leg and left leg with MRSA/pseudomonas aeruginosa/E Coli   from February 3, 2025    --Check/review labs cultures and  scans    --Partial history Per nursing staff    --Discussed with microbiology    --Discussed with  multidisciplinary team/nursing regarding complex set of issues, discussed with microbiology lab and discussed importance of antimicrobial stewardship, management of infection control protocols etc.     This visit included the following complex service elements:  Complex medical decision-making associated with antimicrobial prescribing.  Communicated with the clinical microbiology lab.     Copied text in this note has been reviewed and is accurate as of 02/10/25.     Trevor Jorgensen MD  2/10/2025

## 2025-02-10 NOTE — PLAN OF CARE
Goal Outcome Evaluation:  Plan of Care Reviewed With: patient        Progress: improving  Outcome Evaluation: pt able to stand with less assistance today w mod a x1 and fww. unable to take a few steps today due to extreme er of dl feet in standing and poor balance upon standing. pt able to participate w all other planned te today. continue to progress as tolerated. continue to recommend irf upon d/c.    Anticipated Discharge Disposition (PT): inpatient rehabilitation facility

## 2025-02-10 NOTE — CASE MANAGEMENT/SOCIAL WORK
Case Management Discharge Note      Final Note: Pt is being discharged to acute rehab at [facility] tomorrow, 2/11. CM confirmed with [facility] facility liaison, that Pt can be accepted. Facility will retrieve discharge summary from [facility]. RN to call report to [phone]. If accepting RN is unavailable for report, RN may call report to house supervisor at [phone]. [facility] will  Pt in room 2/11 @ 1115. PCS was scanned into dropbox. Pt and sister, Lennie, are aware and agreeable to plan. No other discharge needs identified.         Selected Continued Care - Admitted Since 2/3/2025       Destination Coordination complete.      Service Provider Services Address Phone Fax Patient Preferred    [facility] [address] [facility] [address] [phone] --              Durable Medical Equipment    No services have been selected for the patient.                Dialysis/Infusion    No services have been selected for the patient.                Home Medical Care Coordination complete.      Service Provider Services Address Phone Fax Patient Preferred    [facility] [address] [address] [phone] -- --              Therapy    No services have been selected for the patient.                Community Resources    No services have been selected for the patient.                Community & DME    No services have been selected for the patient.                    Transportation Services  Ambulance: [facility] (2/11 @ 1115)    Final Discharge Disposition Code: 62 - inpatient rehab facility

## 2025-02-10 NOTE — THERAPY WOUND CARE TREATMENT
Acute Care - Wound/Debridement Treatment Note  [facility]     Patient Name: Cordell Martin  : 1945  MRN: [id number]  Today's Date: 2/10/2025                Admit Date: 2/3/2025    Visit Dx:    ICD-10-CM ICD-9-CM   1. Bilateral lower extremity edema  R60.0 782.3   2. Lymphedema  I89.0 457.1   3. Cellulitis of lower extremity, unspecified laterality  L03.119 682.6   4. Failure of outpatient treatment  Z78.9 V49.89       Patient Active Problem List   Diagnosis    Anxiety    Arthritis    Depression    Hyperlipidemia    Lumbar stenosis with neurogenic claudication    Parkinson's disease    Lumbar stenosis with neurogenic claudication status post L4-5 decompression    Status post lumbar laminectomy    Memory loss    Hypothyroidism (acquired)    Pulmonary nodule (4mm RML incidental)    GERD    Remote smoker (Stopped )    Restrictive pattern on PFTs w/o evidence of ILD    Cellulitis    History of DVT (deep vein thrombosis)    Anemia, chronic disease    Bilateral lower leg cellulitis        Past Medical History:   Diagnosis Date    Anxiety     Arthritis     Back pain     Bronchitis     Cognitive impairment     Depression     Disease of thyroid gland     Glucose intolerance (impaired glucose tolerance)     Hyperlipidemia     Kidney stone     Obesity     Parkinson disease     Pneumonia     Prostate disorder     Thyroid disease     Wears eyeglasses         Past Surgical History:   Procedure Laterality Date    COLONOSCOPY      5 years ago    EYE SURGERY      HERNIA REPAIR  10/20/2020    KNEE SURGERY      LUMBAR LAMINECTOMY DISCECTOMY DECOMPRESSION N/A 2017    Procedure: LUMBAR LAMINECTOMY L4-5;  Surgeon: Antonio Trent MD;  Location: [facility];  Service:     SHOULDER ROTATOR CUFF REPAIR Right     x2    TONSILLECTOMY      VEIN SURGERY             Wound 25 Right circumferential leg cellulitis (Active)   Dressing Appearance dry;intact 02/10/25 0800   Closure Unable to assess 02/10/25 0800   Base  unable to visualize 02/10/25 0800   Periwound intact;dry 02/10/25 0800   Periwound Care dry periwound area maintained 02/10/25 0800       Wound 02/03/25 2015 Left circumferential leg cellulitis (Active)   Dressing Appearance dry;intact 02/10/25 0800   Closure Unable to assess 02/10/25 0800   Base unable to visualize 02/10/25 0800   Periwound Care dry periwound area maintained 02/10/25 0800      Lymphedema       Row Name 02/10/25 1330             Lymphedema Edema Assessment    Ptting Edema Category By severity  -KW      Pitting Edema Moderate  -KW         Skin Changes/Observations    Location/Assessment Lower Extremity  -KW      Lower Extremity Conditions bilateral:;dry;scaly;crust;inflamed  -KW      Lower Extremity Color/Pigment bilateral:;normal  -KW         Lymphedema Pulses/Capillary Refill    Lymphedema Pulses/Capillary Refill capillary refill  -KW      Capillary Refill lower extremity capillary refill  -KW      Lower Extremity Capillary Refill right:;left:;less than 3 seconds  -KW         Compression/Skin Care    Compression/Skin Care skin care;wrapping location;bandaging  -KW      Skin Care washed/dried;lotion applied;moisturizing lotion applied  -KW      Wrapping Location lower extremity  -KW      Wrapping Location LE bilateral:;foot to knee  -KW      Wrapping Comments mepilex Ag to wounds with unna boot in clamshell pattern coban secured with spandage  -KW                User Key  (r) = Recorded By, (t) = Taken By, (c) = Cosigned By      Initials Name Provider Type    Yolanda Howard, PT Physical Therapist                    WOUND DEBRIDEMENT  Total area of Debridement: 68cm2  Debridement Site 1  Location- Site 1: BLE  Selective Debridement- Site 1: Wound Surface >20cmsq  Instruments- Site 1: tweezers  Excised Tissue Description- Site 1: moderate, slough, other (comment) (nonviable crusts/flakes)  Bleeding- Site 1: none               PT Assessment (Last 12 Hours)       PT Evaluation and Treatment        Row Name 02/10/25 1330          Physical Therapy Time and Intention    Subjective Information no complaints  -KW     Document Type wound care;therapy note (daily note)  -KW     Mode of Treatment physical therapy  -KW       Row Name 02/10/25 1330          General Information    Patient Profile Reviewed yes  -KW       Row Name             Wound 02/03/25 2015 Right circumferential leg cellulitis    Wound - Properties Group Placement Date: 02/03/25  -NELY Placement Time: 2015  -NELY Side: Right  -NELY Orientation: circumferential  -NELY Location: leg  -NELY Primary Wound Type: Blisters  -NELY Type: cellulitis  -NELY Present on Original Admission: Y  -NELY    Retired Wound - Properties Group Placement Date: 02/03/25  -NELY Placement Time: 2015  -NELY Present on Original Admission: Y  -NELY Side: Right  -NELY Orientation: circumferential  -NELY Location: leg  -NELY Primary Wound Type: Blisters  -NELY Type: cellulitis  -NELY    Retired Wound - Properties Group Placement Date: 02/03/25  -NELY Placement Time: 2015 -JP Present on Original Admission: Y  -NELY Side: Right  -NELY Orientation: circumferential  -NELY Location: leg  -NELY Primary Wound Type: Blisters  -NELY Type: cellulitis  -NELY    Retired Wound - Properties Group Date first assessed: 02/03/25  -NELY Time first assessed: 2015  -JP Present on Original Admission: Y  -NELY Side: Right  -NELY Location: leg  -NELY Primary Wound Type: Blisters  -NELY Type: cellulitis  -NELY      Row Name             Wound 02/03/25 2015 Left circumferential leg cellulitis    Wound - Properties Group Placement Date: 02/03/25  -NELY Placement Time: 2015  -NELY Side: Left  -NELY Orientation: circumferential  -NELY Location: leg  -NELY Primary Wound Type: Blisters  -NELY Type: cellulitis  -NELY Present on Original Admission: Y  -NELY    Retired Wound - Properties Group Placement Date: 02/03/25  -NELY Placement Time: 2015  -NELY Present on Original Admission: Y  -NELY Side: Left  -NELY Orientation: circumferential  -NELY Location: leg  -NELY Primary Wound Type: Blisters   -NELY Type: cellulitis  -NELY    Retired Wound - Properties Group Placement Date: 02/03/25  -NELY Placement Time: 2015 -JP Present on Original Admission: Y  -NELY Side: Left  -NELY Orientation: circumferential  -NELY Location: leg  -NELY Primary Wound Type: Blisters  -NELY Type: cellulitis  -NELY    Retired Wound - Properties Group Date first assessed: 02/03/25  -NELY Time first assessed: 2015 -JP Present on Original Admission: Y  -NELY Side: Left  -NELY Location: leg  -NELY Primary Wound Type: Blisters  -NELY Type: cellulitis  -NELY      Row Name 02/10/25 1330          Plan of Care Review    Plan of Care Reviewed With patient  -KW     Progress improving  -KW     Outcome Evaluation Pt demonstrating good improvements in BLE edema and skin integrity in response to compression wraps. PT able to debride slough from wound bed and nonviable flakes/crusts along B LE to promote healing and improved granulation. PT encouraged BLE elevation and ankle pumps for edema management. Open areas now demonstrating increased granulation. Pt would continue to benefit from unna boots changes every 2-3 days to further promote wound healing, venous return, reduce limb girth, and improve skin integrity  -KW       Row Name 02/10/25 1330          Positioning and Restraints    Pre-Treatment Position in bed  -KW     Post Treatment Position bed  -KW     In Bed supine;encouraged to call for assist;call light within reach  -KW               User Key  (r) = Recorded By, (t) = Taken By, (c) = Cosigned By      Initials Name Provider Type    Yolanda Howard PT Physical Therapist    Meg Rock, RN Registered Nurse                  Physical Therapy Education       Title: PT OT SLP Therapies (In Progress)       Topic: Physical Therapy (Done)       Point: Mobility training (Done)       Learning Progress Summary            Patient Acceptance, E, VU by ER at 2/10/2025 9654    Comment: purpose of PT, need for aid, standing training    Acceptance, E, NR by AS at 2/7/2025  0902    Acceptance, E, VU,NR by CD at 2/4/2025 1254    Comment: BENEFITS OF OOB ACTIVITY, SAFETY WITH MOBILITY, PROGRESSION OF POC, D/C PLANNING,                      Point: Home exercise program (Done)       Learning Progress Summary            Patient Acceptance, E, VU by ER at 2/10/2025 1158    Comment: purpose of PT, need for aid, standing training    Acceptance, E, NR by AS at 2/7/2025 0902    Acceptance, E, VU,NR by CD at 2/4/2025 1254    Comment: BENEFITS OF OOB ACTIVITY, SAFETY WITH MOBILITY, PROGRESSION OF POC, D/C PLANNING,                      Point: Body mechanics (Done)       Learning Progress Summary            Patient Acceptance, E, VU by ER at 2/10/2025 1158    Comment: purpose of PT, need for aid, standing training    Acceptance, E, NR by AS at 2/7/2025 0902    Acceptance, E, VU,NR by CD at 2/4/2025 1254    Comment: BENEFITS OF OOB ACTIVITY, SAFETY WITH MOBILITY, PROGRESSION OF POC, D/C PLANNING,                      Point: Precautions (Done)       Learning Progress Summary            Patient Acceptance, E, VU by ER at 2/10/2025 1158    Comment: purpose of PT, need for aid, standing training    Acceptance, E, NR by AS at 2/7/2025 0902    Acceptance, E, VU,NR by CD at 2/4/2025 1254    Comment: BENEFITS OF OOB ACTIVITY, SAFETY WITH MOBILITY, PROGRESSION OF POC, D/C PLANNING,                                      User Key       Initials Effective Dates Name Provider Type Discipline    CD 02/03/23 -  Lindsay Harrington, PT Physical Therapist PT    AS 12/13/24 -  Flower Holland PTA Physical Therapist Assistant PT    ER 12/13/24 -  Mariza Lucero PT Physical Therapist PT                    Recommendation and Plan  Anticipated Discharge Disposition (PT): inpatient rehabilitation facility  Planned Therapy Interventions (PT): balance training, bed mobility training, gait training, motor coordination training, neuromuscular re-education, transfer training, postural re-education, home exercise program,  patient/family education, strengthening, stretching  Therapy Frequency (PT): daily  Plan of Care Reviewed With: patient   Progress: improving       Progress: improving  Outcome Evaluation: Pt demonstrating good improvements in BLE edema and skin integrity in response to compression wraps. PT able to debride slough from wound bed and nonviable flakes/crusts along B LE to promote healing and improved granulation. PT encouraged BLE elevation and ankle pumps for edema management. Open areas now demonstrating increased granulation. Pt would continue to benefit from unna boots changes every 2-3 days to further promote wound healing, venous return, reduce limb girth, and improve skin integrity  Plan of Care Reviewed With: patient            Time Calculation   PT Charges       Row Name 02/10/25 1330 02/10/25 1159          Time Calculation    Start Time 1330  -KW 1107  -ER     PT Received On -- 02/10/25  -ER     PT Goal Re-Cert Due Date -- 02/14/25  -ER        Timed Charges    35093 - PT Therapeutic Exercise Minutes -- 12  -ER     68260 - PT Therapeutic Activity Minutes -- 31  -ER        Untimed Charges    29580-Unna Boot 32  -KW --     86559-Hloyvlduo debridement 16  -KW --     97598-Add't debridement ea 20 cm 25  -KW --        Total Minutes    Timed Charges Total Minutes -- 43  -ER     Untimed Charges Total Minutes 73  -KW --      Total Minutes 73  -KW 43  -ER               User Key  (r) = Recorded By, (t) = Taken By, (c) = Cosigned By      Initials Name Provider Type    ER Mariza Lucero, PT Physical Therapist    KW Yolanda Callaway, JARAD Physical Therapist                      Therapy Charges for Today       Code Description Service Date Service Provider Modifiers Qty    63752790496 HC MIAH DEBRIDE OPEN WOUND UP TO 20CM 2/10/2025 Yolanda Callaway, PT GP 1    75169596863 HC PT MIAH DEBRIDE OPEN WOUND EA ADD 20CM 2/10/2025 Yolanda Callaway PT GP 2    28923878670 HC PT STAPPING UNNA BOOT 2/10/2025 Cesia  Yloanda, PT GP 1              PT G-Codes  Outcome Measure Options: AM-PAC 6 Clicks Basic Mobility (PT)  AM-PAC 6 Clicks Score (PT): 9  AM-PAC 6 Clicks Score (OT): 13       Yolanda Callaway, PT  2/10/2025

## 2025-02-10 NOTE — PROGRESS NOTES
[facility] Medicine Services  PROGRESS NOTE    Patient Name: Cordell Martin  : 1945  MRN: [id number]    Date of Admission: 2/3/2025  Primary Care Physician: Ben Holder DO    Subjective   Subjective     CC: f/u covid    HPI: Up in bed getting ready for breakfast. Says he is weak. Blames his admission on [facility].      Objective   Objective     Vital Signs:   Temp:  [97.6 °F (36.4 °C)-98.3 °F (36.8 °C)] 97.6 °F (36.4 °C)  Heart Rate:  [56-75] 59  Resp:  [17-18] 18  BP: (119-139)/(61-70) 129/65  Flow (L/min) (Oxygen Therapy):  [3] 3     Physical Exam:  Constitutional: No acute distress, awake, alert, chronically ill appearing  HENT: NCAT, mucous membranes moist, o2 via NC  Respiratory: Clear to auscultation bilaterally, respiratory effort normal   Cardiovascular: RRR, no murmurs, rubs, or gallops  Gastrointestinal: Positive bowel sounds, soft, nontender, nondistended  Musculoskeletal: Legs wrapped  Psychiatric: Appropriate affect, cooperative  Neurologic: Oriented x 3, strength symmetric in all extremities, Cranial Nerves grossly intact to confrontation, speech clear  Skin: No rashes     Results Reviewed:  LAB RESULTS:      Lab 02/10/25  0501 25  0456 25  1024 25  0646 25  0639 25  0647 25  1430 25  1136   WBC 7.21 4.72 6.13 5.97 5.57 5.58  --  5.40   HEMOGLOBIN 11.3* 11.2* 10.5* 10.1* 9.9* 10.1*  --  12.3*   HEMATOCRIT 36.2* 35.9* 33.0* 32.6* 32.0* 32.6*  --  40.2   PLATELETS 275 254 203 206 195 203  --  243   NEUTROS ABS 5.08 3.55 3.83  --   --  3.89  --  3.73   IMMATURE GRANS (ABS) 0.08* 0.05 0.05  --   --  0.02  --  0.02   LYMPHS ABS 1.46 0.90 1.36  --   --  1.00  --  0.92   MONOS ABS 0.58 0.21 0.57  --   --  0.54  --  0.54   EOS ABS 0.00 0.00 0.31  --   --  0.11  --  0.17   MCV 91.9 91.8 91.7 92.9 94.1 93.1  --  94.6   CRP 7.62*  --   --   --   --   --   --   --    LACTATE  --   --   --   --   --   --   --  1.2    PROTIME  --   --   --   --   --   --   --  13.6   HSTROP T  --   --   --   --   --   --  19 18         Lab 02/10/25  0501 02/09/25  0456 02/08/25  1024 02/07/25  1411 02/06/25  0646   SODIUM 143 140 137 137 138   POTASSIUM 4.4 4.3 4.2 4.2 4.1   CHLORIDE 107 106 101 101 103   CO2 25.0 23.0 23.0 26.0 24.0   ANION GAP 11.0 11.0 13.0 10.0 11.0   BUN 33* 24* 23 23 23   CREATININE 1.26 1.17 1.26 1.46* 1.35*   EGFR 58.4* 63.8 58.4* 48.9* 53.7*   GLUCOSE 137* 151* 128* 145* 93   CALCIUM 8.8 9.2 8.8 8.8 8.4*   MAGNESIUM  --   --   --  2.2  --    PHOSPHORUS  --   --   --  3.1  --          Lab 02/10/25  0501 02/09/25  0456 02/04/25  0647 02/03/25  1136   TOTAL PROTEIN 7.0 7.3 5.3* 7.0   ALBUMIN 2.9* 3.2* 2.8* 3.4*   GLOBULIN 4.1 4.1 2.5 3.6   ALT (SGPT) 7 <5 <5 16   AST (SGOT) 25 20 16 20   BILIRUBIN 0.2 0.3 0.5 0.4   ALK PHOS 130* 146* 128* 162*         Lab 02/03/25  1430 02/03/25  1136   PROBNP  --  68.0   HSTROP T 19 18   PROTIME  --  13.6   INR  --  1.03             Lab 02/08/25  1024   FERRITIN 355.40         Brief Urine Lab Results  (Last result in the past 365 days)        Color   Clarity   Blood   Leuk Est   Nitrite   Protein   CREAT   Urine HCG        10/08/24 2219 Yellow   Clear   Negative   Negative   Negative   Negative                   Microbiology Results Abnormal       Procedure Component Value - Date/Time    COVID-19, FLU A/B, RSV PCR 1 HR TAT - Swab, Nasopharynx [[id number]]  (Abnormal) Collected: 02/08/25 0142    Lab Status: Final result Specimen: Swab from Nasopharynx Updated: 02/08/25 0631     COVID19 Detected     Influenza A PCR Not Detected     Influenza B PCR Not Detected     RSV, PCR Not Detected    Narrative:      Fact sheet for providers: https://www.fda.gov/media/025725/download    Fact sheet for patients: https://www.fda.gov/media/289460/download    Test performed by PCR.    Wound Culture - Wound, Leg, Right [[id number]]  (Abnormal)  (Susceptibility) Collected: 02/03/25 1903    Lab Status: Final  result Specimen: Wound from Leg, Right Updated: 02/07/25 0721     Wound Culture Light growth (2+) Staphylococcus aureus, MRSA     Comment: Refer to previous wound culture collected on 2/3/2025 1528 for MICs    Methicillin resistant Staphylococcus aureus, Patient may be an isolation risk.         Scant growth (1+) Pseudomonas aeruginosa     Comment: Refer to previous wound culture collected on 2/3/2025 1528 for MICs           Rare growth Escherichia coli     Gram Stain Rare (1+) WBCs seen      Rare (1+) Gram positive cocci in pairs    Narrative:            Susceptibility        Escherichia coli      DRE      Amoxicillin + Clavulanate Susceptible      Ampicillin Susceptible      Ampicillin + Sulbactam Susceptible      Cefazolin (Non Urine) Susceptible      Cefepime Susceptible      Ceftazidime Susceptible      Ceftriaxone Susceptible      Gentamicin Susceptible      Levofloxacin Susceptible      Piperacillin + Tazobactam Susceptible      Tetracycline Resistant      Trimethoprim + Sulfamethoxazole Resistant                       Susceptibility Comments       Escherichia coli    With the exception of urinary-sourced infections, aminoglycosides should not be used as monotherapy.               Wound Culture - Swab, Leg, Left [[id number]]  (Abnormal)  (Susceptibility) Collected: 02/03/25 1528    Lab Status: Edited Result - FINAL Specimen: Swab from Leg, Left Updated: 02/05/25 0754     Wound Culture Heavy growth (4+) Staphylococcus aureus, MRSA     Comment:   Methicillin resistant Staphylococcus aureus, Patient may be an isolation risk.         Scant growth (1+) Pseudomonas aeruginosa     Gram Stain Many (4+) Gram positive cocci in pairs and clusters      Rare (1+) WBCs seen      Rare (1+) Gram negative bacilli    Susceptibility        Staphylococcus aureus, MRSA      DRE      Clindamycin Susceptible      Daptomycin Susceptible (C)  [1]       Erythromycin Resistant      Oxacillin Resistant      Rifampin Susceptible       Tetracycline Resistant      Trimethoprim + Sulfamethoxazole Susceptible      Vancomycin Susceptible                   [1]  Appended report. These results have been appended to a previously final verified report.                Susceptibility        Pseudomonas aeruginosa      DRE      Cefepime Susceptible      Ceftazidime Susceptible      Ciprofloxacin Susceptible      Levofloxacin Intermediate      Piperacillin + Tazobactam Susceptible      Tobramycin Susceptible                       Susceptibility Comments       Staphylococcus aureus, MRSA    Daptomycin requested by Dr King 2/5      Pseudomonas aeruginosa    With the exception of urinary-sourced infections, aminoglycosides should not be used as monotherapy.               MRSA Screen, PCR (Inpatient) - Swab, Nares [[id number]]  (Abnormal) Collected: 02/03/25 1903    Lab Status: Final result Specimen: Swab from Nares Updated: 02/03/25 2102     MRSA PCR Positive    Narrative:      The negative predictive value of this diagnostic test is high and should only be used to consider de-escalating anti-MRSA therapy. A positive result may indicate colonization with MRSA and must be correlated clinically.            CT Chest Without Contrast Diagnostic    Result Date: 2/8/2025  CT CHEST WO CONTRAST DIAGNOSTIC Date of Exam: 2/8/2025 2:35 PM EST Indication: Covid 19 positive, eval lung parenchyma. Comparison: 10/9/2024 Technique: Axial CT images were obtained of the chest without contrast administration.  Reconstructed coronal and sagittal images were also obtained. Automated exposure control and iterative construction methods were used. Findings: MEDIASTINUM: Unremarkable. Aortic and heart size are normal. No mass nor pericardial effusion. CORONARY ARTERIES: There is calcified atherosclerotic disease. LUNGS: There is mild reticular interstitial prominence. There are new nodular airspace opacities within each lung base with some areas of irregular groundglass attenuation  suggestive of superimposed infectious process including sequela of COVID-19. No dense lobar consolidation. There is a 5 mm subpleural right middle lobe nodule (image 57, series 4), previously 4 mm. PLEURAL SPACE: There are small bilateral pleural effusions. LYMPH NODES: There are no pathologically enlarged lymph nodes. UPPER ABDOMEN: Unremarkable OSSEOUS STRUCTURES: Appropriate for age with no acute process identified.     Impression: Impression: 1.Faint bilateral lower lung nodular and groundglass opacities suggesting superimposed pneumonia including COVID-19. 2.Small bilateral pleural effusions. 3.Slightly larger 5 mm subpleural right middle lobe nodule. See below recommendations. The Fleischner society pulmonary nodule recommendations are for the follow-up and management of pulmonary nodules smaller than 8 mm detected incidentally in patients >35 years on non-screening CT. The initial guidelines for the management of solid nodules were released in 2005 1, and guidelines for the management of subsolid nodules were released in 2013 2. New revised 2017 recommendations for both solid and subsolid have since been released 4. 2017 guidelines Solid nodules Solitary nodule size: <6 mm *low risk patients: no follow-up needed *high risk patients: optional CT at 12 months Solitary nodule size: 6-8 mm *low risk patients: follow-up at 6-12 months, then consider further follow-up at 18-24 months *high risk patients: initial follow-up CT at 6-12 months and then at 18-24 months if no change Solitary nodule size: >8 mm *either low or high risk patients *consider follow-up CT at 3 months, and/or CT-PET, and/or biopsy Multiple nodules size: <6 mm *low risk patients: no routine follow-up *high risk patients: optional CT at 12 months Multiple nodules size: 6-8 mm *low risk patients: follow-up at 3-6 months, then consider further follow-up at 18-24 months *high risk patients: follow-up at 3-6 months, then at 18-24 months if no change  Multiple nodules size: >8 mm *low risk patients: follow-up at 3-6 months, then consider further follow-up at 18-24 months *high risk patients: follow-up at 3-6 months, then at 18-24 months if no change *  Note: newly detected indeterminate nodule in persons 35 years of age or older. *low risk patients: minimal or absent history of smoking and or other known risk factors *high risk patients: history of smoking or of other known risk factors (e.g. first degree relative with lung cancer, or exposure to asbestos, radon, uranium) *if a nodule up to 8 mm is partly solid or is ground glass further follow-up is required after 24 months to exclude possible slow growing adenocarcinoma (IKE) Subsolid nodules Solitary pure ground-glass nodule *nodule size <6mm *no CT follow-up required *nodule size > or equal to 6mm *follow up CT at 6-12 months, then every 2 years until 5 years Solitary part-solid nodule *nodule size <6mm *no CT follow-up required *nodule size > or equal to 6mm *follow-up CT at 3-6 months *if unchanged, and solid component remains <6mm, then annual follow-up for 5 years Multiple subsolid nodules *nodule size <6mm *follow-up CT at 3-6 months *consider further follow-up at 2 and 4 years if stable *nodule size > or equal to 6mm *follow-up CT at 3-6 months *subsequent management based on the most suspicious nodule(s) Electronically Signed: Jonas Evans MD  2/8/2025 4:13 PM EST  Workstation ID: [id number]     Results for orders placed during the hospital encounter of 01/12/21    Adult Transthoracic Echo Complete W/ Cont if Necessary Per Protocol    Interpretation Summary  · The right ventricle and right atrium are moderately dilated. Other chamber sizes and wall thicknesses are normal.  · Global and segmental LV wall motion is normal. The estimated left ventricular ejection fraction is 51% - 55%.  · The aortic and mitral valves are normal in structure and function.  · The estimated RV systolic pressure is mildly  "elevated 35 mmHg - 40 mmHg. There is as well noted to be less than 50% inspiratory IVC collapse. The main pulmonary artery is \"borderline dilated\".  · There are no other important findings on this study.      Current medications:  Scheduled Meds:atorvastatin, 40 mg, Oral, Daily  budesonide-formoterol, 2 puff, Inhalation, BID - RT  carbidopa-levodopa, 2 tablet, Oral, 4x Daily  DAPTOmycin, 4 mg/kg (Adjusted), Intravenous, Q24H  dexAMETHasone, 6 mg, Oral, Daily With Breakfast  famotidine, 20 mg, Oral, BID AC  fluticasone, 2 spray, Each Nare, Daily  furosemide, 20 mg, Oral, Daily  heparin (porcine), 5,000 Units, Subcutaneous, Q12H  levothyroxine, 88 mcg, Oral, Q AM  piperacillin-tazobactam, 3.375 g, Intravenous, Q8H  polyethylene glycol, 17 g, Oral, BID   And  senna, 2 tablet, Oral, BID  pramipexole, 1 mg, Oral, TID  remdesivir, 100 mg, Intravenous, Q24H  tamsulosin, 0.4 mg, Oral, Daily  vitamin D3, 5,000 Units, Oral, Daily      Continuous Infusions:Pharmacy Consult - Remdesivir,       PRN Meds:.  acetaminophen    polyethylene glycol **AND** senna **AND** bisacodyl    Calcium Replacement - Follow Nurse / BPA Driven Protocol    guaifenesin    HYDROcodone-acetaminophen    influenza vaccine    Magnesium Standard Dose Replacement - Follow Nurse / BPA Driven Protocol    Morphine    Pharmacy Consult - Remdesivir    Phosphorus Replacement - Follow Nurse / BPA Driven Protocol    Potassium Replacement - Follow Nurse / BPA Driven Protocol    sodium chloride    sodium chloride    Assessment & Plan   Assessment & Plan     Active Hospital Problems    Diagnosis  POA    **Bilateral lower leg cellulitis [L03.116, L03.115]  Yes    Cellulitis [L03.90]  Yes    Anemia, chronic disease [D63.8]  Yes    History of DVT (deep vein thrombosis) [Z86.718]  Not Applicable    Restrictive pattern on PFTs w/o evidence of ILD [R94.2]  Yes    Memory loss [R41.3]  Yes    Parkinson's disease [G20.A1]  Yes      Resolved Hospital Problems   No resolved " problems to display.        Brief Hospital Course to date:  Cordell Martin is a 80 y.o. male w/ Parkinson disease, chronic poor mobility, lymphedema, DVT, who presented for eval of leg pain and worsening LE wounds; he was admitted for concern for LE cellulitis, superficial wound Cx was positive for MRSA, Pseudomonas, E. Coli; ID follows for Abx recs, wound care follows for compression wraps; early AM 2/8/25 he complained of cough and tested positive for Covid 19     Infected BL LE wounds  Lymphedema  Hx DVT  -wound care follows for compression wraps  -Polymicrobial superficial wound swab  -ID follows, daptomycin/Zosyn 7-10 days (started 2/4/25)     Covid 19 infection  Hypoxia  -reports sore throat ~4 days (would be 2/4/25), swab + 2/8/25  -dexamethasone and remdesivir   -prev CXR concerning for edema, ordered CT chest to better eval for poss covid 19 changes- bilateral GGO evident. Also with new pulmonary nodule. Will need outpatient follow up.      Parkinson disease  Cognitive impairment  Chronic poor mobility  -cont sinemet, mirapex  -eventual plan for rehab with intent to return to independent living     Constipation   - bowel regimen     Anxiety/Depression     Hypothyroid   - levothyroxine     HLD   - statin     GERD   - continue pepcid while on steroids     Hx DVT   - heparin subq       Expected Discharge Location and Transportation:   Expected Discharge   Expected Discharge Date: 2/12/2025; Expected Discharge Time:      VTE Prophylaxis:  Pharmacologic VTE prophylaxis orders are present.         AM-PAC 6 Clicks Score (PT): 10 (02/09/25 0866)    CODE STATUS:   Code Status and Medical Interventions: CPR (Attempt to Resuscitate); Full Support   Ordered at: 02/03/25 1628     Code Status (Patient has no pulse and is not breathing):    CPR (Attempt to Resuscitate)     Medical Interventions (Patient has pulse or is breathing):    Full Support       Erica Arenas II, DO  02/10/25

## 2025-02-11 VITALS
TEMPERATURE: 97.9 F | RESPIRATION RATE: 18 BRPM | SYSTOLIC BLOOD PRESSURE: 126 MMHG | DIASTOLIC BLOOD PRESSURE: 69 MMHG | WEIGHT: 263.9 LBS | BODY MASS INDEX: 35.74 KG/M2 | HEART RATE: 59 BPM | OXYGEN SATURATION: 94 % | HEIGHT: 72 IN

## 2025-02-11 PROBLEM — L03.115 BILATERAL LOWER LEG CELLULITIS: Status: RESOLVED | Noted: 2025-02-03 | Resolved: 2025-02-11

## 2025-02-11 PROBLEM — D89.831 CYTOKINE RELEASE SYNDROME, GRADE 1: Status: ACTIVE | Noted: 2025-02-11

## 2025-02-11 PROBLEM — L03.90 CELLULITIS: Status: RESOLVED | Noted: 2024-10-09 | Resolved: 2025-02-11

## 2025-02-11 PROBLEM — D89.831 CYTOKINE RELEASE SYNDROME, GRADE 1: Status: RESOLVED | Noted: 2025-02-11 | Resolved: 2025-02-11

## 2025-02-11 PROBLEM — L03.116 BILATERAL LOWER LEG CELLULITIS: Status: RESOLVED | Noted: 2025-02-03 | Resolved: 2025-02-11

## 2025-02-11 PROCEDURE — 99239 HOSP IP/OBS DSCHRG MGMT >30: CPT | Performed by: INTERNAL MEDICINE

## 2025-02-11 PROCEDURE — 25010000002 DAPTOMYCIN PER 1 MG: Performed by: INTERNAL MEDICINE

## 2025-02-11 PROCEDURE — 25010000002 PIPERACILLIN SOD-TAZOBACTAM PER 1 G: Performed by: INTERNAL MEDICINE

## 2025-02-11 PROCEDURE — 63710000001 DEXAMETHASONE PER 0.25 MG: Performed by: INTERNAL MEDICINE

## 2025-02-11 PROCEDURE — 25010000002 HEPARIN (PORCINE) PER 1000 UNITS: Performed by: INTERNAL MEDICINE

## 2025-02-11 RX ORDER — ATORVASTATIN CALCIUM 40 MG/1
40 TABLET, FILM COATED ORAL DAILY
Start: 2025-02-18

## 2025-02-11 RX ORDER — LEVOTHYROXINE SODIUM 88 UG/1
88 TABLET ORAL
Start: 2025-02-12

## 2025-02-11 RX ORDER — HYDROCODONE BITARTRATE AND ACETAMINOPHEN 5; 325 MG/1; MG/1
1 TABLET ORAL EVERY 4 HOURS PRN
Start: 2025-02-11 | End: 2025-02-13

## 2025-02-11 RX ADMIN — CARBIDOPA AND LEVODOPA 2 TABLET: 25; 100 TABLET ORAL at 08:35

## 2025-02-11 RX ADMIN — LEVOTHYROXINE SODIUM 88 MCG: 0.09 TABLET ORAL at 06:41

## 2025-02-11 RX ADMIN — SENNOSIDES 2 TABLET: 8.6 TABLET, FILM COATED ORAL at 08:35

## 2025-02-11 RX ADMIN — ATORVASTATIN CALCIUM 40 MG: 40 TABLET, FILM COATED ORAL at 08:34

## 2025-02-11 RX ADMIN — FLUTICASONE PROPIONATE 2 SPRAY: 50 SPRAY, METERED NASAL at 08:31

## 2025-02-11 RX ADMIN — PIPERACILLIN AND TAZOBACTAM 3.38 G: 3; .375 INJECTION, POWDER, LYOPHILIZED, FOR SOLUTION INTRAVENOUS at 08:35

## 2025-02-11 RX ADMIN — DAPTOMYCIN 400 MG: 500 INJECTION, POWDER, LYOPHILIZED, FOR SOLUTION INTRAVENOUS at 08:31

## 2025-02-11 RX ADMIN — PIPERACILLIN AND TAZOBACTAM 3.38 G: 3; .375 INJECTION, POWDER, LYOPHILIZED, FOR SOLUTION INTRAVENOUS at 00:25

## 2025-02-11 RX ADMIN — FUROSEMIDE 20 MG: 20 TABLET ORAL at 08:34

## 2025-02-11 RX ADMIN — FAMOTIDINE 20 MG: 20 TABLET, FILM COATED ORAL at 06:41

## 2025-02-11 RX ADMIN — PRAMIPEXOLE DIHYDROCHLORIDE 1 MG: 1 TABLET ORAL at 08:35

## 2025-02-11 RX ADMIN — Medication 5000 UNITS: at 08:35

## 2025-02-11 RX ADMIN — DEXAMETHASONE 6 MG: 4 TABLET ORAL at 08:35

## 2025-02-11 RX ADMIN — HEPARIN SODIUM 5000 UNITS: 5000 INJECTION INTRAVENOUS; SUBCUTANEOUS at 08:35

## 2025-02-11 RX ADMIN — TAMSULOSIN HYDROCHLORIDE 0.4 MG: 0.4 CAPSULE ORAL at 08:35

## 2025-02-11 NOTE — DISCHARGE SUMMARY
[facility] Medicine Services  DISCHARGE SUMMARY    Patient Name: Cordell Martin  : 1945  MRN: [id number]    Date of Admission: 2/3/2025 11:11 AM  Date of Discharge:  2025  Primary Care Physician: Ben Holder DO    Consults       Date and Time Order Name Status Description    2/3/2025 10:24 PM Inpatient Infectious Diseases Consult Completed             Hospital Course     Presenting Problem: cellulitis    Active Hospital Problems    Diagnosis  POA    Anemia, chronic disease [D63.8]  Yes    History of DVT (deep vein thrombosis) [Z86.718]  Not Applicable    Restrictive pattern on PFTs w/o evidence of ILD [R94.2]  Yes    Memory loss [R41.3]  Yes    Parkinson's disease [G20.A1]  Yes      Resolved Hospital Problems    Diagnosis Date Resolved POA    **Bilateral lower leg cellulitis [L03.116, L03.115] 2025 Yes    Cytokine release syndrome, grade 1 [D89.831] 2025 No    Cellulitis [L03.90] 2025 Yes          Hospital Course:  Cordell Martin is a 80 y.o. male w/ Parkinson disease, chronic poor mobility, lymphedema, DVT, who presented for eval of leg pain and worsening LE wounds; he was admitted for concern for LE cellulitis, superficial wound Cx was positive for MRSA, Pseudomonas, E. Coli; ID follows for Abx recs, wound care follows for compression wraps; early AM 25 he complained of cough and tested positive for Covid 19     Infected BL LE wounds  Lymphedema  Hx DVT  -Upon arrival wound care follows for compression wraps. Wound culture resulted with growth E coli, pseudomonas, MRAS. ID followed during stay - patient was treated with dapto/zosyn. D/W Dr. Jorgensen prior to d/c, rec'd continuing abx at d/c and he will continue to follow at [facility] to determine duration.  -Hold statin until off dapto.     Covid 19 infection  Hypoxia  -Patient reports symptom onset 25 swab + 25. He received dexamethasone and remdesivir during stay.  -Patient noted to  have new pulmonary nodule on imaging. Will need outpatient follow up. Ambulatory referral to nodule clinic placed.    Day of Discharge     HPI: Up in bed. Feels fairly well. Breathing comfortably. Eating fairly well.    Review of Systems  Gen- No fevers, chills  CV- No chest pain, palpitations  Resp- No cough, dyspnea  GI- No N/V/D, abd pain    Vital Signs:   Temp:  [97.4 °F (36.3 °C)-97.9 °F (36.6 °C)] 97.9 °F (36.6 °C)  Heart Rate:  [44-70] 59  Resp:  [16-20] 18  BP: (125-154)/(65-91) 126/69  Flow (L/min) (Oxygen Therapy):  [3] 3      Physical Exam:  Constitutional: No acute distress, awake, alert, chronically ill appearing  HENT: NCAT, mucous membranes moist  Respiratory: Clear to auscultation bilaterally, respiratory effort normal   Cardiovascular: RRR, no murmurs, rubs, or gallops  Gastrointestinal: Positive bowel sounds, soft, nontender, nondistended, obese abd  Musculoskeletal: Legs wrapped  Psychiatric: Appropriate affect, cooperative  Neurologic: Oriented x 3, strength symmetric in all extremities, Cranial Nerves grossly intact to confrontation, speech clear  Skin: No rashes     Pertinent  and/or Most Recent Results     LAB RESULTS:      Lab 02/10/25  0501 02/09/25  0456 02/08/25  1024 02/06/25  0646 02/05/25  0639   WBC 7.21 4.72 6.13 5.97 5.57   HEMOGLOBIN 11.3* 11.2* 10.5* 10.1* 9.9*   HEMATOCRIT 36.2* 35.9* 33.0* 32.6* 32.0*   PLATELETS 275 254 203 206 195   NEUTROS ABS 5.08 3.55 3.83  --   --    IMMATURE GRANS (ABS) 0.08* 0.05 0.05  --   --    LYMPHS ABS 1.46 0.90 1.36  --   --    MONOS ABS 0.58 0.21 0.57  --   --    EOS ABS 0.00 0.00 0.31  --   --    MCV 91.9 91.8 91.7 92.9 94.1   CRP 7.62*  --   --   --   --          Lab 02/10/25  0501 02/09/25  0456 02/08/25  1024 02/07/25  1411 02/06/25  0646   SODIUM 143 140 137 137 138   POTASSIUM 4.4 4.3 4.2 4.2 4.1   CHLORIDE 107 106 101 101 103   CO2 25.0 23.0 23.0 26.0 24.0   ANION GAP 11.0 11.0 13.0 10.0 11.0   BUN 33* 24* 23 23 23   CREATININE 1.26 1.17  1.26 1.46* 1.35*   EGFR 58.4* 63.8 58.4* 48.9* 53.7*   GLUCOSE 137* 151* 128* 145* 93   CALCIUM 8.8 9.2 8.8 8.8 8.4*   MAGNESIUM  --   --   --  2.2  --    PHOSPHORUS  --   --   --  3.1  --          Lab 02/10/25  0501 02/09/25  0456   TOTAL PROTEIN 7.0 7.3   ALBUMIN 2.9* 3.2*   GLOBULIN 4.1 4.1   ALT (SGPT) 7 <5   AST (SGOT) 25 20   BILIRUBIN 0.2 0.3   ALK PHOS 130* 146*                 Lab 02/08/25  1024   FERRITIN 355.40         Brief Urine Lab Results  (Last result in the past 365 days)        Color   Clarity   Blood   Leuk Est   Nitrite   Protein   CREAT   Urine HCG        10/08/24 2219 Yellow   Clear   Negative   Negative   Negative   Negative                 Microbiology Results (last 10 days)       Procedure Component Value - Date/Time    COVID-19, FLU A/B, RSV PCR 1 HR TAT - Swab, Nasopharynx [[id number]]  (Abnormal) Collected: 02/08/25 0142    Lab Status: Final result Specimen: Swab from Nasopharynx Updated: 02/08/25 0631     COVID19 Detected     Influenza A PCR Not Detected     Influenza B PCR Not Detected     RSV, PCR Not Detected    Narrative:      Fact sheet for providers: https://www.fda.gov/media/964697/download    Fact sheet for patients: https://www.fda.gov/media/042443/download    Test performed by PCR.    Wound Culture - Wound, Leg, Right [[id number]]  (Abnormal)  (Susceptibility) Collected: 02/03/25 1903    Lab Status: Final result Specimen: Wound from Leg, Right Updated: 02/07/25 0721     Wound Culture Light growth (2+) Staphylococcus aureus, MRSA     Comment: Refer to previous wound culture collected on 2/3/2025 1528 for MICs    Methicillin resistant Staphylococcus aureus, Patient may be an isolation risk.         Scant growth (1+) Pseudomonas aeruginosa     Comment: Refer to previous wound culture collected on 2/3/2025 1528 for MICs           Rare growth Escherichia coli     Gram Stain Rare (1+) WBCs seen      Rare (1+) Gram positive cocci in pairs    Narrative:            Susceptibility         Escherichia coli      DRE      Amoxicillin + Clavulanate Susceptible      Ampicillin Susceptible      Ampicillin + Sulbactam Susceptible      Cefazolin (Non Urine) Susceptible      Cefepime Susceptible      Ceftazidime Susceptible      Ceftriaxone Susceptible      Gentamicin Susceptible      Levofloxacin Susceptible      Piperacillin + Tazobactam Susceptible      Tetracycline Resistant      Trimethoprim + Sulfamethoxazole Resistant                       Susceptibility Comments       Escherichia coli    With the exception of urinary-sourced infections, aminoglycosides should not be used as monotherapy.               MRSA Screen, PCR (Inpatient) - Swab, Nares [[id number]]  (Abnormal) Collected: 02/03/25 1903    Lab Status: Final result Specimen: Swab from Nares Updated: 02/03/25 2102     MRSA PCR Positive    Narrative:      The negative predictive value of this diagnostic test is high and should only be used to consider de-escalating anti-MRSA therapy. A positive result may indicate colonization with MRSA and must be correlated clinically.    Blood Culture - Blood, Wrist, Left [[id number]]  (Normal) Collected: 02/03/25 1556    Lab Status: Final result Specimen: Blood from Wrist, Left Updated: 02/08/25 1631     Blood Culture No growth at 5 days    Narrative:      Less than seven (7) mL's of blood was collected.  Insufficient quantity may yield false negative results.    Wound Culture - Swab, Leg, Left [[id number]]  (Abnormal)  (Susceptibility) Collected: 02/03/25 1528    Lab Status: Edited Result - FINAL Specimen: Swab from Leg, Left Updated: 02/05/25 0754     Wound Culture Heavy growth (4+) Staphylococcus aureus, MRSA     Comment:   Methicillin resistant Staphylococcus aureus, Patient may be an isolation risk.         Scant growth (1+) Pseudomonas aeruginosa     Gram Stain Many (4+) Gram positive cocci in pairs and clusters      Rare (1+) WBCs seen      Rare (1+) Gram negative bacilli    Susceptibility         Staphylococcus aureus, MRSA      DRE      Clindamycin Susceptible      Daptomycin Susceptible (C)  [1]       Erythromycin Resistant      Oxacillin Resistant      Rifampin Susceptible      Tetracycline Resistant      Trimethoprim + Sulfamethoxazole Susceptible      Vancomycin Susceptible                   [1]  Appended report. These results have been appended to a previously final verified report.                Susceptibility        Pseudomonas aeruginosa      DRE      Cefepime Susceptible      Ceftazidime Susceptible      Ciprofloxacin Susceptible      Levofloxacin Intermediate      Piperacillin + Tazobactam Susceptible      Tobramycin Susceptible                       Susceptibility Comments       Staphylococcus aureus, MRSA    Daptomycin requested by Dr King 2/5      Pseudomonas aeruginosa    With the exception of urinary-sourced infections, aminoglycosides should not be used as monotherapy.               Blood Culture - Blood, Wrist, Left [[id number]]  (Normal) Collected: 02/03/25 1524    Lab Status: Final result Specimen: Blood from Wrist, Left Updated: 02/08/25 1631     Blood Culture No growth at 5 days    Narrative:      Less than seven (7) mL's of blood was collected.  Insufficient quantity may yield false negative results.            CT Chest Without Contrast Diagnostic    Result Date: 2/8/2025  CT CHEST WO CONTRAST DIAGNOSTIC Date of Exam: 2/8/2025 2:35 PM EST Indication: Covid 19 positive, eval lung parenchyma. Comparison: 10/9/2024 Technique: Axial CT images were obtained of the chest without contrast administration.  Reconstructed coronal and sagittal images were also obtained. Automated exposure control and iterative construction methods were used. Findings: MEDIASTINUM: Unremarkable. Aortic and heart size are normal. No mass nor pericardial effusion. CORONARY ARTERIES: There is calcified atherosclerotic disease. LUNGS: There is mild reticular interstitial prominence. There are new nodular airspace  opacities within each lung base with some areas of irregular groundglass attenuation suggestive of superimposed infectious process including sequela of COVID-19. No dense lobar consolidation. There is a 5 mm subpleural right middle lobe nodule (image 57, series 4), previously 4 mm. PLEURAL SPACE: There are small bilateral pleural effusions. LYMPH NODES: There are no pathologically enlarged lymph nodes. UPPER ABDOMEN: Unremarkable OSSEOUS STRUCTURES: Appropriate for age with no acute process identified.     Impression: 1.Faint bilateral lower lung nodular and groundglass opacities suggesting superimposed pneumonia including COVID-19. 2.Small bilateral pleural effusions. 3.Slightly larger 5 mm subpleural right middle lobe nodule. See below recommendations. The Fleischner society pulmonary nodule recommendations are for the follow-up and management of pulmonary nodules smaller than 8 mm detected incidentally in patients >35 years on non-screening CT. The initial guidelines for the management of solid nodules were released in 2005 1, and guidelines for the management of subsolid nodules were released in 2013 2. New revised 2017 recommendations for both solid and subsolid have since been released 4. 2017 guidelines Solid nodules Solitary nodule size: <6 mm *low risk patients: no follow-up needed *high risk patients: optional CT at 12 months Solitary nodule size: 6-8 mm *low risk patients: follow-up at 6-12 months, then consider further follow-up at 18-24 months *high risk patients: initial follow-up CT at 6-12 months and then at 18-24 months if no change Solitary nodule size: >8 mm *either low or high risk patients *consider follow-up CT at 3 months, and/or CT-PET, and/or biopsy Multiple nodules size: <6 mm *low risk patients: no routine follow-up *high risk patients: optional CT at 12 months Multiple nodules size: 6-8 mm *low risk patients: follow-up at 3-6 months, then consider further follow-up at 18-24 months *high  risk patients: follow-up at 3-6 months, then at 18-24 months if no change Multiple nodules size: >8 mm *low risk patients: follow-up at 3-6 months, then consider further follow-up at 18-24 months *high risk patients: follow-up at 3-6 months, then at 18-24 months if no change *  Note: newly detected indeterminate nodule in persons 35 years of age or older. *low risk patients: minimal or absent history of smoking and or other known risk factors *high risk patients: history of smoking or of other known risk factors (e.g. first degree relative with lung cancer, or exposure to asbestos, radon, uranium) *if a nodule up to 8 mm is partly solid or is ground glass further follow-up is required after 24 months to exclude possible slow growing adenocarcinoma (IKE) Subsolid nodules Solitary pure ground-glass nodule *nodule size <6mm *no CT follow-up required *nodule size > or equal to 6mm *follow up CT at 6-12 months, then every 2 years until 5 years Solitary part-solid nodule *nodule size <6mm *no CT follow-up required *nodule size > or equal to 6mm *follow-up CT at 3-6 months *if unchanged, and solid component remains <6mm, then annual follow-up for 5 years Multiple subsolid nodules *nodule size <6mm *follow-up CT at 3-6 months *consider further follow-up at 2 and 4 years if stable *nodule size > or equal to 6mm *follow-up CT at 3-6 months *subsequent management based on the most suspicious nodule(s) Electronically Signed: Jonas Evans MD  2/8/2025 4:13 PM EST  Workstation ID: [id number]    XR Chest 1 View    Result Date: 2/6/2025  XR CHEST 1 VW Date of Exam: 2/6/2025 9:33 AM EST Indication: Hypoxia Comparison: 2/3/2025 Findings: Heart size is enlarged. There is mild pulmonary vascular congestion and interstitial edema. No focal consolidation is identified. There is no significant pleural fluid.     Impression: Cardiomegaly with mild pulmonary vascular congestion and interstitial edema. Electronically Signed: Gregor Herrera MD   2/6/2025 10:21 AM EST  Workstation ID: [id number]    Duplex Venous Lower Extremity - Bilateral CAR    Result Date: 2/3/2025    Normal bilateral lower extremity venous duplex scan.     XR Chest 1 View    Result Date: 2/3/2025  XR CHEST 1 VW Date of Exam: 2/3/2025 12:34 PM EST Indication: CHF/COPD Protocol Comparison: 10/8/2024 Findings: Metallic zipper artifact is superimposed over the neck. There is previous right shoulder surgery. Heart appears mildly enlarged. Vasculature appears upper limits of normal. No pulmonary edema is seen. Lung volumes are relatively low. There is mild coarsening of the pulmonary interstitial markings but similar to prior exams. Basilar interstitial markings appear to correlate with mild interstitial disease seen in the lower lungs on 10/9/2024 chest CT scan.     Impression: 1. Mild cardiomegaly and pulmonary venous hypertension. 2. Relatively low lung volumes and mild chronic appearing reticular lung changes similar to prior studies. Electronically Signed: Fabian Dye MD  2/3/2025 1:11 PM EST  Workstation ID: [id number]     Results for orders placed during the hospital encounter of 02/03/25    Duplex Venous Lower Extremity - Bilateral CAR    Interpretation Summary    Normal bilateral lower extremity venous duplex scan.      Results for orders placed during the hospital encounter of 02/03/25    Duplex Venous Lower Extremity - Bilateral CAR    Interpretation Summary    Normal bilateral lower extremity venous duplex scan.      Results for orders placed during the hospital encounter of 01/12/21    Adult Transthoracic Echo Complete W/ Cont if Necessary Per Protocol    Interpretation Summary  · The right ventricle and right atrium are moderately dilated. Other chamber sizes and wall thicknesses are normal.  · Global and segmental LV wall motion is normal. The estimated left ventricular ejection fraction is 51% - 55%.  · The aortic and mitral valves are normal in structure and function.  · The  "estimated RV systolic pressure is mildly elevated 35 mmHg - 40 mmHg. There is as well noted to be less than 50% inspiratory IVC collapse. The main pulmonary artery is \"borderline dilated\".  · There are no other important findings on this study.      Plan for Follow-up of Pending Labs/Results:     Discharge Details        Discharge Medications        New Medications        Instructions Start Date   DAPTOmycin 400 mg in sodium chloride 0.9 % 50 mL   4 mg/kg (400 mg), Intravenous, Every 24 Hours   Start Date: February 12, 2025     HYDROcodone-acetaminophen 5-325 MG per tablet  Commonly known as: NORCO   1 tablet, Oral, Every 4 Hours PRN      piperacillin-tazobactam 3-0.375 GM/50ML IVPB  Commonly known as: ZOSYN   3.375 g, Intravenous, Every 6 Hours Scheduled, Infuse over 30 minutes             Changes to Medications        Instructions Start Date   atorvastatin 40 MG tablet  Commonly known as: LIPITOR  What changed: These instructions start on February 18, 2025. If you are unsure what to do until then, ask your doctor or other care provider.   40 mg, Oral, Daily   Start Date: February 18, 2025     fluticasone 50 MCG/ACT nasal spray  Commonly known as: FLONASE  What changed:   how to take this  when to take this   USE 2 SPRAYS IN EACH NOSTRIL ONCE DAILY             Continue These Medications        Instructions Start Date   ammonium lactate 12 % lotion  Commonly known as: AmLactin   Topical, As Needed      budesonide-formoterol 160-4.5 MCG/ACT inhaler  Commonly known as: SYMBICORT   INHALE 2 PUFFS BY MOUTH TWICE DAILY FOR CHRONIC BRONCHITIS. RINSE MOUTH AFTER USE      carbidopa-levodopa  MG per tablet  Commonly known as: SINEMET   TAKE 2 TABLETS BY MOUTH 4 TIMES DAILY *MUST MAKE APPT FOR REFILLS*      furosemide 20 MG tablet  Commonly known as: LASIX   20 mg, Oral, Daily      levothyroxine 88 MCG tablet  Commonly known as: SYNTHROID, LEVOTHROID   88 mcg, Oral, Every Early Morning      pramipexole 1 MG " tablet  Commonly known as: MIRAPEX   1 mg, Oral, 3 Times Daily      tamsulosin 0.4 MG capsule 24 hr capsule  Commonly known as: FLOMAX   tamsulosin 0.4 mg capsule   TAKE 1 CAPSULE BY MOUTH EVERY DAY      vitamin D3 125 MCG (5000 UT) capsule capsule   1 capsule, Daily               No Known Allergies      Discharge Disposition:  Rehab Facility or Unit (DC - External)    Diet:  Hospital:  Diet Order   Procedures    Diet: Regular/House; Fluid Consistency: Thin (IDDSI 0)            Activity:      Restrictions or Other Recommendations:         CODE STATUS:    Code Status and Medical Interventions: CPR (Attempt to Resuscitate); Full Support   Ordered at: 02/03/25 1628     Code Status (Patient has no pulse and is not breathing):    CPR (Attempt to Resuscitate)     Medical Interventions (Patient has pulse or is breathing):    Full Support       Future Appointments   Date Time Provider Department Center   2/11/2025 11:15 AM MED 11 [facility] None       Additional Instructions for the Follow-ups that You Need to Schedule       Ambulatory Referral to Home Health   As directed      Face to Face Visit Date: 2/4/2025   Follow-up provider for Plan of Care?: I treated the patient in an acute care facility and will not continue treatment after discharge.   Follow-up provider: CE BREEN [[id number]]   Reason/Clinical Findings: Bilateral lower extremity cellulitis   Describe mobility limitations that make leaving home difficult: impaired functional mobility, balance gait and endurance, Parkinson's   Nursing/Therapeutic Services Requested: Skilled Nursing Physical Therapy Occupational Therapy   Skilled nursing orders: Wound care dressing/changes Cardiopulmonary assessments Neurovascular assessments Medication education   PT orders: Therapeutic exercise Gait Training Transfer training Strengthening Home safety assessment   Weight Bearing Status: As Tolerated   Occupational orders: Activities of daily living Energy conservation  Strengthening Home safety assessment   Frequency: 1 Week 1                      Erica Arenas II,   02/11/25      Time Spent on Discharge:  I spent  33  minutes on this discharge activity which included: face-to-face encounter with the patient, reviewing the data in the system, coordination of the care with the nursing staff as well as consultants, documentation, and entering orders.

## 2025-02-11 NOTE — PLAN OF CARE
Goal Outcome Evaluation:  Plan of Care Reviewed With: patient        Progress: no change  Outcome Evaluation: No complaints of pain or SOA. On 3L NC. Patient needs met this shift.

## 2025-02-11 NOTE — CASE MANAGEMENT/SOCIAL WORK
Case Management Discharge Note      Final Note: Pt is being discharged to acute rehab at [facility] today. CM confirmed with [facility] facility liaison, that Pt can be accepted. Facility will retrieve discharge summary from [facility]. RN to call report to [phone]. If accepting RN is unavailable for report, RN may call report to house supervisor at [phone]. [facility]  [facility] in room @ 1115. PCS was scanned into dropbox. Pt and sister, Lennie, are aware and agreeable to plan. No other discharge needs identified.         Selected Continued Care - Admitted Since 2/3/2025       Destination Coordination complete.      Service Provider Services Address Phone Fax Patient Preferred    [facility] [address] [address] [phone] --              Durable Medical Equipment    No services have been selected for the patient.                Dialysis/Infusion    No services have been selected for the patient.                Home Medical Care Coordination complete.      Service Provider Services Address Phone Fax Patient Preferred    [facility] [address] [facility] [address] [phone] -- --              Therapy    No services have been selected for the patient.                Community Resources    No services have been selected for the patient.                Community & DME    No services have been selected for the patient.                    Transportation Services  Ambulance: [facility] (2/11 @ 1115)    Final Discharge Disposition Code: 62 - inpatient rehab facility

## 2025-02-11 NOTE — PROGRESS NOTES
Cordell Martin  1945  [id number]      Evaluating Physician: Trevor Jorgensen MD    Chief Complaint: leg pain    Reason for Consultation: leg infections    History of present illness:     Patient is a 80 y.o.  Yr old male Previously patient of Dr. Benites, with history of Parkinson's disease and chronically debilitated, uses a wheelchair primarily by his report but apparently had occasionally been able to use a walker.  Admitted October 2024 with right greater than left lower leg and foot cellulitis, MRI no osteomyelitis and prior history trauma/maggots; culture with skin derrell and pseudomonas aeruginosa, finished IV Zosyn in the hospital.Admission notes indicate cognitive impairment and other comorbidity as outlined below.    Admitted February 3, 2025 with worsening redness/pain at both lower legs with open wounds and unable to make appointments with wound care, apparently difficulty with transportation.  In addition he reports that the Unna boot wraps had been left on for weeks without change.  He did not know about any other specific trauma but admits to bumping the legs periodically and could have had superficial trauma     2/8/25 had reported cough, oxygen saturations lower regarding need for ongoing supplemental oxygen and subsequent COVID positivity.  Remdesivir/decadron added, moved to appropriate isolation on 6 N.   chest x-ray with vague interstitial changes ; CT chest with faint bilateral changes suggesting superimposed pneumonia including COVID, small bilateral pleural effusions      2/11/25 sleepy at my visit; stable oxygen requirements Per nursing, 3L NC O2; cultures noted; Bilateral leg pain is dull at present, intermittent, nonradiating, worse with pressure, better with pain meds and 2  out of 10 in severity, not progressive.  Redness and swelling not progressive.    No headache photophobia or neck stiffness.     No nausea vomiting diarrhea or abdominal pain.  No dysuria hematuria or  pyuria.  No rash        Past Medical History:   Diagnosis Date    Anxiety     Arthritis     Back pain     Bronchitis     Cognitive impairment     Depression     Disease of thyroid gland     Glucose intolerance (impaired glucose tolerance)     Hyperlipidemia     Kidney stone     Obesity     Parkinson disease     Pneumonia     Prostate disorder     Thyroid disease     Wears eyeglasses        Past Surgical History:   Procedure Laterality Date    COLONOSCOPY      5 years ago    EYE SURGERY      HERNIA REPAIR  10/20/2020    KNEE SURGERY      LUMBAR LAMINECTOMY DISCECTOMY DECOMPRESSION N/A 07/13/2017    Procedure: LUMBAR LAMINECTOMY L4-5;  Surgeon: Antonio Trent MD;  Location: [facility];  Service:     SHOULDER ROTATOR CUFF REPAIR Right     x2    TONSILLECTOMY      VEIN SURGERY         Pediatric History   Patient Parents    Not on file     Other Topics Concern    Not on file   Social History Narrative    Lives alone. Uses walker    Has not smoked since about 1980       family history includes Alzheimer's disease in an other family member; Cancer in his father and another family member; Diabetes in an other family member; Heart disease in an other family member; Stroke in an other family member.    No Known Allergies    Medication:  Current Facility-Administered Medications   Medication Dose Route Frequency Provider Last Rate Last Admin    acetaminophen (TYLENOL) tablet 650 mg  650 mg Oral Q4H PRN Serenity Drummond MD        atorvastatin (LIPITOR) tablet 40 mg  40 mg Oral Daily Leobardo Hayden MD   40 mg at 02/10/25 0935    polyethylene glycol (MIRALAX) packet 17 g  17 g Oral BID Leobardo Hayden MD   17 g at 02/10/25 0934    And    senna (SENOKOT) tablet 2 tablet  2 tablet Oral BID Leobardo Hayden MD   2 tablet at 02/10/25 0935    And    bisacodyl (DULCOLAX) suppository 10 mg  10 mg Rectal Daily PRN Leobardo Hayden MD        budesonide-formoterol (SYMBICORT) 160-4.5 MCG/ACT inhaler 2 puff  2 puff Inhalation BID - RT  Serenity Drummond MD   2 puff at 02/10/25 2105    Calcium Replacement - Follow Nurse / BPA Driven Protocol   Not Applicable PRN Serenity Drummond MD        carbidopa-levodopa (SINEMET)  MG per tablet 2 tablet  2 tablet Oral 4x Daily Serenity Drummond MD   2 tablet at 02/10/25 2050    DAPTOmycin (CUBICIN) 400 mg in sodium chloride 0.9 % 50 mL IVPB  4 mg/kg (Adjusted) Intravenous Q24H Trevor Jorgensen  mL/hr at 02/10/25 0935 400 mg at 02/10/25 0935    dexAMETHasone (DECADRON) tablet 6 mg  6 mg Oral Daily With Breakfast Gilbert Canales DO   6 mg at 02/10/25 0935    famotidine (PEPCID) tablet 20 mg  20 mg Oral BID AC Gilbert Canales DO   20 mg at 02/11/25 0641    fluticasone (FLONASE) 50 MCG/ACT nasal spray 2 spray  2 spray Each Nare Daily Leobardo Hayden MD   2 spray at 02/10/25 0936    furosemide (LASIX) tablet 20 mg  20 mg Oral Daily Leobardo Hayden MD   20 mg at 02/10/25 0937    guaifenesin (ROBITUSSIN) 100 MG/5ML liquid 200 mg  200 mg Oral Q4H PRN Ayla Nelson APRN   200 mg at 02/10/25 2050    heparin (porcine) 5000 UNIT/ML injection 5,000 Units  5,000 Units Subcutaneous Q12H Serenity Drummond MD   5,000 Units at 02/10/25 2050    HYDROcodone-acetaminophen (NORCO) 5-325 MG per tablet 1 tablet  1 tablet Oral Q4H PRN Serenity Drummond MD   1 tablet at 02/10/25 1541    influenza vac split high-dose (FLUZONE HIGH DOSE) injection 0.5 mL  0.5 mL Intramuscular During Hospitalization Serenity Drummond MD        levothyroxine (SYNTHROID, LEVOTHROID) tablet 88 mcg  88 mcg Oral Q AM Serenity Drummond MD   88 mcg at 02/11/25 0641    Magnesium Standard Dose Replacement - Follow Nurse / BPA Driven Protocol   Not Applicable PRSerenity Reyez MD        Pharmacy Consult - Remdesivir for Severe COVID-19 (Within 7 days of symptom onset)   Not Applicable Continuous PRN Gilbert Canales, DO        Phosphorus Replacement - Follow Nurse / BPA Driven Protocol   Not Applicable Serenity Howell MD    "     piperacillin-tazobactam (ZOSYN) 3.375 g IVPB in 100 mL NS MBP (CD)  3.375 g Intravenous Q8H Trevor Jorgensen MD   3.375 g at 02/11/25 0025    Potassium Replacement - Follow Nurse / BPA Driven Protocol   Not Applicable PRN Serenity Drummond MD        pramipexole (MIRAPEX) tablet 1 mg  1 mg Oral TID Serenity Drummond MD   1 mg at 02/10/25 2050    remdesivir 100 mg in sodium chloride 0.9 % 250 mL IVPB (powder vial)  100 mg Intravenous Q24H Gilbert Canales DO   100 mg at 02/10/25 1228    sodium chloride 0.9 % flush 10 mL  10 mL Intravenous PRN La Plant, Tu, DO        sodium chloride 0.9 % flush 10 mL  10 mL Intravenous PRN La PlantTu, DO   10 mL at 02/07/25 1946    tamsulosin (FLOMAX) 24 hr capsule 0.4 mg  0.4 mg Oral Daily Serenity Drummond MD   0.4 mg at 02/10/25 0935    vitamin D3 capsule 5,000 Units  5,000 Units Oral Daily Serenity Drummond MD   5,000 Units at 02/10/25 0935       Antibiotics:  Anti-Infectives (From admission, onward)      Ordered     Dose/Rate Route Frequency Start Stop    02/08/25 1049  remdesivir 100 mg in sodium chloride 0.9 % 250 mL IVPB (powder vial)        Ordering Provider: Gilbert Canales DO   Placed in \"Followed by\" Linked Group    100 mg  over 60 Minutes Intravenous Every 24 Hours 02/09/25 1300 02/13/25 1259    02/08/25 1049  remdesivir 200 mg in sodium chloride 0.9 % 290 mL IVPB (powder vial)        Ordering Provider: Gilbert Canales DO   Placed in \"Followed by\" Linked Group    200 mg  over 60 Minutes Intravenous Every 24 Hours 02/08/25 1300 02/08/25 1502    02/04/25 0756  DAPTOmycin (CUBICIN) 400 mg in sodium chloride 0.9 % 50 mL IVPB        Ordering Provider: Trevor Jorgensen MD    4 mg/kg × 94.6 kg (Adjusted)  100 mL/hr over 30 Minutes Intravenous Every 24 Hours 02/04/25 0930 02/15/25 1138    02/03/25 2208  piperacillin-tazobactam (ZOSYN) 3.375 g IVPB in 100 mL NS MBP (CD)        Ordering Provider: Trevor Jorgensen MD    3.375 g  over 30 Minutes " "Intravenous Every 8 Hours Scheduled 02/03/25 2230 02/16/25 0746    02/03/25 1650  vancomycin 2500 mg/500 mL 0.9% NS IVPB ([facility])        Ordering Provider: Serenity Drummond MD    22 mg/kg × 109 kg  over 150 Minutes Intravenous Once 02/03/25 1706 02/03/25 2000 02/03/25 1448  piperacillin-tazobactam (ZOSYN) 3.375 g IVPB in 100 mL NS MBP (CD)        Ordering Provider: Tu Pruitt DO    3.375 g  over 30 Minutes Intravenous Once 02/03/25 1504 02/03/25 1657              Review of Systems    2/11/25     Constitutional-- No Fever, chills or sweats.  Appetite good, and no malaise. No fatigue.  Heent-- No new vision, hearing or throat complaints.  No epistaxis or oral sores.  Denies odynophagia or dysphagia.  No flashers, floaters or eye pain. No odynophagia or dysphagia. No headache, photophobia or neck stiffness.  CV-- No chest pain, palpitation or syncope  Resp-- No  Hemoptysis; see above  GI- No nausea, vomiting, or diarrhea.  No hematochezia, melena, or hematemesis. Denies jaundice or chronic liver disease.  -- No dysuria, hematuria, or flank pain.  Denies hesitancy, urgency or flank pain.  Lymph- no swollen lymph nodes in neck/axilla or groin.   Heme- No active bruising or bleeding  MS-- no swelling or pain in the bones or joints of arms/legs aside from above.  No new back pain.  Neuro-- No acute focal weakness or numbness in the arms or legs.  No seizures.    Full 12 point review of systems reviewed and negative otherwise for acute complaints, except for above    Physical Exam:   Vital Signs   /69 (BP Location: Left arm, Patient Position: Lying)   Pulse (!) 45   Temp 97.9 °F (36.6 °C) (Oral)   Resp 18   Ht 182.9 cm (72\")   Wt 120 kg (263 lb 14.4 oz)   SpO2 94%   BMI 35.79 kg/m²     GENERAL: sleepy, in no acute distress.    HEENT: Normocephalic, atraumatic.    No conjunctival injection. No icterus. Oropharynx clear without evidence of thrush or exudate. No evidence of periodontal disease.    NECK: " Supple without nuchal rigidity. No mass.   HEART: RRR; No murmur, rubs, gallops.   LUNGS: Diminished at bases, vague crackles bilateral, no rhonchi/wheeze or stridor. Nonlabored. No dullness.  ABDOMEN: Soft, nontender, nondistended. Positive bowel sounds. No rebound or guarding. NO mass or HSM.  EXT:  see below   MSK: FROM without joint effusions noted arms/legs.    SKIN: Warm and dry without cutaneous eruptions on Inspection/palpation.    NEURO: Oriented to PPT.  chronic debility.    Bilateral lower extremities with chronic appearing edema/discoloration with probable stasis change, acute erythema/warmth and tenderness bilateral with multifocal ulceration  between knees and toes.  No discrete mass bulge or fluctuance.  No crepitus or bulla.   unable to determine depth of these wounds but no probe to bone tendon joint or ligament; no new visible redness/purulence    Laboratory Data    Results from last 7 days   Lab Units 02/10/25  0501 02/09/25  0456 02/08/25  1024   WBC 10*3/mm3 7.21 4.72 6.13   HEMOGLOBIN g/dL 11.3* 11.2* 10.5*   HEMATOCRIT % 36.2* 35.9* 33.0*   PLATELETS 10*3/mm3 275 254 203     Results from last 7 days   Lab Units 02/10/25  0501   SODIUM mmol/L 143   POTASSIUM mmol/L 4.4   CHLORIDE mmol/L 107   CO2 mmol/L 25.0   BUN mg/dL 33*   CREATININE mg/dL 1.26   GLUCOSE mg/dL 137*   CALCIUM mg/dL 8.8     Results from last 7 days   Lab Units 02/10/25  0501   ALK PHOS U/L 130*   BILIRUBIN mg/dL 0.2   ALT (SGPT) U/L 7   AST (SGOT) U/L 25         Results from last 7 days   Lab Units 02/10/25  0501   CRP mg/dL 7.62*       Estimated Creatinine Clearance: 64.7 mL/min (by C-G formula based on SCr of 1.26 mg/dL).      Microbiology:      Radiology:  Imaging Results (Last 72 Hours)       Procedure Component Value Units Date/Time    CT Chest Without Contrast Diagnostic [[id number]] Collected: 02/08/25 1607     Updated: 02/08/25 1616    Narrative:      CT CHEST WO CONTRAST DIAGNOSTIC    Date of Exam: 2/8/2025 2:35 PM  EST    Indication: Covid 19 positive, eval lung parenchyma.    Comparison: 10/9/2024    Technique: Axial CT images were obtained of the chest without contrast administration.  Reconstructed coronal and sagittal images were also obtained. Automated exposure control and iterative construction methods were used.      Findings:  MEDIASTINUM: Unremarkable. Aortic and heart size are normal. No mass nor pericardial effusion.  CORONARY ARTERIES: There is calcified atherosclerotic disease.  LUNGS: There is mild reticular interstitial prominence. There are new nodular airspace opacities within each lung base with some areas of irregular groundglass attenuation suggestive of superimposed infectious process including sequela of COVID-19. No   dense lobar consolidation. There is a 5 mm subpleural right middle lobe nodule (image 57, series 4), previously 4 mm.  PLEURAL SPACE: There are small bilateral pleural effusions.  LYMPH NODES: There are no pathologically enlarged lymph nodes.    UPPER ABDOMEN: Unremarkable    OSSEOUS STRUCTURES: Appropriate for age with no acute process identified.          Impression:      Impression:  1.Faint bilateral lower lung nodular and groundglass opacities suggesting superimposed pneumonia including COVID-19.  2.Small bilateral pleural effusions.  3.Slightly larger 5 mm subpleural right middle lobe nodule. See below recommendations.    The Fleischner society pulmonary nodule recommendations are for the follow-up and management of pulmonary nodules smaller than 8 mm detected incidentally in patients >35 years on non-screening CT. The initial guidelines for the management of solid   nodules were released in 2005 1, and guidelines for the management of subsolid nodules were released in 2013 2. New revised 2017 recommendations for both solid and subsolid have since been released 4.    2017 guidelines  Solid nodules  Solitary nodule size: <6 mm  *low risk patients: no follow-up needed  *high risk  patients: optional CT at 12 months  Solitary nodule size: 6-8 mm  *low risk patients: follow-up at 6-12 months, then consider further follow-up at 18-24 months  *high risk patients: initial follow-up CT at 6-12 months and then at 18-24 months if no change  Solitary nodule size: >8 mm  *either low or high risk patients  *consider follow-up CT at 3 months, and/or CT-PET, and/or biopsy  Multiple nodules size: <6 mm  *low risk patients: no routine follow-up  *high risk patients: optional CT at 12 months  Multiple nodules size: 6-8 mm  *low risk patients: follow-up at 3-6 months, then consider further follow-up at 18-24 months  *high risk patients: follow-up at 3-6 months, then at 18-24 months if no change  Multiple nodules size: >8 mm  *low risk patients: follow-up at 3-6 months, then consider further follow-up at 18-24 months  *high risk patients: follow-up at 3-6 months, then at 18-24 months if no change  *    Note: newly detected indeterminate nodule in persons 35 years of age or older.  *low risk patients: minimal or absent history of smoking and or other known risk factors  *high risk patients: history of smoking or of other known risk factors (e.g. first degree relative with lung cancer, or exposure to asbestos, radon, uranium)  *if a nodule up to 8 mm is partly solid or is ground glass further follow-up is required after 24 months to exclude possible slow growing adenocarcinoma (IKE)    Subsolid nodules  Solitary pure ground-glass nodule  *nodule size <6mm  *no CT follow-up required  *nodule size > or equal to 6mm  *follow up CT at 6-12 months, then every 2 years until 5 years  Solitary part-solid nodule  *nodule size <6mm  *no CT follow-up required  *nodule size > or equal to 6mm  *follow-up CT at 3-6 months  *if unchanged, and solid component remains <6mm, then annual follow-up for 5 years  Multiple subsolid nodules  *nodule size <6mm  *follow-up CT at 3-6 months  *consider further follow-up at 2 and 4 years if  stable  *nodule size > or equal to 6mm  *follow-up CT at 3-6 months  *subsequent management based on the most suspicious nodule(s)        Electronically Signed: Jonas Evans MD    2/8/2025 4:13 PM EST    Workstation ID: [id number]              Impression:     --acute bilateral lower leg and foot cellulitis/infections with multifocal ulcerations, chronic discoloration/chronic edema and potential intermittent superficial trauma although no specific inciting event.  Apparently had not made it to appointments with wound care and Unna boots had not been changed in weeks.  Unclear how much of this is general self-neglect versus recent weather related challenge with combination. high risk for persistent/recurrent or nonhealing wounds, persistent/progressive or recurrent infection and risk for further functional/limb loss, risk for amputation etc.  Culture below.  Arterial/venous/vascular workup at discretion of medicine team    --Acute cough, COVID-positive with treatment as pneumonia with symptomatology/oxygen requirements and abnormal chest x-ray.  Isolation precautions adjusted, ongoing remdesivir/decadron; if rapidly progressive hypoxia despite current therapy you good give consideration to additional therapy; patient prefers current therapy.    --Parkinson's disease/memory loss per admission notes, chronically debilitated and primarily wheelchair bound/bedbound by his report    --chronic edema and possible lymphedema per patient notes    --Prior history DVT per past notes    --CT scan with 5 mm subpleural right middle lobe nodule, further radiographic follow-up versus referral to pulmonary for monitoring and other evaluation at discretion of medicine team; patient aware    PLAN:     --IV daptomycin/Zosyn      --remdesivir/decadron    **culture right leg and left leg with MRSA/pseudomonas aeruginosa/E Coli   from February 3, 2025    --Check/review labs cultures and scans    --Partial history Per nursing  staff    --Discussed with microbiology    --Discussed with  multidisciplinary team/nursing regarding complex set of issues, discussed with microbiology lab and discussed importance of antimicrobial stewardship, management of infection control protocols etc.     This visit included the following complex service elements:  Complex medical decision-making associated with antimicrobial prescribing.  Communicated with the clinical microbiology lab.     Copied text in this note has been reviewed and is accurate as of 02/11/25.     Trevor Jorgensen MD  2/11/2025

## 2025-02-19 ENCOUNTER — PATIENT OUTREACH (OUTPATIENT)
Dept: CASE MANAGEMENT | Facility: OTHER | Age: 80
End: 2025-02-19
Payer: MEDICARE

## 2025-02-19 NOTE — OUTREACH NOTE
AMBULATORY CASE MANAGEMENT NOTE    Names and Relationships of Patient/Support Persons: Contact: [facility]; Relationship:  -     Care Coordination    RN-CANDY called [facility], spoke with staff on the Stroke-2 Unit.  Was informed patient continues to receive Therapy there; he has a projected discharge date of 2/25/25; this date is subject to change if needed.     Brooklyn MARTINEZ  Ambulatory Case Management    2/19/2025, 10:56 EST

## 2025-02-20 ENCOUNTER — TELEPHONE (OUTPATIENT)
Dept: FAMILY MEDICINE CLINIC | Facility: CLINIC | Age: 80
End: 2025-02-20

## 2025-02-20 NOTE — TELEPHONE ENCOUNTER
Caller: Cordell Martin    Relationship to patient: Self    Best call back number: [phone]     New or established patient?  [] New  [x] Established    Date of discharge: 2/25/25    Facility discharged from: [facility]    Diagnosis/Symptoms: HISTORY OF DVT, ANEMIA AND CHRONIC DISEASE        Additional Details: APPT MADE FOR 3/4/25

## 2025-03-13 ENCOUNTER — PATIENT OUTREACH (OUTPATIENT)
Dept: CASE MANAGEMENT | Facility: OTHER | Age: 80
End: 2025-03-13
Payer: MEDICARE

## 2025-03-13 NOTE — OUTREACH NOTE
AMBULATORY CASE MANAGEMENT NOTE    Names and Relationships of Patient/Support Persons: Contact: Cordell Martin; Relationship: Self -     Patient Outreach    RN-VERONIQUEM called patient, 3rd attempt, regarding recent discharge from Rehab on 2/25/25. Explained role of RNBHARATH.  Patient stated he is getting [facility] to help him, with nursing and therapy. Discussed importance of fall prevention.  Patient denied any falls since being home from rehab.  Discussed importance of close PCP follow up appt with Dr. Holder.  Patient stated it is hard for him to get to his appts right now; he has a car and can drive, but has to put his motorized W/C on the car attachment.  Discussed options of assistance with transportation, including any family members available, including [facility] transportation.  Patient needed to end conversation and go to dinner in his facility.  He agreed for RN-ACM to make an appt for him with PCP in an afternoon -time-frame, and call him back tomorrow.     Care Coordination    RNBHARATH called [facility] and confirmed that they are seeing patient with Nsg, currently 2 days/week;  PT, currently 2 days/week;  and OT, currently 2 days/week.     Adult Patient Profile  Questions/Answers      Flowsheet Row Most Recent Value   Symptoms/Conditions Managed at Home skin, musculoskeletal  [Patient was hospitalized 2/3/25 to 2/11/25 with BLE Cellulitis - He went to Rehab at [facility], 2/11/25 to 2/25/25.  Discharged, 2/25, back to his IL[facility] Apt at [facility] with [facility] to follow up with Nsg, PT and OT.]   Barriers to Managing Health none   Musculoskeletal Symptoms/Conditions mobility limited, unsteady gait  [Patient said he can stand to pivot from bed to W/C,  he does not walk,  he uses the motorized W/C or standard W/C for mobility.]   Musculoskeletal Management Strategies medical device, exercise  [Patient said he is getting [facility]  services that includes PT and OT Therapy.  - He uses a motorized W/C most of the time, he said.]   Skin Symptoms/Conditions other (see comments)  [Was hospitalized with BLE Cellulitis - He said  Nurse is helping with LE wound care.]   Barriers to Taking Medication as Prescribed none   Source of Information patient, health record   Equipment Currently Used at Home wheelchair, motorized, wheelchair   People in Home facility resident  [Patient lives alone in his apartment,  his [facility]   Current Living Arrangements independent living facility   Resource/Environmental Concerns none        Social Work Assessment  Questions/Answers      Flowsheet Row Most Recent Value   People in Home facility resident  [Patient lives alone in his apartment,  his apartment is in [facility] [address]   Current Living Arrangements independent living facility   Functional Status Comments Patient said he can stand to pivot into his chair,  he uses his motorized or standard W/C for his mobility needs.   Equipment Currently Used at Home wheelchair, motorized, wheelchair        Send Education  Questions/Answers      Flowsheet Row Most Recent Value   Advanced Directives: Patient Has            Brooklyn MARTINEZ  Ambulatory Case Management    3/13/2025, 17:00 EDT

## 2025-03-14 ENCOUNTER — PATIENT OUTREACH (OUTPATIENT)
Dept: CASE MANAGEMENT | Facility: OTHER | Age: 80
End: 2025-03-14
Payer: MEDICARE

## 2025-03-14 NOTE — OUTREACH NOTE
AMBULATORY CASE MANAGEMENT NOTE    Names and Relationships of Patient/Support Persons: Contact: Cordell Martin; Relationship: Self -     Care Coordination    As patient requested yesterday, RN-CANDY called his PCP office to make hosp/rehab d/c f/u appt with his PCP.  Received an appointment for Tuesday, 3/25/25, at 1:15pm with Dr. Holder.    RN-VERONIQUEM called [facility], that patient lives in;  Inquired about their Transportation to medical appointments.  Was informed that they have W/C accessible van that can transport patient in his motorized W/C. The residents need to come to the office of the building and complete a form to sign up for transportation when they have a MD appt, at least 24 hrs in advance but can be completed before 24 hours ahead too.  On day of the appt, the resident needs to come down to the lobby at least 30 minutes prior to appt.  When the MD appt is over, the resident is to call the phone number given them, for the van to come back and pick him up.  This facility transportation asks that residents make appointments prior to 2pm.     Patient Outreach    RN-VERONIQUEM called patient to follow up regarding his PCP appt.  Patient stated his Home Health nurse is present at the moment.  He asked RN-ACM to call him back after his lunch, as he goes to lunch at 12noon.     1:35pm - Called patient back as requested. No answer; left voicemail with RN-ACM contact information.   3:15pm - Called patient back as requested.  No answer; left voicemail with RN-ACM contact information.     Brooklyn AMRTINEZ  Ambulatory Case Management    3/14/2025, 11:34 EDT

## 2025-03-17 ENCOUNTER — PATIENT OUTREACH (OUTPATIENT)
Dept: CASE MANAGEMENT | Facility: OTHER | Age: 80
End: 2025-03-17
Payer: MEDICARE

## 2025-03-17 NOTE — OUTREACH NOTE
AMBULATORY CASE MANAGEMENT NOTE    Names and Relationships of Patient/Support Persons: Contact: Cordell Martin; Relationship: Self -     Patient Outreach    RN-ACM called patient to inform him about his next PCP appointment and transportation that is available to him from the [facility].  Explained to patient his appt with PCP, Dr. Holder, is scheduled for Tuesday, 3/25/25 at 1:15pm, to arrive by 1:00.  Discussed the option he has to utilize the [facility] van, which is W/C accessible.  Patient stated he is still in the facility's cafeteria, and does not have anything to write the information down on.  He agreed for RN-VERONIQUEM to call him back in about 30 minutes, after he has returned to his apt.      2:25pm  - RNALLANM called patient back.  He said he is now in his apartment.  Provided patient the PCP appointment information:    -Appt with Dr. Holder -Tuesday, 3/25/25, at 1:15pm, to arrive by 1:00pm  Provided patient with PCP office address and phone number.  Discussed the option of transportation with [facility] Van that is W/C accessible; need to go to his facility's office and complete a form letting them know he needs transportation on that day to his MD appt., and get instructions from the office on what he needs to do.  Patient said he would follow up on this.          Brooklyn MARTINEZ  Ambulatory Case Management    3/17/2025, 13:44 EDT

## 2025-03-19 ENCOUNTER — TELEPHONE (OUTPATIENT)
Dept: FAMILY MEDICINE CLINIC | Facility: CLINIC | Age: 80
End: 2025-03-19
Payer: MEDICARE

## 2025-03-19 DIAGNOSIS — M25.551 RIGHT HIP PAIN: Primary | ICD-10-CM

## 2025-03-19 NOTE — TELEPHONE ENCOUNTER
HOME HEALTH CARE TENDER CALLED REGARDING PATIENT HAVING PAIN IN RIGHT UPPER THIGH, ALMOST NEAR THE HIP    SHE IS REQUESTING A CALL BACK REGARDING WHAT SHE SHOULD DO    cb- [phone]

## 2025-03-21 ENCOUNTER — TELEPHONE (OUTPATIENT)
Dept: PULMONOLOGY | Facility: CLINIC | Age: 80
End: 2025-03-21
Payer: MEDICARE

## 2025-03-21 NOTE — TELEPHONE ENCOUNTER
Reached out  to patient regarding NO CALL/NO SHOW for their appointment today. Offered to reschedule patient, and advised of No Call No Show Policy.    NO VM SET UP, MAILED NS PETER - REAL 3/21

## 2025-03-24 ENCOUNTER — PATIENT OUTREACH (OUTPATIENT)
Dept: CASE MANAGEMENT | Facility: OTHER | Age: 80
End: 2025-03-24
Payer: MEDICARE

## 2025-03-24 NOTE — PLAN OF CARE
Problem: General Plan of Care  Goal: Make and Keep All Appointments  Intervention: My Appointments To Do List  Description: Why is this important?Part of staying healthy is seeing the doctor for follow-up care.If you forget your appointments, there are some things you can do to stay on track.  Flowsheets (Taken 3/24/2025 1541)  My Appointments To Do List: (Patient drives to appts if weather permits, and not raining or snowing.)   keep a calendar with appointment dates   arrange a ride through an agency 1 week before appointment  Goal: Find Help in My Community  Intervention: My Community Help To Do List  Description: Why is this important?Knowing how and where to find help for yourself or family in your neighborhood and community is an important skill.You will want to take some steps to learn how.  Flowsheets (Taken 3/24/2025 1541)  My Community Help To Do List:   begin a notebook of services in my neighborhood or community   make a list of family or friends that I can call  Goal: Optimal Care Coordination  Intervention: Mutually Develop and Foster Achievement of Patient Goals  Flowsheets (Taken 3/24/2025 1541)  Mutually Develop and Foster Achievement of Patient Goals:   problem-solving facilitated   verbalization of feelings encouraged  Intervention: Support and Maintain Acceptable Degree of Health, Comfort and Happiness  Flowsheets (Taken 3/24/2025 1541)  Support and Maintain Acceptable Degree of Health, Comfort and Happiness:   wellness behaviors promoted   patient strengths promoted

## 2025-03-24 NOTE — OUTREACH NOTE
AMBULATORY CASE MANAGEMENT NOTE    Names and Relationships of Patient/Support Persons: Contact: Cordell Martin; Relationship: Self -     Patient Outreach    RN-CANDY called patient to follow up regarding transportation arranged for PCP appt tomorrow, 3/25/25 at 1:15pm.  Patient stated he filled out a form and put it in a drop box in the office of [facility], to get a ride to PCP appt tomorrow.  Advised patient to go to the office today, and confirm that they are planning on driving him in the W/C accessible van to his PCP appt tomorrow; to find out where and when to meet the  of the van tomorrow.  Patient said he would do this after this call.      Patient c/o of a pulled muscle feeling in his right leg, that makes it hard to walk or stand well.  Noted that patient's Home Health nurse called Dr. Holder about this pain and a RX was called in for Diclofenac (Voltaren) Gel.  Patient stated he has not received any new RX from his pharmacy that always delivers new meds to his door.  With patient's permission, a 3-way call was made to [facility], [place].  Inquired about the Diclofenac RX from Dr. Holder.  Was informed that patient's insurance did not cover cost of the Diclofenac, so it is on-hold.  Patient chose to pay Out-Of-Pocket, giving the pharmacy his Card to charge it on, but asked them to wait and charge the cost, $14.79 + tax, on Wednesday.  [facility] agreed to wait and charge the patient's card on Wednesday, and then they will deliver it to him at [facility].     Patient stated he needs to reschedule missed appt at Pulmonary office.  Provided patient the phone number to call.  He said he needed to reschedule appt at the Vascular Surg office on [facility].  Provided patient the phone number for this office for him to call.      Discussed importance of calling MD offices if he knows he is going to miss an appointment and go on and reschedule then if possible.      Discussed  transportation to \[facility]\"".  Patient said he has his own car with a carrier for his motorized W/C, but on rainy or snowy days, he does not go out, afraid that the moisture will hurt his motorized W/C.   Reminded he can always utilize the [facility] in their W/C accessible van; he must give them at least 24 hrs notice.    Brooklyn MARTINEZ  Ambulatory Case Management    3/24/2025, 15:46 EDT

## 2025-03-25 ENCOUNTER — LAB (OUTPATIENT)
Dept: LAB | Facility: HOSPITAL | Age: 80
End: 2025-03-25
Payer: MEDICARE

## 2025-03-25 ENCOUNTER — OFFICE VISIT (OUTPATIENT)
Dept: FAMILY MEDICINE CLINIC | Facility: CLINIC | Age: 80
End: 2025-03-25
Payer: MEDICARE

## 2025-03-25 VITALS
DIASTOLIC BLOOD PRESSURE: 62 MMHG | HEART RATE: 76 BPM | BODY MASS INDEX: 35.78 KG/M2 | OXYGEN SATURATION: 99 % | HEIGHT: 72 IN | SYSTOLIC BLOOD PRESSURE: 124 MMHG

## 2025-03-25 DIAGNOSIS — M25.551 RIGHT HIP PAIN: ICD-10-CM

## 2025-03-25 DIAGNOSIS — R05.3 CHRONIC COUGH: ICD-10-CM

## 2025-03-25 DIAGNOSIS — R06.02 SHORTNESS OF BREATH: ICD-10-CM

## 2025-03-25 DIAGNOSIS — M79.89 SWELLING OF LOWER EXTREMITY: Primary | ICD-10-CM

## 2025-03-25 DIAGNOSIS — E03.9 HYPOTHYROIDISM, UNSPECIFIED TYPE: ICD-10-CM

## 2025-03-25 DIAGNOSIS — R73.03 PREDIABETES: ICD-10-CM

## 2025-03-25 DIAGNOSIS — R79.9 ABNORMAL FINDING OF BLOOD CHEMISTRY, UNSPECIFIED: ICD-10-CM

## 2025-03-25 DIAGNOSIS — G20.A1 PARKINSON'S DISEASE, UNSPECIFIED WHETHER DYSKINESIA PRESENT, UNSPECIFIED WHETHER MANIFESTATIONS FLUCTUATE: ICD-10-CM

## 2025-03-25 LAB
HBA1C MFR BLD: 6.6 % (ref 4.8–5.6)
NT-PROBNP SERPL-MCNC: 59.1 PG/ML (ref 0–1800)

## 2025-03-25 PROCEDURE — 85025 COMPLETE CBC W/AUTO DIFF WBC: CPT

## 2025-03-25 PROCEDURE — 84443 ASSAY THYROID STIM HORMONE: CPT

## 2025-03-25 PROCEDURE — 80053 COMPREHEN METABOLIC PANEL: CPT

## 2025-03-25 PROCEDURE — 84439 ASSAY OF FREE THYROXINE: CPT

## 2025-03-25 PROCEDURE — 83880 ASSAY OF NATRIURETIC PEPTIDE: CPT

## 2025-03-25 PROCEDURE — 80061 LIPID PANEL: CPT

## 2025-03-25 PROCEDURE — 83036 HEMOGLOBIN GLYCOSYLATED A1C: CPT

## 2025-03-25 RX ORDER — FUROSEMIDE 40 MG/1
40 TABLET ORAL DAILY
Qty: 7 TABLET | Refills: 0 | Status: SHIPPED | OUTPATIENT
Start: 2025-03-25 | End: 2025-04-01

## 2025-03-25 RX ORDER — POTASSIUM CHLORIDE 1500 MG/1
20 TABLET, EXTENDED RELEASE ORAL DAILY
Qty: 7 TABLET | Refills: 0 | Status: SHIPPED | OUTPATIENT
Start: 2025-03-25 | End: 2025-04-01

## 2025-03-25 NOTE — PROGRESS NOTES
Chief Complaint   Patient presents with    Hospital Follow Up Visit     [facility] for rehab, legs are still weeping. Pain in hip.       HPI:  Cordell Martin is a 80 y.o. male who presents today for left hip pain and lower extremity swelling.  Hip pain slowly improving.  He suspects he injured it during physical therapy.  He plans on trying Voltaren gel today after picking it up from pharmacy.    ROS:  Constitutional: no fevers, night sweats or unexplained weight loss  Eyes: no vision changes  ENT: no runny nose, ear pain, sore throat  Cardio: no chest pain, palpitations  Pulm: no shortness of breath, wheezing, or cough  GI: no abdominal pain or changes in bowel movements  : no difficulty urinating  MSK: no difficulty ambulating, no joint pain  Neuro: no weakness, dizziness or headache  Psych: no trouble sleeping  Endo: no change in appetite      Past Medical History:   Diagnosis Date    Anxiety     Arthritis     Back pain     Bronchitis     Cognitive impairment     Depression     Disease of thyroid gland     Glucose intolerance (impaired glucose tolerance)     Hyperlipidemia     Kidney stone     Obesity     Parkinson disease     Pneumonia     Prostate disorder     Thyroid disease     Wears eyeglasses       Family History   Problem Relation Age of Onset    Alzheimer's disease Other     Cancer Other     Diabetes Other     Heart disease Other     Stroke Other     Cancer Father       Social History     Socioeconomic History    Marital status:    Tobacco Use    Smoking status: Former     Current packs/day: 0.00     Average packs/day: 1 pack/day for 30.0 years (30.0 ttl pk-yrs)     Types: Cigarettes     Start date:      Quit date:      Years since quittin.2     Passive exposure: Past    Smokeless tobacco: Never    Tobacco comments:     quit    Vaping Use    Vaping status: Never Used   Substance and Sexual Activity    Alcohol use: Yes     Comment: occasionally    Drug use: No    Sexual  activity: Defer     Partners: Female     Birth control/protection: None      No Known Allergies   Immunization History   Administered Date(s) Administered    COVID-19 (MODERNA) 1st,2nd,3rd Dose Monovalent 03/10/2021, 04/07/2021, 10/28/2021    Flu Vaccine Quad PF >36MO 06/16/2022    Fluad Quad 65+ 08/31/2020    Fluzone  >6mos 12/13/2010    Fluzone (or Fluarix & Flulaval for VFC) >6mos 06/16/2022    Fluzone High-Dose 65+YRS 02/23/2018, 09/12/2018, 09/02/2019, 10/13/2020    Fluzone High-Dose 65+yrs 10/05/2023    Influenza, Unspecified 01/01/2022    Pneumococcal Conjugate 13-Valent (PCV13) 04/30/2015    Pneumococcal Polysaccharide (PPSV23) 01/29/2013, 10/10/2021    Tdap 12/04/2009, 04/18/2023    Zostavax 01/26/2011        PE:  Vitals:    03/25/25 1315   BP: 124/62   Pulse: 76   SpO2: 99%      Body mass index is 35.78 kg/m².    Gen Appearance: NAD  HEENT: Normocephalic, PERRLA, no thyromegaly, trache midline  Heart: RRR, normal S1 and S2, no murmur  Lungs: CTA b/l, no wheezing, no crackles  Abdomen: Soft, non-tender, non-distended, no guarding and BSx4  MSK: Moves all extremities well, normal gait, no peripheral edema  Pulses: Palpable and equal b/l  Lymph nodes: No palpable lymphadenopathy   Neuro: No focal deficits      Current Outpatient Medications   Medication Sig Dispense Refill    ammonium lactate (AmLactin) 12 % lotion Apply  topically to the appropriate area as directed As Needed for Dry Skin. 225 g 1    atorvastatin (LIPITOR) 40 MG tablet Take 1 tablet by mouth Daily. Indications: High Amount of Fats in the Blood      budesonide-formoterol (SYMBICORT) 160-4.5 MCG/ACT inhaler INHALE 2 PUFFS BY MOUTH TWICE DAILY FOR CHRONIC BRONCHITIS. RINSE MOUTH AFTER USE      carbidopa-levodopa (SINEMET)  MG per tablet TAKE 2 TABLETS BY MOUTH 4 TIMES DAILY *MUST MAKE APPT FOR REFILLS* 720 tablet 0    Cholecalciferol (VITAMIN D3) 5000 units capsule capsule Take 1 capsule by mouth Daily. Indications: Vitamin D Deficiency       Diclofenac Sodium (VOLTAREN) 1 % gel gel Apply 4 g topically to the appropriate area as directed 4 (Four) Times a Day As Needed (right leg pain). 100 g 0    fluticasone (FLONASE) 50 MCG/ACT nasal spray USE 2 SPRAYS IN EACH NOSTRIL ONCE DAILY (Patient taking differently: Administer 2 sprays into the nostril(s) as directed by provider 2 (Two) Times a Day. Indications: Allergic Rhinitis, Stuffy Nose) 48 mL 2    furosemide (LASIX) 20 MG tablet Take 1 tablet by mouth once daily 90 tablet 0    levothyroxine (SYNTHROID, LEVOTHROID) 88 MCG tablet Take 1 tablet by mouth Every Morning.      levothyroxine (SYNTHROID, LEVOTHROID) 88 MCG tablet Take 1 tablet by mouth Every Morning. Indications: Underactive Thyroid      piperacillin-tazobactam (ZOSYN) 3-0.375 GM/50ML IVPB Infuse 50 mL into a venous catheter Every 6 (Six) Hours. Infuse over 30 minutes      pramipexole (MIRAPEX) 1 MG tablet Take 1 tablet by mouth 3 (Three) Times a Day. 270 tablet 3    tamsulosin (FLOMAX) 0.4 MG capsule 24 hr capsule tamsulosin 0.4 mg capsule   TAKE 1 CAPSULE BY MOUTH EVERY DAY      esomeprazole (nexIUM) 20 MG capsule Take 1 capsule by mouth Every Morning Before Breakfast. 90 capsule 1    furosemide (Lasix) 40 MG tablet Take 1 tablet by mouth Daily for 7 days. 7 tablet 0    potassium chloride (KLOR-CON M20) 20 MEQ CR tablet Take 1 tablet by mouth Daily for 7 days. 7 tablet 0     No current facility-administered medications for this visit.      Quite a bit of swelling on exam today.  Recommend 7 days of increased Lasix dose along with extra potassium supplement.    Right hip pain is improving.  Initially recommended x-ray but patient would like to monitor this for now.    Starting on Nexium, cough may be reflux induced.    Diagnoses and all orders for this visit:    1. Swelling of lower extremity (Primary)  -     furosemide (Lasix) 40 MG tablet; Take 1 tablet by mouth Daily for 7 days.  Dispense: 7 tablet; Refill: 0  -     potassium chloride  (KLOR-CON M20) 20 MEQ CR tablet; Take 1 tablet by mouth Daily for 7 days.  Dispense: 7 tablet; Refill: 0    2. Right hip pain    3. Prediabetes  -     furosemide (Lasix) 40 MG tablet; Take 1 tablet by mouth Daily for 7 days.  Dispense: 7 tablet; Refill: 0  -     potassium chloride (KLOR-CON M20) 20 MEQ CR tablet; Take 1 tablet by mouth Daily for 7 days.  Dispense: 7 tablet; Refill: 0  -     CBC & Differential; Future  -     Comprehensive Metabolic Panel; Future  -     Hemoglobin A1c; Future  -     proBNP; Future  -     Lipid Panel; Future    4. Shortness of breath  -     proBNP; Future    5. Abnormal finding of blood chemistry, unspecified  -     Lipid Panel; Future    6. Chronic cough  -     esomeprazole (nexIUM) 20 MG capsule; Take 1 capsule by mouth Every Morning Before Breakfast.  Dispense: 90 capsule; Refill: 1    7. Hypothyroidism, unspecified type    8. Parkinson's disease, unspecified whether dyskinesia present, unspecified whether manifestations fluctuate         Return in about 6 weeks (around 5/6/2025) for Medicare Wellness.     Dictated Utilizing Dragon Dictation    Please note that portions of this note were completed with a voice recognition program.    Part of this note may be an electronic transcription/translation of spoken language to printed text using the Dragon Dictation System.

## 2025-03-26 ENCOUNTER — RESULTS FOLLOW-UP (OUTPATIENT)
Dept: FAMILY MEDICINE CLINIC | Facility: CLINIC | Age: 80
End: 2025-03-26
Payer: MEDICARE

## 2025-03-26 LAB
ALBUMIN SERPL-MCNC: 3.2 G/DL (ref 3.5–5.2)
ALBUMIN/GLOB SERPL: 0.8 G/DL
ALP SERPL-CCNC: 120 U/L (ref 39–117)
ALT SERPL W P-5'-P-CCNC: 6 U/L (ref 1–41)
ANION GAP SERPL CALCULATED.3IONS-SCNC: 10.2 MMOL/L (ref 5–15)
AST SERPL-CCNC: 19 U/L (ref 1–40)
BASOPHILS # BLD AUTO: 0.03 10*3/MM3 (ref 0–0.2)
BASOPHILS NFR BLD AUTO: 0.5 % (ref 0–1.5)
BILIRUB SERPL-MCNC: 0.3 MG/DL (ref 0–1.2)
BUN SERPL-MCNC: 21 MG/DL (ref 8–23)
BUN/CREAT SERPL: 17.2 (ref 7–25)
CALCIUM SPEC-SCNC: 9.4 MG/DL (ref 8.6–10.5)
CHLORIDE SERPL-SCNC: 105 MMOL/L (ref 98–107)
CHOLEST SERPL-MCNC: 106 MG/DL (ref 0–200)
CO2 SERPL-SCNC: 25.8 MMOL/L (ref 22–29)
CREAT SERPL-MCNC: 1.22 MG/DL (ref 0.76–1.27)
DEPRECATED RDW RBC AUTO: 52.2 FL (ref 37–54)
EGFRCR SERPLBLD CKD-EPI 2021: 60.7 ML/MIN/1.73
EOSINOPHIL # BLD AUTO: 0.31 10*3/MM3 (ref 0–0.4)
EOSINOPHIL NFR BLD AUTO: 4.7 % (ref 0.3–6.2)
ERYTHROCYTE [DISTWIDTH] IN BLOOD BY AUTOMATED COUNT: 15.8 % (ref 12.3–15.4)
GLOBULIN UR ELPH-MCNC: 3.8 GM/DL
GLUCOSE SERPL-MCNC: 74 MG/DL (ref 65–99)
HCT VFR BLD AUTO: 36.2 % (ref 37.5–51)
HDLC SERPL-MCNC: 29 MG/DL (ref 40–60)
HGB BLD-MCNC: 11.4 G/DL (ref 13–17.7)
IMM GRANULOCYTES # BLD AUTO: 0.03 10*3/MM3 (ref 0–0.05)
IMM GRANULOCYTES NFR BLD AUTO: 0.5 % (ref 0–0.5)
LDLC SERPL CALC-MCNC: 51 MG/DL (ref 0–100)
LDLC/HDLC SERPL: 1.61 {RATIO}
LYMPHOCYTES # BLD AUTO: 1.6 10*3/MM3 (ref 0.7–3.1)
LYMPHOCYTES NFR BLD AUTO: 24.3 % (ref 19.6–45.3)
MCH RBC QN AUTO: 28.4 PG (ref 26.6–33)
MCHC RBC AUTO-ENTMCNC: 31.5 G/DL (ref 31.5–35.7)
MCV RBC AUTO: 90 FL (ref 79–97)
MONOCYTES # BLD AUTO: 0.5 10*3/MM3 (ref 0.1–0.9)
MONOCYTES NFR BLD AUTO: 7.6 % (ref 5–12)
NEUTROPHILS NFR BLD AUTO: 4.11 10*3/MM3 (ref 1.7–7)
NEUTROPHILS NFR BLD AUTO: 62.4 % (ref 42.7–76)
NRBC BLD AUTO-RTO: 0 /100 WBC (ref 0–0.2)
PLATELET # BLD AUTO: 295 10*3/MM3 (ref 140–450)
PMV BLD AUTO: 8.9 FL (ref 6–12)
POTASSIUM SERPL-SCNC: 4.8 MMOL/L (ref 3.5–5.2)
PROT SERPL-MCNC: 7 G/DL (ref 6–8.5)
RBC # BLD AUTO: 4.02 10*6/MM3 (ref 4.14–5.8)
SODIUM SERPL-SCNC: 141 MMOL/L (ref 136–145)
T4 FREE SERPL-MCNC: 1.3 NG/DL (ref 0.92–1.68)
TRIGL SERPL-MCNC: 152 MG/DL (ref 0–150)
TSH SERPL DL<=0.05 MIU/L-ACNC: 3.79 UIU/ML (ref 0.27–4.2)
VLDLC SERPL-MCNC: 26 MG/DL (ref 5–40)
WBC NRBC COR # BLD AUTO: 6.58 10*3/MM3 (ref 3.4–10.8)

## 2025-04-01 ENCOUNTER — PATIENT OUTREACH (OUTPATIENT)
Dept: CASE MANAGEMENT | Facility: OTHER | Age: 80
End: 2025-04-01
Payer: MEDICARE

## 2025-04-01 NOTE — OUTREACH NOTE
AMBULATORY CASE MANAGEMENT NOTE    Names and Relationships of Patient/Support Persons: Contact: Cordell Martin; Relationship: Self -     Patient Outreach    RN-ACM called patient to follow up for HRCM needs.  Patient answered call and said it was not a good time to talk.  He asked RN-ACM to call him back tomorrow, after 10am.      Brooklyn MARTINEZ  Ambulatory Case Management    4/1/2025, 16:50 EDT

## 2025-04-10 ENCOUNTER — TELEPHONE (OUTPATIENT)
Dept: FAMILY MEDICINE CLINIC | Facility: CLINIC | Age: 80
End: 2025-04-10
Payer: MEDICARE

## 2025-04-10 NOTE — TELEPHONE ENCOUNTER
Caller: VITO WITH [facility]    Best call back number:  [phone]     What orders are you requesting (i.e. lab or imaging): REQUESTING A   VERBAL ORDER TO SEE THE PATIENT TWICE A WEEK FOR SKILLED NURSING FOR HIS UNNA BOOT     PLEASE CALL

## 2025-04-25 ENCOUNTER — TELEPHONE (OUTPATIENT)
Dept: FAMILY MEDICINE CLINIC | Facility: CLINIC | Age: 80
End: 2025-04-25
Payer: MEDICARE

## 2025-04-25 NOTE — TELEPHONE ENCOUNTER
[facility] home health - calling for verbal orders for patient to have Physical Therapy    1 x a week for 6 weeks     Nicola per Dr. Holder

## 2025-05-05 ENCOUNTER — PATIENT OUTREACH (OUTPATIENT)
Dept: CASE MANAGEMENT | Facility: OTHER | Age: 80
End: 2025-05-05
Payer: MEDICARE

## 2025-05-05 NOTE — OUTREACH NOTE
AMBULATORY CASE MANAGEMENT NOTE    Names and Relationships of Patient/Support Persons:  -     Care Coordination    RN-VERONIQUEM called [facility].  Confirmed they are still actively seeing patient for Nursing 2x/week and PT 1x/week.    RN-ACM attempted outreach to patient.  There was no answer to phone.  Did leave voicemail with RN-ACM contact information.     Brooklyn MARTINEZ  Ambulatory Case Management    5/5/2025, 14:44 EDT

## 2025-05-14 NOTE — TELEPHONE ENCOUNTER
Caller: [facility] - [place], [place] - [facility] [address] - [phone]  - [phone] FX    Relationship: Pharmacy    Best call back number: [phone] EXT [phone]     What was the call regarding: CALLING TO MAKE SURE THEY HAD ASKED FOR budesonide-formoterol (SYMBICORT) 160-4.5 MCG/ACT inhaler

## 2025-05-14 NOTE — TELEPHONE ENCOUNTER
Caller: [facility] [place], [place] - [address] - [phone]  - [phone] FX    Relationship: Pharmacy    Best call back number: [phone] EXT 2     Requested Prescriptions:   Requested Prescriptions     Pending Prescriptions Disp Refills    tamsulosin (FLOMAX) 0.4 MG capsule 24 hr capsule 30 capsule      Sig: Indications: Chronic Prostate Gland Inflammation    budesonide-formoterol (SYMBICORT) 160-4.5 MCG/ACT inhaler       Sig: Indications: Chronic Bronchitis        Pharmacy where request should be sent: [facility] [place], [place] - [address] - [phone]  - [phone] FX     Last office visit with prescribing clinician: 3/25/2025   Last telemedicine visit with prescribing clinician: Visit date not found   Next office visit with prescribing clinician: Visit date not found     Additional details provided by patient: PATIENT IS OUT OF MEDICATION     Does the patient have less than a 3 day supply:  [x] Yes  [] No    Would you like a call back once the refill request has been completed: [] Yes [x] No    If the office needs to give you a call back, can they leave a voicemail: [] Yes [x] No    Alisha Diallo MA   05/14/25 08:58 EDT

## 2025-05-18 RX ORDER — BUDESONIDE AND FORMOTEROL FUMARATE DIHYDRATE 160; 4.5 UG/1; UG/1
2 AEROSOL RESPIRATORY (INHALATION)
Qty: 10.2 G | Refills: 5 | Status: SHIPPED | OUTPATIENT
Start: 2025-05-18

## 2025-05-18 RX ORDER — TAMSULOSIN HYDROCHLORIDE 0.4 MG/1
1 CAPSULE ORAL DAILY
Qty: 90 CAPSULE | Refills: 3 | Status: SHIPPED | OUTPATIENT
Start: 2025-05-18

## 2025-06-02 ENCOUNTER — PATIENT OUTREACH (OUTPATIENT)
Dept: CASE MANAGEMENT | Facility: OTHER | Age: 80
End: 2025-06-02
Payer: MEDICARE

## 2025-06-02 ENCOUNTER — TELEPHONE (OUTPATIENT)
Dept: FAMILY MEDICINE CLINIC | Facility: CLINIC | Age: 80
End: 2025-06-02
Payer: MEDICARE

## 2025-06-02 NOTE — TELEPHONE ENCOUNTER
Caller: MARY WITH [employer]    Relationship: Other    Best call back number: [phone]     What orders are you requesting (i.e. lab or imaging): HAS A NEW WOUND ON RIGHT BUTTOCK. REQUEST TO CLEAN WITH NORMAL SALINE APPLY MEDIHONE COVER WITH FOAM, CHANGE EVERY MONDAY AND THURSDAY.            Additional notes: PLEASE CALL WITH ANY QUESTIONS OR WHEN SUBMITTED.

## 2025-06-02 NOTE — OUTREACH NOTE
AMBULATORY CASE MANAGEMENT NOTE    Names and Relationships of Patient/Support Persons: Contact: [facility]; Relationship:  -     Patient Outreach    RN-ACM outreach to patient engaged in High Risk Case Management.  Call was not answered.    Care Coordination    RN-ACM care coordination with staff at [facility].  Per Rita, patient is current with their service.  He continues to receive SN, PT & OT.  HH re-certification or discharge is due 06/25/25.         Latonya BLANCAS  Ambulatory Case Management    6/2/2025, 10:38 EDT

## 2025-06-03 ENCOUNTER — OFFICE VISIT (OUTPATIENT)
Dept: PULMONOLOGY | Facility: CLINIC | Age: 80
End: 2025-06-03
Payer: MEDICARE

## 2025-06-03 VITALS
HEART RATE: 67 BPM | BODY MASS INDEX: 35.21 KG/M2 | DIASTOLIC BLOOD PRESSURE: 60 MMHG | TEMPERATURE: 97.3 F | OXYGEN SATURATION: 95 % | HEIGHT: 72 IN | WEIGHT: 260 LBS | SYSTOLIC BLOOD PRESSURE: 114 MMHG

## 2025-06-03 DIAGNOSIS — R94.2 RESTRICTIVE PATTERN PRESENT ON PULMONARY FUNCTION TESTING: ICD-10-CM

## 2025-06-03 DIAGNOSIS — J18.9 PNEUMONIA DUE TO INFECTIOUS ORGANISM, UNSPECIFIED LATERALITY, UNSPECIFIED PART OF LUNG: Primary | ICD-10-CM

## 2025-06-03 DIAGNOSIS — G20.A1 PARKINSON'S DISEASE, UNSPECIFIED WHETHER DYSKINESIA PRESENT, UNSPECIFIED WHETHER MANIFESTATIONS FLUCTUATE: ICD-10-CM

## 2025-06-03 DIAGNOSIS — R13.10 DYSPHAGIA, UNSPECIFIED TYPE: ICD-10-CM

## 2025-06-03 DIAGNOSIS — R91.1 PULMONARY NODULE: ICD-10-CM

## 2025-06-03 DIAGNOSIS — Z87.891 FORMER SMOKER: ICD-10-CM

## 2025-06-03 DIAGNOSIS — K21.9 CHRONIC GERD: ICD-10-CM

## 2025-06-03 PROCEDURE — 99214 OFFICE O/P EST MOD 30 MIN: CPT | Performed by: NURSE PRACTITIONER

## 2025-06-03 NOTE — PROGRESS NOTES
[facility] Pulmonary Follow up    CHIEF COMPLAINT    Abnormal CT of the chest    HISTORY OF PRESENT ILLNESS    HPI:   Cordell Martin is a 80 y.o.male who establish care with our pulmonary clinic in October 2023 evaluated by Dr. Rodriguez regarding abnormal CT of the chest.    He had recently been hospitalized at [facility] and at that time underwent CT imaging which showed a noncalcified 4 mm pulmonary nodule which did not bear further workup as he is a low risk individual.    He is a former smoker with a 30-pack-year history, however achieved cessation in the 1980s.    Prior PFTs open compatible with restrictive process felt to be due to his body habitus as he does have Parkinson's disease requiring a power wheelchair.  CT imaging unrevealing for ILD component.    Does have a chronic cough attributed to his reflux as he does have esophageal dysphagia and presence of a hiatal hernia.    Interval history:   Patient was last seen in the office in October 2023 when he establish care with our clinic.    He was hospitalized at [facility] in February 2025 for cellulitis, lymphedema, and incidentally tested positive for COVID-19.  He did receive dexamethasone and remdesivir.  Underwent CT imaging which did show interval enlargement of the RML pulmonary nodule to 5 mm.    Continues to have a cough and has been using a prior Symbicort inhaler without improvement. His cough is mostly at night, after meals, and when he talks. He also feels he has a weaker vocal quality. Also has trouble swallowing certain foods such as rice.      Denies fever, chills, night sweats, or hemoptysis. No recent sick contacts. No chest pain or palpitations.     Patient Active Problem List   Diagnosis    Anxiety    Arthritis    Depression    Hyperlipidemia    Lumbar stenosis with neurogenic claudication    Parkinson's disease    Lumbar stenosis with neurogenic claudication status post L4-5 decompression    Status post lumbar laminectomy    Memory loss     Hypothyroidism (acquired)    Pulmonary nodule (4mm RML incidental)    GERD    Remote smoker (Stopped 1980)    Restrictive pattern on PFTs w/o evidence of ILD    History of DVT (deep vein thrombosis)    Anemia, chronic disease       No Known Allergies    Current Outpatient Medications:     ammonium lactate (AmLactin) 12 % lotion, Apply  topically to the appropriate area as directed As Needed for Dry Skin., Disp: 225 g, Rfl: 1    atorvastatin (LIPITOR) 40 MG tablet, Take 1 tablet by mouth Daily. Indications: High Amount of Fats in the Blood, Disp: , Rfl:     carbidopa-levodopa (SINEMET)  MG per tablet, TAKE 2 TABLETS BY MOUTH 4 TIMES DAILY *MUST MAKE APPT FOR REFILLS*, Disp: 720 tablet, Rfl: 0    Cholecalciferol (VITAMIN D3) 5000 units capsule capsule, Take 1 capsule by mouth Daily. Indications: Vitamin D Deficiency, Disp: , Rfl:     Diclofenac Sodium (VOLTAREN) 1 % gel gel, Apply 4 g topically to the appropriate area as directed 4 (Four) Times a Day As Needed (right leg pain)., Disp: 100 g, Rfl: 0    esomeprazole (nexIUM) 20 MG capsule, Take 1 capsule by mouth Every Morning Before Breakfast., Disp: 90 capsule, Rfl: 1    fluticasone (FLONASE) 50 MCG/ACT nasal spray, USE 2 SPRAYS IN EACH NOSTRIL ONCE DAILY (Patient taking differently: Administer 2 sprays into the nostril(s) as directed by provider 2 (Two) Times a Day. Indications: Allergic Rhinitis, Stuffy Nose), Disp: 48 mL, Rfl: 2    furosemide (LASIX) 20 MG tablet, Take 1 tablet by mouth once daily (Patient taking differently: Take 2 tablets by mouth Daily. Indications: Edema, High Blood Pressure), Disp: 90 tablet, Rfl: 0    levothyroxine (SYNTHROID, LEVOTHROID) 88 MCG tablet, Take 1 tablet by mouth Every Morning., Disp: , Rfl:     levothyroxine (SYNTHROID, LEVOTHROID) 88 MCG tablet, Take 1 tablet by mouth Every Morning. Indications: Underactive Thyroid, Disp: , Rfl:     piperacillin-tazobactam (ZOSYN) 3-0.375 GM/50ML IVPB, Infuse 50 mL into a venous catheter  "Every 6 (Six) Hours. Infuse over 30 minutes, Disp: , Rfl:     pramipexole (MIRAPEX) 1 MG tablet, Take 1 tablet by mouth 3 (Three) Times a Day., Disp: 270 tablet, Rfl: 3    tamsulosin (FLOMAX) 0.4 MG capsule 24 hr capsule, Take 1 capsule by mouth Daily. Indications: Chronic Prostate Gland Inflammation, Disp: 90 capsule, Rfl: 3    furosemide (Lasix) 40 MG tablet, Take 1 tablet by mouth Daily for 7 days., Disp: 7 tablet, Rfl: 0  MEDICATION LIST AND ALLERGIES REVIEWED.    Social History     Tobacco Use    Smoking status: Former     Current packs/day: 0.00     Average packs/day: 1 pack/day for 30.0 years (30.0 ttl pk-yrs)     Types: Cigarettes     Start date:      Quit date:      Years since quittin.4     Passive exposure: Past    Smokeless tobacco: Never    Tobacco comments:     quit    Vaping Use    Vaping status: Never Used   Substance Use Topics    Alcohol use: Yes     Comment: occasionally    Drug use: No       FAMILY AND SOCIAL HISTORY REVIEWED.    Review of Systems   Constitutional:  Negative for chills, fatigue and fever.   Respiratory:  Positive for cough. Negative for chest tightness, shortness of breath and wheezing.    Cardiovascular:  Negative for chest pain, palpitations and leg swelling.   Skin:  Negative for color change.   Psychiatric/Behavioral:  Negative for sleep disturbance.    All other systems reviewed and are negative.      /60   Pulse 67   Temp 97.3 °F (36.3 °C) (Temporal)   Ht 182.9 cm (72.01\")   Wt 118 kg (260 lb)   SpO2 95% Comment: Room air at rest  BMI 35.25 kg/m²     Immunization History   Administered Date(s) Administered    COVID-19 (MODERNA) 1st,2nd,3rd Dose Monovalent 03/10/2021, 2021, 10/28/2021    Flu Vaccine Quad PF >36MO 2022    Fluad Quad 65+ 2020    Fluzone  >6mos 2010    Fluzone (or Fluarix & Flulaval for VFC) >6mos 2022    Fluzone High-Dose 65+YRS 2018, 2018, 2019, 10/13/2020    Fluzone High-Dose 65+yrs " "10/05/2023    Influenza, Unspecified 01/01/2022    Pneumococcal Conjugate 13-Valent (PCV13) 04/30/2015    Pneumococcal Polysaccharide (PPSV23) 01/29/2013, 10/10/2021    Tdap 12/04/2009, 04/18/2023    Zostavax 01/26/2011       Physical Exam  Vitals reviewed.   Constitutional:       General: He is not in acute distress.     Appearance: Normal appearance.      Comments: Elderly gentleman in a power wheelchair    Cardiovascular:      Rate and Rhythm: Normal rate and regular rhythm.      Pulses: Normal pulses.      Heart sounds: Normal heart sounds.   Pulmonary:      Effort: Pulmonary effort is normal. No respiratory distress.      Breath sounds: Rales (Bibasilar) present. No wheezing or rhonchi.   Musculoskeletal:      Comments: Right arm resting tremor    Skin:     General: Skin is warm and dry.      Comments: Bilateral LE wounds. Wraps CDI.    Neurological:      General: No focal deficit present.      Mental Status: He is alert and oriented to person, place, and time.         RESULTS    PFTs:  6/3/2025 spirometry: No airway obstruction and suggestive of a restrictive pattern.    Imaging:   PA/lateral CXR 6/3/2025:   Awaiting final MD interpretation. I will call the patient if the result is abnormal.     Cardiac:   Results for orders placed during the hospital encounter of 01/12/21    Adult Transthoracic Echo Complete W/ Cont if Necessary Per Protocol    Interpretation Summary  · The right ventricle and right atrium are moderately dilated. Other chamber sizes and wall thicknesses are normal.  · Global and segmental LV wall motion is normal. The estimated left ventricular ejection fraction is 51% - 55%.  · The aortic and mitral valves are normal in structure and function.  · The estimated RV systolic pressure is mildly elevated 35 mmHg - 40 mmHg. There is as well noted to be less than 50% inspiratory IVC collapse. The main pulmonary artery is \"borderline dilated\".  · There are no other important findings on this " study.       PROBLEM LIST    Problem List Items Addressed This Visit       Parkinson's disease    Pulmonary nodule (4mm RML incidental)    GERD    Remote smoker (Stopped 1980)    Restrictive pattern on PFTs w/o evidence of ILD     Other Visit Diagnoses         Pneumonia due to infectious organism, unspecified laterality, unspecified part of lung    -  Primary    Relevant Orders    XR Chest 2 View      Dysphagia, unspecified type        Relevant Orders    Ambulatory Referral to Speech Therapy for Evaluation & Treatment          DISCUSSION    Patient was last seen in the office in October 2023 when he establish care with our clinic regarding a 4 mm RML pulmonary nodule that did not merit further workup.    He is a former smoker with 30-pack-year history but quit in the 1980s.    He was hospitalized at [facility] in February 2025 for cellulitis, lymphedema, and incidentally tested positive for COVID-19.  He did receive dexamethasone and remdesivir.  Underwent CT imaging which did show interval enlargement of the RML pulmonary nodule to 5 mm.    Continues to have a cough and has been using a prior Symbicort inhaler without improvement. His cough is mostly at night, after meals, and when he talks. He also feels he has a weaker vocal quality. Also has trouble swallowing certain foods such as rice.     Persistent cough  Discussed patient's individual risk factors attributing to cough including GERD and dysphagia in the setting of his Parkinson's.  Spirometry today again suggestive of a restrictive process likely in the setting on his Parkinson's and wheelchair bound state. Had been trying an old Symbicort inhaler with no relief. I doubt he has underlying asthma.   CXR today actually improved compared to his February 2025 image and does not have any overt pulmonary infiltrate, masses, or effusion.  Awaiting final MD interpretation.  Does have GERD, esophageal dysphagia, and a hiatal hernia.   I am concerned that his reflux and  possible aspiration could be playing a role.   Will order SLP evaluation for therapy.   Has not have any further pneumonias, however he is at high risk for aspiration pneumonia given his Parkinson's and current swallowing difficulties. Briefly discussed consideration of PEG tube should be have recurrent aspiration and he defers for now.   Continue PPI  Reflux precautions discussed at length, particularly sleeping with the head of bed elevated and beings strictly NPO 2-3 hours prior to bedtime. Educational pamphlet was provided   Does have a weak vocal quality however wishes to defer any fees exam or laryngoscopy's at this time.  I will refer him to speech therapy and in the interim recommend dysphagia/aspiration precautions which were discussed at today's visit.  Prior CT imaging unrevealing for ILD component.    RML pulmonary nodule  Initially noted on 2023 imaging and underwent CT imaging during his February 2025 hospitalization not measured at 5 mm prior to 4 mm.  He is a low risk individual and based on the Fleischner guidelines this does not merit further workup at this time.  Regardless given his wheelchair-bound state and Parkinson's, is very difficult for him to get CT imaging completed and defers additional imaging at this time.  He is a former smoker but quit in the 1980s.    Return to clinic in 6 months or sooner if needed.    I personally spent a total of 31 minutes on patient visit today including chart review, face to face with the patient obtaining the history and physical exam, review of pertinent images and tests, counseling and discussion and/or coordination of care as described above, and documentation.  Total time excludes time spent on other separate services such as performing procedures or test interpretation, if applicable.    Electronically signed by ALEXUS Amaya, 06/03/25, 3:18 PM EDT.    Please note that portions of this note were completed with a voice recognition program.      CC:  Glory, Ben Heath, DO

## 2025-06-25 ENCOUNTER — TELEPHONE (OUTPATIENT)
Dept: FAMILY MEDICINE CLINIC | Facility: CLINIC | Age: 80
End: 2025-06-25
Payer: MEDICARE

## 2025-07-03 ENCOUNTER — OFFICE VISIT (OUTPATIENT)
Dept: NEUROLOGY | Facility: CLINIC | Age: 80
End: 2025-07-03
Payer: MEDICARE

## 2025-07-03 VITALS
HEART RATE: 70 BPM | OXYGEN SATURATION: 92 % | SYSTOLIC BLOOD PRESSURE: 118 MMHG | DIASTOLIC BLOOD PRESSURE: 62 MMHG | WEIGHT: 260.14 LBS | HEIGHT: 72 IN | BODY MASS INDEX: 35.24 KG/M2

## 2025-07-03 DIAGNOSIS — G25.81 RESTLESS LEGS SYNDROME (RLS): ICD-10-CM

## 2025-07-03 DIAGNOSIS — G20.A2 PARKINSON'S DISEASE WITHOUT DYSKINESIA, WITH FLUCTUATING MANIFESTATIONS: Primary | ICD-10-CM

## 2025-07-03 PROCEDURE — 1159F MED LIST DOCD IN RCRD: CPT | Performed by: PSYCHIATRY & NEUROLOGY

## 2025-07-03 PROCEDURE — 1160F RVW MEDS BY RX/DR IN RCRD: CPT | Performed by: PSYCHIATRY & NEUROLOGY

## 2025-07-03 PROCEDURE — 99214 OFFICE O/P EST MOD 30 MIN: CPT | Performed by: PSYCHIATRY & NEUROLOGY

## 2025-07-03 RX ORDER — CARBIDOPA AND LEVODOPA 25; 100 MG/1; MG/1
1 TABLET, EXTENDED RELEASE ORAL 2 TIMES DAILY
Qty: 60 TABLET | Refills: 11 | Status: SHIPPED | OUTPATIENT
Start: 2025-07-03 | End: 2026-07-03

## 2025-07-03 RX ORDER — SIMETHICONE 125 MG
CAPSULE ORAL
COMMUNITY

## 2025-07-03 NOTE — PROGRESS NOTES
Subjective:    CC: Cordell Martin is seen today for Follow-up       Current visit-patient saw me in 2023.  He has now moved from [facility] to a senior living facility called [facility] where he is responsible for his own medications.  He states that his tremor has worsened slightly.  He continues to take regular Sinemet 25/100 mg, 2 tablets 4 times a day in addition to Sinemet ER 25/100 mg, 1 tablet twice a day and Mirapex 1 mg 3 times a day for restless leg which does help.  Denies having any hallucinations or constipation but does have some difficulty sleeping at night.  Has stopped taking Remeron and Seroquel at night and does not take any medications currently as these meds were making him extremely lethargic during the day.  Even with the Sinemet makes him tired.  He is now unable to walk at all because of his severe lymphedema that started after his knee surgery 2 years ago.  Has stopped taking Eliquis for his DVT.      Last visit-patient was in the hospital a few months ago for lymphedema/DVT for which she has been started on Eliquis.  After that event to inpatient rehab for 4 weeks and has now moved to [facility] (independent living facility) where he has his meals delivered to the room.  Takes out his own medications..  He states that his tremors have improved since adding Sinemet ER 25/100 mg 1 tablet twice a day that he takes in addition to regular Sinemet 25/100 mg, 2 tablets 4 times daily and Mirapex 1 mg 3 times daily.  He denies having any visual or auditory hallucinations.  He still has some difficulty sleeping at night despite starting Seroquel 12.5 mg nightly.  He will also be restarting home health therapy (including PT/OT) soon. Is using a rollator walker now.    Initial visit -78-year-old male accompanied by his cousin with a history of hypothyroidism, anxiety, depression, RLS, hyperlipidemia, lumbar stenosis status post L4-5 decompression presents with  Parkinson's disease.  As per patient he started having a tremor in his right hand in 2015 along with balance issues.  Over time his symptoms have progressed remarkably.  In addition he has symptoms of anxiety, depression, difficulty sleeping at night where he only gets 4 to 6 hours of sleep and anosmia for the past 2 years.  Also has mild constipation.  Was previously having hallucinations but they have stopped now.  He denies having any significant memory problems.  He lives at home by himself and is currently carrying out his ADLs but with much difficulty.  Cooks his meals by supporting himself against the countertop with 1 hand.  His mother lives out of state gets his groceries shipped to the house.  Was started on Sinemet 25/100 mg, currently at 2 tablets 4 times a day that he takes at 10 AM, 3 PM, 8 PM and 11 PM.  Also takes Requip 1 mg 3 times daily for restless leg syndrome that helps.  Was previously tried on amantadine that caused hallucinations.  Has been getting outpatient PT that he will be finishing this week.  Sustained a fall 2 months ago for which she had a CT scan of the head that was unremarkable.  Also had a CT cervical spine that showed marked degenerative changes with disc bulges most prominent at C4-5 and C5-6 but no acute abnormalities such as fractures.  Of note-I personally reviewed his CT head and Priti's notes as follows-    Cordell Martin is a 77 y.o. male who returns to clinic today with a history of Parkinson's Disease. He has had symptoms since at least 2015 marked initially by a resting tremor primarily in his right hand. He notes occasional associated imbalance, though denies a shuffling gait.    He has also noted short term memory impairment for several years. He notes associated word-finding difficulties and impairments in concentration.    Prior evaluation has included screening blood work and a head CT which were unremarkable. Screening bloodwork has been unremarkable with  the exception of a mildly elevated B6 level (and he has subsequently stopped taking a B6 supplement). He is currently taking Sinemet 1.5 tabs tid and pramipexole 1 mg tid. He was intolerant of amantadine due to hallucinations. He has completed a round of PT, but unfortunately did not find it to be significantly beneficial  Last visit-: Since his last visit in 9/21, he notes that his tremor has worsened. He reports increased freezing. He continues to note numbness and tingling in his hands and feet.     The following portions of the patient's history were reviewed today and updated as of 06/15/2023  : allergies, current medications, past family history, past medical history, past social history, past surgical history, and problem list  These document will be scanned to patient's chart.      Current Outpatient Medications:     ammonium lactate (AmLactin) 12 % lotion, Apply  topically to the appropriate area as directed As Needed for Dry Skin., Disp: 225 g, Rfl: 1    atorvastatin (LIPITOR) 40 MG tablet, Take 1 tablet by mouth Daily. Indications: High Amount of Fats in the Blood, Disp: , Rfl:     carbidopa-levodopa (SINEMET)  MG per tablet, TAKE 2 TABLETS BY MOUTH 4 TIMES DAILY *MUST MAKE APPT FOR REFILLS*, Disp: 720 tablet, Rfl: 0    Cholecalciferol (VITAMIN D3) 5000 units capsule capsule, Take 1 capsule by mouth Daily. Indications: Vitamin D Deficiency, Disp: , Rfl:     esomeprazole (nexIUM) 20 MG capsule, Take 1 capsule by mouth Every Morning Before Breakfast., Disp: 90 capsule, Rfl: 1    fluticasone (FLONASE) 50 MCG/ACT nasal spray, USE 2 SPRAYS IN EACH NOSTRIL ONCE DAILY (Patient taking differently: Administer 2 sprays into the nostril(s) as directed by provider 2 (Two) Times a Day. Indications: Allergic Rhinitis, Stuffy Nose), Disp: 48 mL, Rfl: 2    furosemide (LASIX) 20 MG tablet, Take 1 tablet by mouth once daily (Patient taking differently: Take 2 tablets by mouth Daily. Indications: Edema, High Blood  Pressure), Disp: 90 tablet, Rfl: 0    levothyroxine (SYNTHROID, LEVOTHROID) 88 MCG tablet, Take 1 tablet by mouth Every Morning. Indications: Underactive Thyroid, Disp: , Rfl:     PARoxetine HCl (PAXIL PO), Take  by mouth., Disp: , Rfl:     piperacillin-tazobactam (ZOSYN) 3-0.375 GM/50ML IVPB, Infuse 50 mL into a venous catheter Every 6 (Six) Hours. Infuse over 30 minutes, Disp: , Rfl:     pramipexole (MIRAPEX) 1 MG tablet, Take 1 tablet by mouth 3 (Three) Times a Day., Disp: 270 tablet, Rfl: 3    simethicone (MYLICON,GAS-X) 125 MG capsule capsule, 1 cap(s) orally 4 times a day (after meals and at bedtime) PRN; Duration: 30 days, Disp: , Rfl:     tamsulosin (FLOMAX) 0.4 MG capsule 24 hr capsule, Take 1 capsule by mouth Daily. Indications: Chronic Prostate Gland Inflammation, Disp: 90 capsule, Rfl: 3    carbidopa-levodopa ER (SINEMET CR)  MG per tablet, Take 1 tablet by mouth 2 (Two) Times a Day., Disp: 60 tablet, Rfl: 11    Diclofenac Sodium (VOLTAREN) 1 % gel gel, Apply 4 g topically to the appropriate area as directed 4 (Four) Times a Day As Needed (right leg pain). (Patient not taking: Reported on 7/3/2025), Disp: 100 g, Rfl: 0    furosemide (Lasix) 40 MG tablet, Take 1 tablet by mouth Daily for 7 days., Disp: 7 tablet, Rfl: 0    levothyroxine (SYNTHROID, LEVOTHROID) 88 MCG tablet, Take 1 tablet by mouth Every Morning. (Patient not taking: Reported on 7/3/2025), Disp: , Rfl:    Past Medical History:   Diagnosis Date    Anxiety     Arthritis     Back pain     Bronchitis     Cognitive impairment     Depression     Disease of thyroid gland     Glucose intolerance (impaired glucose tolerance)     Hyperlipidemia     Kidney stone     Obesity     Parkinson disease     Pneumonia     Prostate disorder     Thyroid disease     Wears eyeglasses       Past Surgical History:   Procedure Laterality Date    COLONOSCOPY      5 years ago    EYE SURGERY      HERNIA REPAIR  10/20/2020    KNEE SURGERY      LUMBAR LAMINECTOMY  "DISCECTOMY DECOMPRESSION N/A 2017    Procedure: LUMBAR LAMINECTOMY L4-5;  Surgeon: Antonio Trent MD;  Location: [facility];  Service:     SHOULDER ROTATOR CUFF REPAIR Right     x2    TONSILLECTOMY      VEIN SURGERY        Family History   Problem Relation Age of Onset    Alzheimer's disease Other     Cancer Other     Diabetes Other     Heart disease Other     Stroke Other     Cancer Father       Social History     Socioeconomic History    Marital status:    Tobacco Use    Smoking status: Former     Current packs/day: 0.00     Average packs/day: 1 pack/day for 30.0 years (30.0 ttl pk-yrs)     Types: Cigarettes     Start date:      Quit date:      Years since quittin.5     Passive exposure: Past    Smokeless tobacco: Never    Tobacco comments:     quit    Vaping Use    Vaping status: Never Used   Substance and Sexual Activity    Alcohol use: Yes     Comment: occasionally    Drug use: No    Sexual activity: Defer     Partners: Female     Birth control/protection: None     Review of Systems   Musculoskeletal:  Positive for gait problem.   Neurological:  Positive for tremors.   All other systems reviewed and are negative.      Objective:    /62   Pulse 70   Ht 182.9 cm (72.01\")   Wt 118 kg (260 lb 2.3 oz)   SpO2 92%   BMI 35.27 kg/m²     Neurology Exam:    General apperance: Masklike facies    Mental status: Alert, awake and oriented to time place and person.    Recent and Remote memory: Intact.    Attention span and Concentration: Normal.     Language and Speech: Intact- No dysarthria.    Fluency, Naming , Repitition and Comprehension:  Intact    Cranial Nerves:   CN II: Visual fields are full. Intact. Fundi - Normal, No papillederma, Pupils - BELLA  CN III, IV and VI: Extraocular movements are intact. Normal saccades.   CN V: Facial sensation is intact.   CN VII: Muscles of facial expression reveal no asymmetry. Intact.   CN VIII: Hearing is intact. Whispered voice intact. "   CN IX and X: Palate elevates symmetrically. Intact  CN XI: Shoulder shrug is intact.   CN XII: Tongue is midline without evidence of atrophy or fasciculation.     Ophthalmoscopic exam of optic disc-normal    Motor:-Resting tremor noted in his right hand.  Last dose of Sinemet was 4-1/2 hours ago    Right UE muscle strength 5/5. Normal tone.     Left UE muscle strength 5/5. Normal tone.      Right LE muscle strength5/5. Normal tone.     Left LE muscle strength 5/5. Normal tone.  Severe bilateral lymphedema with vasostatic changes noted    Sensory: Normal light touch, vibration and pinprick sensation bilaterally.    DTRs: 2+ bilaterally in upper and lower extremities.    Babinski: Negative bilaterally.    Co-ordination: Normal finger-to-nose, heel to shin B/L.    Rhomberg: Negative.    Gait: Did not want to stand up today.  Using a motorized scooter today    Cardiovascular: Regular rate and rhythm without murmur, gallop or rub.    Assessment and Plan:  1. Parkinson's disease  I have told him to continue Sinemet 25/100 mg, 2 tablets 4 times daily at 10 AM, 3 PM, 8 PM and 11 PM in addition to Sinemet ER 1 tablet twice a day-8 AM and 4 PM  He does not want to increase his dose of Sinemet or start any medications at night due to daytime somnolence  I have told him to continue PT  He should take the Sinemet on an empty stomach and also keep himself well-hydrated    2. Restless legs syndrome (RLS)  He should continue Mirapex 1 mg 3 times daily      Return in about 9 months (around 4/3/2026).     I spent over 25 minutes with the patient face to face out of which over 50% (20 minutes) was spent in management, instructions and education.     Bernice Ortiz MD

## 2025-07-08 ENCOUNTER — HOSPITAL ENCOUNTER (INPATIENT)
Facility: HOSPITAL | Age: 80
LOS: 6 days | Discharge: NURSING, REHAB OR LONG-TERM CARE | End: 2025-07-15
Attending: EMERGENCY MEDICINE | Admitting: STUDENT IN AN ORGANIZED HEALTH CARE EDUCATION/TRAINING PROGRAM
Payer: MEDICARE

## 2025-07-08 ENCOUNTER — APPOINTMENT (OUTPATIENT)
Dept: GENERAL RADIOLOGY | Facility: HOSPITAL | Age: 80
End: 2025-07-08
Payer: MEDICARE

## 2025-07-08 DIAGNOSIS — R47.1 DYSARTHRIA: ICD-10-CM

## 2025-07-08 DIAGNOSIS — L03.116 BILATERAL LOWER LEG CELLULITIS: Primary | ICD-10-CM

## 2025-07-08 DIAGNOSIS — L97.202 VENOUS STASIS ULCER OF CALF WITH FAT LAYER EXPOSED WITHOUT VARICOSE VEINS, UNSPECIFIED LATERALITY: ICD-10-CM

## 2025-07-08 DIAGNOSIS — R13.10 DYSPHAGIA, UNSPECIFIED TYPE: ICD-10-CM

## 2025-07-08 DIAGNOSIS — R13.10 DYSPHAGIA, UNSPECIFIED TYPE: Primary | ICD-10-CM

## 2025-07-08 DIAGNOSIS — I87.2 VENOUS STASIS ULCER OF CALF WITH FAT LAYER EXPOSED WITHOUT VARICOSE VEINS, UNSPECIFIED LATERALITY: ICD-10-CM

## 2025-07-08 DIAGNOSIS — L03.115 BILATERAL LOWER LEG CELLULITIS: Primary | ICD-10-CM

## 2025-07-08 DIAGNOSIS — D63.8 ANEMIA, CHRONIC DISEASE: ICD-10-CM

## 2025-07-08 DIAGNOSIS — R60.9 DEPENDENT EDEMA: ICD-10-CM

## 2025-07-08 DIAGNOSIS — E78.5 HYPERLIPIDEMIA, UNSPECIFIED HYPERLIPIDEMIA TYPE: ICD-10-CM

## 2025-07-08 DIAGNOSIS — G20.A1 PARKINSON'S DISEASE, UNSPECIFIED WHETHER DYSKINESIA PRESENT, UNSPECIFIED WHETHER MANIFESTATIONS FLUCTUATE: ICD-10-CM

## 2025-07-08 LAB
ALBUMIN SERPL-MCNC: 3.5 G/DL (ref 3.5–5.2)
ALBUMIN/GLOB SERPL: 0.8 G/DL
ALP SERPL-CCNC: 156 U/L (ref 39–117)
ALT SERPL W P-5'-P-CCNC: <5 U/L (ref 1–41)
ANION GAP SERPL CALCULATED.3IONS-SCNC: 9 MMOL/L (ref 5–15)
AST SERPL-CCNC: 13 U/L (ref 1–40)
BASOPHILS # BLD AUTO: 0.02 10*3/MM3 (ref 0–0.2)
BASOPHILS NFR BLD AUTO: 0.3 % (ref 0–1.5)
BILIRUB SERPL-MCNC: 0.5 MG/DL (ref 0–1.2)
BUN SERPL-MCNC: 22.4 MG/DL (ref 8–23)
BUN/CREAT SERPL: 18.4 (ref 7–25)
CALCIUM SPEC-SCNC: 9.2 MG/DL (ref 8.6–10.5)
CHLORIDE SERPL-SCNC: 101 MMOL/L (ref 98–107)
CO2 SERPL-SCNC: 28 MMOL/L (ref 22–29)
CREAT SERPL-MCNC: 1.22 MG/DL (ref 0.76–1.27)
CRP SERPL-MCNC: 4.69 MG/DL (ref 0–0.5)
D-LACTATE SERPL-SCNC: 1.2 MMOL/L (ref 0.5–2)
DEPRECATED RDW RBC AUTO: 61.1 FL (ref 37–54)
EGFRCR SERPLBLD CKD-EPI 2021: 60.7 ML/MIN/1.73
EOSINOPHIL # BLD AUTO: 0.22 10*3/MM3 (ref 0–0.4)
EOSINOPHIL NFR BLD AUTO: 3.2 % (ref 0.3–6.2)
ERYTHROCYTE [DISTWIDTH] IN BLOOD BY AUTOMATED COUNT: 17.6 % (ref 12.3–15.4)
ERYTHROCYTE [SEDIMENTATION RATE] IN BLOOD: 82 MM/HR (ref 0–20)
GLOBULIN UR ELPH-MCNC: 4.5 GM/DL
GLUCOSE SERPL-MCNC: 92 MG/DL (ref 65–99)
HCT VFR BLD AUTO: 42.7 % (ref 37.5–51)
HGB BLD-MCNC: 12.7 G/DL (ref 13–17.7)
IMM GRANULOCYTES # BLD AUTO: 0.02 10*3/MM3 (ref 0–0.05)
IMM GRANULOCYTES NFR BLD AUTO: 0.3 % (ref 0–0.5)
INR PPP: 1.05 (ref 0.89–1.12)
LYMPHOCYTES # BLD AUTO: 1.83 10*3/MM3 (ref 0.7–3.1)
LYMPHOCYTES NFR BLD AUTO: 26.9 % (ref 19.6–45.3)
MCH RBC QN AUTO: 28.4 PG (ref 26.6–33)
MCHC RBC AUTO-ENTMCNC: 29.7 G/DL (ref 31.5–35.7)
MCV RBC AUTO: 95.5 FL (ref 79–97)
MONOCYTES # BLD AUTO: 0.55 10*3/MM3 (ref 0.1–0.9)
MONOCYTES NFR BLD AUTO: 8.1 % (ref 5–12)
NEUTROPHILS NFR BLD AUTO: 4.17 10*3/MM3 (ref 1.7–7)
NEUTROPHILS NFR BLD AUTO: 61.2 % (ref 42.7–76)
NRBC BLD AUTO-RTO: 0 /100 WBC (ref 0–0.2)
PLATELET # BLD AUTO: 190 10*3/MM3 (ref 140–450)
PMV BLD AUTO: 8.5 FL (ref 6–12)
POTASSIUM SERPL-SCNC: 4 MMOL/L (ref 3.5–5.2)
PROCALCITONIN SERPL-MCNC: 0.08 NG/ML (ref 0–0.25)
PROT SERPL-MCNC: 8 G/DL (ref 6–8.5)
PROTHROMBIN TIME: 14.4 SECONDS (ref 12.2–15.3)
RBC # BLD AUTO: 4.47 10*6/MM3 (ref 4.14–5.8)
SODIUM SERPL-SCNC: 138 MMOL/L (ref 136–145)
WBC NRBC COR # BLD AUTO: 6.81 10*3/MM3 (ref 3.4–10.8)

## 2025-07-08 PROCEDURE — 99285 EMERGENCY DEPT VISIT HI MDM: CPT

## 2025-07-08 PROCEDURE — 99223 1ST HOSP IP/OBS HIGH 75: CPT

## 2025-07-08 PROCEDURE — 25010000002 MORPHINE PER 10 MG: Performed by: EMERGENCY MEDICINE

## 2025-07-08 PROCEDURE — 25010000002 VANCOMYCIN 10 G RECONSTITUTED SOLUTION: Performed by: EMERGENCY MEDICINE

## 2025-07-08 PROCEDURE — 71045 X-RAY EXAM CHEST 1 VIEW: CPT

## 2025-07-08 PROCEDURE — 85652 RBC SED RATE AUTOMATED: CPT | Performed by: EMERGENCY MEDICINE

## 2025-07-08 PROCEDURE — 80053 COMPREHEN METABOLIC PANEL: CPT | Performed by: EMERGENCY MEDICINE

## 2025-07-08 PROCEDURE — 87040 BLOOD CULTURE FOR BACTERIA: CPT | Performed by: EMERGENCY MEDICINE

## 2025-07-08 PROCEDURE — 85610 PROTHROMBIN TIME: CPT | Performed by: EMERGENCY MEDICINE

## 2025-07-08 PROCEDURE — 25810000003 SODIUM CHLORIDE 0.9 % SOLUTION: Performed by: EMERGENCY MEDICINE

## 2025-07-08 PROCEDURE — 36415 COLL VENOUS BLD VENIPUNCTURE: CPT

## 2025-07-08 PROCEDURE — 86140 C-REACTIVE PROTEIN: CPT | Performed by: EMERGENCY MEDICINE

## 2025-07-08 PROCEDURE — 83880 ASSAY OF NATRIURETIC PEPTIDE: CPT | Performed by: STUDENT IN AN ORGANIZED HEALTH CARE EDUCATION/TRAINING PROGRAM

## 2025-07-08 PROCEDURE — 25010000002 ONDANSETRON PER 1 MG: Performed by: EMERGENCY MEDICINE

## 2025-07-08 PROCEDURE — 85025 COMPLETE CBC W/AUTO DIFF WBC: CPT | Performed by: EMERGENCY MEDICINE

## 2025-07-08 PROCEDURE — 83605 ASSAY OF LACTIC ACID: CPT | Performed by: EMERGENCY MEDICINE

## 2025-07-08 PROCEDURE — 84145 PROCALCITONIN (PCT): CPT | Performed by: EMERGENCY MEDICINE

## 2025-07-08 RX ORDER — SODIUM CHLORIDE 0.9 % (FLUSH) 0.9 %
10 SYRINGE (ML) INJECTION AS NEEDED
Status: DISCONTINUED | OUTPATIENT
Start: 2025-07-08 | End: 2025-07-09

## 2025-07-08 RX ORDER — ONDANSETRON 2 MG/ML
4 INJECTION INTRAMUSCULAR; INTRAVENOUS ONCE
Status: COMPLETED | OUTPATIENT
Start: 2025-07-08 | End: 2025-07-08

## 2025-07-08 RX ORDER — MORPHINE SULFATE 4 MG/ML
4 INJECTION, SOLUTION INTRAMUSCULAR; INTRAVENOUS ONCE
Status: COMPLETED | OUTPATIENT
Start: 2025-07-08 | End: 2025-07-08

## 2025-07-08 RX ADMIN — VANCOMYCIN HYDROCHLORIDE 2250 MG: 10 INJECTION, POWDER, LYOPHILIZED, FOR SOLUTION INTRAVENOUS at 22:16

## 2025-07-08 RX ADMIN — MORPHINE SULFATE 4 MG: 4 INJECTION, SOLUTION INTRAMUSCULAR; INTRAVENOUS at 23:10

## 2025-07-08 RX ADMIN — ONDANSETRON 4 MG: 2 INJECTION, SOLUTION INTRAMUSCULAR; INTRAVENOUS at 23:10

## 2025-07-09 ENCOUNTER — APPOINTMENT (OUTPATIENT)
Dept: CARDIOLOGY | Facility: HOSPITAL | Age: 80
End: 2025-07-09
Payer: MEDICARE

## 2025-07-09 PROBLEM — G25.81 RESTLESS LEG SYNDROME: Status: ACTIVE | Noted: 2025-07-09

## 2025-07-09 PROBLEM — L03.90 CELLULITIS: Status: ACTIVE | Noted: 2025-07-09

## 2025-07-09 LAB
ALBUMIN SERPL-MCNC: 3 G/DL (ref 3.5–5.2)
ALBUMIN/GLOB SERPL: 0.8 G/DL
ALP SERPL-CCNC: 126 U/L (ref 39–117)
ALT SERPL W P-5'-P-CCNC: <5 U/L (ref 1–41)
ANION GAP SERPL CALCULATED.3IONS-SCNC: 9.1 MMOL/L (ref 5–15)
AST SERPL-CCNC: 15 U/L (ref 1–40)
BASOPHILS # BLD AUTO: 0.01 10*3/MM3 (ref 0–0.2)
BASOPHILS NFR BLD AUTO: 0.2 % (ref 0–1.5)
BH CV LOWER VASCULAR LEFT COMMON FEMORAL AUGMENT: NORMAL
BH CV LOWER VASCULAR LEFT COMMON FEMORAL COMPRESS: NORMAL
BH CV LOWER VASCULAR LEFT COMMON FEMORAL PHASIC: NORMAL
BH CV LOWER VASCULAR LEFT COMMON FEMORAL SPONT: NORMAL
BH CV LOWER VASCULAR LEFT DISTAL FEMORAL AUGMENT: NORMAL
BH CV LOWER VASCULAR LEFT DISTAL FEMORAL COMPRESS: NORMAL
BH CV LOWER VASCULAR LEFT DISTAL FEMORAL PHASIC: NORMAL
BH CV LOWER VASCULAR LEFT DISTAL FEMORAL SPONT: NORMAL
BH CV LOWER VASCULAR LEFT GASTRONEMIUS COMPRESS: NORMAL
BH CV LOWER VASCULAR LEFT GREATER SAPH AK COMPRESS: NORMAL
BH CV LOWER VASCULAR LEFT GREATER SAPH BK COMPRESS: NORMAL
BH CV LOWER VASCULAR LEFT LESSER SAPH COMPRESS: NORMAL
BH CV LOWER VASCULAR LEFT MID FEMORAL AUGMENT: NORMAL
BH CV LOWER VASCULAR LEFT MID FEMORAL COMPRESS: NORMAL
BH CV LOWER VASCULAR LEFT MID FEMORAL PHASIC: NORMAL
BH CV LOWER VASCULAR LEFT MID FEMORAL SPONT: NORMAL
BH CV LOWER VASCULAR LEFT PERONEAL COMPRESS: NORMAL
BH CV LOWER VASCULAR LEFT POPLITEAL AUGMENT: NORMAL
BH CV LOWER VASCULAR LEFT POPLITEAL COMPRESS: NORMAL
BH CV LOWER VASCULAR LEFT POPLITEAL PHASIC: NORMAL
BH CV LOWER VASCULAR LEFT POPLITEAL SPONT: NORMAL
BH CV LOWER VASCULAR LEFT POSTERIOR TIBIAL COMPRESS: NORMAL
BH CV LOWER VASCULAR LEFT PROFUNDA FEMORAL COMPRESS: NORMAL
BH CV LOWER VASCULAR LEFT PROXIMAL FEMORAL AUGMENT: NORMAL
BH CV LOWER VASCULAR LEFT PROXIMAL FEMORAL COMPRESS: NORMAL
BH CV LOWER VASCULAR LEFT PROXIMAL FEMORAL PHASIC: NORMAL
BH CV LOWER VASCULAR LEFT PROXIMAL FEMORAL SPONT: NORMAL
BH CV LOWER VASCULAR LEFT SAPHENOFEMORAL JUNCTION AUGMENT: NORMAL
BH CV LOWER VASCULAR LEFT SAPHENOFEMORAL JUNCTION COMPRESS: NORMAL
BH CV LOWER VASCULAR LEFT SAPHENOFEMORAL JUNCTION PHASIC: NORMAL
BH CV LOWER VASCULAR LEFT SAPHENOFEMORAL JUNCTION SPONT: NORMAL
BH CV LOWER VASCULAR RIGHT COMMON FEMORAL AUGMENT: NORMAL
BH CV LOWER VASCULAR RIGHT COMMON FEMORAL COMPRESS: NORMAL
BH CV LOWER VASCULAR RIGHT COMMON FEMORAL PHASIC: NORMAL
BH CV LOWER VASCULAR RIGHT COMMON FEMORAL SPONT: NORMAL
BH CV LOWER VASCULAR RIGHT DISTAL FEMORAL AUGMENT: NORMAL
BH CV LOWER VASCULAR RIGHT DISTAL FEMORAL COMPRESS: NORMAL
BH CV LOWER VASCULAR RIGHT DISTAL FEMORAL PHASIC: NORMAL
BH CV LOWER VASCULAR RIGHT DISTAL FEMORAL SPONT: NORMAL
BH CV LOWER VASCULAR RIGHT GASTRONEMIUS COMPRESS: NORMAL
BH CV LOWER VASCULAR RIGHT GREATER SAPH BK COMPRESS: NORMAL
BH CV LOWER VASCULAR RIGHT LESSER SAPH COMPRESS: NORMAL
BH CV LOWER VASCULAR RIGHT MID FEMORAL AUGMENT: NORMAL
BH CV LOWER VASCULAR RIGHT MID FEMORAL COMPRESS: NORMAL
BH CV LOWER VASCULAR RIGHT MID FEMORAL PHASIC: NORMAL
BH CV LOWER VASCULAR RIGHT MID FEMORAL SPONT: NORMAL
BH CV LOWER VASCULAR RIGHT PERONEAL COMPRESS: NORMAL
BH CV LOWER VASCULAR RIGHT POPLITEAL AUGMENT: NORMAL
BH CV LOWER VASCULAR RIGHT POPLITEAL COMPRESS: NORMAL
BH CV LOWER VASCULAR RIGHT POPLITEAL PHASIC: NORMAL
BH CV LOWER VASCULAR RIGHT POPLITEAL SPONT: NORMAL
BH CV LOWER VASCULAR RIGHT POSTERIOR TIBIAL COMPRESS: NORMAL
BH CV LOWER VASCULAR RIGHT PROFUNDA FEMORAL COMPRESS: NORMAL
BH CV LOWER VASCULAR RIGHT PROXIMAL FEMORAL AUGMENT: NORMAL
BH CV LOWER VASCULAR RIGHT PROXIMAL FEMORAL COMPRESS: NORMAL
BH CV LOWER VASCULAR RIGHT PROXIMAL FEMORAL PHASIC: NORMAL
BH CV LOWER VASCULAR RIGHT PROXIMAL FEMORAL SPONT: NORMAL
BH CV LOWER VASCULAR RIGHT SAPHENOFEMORAL JUNCTION AUGMENT: NORMAL
BH CV LOWER VASCULAR RIGHT SAPHENOFEMORAL JUNCTION COMPRESS: NORMAL
BH CV LOWER VASCULAR RIGHT SAPHENOFEMORAL JUNCTION PHASIC: NORMAL
BH CV LOWER VASCULAR RIGHT SAPHENOFEMORAL JUNCTION SPONT: NORMAL
BILIRUB SERPL-MCNC: 0.4 MG/DL (ref 0–1.2)
BUN SERPL-MCNC: 20.8 MG/DL (ref 8–23)
BUN/CREAT SERPL: 16.4 (ref 7–25)
CALCIUM SPEC-SCNC: 8.7 MG/DL (ref 8.6–10.5)
CHLORIDE SERPL-SCNC: 102 MMOL/L (ref 98–107)
CO2 SERPL-SCNC: 27.9 MMOL/L (ref 22–29)
CREAT SERPL-MCNC: 1.27 MG/DL (ref 0.76–1.27)
DEPRECATED RDW RBC AUTO: 58.7 FL (ref 37–54)
EGFRCR SERPLBLD CKD-EPI 2021: 57.8 ML/MIN/1.73
EOSINOPHIL # BLD AUTO: 0.14 10*3/MM3 (ref 0–0.4)
EOSINOPHIL NFR BLD AUTO: 2.2 % (ref 0.3–6.2)
ERYTHROCYTE [DISTWIDTH] IN BLOOD BY AUTOMATED COUNT: 17.2 % (ref 12.3–15.4)
GLOBULIN UR ELPH-MCNC: 3.8 GM/DL
GLUCOSE SERPL-MCNC: 137 MG/DL (ref 65–99)
HCT VFR BLD AUTO: 36.4 % (ref 37.5–51)
HGB BLD-MCNC: 11.2 G/DL (ref 13–17.7)
IMM GRANULOCYTES # BLD AUTO: 0.02 10*3/MM3 (ref 0–0.05)
IMM GRANULOCYTES NFR BLD AUTO: 0.3 % (ref 0–0.5)
LYMPHOCYTES # BLD AUTO: 1.04 10*3/MM3 (ref 0.7–3.1)
LYMPHOCYTES NFR BLD AUTO: 16.1 % (ref 19.6–45.3)
MCH RBC QN AUTO: 28.5 PG (ref 26.6–33)
MCHC RBC AUTO-ENTMCNC: 30.8 G/DL (ref 31.5–35.7)
MCV RBC AUTO: 92.6 FL (ref 79–97)
MONOCYTES # BLD AUTO: 0.37 10*3/MM3 (ref 0.1–0.9)
MONOCYTES NFR BLD AUTO: 5.7 % (ref 5–12)
NEUTROPHILS NFR BLD AUTO: 4.87 10*3/MM3 (ref 1.7–7)
NEUTROPHILS NFR BLD AUTO: 75.5 % (ref 42.7–76)
NRBC BLD AUTO-RTO: 0 /100 WBC (ref 0–0.2)
NT-PROBNP SERPL-MCNC: 44.3 PG/ML (ref 0–1800)
PLATELET # BLD AUTO: 183 10*3/MM3 (ref 140–450)
PMV BLD AUTO: 8.7 FL (ref 6–12)
POTASSIUM SERPL-SCNC: 4.3 MMOL/L (ref 3.5–5.2)
PROT SERPL-MCNC: 6.8 G/DL (ref 6–8.5)
QT INTERVAL: 410 MS
QTC INTERVAL: 481 MS
RBC # BLD AUTO: 3.93 10*6/MM3 (ref 4.14–5.8)
SODIUM SERPL-SCNC: 139 MMOL/L (ref 136–145)
VANCOMYCIN SERPL-MCNC: 27.9 MCG/ML (ref 5–40)
WBC NRBC COR # BLD AUTO: 6.45 10*3/MM3 (ref 3.4–10.8)

## 2025-07-09 PROCEDURE — 99232 SBSQ HOSP IP/OBS MODERATE 35: CPT | Performed by: HOSPITALIST

## 2025-07-09 PROCEDURE — 25010000002 ENOXAPARIN PER 10 MG

## 2025-07-09 PROCEDURE — 25010000002 CEFTRIAXONE PER 250 MG: Performed by: STUDENT IN AN ORGANIZED HEALTH CARE EDUCATION/TRAINING PROGRAM

## 2025-07-09 PROCEDURE — 25010000002 PIPERACILLIN SOD-TAZOBACTAM PER 1 G: Performed by: HOSPITALIST

## 2025-07-09 PROCEDURE — 93970 EXTREMITY STUDY: CPT | Performed by: INTERNAL MEDICINE

## 2025-07-09 PROCEDURE — 93005 ELECTROCARDIOGRAM TRACING: CPT

## 2025-07-09 PROCEDURE — 80202 ASSAY OF VANCOMYCIN: CPT

## 2025-07-09 PROCEDURE — 93970 EXTREMITY STUDY: CPT

## 2025-07-09 PROCEDURE — 85025 COMPLETE CBC W/AUTO DIFF WBC: CPT

## 2025-07-09 PROCEDURE — 93010 ELECTROCARDIOGRAM REPORT: CPT | Performed by: INTERNAL MEDICINE

## 2025-07-09 PROCEDURE — 80053 COMPREHEN METABOLIC PANEL: CPT

## 2025-07-09 RX ORDER — ATORVASTATIN CALCIUM 40 MG/1
40 TABLET, FILM COATED ORAL DAILY
Status: DISCONTINUED | OUTPATIENT
Start: 2025-07-09 | End: 2025-07-15 | Stop reason: HOSPADM

## 2025-07-09 RX ORDER — BISACODYL 5 MG/1
5 TABLET, DELAYED RELEASE ORAL DAILY PRN
Status: DISCONTINUED | OUTPATIENT
Start: 2025-07-09 | End: 2025-07-12

## 2025-07-09 RX ORDER — PRAMIPEXOLE DIHYDROCHLORIDE 1 MG/1
1 TABLET ORAL 3 TIMES DAILY
Status: DISCONTINUED | OUTPATIENT
Start: 2025-07-09 | End: 2025-07-15 | Stop reason: HOSPADM

## 2025-07-09 RX ORDER — POLYETHYLENE GLYCOL 3350 17 G/17G
17 POWDER, FOR SOLUTION ORAL DAILY PRN
Status: DISCONTINUED | OUTPATIENT
Start: 2025-07-09 | End: 2025-07-12

## 2025-07-09 RX ORDER — ACETAMINOPHEN 500 MG
500 TABLET ORAL 3 TIMES DAILY
Status: COMPLETED | OUTPATIENT
Start: 2025-07-09 | End: 2025-07-10

## 2025-07-09 RX ORDER — FUROSEMIDE 40 MG/1
40 TABLET ORAL DAILY
Status: DISCONTINUED | OUTPATIENT
Start: 2025-07-09 | End: 2025-07-15 | Stop reason: HOSPADM

## 2025-07-09 RX ORDER — SODIUM CHLORIDE 0.9 % (FLUSH) 0.9 %
10 SYRINGE (ML) INJECTION AS NEEDED
Status: DISCONTINUED | OUTPATIENT
Start: 2025-07-09 | End: 2025-07-15 | Stop reason: HOSPADM

## 2025-07-09 RX ORDER — TAMSULOSIN HYDROCHLORIDE 0.4 MG/1
0.4 CAPSULE ORAL DAILY
Status: DISCONTINUED | OUTPATIENT
Start: 2025-07-09 | End: 2025-07-15 | Stop reason: HOSPADM

## 2025-07-09 RX ORDER — BISACODYL 10 MG
10 SUPPOSITORY, RECTAL RECTAL DAILY PRN
Status: DISCONTINUED | OUTPATIENT
Start: 2025-07-09 | End: 2025-07-12

## 2025-07-09 RX ORDER — ACETAMINOPHEN 500 MG
1000 TABLET ORAL 3 TIMES DAILY
Status: DISCONTINUED | OUTPATIENT
Start: 2025-07-09 | End: 2025-07-09

## 2025-07-09 RX ORDER — HYDROCODONE BITARTRATE AND ACETAMINOPHEN 5; 325 MG/1; MG/1
1 TABLET ORAL EVERY 6 HOURS PRN
Refills: 0 | Status: DISPENSED | OUTPATIENT
Start: 2025-07-09 | End: 2025-07-14

## 2025-07-09 RX ORDER — SODIUM CHLORIDE 0.9 % (FLUSH) 0.9 %
10 SYRINGE (ML) INJECTION EVERY 12 HOURS SCHEDULED
Status: DISCONTINUED | OUTPATIENT
Start: 2025-07-09 | End: 2025-07-15 | Stop reason: HOSPADM

## 2025-07-09 RX ORDER — PAROXETINE 10 MG/1
10 TABLET, FILM COATED ORAL DAILY
Status: DISCONTINUED | OUTPATIENT
Start: 2025-07-09 | End: 2025-07-15 | Stop reason: HOSPADM

## 2025-07-09 RX ORDER — CARBIDOPA AND LEVODOPA 25; 100 MG/1; MG/1
2 TABLET ORAL EVERY 6 HOURS SCHEDULED
Status: DISCONTINUED | OUTPATIENT
Start: 2025-07-09 | End: 2025-07-15 | Stop reason: HOSPADM

## 2025-07-09 RX ORDER — CARBIDOPA AND LEVODOPA 25; 100 MG/1; MG/1
1 TABLET, EXTENDED RELEASE ORAL 2 TIMES DAILY
Status: DISCONTINUED | OUTPATIENT
Start: 2025-07-09 | End: 2025-07-15 | Stop reason: HOSPADM

## 2025-07-09 RX ORDER — ENOXAPARIN SODIUM 100 MG/ML
40 INJECTION SUBCUTANEOUS DAILY
Status: DISCONTINUED | OUTPATIENT
Start: 2025-07-09 | End: 2025-07-15 | Stop reason: HOSPADM

## 2025-07-09 RX ORDER — GABAPENTIN 100 MG/1
100 CAPSULE ORAL EVERY 12 HOURS SCHEDULED
Status: DISCONTINUED | OUTPATIENT
Start: 2025-07-09 | End: 2025-07-15 | Stop reason: HOSPADM

## 2025-07-09 RX ORDER — LEVOTHYROXINE SODIUM 88 UG/1
88 TABLET ORAL
Status: DISCONTINUED | OUTPATIENT
Start: 2025-07-09 | End: 2025-07-15 | Stop reason: HOSPADM

## 2025-07-09 RX ORDER — AMOXICILLIN 250 MG
2 CAPSULE ORAL 2 TIMES DAILY PRN
Status: DISCONTINUED | OUTPATIENT
Start: 2025-07-09 | End: 2025-07-12

## 2025-07-09 RX ADMIN — PRAMIPEXOLE DIHYDROCHLORIDE 1 MG: 1 TABLET ORAL at 15:48

## 2025-07-09 RX ADMIN — PRAMIPEXOLE DIHYDROCHLORIDE 1 MG: 1 TABLET ORAL at 21:16

## 2025-07-09 RX ADMIN — ENOXAPARIN SODIUM 40 MG: 100 INJECTION SUBCUTANEOUS at 08:40

## 2025-07-09 RX ADMIN — CARBIDOPA AND LEVODOPA 2 TABLET: 25; 100 TABLET ORAL at 00:34

## 2025-07-09 RX ADMIN — PRAMIPEXOLE DIHYDROCHLORIDE 1 MG: 1 TABLET ORAL at 08:40

## 2025-07-09 RX ADMIN — CARBIDOPA AND LEVODOPA 2 TABLET: 25; 100 TABLET ORAL at 18:42

## 2025-07-09 RX ADMIN — LEVOTHYROXINE SODIUM 88 MCG: 88 TABLET ORAL at 05:03

## 2025-07-09 RX ADMIN — FUROSEMIDE 40 MG: 40 TABLET ORAL at 08:40

## 2025-07-09 RX ADMIN — PAROXETINE HYDROCHLORIDE 10 MG: 10 TABLET, FILM COATED ORAL at 08:40

## 2025-07-09 RX ADMIN — GABAPENTIN 100 MG: 100 CAPSULE ORAL at 08:39

## 2025-07-09 RX ADMIN — CARBIDOPA AND LEVODOPA 1 TABLET: 25; 100 TABLET, EXTENDED RELEASE ORAL at 20:07

## 2025-07-09 RX ADMIN — ACETAMINOPHEN 500 MG: 500 TABLET, FILM COATED ORAL at 20:07

## 2025-07-09 RX ADMIN — ATORVASTATIN CALCIUM 40 MG: 40 TABLET, FILM COATED ORAL at 08:39

## 2025-07-09 RX ADMIN — CARBIDOPA AND LEVODOPA 2 TABLET: 25; 100 TABLET ORAL at 12:01

## 2025-07-09 RX ADMIN — GABAPENTIN 100 MG: 100 CAPSULE ORAL at 20:07

## 2025-07-09 RX ADMIN — CARBIDOPA AND LEVODOPA 1 TABLET: 25; 100 TABLET, EXTENDED RELEASE ORAL at 08:55

## 2025-07-09 RX ADMIN — Medication 10 ML: at 00:35

## 2025-07-09 RX ADMIN — ACETAMINOPHEN 500 MG: 500 TABLET, FILM COATED ORAL at 15:48

## 2025-07-09 RX ADMIN — Medication 10 ML: at 08:56

## 2025-07-09 RX ADMIN — PIPERACILLIN AND TAZOBACTAM 4.5 G: 4; .5 INJECTION, POWDER, FOR SOLUTION INTRAVENOUS at 18:42

## 2025-07-09 RX ADMIN — CARBIDOPA AND LEVODOPA 2 TABLET: 25; 100 TABLET ORAL at 23:44

## 2025-07-09 RX ADMIN — SODIUM CHLORIDE 2000 MG: 900 INJECTION INTRAVENOUS at 02:43

## 2025-07-09 RX ADMIN — CARBIDOPA AND LEVODOPA 2 TABLET: 25; 100 TABLET ORAL at 05:02

## 2025-07-09 RX ADMIN — TAMSULOSIN HYDROCHLORIDE 0.4 MG: 0.4 CAPSULE ORAL at 08:40

## 2025-07-09 RX ADMIN — PIPERACILLIN AND TAZOBACTAM 4.5 G: 4; .5 INJECTION, POWDER, FOR SOLUTION INTRAVENOUS at 12:01

## 2025-07-09 RX ADMIN — HYDROCODONE BITARTRATE AND ACETAMINOPHEN 1 TABLET: 5; 325 TABLET ORAL at 20:07

## 2025-07-09 RX ADMIN — HYDROCODONE BITARTRATE AND ACETAMINOPHEN 1 TABLET: 5; 325 TABLET ORAL at 05:05

## 2025-07-09 RX ADMIN — HYDROCODONE BITARTRATE AND ACETAMINOPHEN 1 TABLET: 5; 325 TABLET ORAL at 12:01

## 2025-07-09 RX ADMIN — CARBIDOPA AND LEVODOPA 1 TABLET: 25; 100 TABLET, EXTENDED RELEASE ORAL at 00:34

## 2025-07-09 RX ADMIN — Medication 10 ML: at 21:23

## 2025-07-09 RX ADMIN — ACETAMINOPHEN 500 MG: 500 TABLET, FILM COATED ORAL at 08:40

## 2025-07-09 NOTE — PLAN OF CARE
Goal Outcome Evaluation:                 Pt arrived to unit at approx midnight. He is AOx4, VSS on RA with no SxS of distress. Antibiotics given per orders. Pain meds requested once, but patient was asleep by the time I got the order. Fall precautions in place and turning schedule implemented.

## 2025-07-09 NOTE — PLAN OF CARE
Problem: Adult Inpatient Plan of Care  Goal: Plan of Care Review  Outcome: Progressing  Goal: Patient-Specific Goal (Individualized)  Outcome: Progressing  Goal: Absence of Hospital-Acquired Illness or Injury  Outcome: Progressing  Intervention: Identify and Manage Fall Risk  Recent Flowsheet Documentation  Taken 7/9/2025 1600 by Sonya Ochoa RN  Safety Promotion/Fall Prevention:   clutter free environment maintained   assistive device/personal items within reach   safety round/check completed   lighting adjusted  Taken 7/9/2025 1400 by Sonya Ochoa RN  Safety Promotion/Fall Prevention:   assistive device/personal items within reach   clutter free environment maintained   nonskid shoes/slippers when out of bed   safety round/check completed  Taken 7/9/2025 1201 by Sonya Ochoa RN  Safety Promotion/Fall Prevention:   assistive device/personal items within reach   safety round/check completed   toileting scheduled   lighting adjusted  Taken 7/9/2025 1000 by Sonya Ochoa RN  Safety Promotion/Fall Prevention:   assistive device/personal items within reach   fall prevention program maintained   room organization consistent   safety round/check completed  Taken 7/9/2025 0840 by Sonya Ochoa RN  Safety Promotion/Fall Prevention:   activity supervised   assistive device/personal items within reach   fall prevention program maintained   clutter free environment maintained   nonskid shoes/slippers when out of bed   room organization consistent   safety round/check completed  Intervention: Prevent Skin Injury  Recent Flowsheet Documentation  Taken 7/9/2025 1600 by Sonya Ochoa RN  Skin Protection:   silicone foam dressing in place   incontinence pads utilized  Taken 7/9/2025 1400 by Sonya Ochoa RN  Body Position: sitting up in bed  Skin Protection: silicone foam dressing in place  Taken 7/9/2025 1201 by Sonya Ochoa RN  Skin Protection:   silicone foam dressing in place   incontinence  pads utilized  Taken 7/9/2025 1000 by Sonya Ochoa RN  Skin Protection:   silicone foam dressing in place   incontinence pads utilized  Taken 7/9/2025 0840 by Sonya Ochoa RN  Body Position: right  Skin Protection: (silicone dressings peeled back, and skin assessed)   incontinence pads utilized   silicone foam dressing in place  Intervention: Prevent and Manage VTE (Venous Thromboembolism) Risk  Recent Flowsheet Documentation  Taken 7/9/2025 0840 by Sonya Ochoa RN  VTE Prevention/Management: (medication) other (see comments)  Goal: Optimal Comfort and Wellbeing  Outcome: Progressing  Intervention: Monitor Pain and Promote Comfort  Recent Flowsheet Documentation  Taken 7/9/2025 1548 by Sonya Ochoa RN  Pain Management Interventions: pain medication given  Taken 7/9/2025 1201 by Sonya Ochoa RN  Pain Management Interventions: pain medication given  Taken 7/9/2025 1000 by Sonya Ochoa RN  Pain Management Interventions:   pain management plan reviewed with patient/caregiver   pillow support provided   quiet environment facilitated  Taken 7/9/2025 0840 by Sonya Ochoa RN  Pain Management Interventions:   pain management plan reviewed with patient/caregiver   position adjusted  Goal: Readiness for Transition of Care  Outcome: Progressing     Problem: Fall Injury Risk  Goal: Absence of Fall and Fall-Related Injury  Outcome: Progressing  Intervention: Identify and Manage Contributors  Recent Flowsheet Documentation  Taken 7/9/2025 0840 by Sonya Ochoa RN  Medication Review/Management: medications reviewed  Intervention: Promote Injury-Free Environment  Recent Flowsheet Documentation  Taken 7/9/2025 1600 by Sonya Ochoa RN  Safety Promotion/Fall Prevention:   clutter free environment maintained   assistive device/personal items within reach   safety round/check completed   lighting adjusted  Taken 7/9/2025 1400 by Sonya Ochoa RN  Safety Promotion/Fall Prevention:    assistive device/personal items within reach   clutter free environment maintained   nonskid shoes/slippers when out of bed   safety round/check completed  Taken 7/9/2025 1201 by Sonya Ochoa RN  Safety Promotion/Fall Prevention:   assistive device/personal items within reach   safety round/check completed   toileting scheduled   lighting adjusted  Taken 7/9/2025 1000 by Sonya Ochoa RN  Safety Promotion/Fall Prevention:   assistive device/personal items within [facility]   fall prevention program maintained   room organization consistent   safety round/check completed  Taken 7/9/2025 0840 by Sonya Ochoa RN  Safety Promotion/Fall Prevention:   activity supervised   assistive device/personal items within [facility]   fall prevention program maintained   clutter free environment maintained   nonskid shoes/slippers when out of bed   room organization consistent   safety round/check completed     Problem: Pain Acute  Goal: Optimal Pain Control and Function  Outcome: Progressing  Intervention: Develop Pain Management Plan  Recent Flowsheet Documentation  Taken 7/9/2025 1548 by Sonya Ochoa RN  Pain Management Interventions: pain medication given  Taken 7/9/2025 1201 by Sonya Ochoa RN  Pain Management Interventions: pain medication given  Taken 7/9/2025 1000 by Sonya Ochoa RN  Pain Management Interventions:   pain management plan reviewed with patient/caregiver   pillow support provided   quiet environment facilitated  Taken 7/9/2025 0840 by Sonya Ochoa RN  Pain Management Interventions:   pain management plan reviewed with patient/caregiver   position adjusted  Intervention: Prevent or Manage Pain  Recent Flowsheet Documentation  Taken 7/9/2025 0840 by Sonya Ochoa RN  Medication Review/Management: medications reviewed     Problem: Skin Injury Risk Increased  Goal: Skin Health and Integrity  Outcome: Progressing  Intervention: Optimize Skin Protection  Recent Flowsheet  Documentation  Taken 7/9/2025 1600 by Sonya Ochoa RN  Activity Management: activity encouraged  Pressure Reduction Techniques:   heels elevated off bed   pressure points protected  Pressure Reduction Devices: pressure-redistributing mattress utilized  Skin Protection:   silicone foam dressing in place   incontinence pads utilized  Taken 7/9/2025 1400 by Sonya Ochoa RN  Activity Management: activity encouraged  Pressure Reduction Techniques:   heels elevated off bed   pressure points protected  Head of Bed (HOB) Positioning: HOB elevated  Pressure Reduction Devices: specialty bed utilized  Skin Protection: silicone foam dressing in place  Taken 7/9/2025 1201 by Sonya Ochoa RN  Activity Management: activity encouraged  Pressure Reduction Techniques: pressure points protected  Head of Bed (HOB) Positioning: HOB elevated  Pressure Reduction Devices: specialty bed utilized  Skin Protection:   silicone foam dressing in place   incontinence pads utilized  Taken 7/9/2025 1000 by Sonya Ochoa RN  Activity Management: activity encouraged  Pressure Reduction Techniques:   pressure points protected   heels elevated off bed  Pressure Reduction Devices: specialty bed utilized  Skin Protection:   silicone foam dressing in place   incontinence pads utilized  Taken 7/9/2025 0840 by Sonya Ochoa RN  Activity Management: activity encouraged  Pressure Reduction Techniques: frequent weight shift encouraged  Head of Bed (HOB) Positioning: HOB elevated  Pressure Reduction Devices: pressure-redistributing mattress utilized  Skin Protection: (silicone dressings peeled back, and skin assessed)   incontinence pads utilized   silicone foam dressing in place     Problem: Comorbidity Management  Goal: Blood Pressure in Desired Range  Outcome: Progressing  Intervention: Maintain Blood Pressure Management  Recent Flowsheet Documentation  Taken 7/9/2025 0840 by Sonya Ochoa RN  Medication Review/Management:  medications reviewed   Goal Outcome Evaluation: pt seen by wound care (see orders BID, PRN) and moved to speciality bed. Pt c/o of pains in LLE, PRN norco given.  Pt q 2 hr turns and has allevyns on bony prominences. Duplex completed per CV lab as ordered. Results in echart. VSS. RA while awake, 2LNC while sleeping. IV abx administered as ordered.

## 2025-07-09 NOTE — PROGRESS NOTES
[facility]  PROGRESS NOTE    Patient Name: Cordell Martin  : 1945  MRN: [id number]    Date of Admission: 2025  Primary Care Physician: Ben Holder DO    Subjective   Subjective     CC: B LE redness/burning    HPI: No f/c. No n/v. Notes burning in B LE. No change in edema/redness.       Objective   Objective     Vital Signs:   Temp:  [97.4 °F (36.3 °C)-97.6 °F (36.4 °C)] 97.6 °F (36.4 °C)  Heart Rate:  [78-88] 88  Resp:  [16-18] 18  BP: ()/(49-63) 97/54     Physical Exam:  NAD, alert and oriented  OP clear, dry MM  Neck supple  RRR  CTAB  +BS, soft  MARIE  B LE redness/edema, with ulcerations  Normal affect    Results Reviewed:  LAB RESULTS:      Lab 255 25   WBC  --  6.81   HEMOGLOBIN  --  12.7*   HEMATOCRIT  --  42.7   PLATELETS  --  190   NEUTROS ABS  --  4.17   IMMATURE GRANS (ABS)  --  0.02   LYMPHS ABS  --  1.83   MONOS ABS  --  0.55   EOS ABS  --  0.22   MCV  --  95.5   SED RATE 82*  --    CRP  --  4.69*   PROCALCITONIN  --  0.08   LACTATE  --  1.2   PROTIME  --  14.4         Lab 25   SODIUM 138   POTASSIUM 4.0   CHLORIDE 101   CO2 28.0   ANION GAP 9.0   BUN 22.4   CREATININE 1.22   EGFR 60.7   GLUCOSE 92   CALCIUM 9.2         Lab 25   TOTAL PROTEIN 8.0   ALBUMIN 3.5   GLOBULIN 4.5   ALT (SGPT) <5   AST (SGOT) 13   BILIRUBIN 0.5   ALK PHOS 156*         Lab 25   PROBNP 44.3   PROTIME 14.4   INR 1.05                 Brief Urine Lab Results  (Last result in the past 365 days)        Color   Clarity   Blood   Leuk Est   Nitrite   Protein   CREAT   Urine HCG        10/08/24 2219 Yellow   Clear   Negative   Negative   Negative   Negative                   Microbiology Results Abnormal       None            XR Chest 1 View  Result Date: 2025  XR CHEST 1 VW Date of Exam: 2025 9:27 PM EDT Indication: cough Comparison: 2025 and 6/3/2025 Findings: Unremarkable cardiomediastinal  "silhouette. Low lung volumes. Airspace opacity along the left lung base favors atelectasis. Otherwise no focal airspace consolidation. No pleural effusion or pneumothorax. No acute osseous abnormality.     Impression: Impression: Low lung volumes with probable left basilar atelectasis. Electronically Signed: Gilbert Waller MD  7/8/2025 9:47 PM EDT  Workstation ID: [id number]      Results for orders placed during the hospital encounter of 01/12/21    Adult Transthoracic Echo Complete W/ Cont if Necessary Per Protocol 01/13/2021  4:06 PM    Interpretation Summary  · The right ventricle and right atrium are moderately dilated. Other chamber sizes and wall thicknesses are normal.  · Global and segmental LV wall motion is normal. The estimated left ventricular ejection fraction is 51% - 55%.  · The aortic and mitral valves are normal in structure and function.  · The estimated RV systolic pressure is mildly elevated 35 mmHg - 40 mmHg. There is as well noted to be less than 50% inspiratory IVC collapse. The main pulmonary artery is \"borderline dilated\".  · There are no other important findings on this study.      Current medications:  Scheduled Meds:acetaminophen, 500 mg, Oral, TID  atorvastatin, 40 mg, Oral, Daily  carbidopa-levodopa, 2 tablet, Oral, Q6H  carbidopa-levodopa ER, 1 tablet, Oral, BID  cefTRIAXone, 2,000 mg, Intravenous, Q24H  enoxaparin sodium, 40 mg, Subcutaneous, Daily  furosemide, 40 mg, Oral, Daily  levothyroxine, 88 mcg, Oral, Q AM  PARoxetine, 10 mg, Oral, Daily  pramipexole, 1 mg, Oral, TID  sodium chloride, 10 mL, Intravenous, Q12H  tamsulosin, 0.4 mg, Oral, Daily  vancomycin (dosing per levels), , Not Applicable, Daily      Continuous Infusions:Pharmacy to dose vancomycin,       PRN Meds:.  senna-docusate sodium **AND** polyethylene glycol **AND** bisacodyl **AND** bisacodyl    Calcium Replacement - Follow Nurse / BPA Driven Protocol    HYDROcodone-acetaminophen    Magnesium Standard Dose Replacement - " Follow Nurse / BPA Driven Protocol    Pharmacy to dose vancomycin    Phosphorus Replacement - Follow Nurse / BPA Driven Protocol    Potassium Replacement - Follow Nurse / BPA Driven Protocol    [COMPLETED] Insert Peripheral IV **AND** sodium chloride    sodium chloride    Assessment & Plan   Assessment & Plan     Active Hospital Problems    Diagnosis  POA    Restless leg syndrome [G25.81]  Unknown    Cellulitis [L03.90]  Yes    History of DVT (deep vein thrombosis) [Z86.718]  Not Applicable    Hypothyroidism (acquired) [E03.9]  Yes    Cellulitis of right lower extremity [L03.115]  Yes    Hyperlipidemia [E78.5]  Yes      Resolved Hospital Problems   No resolved problems to display.        Brief Hospital Course to date:  Cordell Martin is a 80 y.o. male with history of anxiety/chronic back pain, cognitive impairment, depression, hypothyroidism, HL, PD, obesity, here with B LE redness/drainage    B LE cellulitis  B LE vascular ulcers  B LE pain/burning  -duplex pending  -IV abx  -mobilize  -added low dose neurontin for pain  -wound care    Hypothyroidism  -synthroid    HL  -statin    PD  RLS  Weakness  -on carbidopa/levodopa  -mirapex  -PT/OT    Expected Discharge Location and Transportation: Facility  Expected Discharge TBD  Expected discharge date/ time has not been documented.     VTE Prophylaxis:  Pharmacologic VTE prophylaxis orders are present.         AM-PAC 6 Clicks Score (PT): 11 (07/09/25 0023)    CODE STATUS:   Code Status and Medical Interventions: CPR (Attempt to Resuscitate); Full Support   Ordered at: 07/09/25 0010     Code Status (Patient has no pulse and is not breathing):    CPR (Attempt to Resuscitate)     Medical Interventions (Patient has pulse or is breathing):    Full Support     Level Of Support Discussed With:    Patient       Fabian Lopez MD  07/09/25

## 2025-07-09 NOTE — ED NOTES
Cordell Martin    Nursing Report ED to Floor:  Mental status: aox4  Ambulatory status: did not ambulate in ED  Oxygen Therapy:  RA  Cardiac Rhythm: SR c 1st degree AVB  Admitted from: [facility] assisted living  Safety Concerns:  none   Precautions: none  Social Issues: none  ED Room #:  4    ED Nurse Phone Extension - [phone] or may call [phone].      HPI:   Chief Complaint   Patient presents with    Wound Infection       Past Medical History:  Past Medical History:   Diagnosis Date    Anxiety     Arthritis     Back pain     Bronchitis     Cognitive impairment     Depression     Disease of thyroid gland     Glucose intolerance (impaired glucose tolerance)     Hyperlipidemia     Kidney stone     Obesity     Parkinson disease     Pneumonia     Prostate disorder     Thyroid disease     Wears eyeglasses         Past Surgical History:  Past Surgical History:   Procedure Laterality Date    COLONOSCOPY      5 years ago    EYE SURGERY      HERNIA REPAIR  10/20/2020    KNEE SURGERY      LUMBAR LAMINECTOMY DISCECTOMY DECOMPRESSION N/A 07/13/2017    Procedure: LUMBAR LAMINECTOMY L4-5;  Surgeon: Antonio Trent MD;  Location: [facility] OR;  Service:     SHOULDER ROTATOR CUFF REPAIR Right     x2    TONSILLECTOMY      VEIN SURGERY          Admitting Doctor:   Arnulfo Kelley DO    Consulting Provider(s):  Consults       No orders found from 6/9/2025 to 7/9/2025.             Admitting Diagnosis:   The primary encounter diagnosis was Bilateral lower leg cellulitis. Diagnoses of Dependent edema, Venous stasis ulcer of calf with fat layer exposed without varicose veins, unspecified laterality, Parkinson's disease, unspecified whether dyskinesia present, unspecified whether manifestations fluctuate, Hyperlipidemia, unspecified hyperlipidemia type, and Anemia, chronic disease were also pertinent to this visit.    Most Recent Vitals:   Vitals:    07/08/25 2130 07/08/25 2200 07/08/25 2230 07/08/25 2300   BP:       BP Location:       Patient  Position:       Pulse: 78 81 80 78   Resp:       Temp:       TempSrc:       SpO2: 96% 97% 96% 96%   Weight:       Height:           Active LDAs/IV Access:   Lines, Drains & Airways       Active LDAs       Name Placement date Placement time Site Days    Peripheral IV 07/08/25 2216 20 G Anterior;Left;Upper Arm 07/08/25 2216  Arm  less than 1                    Labs (abnormal labs have a star):   Labs Reviewed   COMPREHENSIVE METABOLIC PANEL - Abnormal; Notable for the following components:       Result Value    Alkaline Phosphatase 156 (*)     All other components within normal limits    Narrative:     GFR Categories in Chronic Kidney Disease (CKD)              GFR Category          GFR (mL/min/1.73)    Interpretation  G1                    90 or greater        Normal or high (1)  G2                    60-89                Mild decrease (1)  G3a                   45-59                Mild to moderate decrease  G3b                   30-44                Moderate to severe decrease  G4                    15-29                Severe decrease  G5                    14 or less           Kidney failure    (1)In the absence of evidence of kidney disease, neither GFR category G1 or G2 fulfill the criteria for CKD.    eGFR calculation 2021 CKD-EPI creatinine equation, which does not include race as a factor   SEDIMENTATION RATE - Abnormal; Notable for the following components:    Sed Rate 82 (*)     All other components within normal limits   C-REACTIVE PROTEIN - Abnormal; Notable for the following components:    C-Reactive Protein 4.69 (*)     All other components within normal limits   CBC WITH AUTO DIFFERENTIAL - Abnormal; Notable for the following components:    Hemoglobin 12.7 (*)     MCHC 29.7 (*)     RDW 17.6 (*)     RDW-SD 61.1 (*)     All other components within normal limits   PROTIME-INR - Normal   LACTIC ACID, PLASMA - Normal   PROCALCITONIN - Normal    Narrative:     As a Marker for Sepsis (Non-Neonates):    1.  "<0.5 ng/mL represents a low risk of severe sepsis and/or septic shock.  2. >2 ng/mL represents a high risk of severe sepsis and/or septic shock.    As a Marker for Lower Respiratory Tract Infections that require antibiotic therapy:    PCT on Admission    Antibiotic Therapy       6-12 Hrs later    >0.5                Strongly Recommended  >0.25 - <0.5        Recommended   0.1 - 0.25          Discouraged              Remeasure/reassess PCT  <0.1                Strongly Discouraged     Remeasure/reassess PCT    As 28 day mortality risk marker: \"Change in Procalcitonin Result\" (>80% or <=80%) if Day 0 (or Day 1) and Day 4 values are available. Refer to http://[email or web address] Grocer[employer]ct-calculator.com    Change in PCT <=80%  A decrease of PCT levels below or equal to 80% defines a positive change in PCT test result representing a higher risk for 28-day all-cause mortality of patients diagnosed with severe sepsis for septic shock.    Change in PCT >80%  A decrease of PCT levels of more than 80% defines a negative change in PCT result representing a lower risk for 28-day all-cause mortality of patients diagnosed with severe sepsis or septic shock.      BLOOD CULTURE   BLOOD CULTURE   CBC AND DIFFERENTIAL    Narrative:     The following orders were created for panel order CBC & Differential.  Procedure                               Abnormality         Status                     ---------                               -----------         ------                     CBC Auto Differential[[id number]]        Abnormal            Final result                 Please view results for these tests on the individual orders.       Meds Given in ED:   Medications   sodium chloride 0.9 % flush 10 mL (has no administration in time range)   vancomycin 2250 mg/500 mL 0.9% NS IVPB ([facility]) (2,250 mg Intravenous New Bag 7/8/25 2216)   ondansetron (ZOFRAN) injection 4 mg (4 mg Intravenous Given 7/8/25 2310)   Morphine sulfate (PF) injection 4 mg (4 mg " Intravenous Given 7/8/25 8080)           Last NIH score:                                                          Dysphagia screening results:  Patient Factors Component (Dysphagia:Stroke or Rule-out)  Best Eye Response: 4-->(E4) spontaneous (07/08/25 2045)  Best Motor Response: 6-->(M6) obeys commands (07/08/25 2045)  Best Verbal Response: 5-->(V5) oriented (07/08/25 2045)  Estephania Coma Scale Score: 15 (07/08/25 2045)     Circle Coma Scale:  No data recorded     CIWA:        Restraint Type:            Isolation Status:  No active isolations

## 2025-07-09 NOTE — PROGRESS NOTES
"Pharmacy Consult-Vancomycin Dosing  Cordell Martin is a  80 y.o. male receiving vancomycin therapy.     Indication:SSTI  Consulting Provider: hospitalist  ID Consult:     Goal Trough: 10-15    Current Antimicrobial Therapy  Anti-Infectives (From admission, onward)      Ordered     Dose/Rate Route Frequency Start Stop    07/09/25 0216  cefTRIAXone (ROCEPHIN) 2,000 mg in sodium chloride 0.9 % 100 mL MBP        Ordering Provider: Arnulfo Kelley DO    2,000 mg  200 mL/hr over 30 Minutes Intravenous Every 24 Hours Scheduled 07/09/25 0230 07/13/25 2059 07/09/25 0012  Pharmacy to dose vancomycin        Ordering Provider: Nubia Sheridan APRN     Not Applicable Continuous PRN 07/09/25 0012 07/14/25 0011    07/08/25 2127  vancomycin 2250 mg/500 mL 0.9% NS IVPB ([facility])        Ordering Provider: Enrico Marroquin MD    20 mg/kg × 118 kg  over 135 Minutes Intravenous Once 07/08/25 2143 07/08/25 2350            Allergies  Allergies as of 07/08/2025    (No Known Allergies)       Labs    Results from last 7 days   Lab Units 07/08/25  2137   BUN mg/dL 22.4   CREATININE mg/dL 1.22       Results from last 7 days   Lab Units 07/08/25  2137   WBC 10*3/mm3 6.81       Evaluation of Dosing     Last Dose Received in the ED/Outside Facility: 2250mg in ED  Is Patient on Dialysis or Renal Replacement: no    Ht - 182.9 cm (72\")  Wt - 119 kg (262 lb 14.4 oz)    Estimated Creatinine Clearance: 66.5 mL/min (by C-G formula based on SCr of 1.22 mg/dL).    Intake & Output (last 3 days)         07/06 0701  07/07 0700 07/07 0701  07/08 0700 07/08 0701  07/09 0700    P.O.   120    Total Intake(mL/kg)   120 (1)    Urine (mL/kg/hr)   500    Total Output   500    Net   -380                   Microbiology and Radiology  Microbiology Results (last 10 days)       ** No results found for the last 240 hours. **            Vancomycin Levels:                        Assessment/Plan:  The patient will be started on a bolus of 20mg/kg for a dose of 2250mg . Given " the patient's current renal status, will dose per random levels and obtain a random level with morning labs before ordering another dose.  Due to infection severity, will target a trough of 10-15.    Pharmacy will continue to follow the patient’s culture results and clinical progress daily.    Kelton Chahal, RahulD

## 2025-07-09 NOTE — NURSING NOTE
"Reason for Wound, Ostomy and Continence (WOC) Nursing Consultation: \"bilateral cellulitis\"      Patient lying on side in bed.  Family/support person not present.     Wound Assessment  Wound Type: Cellulitis; may also have lower extremity venous disease/venous stasis   Location: BLE; R more erythematic and draining  Periwound Skin: redness, swelling, and warm   Drainage Characteristics/Odor: tan and yellow  Drainage Amount: moderate  Pain: No   Care provided: Cleansed legs with pH balanced wipes. Changed dry flow pads, elevated heels off bed with Spry pillow, repositioned Allevyn to cover malleolus and heels.  Notes: Pt was seen in February for red, draining legs with wounds. At that time, compression wraps were done. Legs are quite red currently, will hold off from recommending wraps for now, so as to monitor redness. Care recommended: Cleanse with green wipes, change dry flow pads as needed, keep heels off bed with Spry pillow.    Wound Type: Pressure Injury Stage 2  Location: bilateral lower coccygeal area  Wound Bed: red  Wound Edges: Open  Periwound Skin: pale, rough, chronic discoloration below.    Drainage Characteristics/Odor: none  Drainage Amount: none  Pain: No   Care provided: Cleansed and applied skin barrier with wipe and allowed to dry. Applied new Allevyn. Applied zguard to area below Allevyn. Turned patient to right side using Spry pillow.   Notes: Area is rough, with thickened skin which appears to be ready to open. Will require offloading and keeping Allevyn on, with zguard below-not under Allevyn. Will order isotour bed with pump for low air loss.  Wound Image:     Recommendation(s) for management of wound:   -Cleanse with green wipe when needed. Skin prep and allow to dry. Change Allevyn Q3-5 days. Zguard to skin below Allevyn, not under, bid and prn.   -Practice pressure injury prevention protocol.      Most recent Harmeet Scale score:  Sensory Perception: 2-->very limited  Moisture: 2-->very " moist  Activity: 2-->chairfast  Mobility: 2-->very limited  Nutrition: 3-->adequate  Friction and Shear: 1-->problem  Harmeet Score: 12 (07/09/25 0840)     Specialty support surface: Foam Mattress with Waffle Overlay   Will order agiliti bed due to severe mobility limitation and compromised skin      Pressure Injury Prevention Protocol (initiate for Harmeet Score of 18 or less):   *Turn q 2 hr, keep heels elevated and offloaded with offloading heel boots.  *Allevn dressings to heels, sacrum/coccyx  *Follow C.A.R.E protocol if medical devices (Bipap, kc, Ng tube, etc) are being used.  *Reduce layers under patient (one sheet as drawsheet and two incontinence pads) to allow waffle or BENITO to improve microclimate   *Raise knee-gatch before elevating HOB to reduce shearing      WOC Team will continue to follow.  Please re-consult if the wound(s) worsens.

## 2025-07-09 NOTE — ED PROVIDER NOTES
Subjective   History of Present Illness  80-year-old male with a history of chronic kidney disease presenting to the emergency department with some leg pain and swelling.  Patient is had this for the last few weeks.  Complain of some redness in his legs.  They are very swollen.  They are having a brown.  Patient noted some wounds on his legs bilaterally.  He said some clear discharge.  He denies any fevers or chills.  No headache or change in vision.  No chest pain or shortness of breath.  No abdominal pain or abdominal    History provided by:  Patient and EMS personnel   used: No        Review of Systems   Constitutional:  Negative for chills and fever.   HENT:  Negative for congestion, ear pain and sore throat.    Eyes:  Negative for visual disturbance.   Respiratory:  Negative for shortness of breath.    Cardiovascular:  Positive for leg swelling. Negative for chest pain.   Gastrointestinal:  Negative for abdominal pain.   Genitourinary:  Negative for difficulty urinating.   Musculoskeletal:  Negative for arthralgias.   Skin:  Positive for wound.   Neurological:  Negative for dizziness, weakness and numbness.   Psychiatric/Behavioral:  Negative for agitation.        Past Medical History:   Diagnosis Date    Anxiety     Arthritis     Back pain     Bronchitis     Cognitive impairment     Depression     Disease of thyroid gland     Glucose intolerance (impaired glucose tolerance)     Hyperlipidemia     Kidney stone     Obesity     Parkinson disease     Pneumonia     Prostate disorder     Thyroid disease     Wears eyeglasses        No Known Allergies    Past Surgical History:   Procedure Laterality Date    COLONOSCOPY      5 years ago    EYE SURGERY      HERNIA REPAIR  10/20/2020    KNEE SURGERY      LUMBAR LAMINECTOMY DISCECTOMY DECOMPRESSION N/A 07/13/2017    Procedure: LUMBAR LAMINECTOMY L4-5;  Surgeon: Antonio Trent MD;  Location: [facility];  Service:     SHOULDER ROTATOR CUFF REPAIR Right      x2    TONSILLECTOMY      VEIN SURGERY         Family History   Problem Relation Age of Onset    Alzheimer's disease Other     Cancer Other     Diabetes Other     Heart disease Other     Stroke Other     Cancer Father        Social History     Socioeconomic History    Marital status:    Tobacco Use    Smoking status: Former     Current packs/day: 0.00     Average packs/day: 1 pack/day for 30.0 years (30.0 ttl pk-yrs)     Types: Cigarettes     Start date:      Quit date:      Years since quittin.5     Passive exposure: Past    Smokeless tobacco: Never    Tobacco comments:     quit    Vaping Use    Vaping status: Never Used   Substance and Sexual Activity    Alcohol use: Yes     Comment: occasionally    Drug use: No    Sexual activity: Defer     Partners: Female     Birth control/protection: None           Objective   Physical Exam  Vitals and nursing note reviewed.   Constitutional:       General: He is not in acute distress.     Appearance: He is not ill-appearing or toxic-appearing.   Eyes:      Conjunctiva/sclera: Conjunctivae normal.   Cardiovascular:      Rate and Rhythm: Normal rate and regular rhythm.      Pulses: Normal pulses.   Pulmonary:      Effort: Pulmonary effort is normal. No respiratory distress.   Abdominal:      General: Abdomen is flat. There is no distension.      Palpations: There is no mass.      Tenderness: There is no abdominal tenderness. There is no guarding or rebound.   Musculoskeletal:         General: No deformity. Normal range of motion.      Right lower leg: Edema present.      Left lower leg: Edema present.   Skin:     Comments: Erythema extending from the feet all the way up to the knees.  There are multiple wounds that are oozing clear material.  No purulence.  No crepitus.  No ischemia   Neurological:      General: No focal deficit present.      Mental Status: He is alert.      Sensory: No sensory deficit.      Motor: No weakness.         Procedures            ED Course  ED Course as of 07/08/25 2257 Tue Jul 08, 2025 2139 BP: 143/63 [JK]   2139 Temp: 97.4 °F (36.3 °C) [JK]   2139 Temp src: Oral [JK]   2139 Heart Rate: 84 [JK]   2139 Resp: 16 [JK]   2139 SpO2: 98 % [JK]   2139 Device (Oxygen Therapy): room air  Interpretation:  Patient's repeat vitals, telemetry tracing, and pulse oximetry tracing were directly viewed and interpreted by myself.   O2 sat 98% on room air, interpreted as normal.  Telemetry rhythm strip revealed a rate of 84 bpm, interpreted as normal sinus rhythm [JK]   2255 Comprehensive Metabolic Panel(!) [JK]   2256 C-reactive Protein(!) [JK]   2256 Protime-INR [JK]   2256 CBC & Differential(!)  Interpretation:  Laboratory studies were reviewed and interpreted directly by myself.  CMP shows mild elevation alk phos of 156, CRP was elevated at 4.69 [JK]   2256 XR Chest 1 View  Interpretation:  Imaging was directly visualized by myself, per my interpretations, chest x-ray shows some atelectasis. [JK]   2256 Patient is continued on broad-spectrum antibiotics for significant cellulitis.  Admitted the hospital for further evaluation and treatment [JK]   2256 Based on the patient's presentation, history and diffuse work-up in the emergency department, the patient is deemed appropriate for admission to the hospital for further evaluation and treatment.  This was discussed with the patient at bedside.  They are in agreement with the current medical management.    Admitting physician: Dr. Kelley    Discussion was had with admitting physician regarding the laboratory and imaging findings.  We did discuss current therapeutics in the emergency department and progression of the patient.  Working diagnosis was conveyed to the admitting physician, as well as current status and prognosis for the patient.  They are in agreement with these findings and have accepted admission.    Shared decision making:   After full review of the patient's clinical presentation,  review of any work-up including but not limited to laboratory studies and radiology obtained, I had a discussion with the patient.  Treatment options were discussed as well as the risks, benefits and consequences.  I discussed all findings with the patient and family members if available.  During the discussion, treatment goals were understood by all as well as any misconceptions which were addressed with the patient.  Ample time was given for any questions they may have had.  They are in agreement with the treatment plan as well as final disposition. [JK]      ED Course User Index  [JK] Enrico Marroquin MD                                                       Medical Decision Making  This is a 80-year-old male with a history of chronic kidney disease presented to the emergency department with some leg swelling discharge.  Findings are consistent with cellulitis and stasis ulcers.  Patient appears to be neurovascularly intact at this time.  Overall, the patient is nontoxic.  Afebrile.  IV access was established in the patient.  Placed on continuous telemetry monitoring.  Given the patient's presentation, differential is broad and will require further evaluation.  Workup initiated.      Differential diagnosis: Dependent edema, cellulitis, stasis dermatitis, stasis ulcers, abscess, acute kidney injury, electrolyte abnormality, anemia      Problems Addressed:  Anemia, chronic disease: complicated acute illness or injury  Bilateral lower leg cellulitis: complicated acute illness or injury  Dependent edema: complicated acute illness or injury  Hyperlipidemia, unspecified hyperlipidemia type: complicated acute illness or injury  Parkinson's disease, unspecified whether dyskinesia present, unspecified whether manifestations fluctuate: complicated acute illness or injury  Venous stasis ulcer of calf with fat layer exposed without varicose veins, unspecified laterality: complicated acute illness or injury    Amount  and/or Complexity of Data Reviewed  Independent Historian: EMS  External Data Reviewed: labs, radiology, ECG and notes.     Details: External laboratories, imaging as well as notes were reviewed personally by myself.  All relevant studies were used to guide decision making.     Date of previous record: 6/3/2025    Source of note: Pulmonology    Summary:  Patient was seen and evaluated for routine visit.  I did review basic laboratory studies on file as well as a previous chest x-ray and EKG.  Records reviewed    Labs: ordered. Decision-making details documented in ED Course.  Radiology: ordered and independent interpretation performed. Decision-making details documented in ED Course.    Risk  Prescription drug management.  Decision regarding hospitalization.        Final diagnoses:   Bilateral lower leg cellulitis   Dependent edema   Venous stasis ulcer of calf with fat layer exposed without varicose veins, unspecified laterality   Parkinson's disease, unspecified whether dyskinesia present, unspecified whether manifestations fluctuate   Hyperlipidemia, unspecified hyperlipidemia type   Anemia, chronic disease       ED Disposition  ED Disposition       ED Disposition   Decision to Admit    Condition   --    Comment   --               No follow-up provider specified.       Medication List      No changes were made to your prescriptions during this visit.            Enrico Marroquin MD  07/08/25 3916

## 2025-07-09 NOTE — H&P
[facility] Medicine Services  HISTORY AND PHYSICAL    Patient Name: Cordell Martin  : 1945  MRN: [id number]  Primary Care Physician: Ben Holder DO  Date of admission: 2025    Subjective   Subjective     Chief Complaint:  Bilateral LLE    HPI:  Cordell Martin is a 80 y.o. male with significant medical history for anxiety, chronic back pain, cognitive impairment, depression, hypothyroidism, hyperlipidemia, Parkinson's, prostate disorder, and obesity who presents to [facility] ED with complaints of bilateral LLE drainage.     History of Present Illness  Reason for Admit: Leg swelling.    The patient presents for evaluation of leg swelling. He noticed an increase in redness and swelling in his legs last night, with the right leg being more painful than the left. He attempted to wrap it today but reports finding maggots on his right leg, which he carefully removed.  He is is a resident at an SNF, and insect activity was not reported by staff.   Reports clear drainage from both legs. RN in the ED noticed dry flaky skin in the stretcher.     Currently, he rates her pain as 7 out of 10 and reports itching of his right leg. He has been experiencing intermittent headaches but reports no lightheadedness or dizziness. He also reports no chest pain, shortness of breath, nausea, vomiting, or diarrhea, although he did have a brief episode of diarrhea 2 weeks ago. His appetite remains good.  He resides in a senior living facility and has been receiving assistance with bathing, dressing, and medication management for the past 2 weeks. He is unable to walk dependently, and uses a walker or cane with x 1 assistance for mobility.     He takes carbidopa-levodopa for Parkinson's disease, Paxil, Mirapex for restless leg syndrome, cholesterol medication, a diuretic, and thyroid medication.    She reports intermittent headaches, but denies fever.  He denies chest pain or  shortness of breath, but reports a chronic cough for greater than 2 years.  He is unable to lay flat to sleep.  He denies current nausea, vomiting, or diarrhea.      MEDICATIONS  Carbidopa-levodopa, Paxil       Review of Systems   Constitutional:  Positive for activity change. Negative for chills, fatigue and fever.   Eyes: Negative.    Respiratory:  Positive for cough. Negative for shortness of breath.    Cardiovascular:  Positive for leg swelling. Negative for chest pain.   Gastrointestinal:  Negative for abdominal distention, abdominal pain, diarrhea, nausea and vomiting.   Endocrine: Negative.    Genitourinary: Negative.    Musculoskeletal:  Positive for myalgias.   Skin:  Positive for color change (erythema BLE) and wound (BLE vascular ulcers).   Allergic/Immunologic: Positive for immunocompromised state.   Neurological:  Positive for weakness and headaches. Negative for dizziness, syncope and light-headedness.   Hematological: Negative.    Psychiatric/Behavioral: Negative.                Personal History     Past Medical History:   Diagnosis Date    Anxiety     Arthritis     Back pain     Bronchitis     Cognitive impairment     Depression     Disease of thyroid gland     Glucose intolerance (impaired glucose tolerance)     Hyperlipidemia     Kidney stone     Obesity     Parkinson disease     Pneumonia     Prostate disorder     Thyroid disease     Wears eyeglasses              Past Surgical History:   Procedure Laterality Date    COLONOSCOPY      5 years ago    EYE SURGERY      HERNIA REPAIR  10/20/2020    KNEE SURGERY      LUMBAR LAMINECTOMY DISCECTOMY DECOMPRESSION N/A 07/13/2017    Procedure: LUMBAR LAMINECTOMY L4-5;  Surgeon: Antonio Trent MD;  Location: [facility];  Service:     SHOULDER ROTATOR CUFF REPAIR Right     x2    TONSILLECTOMY      VEIN SURGERY         Family History: family history includes Alzheimer's disease in an other family member; Cancer in his father and another family member; Diabetes  in an other family member; Heart disease in an other family member; Stroke in an other family member.     Social History:  reports that he quit smoking about 45 years ago. His smoking use included cigarettes. He started smoking about 75 years ago. He has a 30 pack-year smoking history. He has been exposed to tobacco smoke. He has never used smokeless tobacco. He reports current alcohol use. He reports that he does not use drugs.  Social History     Social History Narrative    Lives alone. Uses walker    Has not smoked since about 1980       Medications:  Diclofenac Sodium, PARoxetine HCl, ammonium lactate, atorvastatin, carbidopa-levodopa, carbidopa-levodopa ER, esomeprazole, fluticasone, furosemide, levothyroxine, piperacillin-tazobactam, pramipexole, simethicone, tamsulosin, and vitamin D3    No Known Allergies    Objective   Objective     Vital Signs:   Temp:  [97.4 °F (36.3 °C)] 97.4 °F (36.3 °C)  Heart Rate:  [78-84] 78  Resp:  [16] 16  BP: (143)/(63) 143/63    Physical Exam  Constitutional:       Appearance: He is obese.   HENT:      Head: Normocephalic and atraumatic.   Eyes:      Extraocular Movements: Extraocular movements intact.      Pupils: Pupils are equal, round, and reactive to light.   Cardiovascular:      Rate and Rhythm: Regular rhythm.      Pulses: Normal pulses.      Heart sounds: Normal heart sounds.   Pulmonary:      Effort: Pulmonary effort is normal.      Breath sounds: Normal breath sounds.   Abdominal:      General: Bowel sounds are normal. There is no distension.      Palpations: Abdomen is soft.      Tenderness: There is no abdominal tenderness.   Musculoskeletal:         General: Swelling and tenderness present.      Right lower leg: Edema present.      Left lower leg: Edema present.      Comments: BLE vascular ulcerations in various stages of healing   Skin:     Findings: Erythema (RLE ankle to mid thigh erythema) present.   Neurological:      General: No focal deficit present.       Mental Status: He is alert and oriented to person, place, and time.   Psychiatric:         Mood and Affect: Mood normal.         Behavior: Behavior normal.          Physical Exam  Skin: Inflammation extending from the ankle to the thigh on the right leg and significant redness on the left leg.  Abdominal: No tenderness on palpation.       Result Review:  I have personally reviewed the results from the time of this admission to 7/9/2025 00:20 EDT and agree with these findings:  [x]  Laboratory list / accordion  []  Microbiology  []  Radiology  [x]  EKG/Telemetry   []  Cardiology/Vascular   []  Pathology  [x]  Old records  []  Other:      Results  Labs   - Blood sugar: 135           LAB RESULTS:      Lab 07/08/25  2305 07/08/25 2137   WBC  --  6.81   HEMOGLOBIN  --  12.7*   HEMATOCRIT  --  42.7   PLATELETS  --  190   NEUTROS ABS  --  4.17   IMMATURE GRANS (ABS)  --  0.02   LYMPHS ABS  --  1.83   MONOS ABS  --  0.55   EOS ABS  --  0.22   MCV  --  95.5   SED RATE 82*  --    CRP  --  4.69*   PROCALCITONIN  --  0.08   LACTATE  --  1.2   PROTIME  --  14.4         Lab 07/08/25 2137   SODIUM 138   POTASSIUM 4.0   CHLORIDE 101   CO2 28.0   ANION GAP 9.0   BUN 22.4   CREATININE 1.22   EGFR 60.7   GLUCOSE 92   CALCIUM 9.2         Lab 07/08/25 2137   TOTAL PROTEIN 8.0   ALBUMIN 3.5   GLOBULIN 4.5   ALT (SGPT) <5   AST (SGOT) 13   BILIRUBIN 0.5   ALK PHOS 156*         Lab 07/08/25 2137   PROTIME 14.4   INR 1.05                 Brief Urine Lab Results  (Last result in the past 365 days)        Color   Clarity   Blood   Leuk Est   Nitrite   Protein   CREAT   Urine HCG        10/08/24 2219 Yellow   Clear   Negative   Negative   Negative   Negative                 Microbiology Results (last 10 days)       ** No results found for the last 240 hours. **            XR Chest 1 View  Result Date: 7/8/2025  XR CHEST 1 VW Date of Exam: 7/8/2025 9:27 PM EDT Indication: cough Comparison: 2/6/2025 and 6/3/2025 Findings: Unremarkable  "cardiomediastinal silhouette. Low lung volumes. Airspace opacity along the left lung base favors atelectasis. Otherwise no focal airspace consolidation. No pleural effusion or pneumothorax. No acute osseous abnormality.     Impression: Impression: Low lung volumes with probable left basilar atelectasis. Electronically Signed: Gilbert Waller MD  7/8/2025 9:47 PM EDT  Workstation ID: [id number]      Results for orders placed during the hospital encounter of 01/12/21    Adult Transthoracic Echo Complete W/ Cont if Necessary Per Protocol 01/13/2021  4:06 PM    Interpretation Summary  · The right ventricle and right atrium are moderately dilated. Other chamber sizes and wall thicknesses are normal.  · Global and segmental LV wall motion is normal. The estimated left ventricular ejection fraction is 51% - 55%.  · The aortic and mitral valves are normal in structure and function.  · The estimated RV systolic pressure is mildly elevated 35 mmHg - 40 mmHg. There is as well noted to be less than 50% inspiratory IVC collapse. The main pulmonary artery is \"borderline dilated\".  · There are no other important findings on this study.      Assessment & Plan   Assessment & Plan       Cellulitis of right lower extremity    Hyperlipidemia    Hypothyroidism (acquired)    History of DVT (deep vein thrombosis)    Restless leg syndrome    Cordell Martin is a 80 y.o. male with significant medical history for anxiety, chronic back pain, cognitive impairment, depression, hypothyroidism, hyperlipidemia, Parkinson's, prostate disorder, and obesity who presents to [facility] ED with complaints of bilateral LLE drainage.  Vancomycin initiated in ED, continue.  WOC consult.  PT/OT eval in a.m.  Assessment & Plan  Code Status: Full code    BLE cellulitis  BLE vascular ulcers with serous drainage  - Vancomycin initiated in ED, continue  - WBC 6.18  - Lactate 1.2  - WOC consult  - Pain management  - Case management consult in a.m.  - " CBC and CMP in a.m.      Hypothyroidism  - Continue Synthroid      HLD  - Continue statin    Parkinson's disease  Restless legs syndrome  Generalized weakness  - Continue carbidopa levodopa  - Continue Mirapex  - PT/OT consult            DVT prophylaxis:      CODE STATUS: Full code  Code Status (Patient has no pulse and is not breathing): CPR (Attempt to Resuscitate)  Medical Interventions (Patient has pulse or is breathing): Full Support  Level Of Support Discussed With: Patient      Expected Discharge  Expected Discharge Date: 7/10/2025; Expected Discharge Time:  4:00 PM      This note has been completed as part of a split-shared workflow.     Signature: Electronically signed by ALEXUS Mitchell, 07/09/25, 12:19 AM EDT    Patient or patient representative verbalized consent for the use of Ambient Listening during the visit for chart documentation.

## 2025-07-09 NOTE — CASE MANAGEMENT/SOCIAL WORK
Discharge Planning Assessment  [facility]     Patient Name: Cordell Martin  MRN: [id number]  Today's Date: 7/9/2025    Admit Date: 7/8/2025    Plan: home   Discharge Needs Assessment       Row Name 07/09/25 0848       Living Environment    People in Home alone    Unique Family Situation at [facility]    Current Living Arrangements apartment    Primary Care Provided by self;other (see comments)  caregiver 2x/wk and staff at [facility]       Transition Planning    Patient/Family Anticipates Transition to home       Discharge Needs Assessment    Readmission Within the Last 30 Days no previous admission in last 30 days    Equipment Currently Used at Home other (see comments);wheelchair, motorized;commode  scooter    Concerns to be Addressed basic needs;discharge planning                   Discharge Plan       Row Name 07/09/25 0850       Plan    Plan home    Patient/Family in Agreement with Plan yes    Plan Comments I met with this patient bedside. He lives alone at [facility] in [facility]. They were notified. He is independent with toileting, but needs assistance with all other activities of daily living. He is independent with mobility/transfers with the use of a scooter and motorized wheelchair. He also has a BSC. PT and OT have been consulted. He is current with [facility] for SN and PT. They were notified. He anticipates returning home after this hospitalization, and will require a  van arranged for transport. Case management will follow.    Final Discharge Disposition Code 01 - home or self-care                  Continued Care and Services - Admitted Since 7/8/2025    No active coordination exists.       Selected Continued Care - Episodes Includes continued care and service providers with selected services from the active episodes listed below      High Risk Care Management Episode start date: 2/12/2025   There are no active outsourced providers for this episode.                     Demographic Summary       Row Name 07/09/25 0847       General Information    General Information Comments I confirmed that Jannet Holder is Mr Mario's PCP and he has Medicare AB and [place] [employer]                   Functional Status       Row Name 07/09/25 0848       Functional Status, IADL    Medications independent    Meal Preparation completely dependent    Housekeeping completely dependent    Laundry completely dependent    Shopping completely dependent                   Psychosocial    No documentation.                  Abuse/Neglect    No documentation.                  Legal    No documentation.                  Substance Abuse    No documentation.                  Patient Forms    No documentation.                     Marleen Paez RN

## 2025-07-09 NOTE — PROGRESS NOTES
Nutrition Services    Patient Name:  Cordell Martin  YOB: 1945  MRN: [id number]  Admit Date:  7/8/2025    Pt screened per protocol for potential pressure injury. WOC consult pending. RD will complete nutrition assessment with identification of Stg II PI or greater. Will continue to monitor per protocol. Please consult with urgent nutrition related needs. Thanks.      Electronically signed by:  Katie Pillai MS,GENE,LD  07/09/25 09:08 EDT

## 2025-07-10 LAB
ALBUMIN SERPL-MCNC: 3 G/DL (ref 3.5–5.2)
ALBUMIN/GLOB SERPL: 0.9 G/DL
ALP SERPL-CCNC: 125 U/L (ref 39–117)
ALT SERPL W P-5'-P-CCNC: <5 U/L (ref 1–41)
ANION GAP SERPL CALCULATED.3IONS-SCNC: 9 MMOL/L (ref 5–15)
AST SERPL-CCNC: 13 U/L (ref 1–40)
BILIRUB SERPL-MCNC: 0.3 MG/DL (ref 0–1.2)
BUN SERPL-MCNC: 26.7 MG/DL (ref 8–23)
BUN/CREAT SERPL: 17.8 (ref 7–25)
CALCIUM SPEC-SCNC: 8.5 MG/DL (ref 8.6–10.5)
CHLORIDE SERPL-SCNC: 102 MMOL/L (ref 98–107)
CK SERPL-CCNC: 74 U/L (ref 20–200)
CO2 SERPL-SCNC: 28 MMOL/L (ref 22–29)
CREAT SERPL-MCNC: 1.5 MG/DL (ref 0.76–1.27)
DEPRECATED RDW RBC AUTO: 57.7 FL (ref 37–54)
EGFRCR SERPLBLD CKD-EPI 2021: 47.4 ML/MIN/1.73
ERYTHROCYTE [DISTWIDTH] IN BLOOD BY AUTOMATED COUNT: 17.1 % (ref 12.3–15.4)
GLOBULIN UR ELPH-MCNC: 3.3 GM/DL
GLUCOSE SERPL-MCNC: 114 MG/DL (ref 65–99)
HCT VFR BLD AUTO: 33.9 % (ref 37.5–51)
HGB BLD-MCNC: 10.7 G/DL (ref 13–17.7)
MAGNESIUM SERPL-MCNC: 2.1 MG/DL (ref 1.6–2.4)
MCH RBC QN AUTO: 29.2 PG (ref 26.6–33)
MCHC RBC AUTO-ENTMCNC: 31.6 G/DL (ref 31.5–35.7)
MCV RBC AUTO: 92.4 FL (ref 79–97)
PLATELET # BLD AUTO: 174 10*3/MM3 (ref 140–450)
PMV BLD AUTO: 8.8 FL (ref 6–12)
POTASSIUM SERPL-SCNC: 4.1 MMOL/L (ref 3.5–5.2)
PROT SERPL-MCNC: 6.3 G/DL (ref 6–8.5)
RBC # BLD AUTO: 3.67 10*6/MM3 (ref 4.14–5.8)
SODIUM SERPL-SCNC: 139 MMOL/L (ref 136–145)
VANCOMYCIN SERPL-MCNC: 21.2 MCG/ML (ref 5–40)
WBC NRBC COR # BLD AUTO: 4.68 10*3/MM3 (ref 3.4–10.8)

## 2025-07-10 PROCEDURE — 25010000002 ENOXAPARIN PER 10 MG

## 2025-07-10 PROCEDURE — 93005 ELECTROCARDIOGRAM TRACING: CPT | Performed by: NURSE PRACTITIONER

## 2025-07-10 PROCEDURE — 99232 SBSQ HOSP IP/OBS MODERATE 35: CPT | Performed by: STUDENT IN AN ORGANIZED HEALTH CARE EDUCATION/TRAINING PROGRAM

## 2025-07-10 PROCEDURE — 97162 PT EVAL MOD COMPLEX 30 MIN: CPT

## 2025-07-10 PROCEDURE — P9047 ALBUMIN (HUMAN), 25%, 50ML: HCPCS | Performed by: NURSE PRACTITIONER

## 2025-07-10 PROCEDURE — 83735 ASSAY OF MAGNESIUM: CPT | Performed by: STUDENT IN AN ORGANIZED HEALTH CARE EDUCATION/TRAINING PROGRAM

## 2025-07-10 PROCEDURE — 97530 THERAPEUTIC ACTIVITIES: CPT

## 2025-07-10 PROCEDURE — 80053 COMPREHEN METABOLIC PANEL: CPT | Performed by: HOSPITALIST

## 2025-07-10 PROCEDURE — 97166 OT EVAL MOD COMPLEX 45 MIN: CPT

## 2025-07-10 PROCEDURE — 82550 ASSAY OF CK (CPK): CPT | Performed by: STUDENT IN AN ORGANIZED HEALTH CARE EDUCATION/TRAINING PROGRAM

## 2025-07-10 PROCEDURE — 80202 ASSAY OF VANCOMYCIN: CPT

## 2025-07-10 PROCEDURE — 25010000002 PIPERACILLIN SOD-TAZOBACTAM PER 1 G: Performed by: HOSPITALIST

## 2025-07-10 PROCEDURE — 93005 ELECTROCARDIOGRAM TRACING: CPT | Performed by: STUDENT IN AN ORGANIZED HEALTH CARE EDUCATION/TRAINING PROGRAM

## 2025-07-10 PROCEDURE — 25010000002 ALBUMIN HUMAN 25% PER 50 ML: Performed by: NURSE PRACTITIONER

## 2025-07-10 PROCEDURE — 93010 ELECTROCARDIOGRAM REPORT: CPT | Performed by: INTERNAL MEDICINE

## 2025-07-10 PROCEDURE — 92610 EVALUATE SWALLOWING FUNCTION: CPT

## 2025-07-10 PROCEDURE — 85027 COMPLETE CBC AUTOMATED: CPT | Performed by: HOSPITALIST

## 2025-07-10 RX ORDER — MAGNESIUM SULFATE HEPTAHYDRATE 40 MG/ML
2 INJECTION, SOLUTION INTRAVENOUS ONCE
Status: DISCONTINUED | OUTPATIENT
Start: 2025-07-10 | End: 2025-07-10

## 2025-07-10 RX ORDER — ALBUMIN (HUMAN) 12.5 G/50ML
12.5 SOLUTION INTRAVENOUS ONCE
Status: COMPLETED | OUTPATIENT
Start: 2025-07-10 | End: 2025-07-10

## 2025-07-10 RX ORDER — L.ACID,PARA/B.BIFIDUM/S.THERM 8B CELL
1 CAPSULE ORAL DAILY
Status: DISCONTINUED | OUTPATIENT
Start: 2025-07-10 | End: 2025-07-15 | Stop reason: HOSPADM

## 2025-07-10 RX ADMIN — CARBIDOPA AND LEVODOPA 2 TABLET: 25; 100 TABLET ORAL at 11:51

## 2025-07-10 RX ADMIN — GABAPENTIN 100 MG: 100 CAPSULE ORAL at 21:00

## 2025-07-10 RX ADMIN — Medication 10 ML: at 21:03

## 2025-07-10 RX ADMIN — PIPERACILLIN AND TAZOBACTAM 4.5 G: 4; .5 INJECTION, POWDER, FOR SOLUTION INTRAVENOUS at 18:17

## 2025-07-10 RX ADMIN — ENOXAPARIN SODIUM 40 MG: 100 INJECTION SUBCUTANEOUS at 08:40

## 2025-07-10 RX ADMIN — CARBIDOPA AND LEVODOPA 1 TABLET: 25; 100 TABLET, EXTENDED RELEASE ORAL at 22:26

## 2025-07-10 RX ADMIN — PRAMIPEXOLE DIHYDROCHLORIDE 1 MG: 1 TABLET ORAL at 08:40

## 2025-07-10 RX ADMIN — FUROSEMIDE 40 MG: 40 TABLET ORAL at 08:40

## 2025-07-10 RX ADMIN — CARBIDOPA AND LEVODOPA 2 TABLET: 25; 100 TABLET ORAL at 18:17

## 2025-07-10 RX ADMIN — Medication 1 CAPSULE: at 08:40

## 2025-07-10 RX ADMIN — PRAMIPEXOLE DIHYDROCHLORIDE 1 MG: 1 TABLET ORAL at 21:00

## 2025-07-10 RX ADMIN — GABAPENTIN 100 MG: 100 CAPSULE ORAL at 08:40

## 2025-07-10 RX ADMIN — ACETAMINOPHEN 500 MG: 500 TABLET, FILM COATED ORAL at 21:00

## 2025-07-10 RX ADMIN — ALBUMIN (HUMAN) 12.5 G: 0.25 INJECTION, SOLUTION INTRAVENOUS at 22:24

## 2025-07-10 RX ADMIN — ACETAMINOPHEN 500 MG: 500 TABLET, FILM COATED ORAL at 08:40

## 2025-07-10 RX ADMIN — TAMSULOSIN HYDROCHLORIDE 0.4 MG: 0.4 CAPSULE ORAL at 08:40

## 2025-07-10 RX ADMIN — HYDROCODONE BITARTRATE AND ACETAMINOPHEN 1 TABLET: 5; 325 TABLET ORAL at 05:42

## 2025-07-10 RX ADMIN — LEVOTHYROXINE SODIUM 88 MCG: 88 TABLET ORAL at 05:42

## 2025-07-10 RX ADMIN — CARBIDOPA AND LEVODOPA 2 TABLET: 25; 100 TABLET ORAL at 05:42

## 2025-07-10 RX ADMIN — PAROXETINE HYDROCHLORIDE 10 MG: 10 TABLET, FILM COATED ORAL at 08:40

## 2025-07-10 RX ADMIN — PRAMIPEXOLE DIHYDROCHLORIDE 1 MG: 1 TABLET ORAL at 16:06

## 2025-07-10 RX ADMIN — HYDROCODONE BITARTRATE AND ACETAMINOPHEN 1 TABLET: 5; 325 TABLET ORAL at 11:51

## 2025-07-10 RX ADMIN — PIPERACILLIN AND TAZOBACTAM 4.5 G: 4; .5 INJECTION, POWDER, FOR SOLUTION INTRAVENOUS at 11:52

## 2025-07-10 RX ADMIN — CARBIDOPA AND LEVODOPA 1 TABLET: 25; 100 TABLET, EXTENDED RELEASE ORAL at 08:40

## 2025-07-10 RX ADMIN — ACETAMINOPHEN 500 MG: 500 TABLET, FILM COATED ORAL at 16:06

## 2025-07-10 RX ADMIN — PIPERACILLIN AND TAZOBACTAM 4.5 G: 4; .5 INJECTION, POWDER, FOR SOLUTION INTRAVENOUS at 03:15

## 2025-07-10 NOTE — PROGRESS NOTES
"          Clinical Nutrition Assessment     Patient Name: Cordell Martin  YOB: 1945  MRN: [id number]  Date of Encounter: 07/10/25 08:59 EDT  Admission date: 7/8/2025  Reason for Visit: Identified at risk by screening criteria    Assessment   Nutrition Assessment   Admission Diagnosis:  Bilateral lower extremity edema [R60.0]  Cellulitis [L03.90]    Problem List:    Hyperlipidemia    Cellulitis of right lower extremity    Hypothyroidism (acquired)    History of DVT (deep vein thrombosis)    Restless leg syndrome    Cellulitis      PMH:   He  has a past medical history of Anxiety, Arthritis, Back pain, Bronchitis, Cognitive impairment, Depression, Disease of thyroid gland, Glucose intolerance (impaired glucose tolerance), Hyperlipidemia, Kidney stone, Obesity, Parkinson disease, Pneumonia, Prostate disorder, Thyroid disease, and Wears eyeglasses.    PSH:  He  has a past surgical history that includes Shoulder open rotator cuff repair (Right); Tonsillectomy; Colonoscopy; lumbar laminectomy discectomy decompression (N/A, 07/13/2017); Eye surgery; Hernia repair (10/20/2020); Knee surgery; and Vein Surgery.    Applicable Nutrition History:   Skin integrity  Stg 2 - dl coccygeal PI  LLE cellulitus    Anthropometrics     Height: Height: 182.9 cm (72\")  Last Filed Weight: Weight: 119 kg (262 lb 14.4 oz) (07/08/25 2330)  Method: Weight Method: Stated  BMI: BMI (Calculated): 35.6    UBW:     Weight      Weight (kg) Weight (lbs) Weight Method   9/15/2024 117.935 kg  260 lb     10/8/2024 118 kg  260 lb 2.3 oz     10/9/2024 115.713 kg  255 lb 1.6 oz  Bed scale    10/12/2024 119 kg  262 lb 5.6 oz  Bed scale    10/19/2024 108.773 kg  239 lb 12.8 oz  Bed scale    1/14/2025 108.773 kg  239 lb 12.8 oz     2/3/2025 119.704 kg  263 lb 14.4 oz  Bed scale    6/3/2025 117.935 kg  260 lb     7/3/2025 118 kg  260 lb 2.3 oz     7/8/2025 119.251 kg  262 lb 14.4 oz  Stated      Weight change: No significant " changes    Nutrition Focused Physical Exam    Date:  7/10       Pt does not meet criteria for malnutrition diagnosis, at this time.      Subjective   Reported/Observed/Food/Nutrition Related History:     Pt up in bed at time of visit, able to provide most weight/nutrition hx. Pt reports difficult appetite with increased pain, including increased dysphagia. Reports never having SLP eval in the past. Reports only eating ~2 meals daily (breakfast and dinner). WOC eval yesterday identified stg 2 wound - pt with mobility difficulties, primarily chair or bed-bound at baseline. Denies N/V/D. NKFA    Current Nutrition Prescription   PO: Diet: Cardiac, Diabetic; Healthy Heart (2-3 Na+); Consistent Carbohydrate; Fluid Consistency: Thin (IDDSI 0)  Oral Nutrition Supplement: n/a  Intake: Insufficient data - 25% of breakfast observed    Assessment & Plan   Nutrition Diagnosis   Date:  7/10            Updated:    Problem Increased nutrient needs protein   Etiology Wound healing needs   Signs/Symptoms Admission with stg 2   Status: New    Goal:   Nutrition to support treatment and Increase intake      Nutrition Intervention      Follow treatment progress, Care plan reviewed, Encourage intake, Supplement provided    Encourage PO intake at meals  Provide Usman 2x daily  ? SLP consult to evaluate swallow  May benefit from Boost GC 2x daily if unable to improve intake    Monitoring/Evaluation:   Per protocol, I&O, PO intake, Supplement intake, Pertinent labs, Skin status, Symptoms, POC/GOC, Swallow function    Katie Pillai MS,RD,LD  Time Spent: 25min

## 2025-07-10 NOTE — THERAPY EVALUATION
Acute Care - Speech Language Pathology   Swallow Initial Evaluation [facility]  Clinical Swallow Evaluation     Patient Name: Cordell Martin  : 1945  MRN: [id number]  Today's Date: 7/10/2025               Admit Date: 2025    Visit Dx:     ICD-10-CM ICD-9-CM   1. Bilateral lower leg cellulitis  L03.116 682.6    L03.115    2. Dependent edema  R60.9 782.3   3. Venous stasis ulcer of calf with fat layer exposed without varicose veins, unspecified laterality  I87.2 459.81    L97.202 707.12   4. Parkinson's disease, unspecified whether dyskinesia present, unspecified whether manifestations fluctuate  G20.A1 332.0   5. Hyperlipidemia, unspecified hyperlipidemia type  E78.5 272.4   6. Anemia, chronic disease  D63.8 285.29   7. Dysphagia, unspecified type  R13.10 787.20     Patient Active Problem List   Diagnosis    Anxiety    Arthritis    Depression    Hyperlipidemia    Lumbar stenosis with neurogenic claudication    Parkinson's disease    Lumbar stenosis with neurogenic claudication status post L4-5 decompression    Status post lumbar laminectomy    Memory loss    Cellulitis of right lower extremity    Hypothyroidism (acquired)    Pulmonary nodule (4mm RML incidental)    GERD    Remote smoker (Stopped )    Restrictive pattern on PFTs w/o evidence of ILD    History of DVT (deep vein thrombosis)    Anemia, chronic disease    Restless leg syndrome    Cellulitis     Past Medical History:   Diagnosis Date    Anxiety     Arthritis     Back pain     Bronchitis     Cognitive impairment     Depression     Disease of thyroid gland     Glucose intolerance (impaired glucose tolerance)     Hyperlipidemia     Kidney stone     Obesity     Parkinson disease     Pneumonia     Prostate disorder     Thyroid disease     Wears eyeglasses      Past Surgical History:   Procedure Laterality Date    COLONOSCOPY      5 years ago    EYE SURGERY      HERNIA REPAIR  10/20/2020    KNEE SURGERY      LUMBAR LAMINECTOMY DISCECTOMY  DECOMPRESSION N/A 07/13/2017    Procedure: LUMBAR LAMINECTOMY L4-5;  Surgeon: Antonio Trent MD;  Location: [facility];  Service:     SHOULDER ROTATOR CUFF REPAIR Right     x2    TONSILLECTOMY      VEIN SURGERY         SLP Recommendation and Plan  SLP Swallowing Diagnosis: R/O pharyngeal dysphagia, suspected esophageal dysphagia (07/10/25 0900)  SLP Diet Recommendation: regular textures, thin liquids (07/10/25 0900)  Recommended Precautions and Strategies: general aspiration precautions, reflux precautions (07/10/25 0900)  SLP Rec. for Method of Medication Administration: as tolerated (07/10/25 0900)     Monitor for Signs of Aspiration: yes, notify SLP if any concerns (07/10/25 0900)  Recommended Diagnostics: reassess via clinical swallow evaluation (07/10/25 0900)     Anticipated Discharge Disposition (SLP): skilled nursing facility (07/10/25 0900)           Oral Care Recommendations: Oral Care BID/PRN, Toothbrush (07/10/25 0900)                                               SWALLOW EVALUATION (Last 72 Hours)       SLP Adult Swallow Evaluation       Row Name 07/10/25 0900                   Rehab Evaluation    Document Type evaluation  -SM        Subjective Information no complaints  -SM        Patient Observations alert;cooperative  -SM        Patient Effort good  -SM           General Information    Patient Profile Reviewed yes  -SM        Pertinent History Of Current Problem Adm BLE cellulitis. PD. Consulted after pt complained of it being hard to swallow. Hx MBS in past. Most recent 10/2024 revealed functional swallow. Hx GERD, esophageal dilation, chronic cough.  -SM        Current Method of Nutrition regular textures;thin liquids  -SM        Prior Level of Function-Swallowing safe, efficient swallowing in all situations;esophageal concerns  -        Plans/Goals Discussed with patient;agreed upon  -        Barriers to Rehab none identified  -        Patient's Goals for Discharge --  better positioning in  bed for PO intake  -           Pain    Pretreatment Pain Rating 0/10 - no pain  -        Posttreatment Pain Rating 0/10 - no pain  -           Oral Musculature and Cranial Nerve Assessment    Oral Motor General Assessment generalized oral motor weakness  -           Clinical Swallow Eval    Oral Prep Phase WFL  -SM        Oral Transit WFL  -SM        Oral Residue WFL  -SM        Pharyngeal Phase no overt signs/symptoms of pharyngeal impairment  -        Esophageal Phase suspected esophageal impairment  -        Clinical Swallow Evaluation Summary Showing no signs acute change/decline c/t MBS performed last year. Pt denies increased difficulty. Attributes current difficulties r/t specialty bed that is causing him to be hunched over, causing pressure on his stomach and increasing self-feeding difficulties. Spoke with RN, who is already working to get bed adjusted, per pt's preference. Pt did not feel repeat MBS necessary, in agreement as showing no signs aspiration. Pt does report chronic cough. Per observation, suspect reduced swallow reflex frequency r/t PD. Saliva bulid-up likely pooling towards airway before pt cough to clear. Will f/u to ensure no further needs or tx for increasing swallowing of saliva and ? voice. Did not attempt discussion today as pt with increasing lethargy this assessment 2' pain. meds.  -           SLP Evaluation Clinical Impression    SLP Swallowing Diagnosis R/O pharyngeal dysphagia;suspected esophageal dysphagia  -        Functional Impact risk of aspiration/pneumonia  -           Recommendations    SLP Diet Recommendation regular textures;thin liquids  -        Recommended Diagnostics reassess via clinical swallow evaluation  -        Recommended Precautions and Strategies general aspiration precautions;reflux precautions  -        Oral Care Recommendations Oral Care BID/PRN;Toothbrush  -        SLP Rec. for Method of Medication Administration as tolerated  -         Monitor for Signs of Aspiration yes;notify SLP if any concerns  -SM        Anticipated Discharge Disposition (SLP) skilled nursing facility  -                  User Key  (r) = Recorded By, (t) = Taken By, (c) = Cosigned By      Initials Name Effective Dates    Barbara Washington MS CCC-SLP 01/20/25 -                     EDUCATION  The patient has been educated in the following areas:   Dysphagia (Swallowing Impairment) Modified Diet Instruction.                Time Calculation:    Time Calculation- SLP       Row Name 07/10/25 1017             Time Calculation- SLP    SLP Start Time 0900  -      SLP Received On 07/10/25  -         Untimed Charges    [id number] Eval Oral Pharyng Swallow Minutes 55  -SM         Total Minutes    Untimed Charges Total Minutes 55  -SM       Total Minutes 55  -SM                User Key  (r) = Recorded By, (t) = Taken By, (c) = Cosigned By      Initials Name Provider Type    Barbara Washington MS CCC-SLP Speech and Language Pathologist                    Therapy Charges for Today       Code Description Service Date Service Provider Modifiers Qty    [id number] HC ST EVAL ORAL PHARYNG SWALLOW 4 7/10/2025 Barbara Pickens MS CCC-SLP GN 1                 Barbara Pickens MS CCC-SLP  7/10/2025

## 2025-07-10 NOTE — PLAN OF CARE
Goal Outcome Evaluation:               Patient AOx4, VSS on RA. No significant events overnight. Complaints of pain in his right lower leg treated with PRN mes. Adequate relief achieved. Fall precautions in place and turning schedule implemented. Will continue to monitor and treat per orders.

## 2025-07-10 NOTE — THERAPY EVALUATION
Patient Name: Cordell Martin  : 1945    MRN: [id number]                              Today's Date: 7/10/2025       Admit Date: 2025    Visit Dx:     ICD-10-CM ICD-9-CM   1. Bilateral lower leg cellulitis  L03.116 682.6    L03.115    2. Dependent edema  R60.9 782.3   3. Venous stasis ulcer of calf with fat layer exposed without varicose veins, unspecified laterality  I87.2 459.81    L97.202 707.12   4. Parkinson's disease, unspecified whether dyskinesia present, unspecified whether manifestations fluctuate  G20.A1 332.0   5. Hyperlipidemia, unspecified hyperlipidemia type  E78.5 272.4   6. Anemia, chronic disease  D63.8 285.29   7. Dysphagia, unspecified type  R13.10 787.20     Patient Active Problem List   Diagnosis    Anxiety    Arthritis    Depression    Hyperlipidemia    Lumbar stenosis with neurogenic claudication    Parkinson's disease    Lumbar stenosis with neurogenic claudication status post L4-5 decompression    Status post lumbar laminectomy    Memory loss    Cellulitis of right lower extremity    Hypothyroidism (acquired)    Pulmonary nodule (4mm RML incidental)    GERD    Remote smoker (Stopped )    Restrictive pattern on PFTs w/o evidence of ILD    History of DVT (deep vein thrombosis)    Anemia, chronic disease    Restless leg syndrome    Cellulitis     Past Medical History:   Diagnosis Date    Anxiety     Arthritis     Back pain     Bronchitis     Cognitive impairment     Depression     Disease of thyroid gland     Glucose intolerance (impaired glucose tolerance)     Hyperlipidemia     Kidney stone     Obesity     Parkinson disease     Pneumonia     Prostate disorder     Thyroid disease     Wears eyeglasses      Past Surgical History:   Procedure Laterality Date    COLONOSCOPY      5 years ago    EYE SURGERY      HERNIA REPAIR  10/20/2020    KNEE SURGERY      LUMBAR LAMINECTOMY DISCECTOMY DECOMPRESSION N/A 2017    Procedure: LUMBAR LAMINECTOMY L4-5;  Surgeon: Antonio Trent,  MD;  Location: [facility];  Service:     SHOULDER ROTATOR CUFF REPAIR Right     x2    TONSILLECTOMY      VEIN SURGERY        General Information       Row Name 07/10/25 1010          Physical Therapy Time and Intention    Document Type evaluation  -LR     Mode of Treatment physical therapy  -LR       Row Name 07/10/25 1010          General Information    Patient Profile Reviewed yes  -LR     Prior Level of Function independent:;transfer;w/c or scooter;bed mobility  patient reports he t/f to and from w/c, scooter, recliner, commode independently at baseline without RW, caregiver 2x/week, dependent with bathing  -LR     Existing Precautions/Restrictions fall;other (see comments)  B LE edema/pain, Parkinson's  -LR     Barriers to Rehab medically complex;previous functional deficit  -LR       Row Name 07/10/25 1010          Living Environment    Current Living Arrangements apartment;other (see comments)  senior living apartment  -LR     People in Home alone;other (see comments)  caregiver 2x/week, dependent for bathing  -LR       Row Name 07/10/25 1010          Home Main Entrance    Number of Stairs, Main Entrance none  -LR       Row Name 07/10/25 1010          Stairs Within Home, Primary    Number of Stairs, Within Home, Primary none  -LR       Row Name 07/10/25 1010          Cognition    Orientation Status (Cognition) oriented x 4;other (see comments)  requires increased time to respond at times  -LR       Row Name 07/10/25 1010          Safety Issues/Impairments Affecting Functional Mobility    Safety Issues Affecting Function (Mobility) safety precautions follow-through/compliance;safety precaution awareness;judgment  -LR     Impairments Affecting Function (Mobility) balance;strength;pain;endurance/activity tolerance;postural/trunk control;range of motion (ROM);sensation/sensory awareness  -LR               User Key  (r) = Recorded By, (t) = Taken By, (c) = Cosigned By      Initials Name Provider Type    LR Warner  Callie Benton, PT Physical Therapist                   Mobility       Row Name 07/10/25 1010          Bed Mobility    Bed Mobility supine-sit  -LR     Supine-Sit [place] (Bed Mobility) verbal cues;maximum assist (25% patient effort);2 person assist  -LR     Sit-Supine [place] (Bed Mobility) verbal cues;maximum assist (25% patient effort);2 person assist  -LR     Assistive Device (Bed Mobility) head of bed elevated;bed rails;repositioning sheet;other (see comments)  -LR     Comment, (Bed Mobility) Verbal cues to move LEs towards EOB and to reach across for bedrail to pull trunk into sitting. Required increased time to perform and max assist at trunk and with scooting hips out to get feet on floor. Denied dizziness upon sitting up. Required increased time to scoot to EOB. Verbal cues to push from bed to scoot hips back and around into bed. Patient had difficulty getting trunk straight in bed and required significant assist to reposition. Dependent assist x2 via repositioning sheet and bed in trendelenberg to scoot patient up in bed.  -LR       Row Name 07/10/25 1010          Transfers    Comment, (Transfers) Bed elevated to assist with t/f. B feet and knees blocked for t/f. Verbal cues to push up from bed to stand and to extend at knees to power up into standing. Patient with L lateral lean and inability to fully extend at either knee to achieve full upright standing. Achieved 50% of full stand, cleared hips enough for placement of clean linens.  -LR       Row Name 07/10/25 1010          Bed-Chair Transfer    Bed-Chair [place] (Transfers) unable to assess  -LR       Row Name 07/10/25 1010          Sit-Stand Transfer    Sit-Stand [place] (Transfers) verbal cues;maximum assist (25% patient effort);2 person assist  -LR     Assistive Device (Sit-Stand Transfers) other (see comments)  B UE support  -LR       Row Name 07/10/25 1010          Gait/Stairs (Locomotion)    [place] Level (Gait) not  tested  -LR     Patient was able to Ambulate no, other medical factors prevent ambulation  -LR     Reason Patient was unable to Ambulate Non-Ambulatory at Baseline  patient reports he has not ambulaed in 4 months  -LR     [place] Level (Stairs) not tested  -LR     Comment, (Gait/Stairs) See above.  -LR               User Key  (r) = Recorded By, (t) = Taken By, (c) = Cosigned By      Initials Name Provider Type    LR Callie Hylton, PT Physical Therapist                   Obj/Interventions       Row Name 07/10/25 1010          Range of Motion Comprehensive    General Range of Motion lower extremity range of motion deficits identified  -LR       Row Name 07/10/25 1010          Strength Comprehensive (MMT)    General Manual Muscle Testing (MMT) Assessment lower extremity strength deficits identified  -LR       Row Name 07/10/25 1010          Balance    Balance Assessment sitting static balance;sitting dynamic balance;standing static balance;standing dynamic balance  -LR     Static Sitting Balance standby assist  -LR     Dynamic Sitting Balance minimal assist  -LR     Position, Sitting Balance supported;sitting edge of bed  -LR     Static Standing Balance verbal cues;maximum assist;2-person assist  -LR     Dynamic Standing Balance unable to assess  -LR     Position/Device Used, Standing Balance supported;other (see comments)  B UE support  -LR     Comment, Balance patient initially required mod assist for static sitting upon sitting up to EOB. Improved with repositioning. Sat EOB for prolonged period of time, working on repositoning and to allow for further assessment of LEs. Patient with L lateral lean that he was aware of and would correct as he began to drift to the left.  -LR       Row Name 07/10/25 1010          Sensory Assessment (Somatosensory)    Sensory Assessment (Somatosensory) other (see comments)  patient reported diminished light touch in his toes bilaterally, worse on R  -LR       Row Name  07/10/25 1010          General Lower Extremity Assessment (Range of Motion)    Comment: Lower Extremity ROM L knee lacking 20% of full extension AROM, R knee lacking 25% of full extension AROM, R ankle DF lacking 25% AROM, all other LE ROM WFL  -LR       Row Name 07/10/25 1010          Lower Extremity (Manual Muscle Testing)    Lower Extremity: Manual Muscle Testing (MMT) other (see comments)  -LR     Comment, MMT: Lower Extremity Unable to formally MMT LEs d/t pain throughout B LEs; L LE functionally 4/5, R LE functionally 4-/5  -LR               User Key  (r) = Recorded By, (t) = Taken By, (c) = Cosigned By      Initials Name Provider Type    LR Callie Hylton, PT Physical Therapist                   Goals/Plan       Row Name 07/10/25 1010          Bed Mobility Goal 1 (PT)    Activity/Assistive Device (Bed Mobility Goal 1, PT) sit to supine/supine to sit  -LR     [place] Level/Cues Needed (Bed Mobility Goal 1, PT) moderate assist (50-74% patient effort);other (see comments)  x2  -LR     Time Frame (Bed Mobility Goal 1, PT) short term goal (STG);3 days  -LR     Progress/Outcomes (Bed Mobility Goal 1, PT) goal ongoing  -LR       Row Name 07/10/25 1010          Transfer Goal 1 (PT)    Activity/Assistive Device (Transfer Goal 1, PT) sit-to-stand/stand-to-sit;bed-to-chair/chair-to-bed  -LR     [place] Level/Cues Needed (Transfer Goal 1, PT) moderate assist (50-74% patient effort);other (see comments)  x2  -LR     Time Frame (Transfer Goal 1, PT) long term goal (LTG);5 days  -LR     Progress/Outcome (Transfer Goal 1, PT) goal ongoing  -LR       Row Name 07/10/25 1010          Therapy Assessment/Plan (PT)    Planned Therapy Interventions (PT) balance training;bed mobility training;gait training;home exercise program;patient/family education;transfer training;strengthening;ROM (range of motion)  -LR               User Key  (r) = Recorded By, (t) = Taken By, (c) = Cosigned By      Initials Name Provider  Type    LR Callie Hylton, PT Physical Therapist                   Clinical Impression       Row Name 07/10/25 1010          Pain    Pretreatment Pain Rating 9/10  -LR     Posttreatment Pain Rating 10/10  -LR     Pain Location back;extremity  -LR     Pain Side/Orientation right;lower  -LR     Pain Management Interventions exercise or physical activity utilized;movement retraining implemented;nursing notified  -LR     Response to Pain Interventions functional ability unchanged;activity participation with increased pain;activity and movement patterns unchanged  -LR       Row Name 07/10/25 1010          Plan of Care Review    Plan of Care Reviewed With patient  -LR     Progress no change  -LR     Outcome Evaluation Patient required max assist x2 to achieve 50% of full stand at EOB, from elevated bed surface. Patient currently well below functional baseline, demonstrating decreased functional mobility status, impaired balance, decreased endurance, and decreased LE strength/ROM. Will address these deficits to promote return to PLOF. Recommend SNF at d/c. Needs to be moved to a room with overhead lift system. Otherwise, will be unable to assist patient to chair/OOB mobility.  -LR       Row Name 07/10/25 1010          Therapy Assessment/Plan (PT)    Patient/Family Therapy Goals Statement (PT) decrease pain  -LR     Rehab Potential (PT) limited  -LR     Criteria for Skilled Interventions Met (PT) yes;meets criteria;skilled treatment is necessary  -LR     Therapy Frequency (PT) daily  -LR     Predicted Duration of Therapy Intervention (PT) 5 days  -LR       Row Name 07/10/25 1010          Vital Signs    Pre Systolic BP Rehab 109  -LR     Pre Treatment Diastolic BP 55  -LR     Pretreatment Heart Rate (beats/min) 68  -LR     Pre SpO2 (%) 95  -LR     Pre Patient Position Supine  -LR       Row Name 07/10/25 1010          Positioning and Restraints    Pre-Treatment Position in bed  -LR     Post Treatment Position bed  -LR      In Bed notified nsg;supine;call light within reach;encouraged to call for assist;exit alarm on;heels elevated;legs elevated;side rails up x3  -LR               User Key  (r) = Recorded By, (t) = Taken By, (c) = Cosigned By      Initials Name Provider Type    LR Callie Hylton, PT Physical Therapist                   Outcome Measures       Row Name 07/10/25 1010 07/10/25 0840       How much help from another person do you currently need...    Turning from your back to your side while in flat bed without using bedrails? 2  -LR 2  -CS    Moving from lying on back to sitting on the side of a flat bed without bedrails? 2  -LR 2  -CS    Moving to and from a bed to a chair (including a wheelchair)? 1  -LR 2  -CS    Standing up from a chair using your arms (e.g., wheelchair, bedside chair)? 2  -LR 1  -CS    Climbing 3-5 steps with a railing? 1  -LR 1  -CS    To walk in hospital room? 1  -LR 1  -CS    AM-PAC 6 Clicks Score (PT) 9  -LR 9  -CS    Highest Level of Mobility Goal Sit at Edge of Bed-3  -LR Sit at Edge of Bed-3  -CS      Row Name 07/10/25 1010          Functional Assessment    Outcome Measure Options AM-PAC 6 Clicks Basic Mobility (PT)  -LR               User Key  (r) = Recorded By, (t) = Taken By, (c) = Cosigned By      Initials Name Provider Type    Callie Marie, PT Physical Therapist    CS Mckenzie Urrutia RN Registered Nurse                                 Physical Therapy Education       Title: PT OT SLP Therapies (Done)       Topic: Physical Therapy (Done)       Point: Mobility training (Done)       Learning Progress Summary            Patient Acceptance, E,D, VU,NR by LR at 7/10/2025 1010    Comment: Educated on benefits of mobility, safety with mobility, correct supine<->sit t/f technique, correct sit<->stand t/f technique, and progression of POC.                      Point: Home exercise program (Done)       Learning Progress Summary            Patient Acceptance, E,D, VU,NR  by LR at 7/10/2025 1010    Comment: Educated on benefits of mobility, safety with mobility, correct supine<->sit t/f technique, correct sit<->stand t/f technique, and progression of POC.                      Point: Body mechanics (Done)       Learning Progress Summary            Patient Acceptance, E,D, VU,NR by LR at 7/10/2025 1010    Comment: Educated on benefits of mobility, safety with mobility, correct supine<->sit t/f technique, correct sit<->stand t/f technique, and progression of POC.                      Point: Precautions (Done)       Learning Progress Summary            Patient Acceptance, E,D, VU,NR by LR at 7/10/2025 1010    Comment: Educated on benefits of mobility, safety with mobility, correct supine<->sit t/f technique, correct sit<->stand t/f technique, and progression of POC.                                      User Key       Initials Effective Dates Name Provider Type Discipline    LR 02/03/23 -  Callie Hylton, PT Physical Therapist PT                  PT Recommendation and Plan  Planned Therapy Interventions (PT): balance training, bed mobility training, gait training, home exercise program, patient/family education, transfer training, strengthening, ROM (range of motion)  Progress: no change  Outcome Evaluation: Patient required max assist x2 to achieve 50% of full stand at EOB, from elevated bed surface. Patient currently well below functional baseline, demonstrating decreased functional mobility status, impaired balance, decreased endurance, and decreased LE strength/ROM. Will address these deficits to promote return to PLOF. Recommend SNF at d/c. Needs to be moved to a room with overhead lift system. Otherwise, will be unable to assist patient to chair/OOB mobility.     Time Calculation:   PT Evaluation Complexity  History, PT Evaluation Complexity: 3 or more personal factors and/or comorbidities  Examination of Body Systems (PT Eval Complexity): total of 3 or more  elements  Clinical Presentation (PT Evaluation Complexity): evolving  Clinical Decision Making (PT Evaluation Complexity): moderate complexity  Overall Complexity (PT Evaluation Complexity): moderate complexity     PT Charges       Row Name 07/10/25 1010             Time Calculation    Start Time 1010  -LR      PT Received On 07/10/25  -LR      PT Goal Re-Cert Due Date 07/20/25  -LR         Timed Charges    45482 - PT Therapeutic Activity Minutes 8  -LR         Untimed Charges    PT Eval/Re-eval Minutes 65  -LR         Total Minutes    Timed Charges Total Minutes 8  -LR      Untimed Charges Total Minutes 65  -LR       Total Minutes 73  -LR                User Key  (r) = Recorded By, (t) = Taken By, (c) = Cosigned By      Initials Name Provider Type    LR Callie Hylton, PT Physical Therapist                  Therapy Charges for Today       Code Description Service Date Service Provider Modifiers Qty    71364957138 HC PT THERAPEUTIC ACT EA 15 MIN 7/10/2025 Callie Hylton, PT GP 1    [id number] HC-PT EVAL MOD COMPLEXITY 5 7/10/2025 Callie Hylton, PT  1            PT G-Codes  Outcome Measure Options: AM-PAC 6 Clicks Basic Mobility (PT)  AM-PAC 6 Clicks Score (PT): 9  PT Discharge Summary  Anticipated Discharge Disposition (PT): skilled nursing facility    Callie Hylton, PT  7/10/2025

## 2025-07-10 NOTE — THERAPY EVALUATION
Patient Name: Cordell Martin  : 1945    MRN: [id number]                              Today's Date: 7/10/2025       Admit Date: 2025    Visit Dx:     ICD-10-CM ICD-9-CM   1. Bilateral lower leg cellulitis  L03.116 682.6    L03.115    2. Dependent edema  R60.9 782.3   3. Venous stasis ulcer of calf with fat layer exposed without varicose veins, unspecified laterality  I87.2 459.81    L97.202 707.12   4. Parkinson's disease, unspecified whether dyskinesia present, unspecified whether manifestations fluctuate  G20.A1 332.0   5. Hyperlipidemia, unspecified hyperlipidemia type  E78.5 272.4   6. Anemia, chronic disease  D63.8 285.29   7. Dysphagia, unspecified type  R13.10 787.20     Patient Active Problem List   Diagnosis    Anxiety    Arthritis    Depression    Hyperlipidemia    Lumbar stenosis with neurogenic claudication    Parkinson's disease    Lumbar stenosis with neurogenic claudication status post L4-5 decompression    Status post lumbar laminectomy    Memory loss    Cellulitis of right lower extremity    Hypothyroidism (acquired)    Pulmonary nodule (4mm RML incidental)    GERD    Remote smoker (Stopped )    Restrictive pattern on PFTs w/o evidence of ILD    History of DVT (deep vein thrombosis)    Anemia, chronic disease    Restless leg syndrome    Cellulitis     Past Medical History:   Diagnosis Date    Anxiety     Arthritis     Back pain     Bronchitis     Cognitive impairment     Depression     Disease of thyroid gland     Glucose intolerance (impaired glucose tolerance)     Hyperlipidemia     Kidney stone     Obesity     Parkinson disease     Pneumonia     Prostate disorder     Thyroid disease     Wears eyeglasses      Past Surgical History:   Procedure Laterality Date    COLONOSCOPY      5 years ago    EYE SURGERY      HERNIA REPAIR  10/20/2020    KNEE SURGERY      LUMBAR LAMINECTOMY DISCECTOMY DECOMPRESSION N/A 2017    Procedure: LUMBAR LAMINECTOMY L4-5;  Surgeon: Antonio Trent,  MD;  Location: [facility];  Service:     SHOULDER ROTATOR CUFF REPAIR Right     x2    TONSILLECTOMY      VEIN SURGERY        General Information       Row Name 07/10/25 1136          OT Time and Intention    Subjective Information pain;numbness;swelling (P)   -KW     Document Type evaluation (P)   -KW     Mode of Treatment occupational therapy (P)   -KW     Patient Effort good (P)   -KW       Row Name 07/10/25 1136          General Information    Patient Profile Reviewed yes (P)   -KW     Prior Level of Function independent:;transfer;dependent:;ADL's;dressing;bathing (P)   patient reports he t/f to and from w/c, scooter, recliner, commode independently at baseline without RW, caregiver 2x/week, dependent with bathing  -KW     Existing Precautions/Restrictions fall;other (see comments) (P)   -KW     Barriers to Rehab medically complex;previous functional deficit (P)   -KW       Row Name 07/10/25 1136          Occupational Profile    Environmental Supports and Barriers (Occupational Profile) Pt reports that he lives alone, has a caretaker to visit twice weekly to assist with ADLs, he states no falls within 3 months and that he is independent with his toileting needs. Pt states that he has severe swelling and wounds that require medical attention and pt reported to the hospitalist that his wounds were covered with maggots a few days ago at his facility. (P)   -KW       Row Name 07/10/25 1136          Living Environment    Current Living Arrangements independent living facility (P)   -KW     People in Home alone (P)   -KW       Row Name 07/10/25 1136          Home Main Entrance    Number of Stairs, Main Entrance none (P)   -KW     Stair Railings, Main Entrance none (P)   -KW       Row Name 07/10/25 1136          Stairs Within Home, Primary    Number of Stairs, Within Home, Primary none (P)   -KW     Stair Railings, Within Home, Primary none (P)   -KW       Row Name 07/10/25 1136          Cognition    Orientation Status  (Cognition) oriented x 4 (P)   -KW       Row Name 07/10/25 1136          Safety Issues/Impairments Affecting Functional Mobility    Safety Issues Affecting Function (Mobility) awareness of need for assistance;insight into deficits/self-awareness;judgment;problem-solving;safety precaution awareness;safety precautions follow-through/compliance;sequencing abilities (P)   -KW     Impairments Affecting Function (Mobility) balance;strength;pain;endurance/activity tolerance;postural/trunk control;range of motion (ROM);sensation/sensory awareness;shortness of breath (P)   -KW               User Key  (r) = Recorded By, (t) = Taken By, (c) = Cosigned By      Initials Name Provider Type    KW Kristine Persaud OT Student OT Student                     Mobility/ADL's       Row Name 07/10/25 1144          Bed Mobility    Bed Mobility scooting/bridging;supine-sit-supine (P)   -KW     Scooting/Bridging [place] (Bed Mobility) maximum assist (25% patient effort);2 person assist;verbal cues (P)   -KW     Supine-Sit-Supine [place] (Bed Mobility) verbal cues;maximum assist (25% patient effort);2 person assist (P)   -KW     Bed Mobility, Safety Issues decreased use of arms for pushing/pulling;decreased use of legs for bridging/pushing;impaired trunk control for bed mobility (P)   -KW     Assistive Device (Bed Mobility) head of bed elevated;bed rails;repositioning sheet (P)   -KW     Comment, (Bed Mobility) Pt was given VC for hand and leg placement to assist with bed mobility. (P)   -KW       Row Name 07/10/25 1144          Transfers    Transfers sit-stand transfer;stand-sit transfer (P)   -KW       Row Name 07/10/25 1144          Sit-Stand Transfer    Sit-Stand [place] (Transfers) verbal cues;maximum assist (25% patient effort);2 person assist (P)   -KW     Assistive Device (Sit-Stand Transfers) other (see comments) (P)   BUE support  -KW       Row Name 07/10/25 1144          Stand-Sit Transfer    Stand-Sit  [place] (Transfers) verbal cues;maximum assist (25% patient effort);2 person assist (P)   -KW     Assistive Device (Stand-Sit Transfers) other (see comments) (P)   BUE support  -       Row Name 07/10/25 1144          Activities of Daily Living    BADL Assessment/Intervention lower body dressing;grooming (P)   -KW       Row Name 07/10/25 1144          Lower Body Dressing Assessment/Training    [place] Level (Lower Body Dressing) don;socks;dependent (less than 25% patient effort) (P)   -KW     Position (Lower Body Dressing) supine (P)   -KW       [facility] Name 07/10/25 1144          Grooming Assessment/Training    [place] Level (Grooming) wash face, hands;set up (P)   -KW     Position (Grooming) sitting up in bed (P)   -KW               User Key  (r) = Recorded By, (t) = Taken By, (c) = Cosigned By      Initials Name Provider Type    Kristine Hawkins, OT Student OT Student                   Obj/Interventions       Row Name 07/10/25 1148          Sensory Assessment (Somatosensory)    Sensory Assessment (Somatosensory) sensation intact (P)   -KW     Sensory Subjective Reports numbness (P)   -     Sensory Assessment Pt reports that BUE hands experience numbness and gripping difficulties. (P)   -[facility] Name 07/10/25 1148          Vision Assessment/Intervention    Visual Impairment/Limitations WFL (P)   -KW       Row Name 07/10/25 1148          Range of Motion Comprehensive    General Range of Motion upper extremity range of motion deficits identified (P)   -KW     Comment, General Range of Motion RUE 50% % (P)   -KW       Row Name 07/10/25 1148          Strength Comprehensive (MMT)    General Manual Muscle Testing (MMT) Assessment upper extremity strength deficits identified (P)   -KW     Comment, General Manual Muscle Testing (MMT) Assessment LUE 3+/5, RUE not tested (P)   -[facility] Name 07/10/25 1148          Hand (Therapeutic Exercise)    Hand (Therapeutic Exercise) other (see  comments) (P)   Pt performed hand exercises uses BUE to grasp an empty cup and then a filled cup to work on hand  strengthening.  -KW       Row Name 07/10/25 1148          Motor Skills    Therapeutic Exercise hand (P)   -KW       Row Name 07/10/25 1148          Balance    Balance Assessment sitting static balance;sitting dynamic balance;standing static balance (P)   -KW     Static Sitting Balance standby assist (P)   -KW     Dynamic Sitting Balance minimal assist (P)   -KW     Position, Sitting Balance supported;sitting edge of bed (P)   -KW     Sit to Stand Dynamic Balance maximum assist;2-person assist (P)   -KW     Static Standing Balance verbal cues;maximum assist;2-person assist (P)   -KW     Dynamic Standing Balance unable to assess (P)   -KW     Position/Device Used, Standing Balance other (see comments) (P)   BUE support  -KW     Balance Interventions sitting;standing;sit to stand;supported;static;dynamic;highly challenging;occupation based/functional task (P)   -KW     Comment, Balance Pt attempted 1 STS residential with max A x 2 and verbal commands for positioning of leg placement to assist with standing. (P)   -KW               User Key  (r) = Recorded By, (t) = Taken By, (c) = Cosigned By      Initials Name Provider Type    KW Kristine Persaud, OT Student OT Student                   Goals/Plan       Row Name 07/10/25 1301          Bed Mobility Goal 1 (OT)    Activity/Assistive Device (Bed Mobility Goal 1, OT) sit to supine/supine to sit (P)   -KW     [place] Level/Cues Needed (Bed Mobility Goal 1, OT) minimum assist (75% or more patient effort) (P)   -KW     Time Frame (Bed Mobility Goal 1, OT) 5 days (P)   -KW       Row Name 07/10/25 1301          Transfer Goal 1 (OT)    Activity/Assistive Device (Transfer Goal 1, OT) bed-to-chair/chair-to-bed (P)   -KW     [place] Level/Cues Needed (Transfer Goal 1, OT) minimum assist (75% or more patient effort) (P)   -KW     Time Frame (Transfer  Goal 1, OT) short term goal (STG);5 days (P)   -KW       Row Name 07/10/25 1301          Toileting Goal 1 (OT)    Activity/Device (Toileting Goal 1, OT) commode (P)   -KW     [place] Level/Cues Needed (Toileting Goal 1, OT) minimum assist (75% or more patient effort) (P)   -KW     Time Frame (Toileting Goal 1, OT) long term goal (LTG);10 days (P)   -KW       Row Name 07/10/25 1301          Therapy Assessment/Plan (OT)    Planned Therapy Interventions (OT) activity tolerance training;adaptive equipment training;BADL retraining;edema control/reduction;functional balance retraining;occupation/activity based interventions;passive ROM/stretching;patient/caregiver education/training;ROM/therapeutic exercise;strengthening exercise;transfer/mobility retraining (P)   -KW               User Key  (r) = Recorded By, (t) = Taken By, (c) = Cosigned By      Initials Name Provider Type    KW Kristine Persaud, OT Student OT Student                   Clinical Impression       Row Name 07/10/25 1256          Pain Assessment    Pretreatment Pain Rating 9/10 (P)   -KW     Posttreatment Pain Rating 6/10 (P)   -KW     Pain Location back;extremity (P)   -KW     Pain Side/Orientation right (P)   -KW     Pain Management Interventions activity modification encouraged;exercise or physical activity utilized;positioning techniques utilized;nursing notified (P)   -KW     Response to Pain Interventions activity participation with decreased pain (P)   -KW       Row Name 07/10/25 1257          Plan of Care Review    Plan of Care Reviewed With patient (P)   -KW     Progress no change (P)   -KW     Outcome Evaluation Pt presents with severe edema and pain in LE, decreased use of UE and generalized weakness limiting ADLs and mobility warranting OT services. Pt will benefit from OT services to increase strength and promote independence of ADLs and mobility. Rec DC SNF. (P)   -KW       Row Name 07/10/25 1258          Therapy Assessment/Plan  (OT)    Patient/Family Therapy Goal Statement (OT) Return to PLOF (P)   -KW     Rehab Potential (OT) fair (P)   -KW     Criteria for Skilled Therapeutic Interventions Met (OT) yes;meets criteria;skilled treatment is necessary (P)   -KW     Therapy Frequency (OT) daily (P)   -KW     Predicted Duration of Therapy Intervention (OT) 10 days (P)   -KW       Row Name 07/10/25 1255          Therapy Plan Review/Discharge Plan (OT)    Anticipated Discharge Disposition (OT) skilled nursing facility (P)   -KW       Row Name 07/10/25 1255          Vital Signs    Pre Systolic BP Rehab 109 (P)   -KW     Pre Treatment Diastolic BP 55 (P)   -KW     Pretreatment Heart Rate (beats/min) 63 (P)   -KW     Pre SpO2 (%) 94 (P)   -KW     O2 Delivery Pre Treatment room air (P)   -KW     Pre Patient Position Supine (P)   -KW     Intra Patient Position Sitting (P)   -KW     Post Patient Position Supine (P)   -KW       Row Name 07/10/25 1255          Positioning and Restraints    Pre-Treatment Position in bed (P)   -KW     Post Treatment Position bed (P)   -KW     In Bed notified nsg;exit alarm on;side rails up x2;supine;legs elevated;heels elevated;call light within reach;encouraged to call for assist;pillow between legs (P)   -KW               User Key  (r) = Recorded By, (t) = Taken By, (c) = Cosigned By      Initials Name Provider Type    Kristine Hawkins, OT Student OT Student                   Outcome Measures       Row Name 07/10/25 1302          How much help from another is currently needed...    Putting on and taking off regular lower body clothing? 1 (P)   -KW     Bathing (including washing, rinsing, and drying) 2 (P)   -KW     Toileting (which includes using toilet bed pan or urinal) 1 (P)   -KW     Putting on and taking off regular upper body clothing 2 (P)   -KW     Taking care of personal grooming (such as brushing teeth) 3 (P)   -KW     Eating meals 4 (P)   -KW     AM-PAC 6 Clicks Score (OT) 13 (P)   -KW       Row Name  07/10/25 1010 07/10/25 0840       How much help from another person do you currently need...    Turning from your back to your side while in flat bed without using bedrails? 2  -LR 2  -CS    Moving from lying on back to sitting on the side of a flat bed without bedrails? 2  -LR 2  -CS    Moving to and from a bed to a chair (including a wheelchair)? 1  -LR 2  -CS    Standing up from a chair using your arms (e.g., wheelchair, bedside chair)? 2  -LR 1  -CS    Climbing 3-5 steps with a railing? 1  -LR 1  -CS    To walk in hospital room? 1  -LR 1  -CS    AM-PAC 6 Clicks Score (PT) 9  -LR 9  -CS    Highest Level of Mobility Goal Sit at Edge of Bed-3  -LR Sit at Edge of Bed-3  -CS      Row Name 07/10/25 1302 07/10/25 1010       Functional Assessment    Outcome Measure Options AM-PAC 6 Clicks Daily Activity (OT) (P)   -KW AM-PAC 6 Clicks Basic Mobility (PT)  -LR              User Key  (r) = Recorded By, (t) = Taken By, (c) = Cosigned By      Initials Name Provider Type    LR Callie Hylton, PT Physical Therapist    Mckenzie Solis, RN Registered Nurse    Kristine Hawkins, OT Student OT Student                    Occupational Therapy Education       Title: PT OT SLP Therapies (Done)       Topic: Occupational Therapy (Done)       Point: ADL training (Done)       Learning Progress Summary            Patient Acceptance, E, VU by LEN at 7/10/2025 1304                      Point: Precautions (Done)       Learning Progress Summary            Patient Acceptance, E, VU by LEN at 7/10/2025 1304                      Point: Body mechanics (Done)       Learning Progress Summary            Patient Acceptance, E, VU by LEN at 7/10/2025 1304                                      User Key       Initials Effective Dates Name Provider Type Discipline     04/23/25 -  Kristine Persaud, OT Student OT Student OT                  OT Recommendation and Plan  Planned Therapy Interventions (OT): (P) activity tolerance  training, adaptive equipment training, BADL retraining, edema control/reduction, functional balance retraining, occupation/activity based interventions, passive ROM/stretching, patient/caregiver education/training, ROM/therapeutic exercise, strengthening exercise, transfer/mobility retraining  Therapy Frequency (OT): (P) daily  Plan of Care Review  Plan of Care Reviewed With: (P) patient  Progress: (P) no change  Outcome Evaluation: (P) Pt presents with severe edema and pain in LE, decreased use of UE and generalized weakness limiting ADLs and mobility warranting OT services. Pt will benefit from OT services to increase strength and promote independence of ADLs and mobility. Rec DC SNF.     Time Calculation:   Evaluation Complexity (OT)  Review Occupational Profile/Medical/Therapy History Complexity: (P) expanded/moderate complexity  Assessment, Occupational Performance/Identification of Deficit Complexity: (P) 3-5 performance deficits  Clinical Decision Making Complexity (OT): (P) detailed assessment/moderate complexity  Overall Complexity of Evaluation (OT): (P) moderate complexity     Time Calculation- OT       Row Name 07/10/25 1306             Time Calculation- OT    OT Start Time 1018 (P)   -KW      OT Received On 07/10/25 (P)   -KW      OT Goal Re-Cert Due Date 07/20/25 (P)   -KW         Timed Charges    53994 - OT Therapeutic Activity Minutes 15 (P)   -KW         Untimed Charges    OT Eval/Re-eval Minutes 65 (P)   -KW         Total Minutes    Timed Charges Total Minutes 15 (P)   -KW      Untimed Charges Total Minutes 65 (P)   -KW       Total Minutes 80 (P)   -KW                User Key  (r) = Recorded By, (t) = Taken By, (c) = Cosigned By      Initials Name Provider Type    Kristine Hawkins OT Student OT Student                  Therapy Charges for Today       Code Description Service Date Service Provider Modifiers Qty    60440916073 HC OT THERAPEUTIC ACT EA 15 MIN 7/10/2025 Kristine Persaud  JUNG MOONEY Student GO 1    [id number] -OT EVAL MOD COMPLEXITY 5 7/10/2025 Kristine Persaud, JUNG Student  1                 Kristine Persaud OT Student  7/10/2025

## 2025-07-10 NOTE — PLAN OF CARE
Goal Outcome Evaluation:      Patient A&Ox4, forgetful at times.  VSS. RA while awake 2LNC while asleep. NSR with 1st Degree AVB on tele.  BLE wound care completed as ordered. Awaiting PT wound care for wraps.  Patient refused agility bed, refusal form signed and in chart.  Q2 turn, skin interventions in place.  Call light and personal belongings within reach. Fall precautions in place.      Problem: Adult Inpatient Plan of Care  Goal: Plan of Care Review  Outcome: Progressing  Goal: Patient-Specific Goal (Individualized)  Outcome: Progressing  Goal: Absence of Hospital-Acquired Illness or Injury  Outcome: Progressing  Intervention: Identify and Manage Fall Risk  Recent Flowsheet Documentation  Taken 7/10/2025 1221 by Mckenzie Urrutia, RN  Safety Promotion/Fall Prevention:   activity supervised   assistive device/personal items within reach   clutter free environment maintained   fall prevention program maintained   gait belt   lighting adjusted   mobility aid in reach   nonskid shoes/slippers when out of bed   room organization consistent   safety round/check completed  Taken 7/10/2025 1000 by Mckenzie Urrutia, RN  Safety Promotion/Fall Prevention:   activity supervised   assistive device/personal items within reach   clutter free environment maintained   fall prevention program maintained   gait belt   lighting adjusted   mobility aid in reach   nonskid shoes/slippers when out of bed   room organization consistent   safety round/check completed  Taken 7/10/2025 0840 by Mckenzie Urrutia, RN  Safety Promotion/Fall Prevention:   activity supervised   assistive device/personal items within reach   clutter free environment maintained   fall prevention program maintained   gait belt   lighting adjusted   mobility aid in reach   nonskid shoes/slippers when out of bed   room organization consistent   safety round/check completed  Intervention: Prevent Skin Injury  Recent Flowsheet Documentation  Taken  7/10/2025 1400 by Mckenzie Urrutia RN  Body Position:   turned   right  Taken 7/10/2025 1221 by Mckenzie Urrutia RN  Skin Protection:   transparent dressing maintained   silicone foam dressing in place   incontinence pads utilized  Taken 7/10/2025 1000 by Mckenzie Urrutia RN  Skin Protection:   transparent dressing maintained   silicone foam dressing in place   incontinence pads utilized  Taken 7/10/2025 0840 by Mckenzie Urrutia RN  Body Position: sitting up in bed  Skin Protection: (silicone dressing peeled back skin assessed)   transparent dressing maintained   silicone foam dressing in place   incontinence pads utilized  Intervention: Prevent and Manage VTE (Venous Thromboembolism) Risk  Recent Flowsheet Documentation  Taken 7/10/2025 0840 by Mckenzie Urrutia RN  VTE Prevention/Management: (see MAR) other (see comments)  Intervention: Prevent Infection  Recent Flowsheet Documentation  Taken 7/10/2025 1221 by Mckenzie Urrutia RN  Infection Prevention:   environmental surveillance performed   hand hygiene promoted   personal protective equipment utilized   single patient room provided   rest/sleep promoted  Taken 7/10/2025 1000 by Mckenzie Urrutia RN  Infection Prevention:   environmental surveillance performed   hand hygiene promoted   personal protective equipment utilized   rest/sleep promoted   single patient room provided  Taken 7/10/2025 0840 by Mckenzie Urrutia RN  Infection Prevention:   environmental surveillance performed   hand hygiene promoted   personal protective equipment utilized   rest/sleep promoted   single patient room provided  Goal: Optimal Comfort and Wellbeing  Outcome: Progressing  Intervention: Monitor Pain and Promote Comfort  Recent Flowsheet Documentation  Taken 7/10/2025 1151 by Mckenzie Urrutia RN  Pain Management Interventions: pain medication given  Taken 7/10/2025 0840 by Mckenzie Urrutia RN  Pain Management Interventions:   pain  management plan reviewed with patient/caregiver   position adjusted  Intervention: Provide Person-Centered Care  Recent Flowsheet Documentation  Taken 7/10/2025 7040 by Mckenzie Urrutia, RN  Trust Relationship/Rapport:   care explained   questions answered   questions encouraged  Goal: Readiness for Transition of Care  Outcome: Progressing

## 2025-07-10 NOTE — PROGRESS NOTES
[facility] Medicine Services  PROGRESS NOTE    Patient Name: Cordell Martin  : 1945  MRN: [id number]    Date of Admission: 2025  Primary Care Physician: Ben Holder,     Subjective   Subjective     CC: B LE redness/burning    HPI:   Reports his bilateral LE erythema has decreased some. Has RLE pain. Has back pain and chronic left hip pain. No dysuria. Has dysphagia issues.    Objective   Objective     Vital Signs:   Temp:  [97.6 °F (36.4 °C)-98.5 °F (36.9 °C)] 98.1 °F (36.7 °C)  Heart Rate:  [70-79] 70  Resp:  [17-18] 18  BP: ()/(45-62) 109/55  Flow (L/min) (Oxygen Therapy):  [2] 2     Physical Exam:  Constitutional: No acute distress, awake, alert  HENT: NCAT, mucous membranes moist  Respiratory: Clear to auscultation bilaterally, respiratory effort normal   Cardiovascular: RRR  Gastrointestinal: Positive bowel sounds, soft, nontender, nondistended  Musculoskeletal: bilateral ankle edema  Psychiatric: Appropriate affect, cooperative  Neurologic: Alert, oriented, symmetric facies, MARIE, +masked facies, +pill-rolling tremor, speech clear  Skin: B/l LE erythema, no warmth    Results Reviewed:  LAB RESULTS:      Lab 07/10/25  0538 25  1014 25  2305 25  2137   WBC 4.68 6.45  --  6.81   HEMOGLOBIN 10.7* 11.2*  --  12.7*   HEMATOCRIT 33.9* 36.4*  --  42.7   PLATELETS 174 183  --  190   NEUTROS ABS  --  4.87  --  4.17   IMMATURE GRANS (ABS)  --  0.02  --  0.02   LYMPHS ABS  --  1.04  --  1.83   MONOS ABS  --  0.37  --  0.55   EOS ABS  --  0.14  --  0.22   MCV 92.4 92.6  --  95.5   SED RATE  --   --  82*  --    CRP  --   --   --  4.69*   PROCALCITONIN  --   --   --  0.08   LACTATE  --   --   --  1.2   PROTIME  --   --   --  14.4         Lab 07/10/25  0538 25  1014 25  2137   SODIUM 139 139 138   POTASSIUM 4.1 4.3 4.0   CHLORIDE 102 102 101   CO2 28.0 27.9 28.0   ANION GAP 9.0 9.1 9.0   BUN 26.7* 20.8 22.4   CREATININE 1.50* 1.27 1.22    EGFR 47.4* 57.8* 60.7   GLUCOSE 114* 137* 92   CALCIUM 8.5* 8.7 9.2         Lab 07/10/25  0538 07/09/25  1014 07/08/25  2137   TOTAL PROTEIN 6.3 6.8 8.0   ALBUMIN 3.0* 3.0* 3.5   GLOBULIN 3.3 3.8 4.5   ALT (SGPT) <5 <5 <5   AST (SGOT) 13 15 13   BILIRUBIN 0.3 0.4 0.5   ALK PHOS 125* 126* 156*         Lab 07/08/25  2137   PROBNP 44.3   PROTIME 14.4   INR 1.05                 Brief Urine Lab Results  (Last result in the past 365 days)        Color   Clarity   Blood   Leuk Est   Nitrite   Protein   CREAT   Urine HCG        10/08/24 2219 Yellow   Clear   Negative   Negative   Negative   Negative                   Microbiology Results Abnormal       None            Duplex Venous Lower Extremity - Bilateral CAR  Result Date: 7/9/2025    The bilateral lower extremity venous duplex scan is negative for evidence of a DVT and SVT in the areas visualized.  The right great saphenous vein (above-knee) was not well-visualized.     XR Chest 1 View  Result Date: 7/8/2025  XR CHEST 1 VW Date of Exam: 7/8/2025 9:27 PM EDT Indication: cough Comparison: 2/6/2025 and 6/3/2025 Findings: Unremarkable cardiomediastinal silhouette. Low lung volumes. Airspace opacity along the left lung base favors atelectasis. Otherwise no focal airspace consolidation. No pleural effusion or pneumothorax. No acute osseous abnormality.     Impression: Impression: Low lung volumes with probable left basilar atelectasis. Electronically Signed: Gilbert Waller MD  7/8/2025 9:47 PM EDT  Workstation ID: [id number]      Results for orders placed during the hospital encounter of 01/12/21    Adult Transthoracic Echo Complete W/ Cont if Necessary Per Protocol 01/13/2021  4:06 PM    Interpretation Summary  · The right ventricle and right atrium are moderately dilated. Other chamber sizes and wall thicknesses are normal.  · Global and segmental LV wall motion is normal. The estimated left ventricular ejection fraction is 51% - 55%.  · The aortic and mitral valves are  "normal in structure and function.  · The estimated RV systolic pressure is mildly elevated 35 mmHg - 40 mmHg. There is as well noted to be less than 50% inspiratory IVC collapse. The main pulmonary artery is \"borderline dilated\".  · There are no other important findings on this study.      Current medications:  Scheduled Meds:acetaminophen, 500 mg, Oral, TID  [Held by provider] atorvastatin, 40 mg, Oral, Daily  carbidopa-levodopa, 2 tablet, Oral, Q6H  carbidopa-levodopa ER, 1 tablet, Oral, BID  [START ON 7/11/2025] DAPTOmycin, 6 mg/kg (Adjusted), Intravenous, Q24H  enoxaparin sodium, 40 mg, Subcutaneous, Daily  furosemide, 40 mg, Oral, Daily  gabapentin, 100 mg, Oral, Q12H  lactobacillus acidophilus, 1 capsule, Oral, Daily  levothyroxine, 88 mcg, Oral, Q AM  PARoxetine, 10 mg, Oral, Daily  piperacillin-tazobactam, 4.5 g, Intravenous, Q8H  pramipexole, 1 mg, Oral, TID  sodium chloride, 10 mL, Intravenous, Q12H  tamsulosin, 0.4 mg, Oral, Daily      Continuous Infusions:     PRN Meds:.  senna-docusate sodium **AND** polyethylene glycol **AND** bisacodyl **AND** bisacodyl    Calcium Replacement - Follow Nurse / BPA Driven Protocol    HYDROcodone-acetaminophen    Magnesium Standard Dose Replacement - Follow Nurse / BPA Driven Protocol    Phosphorus Replacement - Follow Nurse / BPA Driven Protocol    Potassium Replacement - Follow Nurse / BPA Driven Protocol    sodium chloride    Assessment & Plan   Assessment & Plan     Active Hospital Problems    Diagnosis  POA    Restless leg syndrome [G25.81]  Unknown    Cellulitis [L03.90]  Yes    History of DVT (deep vein thrombosis) [Z86.718]  Not Applicable    Hypothyroidism (acquired) [E03.9]  Yes    Cellulitis of right lower extremity [L03.115]  Yes    Hyperlipidemia [E78.5]  Yes      Resolved Hospital Problems   No resolved problems to display.        Brief Hospital Course to date:  Cordell Martin is a 80 y.o. male with history of anxiety, chronic back pain, cognitive " impairment, depression, hypothyroidism, HLD, PD, obesity, admitted with B LE redness/drainage. Venous duplex negative for VTE in portions visualized.    This patient's problems and plans were partially entered by my partner and updated as appropriate by me 07/10/25.    B LE cellulitis  B LE vascular ulcers  B LE pain/burning  -duplex negative for VTE in portions visualized   -IV abx adjusted given worsening renal function  -mobilize  -gabapentin for pain  -wound care; PT wound consult     Elevated Cr  -Cr jump from 1.27-->1.5  -Had been on vanc/zosyn; stop vanc; changed to dapto    Hypothyroidism  -synthroid    HLD  -statin on hold while on daptomycin    PD  RLS  Weakness  -on carbidopa/levodopa  -mirapex  -PT/OT    Called by RN re: possible torsades de pointes; reviewed tele and is consistent with artifact, QTS complexes seen in multiple leads and lead I doesn't show torsades     Expected Discharge Location and Transportation: Facility  Expected Discharge TBD  Expected discharge date/ time has not been documented.     VTE Prophylaxis:  Pharmacologic VTE prophylaxis orders are present.         AM-PAC 6 Clicks Score (PT): 9 (07/09/25 2007)    CODE STATUS:   Code Status and Medical Interventions: CPR (Attempt to Resuscitate); Full Support   Ordered at: 07/09/25 0010     Code Status (Patient has no pulse and is not breathing):    CPR (Attempt to Resuscitate)     Medical Interventions (Patient has pulse or is breathing):    Full Support     Level Of Support Discussed With:    Patient       Rita Villarreal MD  07/10/25

## 2025-07-10 NOTE — NURSING NOTE
Provider was notified regarding tele alarm for abnormal rhythm. Patient was asymptomatic, awake alert, talking on telephone.   Provider placed orders and reviewed tele.   Per provider the abnormal rhythm was convincing artifact as lead I didn't show it and  the QRS complexes could be seen in other leads.   Continue with current POC and continuous cardiac monitoring.

## 2025-07-10 NOTE — PLAN OF CARE
Goal Outcome Evaluation:  Plan of Care Reviewed With: patient        Progress: no change  Outcome Evaluation: Patient required max assist x2 to achieve 50% of full stand at EOB, from elevated bed surface. Patient currently well below functional baseline, demonstrating decreased functional mobility status, impaired balance, decreased endurance, and decreased LE strength/ROM. Will address these deficits to promote return to PLOF. Recommend SNF at d/c. Needs to be moved to a room with overhead lift system. Otherwise, will be unable to assist patient to chair/OOB mobility.    Anticipated Discharge Disposition (PT): skilled nursing facility

## 2025-07-10 NOTE — PLAN OF CARE
Goal Outcome Evaluation:  Plan of Care Reviewed With: (P) patient        Progress: (P) no change  Outcome Evaluation: (P) Pt presents with severe edema and pain in LE, decreased use of UE and generalized weakness limiting ADLs and mobility warranting OT services. Pt will benefit from OT services to increase strength and promote independence of ADLs and mobility. Rec DC SNF.    Anticipated Discharge Disposition (OT): (P) skilled nursing facility

## 2025-07-10 NOTE — PROGRESS NOTES
"Pharmacy Consult-Vancomycin Dosing  Cordell Martin is a  80 y.o. male receiving vancomycin therapy.     Indication:SSTI  Consulting Provider: hospitalist  ID Consult: NA    Goal Trough: 10-15    Current Antimicrobial Therapy  Anti-Infectives (From admission, onward)      Ordered     Dose/Rate Route Frequency Start Stop    07/09/25 1138  piperacillin-tazobactam (ZOSYN) 4.5 g IVPB in 100 mL NS MBP (CD)        Ordering Provider: Fabian Lopez MD    4.5 g  over 4 Hours Intravenous Every 8 Hours 07/09/25 1915 07/14/25 1914    07/09/25 1138  piperacillin-tazobactam (ZOSYN) 4.5 g IVPB in 100 mL NS MBP (CD)        Ordering Provider: Fabian Lopez MD    4.5 g  over 30 Minutes Intravenous Once 07/09/25 1230 07/09/25 1231    07/09/25 0643  vancomycin (dosing per levels)        Ordering Provider: Kelton Chahal, PharmD     Not Applicable Daily 07/09/25 0900 07/16/25 0859    07/09/25 0216  cefTRIAXone (ROCEPHIN) 2,000 mg in sodium chloride 0.9 % 100 mL MBP  Status:  Discontinued        Ordering Provider: Arnulfo Kelley DO    2,000 mg  200 mL/hr over 30 Minutes Intravenous Every 24 Hours Scheduled 07/09/25 0230 07/09/25 1138    07/09/25 0012  Pharmacy to dose vancomycin        Ordering Provider: Nubia Sheridan APRN     Not Applicable Continuous PRN 07/09/25 0012 07/14/25 0011    07/08/25 2127  vancomycin 2250 mg/500 mL 0.9% NS IVPB ([facility])        Ordering Provider: Enrico Marroquin MD    20 mg/kg × 118 kg  over 135 Minutes Intravenous Once 07/08/25 2143 07/08/25 2350            Allergies  Allergies as of 07/08/2025    (No Known Allergies)       Labs    Results from last 7 days   Lab Units 07/10/25  0538 07/09/25  1014 07/08/25  2137   BUN mg/dL 26.7* 20.8 22.4   CREATININE mg/dL 1.50* 1.27 1.22       Results from last 7 days   Lab Units 07/10/25  0538 07/09/25  1014 07/08/25  2137   WBC 10*3/mm3 4.68 6.45 6.81       Evaluation of Dosing     Is Patient on Dialysis or Renal Replacement: no    Ht - 182.9 cm (72\")  Wt - 119 kg " (262 lb 14.4 oz)    Estimated Creatinine Clearance: 54.1 mL/min (A) (by C-G formula based on SCr of 1.5 mg/dL (H)).    Intake & Output (last 3 days)         07/07 0701  07/08 0700 07/08 0701  07/09 0700 07/09 0701  07/10 0700 07/10 0701  07/11 0700    P.O.  120 480     Total Intake(mL/kg)  120 (1) 480 (4)     Urine (mL/kg/hr)  500 350 (0.1)     Total Output  500 350     Net  -380 +130                     Microbiology and Radiology  Microbiology Results (last 10 days)       Procedure Component Value - Date/Time    Blood Culture - Blood, Arm, Right [[id number]]  (Normal) Collected: 07/08/25 2140    Lab Status: Preliminary result Specimen: Blood from Arm, Right Updated: 07/09/25 2200     Blood Culture No growth at 24 hours    Blood Culture - Blood, Arm, Left [[id number]]  (Normal) Collected: 07/08/25 2139    Lab Status: Preliminary result Specimen: Blood from Arm, Left Updated: 07/09/25 2200     Blood Culture No growth at 24 hours            Vancomycin Levels:    Results from last 7 days   Lab Units 07/10/25  0538 07/09/25  1014   VANCOMYCIN RM mcg/mL 21.20 27.90                     Assessment/Plan:    Loading dose received 7/8.  Level today resulted slightly supra-therapeutic at 21.2mcg/mL.  Scr also increased over night.  Hold further vancomycin dosing today.  Level check 7/11 AM labs.  Pharmacy will continue to follow and adjust dose based on renal function, drug levels, and clinical status.    Thanks,  Marielena Cohen, PharmD, BCPS   7/10/2025  07:41 EDT

## 2025-07-10 NOTE — PLAN OF CARE
Goal Outcome Evaluation:  Plan of Care Reviewed With: patient                Anticipated Discharge Disposition (SLP): skilled nursing facility          SLP Swallowing Diagnosis: R/O pharyngeal dysphagia, suspected esophageal dysphagia (07/10/25 0900)

## 2025-07-11 LAB
ANION GAP SERPL CALCULATED.3IONS-SCNC: 10.4 MMOL/L (ref 5–15)
BUN SERPL-MCNC: 29.1 MG/DL (ref 8–23)
BUN/CREAT SERPL: 19 (ref 7–25)
CALCIUM SPEC-SCNC: 8.9 MG/DL (ref 8.6–10.5)
CHLORIDE SERPL-SCNC: 102 MMOL/L (ref 98–107)
CO2 SERPL-SCNC: 27.6 MMOL/L (ref 22–29)
CREAT SERPL-MCNC: 1.53 MG/DL (ref 0.76–1.27)
EGFRCR SERPLBLD CKD-EPI 2021: 46.2 ML/MIN/1.73
GLUCOSE SERPL-MCNC: 108 MG/DL (ref 65–99)
MAGNESIUM SERPL-MCNC: 2.2 MG/DL (ref 1.6–2.4)
POTASSIUM SERPL-SCNC: 4.3 MMOL/L (ref 3.5–5.2)
SODIUM SERPL-SCNC: 140 MMOL/L (ref 136–145)

## 2025-07-11 PROCEDURE — 97164 PT RE-EVAL EST PLAN CARE: CPT

## 2025-07-11 PROCEDURE — 83735 ASSAY OF MAGNESIUM: CPT | Performed by: STUDENT IN AN ORGANIZED HEALTH CARE EDUCATION/TRAINING PROGRAM

## 2025-07-11 PROCEDURE — 92610 EVALUATE SWALLOWING FUNCTION: CPT

## 2025-07-11 PROCEDURE — 99232 SBSQ HOSP IP/OBS MODERATE 35: CPT | Performed by: STUDENT IN AN ORGANIZED HEALTH CARE EDUCATION/TRAINING PROGRAM

## 2025-07-11 PROCEDURE — 25010000002 DAPTOMYCIN PER 1 MG: Performed by: STUDENT IN AN ORGANIZED HEALTH CARE EDUCATION/TRAINING PROGRAM

## 2025-07-11 PROCEDURE — 80048 BASIC METABOLIC PNL TOTAL CA: CPT | Performed by: STUDENT IN AN ORGANIZED HEALTH CARE EDUCATION/TRAINING PROGRAM

## 2025-07-11 PROCEDURE — 29580 STRAPPING UNNA BOOT: CPT

## 2025-07-11 PROCEDURE — 25010000002 PIPERACILLIN SOD-TAZOBACTAM PER 1 G: Performed by: HOSPITALIST

## 2025-07-11 PROCEDURE — 25010000002 ENOXAPARIN PER 10 MG

## 2025-07-11 PROCEDURE — 97597 DBRDMT OPN WND 1ST 20 CM/<: CPT

## 2025-07-11 PROCEDURE — 92523 SPEECH SOUND LANG COMPREHEN: CPT

## 2025-07-11 RX ADMIN — PIPERACILLIN AND TAZOBACTAM 4.5 G: 4; .5 INJECTION, POWDER, FOR SOLUTION INTRAVENOUS at 11:31

## 2025-07-11 RX ADMIN — PRAMIPEXOLE DIHYDROCHLORIDE 1 MG: 1 TABLET ORAL at 20:22

## 2025-07-11 RX ADMIN — PAROXETINE HYDROCHLORIDE 10 MG: 10 TABLET, FILM COATED ORAL at 09:14

## 2025-07-11 RX ADMIN — CARBIDOPA AND LEVODOPA 2 TABLET: 25; 100 TABLET ORAL at 18:54

## 2025-07-11 RX ADMIN — Medication 1 CAPSULE: at 09:14

## 2025-07-11 RX ADMIN — ENOXAPARIN SODIUM 40 MG: 100 INJECTION SUBCUTANEOUS at 09:14

## 2025-07-11 RX ADMIN — FUROSEMIDE 40 MG: 40 TABLET ORAL at 09:14

## 2025-07-11 RX ADMIN — CARBIDOPA AND LEVODOPA 2 TABLET: 25; 100 TABLET ORAL at 00:28

## 2025-07-11 RX ADMIN — DAPTOMYCIN 550 MG: 500 INJECTION, POWDER, LYOPHILIZED, FOR SOLUTION INTRAVENOUS at 09:18

## 2025-07-11 RX ADMIN — CARBIDOPA AND LEVODOPA 2 TABLET: 25; 100 TABLET ORAL at 05:48

## 2025-07-11 RX ADMIN — CARBIDOPA AND LEVODOPA 1 TABLET: 25; 100 TABLET, EXTENDED RELEASE ORAL at 20:22

## 2025-07-11 RX ADMIN — CARBIDOPA AND LEVODOPA 2 TABLET: 25; 100 TABLET ORAL at 11:31

## 2025-07-11 RX ADMIN — GABAPENTIN 100 MG: 100 CAPSULE ORAL at 20:22

## 2025-07-11 RX ADMIN — HYDROCODONE BITARTRATE AND ACETAMINOPHEN 1 TABLET: 5; 325 TABLET ORAL at 00:55

## 2025-07-11 RX ADMIN — GABAPENTIN 100 MG: 100 CAPSULE ORAL at 09:14

## 2025-07-11 RX ADMIN — PRAMIPEXOLE DIHYDROCHLORIDE 1 MG: 1 TABLET ORAL at 09:14

## 2025-07-11 RX ADMIN — TAMSULOSIN HYDROCHLORIDE 0.4 MG: 0.4 CAPSULE ORAL at 09:14

## 2025-07-11 RX ADMIN — LEVOTHYROXINE SODIUM 88 MCG: 88 TABLET ORAL at 05:48

## 2025-07-11 RX ADMIN — CARBIDOPA AND LEVODOPA 1 TABLET: 25; 100 TABLET, EXTENDED RELEASE ORAL at 09:14

## 2025-07-11 RX ADMIN — PRAMIPEXOLE DIHYDROCHLORIDE 1 MG: 1 TABLET ORAL at 16:30

## 2025-07-11 RX ADMIN — BISACODYL 5 MG: 5 TABLET, COATED ORAL at 18:54

## 2025-07-11 RX ADMIN — PIPERACILLIN AND TAZOBACTAM 4.5 G: 4; .5 INJECTION, POWDER, FOR SOLUTION INTRAVENOUS at 03:01

## 2025-07-11 RX ADMIN — PIPERACILLIN AND TAZOBACTAM 4.5 G: 4; .5 INJECTION, POWDER, FOR SOLUTION INTRAVENOUS at 18:54

## 2025-07-11 NOTE — PLAN OF CARE
Problem: Adult Inpatient Plan of Care  Goal: Absence of Hospital-Acquired Illness or Injury  Intervention: Identify and Manage Fall Risk  Recent Flowsheet Documentation  Taken 7/11/2025 0200 by Arianna Swift RN  Safety Promotion/Fall Prevention:   activity supervised   gait belt   fall prevention program maintained   lighting adjusted   nonskid shoes/slippers when out of bed   room organization consistent   safety round/check completed   toileting scheduled  Taken 7/11/2025 0000 by Arianna Swift RN  Safety Promotion/Fall Prevention:   toileting scheduled   safety round/check completed   room organization consistent   nonskid shoes/slippers when out of bed   activity supervised  Taken 7/10/2025 2200 by Arianna Swift RN  Safety Promotion/Fall Prevention:   toileting scheduled   safety round/check completed   room organization consistent   nonskid shoes/slippers when out of bed   fall prevention program maintained  Taken 7/10/2025 2015 by Arianna Swift RN  Safety Promotion/Fall Prevention:   toileting scheduled   safety round/check completed   room organization consistent  Intervention: Prevent Skin Injury  Recent Flowsheet Documentation  Taken 7/11/2025 0200 by Arianna Swift RN  Body Position: supine  Taken 7/11/2025 0000 by Arianna Swift RN  Body Position: supine  Skin Protection:   transparent dressing maintained   silicone foam dressing in place   incontinence pads utilized  Taken 7/10/2025 2200 by Arianna Swift RN  Body Position: supine  Taken 7/10/2025 2015 by Arianna Swift RN  Body Position: supine  Skin Protection:   transparent dressing maintained   silicone foam dressing in place   incontinence pads utilized  Intervention: Prevent and Manage VTE (Venous Thromboembolism) Risk  Recent Flowsheet Documentation  Taken 7/10/2025 2015 by Arianna Swift RN  VTE Prevention/Management: (Cellulitis to BLE) SCDs (sequential compression devices) off  Intervention: Prevent Infection  Recent Flowsheet  Documentation  Taken 7/11/2025 0200 by Arianna Swift RN  Infection Prevention:   single patient room provided   rest/sleep promoted   equipment surfaces disinfected  Taken 7/11/2025 0000 by Arianna Swift RN  Infection Prevention:   rest/sleep promoted   single patient room provided  Taken 7/10/2025 2200 by Arianna Swift RN  Infection Prevention:   rest/sleep promoted   single patient room provided  Taken 7/10/2025 2015 by Arianna Swift RN  Infection Prevention:   environmental surveillance performed   rest/sleep promoted   single patient room provided  Goal: Optimal Comfort and Wellbeing  Intervention: Monitor Pain and Promote Comfort  Recent Flowsheet Documentation  Taken 7/11/2025 0055 by Arianna Swift RN  Pain Management Interventions: pain medication given  Taken 7/10/2025 2200 by Arianna Swift RN  Pain Management Interventions:   relaxation techniques promoted   quiet environment facilitated  Taken 7/10/2025 2015 by Arianna Swift RN  Pain Management Interventions:   quiet environment facilitated   position adjusted   pillow support provided  Intervention: Provide Person-Centered Care  Recent Flowsheet Documentation  Taken 7/10/2025 2200 by Arianna Swift RN  Trust Relationship/Rapport:   care explained   choices provided  Taken 7/10/2025 2015 by Arianna Swift RN  Trust Relationship/Rapport:   care explained   choices provided   emotional support provided   Goal Outcome Evaluation: Patient is A&O x4. Patient transferred from  at shift change. Patient arrived to the unit with low blood pressure. ALEXUS Cabrales was notified, one time dose of albumin was ordered. During routine vitals, abnormal rhythm was observed on telemetry. Systolic blood pressure increased to 180s after abnormal rhythm. Bp decreased after abnormal rhythm converted to NSR with 1st AV block. Patient was asymptomatic. ALEXUS Cabrales was notified, EKG ordered. Patient is resting, call light in reach.

## 2025-07-11 NOTE — PROGRESS NOTES
[facility] Medicine Services  PROGRESS NOTE    Patient Name: Cordell Martin  : 1945  MRN: [id number]    Date of Admission: 2025  Primary Care Physician: Ben Holder DO    Subjective   Subjective     CC: B LE redness/burning    HPI:   Reports improved lower extremity erythema and decreased pain. BP low overnight and given albumin.     Objective   Objective     Vital Signs:   Temp:  [97 °F (36.1 °C)-98 °F (36.7 °C)] 98 °F (36.7 °C)  Heart Rate:  [56-73] 56  Resp:  [16-18] 18  BP: ()/(42-58) 105/58  Flow (L/min) (Oxygen Therapy):  [2] 2     Physical Exam:  Constitutional: No acute distress, awake, alert  HENT: NCAT, mucous membranes moist  Respiratory: Clear to auscultation bilaterally, respiratory effort normal   Cardiovascular: RRR  Gastrointestinal: Normoactive bowel sounds, soft, nontender, nondistended  Musculoskeletal: bilateral ankle edema  Psychiatric: Appropriate affect, cooperative  Neurologic: Alert, oriented, symmetric facies, MARIE, +masked facies, +pill-rolling tremor, speech clear  Skin: B/l LE in Unna boots     Results Reviewed:  LAB RESULTS:      Lab 07/10/25  0538 25  1014 25  2305 25   WBC 4.68 6.45  --  6.81   HEMOGLOBIN 10.7* 11.2*  --  12.7*   HEMATOCRIT 33.9* 36.4*  --  42.7   PLATELETS 174 183  --  190   NEUTROS ABS  --  4.87  --  4.17   IMMATURE GRANS (ABS)  --  0.02  --  0.02   LYMPHS ABS  --  1.04  --  1.83   MONOS ABS  --  0.37  --  0.55   EOS ABS  --  0.14  --  0.22   MCV 92.4 92.6  --  95.5   SED RATE  --   --  82*  --    CRP  --   --   --  4.69*   PROCALCITONIN  --   --   --  0.08   LACTATE  --   --   --  1.2   PROTIME  --   --   --  14.4         Lab 07/10/25  0538 25  1014 25  2137   SODIUM 139 139 138   POTASSIUM 4.1 4.3 4.0   CHLORIDE 102 102 101   CO2 28.0 27.9 28.0   ANION GAP 9.0 9.1 9.0   BUN 26.7* 20.8 22.4   CREATININE 1.50* 1.27 1.22   EGFR 47.4* 57.8* 60.7   GLUCOSE 114* 137* 92  "  CALCIUM 8.5* 8.7 9.2   MAGNESIUM 2.1  --   --          Lab 07/10/25  0538 07/09/25  1014 07/08/25  2137   TOTAL PROTEIN 6.3 6.8 8.0   ALBUMIN 3.0* 3.0* 3.5   GLOBULIN 3.3 3.8 4.5   ALT (SGPT) <5 <5 <5   AST (SGOT) 13 15 13   BILIRUBIN 0.3 0.4 0.5   ALK PHOS 125* 126* 156*         Lab 07/08/25  2137   PROBNP 44.3   PROTIME 14.4   INR 1.05                 Brief Urine Lab Results  (Last result in the past 365 days)        Color   Clarity   Blood   Leuk Est   Nitrite   Protein   CREAT   Urine HCG        10/08/24 2219 Yellow   Clear   Negative   Negative   Negative   Negative                   Microbiology Results Abnormal       None            Duplex Venous Lower Extremity - Bilateral CAR  Result Date: 7/9/2025    The bilateral lower extremity venous duplex scan is negative for evidence of a DVT and SVT in the areas visualized.  The right great saphenous vein (above-knee) was not well-visualized.       Results for orders placed during the hospital encounter of 01/12/21    Adult Transthoracic Echo Complete W/ Cont if Necessary Per Protocol 01/13/2021  4:06 PM    Interpretation Summary  · The right ventricle and right atrium are moderately dilated. Other chamber sizes and wall thicknesses are normal.  · Global and segmental LV wall motion is normal. The estimated left ventricular ejection fraction is 51% - 55%.  · The aortic and mitral valves are normal in structure and function.  · The estimated RV systolic pressure is mildly elevated 35 mmHg - 40 mmHg. There is as well noted to be less than 50% inspiratory IVC collapse. The main pulmonary artery is \"borderline dilated\".  · There are no other important findings on this study.      Current medications:  Scheduled Meds:[Held by provider] atorvastatin, 40 mg, Oral, Daily  carbidopa-levodopa, 2 tablet, Oral, Q6H  carbidopa-levodopa ER, 1 tablet, Oral, BID  DAPTOmycin, 6 mg/kg (Adjusted), Intravenous, Q24H  enoxaparin sodium, 40 mg, Subcutaneous, Daily  furosemide, 40 mg, " Oral, Daily  gabapentin, 100 mg, Oral, Q12H  lactobacillus acidophilus, 1 capsule, Oral, Daily  levothyroxine, 88 mcg, Oral, Q AM  PARoxetine, 10 mg, Oral, Daily  piperacillin-tazobactam, 4.5 g, Intravenous, Q8H  pramipexole, 1 mg, Oral, TID  sodium chloride, 10 mL, Intravenous, Q12H  tamsulosin, 0.4 mg, Oral, Daily      Continuous Infusions:     PRN Meds:.  senna-docusate sodium **AND** polyethylene glycol **AND** bisacodyl **AND** bisacodyl    Calcium Replacement - Follow Nurse / BPA Driven Protocol    HYDROcodone-acetaminophen    Magnesium Standard Dose Replacement - Follow Nurse / BPA Driven Protocol    Phosphorus Replacement - Follow Nurse / BPA Driven Protocol    Potassium Replacement - Follow Nurse / BPA Driven Protocol    sodium chloride    Assessment & Plan   Assessment & Plan     Active Hospital Problems    Diagnosis  POA    Restless leg syndrome [G25.81]  Unknown    Cellulitis [L03.90]  Yes    History of DVT (deep vein thrombosis) [Z86.718]  Not Applicable    Hypothyroidism (acquired) [E03.9]  Yes    Cellulitis of right lower extremity [L03.115]  Yes    Hyperlipidemia [E78.5]  Yes      Resolved Hospital Problems   No resolved problems to display.        Brief Hospital Course to date:  Cordell Martin is a 80 y.o. male with history of anxiety, chronic back pain, cognitive impairment, depression, hypothyroidism, HLD, PD, obesity, admitted with B LE redness/drainage. Venous duplex negative for VTE in portions visualized.    This patient's problems and plans were partially entered by my partner and updated as appropriate by me 07/11/25.    B LE cellulitis  B LE vascular ulcers  B LE pain/burning  -duplex negative for VTE in portions visualized   -IV abx adjusted given worsening renal function  -mobilize  -gabapentin for pain  -wound care; PT wound consulted, now w unna boots    Elevated Cr  -Cr jump from 1.27-->1.5  -Had been on vanc/zosyn; stopped vanc; changed to dapto  -Still awaiting AM labs, requested  RN to draw or have lab come and draw now    Hypothyroidism  -synthroid    HLD  -statin on hold while on daptomycin    PD  RLS  Weakness  -on carbidopa/levodopa  -mirapex  -PT/OT    Called by RN re: possible torsades de pointes on 7/10; reviewed tele and is consistent with artifact, QTS complexes seen in multiple leads and lead I doesn't show torsades     Expected Discharge Location and Transportation: Facility  Expected Discharge TBD  Expected discharge date/ time has not been documented.     VTE Prophylaxis:  Pharmacologic VTE prophylaxis orders are present.         AM-PAC 6 Clicks Score (PT): 10 (07/10/25 2015)    CODE STATUS:   Code Status and Medical Interventions: CPR (Attempt to Resuscitate); Full Support   Ordered at: 07/09/25 0010     Code Status (Patient has no pulse and is not breathing):    CPR (Attempt to Resuscitate)     Medical Interventions (Patient has pulse or is breathing):    Full Support     Level Of Support Discussed With:    Patient       Rita Villarreal MD  07/11/25

## 2025-07-11 NOTE — CASE MANAGEMENT/SOCIAL WORK
Continued Stay Note  Psychiatric     Patient Name: Cordell Martin  MRN: [id number]  Today's Date: 7/11/2025    Admit Date: 7/8/2025    Plan: [facility]   Discharge Plan       Row Name 07/11/25 1241       Plan    Plan [facility]    Plan Comments Spoke with Sonya at [facility] [place] [facility] [place] [facility] can offer a skilled rehab bed on Monday if medically ready.  Case Management will continue to follow and assist with discharge plan.    Final Discharge Disposition Code 03 - skilled nursing facility (SNF)                   Discharge Codes    No documentation.                 Expected Discharge Date and Time       Expected Discharge Date Expected Discharge Time    Jul 14, 2025               Livia Cabrales RN

## 2025-07-11 NOTE — PLAN OF CARE
Goal Outcome Evaluation:  Plan of Care Reviewed With: patient                Anticipated Discharge Disposition (SLP): skilled nursing facility    SLP Diagnosis: moderate, dysarthria (07/11/25 0900)     SLP Swallowing Diagnosis: swallow WFL/no suspected pharyngeal impairment (07/11/25 0900)

## 2025-07-11 NOTE — THERAPY RE-EVALUATION
Acute Care - Wound/Debridement Initial Evaluation  [facility]     Patient Name: Cordell Martin  : 1945  MRN: [id number]  Today's Date: 2025                Admit Date: 2025    Visit Dx:    ICD-10-CM ICD-9-CM   1. Bilateral lower leg cellulitis  L03.116 682.6    L03.115    2. Dependent edema  R60.9 782.3   3. Venous stasis ulcer of calf with fat layer exposed without varicose veins, unspecified laterality  I87.2 459.81    L97.202 707.12   4. Parkinson's disease, unspecified whether dyskinesia present, unspecified whether manifestations fluctuate  G20.A1 332.0   5. Hyperlipidemia, unspecified hyperlipidemia type  E78.5 272.4   6. Anemia, chronic disease  D63.8 285.29   7. Dysphagia, unspecified type  R13.10 787.20   8. Dysarthria  R47.1 784.51             Patient Active Problem List   Diagnosis    Anxiety    Arthritis    Depression    Hyperlipidemia    Lumbar stenosis with neurogenic claudication    Parkinson's disease    Lumbar stenosis with neurogenic claudication status post L4-5 decompression    Status post lumbar laminectomy    Memory loss    Cellulitis of right lower extremity    Hypothyroidism (acquired)    Pulmonary nodule (4mm RML incidental)    GERD    Remote smoker (Stopped )    Restrictive pattern on PFTs w/o evidence of ILD    History of DVT (deep vein thrombosis)    Anemia, chronic disease    Restless leg syndrome    Cellulitis        Past Medical History:   Diagnosis Date    Anxiety     Arthritis     Back pain     Bronchitis     Cognitive impairment     Depression     Disease of thyroid gland     Glucose intolerance (impaired glucose tolerance)     Hyperlipidemia     Kidney stone     Obesity     Parkinson disease     Pneumonia     Prostate disorder     Thyroid disease     Wears eyeglasses         Past Surgical History:   Procedure Laterality Date    COLONOSCOPY      5 years ago    EYE SURGERY      HERNIA REPAIR  10/20/2020    KNEE SURGERY      LUMBAR LAMINECTOMY DISCECTOMY  DECOMPRESSION N/A 07/13/2017    Procedure: LUMBAR LAMINECTOMY L4-5;  Surgeon: Antonio Trent MD;  Location: [facility];  Service:     SHOULDER ROTATOR CUFF REPAIR Right     x2    TONSILLECTOMY      VEIN SURGERY             Wound 07/09/25 0000 Right lower leg Vascular Ulcer (Active)   Wound Image    07/11/25 0945   Dressing Appearance open to air 07/11/25 0945   Confirmed Empty Wound Bed Yes, visual inspection of wound bed 07/11/25 0945   Base moist;pink;red 07/11/25 0945   Periwound intact;dry 07/11/25 0945   Periwound Temperature warm 07/11/25 0945   Periwound Skin Turgor soft 07/11/25 0945   Edges irregular 07/11/25 0945   Wound Length (cm) 20 cm 07/11/25 0945   Wound Width (cm) 10 cm 07/11/25 0945   Wound Depth (cm) 0.1 cm 07/11/25 0945   Wound Surface Area (cm^2) 157.08 cm^2 07/11/25 0945   Wound Volume (cm^3) 10.472 cm^3 07/11/25 0945   Drainage Characteristics/Odor serosanguineous 07/11/25 0945   Drainage Amount small 07/11/25 0945   Care, Wound cleansed with;soap and water;debrided 07/11/25 0945   Dressing Care multi-layer wrap 07/11/25 0945   Periwound Care dry periwound area maintained 07/11/25 0945       Wound 07/09/25 0000 Left lower leg Vascular Ulcer (Active)   Dressing Appearance open to air 07/11/25 0850   Base red;scab 07/11/25 0400   Periwound blistered;redness 07/11/25 0400   Drainage Characteristics/Odor serous;tan 07/11/25 0400   Drainage Amount scant 07/11/25 0400   Dressing Care open to air 07/11/25 0850       Wound 07/09/25 0000 Right medial gluteal Pressure Injury (Active)   Dressing Appearance intact 07/11/25 0850   Closure None 07/11/25 0000   Base unable to visualize 07/11/25 0000   Drainage Amount none 07/11/25 0400   Dressing Care foam 07/11/25 0850      Lymphedema       Row Name 07/11/25 0945             Lymphedema Edema Assessment    Ptting Edema Category By severity  -MF      Pitting Edema Severe  -MF         Skin Changes/Observations    Location/Assessment Lower Extremity  -MF       Lower Extremity Conditions bilateral:;scaly;crust;inflamed;weeping  -MF      Lower Extremity Color/Pigment bilateral:;erythema  -MF         Lymphedema Pulses/Capillary Refill    Lymphedema Pulses/Capillary Refill lower extremity pulses;capillary refill  -      Dorsalis Pedis Pulse right:;left:;+2 normal  -MF      Capillary Refill lower extremity capillary refill  -MF      Lower Extremity Capillary Refill right:;left:;less than 3 seconds  -MF         Lymphedema Measurements    Measurement Type(s) Quick Girth  -MF      Quick Girth Areas Lower extremities  -MF         LLE Quick Girth (cm)    Mid foot 25 cm  -MF      Smallest ankle 24.5 cm  -MF      Largest calf 39 cm  -MF         RLE Quick Girth (cm)    Mid foot 25.5 cm  -MF      Smallest ankle 24 cm  -MF      Largest calf 39.5 cm  -MF      RLE Quick Girth Total 89  -MF         Compression/Skin Care    Compression/Skin Care skin care;bandaging;wrapping location  -MF      Skin Care washed/dried;lotion applied  -MF      Wrapping Location lower extremity  -MF      Wrapping Location LE bilateral:;foot to knee  -MF      Wrapping Comments unna boot applied in clamshell with coban and spandage to secure.  -                User Key  (r) = Recorded By, (t) = Taken By, (c) = Cosigned By      Initials Name Provider Type    MF Trevor Wang, PT Physical Therapist                    WOUND DEBRIDEMENT  Total area of Debridement: ~5cm2  Debridement Site 1  Location- Site 1: RLE  Selective Debridement- Site 1: Wound Surface <20cmsq  Instruments- Site 1: tweezers  Excised Tissue Description- Site 1: minimum, other (comment) (crusted nonviable skin)  Bleeding- Site 1: none               PT Assessment (Last 12 Hours)       PT Evaluation and Treatment       Row Name 07/11/25 1527          Physical Therapy Time and Intention    Subjective Information complains of;weakness;fatigue  -MF     Document Type evaluation;therapy note (daily note);wound care  -     Mode of Treatment  individual therapy;physical therapy  -       Row Name 07/11/25 0945          General Information    Patient Profile Reviewed yes  -     Patient Observations cooperative;alert;agree to therapy  -     Pertinent History of Current Functional Problem BLE edema with erythema and open wounds to RLE.  -     Risks Reviewed patient:;increased discomfort  -     Benefits Reviewed patient:;improve skin integrity  -     Barriers to Rehab previous functional deficit;medically complex;physical barrier  -       Row Name 07/11/25 0945          Pain    Pretreatment Pain Rating 5/10  -     Posttreatment Pain Rating 5/10  -     Pain Location back  -     Pain Side/Orientation generalized  -     Pain Management Interventions positioning techniques utilized  -       Row Name 07/11/25 0945          Cognition    Affect/Mental Status (Cognition) WFL  -       Row Name 07/11/25 0945          Wound 07/09/25 0000 Right lower leg Vascular Ulcer    Wound - Properties Group Placement Date: 07/09/25  -HG Placement Time: 0000  -HG Present on Original Admission: Y  -HG Side: Right  -HG Orientation: lower  - Location: leg  - Primary Wound Type: Vascular Ulc  - Additional Comments: cellulitis, redness up to groin  -    Wound Image Images linked: 2  -MF     Dressing Appearance open to air  -     Confirmed Empty Wound Bed Yes, visual inspection of wound bed  -     Base moist;pink;red  -MF     Periwound intact;dry  -     Periwound Temperature warm  -     Periwound Skin Turgor soft  -     Edges irregular  -MF     Wound Length (cm) 20 cm  approx area of multiple scattered areas to post RLE. 1x1 to area to knee  -     Wound Width (cm) 10 cm  -     Wound Depth (cm) 0.1 cm  -MF     Wound Surface Area (cm^2) 157.08 cm^2  -MF     Wound Volume (cm^3) 10.472 cm^3  -MF     Drainage Characteristics/Odor serosanguineous  -MF     Drainage Amount small  -     Care, Wound cleansed with;soap and water;debrided  -      Dressing Care multi-layer wrap  -MF     Periwound Care dry periwound area maintained  -     Retired Wound - Properties Group Placement Date: 07/09/25 -HG Placement Time: 0000  -HG Present on Original Admission: Y  -HG Side: Right  -HG Orientation: lower  -HG Location: leg  -HG Additional Comments: cellulitis, redness up to groin  -HG    Retired Wound - Properties Group Placement Date: 07/09/25 -HG Placement Time: 0000  -HG Present on Original Admission: Y  -HG Side: Right  -HG Orientation: lower  -HG Location: leg  -HG Additional Comments: cellulitis, redness up to groin  -HG    Retired Wound - Properties Group Date first assessed: 07/09/25 -HG Time first assessed: 0000  -HG Present on Original Admission: Y  -HG Side: Right  -HG Location: leg  -HG Additional Comments: cellulitis, redness up to groin  -HG      Row Name             Wound 07/09/25 0000 Left lower leg Vascular Ulcer    Wound - Properties Group Placement Date: 07/09/25 -HG Placement Time: 0000  -HG Present on Original Admission: Y  -HG Side: Left  -HG Orientation: lower  -HG Location: leg  -HG Primary Wound Type: Vascular Ulc  -HG Additional Comments: cellulitis with redness up to thigh  -HG    Retired Wound - Properties Group Placement Date: 07/09/25 -HG Placement Time: 0000  -HG Present on Original Admission: Y  -HG Side: Left  -HG Orientation: lower  -HG Location: leg  -HG Additional Comments: cellulitis with redness up to thigh  -HG    Retired Wound - Properties Group Placement Date: 07/09/25 -HG Placement Time: 0000  -HG Present on Original Admission: Y  -HG Side: Left  -HG Orientation: lower  -HG Location: leg  -HG Additional Comments: cellulitis with redness up to thigh  -HG    Retired Wound - Properties Group Date first assessed: 07/09/25 -HG Time first assessed: 0000  -HG Present on Original Admission: Y  -HG Side: Left  -HG Location: leg  -HG Additional Comments: cellulitis with redness up to thigh  -HG      Row Name             Wound  07/09/25 0000 Right medial gluteal Pressure Injury    Wound - Properties Group Placement Date: 07/09/25 -HG Placement Time: 0000  -HG Present on Original Admission: Y  -HG Side: Right  -HG Orientation: medial  -HG Location: gluteal  -HG Primary Wound Type: Pressure Inj  -HG    Retired Wound - Properties Group Placement Date: 07/09/25 -HG Placement Time: 0000  -HG Present on Original Admission: Y  -HG Side: Right  -HG Orientation: medial  -HG Location: gluteal  -HG    Retired Wound - Properties Group Placement Date: 07/09/25 -HG Placement Time: 0000  -HG Present on Original Admission: Y  -HG Side: Right  -HG Orientation: medial  -HG Location: gluteal  -HG    Retired Wound - Properties Group Date first assessed: 07/09/25 -HG Time first assessed: 0000  -HG Present on Original Admission: Y  -HG Side: Right  -HG Location: gluteal  -HG      Row Name 07/11/25 0945          Coping    Observed Emotional State calm;cooperative  -     Verbalized Emotional State acceptance  -     Trust Relationship/Rapport care explained  -       Row Name 07/11/25 0945          Plan of Care Review    Plan of Care Reviewed With patient  -     Outcome Evaluation Pt presents with BLE edema with moderate erythema and ulcerations. PT was able to lightly debride several areas to RLE and unna boots applied to BLEs to help increase venous return to reduce LE edema and improve skin integrity and healing potential.  -       Row Name 07/11/25 0945          Positioning and Restraints    Pre-Treatment Position in bed  -     Post Treatment Position bed  -     In Bed supine;call light within reach;heels elevated  -       Row Name 07/11/25 0945          Therapy Assessment/Plan (PT)    Patient/Family Therapy Goals Statement (PT) heal wound  -     PT Diagnosis (PT) RLE ulcerations with BLE edema and erythema  -     Rehab Potential (PT) limited  -     Criteria for Skilled Interventions Met (PT) yes;meets criteria;skilled treatment is  necessary  -MF     Predicted Duration of Therapy Intervention (PT) 10 days  -       Row Name 07/11/25 0945          Therapy Plan Review/Discharge Plan (PT)    Therapy Plan Review (PT) evaluation/treatment results reviewed;care plan/treatment goals reviewed;risks/benefits reviewed;current/potential barriers reviewed;participants voiced agreement with care plan;participants included;patient  -       Row Name 07/11/25 0945          Physical Therapy Goals    Wound Management Goal Selection (PT) wound management, PT goal 2;wound management, PT goal 1  -       Row Name 07/11/25 0945          Wound Management Goal 1 (PT)    Wound Management Goal (Wound Goal 1, PT) Decrease RLE erythema to none to improve skin integrity  -MF     Time Frame (Wound Goal 1, PT) 1 week  -       Row Name 07/11/25 0945          Wound Management Goal 2 (PT)    Wound Management Goal (Wound Goal 2, PT) Decrease BLE edema to none to improve skin integrity and healing potential.  -     Time Frame (Wound Goal 2, PT) 2 weeks  -               User Key  (r) = Recorded By, (t) = Taken By, (c) = Cosigned By      Initials Name Provider Type    Trevor Haddad, PT Physical Therapist     Marly Lindsey, RN Registered Nurse                  Physical Therapy Education       Title: PT OT SLP Therapies (Done)       Topic: Physical Therapy (Done)       Point: Mobility training (Done)       Learning Progress Summary            Patient Acceptance, E, VU by KW at 7/10/2025 1304    Acceptance, E,D, VU,NR by LR at 7/10/2025 1010    Comment: Educated on benefits of mobility, safety with mobility, correct supine<->sit t/f technique, correct sit<->stand t/f technique, and progression of POC.                      Point: Home exercise program (Done)       Learning Progress Summary            Patient Acceptance, E, VU by KW at 7/10/2025 1304    Acceptance, E,D, VU,NR by LR at 7/10/2025 1010    Comment: Educated on benefits of mobility, safety with  mobility, correct supine<->sit t/f technique, correct sit<->stand t/f technique, and progression of POC.                      Point: Body mechanics (Done)       Learning Progress Summary            Patient Acceptance, E, VU by KW at 7/10/2025 1304    Acceptance, E,D, VU,NR by LR at 7/10/2025 1010    Comment: Educated on benefits of mobility, safety with mobility, correct supine<->sit t/f technique, correct sit<->stand t/f technique, and progression of POC.                      Point: Precautions (Done)       Learning Progress Summary            Patient Acceptance, E, VU by KW at 7/10/2025 1304    Acceptance, E,D, VU,NR by LR at 7/10/2025 1010    Comment: Educated on benefits of mobility, safety with mobility, correct supine<->sit t/f technique, correct sit<->stand t/f technique, and progression of POC.                                      User Key       Initials Effective Dates Name Provider Type Discipline     02/03/23 -  Callie Hylton, PT Physical Therapist PT     04/23/25 -  Kristine Persaud OT Student OT Student OT                    Recommendation and Plan  Anticipated Equipment Needs at Discharge (PT): other (see comments) (TBD)  Anticipated Discharge Disposition (PT): skilled nursing facility  Planned Therapy Interventions (PT): wound care, patient/family education  Therapy Frequency (PT): daily  Plan of Care Reviewed With: patient           Outcome Evaluation: Pt presents with BLE edema with moderate erythema and ulcerations. PT was able to lightly debride several areas to RLE and unna boots applied to BLEs to help increase venous return to reduce LE edema and improve skin integrity and healing potential.  Plan of Care Reviewed With: patient            Time Calculation   PT Charges       Row Name 07/11/25 0945             Time Calculation    Start Time 0945  -MF      PT Goal Re-Cert Due Date 07/20/25  -         Untimed Charges    PT Eval/Re-eval Minutes 25  -MF      Wound Care 95202  Selective debridement;38016 Unna boot  -MF      29580-Unna Boot 20  -MF      01339-Typumlsmp debridement 20  -MF         Total Minutes    Untimed Charges Total Minutes 65  -MF       Total Minutes 65  -MF                User Key  (r) = Recorded By, (t) = Taken By, (c) = Cosigned By      Initials Name Provider Type    Trevor Haddad, PT Physical Therapist                            PT G-Codes  Outcome Measure Options: AM-PAC 6 Clicks Daily Activity (OT)  AM-PAC 6 Clicks Score (PT): 10  AM-PAC 6 Clicks Score (OT): 13       Trevor Wang, PT  7/11/2025

## 2025-07-11 NOTE — PLAN OF CARE
Goal Outcome Evaluation:  Plan of Care Reviewed With: patient           Outcome Evaluation: Pt presents with BLE edema with moderate erythema and ulcerations. PT was able to lightly debride several areas to RLE and unna boots applied to BLEs to help increase venous return to reduce LE edema and improve skin integrity and healing potential.

## 2025-07-11 NOTE — THERAPY EVALUATION
Acute Care - Speech Language Pathology   Swallow Re-Evaluation [facility]  Re-Clinical Swallow Evaluation      Patient Name: Cordell Martin  : 1945  MRN: [id number]  Today's Date: 2025               Admit Date: 2025    Visit Dx:     ICD-10-CM ICD-9-CM   1. Bilateral lower leg cellulitis  L03.116 682.6    L03.115    2. Dependent edema  R60.9 782.3   3. Venous stasis ulcer of calf with fat layer exposed without varicose veins, unspecified laterality  I87.2 459.81    L97.202 707.12   4. Parkinson's disease, unspecified whether dyskinesia present, unspecified whether manifestations fluctuate  G20.A1 332.0   5. Hyperlipidemia, unspecified hyperlipidemia type  E78.5 272.4   6. Anemia, chronic disease  D63.8 285.29   7. Dysphagia, unspecified type  R13.10 787.20   8. Dysarthria  R47.1 784.51     Patient Active Problem List   Diagnosis    Anxiety    Arthritis    Depression    Hyperlipidemia    Lumbar stenosis with neurogenic claudication    Parkinson's disease    Lumbar stenosis with neurogenic claudication status post L4-5 decompression    Status post lumbar laminectomy    Memory loss    Cellulitis of right lower extremity    Hypothyroidism (acquired)    Pulmonary nodule (4mm RML incidental)    GERD    Remote smoker (Stopped )    Restrictive pattern on PFTs w/o evidence of ILD    History of DVT (deep vein thrombosis)    Anemia, chronic disease    Restless leg syndrome    Cellulitis     Past Medical History:   Diagnosis Date    Anxiety     Arthritis     Back pain     Bronchitis     Cognitive impairment     Depression     Disease of thyroid gland     Glucose intolerance (impaired glucose tolerance)     Hyperlipidemia     Kidney stone     Obesity     Parkinson disease     Pneumonia     Prostate disorder     Thyroid disease     Wears eyeglasses      Past Surgical History:   Procedure Laterality Date    COLONOSCOPY      5 years ago    EYE SURGERY      HERNIA REPAIR  10/20/2020    KNEE SURGERY       LUMBAR LAMINECTOMY DISCECTOMY DECOMPRESSION N/A 07/13/2017    Procedure: LUMBAR LAMINECTOMY L4-5;  Surgeon: Antonio Trent MD;  Location: [facility];  Service:     SHOULDER ROTATOR CUFF REPAIR Right     x2    TONSILLECTOMY      VEIN SURGERY         SLP Recommendation and Plan  SLP Swallowing Diagnosis: swallow WFL/no suspected pharyngeal impairment (07/11/25 0900)  SLP Diet Recommendation: regular textures, thin liquids (07/11/25 0900)  Recommended Precautions and Strategies: general aspiration precautions, reflux precautions (07/11/25 0900)  SLP Rec. for Method of Medication Administration: as tolerated (07/11/25 0900)     Monitor for Signs of Aspiration: yes, notify SLP if any concerns (07/11/25 0900)     Swallow Criteria for Skilled Therapeutic Interventions Met: no problems identified which require skilled intervention (07/11/25 0900)  Anticipated Discharge Disposition (SLP): skilled nursing facility (07/11/25 0900)     Therapy Frequency (Swallow): evaluation only (07/11/25 0900)  Predicted Duration Therapy Intervention (Days): 1 week (07/11/25 0900)  Oral Care Recommendations: Oral Care BID/PRN, Toothbrush (07/11/25 0900)                                               SWALLOW EVALUATION (Last 72 Hours)       SLP Adult Swallow Evaluation       Row Name 07/11/25 0900 07/10/25 0900                Rehab Evaluation    Document Type re-evaluation  -SM evaluation  -SMA       Subjective Information no complaints  -SM no complaints  -SMA       Patient Observations alert;cooperative  -SM alert;cooperative  -SMA       Patient/Family/Caregiver Comments/Observations RN & tech present in room  -SM --       Patient Effort good  -SM good  -SMA       Symptoms Noted During/After Treatment none  -SM --          General Information    Patient Profile Reviewed yes  -SM yes  -SMA       Pertinent History Of Current Problem See previous eval  -SM Adm BLE cellulitis. PD. Consulted after pt complained of it being hard to swallow. Hx MBS  in past. Most recent 10/2024 revealed functional swallow. Hx GERD, esophageal dilation, chronic cough.  -[facility]       Current Method of Nutrition regular textures;thin liquids  - regular textures;thin liquids  -[facility]       Precautions/Limitations, Vision WFL;for purposes of eval  -SM --       Precautions/Limitations, Hearing WFL;for purposes of eval  -SM --       Prior Level of Function-Communication WFL;motor speech impairment;other (see comments)  hx of PD  - --       Prior Level of Function-Swallowing safe, efficient swallowing in all situations;esophageal concerns  - safe, efficient swallowing in all situations;esophageal concerns  -[facility]       Plans/Goals Discussed with patient;agreed upon  - patient;agreed upon  -[facility]       Barriers to Rehab none identified  - none identified  -[facility]       Patient's Goals for Discharge patient did not state  - --  better positioning in bed for PO intake  -[facility]          Pain    Pretreatment Pain Rating 5/10  - 0/10 - no pain  -[facility]       Posttreatment Pain Rating 5/10  - 0/10 - no pain  -[facility]       Pain Location back  - --       Pain Side/Orientation generalized  - --       Pain Management Interventions nursing notified  - --          Oral Musculature and Cranial Nerve Assessment    Oral Motor General Assessment -- generalized oral motor weakness  -[facility]          General Eating/Swallowing Observations    Respiratory Support Currently in Use nasal cannula  - --          Clinical Swallow Eval    Oral Prep Phase WFL  -SM WFL  -SMA       Oral Transit WFL  -SM WFL  -SMA       Oral Residue WFL  -SM WFL  -SMA       Pharyngeal Phase no overt signs/symptoms of pharyngeal impairment  - no overt signs/symptoms of pharyngeal impairment  -[facility]       Esophageal Phase -- suspected esophageal impairment  -[facility]       Clinical Swallow Evaluation Summary Pt seen following breakfast. Great intake, no concerns from pt. Seen with PO trials of thin and regular solids, no s/sxs at bedside. Pt  would like to target volume of voice in therapy 2' PD  -SM Showing no signs acute change/decline c/t MBS performed last year. Pt denies increased difficulty. Attributes current difficulties r/t specialty bed that is causing him to be hunched over, causing pressure on his stomach and increasing self-feeding difficulties. Spoke with RN, who is already working to get bed adjusted, per pt's preference. Pt did not feel repeat MBS necessary, in agreement as showing no signs aspiration. Pt does report chronic cough. Per observation, suspect reduced swallow reflex frequency r/t PD. Saliva bulid-up likely pooling towards airway before pt cough to clear. Will f/u to ensure no further needs or tx for increasing swallowing of saliva and ? voice. Did not attempt discussion today as pt with increasing lethargy this assessment 2' pain. meds.  -[facility]          SLP Evaluation Clinical Impression    SLP Swallowing Diagnosis swallow WFL/no suspected pharyngeal impairment  - R/O pharyngeal dysphagia;suspected esophageal dysphagia  -[facility]       Functional Impact no impact on function  - risk of aspiration/pneumonia  -[facility]       Swallow Criteria for Skilled Therapeutic Interventions Met no problems identified which require skilled intervention  - --          Recommendations    Therapy Frequency (Swallow) evaluation only  - --       SLP Diet Recommendation regular textures;thin liquids  - regular textures;thin liquids  -[facility]       Recommended Diagnostics -- reassess via clinical swallow evaluation  -[facility]       Recommended Precautions and Strategies general aspiration precautions;reflux precautions  - general aspiration precautions;reflux precautions  -[facility]       Oral Care Recommendations Oral Care BID/PRN;Toothbrush  - Oral Care BID/PRN;Toothbrush  -[facility]       SLP Rec. for Method of Medication Administration as tolerated  - as tolerated  -[facility]       Monitor for Signs of Aspiration yes;notify SLP if any concerns  - yes;notify SLP if  any concerns  -SMA       Anticipated Discharge Disposition (SLP) skilled nursing facility  - skilled nursing facility  -[facility]                 User Key  (r) = Recorded By, (t) = Taken By, (c) = Cosigned By      Initials Name Effective Dates    Barbara Nunez, MS CCC-SLP 01/20/25 -     SM Nahed Mauricio MS CF-SLP 06/05/25 -                     EDUCATION  The patient has been educated in the following areas:   Communication Impairment Dysphagia (Swallowing Impairment).        SLP GOALS       Row Name 07/11/25 0900             Patient will demonstrate functional communication skills for return to discharge environment     [place] with minimal cues  -SM      Time frame 1 week  -SM      Progress/Outcomes new goal  -SM         Phonation Goal 1 (SLP)    Improve Phonation By Goal 1 (SLP) using loud speech;80%;with minimal cues (75-90%)  -SM      Time Frame (Phonation Goal 1, SLP) 1 week  -SM      Progress/Outcomes (Phonation Goal 1, SLP) new goal  -SM      Comment (Phonation Goal 1, SLP) Pt with hx of PD, reported his loudness has decreased and would like to target it in therapy  -                User Key  (r) = Recorded By, (t) = Taken By, (c) = Cosigned By      Initials Name Provider Type    Nahed Claros MS CF-SLP Speech and Language Pathologist                         Time Calculation:    Time Calculation- SLP       Row Name 07/11/25 1014             Time Calculation- SLP    SLP Start Time 0900  -      SLP Received On 07/11/25  -         Untimed Charges    45390-CV Eval Speech and Production w/ Language Minutes 25  -SM      44440-UC Eval Oral Pharyng Swallow Minutes 40  -SM         Total Minutes    Untimed Charges Total Minutes 65  -SM       Total Minutes 65  -SM                User Key  (r) = Recorded By, (t) = Taken By, (c) = Cosigned By      Initials Name Provider Type    Nahed Claros MS CF-SLP Speech and Language Pathologist                    Therapy Charges for  Today       Code Description Service Date Service Provider Modifiers Qty    55380903919 HC ST EVAL ORAL PHARYNG SWALLOW 3 2025 Nahed Mauricio, MS CF-SLP GN 1    [id number] HC ST EVAL SPEECH AND PROD W LANG  2 2025 Nahed Mauricio, MS CF-SLP GN 1                 Nahed Mauricio, MS CF-SLP  2025   and Acute Care - Speech Language Pathology Initial Evaluation  [facility]  Cognitive-Communication Evaluation      Patient Name: Cordell Martin  : 1945  MRN: [id number]  Today's Date: 2025               Admit Date: 2025     Visit Dx:    ICD-10-CM ICD-9-CM   1. Bilateral lower leg cellulitis  L03.116 682.6    L03.115    2. Dependent edema  R60.9 782.3   3. Venous stasis ulcer of calf with fat layer exposed without varicose veins, unspecified laterality  I87.2 459.81    L97.202 707.12   4. Parkinson's disease, unspecified whether dyskinesia present, unspecified whether manifestations fluctuate  G20.A1 332.0   5. Hyperlipidemia, unspecified hyperlipidemia type  E78.5 272.4   6. Anemia, chronic disease  D63.8 285.29   7. Dysphagia, unspecified type  R13.10 787.20   8. Dysarthria  R47.1 784.51     Patient Active Problem List   Diagnosis    Anxiety    Arthritis    Depression    Hyperlipidemia    Lumbar stenosis with neurogenic claudication    Parkinson's disease    Lumbar stenosis with neurogenic claudication status post L4-5 decompression    Status post lumbar laminectomy    Memory loss    Cellulitis of right lower extremity    Hypothyroidism (acquired)    Pulmonary nodule (4mm RML incidental)    GERD    Remote smoker (Stopped )    Restrictive pattern on PFTs w/o evidence of ILD    History of DVT (deep vein thrombosis)    Anemia, chronic disease    Restless leg syndrome    Cellulitis     Past Medical History:   Diagnosis Date    Anxiety     Arthritis     Back pain     Bronchitis     Cognitive impairment     Depression     Disease of thyroid gland     Glucose intolerance  (impaired glucose tolerance)     Hyperlipidemia     Kidney stone     Obesity     Parkinson disease     Pneumonia     Prostate disorder     Thyroid disease     Wears eyeglasses      Past Surgical History:   Procedure Laterality Date    COLONOSCOPY      5 years ago    EYE SURGERY      HERNIA REPAIR  10/20/2020    KNEE SURGERY      LUMBAR LAMINECTOMY DISCECTOMY DECOMPRESSION N/A 07/13/2017    Procedure: LUMBAR LAMINECTOMY L4-5;  Surgeon: Antonio Trent MD;  Location: [facility];  Service:     SHOULDER ROTATOR CUFF REPAIR Right     x2    TONSILLECTOMY      VEIN SURGERY         SLP Recommendation and Plan  SLP Diagnosis: moderate, dysarthria (07/11/25 0900)        Monitor for Signs of Aspiration: yes, notify SLP if any concerns (07/11/25 0900)  Swallow Criteria for Skilled Therapeutic Interventions Met: no problems identified which require skilled intervention (07/11/25 0900)  SLC Criteria for Skilled Therapy Interventions Met: yes (07/11/25 0900)  Anticipated Discharge Disposition (SLP): skilled nursing facility (07/11/25 0900)     Therapy Frequency (Swallow): evaluation only (07/11/25 0900)  Therapy Frequency (SLP SLC): 3 days per week (07/11/25 0900)  Predicted Duration Therapy Intervention (Days): 1 week (07/11/25 0900)  Oral Care Recommendations: Oral Care BID/PRN, Toothbrush (07/11/25 0900)                                 SLP EVALUATION (Last 72 Hours)       SLP SLC Evaluation       Row Name 07/11/25 0900                   Comprehension Assessment/Intervention    Comprehension Assessment/Intervention Auditory Comprehension  -SM           Auditory Comprehension Assessment/Intervention    Auditory Comprehension (Communication) WFL  -SM        Answers Questions (Communication) yes/no;wh questions;personal;WFL  -SM        Able to Follow Commands (Communication) 1-step;WFL  -SM        Narrative Discourse conversational level;WFL  -SM           Expression Assessment/Intervention    Expression Assessment/Intervention  verbal expression  -           Verbal Expression Assessment/Intervention    Verbal Expression WFL  -SM        Automatic Speech (Communication) response to greeting;WFL  -SM        Conversational Discourse/Fluency WFL  -SM           Motor Speech Assessment/Intervention    Motor Speech Function moderate impairment  -        Characteristics Consistent with Dysarthria decreased intensity;slow rate  -        Conversational Speech (Communication) moderate impairment  -        Speech intelligibility 90%;with unfamiliar listener  -        Motor Speech, Comment Pt with hx of PD, presenting with decreased intensity and speed of speech in conversation. When asked if he would like to work on these areas in therapy, pt responded yes and attests that his loudness has decreased recently.  -           SLP Evaluation Clinical Impressions    SLP Diagnosis moderate;dysarthria  -        Rehab Potential/Prognosis good  -        SLC Criteria for Skilled Therapy Interventions Met yes  -           Recommendations    Therapy Frequency (SLP SLC) 3 days per week  -        Predicted Duration Therapy Intervention (Days) 1 week  -                  User Key  (r) = Recorded By, (t) = Taken By, (c) = Cosigned By      Initials Name Effective Dates    Nahed Claros MS CF-SLP 06/05/25 -                        EDUCATION  The patient has been educated in the following areas:     Cognitive Impairment Dysphagia (Swallowing Impairment).           SLP GOALS       Row Name 07/11/25 0900             Patient will demonstrate functional communication skills for return to discharge environment     [place] with minimal cues  -      Time frame 1 week  -SM      Progress/Outcomes new goal  -         Phonation Goal 1 (SLP)    Improve Phonation By Goal 1 (SLP) using loud speech;80%;with minimal cues (75-90%)  -      Time Frame (Phonation Goal 1, SLP) 1 week  -SM      Progress/Outcomes (Phonation Goal 1, SLP) new goal  -SM       Comment (Phonation Goal 1, SLP) Pt with hx of PD, reported his loudness has decreased and would like to target it in therapy  -                User Key  (r) = Recorded By, (t) = Taken By, (c) = Cosigned By      Initials Name Provider Type    Nahed Claros MS CF-SLP Speech and Language Pathologist                              Time Calculation:      Time Calculation- SLP       Row Name 07/11/25 1014             Time Calculation- SLP    SLP Start Time 0900  -      SLP Received On 07/11/25  -         Untimed Charges    78464-RF Eval Speech and Production w/ Language Minutes 25  -SM      28270-WH Eval Oral Pharyng Swallow Minutes 40  -SM         Total Minutes    Untimed Charges Total Minutes 65  -SM       Total Minutes 65  -SM                User Key  (r) = Recorded By, (t) = Taken By, (c) = Cosigned By      Initials Name Provider Type    Nahed Claros MS CF-SLP Speech and Language Pathologist                    Therapy Charges for Today       Code Description Service Date Service Provider Modifiers Qty    69415223114 HC ST EVAL ORAL PHARYNG SWALLOW 3 7/11/2025 Nahed Mauricio MS CF-SLP GN 1    [id number] HC ST EVAL SPEECH AND PROD W LANG  2 7/11/2025 Nahed Mauricio MS CF-SLP GN 1                       Nahed Mauricio MS CF-SLP  7/11/2025

## 2025-07-12 LAB
ANION GAP SERPL CALCULATED.3IONS-SCNC: 10 MMOL/L (ref 5–15)
BUN SERPL-MCNC: 30.9 MG/DL (ref 8–23)
BUN/CREAT SERPL: 21.5 (ref 7–25)
CALCIUM SPEC-SCNC: 9.1 MG/DL (ref 8.6–10.5)
CHLORIDE SERPL-SCNC: 101 MMOL/L (ref 98–107)
CK SERPL-CCNC: 62 U/L (ref 20–200)
CO2 SERPL-SCNC: 26 MMOL/L (ref 22–29)
CREAT SERPL-MCNC: 1.44 MG/DL (ref 0.76–1.27)
EGFRCR SERPLBLD CKD-EPI 2021: 49.7 ML/MIN/1.73
GLUCOSE SERPL-MCNC: 122 MG/DL (ref 65–99)
POTASSIUM SERPL-SCNC: 4.4 MMOL/L (ref 3.5–5.2)
SODIUM SERPL-SCNC: 137 MMOL/L (ref 136–145)

## 2025-07-12 PROCEDURE — 82550 ASSAY OF CK (CPK): CPT | Performed by: STUDENT IN AN ORGANIZED HEALTH CARE EDUCATION/TRAINING PROGRAM

## 2025-07-12 PROCEDURE — 25010000002 DAPTOMYCIN PER 1 MG: Performed by: STUDENT IN AN ORGANIZED HEALTH CARE EDUCATION/TRAINING PROGRAM

## 2025-07-12 PROCEDURE — 25010000002 ENOXAPARIN PER 10 MG

## 2025-07-12 PROCEDURE — 99232 SBSQ HOSP IP/OBS MODERATE 35: CPT | Performed by: STUDENT IN AN ORGANIZED HEALTH CARE EDUCATION/TRAINING PROGRAM

## 2025-07-12 PROCEDURE — 80048 BASIC METABOLIC PNL TOTAL CA: CPT | Performed by: STUDENT IN AN ORGANIZED HEALTH CARE EDUCATION/TRAINING PROGRAM

## 2025-07-12 PROCEDURE — 25010000002 PIPERACILLIN SOD-TAZOBACTAM PER 1 G: Performed by: HOSPITALIST

## 2025-07-12 RX ORDER — BISACODYL 10 MG
10 SUPPOSITORY, RECTAL RECTAL DAILY PRN
Status: DISCONTINUED | OUTPATIENT
Start: 2025-07-12 | End: 2025-07-15 | Stop reason: HOSPADM

## 2025-07-12 RX ORDER — POLYETHYLENE GLYCOL 3350 17 G/17G
17 POWDER, FOR SOLUTION ORAL DAILY
Status: DISCONTINUED | OUTPATIENT
Start: 2025-07-12 | End: 2025-07-15 | Stop reason: HOSPADM

## 2025-07-12 RX ORDER — AMOXICILLIN 250 MG
2 CAPSULE ORAL 2 TIMES DAILY
Status: DISCONTINUED | OUTPATIENT
Start: 2025-07-12 | End: 2025-07-15 | Stop reason: HOSPADM

## 2025-07-12 RX ORDER — BISACODYL 5 MG/1
5 TABLET, DELAYED RELEASE ORAL DAILY
Status: DISCONTINUED | OUTPATIENT
Start: 2025-07-12 | End: 2025-07-15 | Stop reason: HOSPADM

## 2025-07-12 RX ADMIN — POLYETHYLENE GLYCOL 3350 17 G: 17 POWDER, FOR SOLUTION ORAL at 09:37

## 2025-07-12 RX ADMIN — GABAPENTIN 100 MG: 100 CAPSULE ORAL at 09:37

## 2025-07-12 RX ADMIN — SENNOSIDES, DOCUSATE SODIUM 2 TABLET: 50; 8.6 TABLET, FILM COATED ORAL at 19:44

## 2025-07-12 RX ADMIN — CARBIDOPA AND LEVODOPA 2 TABLET: 25; 100 TABLET ORAL at 11:20

## 2025-07-12 RX ADMIN — PRAMIPEXOLE DIHYDROCHLORIDE 1 MG: 1 TABLET ORAL at 09:37

## 2025-07-12 RX ADMIN — PIPERACILLIN AND TAZOBACTAM 4.5 G: 4; .5 INJECTION, POWDER, FOR SOLUTION INTRAVENOUS at 19:46

## 2025-07-12 RX ADMIN — Medication 10 ML: at 19:46

## 2025-07-12 RX ADMIN — Medication 1 CAPSULE: at 09:37

## 2025-07-12 RX ADMIN — PIPERACILLIN AND TAZOBACTAM 4.5 G: 4; .5 INJECTION, POWDER, FOR SOLUTION INTRAVENOUS at 11:20

## 2025-07-12 RX ADMIN — PRAMIPEXOLE DIHYDROCHLORIDE 1 MG: 1 TABLET ORAL at 19:45

## 2025-07-12 RX ADMIN — SENNOSIDES, DOCUSATE SODIUM 2 TABLET: 50; 8.6 TABLET, FILM COATED ORAL at 09:37

## 2025-07-12 RX ADMIN — CARBIDOPA AND LEVODOPA 2 TABLET: 25; 100 TABLET ORAL at 00:52

## 2025-07-12 RX ADMIN — HYDROCODONE BITARTRATE AND ACETAMINOPHEN 1 TABLET: 5; 325 TABLET ORAL at 04:18

## 2025-07-12 RX ADMIN — GABAPENTIN 100 MG: 100 CAPSULE ORAL at 19:45

## 2025-07-12 RX ADMIN — CARBIDOPA AND LEVODOPA 1 TABLET: 25; 100 TABLET, EXTENDED RELEASE ORAL at 09:37

## 2025-07-12 RX ADMIN — LEVOTHYROXINE SODIUM 88 MCG: 88 TABLET ORAL at 06:02

## 2025-07-12 RX ADMIN — MINERAL OIL 1 ENEMA: 100 ENEMA RECTAL at 17:19

## 2025-07-12 RX ADMIN — PRAMIPEXOLE DIHYDROCHLORIDE 1 MG: 1 TABLET ORAL at 16:07

## 2025-07-12 RX ADMIN — CARBIDOPA AND LEVODOPA 2 TABLET: 25; 100 TABLET ORAL at 17:19

## 2025-07-12 RX ADMIN — PIPERACILLIN AND TAZOBACTAM 4.5 G: 4; .5 INJECTION, POWDER, FOR SOLUTION INTRAVENOUS at 04:13

## 2025-07-12 RX ADMIN — DAPTOMYCIN 550 MG: 500 INJECTION, POWDER, LYOPHILIZED, FOR SOLUTION INTRAVENOUS at 09:37

## 2025-07-12 RX ADMIN — PAROXETINE HYDROCHLORIDE 10 MG: 10 TABLET, FILM COATED ORAL at 09:37

## 2025-07-12 RX ADMIN — TAMSULOSIN HYDROCHLORIDE 0.4 MG: 0.4 CAPSULE ORAL at 09:37

## 2025-07-12 RX ADMIN — CARBIDOPA AND LEVODOPA 1 TABLET: 25; 100 TABLET, EXTENDED RELEASE ORAL at 19:46

## 2025-07-12 RX ADMIN — CARBIDOPA AND LEVODOPA 2 TABLET: 25; 100 TABLET ORAL at 06:02

## 2025-07-12 RX ADMIN — ENOXAPARIN SODIUM 40 MG: 100 INJECTION SUBCUTANEOUS at 09:36

## 2025-07-12 NOTE — PROGRESS NOTES
"    [facility] Medicine Services  PROGRESS NOTE    Patient Name: Cordell Martin  : 1945  MRN: [id number]    Date of Admission: 2025  Primary Care Physician: Ben Holder DO    Subjective   Subjective     CC: B RHETT redness/burning    HPI:   Patient reports feeling miserable because nobody is paying attention to him in the hospital, he provides example of last night where he was upset with the staff because he requested a sherbet and it took a while to come to him.  He states that when he asked why it took so long, that he was told it was \"not of his business\". Patient reports his leg discomfort has improved dramatically. No chest pain or SOA. No N/V. No BM since .     Objective   Objective     Vital Signs:   Temp:  [97.3 °F (36.3 °C)-98.1 °F (36.7 °C)] 97.3 °F (36.3 °C)  Heart Rate:  [59-66] 59  Resp:  [18] 18  BP: (107-126)/(49-73) 112/63     Physical Exam:  Constitutional: No acute distress, awake, alert  HENT: NCAT, mucous membranes moist  Respiratory: Clear to auscultation bilaterally, respiratory effort normal   Cardiovascular: RRR  Gastrointestinal: Normoactive bowel sounds, soft, nontender, nondistended  Musculoskeletal: bilateral ankle edema  Psychiatric: Flat affect, cooperative  Neurologic: Alert, oriented, symmetric facies, MARIE, +masked facies, +pill-rolling tremor, speech clear  Skin: B/l LE in Unna boots    Results Reviewed:  LAB RESULTS:      Lab 07/10/25  0538 25  1014 25  2305 25  2137   WBC 4.68 6.45  --  6.81   HEMOGLOBIN 10.7* 11.2*  --  12.7*   HEMATOCRIT 33.9* 36.4*  --  42.7   PLATELETS 174 183  --  190   NEUTROS ABS  --  4.87  --  4.17   IMMATURE GRANS (ABS)  --  0.02  --  0.02   LYMPHS ABS  --  1.04  --  1.83   MONOS ABS  --  0.37  --  0.55   EOS ABS  --  0.14  --  0.22   MCV 92.4 92.6  --  95.5   SED RATE  --   --  82*  --    CRP  --   --   --  4.69*   PROCALCITONIN  --   --   --  0.08   LACTATE  --   --   --  1.2   PROTIME  " "--   --   --  14.4         Lab 07/11/25  1519 07/10/25  0538 07/09/25  1014 07/08/25  2137   SODIUM 140 139 139 138   POTASSIUM 4.3 4.1 4.3 4.0   CHLORIDE 102 102 102 101   CO2 27.6 28.0 27.9 28.0   ANION GAP 10.4 9.0 9.1 9.0   BUN 29.1* 26.7* 20.8 22.4   CREATININE 1.53* 1.50* 1.27 1.22   EGFR 46.2* 47.4* 57.8* 60.7   GLUCOSE 108* 114* 137* 92   CALCIUM 8.9 8.5* 8.7 9.2   MAGNESIUM 2.2 2.1  --   --          Lab 07/10/25  0538 07/09/25  1014 07/08/25  2137   TOTAL PROTEIN 6.3 6.8 8.0   ALBUMIN 3.0* 3.0* 3.5   GLOBULIN 3.3 3.8 4.5   ALT (SGPT) <5 <5 <5   AST (SGOT) 13 15 13   BILIRUBIN 0.3 0.4 0.5   ALK PHOS 125* 126* 156*         Lab 07/08/25  2137   PROBNP 44.3   PROTIME 14.4   INR 1.05                 Brief Urine Lab Results  (Last result in the past 365 days)        Color   Clarity   Blood   Leuk Est   Nitrite   Protein   CREAT   Urine HCG        10/08/24 2219 Yellow   Clear   Negative   Negative   Negative   Negative                   Microbiology Results Abnormal       None            No radiology results from the last 24 hrs      Results for orders placed during the hospital encounter of 01/12/21    Adult Transthoracic Echo Complete W/ Cont if Necessary Per Protocol 01/13/2021  4:06 PM    Interpretation Summary  · The right ventricle and right atrium are moderately dilated. Other chamber sizes and wall thicknesses are normal.  · Global and segmental LV wall motion is normal. The estimated left ventricular ejection fraction is 51% - 55%.  · The aortic and mitral valves are normal in structure and function.  · The estimated RV systolic pressure is mildly elevated 35 mmHg - 40 mmHg. There is as well noted to be less than 50% inspiratory IVC collapse. The main pulmonary artery is \"borderline dilated\".  · There are no other important findings on this study.      Current medications:  Scheduled Meds:[Held by provider] atorvastatin, 40 mg, Oral, Daily  senna-docusate sodium, 2 tablet, Oral, BID   And  polyethylene " glycol, 17 g, Oral, Daily   And  bisacodyl, 5 mg, Oral, Daily  carbidopa-levodopa, 2 tablet, Oral, Q6H  carbidopa-levodopa ER, 1 tablet, Oral, BID  DAPTOmycin, 6 mg/kg (Adjusted), Intravenous, Q24H  enoxaparin sodium, 40 mg, Subcutaneous, Daily  [Held by provider] furosemide, 40 mg, Oral, Daily  gabapentin, 100 mg, Oral, Q12H  lactobacillus acidophilus, 1 capsule, Oral, Daily  levothyroxine, 88 mcg, Oral, Q AM  mineral oil, 1 enema, Rectal, Once  PARoxetine, 10 mg, Oral, Daily  piperacillin-tazobactam, 4.5 g, Intravenous, Q8H  pramipexole, 1 mg, Oral, TID  sodium chloride, 10 mL, Intravenous, Q12H  tamsulosin, 0.4 mg, Oral, Daily      Continuous Infusions:     PRN Meds:.  senna-docusate sodium **AND** polyethylene glycol **AND** bisacodyl **AND** bisacodyl    Calcium Replacement - Follow Nurse / BPA Driven Protocol    HYDROcodone-acetaminophen    Magnesium Standard Dose Replacement - Follow Nurse / BPA Driven Protocol    Phosphorus Replacement - Follow Nurse / BPA Driven Protocol    Potassium Replacement - Follow Nurse / BPA Driven Protocol    sodium chloride    Assessment & Plan   Assessment & Plan     Active Hospital Problems    Diagnosis  POA    Restless leg syndrome [G25.81]  Unknown    Cellulitis [L03.90]  Yes    History of DVT (deep vein thrombosis) [Z86.718]  Not Applicable    Hypothyroidism (acquired) [E03.9]  Yes    Cellulitis of right lower extremity [L03.115]  Yes    Hyperlipidemia [E78.5]  Yes      Resolved Hospital Problems   No resolved problems to display.        Brief Hospital Course to date:  Cordell Martin is a 80 y.o. male with history of anxiety, chronic back pain, cognitive impairment, depression, hypothyroidism, HLD, PD, obesity, admitted with B LE redness/drainage. Venous duplex negative for VTE in portions visualized.    This patient's problems and plans were partially entered by my partner and updated as appropriate by me 07/12/25.    B LE cellulitis  B LE vascular ulcers  B LE  pain/burning  -duplex negative for VTE in portions visualized   -IV abx adjusted given renal function, to finish 5d course  -mobilize  -gabapentin for pain  -wound care; PT wound consulted, now w unna boots    Elevated Cr  -Cr jump from 1.27-->1.5  -Had been on vanc/zosyn; stopped vanc; changed to dapto  -Awaiting am labs, currently in process    Hypothyroidism  -synthroid    HLD  -statin on hold while on daptomycin    PD  RLS  Weakness  -on carbidopa/levodopa  -mirapex  -PT/OT    Called by RN re: possible torsades de pointes on 7/10; reviewed tele and is consistent with artifact, QTS complexes seen in multiple leads and lead I doesn't show torsades     Expected Discharge Location and Transportation: Facility  Expected Discharge maybe Monday  Expected Discharge Date: 7/14/2025; Expected Discharge Time:      VTE Prophylaxis:  Pharmacologic VTE prophylaxis orders are present.         AM-PAC 6 Clicks Score (PT): 11 (07/12/25 0855)    CODE STATUS:   Code Status and Medical Interventions: CPR (Attempt to Resuscitate); Full Support   Ordered at: 07/09/25 0010     Code Status (Patient has no pulse and is not breathing):    CPR (Attempt to Resuscitate)     Medical Interventions (Patient has pulse or is breathing):    Full Support     Level Of Support Discussed With:    Patient       Rita Villarreal MD  07/12/25

## 2025-07-13 LAB
BACTERIA SPEC AEROBE CULT: NORMAL
BACTERIA SPEC AEROBE CULT: NORMAL

## 2025-07-13 PROCEDURE — 99232 SBSQ HOSP IP/OBS MODERATE 35: CPT | Performed by: STUDENT IN AN ORGANIZED HEALTH CARE EDUCATION/TRAINING PROGRAM

## 2025-07-13 PROCEDURE — 25010000002 DAPTOMYCIN PER 1 MG: Performed by: STUDENT IN AN ORGANIZED HEALTH CARE EDUCATION/TRAINING PROGRAM

## 2025-07-13 PROCEDURE — 25010000002 ENOXAPARIN PER 10 MG

## 2025-07-13 PROCEDURE — 25010000002 PIPERACILLIN SOD-TAZOBACTAM PER 1 G: Performed by: STUDENT IN AN ORGANIZED HEALTH CARE EDUCATION/TRAINING PROGRAM

## 2025-07-13 PROCEDURE — 25010000002 PIPERACILLIN SOD-TAZOBACTAM PER 1 G: Performed by: HOSPITALIST

## 2025-07-13 RX ADMIN — CARBIDOPA AND LEVODOPA 2 TABLET: 25; 100 TABLET ORAL at 05:05

## 2025-07-13 RX ADMIN — Medication 10 ML: at 08:59

## 2025-07-13 RX ADMIN — CARBIDOPA AND LEVODOPA 1 TABLET: 25; 100 TABLET, EXTENDED RELEASE ORAL at 20:32

## 2025-07-13 RX ADMIN — SENNOSIDES, DOCUSATE SODIUM 2 TABLET: 50; 8.6 TABLET, FILM COATED ORAL at 20:32

## 2025-07-13 RX ADMIN — SENNOSIDES, DOCUSATE SODIUM 2 TABLET: 50; 8.6 TABLET, FILM COATED ORAL at 08:58

## 2025-07-13 RX ADMIN — GABAPENTIN 100 MG: 100 CAPSULE ORAL at 20:32

## 2025-07-13 RX ADMIN — PRAMIPEXOLE DIHYDROCHLORIDE 1 MG: 1 TABLET ORAL at 08:57

## 2025-07-13 RX ADMIN — GABAPENTIN 100 MG: 100 CAPSULE ORAL at 08:57

## 2025-07-13 RX ADMIN — PIPERACILLIN AND TAZOBACTAM 4.5 G: 4; .5 INJECTION, POWDER, FOR SOLUTION INTRAVENOUS at 03:12

## 2025-07-13 RX ADMIN — CARBIDOPA AND LEVODOPA 2 TABLET: 25; 100 TABLET ORAL at 12:28

## 2025-07-13 RX ADMIN — LEVOTHYROXINE SODIUM 88 MCG: 88 TABLET ORAL at 05:05

## 2025-07-13 RX ADMIN — CARBIDOPA AND LEVODOPA 2 TABLET: 25; 100 TABLET ORAL at 23:06

## 2025-07-13 RX ADMIN — PIPERACILLIN AND TAZOBACTAM 4.5 G: 4; .5 INJECTION, POWDER, FOR SOLUTION INTRAVENOUS at 18:36

## 2025-07-13 RX ADMIN — Medication 1 CAPSULE: at 08:58

## 2025-07-13 RX ADMIN — Medication 10 ML: at 20:33

## 2025-07-13 RX ADMIN — PAROXETINE HYDROCHLORIDE 10 MG: 10 TABLET, FILM COATED ORAL at 09:30

## 2025-07-13 RX ADMIN — CARBIDOPA AND LEVODOPA 2 TABLET: 25; 100 TABLET ORAL at 00:04

## 2025-07-13 RX ADMIN — POLYETHYLENE GLYCOL 3350 17 G: 17 POWDER, FOR SOLUTION ORAL at 08:58

## 2025-07-13 RX ADMIN — ENOXAPARIN SODIUM 40 MG: 100 INJECTION SUBCUTANEOUS at 08:57

## 2025-07-13 RX ADMIN — TAMSULOSIN HYDROCHLORIDE 0.4 MG: 0.4 CAPSULE ORAL at 08:58

## 2025-07-13 RX ADMIN — DAPTOMYCIN 550 MG: 500 INJECTION, POWDER, LYOPHILIZED, FOR SOLUTION INTRAVENOUS at 10:31

## 2025-07-13 RX ADMIN — PRAMIPEXOLE DIHYDROCHLORIDE 1 MG: 1 TABLET ORAL at 20:32

## 2025-07-13 RX ADMIN — PIPERACILLIN AND TAZOBACTAM 4.5 G: 4; .5 INJECTION, POWDER, FOR SOLUTION INTRAVENOUS at 12:27

## 2025-07-13 RX ADMIN — BISACODYL 5 MG: 5 TABLET, COATED ORAL at 08:58

## 2025-07-13 RX ADMIN — CARBIDOPA AND LEVODOPA 1 TABLET: 25; 100 TABLET, EXTENDED RELEASE ORAL at 08:58

## 2025-07-13 RX ADMIN — PRAMIPEXOLE DIHYDROCHLORIDE 1 MG: 1 TABLET ORAL at 16:40

## 2025-07-13 RX ADMIN — CARBIDOPA AND LEVODOPA 2 TABLET: 25; 100 TABLET ORAL at 18:36

## 2025-07-13 NOTE — PROGRESS NOTES
[facility] Medicine Services  PROGRESS NOTE    Patient Name: Cordell Martin  : 1945  MRN: [id number]    Date of Admission: 2025  Primary Care Physician: Ben Holder DO    Subjective   Subjective     CC: MILDRED DELANEY redness/burning    HPI:   Had a BM.   Tolerating diet.  No chest pain or SOA.  No N/V.  Reports his legs are feeling better.     Objective   Objective     Vital Signs:   Temp:  [96.3 °F (35.7 °C)-98.1 °F (36.7 °C)] 98 °F (36.7 °C)  Heart Rate:  [63-72] 67  Resp:  [16-18] 18  BP: (107-133)/() 107/55  Flow (L/min) (Oxygen Therapy):  [2] 2     Physical Exam:  Constitutional: No acute distress, awake, alert, sitting up in bed  HENT: NCAT, mucous membranes moist  Respiratory: Clear to auscultation bilaterally, respiratory effort normal   Cardiovascular: RRR  Gastrointestinal: Normoactive bowel sounds, soft, nontender, nondistended  Psychiatric: Flat affect, cooperative  Neurologic: Alert, oriented, symmetric facies, MARIE, +masked facies, +pill-rolling tremor, speech clear  Skin: B/l LE in Unna boots    Results Reviewed:  LAB RESULTS:      Lab 07/10/25  0538 25  1014 25  2305 25  2137   WBC 4.68 6.45  --  6.81   HEMOGLOBIN 10.7* 11.2*  --  12.7*   HEMATOCRIT 33.9* 36.4*  --  42.7   PLATELETS 174 183  --  190   NEUTROS ABS  --  4.87  --  4.17   IMMATURE GRANS (ABS)  --  0.02  --  0.02   LYMPHS ABS  --  1.04  --  1.83   MONOS ABS  --  0.37  --  0.55   EOS ABS  --  0.14  --  0.22   MCV 92.4 92.6  --  95.5   SED RATE  --   --  82*  --    CRP  --   --   --  4.69*   PROCALCITONIN  --   --   --  0.08   LACTATE  --   --   --  1.2   PROTIME  --   --   --  14.4         Lab 25  1012 25  1519 07/10/25  0538 25  1014 25  2137   SODIUM 137 140 139 139 138   POTASSIUM 4.4 4.3 4.1 4.3 4.0   CHLORIDE 101 102 102 102 101   CO2 26.0 27.6 28.0 27.9 28.0   ANION GAP 10.0 10.4 9.0 9.1 9.0   BUN 30.9* 29.1* 26.7* 20.8 22.4   CREATININE 1.44*  "1.53* 1.50* 1.27 1.22   EGFR 49.7* 46.2* 47.4* 57.8* 60.7   GLUCOSE 122* 108* 114* 137* 92   CALCIUM 9.1 8.9 8.5* 8.7 9.2   MAGNESIUM  --  2.2 2.1  --   --          Lab 07/10/25  0538 07/09/25  1014 07/08/25  2137   TOTAL PROTEIN 6.3 6.8 8.0   ALBUMIN 3.0* 3.0* 3.5   GLOBULIN 3.3 3.8 4.5   ALT (SGPT) <5 <5 <5   AST (SGOT) 13 15 13   BILIRUBIN 0.3 0.4 0.5   ALK PHOS 125* 126* 156*         Lab 07/08/25  2137   PROBNP 44.3   PROTIME 14.4   INR 1.05                 Brief Urine Lab Results  (Last result in the past 365 days)        Color   Clarity   Blood   Leuk Est   Nitrite   Protein   CREAT   Urine HCG        10/08/24 2219 Yellow   Clear   Negative   Negative   Negative   Negative                   Microbiology Results Abnormal       None            No radiology results from the last 24 hrs      Results for orders placed during the hospital encounter of 01/12/21    Adult Transthoracic Echo Complete W/ Cont if Necessary Per Protocol 01/13/2021  4:06 PM    Interpretation Summary  · The right ventricle and right atrium are moderately dilated. Other chamber sizes and wall thicknesses are normal.  · Global and segmental LV wall motion is normal. The estimated left ventricular ejection fraction is 51% - 55%.  · The aortic and mitral valves are normal in structure and function.  · The estimated RV systolic pressure is mildly elevated 35 mmHg - 40 mmHg. There is as well noted to be less than 50% inspiratory IVC collapse. The main pulmonary artery is \"borderline dilated\".  · There are no other important findings on this study.      Current medications:  Scheduled Meds:[Held by provider] atorvastatin, 40 mg, Oral, Daily  senna-docusate sodium, 2 tablet, Oral, BID   And  polyethylene glycol, 17 g, Oral, Daily   And  bisacodyl, 5 mg, Oral, Daily  carbidopa-levodopa, 2 tablet, Oral, Q6H  carbidopa-levodopa ER, 1 tablet, Oral, BID  DAPTOmycin, 6 mg/kg (Adjusted), Intravenous, Q24H  enoxaparin sodium, 40 mg, Subcutaneous, " Daily  [Held by provider] furosemide, 40 mg, Oral, Daily  gabapentin, 100 mg, Oral, Q12H  lactobacillus acidophilus, 1 capsule, Oral, Daily  levothyroxine, 88 mcg, Oral, Q AM  PARoxetine, 10 mg, Oral, Daily  piperacillin-tazobactam, 4.5 g, Intravenous, Q8H  pramipexole, 1 mg, Oral, TID  sodium chloride, 10 mL, Intravenous, Q12H  tamsulosin, 0.4 mg, Oral, Daily      Continuous Infusions:     PRN Meds:.  senna-docusate sodium **AND** polyethylene glycol **AND** bisacodyl **AND** bisacodyl    Calcium Replacement - Follow Nurse / BPA Driven Protocol    HYDROcodone-acetaminophen    Magnesium Standard Dose Replacement - Follow Nurse / BPA Driven Protocol    Phosphorus Replacement - Follow Nurse / BPA Driven Protocol    Potassium Replacement - Follow Nurse / BPA Driven Protocol    sodium chloride    Assessment & Plan   Assessment & Plan     Active Hospital Problems    Diagnosis  POA    Restless leg syndrome [G25.81]  Unknown    Cellulitis [L03.90]  Yes    History of DVT (deep vein thrombosis) [Z86.718]  Not Applicable    Hypothyroidism (acquired) [E03.9]  Yes    Cellulitis of right lower extremity [L03.115]  Yes    Hyperlipidemia [E78.5]  Yes      Resolved Hospital Problems   No resolved problems to display.        Brief Hospital Course to date:  Cordell Martin is a 80 y.o. male with history of anxiety, chronic back pain, cognitive impairment, depression, hypothyroidism, HLD, PD, obesity, admitted with B LE redness/drainage. Venous duplex negative for VTE in portions visualized.    This patient's problems and plans were partially entered by my partner and updated as appropriate by me 07/13/25.    B LE cellulitis  B LE vascular ulcers  B LE pain/burning  -duplex negative for VTE in portions visualized   -IV abx adjusted given renal function, to finish 5d course  -gabapentin for pain  -wound care; PT wound consulted, now w unna boots     Elevated Cr  -Cr jump from 1.27-->1.5-->1.4  -Had been on vanc/zosyn; stopped vanc;  changed to dapto  -Awaiting am labs, currently in process    Hypothyroidism-synthroid    HLD-statin on hold while on daptomycin, will need to resume tmw    PD  RLS  -on carbidopa/levodopa  -mirapex  -PT/OT    Called by RN re: possible torsades de pointes on 7/10; reviewed tele and is consistent with artifact, QTS complexes seen in multiple leads and lead I doesn't show torsades     Expected Discharge Location and Transportation: Facility  Expected Discharge maybe Monday  Expected Discharge Date: 7/14/2025; Expected Discharge Time:      VTE Prophylaxis:  Pharmacologic VTE prophylaxis orders are present.         AM-PAC 6 Clicks Score (PT): 9 (07/12/25 1940)    CODE STATUS:   Code Status and Medical Interventions: CPR (Attempt to Resuscitate); Full Support   Ordered at: 07/09/25 0010     Code Status (Patient has no pulse and is not breathing):    CPR (Attempt to Resuscitate)     Medical Interventions (Patient has pulse or is breathing):    Full Support     Level Of Support Discussed With:    Patient       Rita Villarreal MD  07/13/25

## 2025-07-13 NOTE — PLAN OF CARE
Oriented x 4 with forgetfulness. Patient follows commands / conversant. Unna boots in place to BLE, CDI. Constant tremor noted to bilateral hands. Patient denies pain / discomfort at this time. VSS on RA. SR in 60s on cardiac mx. Call light in reach.

## 2025-07-14 LAB
ANION GAP SERPL CALCULATED.3IONS-SCNC: 10 MMOL/L (ref 5–15)
BUN SERPL-MCNC: 27.2 MG/DL (ref 8–23)
BUN/CREAT SERPL: 19 (ref 7–25)
CALCIUM SPEC-SCNC: 9.1 MG/DL (ref 8.6–10.5)
CHLORIDE SERPL-SCNC: 102 MMOL/L (ref 98–107)
CO2 SERPL-SCNC: 26 MMOL/L (ref 22–29)
CREAT SERPL-MCNC: 1.43 MG/DL (ref 0.76–1.27)
EGFRCR SERPLBLD CKD-EPI 2021: 50.2 ML/MIN/1.73
GLUCOSE SERPL-MCNC: 139 MG/DL (ref 65–99)
POTASSIUM SERPL-SCNC: 4 MMOL/L (ref 3.5–5.2)
QT INTERVAL: 450 MS
QT INTERVAL: 458 MS
QTC INTERVAL: 453 MS
QTC INTERVAL: 464 MS
SODIUM SERPL-SCNC: 138 MMOL/L (ref 136–145)

## 2025-07-14 PROCEDURE — 80048 BASIC METABOLIC PNL TOTAL CA: CPT | Performed by: STUDENT IN AN ORGANIZED HEALTH CARE EDUCATION/TRAINING PROGRAM

## 2025-07-14 PROCEDURE — 25010000002 ENOXAPARIN PER 10 MG

## 2025-07-14 PROCEDURE — 97597 DBRDMT OPN WND 1ST 20 CM/<: CPT

## 2025-07-14 PROCEDURE — 97110 THERAPEUTIC EXERCISES: CPT

## 2025-07-14 PROCEDURE — 29580 STRAPPING UNNA BOOT: CPT

## 2025-07-14 PROCEDURE — 99239 HOSP IP/OBS DSCHRG MGMT >30: CPT | Performed by: STUDENT IN AN ORGANIZED HEALTH CARE EDUCATION/TRAINING PROGRAM

## 2025-07-14 RX ORDER — AMOXICILLIN 250 MG
2 CAPSULE ORAL 2 TIMES DAILY
Qty: 60 TABLET | Refills: 0 | Status: SHIPPED | OUTPATIENT
Start: 2025-07-14

## 2025-07-14 RX ORDER — BUDESONIDE AND FORMOTEROL FUMARATE DIHYDRATE 160; 4.5 UG/1; UG/1
2 AEROSOL RESPIRATORY (INHALATION)
COMMUNITY

## 2025-07-14 RX ORDER — PANTOPRAZOLE SODIUM 40 MG/1
40 TABLET, DELAYED RELEASE ORAL DAILY
COMMUNITY

## 2025-07-14 RX ORDER — FLUTICASONE PROPIONATE 50 MCG
2 SPRAY, SUSPENSION (ML) NASAL DAILY
Status: DISCONTINUED | OUTPATIENT
Start: 2025-07-14 | End: 2025-07-15 | Stop reason: HOSPADM

## 2025-07-14 RX ORDER — LEVOTHYROXINE SODIUM 100 UG/1
100 TABLET ORAL
COMMUNITY

## 2025-07-14 RX ADMIN — CARBIDOPA AND LEVODOPA 2 TABLET: 25; 100 TABLET ORAL at 05:27

## 2025-07-14 RX ADMIN — SENNOSIDES, DOCUSATE SODIUM 2 TABLET: 50; 8.6 TABLET, FILM COATED ORAL at 08:44

## 2025-07-14 RX ADMIN — TAMSULOSIN HYDROCHLORIDE 0.4 MG: 0.4 CAPSULE ORAL at 08:44

## 2025-07-14 RX ADMIN — FLUTICASONE PROPIONATE 2 SPRAY: 50 SPRAY, METERED NASAL at 09:20

## 2025-07-14 RX ADMIN — CARBIDOPA AND LEVODOPA 1 TABLET: 25; 100 TABLET, EXTENDED RELEASE ORAL at 20:13

## 2025-07-14 RX ADMIN — CARBIDOPA AND LEVODOPA 2 TABLET: 25; 100 TABLET ORAL at 17:43

## 2025-07-14 RX ADMIN — GABAPENTIN 100 MG: 100 CAPSULE ORAL at 08:45

## 2025-07-14 RX ADMIN — SENNOSIDES, DOCUSATE SODIUM 2 TABLET: 50; 8.6 TABLET, FILM COATED ORAL at 20:13

## 2025-07-14 RX ADMIN — PRAMIPEXOLE DIHYDROCHLORIDE 1 MG: 1 TABLET ORAL at 08:46

## 2025-07-14 RX ADMIN — Medication 10 ML: at 20:18

## 2025-07-14 RX ADMIN — Medication 10 ML: at 08:47

## 2025-07-14 RX ADMIN — ENOXAPARIN SODIUM 40 MG: 100 INJECTION SUBCUTANEOUS at 08:44

## 2025-07-14 RX ADMIN — BISACODYL 5 MG: 5 TABLET, COATED ORAL at 08:45

## 2025-07-14 RX ADMIN — POLYETHYLENE GLYCOL 3350 17 G: 17 POWDER, FOR SOLUTION ORAL at 08:44

## 2025-07-14 RX ADMIN — Medication 1 CAPSULE: at 08:44

## 2025-07-14 RX ADMIN — CARBIDOPA AND LEVODOPA 1 TABLET: 25; 100 TABLET, EXTENDED RELEASE ORAL at 08:46

## 2025-07-14 RX ADMIN — PRAMIPEXOLE DIHYDROCHLORIDE 1 MG: 1 TABLET ORAL at 20:13

## 2025-07-14 RX ADMIN — ATORVASTATIN CALCIUM 40 MG: 40 TABLET, FILM COATED ORAL at 08:46

## 2025-07-14 RX ADMIN — LEVOTHYROXINE SODIUM 88 MCG: 88 TABLET ORAL at 05:27

## 2025-07-14 RX ADMIN — GABAPENTIN 100 MG: 100 CAPSULE ORAL at 20:13

## 2025-07-14 RX ADMIN — CARBIDOPA AND LEVODOPA 2 TABLET: 25; 100 TABLET ORAL at 11:51

## 2025-07-14 RX ADMIN — PRAMIPEXOLE DIHYDROCHLORIDE 1 MG: 1 TABLET ORAL at 15:41

## 2025-07-14 RX ADMIN — PAROXETINE HYDROCHLORIDE 10 MG: 10 TABLET, FILM COATED ORAL at 08:44

## 2025-07-14 NOTE — THERAPY WOUND CARE TREATMENT
Acute Care - Wound/Debridement Treatment Note  [facility]     Patient Name: Cordell Martin  : 1945  MRN: [id number]  Today's Date: 2025                          Admit Date: 2025    Visit Dx:    ICD-10-CM ICD-9-CM   1. Bilateral lower leg cellulitis  L03.116 682.6    L03.115    2. Dependent edema  R60.9 782.3   3. Venous stasis ulcer of calf with fat layer exposed without varicose veins, unspecified laterality  I87.2 459.81    L97.202 707.12   4. Parkinson's disease, unspecified whether dyskinesia present, unspecified whether manifestations fluctuate  G20.A1 332.0   5. Hyperlipidemia, unspecified hyperlipidemia type  E78.5 272.4   6. Anemia, chronic disease  D63.8 285.29   7. Dysphagia, unspecified type  R13.10 787.20   8. Dysarthria  R47.1 784.51       Patient Active Problem List   Diagnosis    Anxiety    Arthritis    Depression    Hyperlipidemia    Lumbar stenosis with neurogenic claudication    Parkinson's disease    Lumbar stenosis with neurogenic claudication status post L4-5 decompression    Status post lumbar laminectomy    Memory loss    Cellulitis of right lower extremity    Hypothyroidism (acquired)    Pulmonary nodule (4mm RML incidental)    GERD    Remote smoker (Stopped )    Restrictive pattern on PFTs w/o evidence of ILD    History of DVT (deep vein thrombosis)    Anemia, chronic disease    Restless leg syndrome    Cellulitis        Past Medical History:   Diagnosis Date    Anxiety     Arthritis     Back pain     Bronchitis     Cognitive impairment     Depression     Disease of thyroid gland     Glucose intolerance (impaired glucose tolerance)     Hyperlipidemia     Kidney stone     Obesity     Parkinson disease     Pneumonia     Prostate disorder     Thyroid disease     Wears eyeglasses         Past Surgical History:   Procedure Laterality Date    COLONOSCOPY      5 years ago    EYE SURGERY      HERNIA REPAIR  10/20/2020    KNEE SURGERY      LUMBAR LAMINECTOMY DISCECTOMY  DECOMPRESSION N/A 07/13/2017    Procedure: LUMBAR LAMINECTOMY L4-5;  Surgeon: Antonio Trent MD;  Location: [facility] OR;  Service:     SHOULDER ROTATOR CUFF REPAIR Right     x2    TONSILLECTOMY      VEIN SURGERY             Wound 07/09/25 0000 Right lower leg Vascular Ulcer (Active)   Wound Image    07/14/25 1347   Dressing Appearance intact;dry 07/14/25 1347   Confirmed Empty Wound Bed Yes, visual inspection of wound bed 07/14/25 1347   Closure Unable to assess 07/14/25 0846   Base granulating;moist;red;slough 07/14/25 1347   Drainage Characteristics/Odor serosanguineous 07/14/25 1347   Drainage Amount small 07/14/25 1347   Care, Wound cleansed with;wound cleanser;debrided;kimberly boot 07/14/25 1347       Wound 07/09/25 0000 Left lower leg Vascular Ulcer (Active)   Dressing Appearance dry;intact 07/14/25 0846   Closure Unable to assess 07/14/25 0846   Base unable to visualize 07/14/25 0846   Care, Wound kimberly boot 07/14/25 0846       Wound 07/09/25 0000 Right medial gluteal Pressure Injury (Active)   Dressing Appearance dry;intact 07/14/25 0400   Closure None 07/14/25 0400   Base red;blanchable 07/14/25 0846   Care, Wound barrier applied;cleansed with;other (see comments) 07/14/25 0000   Dressing Care silicone border foam 07/14/25 0846      Lymphedema       Row Name 07/14/25 1700             Lymphedema Edema Assessment    Ptting Edema Category By severity  -KW      Pitting Edema Severe  -KW         Skin Changes/Observations    Location/Assessment Lower Extremity  -KW      Lower Extremity Conditions bilateral:;scaly;crust;inflamed;weeping  -KW      Lower Extremity Color/Pigment bilateral:;erythema  -KW         Lymphedema Pulses/Capillary Refill    Lymphedema Pulses/Capillary Refill capillary refill  -KW      Capillary Refill lower extremity capillary refill  -KW      Lower Extremity Capillary Refill right:;left:;less than 3 seconds  -KW         Lymphedema Measurements    Measurement Type(s) Quick Girth  -KW      Quick  Girth Areas Lower extremities  -KW         Compression/Skin Care    Compression/Skin Care skin care;bandaging;wrapping location  -KW      Skin Care washed/dried;lotion applied  -KW      Wrapping Location lower extremity  -KW      Wrapping Location LE bilateral:;foot to knee  -KW      Wrapping Comments unna boot applied in clamshell with coban and spandage to secure.  -KW                User Key  (r) = Recorded By, (t) = Taken By, (c) = Cosigned By      Initials Name Provider Type    KW Yolanda Callaway, PT Physical Therapist                    WOUND DEBRIDEMENT  Total area of Debridement: 5cm2  Debridement Site 1  Location- Site 1: RLE  Selective Debridement- Site 1: Wound Surface <20cmsq  Instruments- Site 1: tweezers  Excised Tissue Description- Site 1: minimum, slough (crusts)               PT Assessment (Last 12 Hours)       PT Evaluation and Treatment       Row Name 07/14/25 1347          Physical Therapy Time and Intention    Subjective Information pain  -KW     Document Type wound care;therapy note (daily note)  -KW     Mode of Treatment physical therapy  -KW       Row Name 07/14/25 1347          General Information    Patient Profile Reviewed yes  -KW       Row Name 07/14/25 1347          Wound 07/09/25 0000 Right lower leg Vascular Ulcer    Wound - Properties Group Placement Date: 07/09/25  -HG Placement Time: 0000  -HG Present on Original Admission: Y  -HG Side: Right  -HG Orientation: lower  -HG Location: leg  -HG Primary Wound Type: Vascular Ulc  - Additional Comments: cellulitis, redness up to groin  -HG    Wound Image Images linked: 2  -KW     Dressing Appearance intact;dry  -KW     Dressing Removed Type --  UB  -KW     Confirmed Empty Wound Bed Yes, visual inspection of wound bed  -KW     Base granulating;moist;red;slough  -KW     Drainage Characteristics/Odor serosanguineous  -KW     Drainage Amount small  -KW     Care, Wound cleansed with;wound cleanser;debrided;kimberly boot  -KW     Dressing Care  --  mepilex ag, UB  -KW     Retired Wound - Properties Group Placement Date: 07/09/25 -HG Placement Time: 0000  -HG Present on Original Admission: Y  -HG Side: Right  -HG Orientation: lower  -HG Location: leg  -HG Additional Comments: cellulitis, redness up to groin  -HG    Retired Wound - Properties Group Placement Date: 07/09/25 -HG Placement Time: 0000  -HG Present on Original Admission: Y  -HG Side: Right  -HG Orientation: lower  -HG Location: leg  -HG Additional Comments: cellulitis, redness up to groin  -HG    Retired Wound - Properties Group Date first assessed: 07/09/25 -HG Time first assessed: 0000  -HG Present on Original Admission: Y  -HG Side: Right  -HG Location: leg  -HG Additional Comments: cellulitis, redness up to groin  -HG      Row Name             Wound 07/09/25 0000 Left lower leg Vascular Ulcer    Wound - Properties Group Placement Date: 07/09/25 -HG Placement Time: 0000  -HG Present on Original Admission: Y  -HG Side: Left  -HG Orientation: lower  -HG Location: leg  -HG Primary Wound Type: Vascular Ulc  -HG Additional Comments: cellulitis with redness up to thigh  -HG    Retired Wound - Properties Group Placement Date: 07/09/25 -HG Placement Time: 0000  -HG Present on Original Admission: Y  -HG Side: Left  -HG Orientation: lower  -HG Location: leg  -HG Additional Comments: cellulitis with redness up to thigh  -HG    Retired Wound - Properties Group Placement Date: 07/09/25 -HG Placement Time: 0000  -HG Present on Original Admission: Y  -HG Side: Left  -HG Orientation: lower  -HG Location: leg  -HG Additional Comments: cellulitis with redness up to thigh  -HG    Retired Wound - Properties Group Date first assessed: 07/09/25 -HG Time first assessed: 0000  -HG Present on Original Admission: Y  -HG Side: Left  -HG Location: leg  -HG Additional Comments: cellulitis with redness up to thigh  -HG      Row Name             Wound 07/09/25 0000 Right medial gluteal Pressure Injury    Wound -  Properties Group Placement Date: 07/09/25 -HG Placement Time: 0000  -HG Present on Original Admission: Y  -HG Side: Right  -HG Orientation: medial  -HG Location: gluteal  -HG Primary Wound Type: Pressure Inj  -HG    Retired Wound - Properties Group Placement Date: 07/09/25 -HG Placement Time: 0000  -HG Present on Original Admission: Y  -HG Side: Right  -HG Orientation: medial  -HG Location: gluteal  -HG    Retired Wound - Properties Group Placement Date: 07/09/25 -HG Placement Time: 0000  -HG Present on Original Admission: Y  -HG Side: Right  -HG Orientation: medial  -HG Location: gluteal  -HG    Retired Wound - Properties Group Date first assessed: 07/09/25 -HG Time first assessed: 0000  -HG Present on Original Admission: Y  -HG Side: Right  -HG Location: gluteal  -HG      Row Name 07/14/25 1347          Plan of Care Review    Plan of Care Reviewed With patient  -KW     Progress improving  -KW     Outcome Evaluation Pt stating that previous wraps tight around ankles and required wraps to be cut. Patient demonstrating improvement in healing of R posterior wound bed. He is also demonstrating good improvements in BLE edema and skin integrity in response to compression wraps. PT able to debride slough from wound bed to promote healing and improved granulation.Pt would continue to benefit from unna boots changes every 2-3 days to further promote wound healing, venous return, reduce limb girth, and improve skin integrity  -       Row Name 07/14/25 1347          Positioning and Restraints    Pre-Treatment Position in bed  -KW     Post Treatment Position bed  -KW     In Bed supine;encouraged to call for assist;call light within reach  -KW               User Key  (r) = Recorded By, (t) = Taken By, (c) = Cosigned By      Initials Name Provider Type    Yolanda Howard, PT Physical Therapist    Marly Matute, RN Registered Nurse                  Physical Therapy Education       Title: PT OT SLP Therapies  (In Progress)       Topic: Physical Therapy (Done)       Point: Mobility training (Done)       Learning Progress Summary            Patient Acceptance, E, VU by BC at 7/12/2025 0900    Acceptance, E, VU by  at 7/10/2025 1304    Acceptance, E,D, VU,NR by LR at 7/10/2025 1010    Comment: Educated on benefits of mobility, safety with mobility, correct supine<->sit t/f technique, correct sit<->stand t/f technique, and progression of POC.                      Point: Home exercise program (Done)       Learning Progress Summary            Patient Acceptance, E, VU by BC at 7/12/2025 0900    Acceptance, E, VU by  at 7/10/2025 1304    Acceptance, E,D, VU,NR by LR at 7/10/2025 1010    Comment: Educated on benefits of mobility, safety with mobility, correct supine<->sit t/f technique, correct sit<->stand t/f technique, and progression of POC.                      Point: Body mechanics (Done)       Learning Progress Summary            Patient Acceptance, E, VU by BC at 7/12/2025 0900    Acceptance, E, VU by  at 7/10/2025 1304    Acceptance, E,D, VU,NR by LR at 7/10/2025 1010    Comment: Educated on benefits of mobility, safety with mobility, correct supine<->sit t/f technique, correct sit<->stand t/f technique, and progression of POC.                      Point: Precautions (Done)       Learning Progress Summary            Patient Acceptance, E, VU by BC at 7/12/2025 0900    Acceptance, E, VU by  at 7/10/2025 1304    Acceptance, E,D, VU,NR by LR at 7/10/2025 1010    Comment: Educated on benefits of mobility, safety with mobility, correct supine<->sit t/f technique, correct sit<->stand t/f technique, and progression of POC.                                      User Key       Initials Effective Dates Name Provider Type Discipline     02/03/23 -  Callie Hylton, PT Physical Therapist PT    BC 04/27/21 -  Senait Ariza, RN Registered Nurse Nurse     04/23/25 -  Kristine Persaud, OT Student OT Student OT                     Recommendation and Plan  Anticipated Equipment Needs at Discharge (PT): other (see comments) (TBD)  Anticipated Discharge Disposition (PT): skilled nursing facility  Planned Therapy Interventions (PT): wound care, patient/family education  Therapy Frequency (PT): daily  Plan of Care Reviewed With: patient   Progress: improving       Progress: improving  Outcome Evaluation: Pt stating that previous wraps tight around ankles and required wraps to be cut. Patient demonstrating improvement in healing of R posterior wound bed. He is also demonstrating good improvements in BLE edema and skin integrity in response to compression wraps. PT able to debride slough from wound bed to promote healing and improved granulation.Pt would continue to benefit from unna boots changes every 2-3 days to further promote wound healing, venous return, reduce limb girth, and improve skin integrity  Plan of Care Reviewed With: patient            Time Calculation   PT Charges       Row Name 07/14/25 1347             Time Calculation    Start Time 1347  -KW         Untimed Charges    29580-Unna Boot 35  -KW      27857-Etfukvshj debridement 10  -KW         Total Minutes    Untimed Charges Total Minutes 45  -KW       Total Minutes 45  -KW                User Key  (r) = Recorded By, (t) = Taken By, (c) = Cosigned By      Initials Name Provider Type    Yolanda Howard, JARAD Physical Therapist                      Therapy Charges for Today       Code Description Service Date Service Provider Modifiers Qty    28570243067 HC MIAH DEBRIDE OPEN WOUND UP TO 20CM 7/14/2025 Yolanda Callaway, PT GP 1    31756682737 HC PT STAPPING UNNA BOOT 7/14/2025 Yolanda Callaway PT GP 1              PT G-Codes  Outcome Measure Options: AM-PAC 6 Clicks Daily Activity (OT)  AM-PAC 6 Clicks Score (PT): 10  AM-PAC 6 Clicks Score (OT): 12       Yolanda Callaway PT  7/14/2025

## 2025-07-14 NOTE — PLAN OF CARE
Goal Outcome Evaluation:  Plan of Care Reviewed With: patient        Progress: no change  Outcome Evaluation: Pt alert and participatory in OT interventions focused from bed level as pt awaited wound care for tx [facility]. Faciltiated BUE A/AROM progressing proximal to distal to promote joint mobility, progress toward strengthening and integrate better in ADLs, related t/fs and mobility. Pt tolerated w/o increase in pain. PT able to manage gown with mod A, complete simple hair grooming and face washing with SUA. Demonstrated bed mob rolling to R side with mod A and use of bed supports to reach sidelying for improved positioning w/ pillow support for comfort and alignment. PT would benefit from cont'd IPOT POC as pt able to tolerate.    Anticipated Discharge Disposition (OT): skilled nursing facility

## 2025-07-14 NOTE — CASE MANAGEMENT/SOCIAL WORK
Case Management Discharge Note      Final Note: Patient's plan is skilled rehab at [facility] in [place] tmrw 7/15.  ambulance will transport the patient. PCS filled out and faxed to [employer], placed in chartlet. Nurse to call report to [phone]. Facility liaison will pull discharge summary from [facility]. Patient is agreeable to plan. No further discharge needs identified.         Selected Continued Care - Admitted Since 7/8/2025       Destination Coordination complete.      Service Provider Services Address Phone Fax Patient Preferred    [facility] [address], [place] [address] [phone] --              Durable Medical Equipment    No services have been selected for the patient.                Dialysis/Infusion    No services have been selected for the patient.                Home Medical Care Coordination complete.      Service Provider Services Address Phone Fax Patient Preferred    [facility] [address], [address], [place] [address] [phone] --              Therapy    No services have been selected for the patient.                Community Resources    No services have been selected for the patient.                Community & DME    No services have been selected for the patient.                    Selected Continued Care - Episodes Includes continued care and service providers with selected services from the active episodes listed below      High Risk Care Management Episode start date: 2/12/2025 (Paused)   There are no active outsourced providers for this episode.                 Transportation Services  Transportation: Ambulance  Ambulance: [facility]  [facility] Ambulance Status: Accepted    Final Discharge Disposition Code: 03 - skilled nursing facility (SNF)

## 2025-07-14 NOTE — PLAN OF CARE
Goal Outcome Evaluation:  Plan of Care Reviewed With: patient        Progress: improving  Outcome Evaluation: Pt stating that previous wraps tight around ankles and required wraps to be cut. Patient demonstrating improvement in healing of R posterior wound bed. He is also demonstrating good improvements in BLE edema and skin integrity in response to compression wraps. PT able to debride slough from wound bed to promote healing and improved granulation.Pt would continue to benefit from unna boots changes every 2-3 days to further promote wound healing, venous return, reduce limb girth, and improve skin integrity

## 2025-07-14 NOTE — PLAN OF CARE
Problem: Adult Inpatient Plan of Care  Goal: Absence of Hospital-Acquired Illness or Injury  Intervention: Identify and Manage Fall Risk  Recent Flowsheet Documentation  Taken 7/14/2025 0400 by Arianna Swift RN  Safety Promotion/Fall Prevention:   activity supervised   assistive device/personal items within reach   fall prevention program maintained   gait belt   lighting adjusted   room organization consistent   safety round/check completed   toileting scheduled  Taken 7/14/2025 0200 by Arianna Swift RN  Safety Promotion/Fall Prevention:   activity supervised   clutter free environment maintained   fall prevention program maintained   gait belt   lighting adjusted   mobility aid in reach   room organization consistent   safety round/check completed   toileting scheduled  Taken 7/14/2025 0000 by Arianna Swift RN  Safety Promotion/Fall Prevention:   activity supervised   assistive device/personal items within reach   fall prevention program maintained   gait belt   lighting adjusted   nonskid shoes/slippers when out of bed   patient off unit  Taken 7/13/2025 2200 by Arianna Swift RN  Safety Promotion/Fall Prevention:   toileting scheduled   safety round/check completed   room organization consistent   nonskid shoes/slippers when out of bed   gait belt   fall prevention program maintained   clutter free environment maintained   assistive device/personal items within reach  Taken 7/13/2025 2000 by Arianna Swift RN  Safety Promotion/Fall Prevention:   activity supervised   assistive device/personal items within reach   fall prevention program maintained   lighting adjusted   mobility aid in reach   nonskid shoes/slippers when out of bed   room organization consistent   safety round/check completed   toileting scheduled  Intervention: Prevent Skin Injury  Recent Flowsheet Documentation  Taken 7/14/2025 0200 by Arianna Swift RN  Body Position: position maintained  Taken 7/14/2025 0000 by Arianna Swift RN  Body  Position: supine  Skin Protection: incontinence pads utilized  Taken 7/13/2025 2200 by Arianna Swift RN  Body Position: position maintained  Taken 7/13/2025 2000 by Arianna Swift RN  Body Position: position maintained  Skin Protection: incontinence pads utilized  Intervention: Prevent and Manage VTE (Venous Thromboembolism) Risk  Recent Flowsheet Documentation  Taken 7/13/2025 2000 by Arianna Swift RN  VTE Prevention/Management: (Lovenox) SCDs (sequential compression devices) off  Intervention: Prevent Infection  Recent Flowsheet Documentation  Taken 7/14/2025 0400 by Arianna Swift RN  Infection Prevention:   environmental surveillance performed   hand hygiene promoted   rest/sleep promoted   single patient room provided  Taken 7/14/2025 0200 by Arianna Swift RN  Infection Prevention:   environmental surveillance performed   hand hygiene promoted   rest/sleep promoted   single patient room provided  Taken 7/14/2025 0000 by Arianna Swift RN  Infection Prevention:   environmental surveillance performed   hand hygiene promoted   rest/sleep promoted   single patient room provided  Taken 7/13/2025 2200 by Arianna Swift RN  Infection Prevention:   hand hygiene promoted   rest/sleep promoted   single patient room provided  Taken 7/13/2025 2000 by Arianna Swift RN  Infection Prevention:   equipment surfaces disinfected   hand hygiene promoted   rest/sleep promoted   single patient room provided  Goal: Optimal Comfort and Wellbeing  Intervention: Monitor Pain and Promote Comfort  Recent Flowsheet Documentation  Taken 7/14/2025 0000 by Arianna Swift RN  Pain Management Interventions: no interventions per patient request  Taken 7/13/2025 2200 by Arianna Swift RN  Pain Management Interventions: no interventions per patient request  Taken 7/13/2025 2000 by Arianna Swift RN  Pain Management Interventions: no interventions per patient request  Intervention: Provide Person-Centered Care  Recent Flowsheet  Documentation  Taken 7/14/2025 0000 by Arianna Swift, ELADIA  Trust Relationship/Rapport:   care explained   choices provided  Taken 7/13/2025 2200 by Arianna Swift RN  Trust Relationship/Rapport:   care explained   choices provided  Taken 7/13/2025 2000 by Arianna wSift RN  Trust Relationship/Rapport:   care explained   choices provided   emotional support provided   Goal Outcome Evaluation: Patient is A&O x4 with forgetfulness. VSS. Patient had no complaints of pain. Elissa boot to BLE are clean, dry, and intact. Patient is resting, call light in reach

## 2025-07-14 NOTE — DISCHARGE SUMMARY
Notified this PM that the facility actually doesn't have a bed until tmw. DC on hold until tmw.         [facility]  DISCHARGE SUMMARY    Patient Name: Cordell Martin  : 1945  MRN: [id number]    Date of Admission: 2025  8:38 PM  Date of Discharge: 2025  Primary Care Physician: Ben Holder,     Consults       No orders found from 2025 to 2025.            Hospital Course     Presenting Problem: Cellulitis    Active Hospital Problems    Diagnosis  POA    **Cellulitis [L03.90]  Yes    Restless leg syndrome [G25.81]  Yes    History of DVT (deep vein thrombosis) [Z86.718]  Not Applicable    Hypothyroidism (acquired) [E03.9]  Yes    Cellulitis of right lower extremity [L03.115]  Yes    Hyperlipidemia [E78.5]  Yes      Resolved Hospital Problems   No resolved problems to display.          Hospital Course:  Cordell Martin is a 80 y.o. male with history of anxiety, chronic back pain, cognitive impairment, depression, hypothyroidism, HLD, PD, obesity, admitted with B LE redness/drainage. Venous duplex negative for VTE in portions visualized.     This patient's problems and plans were partially entered by my partner and updated as appropriate by me 25.     B LE cellulitis  B LE vascular ulcers  Lymphedema   -duplex negative for VTE in portions visualized   -S/p course of IV abx  -wound care; PT wound consulted, now w unna boots, continue at facility     Elevated Cr  -Cr bump from 1.27-->1.5-->1.4, now stabilized   -Had been on vanc/zosyn; stopped vanc     Hypothyroidism-synthroid  HLD-statin  PD-on carbidopa/levodopa  RLS    Discharge Follow Up Recommendations for outpatient labs/diagnostics:  PCP in 5-7 days regarding this hospitalization, repeat labs (CBC, CMP)  Continue leg wraps at facility, change every 2 to 3 days    Day of Discharge     HPI:   The patient reports feeling well today.  No chest pain or shortness of breath.  No nausea  or vomiting.  Had 2 bowel movements.  Legs continue to feel better.  Erythema improved.    Review of Systems  As above    Vital Signs:   Temp:  [97.6 °F (36.4 °C)-98.1 °F (36.7 °C)] 97.6 °F (36.4 °C)  Heart Rate:  [63-70] 63  Resp:  [16-18] 18  BP: (109-122)/(49-57) 116/57  Flow (L/min) (Oxygen Therapy):  [2] 2      Physical Exam:  Constitutional: No acute distress, awake, alert, sitting up in bed, obese  HENT: NCAT, mucous membranes moist  Respiratory: Clear to auscultation bilaterally, respiratory effort normal   Cardiovascular: RRR  Gastrointestinal: Normoactive bowel sounds, soft, nontender, nondistended  Psychiatric: Flat affect, cooperative  Neurologic: Alert, oriented, symmetric facies, MARIE, +masked facies, +pill-rolling tremor, speech clear  Skin: B/l LE in Unna boots    Pertinent  and/or Most Recent Results     LAB RESULTS:      Lab 07/10/25  0538 07/09/25  1014 07/08/25  2305 07/08/25  2137   WBC 4.68 6.45  --  6.81   HEMOGLOBIN 10.7* 11.2*  --  12.7*   HEMATOCRIT 33.9* 36.4*  --  42.7   PLATELETS 174 183  --  190   NEUTROS ABS  --  4.87  --  4.17   IMMATURE GRANS (ABS)  --  0.02  --  0.02   LYMPHS ABS  --  1.04  --  1.83   MONOS ABS  --  0.37  --  0.55   EOS ABS  --  0.14  --  0.22   MCV 92.4 92.6  --  95.5   SED RATE  --   --  82*  --    CRP  --   --   --  4.69*   PROCALCITONIN  --   --   --  0.08   LACTATE  --   --   --  1.2   PROTIME  --   --   --  14.4         Lab 07/14/25  0928 07/12/25  1012 07/11/25  1519 07/10/25  0538 07/09/25  1014   SODIUM 138 137 140 139 139   POTASSIUM 4.0 4.4 4.3 4.1 4.3   CHLORIDE 102 101 102 102 102   CO2 26.0 26.0 27.6 28.0 27.9   ANION GAP 10.0 10.0 10.4 9.0 9.1   BUN 27.2* 30.9* 29.1* 26.7* 20.8   CREATININE 1.43* 1.44* 1.53* 1.50* 1.27   EGFR 50.2* 49.7* 46.2* 47.4* 57.8*   GLUCOSE 139* 122* 108* 114* 137*   CALCIUM 9.1 9.1 8.9 8.5* 8.7   MAGNESIUM  --   --  2.2 2.1  --          Lab 07/10/25  0538 07/09/25  1014 07/08/25  2137   TOTAL PROTEIN 6.3 6.8 8.0   ALBUMIN 3.0*  3.0* 3.5   GLOBULIN 3.3 3.8 4.5   ALT (SGPT) <5 <5 <5   AST (SGOT) 13 15 13   BILIRUBIN 0.3 0.4 0.5   ALK PHOS 125* 126* 156*         Lab 07/08/25 2137   PROBNP 44.3   PROTIME 14.4   INR 1.05                 Brief Urine Lab Results  (Last result in the past 365 days)        Color   Clarity   Blood   Leuk Est   Nitrite   Protein   CREAT   Urine HCG        10/08/24 2219 Yellow   Clear   Negative   Negative   Negative   Negative                 Microbiology Results (last 10 days)       Procedure Component Value - Date/Time    Blood Culture - Blood, Arm, Right [[id number]]  (Normal) Collected: 07/08/25 2140    Lab Status: Final result Specimen: Blood from Arm, Right Updated: 07/13/25 2200     Blood Culture No growth at 5 days    Blood Culture - Blood, Arm, Left [[id number]]  (Normal) Collected: 07/08/25 2139    Lab Status: Final result Specimen: Blood from Arm, Left Updated: 07/13/25 2200     Blood Culture No growth at 5 days            Duplex Venous Lower Extremity - Bilateral CAR  Result Date: 7/9/2025    The bilateral lower extremity venous duplex scan is negative for evidence of a DVT and SVT in the areas visualized.  The right great saphenous vein (above-knee) was not well-visualized.     XR Chest 1 View  Result Date: 7/8/2025  XR CHEST 1 VW Date of Exam: 7/8/2025 9:27 PM EDT Indication: cough Comparison: 2/6/2025 and 6/3/2025 Findings: Unremarkable cardiomediastinal silhouette. Low lung volumes. Airspace opacity along the left lung base favors atelectasis. Otherwise no focal airspace consolidation. No pleural effusion or pneumothorax. No acute osseous abnormality.     Impression: Low lung volumes with probable left basilar atelectasis. Electronically Signed: Gilbert Waller MD  7/8/2025 9:47 PM EDT  Workstation ID: [id number]      Results for orders placed during the hospital encounter of 07/08/25    Duplex Venous Lower Extremity - Bilateral CAR 07/09/2025  4:16 PM    Interpretation Summary    The bilateral lower  "extremity venous duplex scan is negative for evidence of a DVT and SVT in the areas visualized.  The right great saphenous vein (above-knee) was not well-visualized.      Results for orders placed during the hospital encounter of 07/08/25    Duplex Venous Lower Extremity - Bilateral CAR 07/09/2025  4:16 PM    Interpretation Summary    The bilateral lower extremity venous duplex scan is negative for evidence of a DVT and SVT in the areas visualized.  The right great saphenous vein (above-knee) was not well-visualized.      Results for orders placed during the hospital encounter of 01/12/21    Adult Transthoracic Echo Complete W/ Cont if Necessary Per Protocol 01/13/2021  4:06 PM    Interpretation Summary  · The right ventricle and right atrium are moderately dilated. Other chamber sizes and wall thicknesses are normal.  · Global and segmental LV wall motion is normal. The estimated left ventricular ejection fraction is 51% - 55%.  · The aortic and mitral valves are normal in structure and function.  · The estimated RV systolic pressure is mildly elevated 35 mmHg - 40 mmHg. There is as well noted to be less than 50% inspiratory IVC collapse. The main pulmonary artery is \"borderline dilated\".  · There are no other important findings on this study.      Plan for Follow-up of Pending Labs/Results:     Discharge Details        Discharge Medications        New Medications        Instructions Start Date   sennosides-docusate 8.6-50 MG per tablet  Commonly known as: PERICOLACE   2 tablets, Oral, 2 Times Daily, Hold for loose stools             Changes to Medications        Instructions Start Date   fluticasone 50 MCG/ACT nasal spray  Commonly known as: FLONASE  What changed:   how to take this  when to take this   USE 2 SPRAYS IN EACH NOSTRIL ONCE DAILY      furosemide 20 MG tablet  Commonly known as: LASIX  What changed:   how much to take  Another medication with the same name was removed. Continue taking this medication, " and follow the directions you see here.   20 mg, Oral, Daily      levothyroxine 100 MCG tablet  Commonly known as: SYNTHROID, LEVOTHROID  What changed: Another medication with the same name was removed. Continue taking this medication, and follow the directions you see here.   100 mcg, Oral, Every Early Morning             Continue These Medications        Instructions Start Date   ammonium lactate 12 % lotion  Commonly known as: AmLactin   Topical, As Needed      atorvastatin 40 MG tablet  Commonly known as: LIPITOR   40 mg, Oral, Daily      budesonide-formoterol 160-4.5 MCG/ACT inhaler  Commonly known as: SYMBICORT   2 puffs, Inhalation, 2 Times Daily - RT      carbidopa-levodopa  MG per tablet  Commonly known as: SINEMET   TAKE 2 TABLETS BY MOUTH 4 TIMES DAILY *MUST MAKE APPT FOR REFILLS*      carbidopa-levodopa ER  MG per tablet  Commonly known as: SINEMET CR   1 tablet, Oral, 2 Times Daily      pantoprazole 40 MG EC tablet  Commonly known as: PROTONIX   40 mg, Oral, Daily      pramipexole 1 MG tablet  Commonly known as: MIRAPEX   1 mg, Oral, 3 Times Daily      simethicone 125 MG capsule capsule  Commonly known as: MYLICON,GAS-X   1 cap(s) orally 4 times a day (after meals and at bedtime) PRN; Duration: 30 days      tamsulosin 0.4 MG capsule 24 hr capsule  Commonly known as: FLOMAX   0.4 mg, Oral, Daily      vitamin D3 125 MCG (5000 UT) capsule capsule   1 capsule, Daily             Stop These Medications      Diclofenac Sodium 1 % gel gel  Commonly known as: VOLTAREN     esomeprazole 20 MG capsule  Commonly known as: nexIUM     piperacillin-tazobactam 3-0.375 GM/50ML IVPB  Commonly known as: ZOSYN              No Known Allergies      Discharge Disposition:  Skilled Nursing Facility (DC - External)    Diet:  Hospital:  Diet Order   Procedures    Diet: Cardiac, Diabetic; Healthy Heart (2-3 Na+); Consistent Carbohydrate; Fluid Consistency: Thin (IDDSI 0)       Diet Instructions       Diet: Cardiac  Diets; Healthy Heart (2-3 Na+); Regular (IDDSI 7); Thin (IDDSI 0)      Discharge Diet: Cardiac Diets    Cardiac Diet: Healthy Heart (2-3 Na+)    Texture: Regular (IDDSI 7)    Fluid Consistency: Thin (IDDSI 0)             Activity:      Restrictions or Other Recommendations:         CODE STATUS:    Code Status and Medical Interventions: CPR (Attempt to Resuscitate); Full Support   Ordered at: 07/09/25 0010     Code Status (Patient has no pulse and is not breathing):    CPR (Attempt to Resuscitate)     Medical Interventions (Patient has pulse or is breathing):    Full Support     Level Of Support Discussed With:    Patient       Future Appointments   Date Time Provider Department Center   7/22/2025  1:15 PM Ben Holder DO [facility]   12/9/2025  1:00 PM Halima Booker APRN [facility]   3/3/2026  1:45 PM Bernice Ortiz MD [facility]       Additional Instructions for the Follow-ups that You Need to Schedule       Call MD With Problems / Concerns   As directed      Please seek medical attention for any of the following: Difficulty breathing, chest pain, passing out, vomiting blood, bloody stools, and/or any other concerning symptoms    Order Comments: Please seek medical attention for any of the following: Difficulty breathing, chest pain, passing out, vomiting blood, bloody stools, and/or any other concerning symptoms         Discharge Follow-up with PCP   As directed       Currently Documented PCP:    Ben Holder DO    PCP Phone Number:    [phone]     Follow Up Details: PCP in 5-7 days regarding this hospitalization, repeat labs (CBC, CMP)                      Rita Villarreal MD  07/14/25      Time Spent on Discharge:  I spent  35  minutes on this discharge activity which included: face-to-face encounter with the patient, reviewing the data in the system, coordination of the care with the nursing staff as well as consultants, documentation, and entering orders.

## 2025-07-14 NOTE — THERAPY TREATMENT NOTE
Patient Name: Cordell Martin  : 1945    MRN: [id number]                              Today's Date: 2025       Admit Date: 2025    Visit Dx:     ICD-10-CM ICD-9-CM   1. Bilateral lower leg cellulitis  L03.116 682.6    L03.115    2. Dependent edema  R60.9 782.3   3. Venous stasis ulcer of calf with fat layer exposed without varicose veins, unspecified laterality  I87.2 459.81    L97.202 707.12   4. Parkinson's disease, unspecified whether dyskinesia present, unspecified whether manifestations fluctuate  G20.A1 332.0   5. Hyperlipidemia, unspecified hyperlipidemia type  E78.5 272.4   6. Anemia, chronic disease  D63.8 285.29   7. Dysphagia, unspecified type  R13.10 787.20   8. Dysarthria  R47.1 784.51     Patient Active Problem List   Diagnosis    Anxiety    Arthritis    Depression    Hyperlipidemia    Lumbar stenosis with neurogenic claudication    Parkinson's disease    Lumbar stenosis with neurogenic claudication status post L4-5 decompression    Status post lumbar laminectomy    Memory loss    Cellulitis of right lower extremity    Hypothyroidism (acquired)    Pulmonary nodule (4mm RML incidental)    GERD    Remote smoker (Stopped )    Restrictive pattern on PFTs w/o evidence of ILD    History of DVT (deep vein thrombosis)    Anemia, chronic disease    Restless leg syndrome    Cellulitis     Past Medical History:   Diagnosis Date    Anxiety     Arthritis     Back pain     Bronchitis     Cognitive impairment     Depression     Disease of thyroid gland     Glucose intolerance (impaired glucose tolerance)     Hyperlipidemia     Kidney stone     Obesity     Parkinson disease     Pneumonia     Prostate disorder     Thyroid disease     Wears eyeglasses      Past Surgical History:   Procedure Laterality Date    COLONOSCOPY      5 years ago    EYE SURGERY      HERNIA REPAIR  10/20/2020    KNEE SURGERY      LUMBAR LAMINECTOMY DISCECTOMY DECOMPRESSION N/A 2017    Procedure: LUMBAR LAMINECTOMY  L4-5;  Surgeon: Antonio Trent MD;  Location: [facility];  Service:     SHOULDER ROTATOR CUFF REPAIR Right     x2    TONSILLECTOMY      VEIN SURGERY        General Information       Row Name 07/14/25 1528          OT Time and Intention    Document Type therapy note (daily note)  -JY     Mode of Treatment occupational therapy;individual therapy  -JY       Row Name 07/14/25 1528          General Information    Patient Profile Reviewed yes  -JY     Existing Precautions/Restrictions fall;other (see comments)  chronic back pain, PD  -JY     Barriers to Rehab medically complex  -JY       Row Name 07/14/25 1528          Cognition    Orientation Status (Cognition) oriented x 4  -JY       Row Name 07/14/25 1528          Safety Issues/Impairments Affecting Functional Mobility    Safety Issues Affecting Function (Mobility) awareness of need for assistance;insight into deficits/self-awareness;judgment;problem-solving;safety precaution awareness;safety precautions follow-through/compliance;sequencing abilities  -JY     Impairments Affecting Function (Mobility) balance;strength;pain;endurance/activity tolerance;postural/trunk control;range of motion (ROM);sensation/sensory awareness;shortness of breath  -JY     Comment, Safety Issues/Impairments (Mobility) safety issues and impairments noted based on other observations, report as OT interventions completed from bed level this date d/t awaiting wound care tx in bed  -JY               User Key  (r) = Recorded By, (t) = Taken By, (c) = Cosigned By      Initials Name Provider Type    Priti Valiente, OT Occupational Therapist                     Mobility/ADL's       Row Name 07/14/25 1529          Bed Mobility    Bed Mobility rolling right  -JY     Rolling Right [place] (Bed Mobility) moderate assist (50% patient effort);verbal cues  -JY     Bed Mobility, Safety Issues decreased use of arms for pushing/pulling;decreased use of legs for bridging/pushing;impaired trunk control  for bed mobility  -J     Assistive Device (Bed Mobility) head of bed elevated;bed rails;repositioning sheet  -     Comment, (Bed Mobility) skilled cues for hand placement and seq utilizing support of bed rails to pull self to sidelying toward R for improved placement of positioning pillows for comfort and pressure reduction, relief; pt req'd mod A to bring LB to sidelying aligning with UB moved more [place] by pt  -[facility] Name 07/14/25 1529          Transfers    Comment, (Transfers) did not assess fxl t/fs this date as OT interventions completed from bed level d/t pt awaiting wound care tx at bed level  -[facility] Name 07/14/25 1529          Bed-Chair Transfer    Bed-Chair [place] (Transfers) not tested  -[facility] Name 07/14/25 1529          Sit-Stand Transfer    Sit-Stand [place] (Transfers) not tested  -[facility] Name 07/14/25 1529          Stand-Sit Transfer    Stand-Sit [place] (Transfers) not tested  -[facility] Name 07/14/25 1529          Functional Mobility    Functional Mobility- Ind. Level not tested  -     Functional Mobility- Comment defer to PT for specifics, fxl mobility not assessed this date d/t OT interventions completed from bed level d/t pt awaiting wound care tx at bed level, pt further reports non ambulatory in last ~ 4 months  -     Patient was able to Ambulate no, other medical factors prevent ambulation  -     Reason Patient was unable to Ambulate Non-Ambulatory at Baseline  -[facility] Name 07/14/25 1529          Activities of Daily Living    BADL Assessment/Intervention upper body dressing;grooming;lower body dressing  -[facility] Name 07/14/25 1529          Lower Body Dressing Assessment/Training    [place] Level (Lower Body Dressing) doff;don;socks;dependent (less than 25% patient effort)  -     Position (Lower Body Dressing) supine  -[facility] Name 07/14/25 1529          Grooming Assessment/Training    [place] Level (Grooming) wash  face, hands;set up  -     Position (Grooming) sitting up in bed  -[facility] Name 07/14/25 1529          Upper Body Dressing Assessment/Training    [place] Level (Upper Body Dressing) ahmet/litzy;other (see comments);moderate assist (50% patient effort);verbal cues  mgmt  -     Position (Upper Body Dressing) sitting up in bed  -     Comment, (Upper Body Dressing) mod A for mgmt for improved fit and alignment, deferred need for exchange  -               User Key  (r) = Recorded By, (t) = Taken By, (c) = Cosigned By      Initials Name Provider Type    Priti Valiente, OT Occupational Therapist                   Obj/Interventions       [facility] Name 07/14/25 1534          Shoulder (Therapeutic Exercise)    Shoulder (Therapeutic Exercise) AAROM (active assistive range of motion)  -     Shoulder AAROM (Therapeutic Exercise) left assist right;right assist left;bilateral;flexion;extension;scapular protraction;scapular retraction;10 repetitions;other (see comments)  sitting up in bed  -[facility] Name 07/14/25 1534          Elbow/Forearm (Therapeutic Exercise)    Elbow/Forearm (Therapeutic Exercise) AAROM (active assistive range of motion)  -     Elbow/Forearm AAROM (Therapeutic Exercise) left assist right;right assist left;bilateral;flexion;extension;supination;pronation;10 repetitions;other (see comments)  sitting up in bed  -[facility] Name 07/14/25 1534          Wrist (Therapeutic Exercise)    Wrist (Therapeutic Exercise) AAROM (active assistive range of motion)  -     Wrist AAROM (Therapeutic Exercise) left assist right;right assist left;bilateral;flexion;extension;10 repetitions  -[facility] Name 07/14/25 1534          Hand (Therapeutic Exercise)    Hand (Therapeutic Exercise) AROM (active range of motion)  -     Hand AROM/AAROM (Therapeutic Exercise) AROM (active range of motion);finger flexion;finger extension;10 repetitions  -[facility] Name 07/14/25 1534          Motor Skills    Motor  Skills functional endurance  -JY     Functional Endurance decreased activity tolerance toward more dynamic demands; maintained SPO2 > 90% throughout with supplemental O2  -JY     Therapeutic Exercise shoulder;elbow/forearm;wrist;hand;other (see comments)  facilitated BUE AAROM for joint mobility to prepare UEs for progressive strengthening and use in ADLs, related t/fs  -JY       Row Name 07/14/25 1534          Balance    Static Sitting Balance standby assist  -JY     Position, Sitting Balance supported  -JY     Comment, Balance u/a to assess pt from less uspported positions yet when sitting up in bed for TE and ADLs, no LOB noted  -JY               User Key  (r) = Recorded By, (t) = Taken By, (c) = Cosigned By      Initials Name Provider Type    Priti Valiente, JUNG Occupational Therapist                   Goals/Plan    No documentation.                  Clinical Impression       Row Name 07/14/25 1536          Pain Assessment    Pretreatment Pain Rating 7/10  -JY     Posttreatment Pain Rating 7/10  -JY     Pain Location extremity;back  -JY     Pain Side/Orientation right;lower  -JY     Pain Management Interventions activity modification encouraged;positioning techniques utilized  -JY     Response to Pain Interventions activity participation with tolerable pain  -JY     Pre/Posttreatment Pain Comment pt reported pain at R ankle and grossly at back with concern for positioning, assisted pt in repositioning and pain remained same yet pt endorsed more comfort  -JY       Row Name 07/14/25 1536          Plan of Care Review    Plan of Care Reviewed With patient  -JY     Progress no change  -JY     Outcome Evaluation Pt alert and participatory in OT interventions focused from bed level as pt awaited wound care for tx at [facility]. Faciltiated BUE A/AROM progressing proximal to distal to promote joint mobility, progress toward strengthening and integrate better in ADLs, related t/fs and mobility. Pt tolerated w/o increase in  pain. PT able to manage gown with mod A, complete simple hair grooming and face washing with SUA. Demonstrated bed mob rolling to R side with mod A and use of bed supports to reach sidelying for improved positioning w/ pillow support for comfort and alignment. PT would benefit from cont'd IPOT POC as pt able to tolerate.  [facility] 07/[facility]          Therapy Assessment/Plan (OT)    Criteria for Skilled Therapeutic Interventions Met (OT) yes;meets criteria;skilled treatment is necessary  -JY     Therapy Frequency (OT) daily  -JY       [facility] Name 07/14/25 1536          Therapy Plan Review/Discharge Plan (OT)    Anticipated Discharge Disposition (OT) [facility] nursing facility  -JY       Row Name 07/14/25 1536          Vital Signs    Pre Systolic BP Rehab 114  -JY     Pre Treatment Diastolic BP 55  -JY     Pre SpO2 (%) 94  -JY     O2 Delivery Pre Treatment supplemental O2  -JY     O2 Delivery Intra Treatment supplemental O2  -JY     Post SpO2 (%) 94  -JY     O2 Delivery Post Treatment supplemental O2  -JY     Pre Patient Position Supine  -JY     Intra Patient Position Supine  -JY     Post Patient Position Supine  -JY       Row Name 07/14/25 1536          Positioning and Restraints    Pre-Treatment Position in bed  -JY     Post Treatment Position bed  -JY     In Bed notified nsg;fowlers;call light within reach;encouraged to call for assist;exit alarm on;legs elevated;heels elevated  aligned toward R side with pillow at trunk  -JY               User Key  (r) = Recorded By, (t) = Taken By, (c) = Cosigned By      Initials Name Provider Type    Priti Valiente, OT Occupational Therapist                   Outcome Measures       Row Name 07/14/25 8239          How much help from another is currently needed...    Putting on and taking off regular lower body clothing? 1  -JY     Bathing (including washing, rinsing, and drying) 2  -JY     Toileting (which includes using toilet bed pan or urinal) 1  -JY     Putting on  and taking off regular upper body clothing 2  -JY     Taking care of personal grooming (such as brushing teeth) 3  -JY     Eating meals 3  -JY     AM-PAC 6 Clicks Score (OT) 12  -JY       Row Name 07/14/25 0846          How much help from another person do you currently need...    Turning from your back to your side while in flat bed without using bedrails? 2  -EA     Moving from lying on back to sitting on the side of a flat bed without bedrails? 2  -EA     Moving to and from a bed to a chair (including a wheelchair)? 2  -EA     Standing up from a chair using your arms (e.g., wheelchair, bedside chair)? 2  -EA     Climbing 3-5 steps with a railing? 1  -EA     To walk in hospital room? 1  -EA     AM-PAC 6 Clicks Score (PT) 10  -EA     Highest Level of Mobility Goal Move to Chair/Commode-4  -EA       Row Name 07/14/25 1540          Functional Assessment    Outcome Measure Options AM-PAC 6 Clicks Daily Activity (OT)  -               User Key  (r) = Recorded By, (t) = Taken By, (c) = Cosigned By      Initials Name Provider Type    Priti Valiente OT Occupational Therapist    Mariza Gill, RN Registered Nurse                    Occupational Therapy Education       Title: PT OT SLP Therapies (In Progress)       Topic: Occupational Therapy (In Progress)       Point: ADL training (In Progress)       Learning Progress Summary            Patient Acceptance, E,D, NR by ANALI at 7/14/2025 1510                      Point: Precautions (In Progress)       Learning Progress Summary            Patient Acceptance, E,D, NR by ANALI at 7/14/2025 1510                      Point: Body mechanics (In Progress)       Learning Progress Summary            Patient Acceptance, E,D, NR by ANALI at 7/14/2025 1510                                      User Key       Initials Effective Dates Name Provider Type Discipline    ANALI 06/16/21 -  Priti Caro OT Occupational Therapist OT                  OT Recommendation and Plan  Therapy Frequency  (OT): daily  Plan of Care Review  Plan of Care Reviewed With: patient  Progress: no change  Outcome Evaluation: Pt alert and participatory in OT interventions focused from bed level as pt awaited wound care for tx at [facility]. Faciltiated BUE A/AROM progressing proximal to distal to promote joint mobility, progress toward strengthening and integrate better in ADLs, related t/fs and mobility. Pt tolerated w/o increase in pain. PT able to manage gown with mod A, complete simple hair grooming and face washing with SUA. Demonstrated bed mob rolling to R side with mod A and use of bed supports to reach sidelying for improved positioning w/ pillow support for comfort and alignment. PT would benefit from cont'd IPOT POC as pt able to tolerate.     Time Calculation:         Time Calculation- OT       Row Name 07/14/25 1541             Time Calculation- OT    OT Start Time 1510  -JY      OT Received On 07/14/25  -JY      OT Goal Re-Cert Due Date 07/20/25  -JY         Timed Charges    70001 - OT Therapeutic Exercise Minutes 12  -JY      30301 - OT Self Care/Mgmt Minutes 6  -JY         Total Minutes    Timed Charges Total Minutes 18  -JY       Total Minutes 18  -JY                User Key  (r) = Recorded By, (t) = Taken By, (c) = Cosigned By      Initials Name Provider Type    Priti Valiente OT Occupational Therapist                  Therapy Charges for Today       Code Description Service Date Service Provider Modifiers Qty    [id number]  OT THER PROC EA 15 MIN 7/14/2025 Priti Caro OT GO 1                 Priti Caro OT  7/14/2025

## 2025-07-14 NOTE — PLAN OF CARE
Goal Outcome Evaluation:              Outcome Evaluation: Pt is alert and oriented x4. VSS on room air to 2L nasal cannula. Voiding spontaneously. Skin interventions in place.

## 2025-07-15 VITALS
HEIGHT: 72 IN | RESPIRATION RATE: 18 BRPM | BODY MASS INDEX: 35.61 KG/M2 | HEART RATE: 66 BPM | SYSTOLIC BLOOD PRESSURE: 117 MMHG | OXYGEN SATURATION: 98 % | TEMPERATURE: 98.2 F | DIASTOLIC BLOOD PRESSURE: 65 MMHG | WEIGHT: 262.9 LBS

## 2025-07-15 PROCEDURE — 25010000002 ENOXAPARIN PER 10 MG

## 2025-07-15 RX ADMIN — ENOXAPARIN SODIUM 40 MG: 100 INJECTION SUBCUTANEOUS at 08:15

## 2025-07-15 RX ADMIN — CARBIDOPA AND LEVODOPA 2 TABLET: 25; 100 TABLET ORAL at 05:35

## 2025-07-15 RX ADMIN — CARBIDOPA AND LEVODOPA 2 TABLET: 25; 100 TABLET ORAL at 00:03

## 2025-07-15 RX ADMIN — PRAMIPEXOLE DIHYDROCHLORIDE 1 MG: 1 TABLET ORAL at 08:14

## 2025-07-15 RX ADMIN — Medication 10 ML: at 08:16

## 2025-07-15 RX ADMIN — BISACODYL 5 MG: 5 TABLET, COATED ORAL at 08:15

## 2025-07-15 RX ADMIN — CARBIDOPA AND LEVODOPA 2 TABLET: 25; 100 TABLET ORAL at 11:38

## 2025-07-15 RX ADMIN — TAMSULOSIN HYDROCHLORIDE 0.4 MG: 0.4 CAPSULE ORAL at 08:14

## 2025-07-15 RX ADMIN — ATORVASTATIN CALCIUM 40 MG: 40 TABLET, FILM COATED ORAL at 08:15

## 2025-07-15 RX ADMIN — PAROXETINE HYDROCHLORIDE 10 MG: 10 TABLET, FILM COATED ORAL at 08:15

## 2025-07-15 RX ADMIN — FLUTICASONE PROPIONATE 2 SPRAY: 50 SPRAY, METERED NASAL at 08:15

## 2025-07-15 RX ADMIN — SENNOSIDES, DOCUSATE SODIUM 2 TABLET: 50; 8.6 TABLET, FILM COATED ORAL at 08:14

## 2025-07-15 RX ADMIN — CARBIDOPA AND LEVODOPA 1 TABLET: 25; 100 TABLET, EXTENDED RELEASE ORAL at 08:14

## 2025-07-15 RX ADMIN — Medication 1 CAPSULE: at 08:14

## 2025-07-15 RX ADMIN — LEVOTHYROXINE SODIUM 88 MCG: 88 TABLET ORAL at 05:35

## 2025-07-15 RX ADMIN — GABAPENTIN 100 MG: 100 CAPSULE ORAL at 08:15

## 2025-07-15 NOTE — PLAN OF CARE
Problem: Adult Inpatient Plan of Care  Goal: Absence of Hospital-Acquired Illness or Injury  Intervention: Identify and Manage Fall Risk  Recent Flowsheet Documentation  Taken 7/15/2025 0200 by Arianna Swift RN  Safety Promotion/Fall Prevention:   activity supervised   assistive device/personal items within reach   clutter free environment maintained   fall prevention program maintained   gait belt   lighting adjusted   mobility aid in reach   nonskid shoes/slippers when out of bed   room organization consistent   safety round/check completed   toileting scheduled  Taken 7/15/2025 0000 by Arianna Swift RN  Safety Promotion/Fall Prevention:   activity supervised   assistive device/personal items within reach   clutter free environment maintained   fall prevention program maintained   gait belt   lighting adjusted   patient off unit   nonskid shoes/slippers when out of bed   room organization consistent   safety round/check completed   toileting scheduled  Taken 7/14/2025 2200 by Arianna Swift RN  Safety Promotion/Fall Prevention:   activity supervised   clutter free environment maintained   fall prevention program maintained   lighting adjusted   nonskid shoes/slippers when out of bed   room organization consistent   safety round/check completed  Taken 7/14/2025 2000 by Arianna Swift RN  Safety Promotion/Fall Prevention:   activity supervised   assistive device/personal items within reach   fall prevention program maintained   gait belt   mobility aid in reach   nonskid shoes/slippers when out of bed   safety round/check completed   room organization consistent   toileting scheduled  Intervention: Prevent Skin Injury  Recent Flowsheet Documentation  Taken 7/15/2025 0200 by Arianna Swift RN  Body Position: position maintained  Taken 7/15/2025 0100 by Arianna Swift RN  Body Position:   turned   left  Taken 7/15/2025 0000 by Arianna Swift RN  Body Position: position maintained  Skin Protection:   incontinence  pads utilized   drying agents applied  Taken 7/14/2025 2200 by Arianna Swift RN  Skin Protection:   incontinence pads utilized   drying agents applied  Taken 7/14/2025 2000 by Arianna Swift RN  Body Position: supine  Skin Protection:   incontinence pads utilized   silicone foam dressing in place   transparent dressing maintained  Intervention: Prevent and Manage VTE (Venous Thromboembolism) Risk  Recent Flowsheet Documentation  Taken 7/14/2025 2000 by Arianna Swift RN  VTE Prevention/Management: (Lovenox) other (see comments)  Intervention: Prevent Infection  Recent Flowsheet Documentation  Taken 7/15/2025 0200 by Arianna Swift RN  Infection Prevention:   environmental surveillance performed   hand hygiene promoted   rest/sleep promoted   single patient room provided   visitors restricted/screened  Taken 7/15/2025 0000 by Arianna Swift RN  Infection Prevention:   environmental surveillance performed   hand hygiene promoted   rest/sleep promoted   single patient room provided   visitors restricted/screened  Taken 7/14/2025 2200 by Arianna Swift RN  Infection Prevention:   environmental surveillance performed   hand hygiene promoted   personal protective equipment utilized   rest/sleep promoted   single patient room provided  Taken 7/14/2025 2000 by Arianna Swift RN  Infection Prevention:   environmental surveillance performed   hand hygiene promoted   rest/sleep promoted  Goal: Optimal Comfort and Wellbeing  Intervention: Monitor Pain and Promote Comfort  Recent Flowsheet Documentation  Taken 7/15/2025 0200 by Arianna Swift RN  Pain Management Interventions: no interventions per patient request  Taken 7/15/2025 0000 by Arianna Swift RN  Pain Management Interventions: no interventions per patient request  Taken 7/14/2025 2200 by Arianna Swift RN  Pain Management Interventions: no interventions per patient request  Taken 7/14/2025 2000 by Arianna Swift RN  Pain Management Interventions: no interventions per  patient request  Intervention: Provide Person-Centered Care  Recent Flowsheet Documentation  Taken 7/14/2025 2200 by Arianna Swift RN  Trust Relationship/Rapport:   care explained   choices provided  Taken 7/14/2025 2000 by Arianna Swift RN  Trust Relationship/Rapport:   care explained   choices provided   Goal Outcome Evaluation: Patient is alert and oriented x4. On 2L NC. Patients pain controlled with PO gabapentin. Patient up to bedside commode with min assist x2. Dressing to BLE remains dry and intact. Patient is resting, call light in reach

## 2025-07-15 NOTE — DISCHARGE SUMMARY
[facility] Medicine Services  DISCHARGE SUMMARY    Patient Name: Cordell Martin  : 1945  MRN: [id number]    Date of Admission: 2025  8:38 PM  Date of Discharge: 7/15/2025  Primary Care Physician: Ben Holder DO    Consults       No orders found from 2025 to 2025.            Hospital Course     Presenting Problem: Cellulitis    Active Hospital Problems    Diagnosis  POA    **Cellulitis [L03.90]  Yes    Restless leg syndrome [G25.81]  Yes    History of DVT (deep vein thrombosis) [Z86.718]  Not Applicable    Hypothyroidism (acquired) [E03.9]  Yes    Cellulitis of right lower extremity [L03.115]  Yes    Hyperlipidemia [E78.5]  Yes      Resolved Hospital Problems   No resolved problems to display.          Hospital Course:  Cordell Martin is a 80 y.o. male with history of anxiety, chronic back pain, cognitive impairment, depression, hypothyroidism, HLD, PD, obesity, admitted with B LE redness/drainage. Venous duplex negative for VTE in portions visualized.     This patient's problems and plans were partially entered by my partner and updated as appropriate by me 07/15/25.     B LE cellulitis  B LE vascular ulcers  Lymphedema   -duplex negative for VTE in portions visualized   -S/p course of IV abx; LE edema, wounds, and erythema significantly improved prior to DC  -wound care; PT wound consulted, now w unna boots, continue at facility and change every 2-3 days      Elevated Cr  -Cr bump from 1.27-->1.5-->1.4, now stabilized   -Had been on vanc/zosyn; stopped vanc     Hypothyroidism-synthroid  HLD-statin  PD-on carbidopa/levodopa  RLS    Discharge Follow Up Recommendations for outpatient labs/diagnostics:  PCP in 5-7 days regarding this hospitalization, repeat labs (CBC, CMP)  Continue leg wraps at facility, change every 2 to 3 days    Day of Discharge     HPI:   Had a bowel movement last night.  Tolerating diet.  No chest pain or shortness of breath.  On  room air at time of evaluation and satting in the mid 90s.  No nausea or vomiting.  Denied leg pain.  Discussed follow-up plan with patient.    Review of Systems  As above    Vital Signs:   Temp:  [97.5 °F (36.4 °C)-98 °F (36.7 °C)] 98 °F (36.7 °C)  Heart Rate:  [60-65] 65  Resp:  [18] 18  BP: ()/(51-84) 110/55  Flow (L/min) (Oxygen Therapy):  [2] 2      Physical Exam:  Constitutional: No acute distress, awake, alert, sitting up in bed, obese  HENT: NCAT, mucous membranes moist  Respiratory: Clear to auscultation bilaterally, respiratory effort normal   Cardiovascular: RRR  Gastrointestinal: Normoactive bowel sounds, soft, nontender, nondistended  Psychiatric: Flat affect, cooperative  Neurologic: Alert, oriented, symmetric facies, MARIE, +masked facies, +pill-rolling tremor, speech clear  Skin: B/l LE in Unna boots    Pertinent  and/or Most Recent Results     LAB RESULTS:      Lab 07/10/25  0538 07/09/25  1014 07/08/25  2305 07/08/25  2137   WBC 4.68 6.45  --  6.81   HEMOGLOBIN 10.7* 11.2*  --  12.7*   HEMATOCRIT 33.9* 36.4*  --  42.7   PLATELETS 174 183  --  190   NEUTROS ABS  --  4.87  --  4.17   IMMATURE GRANS (ABS)  --  0.02  --  0.02   LYMPHS ABS  --  1.04  --  1.83   MONOS ABS  --  0.37  --  0.55   EOS ABS  --  0.14  --  0.22   MCV 92.4 92.6  --  95.5   SED RATE  --   --  82*  --    CRP  --   --   --  4.69*   PROCALCITONIN  --   --   --  0.08   LACTATE  --   --   --  1.2   PROTIME  --   --   --  14.4         Lab 07/14/25  0928 07/12/25  1012 07/11/25  1519 07/10/25  0538 07/09/25  1014   SODIUM 138 137 140 139 139   POTASSIUM 4.0 4.4 4.3 4.1 4.3   CHLORIDE 102 101 102 102 102   CO2 26.0 26.0 27.6 28.0 27.9   ANION GAP 10.0 10.0 10.4 9.0 9.1   BUN 27.2* 30.9* 29.1* 26.7* 20.8   CREATININE 1.43* 1.44* 1.53* 1.50* 1.27   EGFR 50.2* 49.7* 46.2* 47.4* 57.8*   GLUCOSE 139* 122* 108* 114* 137*   CALCIUM 9.1 9.1 8.9 8.5* 8.7   MAGNESIUM  --   --  2.2 2.1  --          Lab 07/10/25  0538 07/09/25  1014  07/08/25 2137   TOTAL PROTEIN 6.3 6.8 8.0   ALBUMIN 3.0* 3.0* 3.5   GLOBULIN 3.3 3.8 4.5   ALT (SGPT) <5 <5 <5   AST (SGOT) 13 15 13   BILIRUBIN 0.3 0.4 0.5   ALK PHOS 125* 126* 156*         Lab 07/08/25 2137   PROBNP 44.3   PROTIME 14.4   INR 1.05                 Brief Urine Lab Results  (Last result in the past 365 days)        Color   Clarity   Blood   Leuk Est   Nitrite   Protein   CREAT   Urine HCG        10/08/24 2219 Yellow   Clear   Negative   Negative   Negative   Negative                 Microbiology Results (last 10 days)       Procedure Component Value - Date/Time    Blood Culture - Blood, Arm, Right [[id number]]  (Normal) Collected: 07/08/25 2140    Lab Status: Final result Specimen: Blood from Arm, Right Updated: 07/13/25 2200     Blood Culture No growth at 5 days    Blood Culture - Blood, Arm, Left [[id number]]  (Normal) Collected: 07/08/25 2139    Lab Status: Final result Specimen: Blood from Arm, Left Updated: 07/13/25 2200     Blood Culture No growth at 5 days            Duplex Venous Lower Extremity - Bilateral CAR  Result Date: 7/9/2025    The bilateral lower extremity venous duplex scan is negative for evidence of a DVT and SVT in the areas visualized.  The right great saphenous vein (above-knee) was not well-visualized.     XR Chest 1 View  Result Date: 7/8/2025  XR CHEST 1 VW Date of Exam: 7/8/2025 9:27 PM EDT Indication: cough Comparison: 2/6/2025 and 6/3/2025 Findings: Unremarkable cardiomediastinal silhouette. Low lung volumes. Airspace opacity along the left lung base favors atelectasis. Otherwise no focal airspace consolidation. No pleural effusion or pneumothorax. No acute osseous abnormality.     Impression: Low lung volumes with probable left basilar atelectasis. Electronically Signed: Gilbert Waller MD  7/8/2025 9:47 PM EDT  Workstation ID: [id number]      Results for orders placed during the hospital encounter of 07/08/25    Duplex Venous Lower Extremity - Bilateral CAR 07/09/2025  4:16  "PM    Interpretation Summary    The bilateral lower extremity venous duplex scan is negative for evidence of a DVT and SVT in the areas visualized.  The right great saphenous vein (above-knee) was not well-visualized.      Results for orders placed during the hospital encounter of 07/08/25    Duplex Venous Lower Extremity - Bilateral CAR 07/09/2025  4:16 PM    Interpretation Summary    The bilateral lower extremity venous duplex scan is negative for evidence of a DVT and SVT in the areas visualized.  The right great saphenous vein (above-knee) was not well-visualized.      Results for orders placed during the hospital encounter of 01/12/21    Adult Transthoracic Echo Complete W/ Cont if Necessary Per Protocol 01/13/2021  4:06 PM    Interpretation Summary  · The right ventricle and right atrium are moderately dilated. Other chamber sizes and wall thicknesses are normal.  · Global and segmental LV wall motion is normal. The estimated left ventricular ejection fraction is 51% - 55%.  · The aortic and mitral valves are normal in structure and function.  · The estimated RV systolic pressure is mildly elevated 35 mmHg - 40 mmHg. There is as well noted to be less than 50% inspiratory IVC collapse. The main pulmonary artery is \"borderline dilated\".  · There are no other important findings on this study.      Plan for Follow-up of Pending Labs/Results:     Discharge Details        Discharge Medications        New Medications        Instructions Start Date   sennosides-docusate 8.6-50 MG per tablet  Commonly known as: PERICOLACE   2 tablets, Oral, 2 Times Daily, Hold for loose stools             Changes to Medications        Instructions Start Date   fluticasone 50 MCG/ACT nasal spray  Commonly known as: FLONASE  What changed:   how to take this  when to take this   USE 2 SPRAYS IN EACH NOSTRIL ONCE DAILY      furosemide 20 MG tablet  Commonly known as: LASIX  What changed:   how much to take  Another medication with the same " name was removed. Continue taking this medication, and follow the directions you see here.   20 mg, Oral, Daily      levothyroxine 100 MCG tablet  Commonly known as: SYNTHROID, LEVOTHROID  What changed: Another medication with the same name was removed. Continue taking this medication, and follow the directions you see here.   100 mcg, Oral, Every Early Morning             Continue These Medications        Instructions Start Date   ammonium lactate 12 % lotion  Commonly known as: AmLactin   Topical, As Needed      atorvastatin 40 MG tablet  Commonly known as: LIPITOR   40 mg, Oral, Daily      budesonide-formoterol 160-4.5 MCG/ACT inhaler  Commonly known as: SYMBICORT   2 puffs, Inhalation, 2 Times Daily - RT      carbidopa-levodopa  MG per tablet  Commonly known as: SINEMET   TAKE 2 TABLETS BY MOUTH 4 TIMES DAILY *MUST MAKE APPT FOR REFILLS*      carbidopa-levodopa ER  MG per tablet  Commonly known as: SINEMET CR   1 tablet, Oral, 2 Times Daily      pantoprazole 40 MG EC tablet  Commonly known as: PROTONIX   40 mg, Oral, Daily      pramipexole 1 MG tablet  Commonly known as: MIRAPEX   1 mg, Oral, 3 Times Daily      simethicone 125 MG capsule capsule  Commonly known as: MYLICON,GAS-X   1 cap(s) orally 4 times a day (after meals and at bedtime) PRN; Duration: 30 days      tamsulosin 0.4 MG capsule 24 hr capsule  Commonly known as: FLOMAX   0.4 mg, Oral, Daily      vitamin D3 125 MCG (5000 UT) capsule capsule   1 capsule, Daily             Stop These Medications      Diclofenac Sodium 1 % gel gel  Commonly known as: VOLTAREN     esomeprazole 20 MG capsule  Commonly known as: nexIUM     piperacillin-tazobactam 3-0.375 GM/50ML IVPB  Commonly known as: ZOSYN              No Known Allergies      Discharge Disposition:  Skilled Nursing Facility (DC - External)    Diet:  Hospital:  Diet Order   Procedures    Diet: Cardiac, Diabetic; Healthy Heart (2-3 Na+); Consistent Carbohydrate; Fluid Consistency: Thin (IDDSI  0)       Diet Instructions       Diet: Cardiac Diets; Healthy Heart (2-3 Na+); Regular (IDDSI 7); Thin (IDDSI 0)      Discharge Diet: Cardiac Diets    Cardiac Diet: Healthy Heart (2-3 Na+)    Texture: Regular (IDDSI 7)    Fluid Consistency: Thin (IDDSI 0)             Activity:      Restrictions or Other Recommendations:         CODE STATUS:    Code Status and Medical Interventions: CPR (Attempt to Resuscitate); Full Support   Ordered at: 07/09/25 0010     Code Status (Patient has no pulse and is not breathing):    CPR (Attempt to Resuscitate)     Medical Interventions (Patient has pulse or is breathing):    Full Support     Level Of Support Discussed With:    Patient       Future Appointments   Date Time Provider Department Center   7/22/2025  1:15 PM Ben Holder DO [facility]   12/9/2025  1:00 PM Halima Booker APRN [facility]   3/3/2026  1:45 PM Bernice Ortiz MD [facility]       Additional Instructions for the Follow-ups that You Need to Schedule       Call MD With Problems / Concerns   As directed      Please seek medical attention for any of the following: Difficulty breathing, chest pain, passing out, vomiting blood, bloody stools, and/or any other concerning symptoms    Order Comments: Please seek medical attention for any of the following: Difficulty breathing, chest pain, passing out, vomiting blood, bloody stools, and/or any other concerning symptoms         Discharge Follow-up with PCP   As directed       Currently Documented PCP:    Ben Holder DO    PCP Phone Number:    [phone]     Follow Up Details: PCP in 5-7 days regarding this hospitalization, repeat labs (CBC, CMP)                      Rita Villarreal MD  07/15/25      Time Spent on Discharge:  I spent  35  minutes on this discharge activity which included: face-to-face encounter with the patient, reviewing the data in the system, coordination of the care with the nursing staff as well as consultants,  documentation, and entering orders.

## 2025-07-24 ENCOUNTER — PATIENT OUTREACH (OUTPATIENT)
Dept: CASE MANAGEMENT | Facility: OTHER | Age: 80
End: 2025-07-24
Payer: MEDICARE

## 2025-07-24 NOTE — OUTREACH NOTE
AMBULATORY CASE MANAGEMENT NOTE    Names and Relationships of Patient/Support Persons: Contact: [facility]; Relationship:  -     Care Coordination    RN-ACM care coordination with staff at [facility].  Per their report, patient remains at the facility.  Discharge planning is incomplete at this time.       Latonya BLANCAS  Ambulatory Case Management    7/24/2025, 14:56 EDT

## 2025-08-13 ENCOUNTER — TRANSCRIBE ORDERS (OUTPATIENT)
Dept: OCCUPATIONAL THERAPY | Facility: HOSPITAL | Age: 80
End: 2025-08-13
Payer: MEDICARE

## 2025-08-13 DIAGNOSIS — I89.0 LYMPHEDEMA: Primary | ICD-10-CM

## 2025-08-15 ENCOUNTER — TRANSCRIBE ORDERS (OUTPATIENT)
Dept: PHYSICAL THERAPY | Facility: HOSPITAL | Age: 80
End: 2025-08-15
Payer: MEDICARE

## 2025-08-15 DIAGNOSIS — I89.0 LYMPHEDEMA: Primary | ICD-10-CM

## 2025-08-26 ENCOUNTER — HOSPITAL ENCOUNTER (OUTPATIENT)
Dept: OCCUPATIONAL THERAPY | Facility: HOSPITAL | Age: 80
Setting detail: THERAPIES SERIES
Discharge: HOME | End: 2025-08-26
Payer: MEDICARE

## 2025-08-26 DIAGNOSIS — I89.0 LYMPHEDEMA: Primary | ICD-10-CM

## 2025-08-26 PROCEDURE — 97166 OT EVAL MOD COMPLEX 45 MIN: CPT

## (undated) DEVICE — 3M™ STERI-STRIP™ ANTIMICROBIAL SKIN CLOSURES 1/2 INCH X 4 INCHES 50/CARTON 4 CARTONS/CASE A1847: Brand: 3M™ STERI-STRIP™

## (undated) DEVICE — DRSNG TELFA PAD NONADH STR 1S 3X8IN

## (undated) DEVICE — ENCORE® LATEX MICRO SIZE 7.5, STERILE LATEX POWDER-FREE SURGICAL GLOVE: Brand: ENCORE

## (undated) DEVICE — DRSNG WND BORDR/ADHS NONADHR/GZ LF 4X4IN STRL

## (undated) DEVICE — SOL LR 1000ML

## (undated) DEVICE — JACKSON-PRATT 100CC BULB RESERVOIR: Brand: CARDINAL HEALTH

## (undated) DEVICE — SUT VIC PLS CTD ANTIB BR 3/0 8/18IN 45CM

## (undated) DEVICE — ACCY PA700 LUBRICANT DIFFUSER MR7 4 PACK: Brand: MIDAS REX

## (undated) DEVICE — DRSNG WND GZ PAD BORDERED LF 4X5IN STRL

## (undated) DEVICE — AIRWY SZ11

## (undated) DEVICE — CANNULA,ADULT,SOFT-TOUCH,7TUBE,SC: Brand: MEDLINE

## (undated) DEVICE — ANTIBACTERIAL UNDYED BRAIDED (POLYGLACTIN 910), SYNTHETIC ABSORBABLE SUTURE: Brand: COATED VICRYL

## (undated) DEVICE — 3M™ STERI-STRIP™ REINFORCED ADHESIVE SKIN CLOSURES, R1547, 1/2 IN X 4 IN (12 MM X 100 MM), 6 STRIPS/ENVELOPE: Brand: 3M™ STERI-STRIP™

## (undated) DEVICE — TOOL 14MH30 LEGEND 14CM 3MM: Brand: MIDAS REX ™

## (undated) DEVICE — NEURO SPONGES: Brand: DEROYAL

## (undated) DEVICE — MEDI-VAC YANKAUER SUCTION HANDLE W/BULBOUS TIP: Brand: CARDINAL HEALTH

## (undated) DEVICE — ADHS LIQ MASTISOL 2/3ML

## (undated) DEVICE — FLTR HME STR UNIV W/SMPL PORT

## (undated) DEVICE — ENCORE® LATEX MICRO SIZE 6.5, STERILE LATEX POWDER-FREE SURGICAL GLOVE: Brand: ENCORE

## (undated) DEVICE — PK NEURO DISC 10

## (undated) DEVICE — ELECTRD BLD EXT EDGE/INSUL 1P 4IN

## (undated) DEVICE — JP PERF DRN SIL FLT 7MM FULL: Brand: CARDINAL HEALTH

## (undated) DEVICE — ENCORE® LATEX MICRO SIZE 7, STERILE LATEX POWDER-FREE SURGICAL GLOVE: Brand: ENCORE

## (undated) DEVICE — MEDI-VAC NON-CONDUCTIVE SUCTION TUBING: Brand: CARDINAL HEALTH

## (undated) DEVICE — C-ARM DRAPE: Brand: DEROYAL